# Patient Record
Sex: MALE | URBAN - METROPOLITAN AREA
[De-identification: names, ages, dates, MRNs, and addresses within clinical notes are randomized per-mention and may not be internally consistent; named-entity substitution may affect disease eponyms.]

---

## 2017-02-11 ENCOUNTER — EMERGENCY (EMERGENCY)
Facility: HOSPITAL | Age: 82
LOS: 1 days | Discharge: HOME | End: 2017-02-11
Attending: EMERGENCY MEDICINE | Admitting: EMERGENCY MEDICINE
Payer: MEDICARE

## 2017-02-11 VITALS
OXYGEN SATURATION: 98 % | SYSTOLIC BLOOD PRESSURE: 168 MMHG | HEIGHT: 68 IN | RESPIRATION RATE: 18 BRPM | WEIGHT: 134.92 LBS | DIASTOLIC BLOOD PRESSURE: 67 MMHG | TEMPERATURE: 98 F | HEART RATE: 78 BPM

## 2017-02-11 PROCEDURE — 99283 EMERGENCY DEPT VISIT LOW MDM: CPT

## 2017-02-11 PROCEDURE — 73130 X-RAY EXAM OF HAND: CPT | Mod: 26,LT

## 2017-02-11 NOTE — ED ADULT TRIAGE NOTE - CHIEF COMPLAINT QUOTE
Pt A&Ox3. Pt states he tripped on a raised slab of sidewalk and fell forward using his L hand to break the fall. Pt c/o L knee pain and L hand pain. Pts 4th digit wrapped in gauze by EMS to control bleeding from lac on finger. Pt denies CP/SOB; denies hitting head.

## 2017-02-12 RX ORDER — OXYCODONE AND ACETAMINOPHEN 5; 325 MG/1; MG/1
1 TABLET ORAL
Qty: 12 | Refills: 0
Start: 2017-02-12 | End: 2017-02-15

## 2017-02-12 NOTE — ED PROVIDER NOTE - OBJECTIVE STATEMENT
82 yo M s/p mech. slip and fall, fell forward onto knees and L hand. No head injury, no LOC, no neck c/o. Was helped up and was able to ambulate, notes abrasion to L fingertip and to R knee. Denies anticoagulant use or meds, denies PMH.

## 2017-02-13 ENCOUNTER — EMERGENCY (EMERGENCY)
Facility: HOSPITAL | Age: 82
LOS: 1 days | Discharge: HOME | End: 2017-02-13
Attending: EMERGENCY MEDICINE | Admitting: EMERGENCY MEDICINE
Payer: MEDICARE

## 2017-02-13 VITALS
HEART RATE: 81 BPM | RESPIRATION RATE: 18 BRPM | OXYGEN SATURATION: 100 % | DIASTOLIC BLOOD PRESSURE: 77 MMHG | SYSTOLIC BLOOD PRESSURE: 159 MMHG | TEMPERATURE: 98 F

## 2017-02-13 PROCEDURE — 73130 X-RAY EXAM OF HAND: CPT | Mod: 26,LT

## 2017-02-13 PROCEDURE — 99283 EMERGENCY DEPT VISIT LOW MDM: CPT | Mod: GC

## 2017-02-13 NOTE — ED PROVIDER NOTE - OBJECTIVE STATEMENT
83yom with left 4th metacarpal fracture diagnosed on 2/11 at [facility] after mechanical fall presents for follow up visit. Pt was told to follow up with ortho within 24hr but has not been able to get an appointment w/ hand until tomorrow night, so he was concerned and came to get it check. Pt arrives in ulnar splint, feels like his hand has swollen but denies any worsening pain, numbness, tingling. Has noted some increased bruising in his exposed 2nd/3rd digits. Non-displaced, non-angulated fracture on prior imaging. 83yom with left 4th metacarpal fracture diagnosed on 2/11 at [facility] after mechanical fall presents for follow up visit. Pt was told to follow up with ortho within 24hr but has not been able to get an appointment w/ hand until tomorrow night, so he was concerned and came to get it check. Pt arrives in ulnar splint, feels like his hand has swollen but denies any worsening pain, numbness, tingling. Has noted some increased bruising in his exposed 2nd/3rd digits. Non-displaced, non-angulated fracture on prior imaging. No repeat trauma.

## 2017-02-13 NOTE — ED PROVIDER NOTE - ATTENDING CONTRIBUTION TO CARE
82 yo male sp fall 2 days ago with 4th metatarsal fracture splinted presents because his FU with ortho is tomorrow but he was told to fu in 48 hours.  Pt denies paresthesias, feels his ecchymosis and swelling has incresed . afvss nad sitting in chair normal sensation normal pulses + ecchymosis + tenderness at 4th metatarsal/repeat xray with improved alignment since yesterday fu with ortho tomorrow at 720 pm

## 2017-02-13 NOTE — ED ADULT TRIAGE NOTE - CHIEF COMPLAINT QUOTE
Pt states he injured his left hand on saturday, was told he had a fracture and told to see ortho within 24hrs, pt unable to get an appt and is worried. Pt noted with splint and ace bandage to extremity, +swelling to hand

## 2018-02-10 ENCOUNTER — EMERGENCY (EMERGENCY)
Facility: HOSPITAL | Age: 83
LOS: 1 days | Discharge: HOME | End: 2018-02-10
Attending: EMERGENCY MEDICINE | Admitting: EMERGENCY MEDICINE
Payer: MEDICARE

## 2018-02-10 VITALS
OXYGEN SATURATION: 100 % | RESPIRATION RATE: 20 BRPM | HEART RATE: 92 BPM | SYSTOLIC BLOOD PRESSURE: 133 MMHG | DIASTOLIC BLOOD PRESSURE: 65 MMHG | TEMPERATURE: 98 F

## 2018-02-10 LAB
ALBUMIN SERPL ELPH-MCNC: 4.1 G/DL — SIGNIFICANT CHANGE UP (ref 3.3–5)
ALP SERPL-CCNC: 58 U/L — SIGNIFICANT CHANGE UP (ref 40–120)
ALT FLD-CCNC: 12 U/L — SIGNIFICANT CHANGE UP (ref 4–41)
APTT BLD: 28.4 SEC — SIGNIFICANT CHANGE UP (ref 27.5–37.4)
AST SERPL-CCNC: 16 U/L — SIGNIFICANT CHANGE UP (ref 4–40)
BASOPHILS # BLD AUTO: 0.03 K/UL — SIGNIFICANT CHANGE UP (ref 0–0.2)
BASOPHILS NFR BLD AUTO: 0.5 % — SIGNIFICANT CHANGE UP (ref 0–2)
BILIRUB SERPL-MCNC: 0.5 MG/DL — SIGNIFICANT CHANGE UP (ref 0.2–1.2)
BUN SERPL-MCNC: 14 MG/DL — SIGNIFICANT CHANGE UP (ref 7–23)
CALCIUM SERPL-MCNC: 8.9 MG/DL — SIGNIFICANT CHANGE UP (ref 8.4–10.5)
CHLORIDE SERPL-SCNC: 103 MMOL/L — SIGNIFICANT CHANGE UP (ref 98–107)
CO2 SERPL-SCNC: 27 MMOL/L — SIGNIFICANT CHANGE UP (ref 22–31)
CREAT SERPL-MCNC: 1.04 MG/DL — SIGNIFICANT CHANGE UP (ref 0.5–1.3)
EOSINOPHIL # BLD AUTO: 0.04 K/UL — SIGNIFICANT CHANGE UP (ref 0–0.5)
EOSINOPHIL NFR BLD AUTO: 0.6 % — SIGNIFICANT CHANGE UP (ref 0–6)
GLUCOSE SERPL-MCNC: 113 MG/DL — HIGH (ref 70–99)
HBA1C BLD-MCNC: 5.6 % — SIGNIFICANT CHANGE UP (ref 4–5.6)
HCT VFR BLD CALC: 44.4 % — SIGNIFICANT CHANGE UP (ref 39–50)
HGB BLD-MCNC: 14.7 G/DL — SIGNIFICANT CHANGE UP (ref 13–17)
IMM GRANULOCYTES # BLD AUTO: 0.03 # — SIGNIFICANT CHANGE UP
IMM GRANULOCYTES NFR BLD AUTO: 0.5 % — SIGNIFICANT CHANGE UP (ref 0–1.5)
INR BLD: 0.95 — SIGNIFICANT CHANGE UP (ref 0.88–1.17)
LYMPHOCYTES # BLD AUTO: 1.21 K/UL — SIGNIFICANT CHANGE UP (ref 1–3.3)
LYMPHOCYTES # BLD AUTO: 19.5 % — SIGNIFICANT CHANGE UP (ref 13–44)
MCHC RBC-ENTMCNC: 30.8 PG — SIGNIFICANT CHANGE UP (ref 27–34)
MCHC RBC-ENTMCNC: 33.1 % — SIGNIFICANT CHANGE UP (ref 32–36)
MCV RBC AUTO: 92.9 FL — SIGNIFICANT CHANGE UP (ref 80–100)
MONOCYTES # BLD AUTO: 0.59 K/UL — SIGNIFICANT CHANGE UP (ref 0–0.9)
MONOCYTES NFR BLD AUTO: 9.5 % — SIGNIFICANT CHANGE UP (ref 2–14)
NEUTROPHILS # BLD AUTO: 4.29 K/UL — SIGNIFICANT CHANGE UP (ref 1.8–7.4)
NEUTROPHILS NFR BLD AUTO: 69.4 % — SIGNIFICANT CHANGE UP (ref 43–77)
NRBC # FLD: 0 — SIGNIFICANT CHANGE UP
PLATELET # BLD AUTO: 260 K/UL — SIGNIFICANT CHANGE UP (ref 150–400)
PMV BLD: 9.1 FL — SIGNIFICANT CHANGE UP (ref 7–13)
POTASSIUM SERPL-MCNC: 4.4 MMOL/L — SIGNIFICANT CHANGE UP (ref 3.5–5.3)
POTASSIUM SERPL-SCNC: 4.4 MMOL/L — SIGNIFICANT CHANGE UP (ref 3.5–5.3)
PROT SERPL-MCNC: 6.8 G/DL — SIGNIFICANT CHANGE UP (ref 6–8.3)
PROTHROM AB SERPL-ACNC: 10.5 SEC — SIGNIFICANT CHANGE UP (ref 9.8–13.1)
RBC # BLD: 4.78 M/UL — SIGNIFICANT CHANGE UP (ref 4.2–5.8)
RBC # FLD: 13.7 % — SIGNIFICANT CHANGE UP (ref 10.3–14.5)
SODIUM SERPL-SCNC: 140 MMOL/L — SIGNIFICANT CHANGE UP (ref 135–145)
WBC # BLD: 6.19 K/UL — SIGNIFICANT CHANGE UP (ref 3.8–10.5)
WBC # FLD AUTO: 6.19 K/UL — SIGNIFICANT CHANGE UP (ref 3.8–10.5)

## 2018-02-10 PROCEDURE — 70496 CT ANGIOGRAPHY HEAD: CPT | Mod: 26

## 2018-02-10 PROCEDURE — 70450 CT HEAD/BRAIN W/O DYE: CPT | Mod: 26,59

## 2018-02-10 PROCEDURE — 70498 CT ANGIOGRAPHY NECK: CPT | Mod: 26

## 2018-02-10 PROCEDURE — 99218: CPT | Mod: GC

## 2018-02-10 RX ORDER — SODIUM CHLORIDE 9 MG/ML
1000 INJECTION INTRAMUSCULAR; INTRAVENOUS; SUBCUTANEOUS ONCE
Qty: 0 | Refills: 0 | Status: COMPLETED | OUTPATIENT
Start: 2018-02-10 | End: 2018-02-10

## 2018-02-10 RX ORDER — METOCLOPRAMIDE HCL 10 MG
10 TABLET ORAL ONCE
Qty: 0 | Refills: 0 | Status: COMPLETED | OUTPATIENT
Start: 2018-02-10 | End: 2018-02-10

## 2018-02-10 RX ORDER — KETOROLAC TROMETHAMINE 30 MG/ML
15 SYRINGE (ML) INJECTION ONCE
Qty: 0 | Refills: 0 | Status: DISCONTINUED | OUTPATIENT
Start: 2018-02-10 | End: 2018-02-10

## 2018-02-10 RX ORDER — ACETAMINOPHEN 500 MG
650 TABLET ORAL ONCE
Qty: 0 | Refills: 0 | Status: COMPLETED | OUTPATIENT
Start: 2018-02-10 | End: 2018-02-10

## 2018-02-10 RX ADMIN — Medication 650 MILLIGRAM(S): at 19:37

## 2018-02-10 RX ADMIN — SODIUM CHLORIDE 1000 MILLILITER(S): 9 INJECTION INTRAMUSCULAR; INTRAVENOUS; SUBCUTANEOUS at 17:37

## 2018-02-10 RX ADMIN — Medication 650 MILLIGRAM(S): at 20:46

## 2018-02-10 RX ADMIN — Medication 10 MILLIGRAM(S): at 17:37

## 2018-02-10 RX ADMIN — Medication 15 MILLIGRAM(S): at 21:00

## 2018-02-10 RX ADMIN — Medication 15 MILLIGRAM(S): at 20:46

## 2018-02-10 NOTE — ED PROVIDER NOTE - OBJECTIVE STATEMENT
84M h/o peripheral neuropathy and BPH 84M h/o peripheral neuropathy and BPH p/w HA and gait disturbance. HA is b/l, gradual onset at 9am this morning, pressure-like, associated with gait disturbance. No facial droop, slurred speech, vertigo, visual changes, or unilateral numbness/weakness. No fever, cp, sob, or N/V. No falls or head injury. 84M h/o peripheral neuropathy and BPH p/w HA and gait disturbance. HA is b/l, gradual onset at 9am this morning, pressure-like, associated with gait disturbance. No facial droop, slurred speech, vertigo, visual changes, or unilateral numbness/weakness. No fever, cp, sob, or vomiting. +nausea. No falls or head injury.

## 2018-02-10 NOTE — ED CDU PROVIDER INITIAL DAY NOTE - OBJECTIVE STATEMENT
84M h/o peripheral neuropathy and BPH p/w HA and gait disturbance. HA is b/l, gradual onset at 9am this morning, pressure-like, associated with gait disturbance. No facial droop, slurred speech, vertigo, visual changes, or unilateral numbness/weakness. No fever, cp, sob, or vomiting. +nausea. No falls or head injury. 84M h/o peripheral neuropathy, HLD and BPH presents c/o HA and gait disturbance since this am when waking up. CT head/ angio done showing no acute findings, Neuro seen pt. Recommended pt to come to CDU for tele monitoring, MRI c spine to r/0 subacute cord compression, and meds to control HA.

## 2018-02-10 NOTE — ED CDU PROVIDER INITIAL DAY NOTE - ATTENDING CONTRIBUTION TO CARE
I performed a history and physical exam of the patient and discussed their management with the Advanced Care Practitioner. I reviewed the ACP's note and agree with the documented findings and plan of care.     Patient is an 83 yo M with history of hyperlipidemia, BPH, peripheral neuropathy here in CDU for MRI head and neck. Patient came to the emergency department for severe headache and dizziness, difficulty walking upon waking up yesterday. CTA Head/ Neck neg for acute findings. Patient was evaluated by neurology who recommended further imaging.   VS noted  Gen. no acute distress, Non toxic   HEENT: EOMI, mmm, pharynx w/o erythema or exudate  Lungs: CTAB/L no C/ W /R   CVS: RRR   Abd; Soft non tender, non distended   Ext: no edema  Skin: no rash  Neuro AAOx3, CN 2-12 intact, strength 5/5 in UE/ LE non focal clear speech, gait normal  a/p: headache/ dizziness - pending MRI head and neck (to r/o subacute cord compression)  - Ann VIRAMONTES I performed a history and physical exam of the patient and discussed their management with the Advanced Care Practitioner. I reviewed the ACP's note and agree with the documented findings and plan of care.     Patient is an 83 yo M with history of hyperlipidemia, BPH, peripheral neuropathy here in CDU for MRI head and neck. Patient came to the emergency department for severe headache and dizziness, difficulty walking upon waking up yesterday. CTA Head/ Neck neg for acute findings. Patient was evaluated by neurology who recommended further imaging.   VS noted  Gen. no acute distress, Non toxic   HEENT: EOMI, mmm, pharynx w/o erythema or exudate  Lungs: CTAB/L no C/ W /R   CVS: RRR   Abd; Soft non tender, non distended   Ext: no edema  Skin: no rash  Neuro AAOx3, CN 2-12 intact, strength 5/5 in UE/ LE non focal clear speech, gait normal  a/p: headache/ dizziness - pending MRI head and neck (to r/o subacute cord compression)  - Ann VIRAMONTES.

## 2018-02-10 NOTE — ED CDU PROVIDER INITIAL DAY NOTE - MEDICAL DECISION MAKING DETAILS
84M h/o peripheral neuropathy, HLD and BPH presents c/o HA and gait disturbance since this am when waking up. CT head/ angio done showing no acute findings, Neuro seen pt. Recommended pt to come to CDU for tele monitoring, MRI c spine to r/0 subacute cord compression, and meds to control HA.

## 2018-02-10 NOTE — CONSULT NOTE ADULT - SUBJECTIVE AND OBJECTIVE BOX
HPI: The patient is a 83 yo m, with PMHx of neuropathy in BL feet with unknown etiology, presented with headache from this morning when he woke up. Headache did not wake him up. He describes it as bifrontal, constant, worse when he is standing up, no photophobia, no phonophobia. He also noticed some imbalance and problem ambulating accompanied with headache. No falls. No vertigo, no dizziness, no weakness.    MEDICATIONS  (STANDING):    MEDICATIONS  (PRN):    PAST MEDICAL & SURGICAL HISTORY:  Peripheral neuropathy  BPH (benign prostatic hyperplasia)  No significant past surgical history    FAMILY HISTORY:  No pertinent family history in first degree relatives    Allergies    penicillin (Unknown)    Intolerances        SHx - No smoking, No ETOH, No drug abuse    Review of Systems:  See HPI      Vital Signs Last 24 Hrs  T(C): 36.7 (10 Feb 2018 19:38), Max: 36.8 (10 Feb 2018 15:04)  T(F): 98 (10 Feb 2018 19:38), Max: 98.3 (10 Feb 2018 15:04)  HR: 69 (10 Feb 2018 19:38) (66 - 92)  BP: 145/65 (10 Feb 2018 19:38) (133/65 - 182/66)  BP(mean): --  RR: 18 (10 Feb 2018 19:38) (18 - 20)  SpO2: 100% (10 Feb 2018 19:38) (99% - 100%)    General Exam:   General appearance: No acute distress                   Neurological Exam:  Mental Status: Orientated to self, date and place.    No dysarthria, aphasia or neglect.      Cranial Nerves: CN I - not tested.  PERRL, EOMI, VFF, no nystagmus or diplopia.  No APD.    Fundi not visualized bilaterally.    No facial asymmetry.  Hearing intact to finger rub bilaterally.     Motor:   Tone: normal.                  Strength:     [] Upper extremity                      Delt       Bicep    Tricep                                                  R         5/5        5/5        5/5       5/5                                               L          5/5        5/5        5/5       5/5  [] Lower extremity                       HF          KE          KF        DF         PF                                               R        5/5        5/5        5/5       5/5       5/5                                               L         5/5        5/5       5/5       5/5        5/5  Pronator drift: none                 Dysmetria: BL NL FTN  No truncal ataxia.    Tremor: No resting, postural or action tremor.  No myoclonus.    Sensation: intact to light touch, pinprick, vibration and proprioception    Deep Tendon Reflexes:   Right 3+ : BC, TC, BRD   Left 3+ : BC, TC, BRD  Right 4+ Knee, 2+ ankle no clonus  Left 4+ Knee, 2+ ankle no clonus    Toes flexor bilaterally  Gait: unstable, appears mildy spastic    Other:    02-10    140  |  103  |  14  ----------------------------<  113<H>  4.4   |  27  |  1.04    Ca    8.9      10 Feb 2018 16:10    TPro  6.8  /  Alb  4.1  /  TBili  0.5  /  DBili  x   /  AST  16  /  ALT  12  /  AlkPhos  58  02-10                          14.7   6.19  )-----------( 260      ( 10 Feb 2018 16:10 )             44.4       Radiology  < from: CT Angio Head w/ IV Cont (02.10.18 @ 18:28) >  Contrast enhanced CT head: No acute intracranial mass effect or abnormal   enhancement.    CT angiography neck: No evidence of hemodynamically significant stenosis   using NASCET criteria. Mild atherosclerosis of the arch and of the left   vertebral artery resulting in mild stenosis, remainder of the cervical   vertebral arteries are widely patent.    CT angiography brain: No major vessel occlusion or proximal stenosis. 2   mm posterior medially oriented conical outpouching off of the left   supraclinoid internal carotid artery may represent an infundibular   origin, however tiny aneurysm cannot be excluded.    < end of copied text >

## 2018-02-10 NOTE — ED ADULT NURSE NOTE - OBJECTIVE STATEMENT
Pt received in #1, aaox3 with c/o frontal headache with associated dizziness and unsteady gait since ~0900 this AM. Pt states that he was in his normal state of health prior to sleeping last night and woke up this AM with his symptoms. States his headaches normally improve with "aspirin" but his current symptoms have not improved, are more severe than normal and his unsteady gait is not normally associated with his previous headaches. Pt denies any falls, cp, sob, nausea, vomiting or diaphoresis. No facial droop, arm drift or slurred speech noted.   Pt transported to CT for urgent CTH.

## 2018-02-10 NOTE — ED ADULT TRIAGE NOTE - CHIEF COMPLAINT QUOTE
Pt with co waking up with a bad headache. pt states his gait is usually unsteady but today it was worse. Pt denies sob chest. Evaluated by Fit no stroke code, per   in field

## 2018-02-10 NOTE — ED PROVIDER NOTE - MEDICAL DECISION MAKING DETAILS
Pt with HA and gait instability. Concern for posterior CVA causing ataxia. Plan: labs, CT, pain control, neuro c/s, admit for MRI if needed.

## 2018-02-10 NOTE — ED PROVIDER NOTE - ATTENDING CONTRIBUTION TO CARE
ED Attending Dr. Brown: 85 yo male with BPH and non-DM peripheral neuropathy in ED with headache and imbalance.  Pt states that he woke up with headache (was not awoken by headache) and then during the day noted difficulty keeping his balance.  No falls.  No head injury.  No CP, SOB, N/V/D or abdominal pain.  Headache has not changed in quality since beginning--is a frontal "burning" pain.  On exam pt overall well appearing, in NAD, heart RRR, lungs CTAB, abd NTND, extremities without swelling, strength 5/5 in all extremities and skin without rash.  Pt stood up to walk and had tenuous gait, states that his normal gait is more fluid. ED Attending Dr. Brown: 83 yo male with BPH and non-DM peripheral neuropathy in ED with headache and imbalance.  Pt states that he woke up with headache (was not awoken by headache) and then during the day noted difficulty keeping his balance.  No falls.  No head injury.  No CP, SOB, N/V/D or abdominal pain.  Headache has not changed in quality since beginning--is a frontal "burning" pain.  On exam pt overall well appearing, in NAD, heart RRR, lungs CTAB, abd NTND, extremities without swelling, strength 5/5 in all extremities and skin without rash.  Pt stood up to walk and had tenuous gait, states that his normal gait is more fluid

## 2018-02-10 NOTE — ED ADULT NURSE NOTE - ED STAT RN HANDOFF DETAILS
Report given to RODOLFO Greco in the CDU, pt. appears stable, transported to the unit via wheelchair.

## 2018-02-10 NOTE — CONSULT NOTE ADULT - ASSESSMENT
83 yo m, with PMHx of neuropathy in BL feet with unknown etiology, presented with headache from this morning when he woke up. Headache did not wake him up. He describes it as bifrontal, constant, worse when he is standing up, no photophobia, no phonophobia. He also noticed some imbalance and problem ambulating accompanied with headache. Exam shows a supple neck, increased reflexes, BL UE 3+, BL KJ LE 4+, ankles 3+ with no clonus. CTH with contrast unremarkable, CTA H&N shows a 2 mm supraclinoid left ICA aneurysm which is an accidental finding and can't cause these symptoms.  Impression:  [] MRI C Spine no contrast  [] Headache control with Ketorolac, magnesium IV 85 yo m, with PMHx of neuropathy in BL feet with unknown etiology, presented with headache from this morning when he woke up. Headache did not wake him up. He describes it as bifrontal, constant, worse when he is standing up, no photophobia, no phonophobia. He also noticed some imbalance and problem ambulating accompanied with headache. Exam shows a supple neck, increased reflexes, BL UE 3+, BL KJ LE 4+, ankles 3+ with no clonus. Proprioception and vibration intact. CTH with contrast unremarkable, CTA H&N shows a 2 mm supraclinoid left ICA aneurysm which is an accidental finding and can't cause these symptoms.  Impression: no SDH or SAH were seen in CTH, no post aneurysm were identified, exam is non focal except hyperreflexia  Plan: Control headache, r/o c spine subacute cord compression  [] MRI C Spine no contrast  [] Headache control with Ketorolac, magnesium IV

## 2018-02-10 NOTE — ED PROVIDER NOTE - PROGRESS NOTE DETAILS
Dr. Marrero: Was called for a stroke eval. Pt states he woke up with a headache and worsening difficulty walking with feeling he will fall. Pt states having neuropathy to LE but this is worse than normal. Pt denies any falls. Admits to nausea. Denies any vomiting. Denies any visual changes, fever, chills, SOB, chest pain, or abd pain. No focal deficits on exam. Good finger to nose. 5/5 b/l UE and LE. Last normal was last night. Gollogly: CT/CTA neg. Pt seen by neuro. Plan for overnight stay in CDU for MRI. Pt agrees to stay.

## 2018-02-10 NOTE — ED CDU PROVIDER INITIAL DAY NOTE - PMH
BPH (benign prostatic hyperplasia)    Peripheral neuropathy BPH (benign prostatic hyperplasia)    HLD (hyperlipidemia)    Peripheral neuropathy

## 2018-02-11 VITALS
OXYGEN SATURATION: 100 % | TEMPERATURE: 98 F | HEART RATE: 67 BPM | SYSTOLIC BLOOD PRESSURE: 136 MMHG | RESPIRATION RATE: 18 BRPM | DIASTOLIC BLOOD PRESSURE: 75 MMHG

## 2018-02-11 PROCEDURE — 99217: CPT | Mod: GC

## 2018-02-11 PROCEDURE — 72141 MRI NECK SPINE W/O DYE: CPT | Mod: 26

## 2018-02-11 PROCEDURE — 70551 MRI BRAIN STEM W/O DYE: CPT | Mod: 26

## 2018-02-11 RX ORDER — SIMVASTATIN 20 MG/1
5 TABLET, FILM COATED ORAL ONCE
Qty: 0 | Refills: 0 | Status: COMPLETED | OUTPATIENT
Start: 2018-02-11 | End: 2018-02-11

## 2018-02-11 RX ORDER — ACETAMINOPHEN 500 MG
650 TABLET ORAL ONCE
Qty: 0 | Refills: 0 | Status: COMPLETED | OUTPATIENT
Start: 2018-02-11 | End: 2018-02-11

## 2018-02-11 RX ADMIN — Medication 650 MILLIGRAM(S): at 17:27

## 2018-02-11 RX ADMIN — SIMVASTATIN 5 MILLIGRAM(S): 20 TABLET, FILM COATED ORAL at 02:46

## 2018-02-11 RX ADMIN — Medication 0.5 MILLIGRAM(S): at 09:48

## 2018-02-11 RX ADMIN — Medication 650 MILLIGRAM(S): at 17:58

## 2018-02-11 NOTE — ED CDU PROVIDER SUBSEQUENT DAY NOTE - MEDICAL DECISION MAKING DETAILS
Ann VIRAMONTES: Patient is an 85 yo M with history of hyperlipidemia, BPH, peripheral neuropathy here in CDU for MRI head and neck. Patient came to the emergency department for severe headache and dizziness, difficulty walking upon waking up yesterday. CTA Head/ Neck neg for acute findings. Patient was evaluated by neurology who recommended further imaging. Patient reports improvement in headache and describes a slight pressure in the front of his head. No nausea/ vomiting/ dizziness.

## 2018-02-11 NOTE — PROVIDER CONTACT NOTE (OTHER) - ASSESSMENT
Medical team informed pt needs ride home to go home. Writer with the pt and pt informed he has head ache. Writer discussed this case with the medical team in CDU and was informed pt has been medically cleared for discharge. Writer explained the pt on the discharge planning process and pt informed " If I need a ride its my problem and will find my way to go home and no need assistance at this time". Writer even offered calling a cab to get home. medical team was informed about this intervention.

## 2018-02-11 NOTE — ED CDU PROVIDER SUBSEQUENT DAY NOTE - ATTENDING CONTRIBUTION TO CARE
I performed a history and physical exam of the patient and discussed their management with the Advanced Care Practitioner. I reviewed the ACP's note and agree with the documented findings and plan of care.     Patient is an 83 yo M with history of hyperlipidemia, BPH, peripheral neuropathy here in CDU for MRI head and neck. Patient came to the emergency department for severe headache and dizziness, difficulty walking upon waking up yesterday. CTA Head/ Neck neg for acute findings. Patient was evaluated by neurology who recommended further imaging.   VS noted  Gen. no acute distress, Non toxic   HEENT: EOMI, mmm, pharynx w/o erythema or exudate  Lungs: CTAB/L no C/ W /R   CVS: RRR   Abd; Soft non tender, non distended   Ext: no edema  Skin: no rash  Neuro AAOx3, CN 2-12 intact, strength 5/5 in UE/ LE non focal clear speech, gait normal  a/p: headache/ dizziness. Patient reports improvement in headache and describes a slight pressure in the front of his head. No nausea/ vomiting/ dizziness.  - pending MRI head and neck (to r/o subacute cord compression).  - Ann VIRAMONTES

## 2018-02-11 NOTE — ED CDU PROVIDER DISPOSITION NOTE - CLINICAL COURSE
PT presented with headache and wide gait. He was seen by neuro who recommended an mri to r/o stroke. Head ache improved and stroke work up was negative. Pt was given name og neurologist to follow up with. Dr. Bolanos tel

## 2018-02-11 NOTE — ED CDU PROVIDER DISPOSITION NOTE - ATTENDING CONTRIBUTION TO CARE
I performed a history and physical exam of the patient and discussed their management with the Advanced Care Practitioner. I reviewed the ACP's note and agree with the documented findings and plan of care.       Patient is an 83 yo M with history of hyperlipidemia, BPH, peripheral neuropathy here in CDU for MRI head and neck. Patient came to the emergency department for severe headache and dizziness, difficulty walking upon waking up yesterday. CTA Head/ Neck neg for acute findings. Patient was evaluated by neurology who recommended further imaging.   VS noted  Gen. no acute distress, Non toxic   HEENT: EOMI, mmm, pharynx w/o erythema or exudate  Lungs: CTAB/L no C/ W /R   CVS: RRR   Abd; Soft non tender, non distended   Ext: no edema  Skin: no rash  Neuro AAOx3, CN 2-12 intact, strength 5/5 in UE/ LE non focal clear speech, gait normal  a/p: headache/ dizziness. Patient reports improvement in headache and describes a slight pressure in the front of his head. No nausea/ vomiting/ dizziness.  MRI Head/ Neck negative for acute findings. Neurology discussed results with patient and recommended outpatient follow up. Patient reassessed and he is walking independently. Of note: patient is upset that no clear etiology of headache has been found. I discussed results with patient in detail and also observed neurology to discuss results with him. Given no acute findings, patient told to take tylenol for pain and follow up. Patient discharged.   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  No acute or subacute infarction.    MR Neck:   IMPRESSION:  Multilevel degenerative spondylosis as described above. No evidence for   cord signal abnormality. No evidence for soft tissue injury.  - Ann VIRAMONTES.

## 2018-02-11 NOTE — PROGRESS NOTE ADULT - SUBJECTIVE AND OBJECTIVE BOX
Neurology Progress    [facility]84[facility]    HPI:      Past Medical History  HLD (hyperlipidemia)  Peripheral neuropathy  BPH (benign prostatic hyperplasia)  No pertinent past medical history      Past Surgical History  No significant past surgical history      MEDICATIONS           Family history: No history of dementia, strokes, or seizures   FAMILY HISTORY:  No pertinent family history in first degree relatives    SOCIAL HISTORY -- No history of tobacco or alcohol use     Allergies    penicillin (Unknown)    Intolerances            Vital Signs Last 24 Hrs  T(C): 36.8 (11 Feb 2018 06:20), Max: 37.1 (10 Feb 2018 23:50)  T(F): 98.2 (11 Feb 2018 06:20), Max: 98.7 (10 Feb 2018 23:50)  HR: 64 (11 Feb 2018 06:20) (63 - 92)  BP: 132/- (11 Feb 2018 06:20) (132/- - 182/66)  BP(mean): --  RR: 14 (11 Feb 2018 06:20) (14 - 20)  SpO2: 98% (11 Feb 2018 06:20) (98% - 100%)        On Neurological Examination:    Mental Status - Patient is alert, awake, oriented X3. .   Follows commands well and able to answer questions appropriately. Mood and affect  normal  Follow simple commands able to repeat  able to name.  Speech - Fluent no Dysarthria  no  Aphasia                              Cranial Nerves - Extraocular muscle intact  TRACEY Facial symmetry Tongue midline, CnV1to V3 intact gross hearing intact      Motor Exam -   Right upper  5/5 throughout  Left upper 5/5 throughtout  Right lower- 5/5 throughout  Left lower 5/5 throughout  Coordination -finger to nose intact  Muscle tone - is normal all over. No asymmetry is seen.      Sensory    Bilateral intact to light touch    Gait -  normal  no ataxia     GENERAL Exam:     Nontoxic , No Acute Distress   	  HEENT:  normocephalic, atraumatic  		  LUNGS:	Clear bilaterally  No Wheeze      VASCULAR: no carotid brui  	  HEART:	 Normal S1S2   No murmur RRR        	  MUSCULOSKELETAL: Normal Range of Motion  	   SKIN:      Normal   No Ecchymosis               LABS:  CBC Full  -  ( 10 Feb 2018 16:10 )  WBC Count : 6.19 K/uL  Hemoglobin : 14.7 g/dL  Hematocrit : 44.4 %  Platelet Count - Automated : 260 K/uL  Mean Cell Volume : 92.9 fL  Mean Cell Hemoglobin : 30.8 pg  Mean Cell Hemoglobin Concentration : 33.1 %  Auto Neutrophil # : 4.29 K/uL  Auto Lymphocyte # : 1.21 K/uL  Auto Monocyte # : 0.59 K/uL  Auto Eosinophil # : 0.04 K/uL  Auto Basophil # : 0.03 K/uL  Auto Neutrophil % : 69.4 %  Auto Lymphocyte % : 19.5 %  Auto Monocyte % : 9.5 %  Auto Eosinophil % : 0.6 %  Auto Basophil % : 0.5 %      02-10    140  |  103  |  14  ----------------------------<  113<H>  4.4   |  27  |  1.04    Ca    8.9      10 Feb 2018 16:10    TPro  6.8  /  Alb  4.1  /  TBili  0.5  /  DBili  x   /  AST  16  /  ALT  12  /  AlkPhos  58  02-10    Hemoglobin A1C: Hemoglobin A1C, Whole Blood: 5.6 % (02-10 @ 16:10)      LIVER FUNCTIONS - ( 10 Feb 2018 16:10 )  Alb: 4.1 g/dL / Pro: 6.8 g/dL / ALK PHOS: 58 u/L / ALT: 12 u/L / AST: 16 u/L / GGT: x           Vitamin B12   PT/INR - ( 10 Feb 2018 16:10 )   PT: 10.5 SEC;   INR: 0.95          PTT - ( 10 Feb 2018 16:10 )  PTT:28.4 SEC      RADIOLOGY    < from: CT Angio Head w/ IV Cont (02.10.18 @ 18:28) >  EXAM:  CT ANGIO NECK (W)AW IC      EXAM:  CT ANGIO BRAIN (W)AW IC        PROCEDURE DATE:  Feb 10 2018         INTERPRETATION:  CLINICAL INFORMATION: Headache. Ataxia. Balancing   difficulty. Vertebrobasilar insufficiency.     TECHNIQUE: Axial contrast enhanced images of the brain were performed.   Contrast enhanced axial CT images were acquired from the aortic arch to   the vertex of the calvarium, during the angiographic phase.   Three-dimensional maximum intensity projection reformats were generated.    90 cc of Omnipaque-350 mg/ml were administered intravenously, without   immediate complication.    COMPARISON: CT head 2/10/2018.    FINDINGS:     CONTRAST-ENHANCED CT HEAD:    No CT evidence of intracranial hemorrhage, acute territorial infarct,   mass effect, edema, herniation, or hydrocephalus.    The ventricles and sulci appear within normal limits for age.    The mastoid air cells and paranasal sinuses appear clear.    No enhancing mass lesion is identified.    CT ANGIOGRAPHY NECK:    The visualized portions of the aortic arch and proximal great vessels are   unremarkable. The bilateral common, internal, and external carotid   arteries are widely patent without evidence of flow-limiting stenosis,   occlusion, or dissection. There is calcification at the origin of the   left vertebral artery off of the left subclavian artery resulting in mild   stenosis. The remainder of the bilateral cervical vertebral arteries are   widely patent throughout their course. Incidental note is made of a mild   retropharyngeal course of the left common carotid artery.    Visualized lung apices are clear. Regional soft tissues of the neck are   grossly unremarkable.    CT ANGIOGRAPHY BRAIN:    2 mm posterior medially oriented conical outpouching off of the left   supraclinoid internal carotid artery may represent a infundibulum origin   versus tiny aneurysm. Bilateral petrous, cavernous, and supine segments   of the internal carotid arteries are otherwise widely patent. Bilateral   anterior middle cerebral arteries are unremarkable with grossly symmetric   distal branching.    Posterior circulation including the bilateral intracranial vertebral,   basilar, and bilateral posterior cerebral arteries are unremarkable and   widely patent. A right posterior commuting artery is identified. The   origins and proximal portions of the bilateral superior cerebellar and   posterior inferior cerebellar arteries identified.    IMPRESSION:     Contrast enhanced CT head: No acute intracranial mass effect or abnormal   enhancement.    CT angiography neck: No evidence of hemodynamically significant stenosis   using NASCET criteria. Mild atherosclerosis of the arch and of the left   vertebral artery resulting in mild stenosis, remainder of the cervical   vertebral arteries are widely patent.    CT angiography brain: No major vessel occlusion or proximal stenosis. 2   mm posterior medially oriented conical outpouching off of the left   supraclinoid internal carotid artery may represent an infundibular   origin, however tiny aneurysm cannot be excluded.    If clinical symptoms persist or worsen, more sensitive evaluation with   brain MRI may be obtained, if no contraindications exist.              ALBERTO SNOWDEN M.D., RADIOLOGY RESIDENT  This document has been electronically signed.  NATY ARGUELLES M.D., ATTENDING RADIOLOGIST  This document has been electronically signed. Feb 10 2018  9:18PM        < end of copied text >  EKG                 schoenberg

## 2018-11-16 NOTE — ED CDU PROVIDER SUBSEQUENT DAY NOTE - PMH
BPH (benign prostatic hyperplasia)    HLD (hyperlipidemia)    Peripheral neuropathy
# 0.02 0.00 - 0.20 E9/L    Anisocytosis 2+     Poikilocytes 2+     Target Cells 2+    RETICULOCYTES   Result Value Ref Range    Retic Ct Pct 1.3 0.4 - 1.9 %    Retic Ct Abs 0.060 E12/L    Immature Retic Fract 20.7 (H) 3.0 - 15.9 %    Retic HGB Equivalent 21.9 (L) 28.2 - 36.6 pg   Comprehensive Metabolic Panel   Result Value Ref Range    Sodium 138 132 - 146 mmol/L    Potassium 3.8 3.5 - 5.0 mmol/L    Chloride 103 98 - 107 mmol/L    CO2 24 22 - 29 mmol/L    Anion Gap 11 7 - 16 mmol/L    Glucose 123 (H) 74 - 99 mg/dL    BUN 7 6 - 20 mg/dL    CREATININE 0.8 0.5 - 1.0 mg/dL    GFR Non-African American >60 >=60 mL/min/1.73    GFR African American >60     Calcium 8.5 (L) 8.6 - 10.2 mg/dL    Total Protein 7.0 6.4 - 8.3 g/dL    Alb 3.8 3.5 - 5.2 g/dL    Total Bilirubin 0.8 0.0 - 1.2 mg/dL    Alkaline Phosphatase 99 35 - 104 U/L    ALT 6 0 - 32 U/L    AST 11 0 - 31 U/L   EKG 12 Lead   Result Value Ref Range    Ventricular Rate 59 BPM    Atrial Rate 59 BPM    P-R Interval 138 ms    QRS Duration 88 ms    Q-T Interval 418 ms    QTc Calculation (Bazett) 413 ms    P Axis 20 degrees    R Axis 43 degrees    T Axis 24 degrees       Radiology:  XR CHEST PORTABLE   Final Result   1. Indeterminate right basilar airspace disease could be reflective of   atelectasis or pneumonia, nonspecific finding. 2. Stable, prominent cardiomediastinal silhouette with suspected mild   underlying central pulmonary vascular congestion.          ------------------------- NURSING NOTES AND VITALS REVIEWED ---------------------------  Date / Time Roomed:  11/16/2018  1:35 PM  ED Bed Assignment:  01/01    The nursing notes within the ED encounter and vital signs as below have been reviewed.    BP (!) 150/81   Pulse 81   Temp 98.2 °F (36.8 °C) (Oral)   Resp 16   Ht 5' 3\" (1.6 m)   Wt 290 lb (131.5 kg)   LMP 11/12/2018   SpO2 100%   BMI 51.37 kg/m²   Oxygen Saturation Interpretation: Normal      ------------------------------------------ PROGRESS

## 2020-06-24 NOTE — ED PROVIDER NOTE - MEDICAL DECISION MAKING DETAILS
[Patient] : patient
83yom second visit for non-displaced non-angulated 4th metacarpal fracture. Pt has hand follow up tomorrow. No evidence of compartment syndrome. Repeat imaging unchanged, f/u w/ hand.

## 2021-07-26 ENCOUNTER — EMERGENCY (EMERGENCY)
Facility: HOSPITAL | Age: 86
LOS: 1 days | Discharge: HOME | End: 2021-07-26
Attending: EMERGENCY MEDICINE | Admitting: EMERGENCY MEDICINE
Payer: MEDICARE

## 2021-07-26 VITALS
RESPIRATION RATE: 20 BRPM | TEMPERATURE: 98 F | OXYGEN SATURATION: 97 % | DIASTOLIC BLOOD PRESSURE: 67 MMHG | HEIGHT: 68 IN | HEART RATE: 101 BPM | SYSTOLIC BLOOD PRESSURE: 141 MMHG

## 2021-07-26 VITALS
DIASTOLIC BLOOD PRESSURE: 76 MMHG | RESPIRATION RATE: 16 BRPM | OXYGEN SATURATION: 98 % | HEART RATE: 98 BPM | SYSTOLIC BLOOD PRESSURE: 144 MMHG

## 2021-07-26 PROBLEM — N40.0 BENIGN PROSTATIC HYPERPLASIA WITHOUT LOWER URINARY TRACT SYMPTOMS: Chronic | Status: ACTIVE | Noted: 2018-02-10

## 2021-07-26 PROBLEM — G62.9 POLYNEUROPATHY, UNSPECIFIED: Chronic | Status: ACTIVE | Noted: 2018-02-10

## 2021-07-26 LAB
ALBUMIN SERPL ELPH-MCNC: 4.2 G/DL — SIGNIFICANT CHANGE UP (ref 3.3–5)
ALP SERPL-CCNC: 75 U/L — SIGNIFICANT CHANGE UP (ref 40–120)
ALT FLD-CCNC: 9 U/L — SIGNIFICANT CHANGE UP (ref 4–41)
ANION GAP SERPL CALC-SCNC: 12 MMOL/L — SIGNIFICANT CHANGE UP (ref 7–14)
APTT BLD: 29.1 SEC — SIGNIFICANT CHANGE UP (ref 27–36.3)
AST SERPL-CCNC: 18 U/L — SIGNIFICANT CHANGE UP (ref 4–40)
BASOPHILS # BLD AUTO: 0.04 K/UL — SIGNIFICANT CHANGE UP (ref 0–0.2)
BASOPHILS NFR BLD AUTO: 0.4 % — SIGNIFICANT CHANGE UP (ref 0–2)
BILIRUB SERPL-MCNC: 0.4 MG/DL — SIGNIFICANT CHANGE UP (ref 0.2–1.2)
BUN SERPL-MCNC: 18 MG/DL — SIGNIFICANT CHANGE UP (ref 7–23)
CALCIUM SERPL-MCNC: 9.4 MG/DL — SIGNIFICANT CHANGE UP (ref 8.4–10.5)
CHLORIDE SERPL-SCNC: 104 MMOL/L — SIGNIFICANT CHANGE UP (ref 98–107)
CO2 SERPL-SCNC: 24 MMOL/L — SIGNIFICANT CHANGE UP (ref 22–31)
CREAT SERPL-MCNC: 0.97 MG/DL — SIGNIFICANT CHANGE UP (ref 0.5–1.3)
EOSINOPHIL # BLD AUTO: 0.02 K/UL — SIGNIFICANT CHANGE UP (ref 0–0.5)
EOSINOPHIL NFR BLD AUTO: 0.2 % — SIGNIFICANT CHANGE UP (ref 0–6)
GLUCOSE SERPL-MCNC: 114 MG/DL — HIGH (ref 70–99)
HCT VFR BLD CALC: 42.3 % — SIGNIFICANT CHANGE UP (ref 39–50)
HGB BLD-MCNC: 13.6 G/DL — SIGNIFICANT CHANGE UP (ref 13–17)
IANC: 8.26 K/UL — SIGNIFICANT CHANGE UP (ref 1.5–8.5)
IMM GRANULOCYTES NFR BLD AUTO: 0.5 % — SIGNIFICANT CHANGE UP (ref 0–1.5)
INR BLD: 1.08 RATIO — SIGNIFICANT CHANGE UP (ref 0.88–1.16)
LYMPHOCYTES # BLD AUTO: 1.08 K/UL — SIGNIFICANT CHANGE UP (ref 1–3.3)
LYMPHOCYTES # BLD AUTO: 10.6 % — LOW (ref 13–44)
MCHC RBC-ENTMCNC: 29.3 PG — SIGNIFICANT CHANGE UP (ref 27–34)
MCHC RBC-ENTMCNC: 32.2 GM/DL — SIGNIFICANT CHANGE UP (ref 32–36)
MCV RBC AUTO: 91.2 FL — SIGNIFICANT CHANGE UP (ref 80–100)
MONOCYTES # BLD AUTO: 0.7 K/UL — SIGNIFICANT CHANGE UP (ref 0–0.9)
MONOCYTES NFR BLD AUTO: 6.9 % — SIGNIFICANT CHANGE UP (ref 2–14)
NEUTROPHILS # BLD AUTO: 8.26 K/UL — HIGH (ref 1.8–7.4)
NEUTROPHILS NFR BLD AUTO: 81.4 % — HIGH (ref 43–77)
NRBC # BLD: 0 /100 WBCS — SIGNIFICANT CHANGE UP
NRBC # FLD: 0 K/UL — SIGNIFICANT CHANGE UP
PLATELET # BLD AUTO: 279 K/UL — SIGNIFICANT CHANGE UP (ref 150–400)
POTASSIUM SERPL-MCNC: 4.6 MMOL/L — SIGNIFICANT CHANGE UP (ref 3.5–5.3)
POTASSIUM SERPL-SCNC: 4.6 MMOL/L — SIGNIFICANT CHANGE UP (ref 3.5–5.3)
PROT SERPL-MCNC: 7.5 G/DL — SIGNIFICANT CHANGE UP (ref 6–8.3)
PROTHROM AB SERPL-ACNC: 12.3 SEC — SIGNIFICANT CHANGE UP (ref 10.6–13.6)
RBC # BLD: 4.64 M/UL — SIGNIFICANT CHANGE UP (ref 4.2–5.8)
RBC # FLD: 13 % — SIGNIFICANT CHANGE UP (ref 10.3–14.5)
SODIUM SERPL-SCNC: 140 MMOL/L — SIGNIFICANT CHANGE UP (ref 135–145)
WBC # BLD: 10.15 K/UL — SIGNIFICANT CHANGE UP (ref 3.8–10.5)
WBC # FLD AUTO: 10.15 K/UL — SIGNIFICANT CHANGE UP (ref 3.8–10.5)

## 2021-07-26 PROCEDURE — 70498 CT ANGIOGRAPHY NECK: CPT | Mod: 26

## 2021-07-26 PROCEDURE — 70496 CT ANGIOGRAPHY HEAD: CPT | Mod: 26

## 2021-07-26 PROCEDURE — 99285 EMERGENCY DEPT VISIT HI MDM: CPT

## 2021-07-26 NOTE — CONSULT NOTE ADULT - ASSESSMENT
87 y.o. RH male with a PMH HTN, HLD, peripheral neuropathy, BPH, presenting with c/o right hand weakness and numbness upon waking up this AM. Last known normal 3:00 AM 7/26/21. Vitals notable for tachycardia of 101, but otherwise stable on admission. Routine labs without significant findings. Physical exam notable for reduced sensation to pinprick of right 5th and ulnar half of 4th digit in comparison to the left, as well as subjective complaint of weakness of right 5th digit in comparison to the left, however not elicited on exam.     Impression:     Recommendations:    87 y.o. RH male with a PMH HTN, HLD, peripheral neuropathy, BPH, presenting with c/o right hand weakness and numbness upon waking up this AM. Last known normal 3:00 AM 7/26/21. Vitals notable for tachycardia of 101, but otherwise stable on admission. Routine labs without significant findings. Physical exam notable for reduced sensation to pinprick of right 5th and ulnar half of 4th digit in comparison to the left, as well as subjective complaint of weakness of right 5th digit in comparison to the left, however not elicited on exam. CTH, and CTA head and neck without significant findings.      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     Recommendations:   [] Conservative management: elbow brace/pad, avoid prolonged resting on elbow, NSAIDs prn  [] Patient can follow up with general neurology at [address] [place] [address] for EMG to evaluate for weakness. Please instruct the patient to call [phone] to schedule this appointment.     Plan to be discussed with Neurology attending Dr. Ca.  87 y.o. RH male with a PMH HTN, HLD, peripheral neuropathy, BPH, presenting with c/o right hand weakness and numbness upon waking up this AM. Last known normal 3:00 AM 7/26/21. Vitals notable for tachycardia of 101, but otherwise stable on admission. Routine labs without significant findings. Physical exam notable for reduced sensation to pinprick of right 5th and ulnar half of 4th digit in comparison to the left, as well as subjective complaint of weakness of right 5th digit in comparison to the left, however not elicited on exam. CTH, and CTA head and neck without significant findings.      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     Recommendations:   [] Conservative management: elbow brace/pad, avoid prolonged resting on elbow or any activity that may cause compression  [] Patient can follow up with Neurology at [address] [place] [address] for a nerve conduction study.  Please instruct the patient to call [phone] to schedule this appointment.     Plan discussed with Neurology attending Dr. Tran.

## 2021-07-26 NOTE — ED ADULT NURSE NOTE - PAIN: PRESENCE, MLM
denies pain/discomfort Odomzo Counseling- I discussed with the patient the risks of Odomzo including but not limited to nausea, vomiting, diarrhea, constipation, weight loss, changes in the sense of taste, decreased appetite, muscle spasms, and hair loss.  The patient verbalized understanding of the proper use and possible adverse effects of Odomzo.  All of the patient's questions and concerns were addressed.

## 2021-07-26 NOTE — ED PROVIDER NOTE - PROGRESS NOTE DETAILS
Head CT angio wnl. Patient cleated to discharge as per neurology recs. Agrees to follow up with neurology.

## 2021-07-26 NOTE — ED PROVIDER NOTE - NSFOLLOWUPINSTRUCTIONS_ED_ALL_ED_FT
You were seen in the ED for r hand weakness.   The following labs/imaging were obtained: see attached (if applicable)  Continue home medications (if any).   Ibuprofen (Motrin 600mg each 6-8hrs) for pain for no more than 1 week. Take Ibuprofen with meals.  Return to the ED if you develop leg / arm weakness, slurred speech, worsening or new concerning symptoms.  Follow up with your primary care in 2-3 days.   Follow up with neurology at [address] [place] [address] for EMG to evaluate for weakness. call [phone] to schedule this appointment.   Discussed with pt results of work up, strict return precautions, and need for follow up.  Pt expressed understanding and agrees with plan.

## 2021-07-26 NOTE — ED PROVIDER NOTE - ATTENDING CONTRIBUTION TO CARE
pt w/ right hand weakness since wake up, w/ normal neuro exam, will CT, neuro consult.   GEN - NAD; well appearing; A+O x3   HEAD - NC/AT     EYES - EOMI, no conjunctival pallor, no scleral icterus  ENT -   mucous membranes  moist , no discharge      NECK - Neck supple  PULM - CTA b/l,  symmetric breath sounds  COR -  RRR, S1 S2, no murmurs  ABD - , ND, NT, soft, no guarding, no rebound, no masses    BACK - no CVA tenderness, nontender spine     EXTREMS - no edema, no deformity, warm and well perfused    SKIN - no rash or bruising      NEUROLOGIC - A&Ox3, PERRLA, EOMI, no nystagmus, CN2-12 grossly intact, no pronator drift, normal finger to nose and rapid alternating finger movements, normal sensation and 5/5 strength in all extremities

## 2021-07-26 NOTE — ED ADULT NURSE NOTE - CAS EDP DISCH TYPE
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 No clonality on last bx 12/2018; in 2016 he did have atypical cells undetermined significance on blood but saw Ramón and he felt ok.  Thanks, fredrick 
Home

## 2021-07-26 NOTE — ED PROVIDER NOTE - PATIENT PORTAL LINK FT
You can access the FollowMyHealth Patient Portal offered by [facility] by registering at the following website: [email or web address]. By joining [facility]’s FollowMyHealth portal, you will also be able to view your health information using other applications (apps) compatible with our system.

## 2021-07-26 NOTE — ED PROVIDER NOTE - OBJECTIVE STATEMENT
86yo M w/ pmhx of HLD, BPH, peripheral neuropathy presents to the ED c/o R hand weakness x today. Pt woke up with the R hand weakness, last known normal last night before he went to bed. Went to an C prior to arrival and was referred to the ER for further evaluation. Denies any CN weakness, cp, sob, dizziness, syncope, fevers, chills, neck stiffness.

## 2021-07-26 NOTE — ED ADULT NURSE NOTE - NSIMPLEMENTINTERV_GEN_ALL_ED
Implemented All Universal Safety Interventions:  [place] to call system. Call bell, personal items and telephone within reach. Instruct patient to call for assistance. Room bathroom lighting operational. Non-slip footwear when patient is off stretcher. Physically safe environment: no spills, clutter or unnecessary equipment. Stretcher in lowest position, wheels locked, appropriate side rails in place.

## 2021-07-26 NOTE — ED ADULT TRIAGE NOTE - CHIEF COMPLAINT QUOTE
pt coming by EMS from Urgent care for right hand weakness started at 10;30 AM - 11:00 today king. upper and lower ext. equal strength.  denies CP, SOB, no dizziness.

## 2021-07-26 NOTE — CONSULT NOTE ADULT - SUBJECTIVE AND OBJECTIVE BOX
Neurology Consult    HPI:  87 y.o. RH male with a PMH HTN, HLD, peripheral neuropathy, BPH, presenting with c/o right hand weakness and numbness. Last known normal 3:00 AM 7/26/21, when he went to bed. Patient reports noticing symptoms on awaking this AM. Reports having coins fall from his hand due to inability to hold onto them. Though sensation of the coins in his hands was intact, he reports this was reduced as well. Patient also reports feeling his handwriting is slightly off due to same. On further questioning he specifies weakness localized specifically to his right fifth digit. He denies any sensation of weakness or numbness to his right shoulder/arm/forearm or LUE. He reports chronic peripheral neuropathy of his bilateral lower extremity but denies new weakness or change in sensation as well. He reports sleeping with his arms crossed beneath his head, but otherwise denies placing his RUE extremity in odd positions or performing activity resulting in excessive use of his right wrist. Patient also denies any associated headache, dizziness or light headedness, changes in vision or hearing, chest pain, palpitations, changes in gait, recent fall or injury. He denies having similar symptoms in the past, as well as any history of stroke.    REVIEW OF SYSTEMS:  A 10-system ROS was performed and is negative except as noted above and/or in the HPI.      PAST MEDICAL & SURGICAL HISTORY:  BPH (benign prostatic hyperplasia)    Peripheral neuropathy    HLD (hyperlipidemia)    No significant past surgical history      FAMILY HISTORY:  No pertinent family history in first degree relatives      SOCIAL HISTORY:       MEDICATIONS    Home Medications:    MEDICATIONS  (STANDING):    MEDICATIONS  (PRN):      ALLERGIES    penicillin (Unknown)      VITALS & EXAMINATION    Height (cm): 172.7 (07-26 @ 13:50)    Vital Signs Last 24 Hrs  T(C): 36.8 (26 Jul 2021 17:51), Max: 36.8 (26 Jul 2021 17:51)  T(F): 98.2 (26 Jul 2021 17:51), Max: 98.2 (26 Jul 2021 17:51)  HR: 98 (26 Jul 2021 17:51) (98 - 101)  BP: 160/75 (26 Jul 2021 17:51) (141/67 - 160/75)  RR: 16 (26 Jul 2021 17:51) (16 - 20)  SpO2: 98% (26 Jul 2021 17:51) (97% - 98%)    General Exam:  Constitutional: NAD, pleasant, cooperative  Head: NT/AT   Chest: No signs of resp distress, on room air  Abd: Soft, NTTP  Extremities: No edema  	    Neuro Exam:  Mental Status: Patient is alert, awake, oriented to person, place, situation and time. Speech is clear. Naming and repetition intact, fluent with good comprehension. Follows 3-step commands. Recalls 2/3 objects at 5 minutes.                             Cranial Nerves: Pupils are equal, round, and reactive to light. Visual fields are intact to confrontation. Ocular movements are intact. Face is symmetric at rest and with activation with intact sensation throughout. Hearing intact to finger rub bilaterally. Muscles of tongue and palate activate symmetrically. No dysarthria. Strength is full in sternocleidomastoid and trapezius bilaterally.     Motor:  Muscle bulk and tone are normal. Intact fine motor movements bilaterally. There is no pronator drift.              Deltoid	Biceps	Triceps	Wrist ext	Finger ABd	   R	5	5	5	5	               5		5 	  L	5	5	5	5	               5		5    	H-Flex	H-Ext	K-Flex	K-Ext	D-Flex	P-Flex  R	5	5	5	5	5	5 	   L	5	5	5	5	5	5      Sensation is intact to light touch and proprioception throughout. Reduced sensation to pinprick on R 5th and ulnar half of 4th digit in comparison to left. Romberg is negative.    Reflexes:  2+ and symmetric at the biceps, triceps, brachioradialis, patella, and Achilles bilaterally. Plantar response is flexor bilaterally.        Coordination: No dysmetria on finger-nose-finger or heel-knee-shin.     Gait: Deferred.      LABS:    CBC                       13.6   10.15 )-----------( 279      ( 26 Jul 2021 17:20 )             42.3     Chem 07-26    140  |  104  |  18  ----------------------------<  114<H>  4.6   |  24  |  0.97    Ca    9.4      26 Jul 2021 17:20    LFTs LIVER FUNCTIONS - ( 26 Jul 2021 17:20 )  TPro  7.5  /  Alb  4.2  /  TBili  0.4  /  DBili  x   /  AST  18  /  ALT  9   /  AlkPhos  75  07-26    Coags PT/INR - ( 26 Jul 2021 18:19 )   PT: 12.3 sec;   INR: 1.08 ratio    PTT - ( 26 Jul 2021 18:19 )  PTT:29.1 sec      Lipid Panel   A1c   Cardiac enzymes     U/A     STUDIES & IMAGING   Neurology Consult    HPI:  87 y.o. RH male with a PMH HTN, HLD, peripheral neuropathy, BPH, presenting with c/o right hand weakness and numbness. Last known normal 3:00 AM 7/26/21, when he went to bed. Patient reports noticing symptoms on awaking this AM. Reports having coins fall from his hand due to inability to hold onto them. Though sensation of the coins in his hands was intact, he reports this was reduced as well. Patient also reports feeling his handwriting is slightly off due to same. On further questioning he specifies weakness localized specifically to his right fifth digit. He denies any sensation of weakness or numbness to his right shoulder/arm/forearm or LUE. He reports chronic peripheral neuropathy of his bilateral lower extremity but denies new weakness or change in sensation as well. He reports sleeping with his arms crossed beneath his head, but otherwise denies placing his RUE extremity in odd positions or performing activity resulting in excessive use of his right wrist. Patient also denies any associated headache, dizziness or light headedness, changes in vision or hearing, chest pain, palpitations, changes in gait, recent fall or injury. He denies having similar symptoms in the past, as well as any history of stroke.    REVIEW OF SYSTEMS:  A 10-system ROS was performed and is negative except as noted above and/or in the HPI.      PAST MEDICAL & SURGICAL HISTORY:  BPH (benign prostatic hyperplasia)    Peripheral neuropathy    HLD (hyperlipidemia)    No significant past surgical history      FAMILY HISTORY:  No pertinent family history in first degree relatives      SOCIAL HISTORY:       MEDICATIONS    Home Medications:    MEDICATIONS  (STANDING):    MEDICATIONS  (PRN):      ALLERGIES    penicillin (Unknown)      VITALS & EXAMINATION    Height (cm): 172.7 (07-26 @ 13:50)    Vital Signs Last 24 Hrs  T(C): 36.8 (26 Jul 2021 17:51), Max: 36.8 (26 Jul 2021 17:51)  T(F): 98.2 (26 Jul 2021 17:51), Max: 98.2 (26 Jul 2021 17:51)  HR: 98 (26 Jul 2021 17:51) (98 - 101)  BP: 160/75 (26 Jul 2021 17:51) (141/67 - 160/75)  RR: 16 (26 Jul 2021 17:51) (16 - 20)  SpO2: 98% (26 Jul 2021 17:51) (97% - 98%)    General Exam:  Constitutional: NAD, pleasant, cooperative  Head: NT/AT   Chest: No signs of resp distress, on room air  Abd: Soft, NTTP  Extremities: No edema  	    Neuro Exam:  Mental Status: Patient is alert, awake, oriented to person, place, situation and time. Speech is clear. Naming and repetition intact, fluent with good comprehension. Follows 3-step commands. Recalls 2/3 objects at 5 minutes.                             Cranial Nerves: Pupils are equal, round, and reactive to light. Visual fields are intact to confrontation. Ocular movements are intact. Face is symmetric at rest and with activation with intact sensation throughout. Hearing intact to finger rub bilaterally. Muscles of tongue and palate activate symmetrically. No dysarthria. Strength is full in sternocleidomastoid and trapezius bilaterally.     Motor:  Muscle bulk and tone are normal. Intact fine motor movements bilaterally. There is no pronator drift.              Deltoid	Biceps	Triceps	Wrist ext	Finger ABd	   R	5	5	5	5	               5		5 	  L	5	5	5	5	               5		5    	H-Flex	H-Ext	K-Flex	K-Ext	D-Flex	P-Flex  R	5	5	5	5	5	5 	   L	5	5	5	5	5	5      Sensation is intact to light touch and proprioception throughout. Reduced sensation to pinprick on R 5th and ulnar half of 4th digit in comparison to left. Romberg is negative.    Reflexes:  2+ and symmetric at the biceps, triceps, brachioradialis, patella, and Achilles bilaterally. Plantar response is flexor bilaterally.        Coordination: No dysmetria on finger-nose-finger or heel-knee-shin.     Gait: Deferred.      LABS:    CBC                       13.6   10.15 )-----------( 279      ( 26 Jul 2021 17:20 )             42.3     Chem 07-26    140  |  104  |  18  ----------------------------<  114<H>  4.6   |  24  |  0.97    Ca    9.4      26 Jul 2021 17:20    LFTs LIVER FUNCTIONS - ( 26 Jul 2021 17:20 )  TPro  7.5  /  Alb  4.2  /  TBili  0.4  /  DBili  x   /  AST  18  /  ALT  9   /  AlkPhos  75  07-26    Coags PT/INR - ( 26 Jul 2021 18:19 )   PT: 12.3 sec;   INR: 1.08 ratio    PTT - ( 26 Jul 2021 18:19 )  PTT:29.1 sec      Lipid Panel   A1c   Cardiac enzymes     U/A     STUDIES & IMAGING    CT BRAIN (7/26/21): No acute intracranial hemorrhage, mass effect, midline shift. No abnormal areas of enhancement are identified.    CTA NECK AND BRAIN (7/26/21): No obstruction, hemodynamically significant stenosis, dissection, or aneurysm.   Neurology Consult    HPI:  87 y.o. RH male with a PMH HTN, HLD, peripheral neuropathy, BPH, presenting with c/o right hand weakness and numbness. Last known normal 3:00 AM 7/26/21, when he went to bed. Patient reports noticing symptoms on awaking this AM. Reports having coins fall from his hand due to inability to hold onto them. Though sensation of the coins in his hands was intact, he reports this was reduced as well. Patient also reports feeling his handwriting is slightly off due to same. On further questioning he specifies weakness localized specifically to his right fifth digit. He denies any sensation of weakness or numbness to his right shoulder/arm/forearm or LUE. He reports chronic peripheral neuropathy of his bilateral lower extremity but denies new weakness or change in sensation as well. He reports sleeping with his arms crossed beneath his head, but otherwise denies placing his RUE extremity in odd positions or performing activity resulting in excessive use of his right wrist. Patient also denies any associated headache, dizziness or light headedness, changes in vision or hearing, chest pain, palpitations, changes in gait, recent fall or injury. He denies having similar symptoms in the past, as well as any history of stroke.    REVIEW OF SYSTEMS:  A 10-system ROS was performed and is negative except as noted above and/or in the HPI.      PAST MEDICAL & SURGICAL HISTORY:  BPH (benign prostatic hyperplasia)    Peripheral neuropathy    HLD (hyperlipidemia)    No significant past surgical history      FAMILY HISTORY:  No pertinent family history in first degree relatives      ALLERGIES    penicillin (Unknown)      VITALS & EXAMINATION    Height (cm): 172.7 (07-26 @ 13:50)    Vital Signs Last 24 Hrs  T(C): 36.8 (26 Jul 2021 17:51), Max: 36.8 (26 Jul 2021 17:51)  T(F): 98.2 (26 Jul 2021 17:51), Max: 98.2 (26 Jul 2021 17:51)  HR: 98 (26 Jul 2021 17:51) (98 - 101)  BP: 160/75 (26 Jul 2021 17:51) (141/67 - 160/75)  RR: 16 (26 Jul 2021 17:51) (16 - 20)  SpO2: 98% (26 Jul 2021 17:51) (97% - 98%)    General Exam:  Constitutional: NAD, pleasant, cooperative  Head: NT/AT   Chest: No signs of resp distress, on room air  Abd: Soft, NTTP  Extremities: No edema  	    Neuro Exam:  Mental Status: Patient is alert, awake, oriented to person, place, situation and time. Speech is clear. Naming and repetition intact, fluent with good comprehension. Follows 3-step commands. Recalls 2/3 objects at 5 minutes.                             Cranial Nerves: Pupils are equal, round, and reactive to light. Visual fields are intact to confrontation. Ocular movements are intact. Face is symmetric at rest and with activation with intact sensation throughout. Hearing intact to finger rub bilaterally. Muscles of tongue and palate activate symmetrically. No dysarthria. Strength is full in sternocleidomastoid and trapezius bilaterally.     Motor:  Muscle bulk and tone are normal. Intact fine motor movements bilaterally. There is no pronator drift.              Deltoid	Biceps	Triceps	Wrist ext	Finger ABd	   R	5	5	5	5	               5		5 	  L	5	5	5	5	               5		5    	H-Flex	H-Ext	K-Flex	K-Ext	D-Flex	P-Flex  R	5	5	5	5	5	5 	   L	5	5	5	5	5	5      Sensation is intact to light touch and proprioception throughout. Reduced sensation to pinprick on R 5th and ulnar half of 4th digit in comparison to left. Romberg is negative.    Reflexes:  2+ and symmetric at the biceps, triceps, brachioradialis, patella, and Achilles bilaterally. Plantar response is flexor bilaterally.        Coordination: No dysmetria on finger-nose-finger or heel-knee-shin.     Gait: Deferred.      LABS:    CBC                       13.6   10.15 )-----------( 279      ( 26 Jul 2021 17:20 )             42.3     Chem 07-26    140  |  104  |  18  ----------------------------<  114<H>  4.6   |  24  |  0.97    Ca    9.4      26 Jul 2021 17:20    LFTs LIVER FUNCTIONS - ( 26 Jul 2021 17:20 )  TPro  7.5  /  Alb  4.2  /  TBili  0.4  /  DBili  x   /  AST  18  /  ALT  9   /  AlkPhos  75  07-26    Coags PT/INR - ( 26 Jul 2021 18:19 )   PT: 12.3 sec;   INR: 1.08 ratio    PTT - ( 26 Jul 2021 18:19 )  PTT:29.1 sec      Lipid Panel   A1c   Cardiac enzymes     U/A     STUDIES & IMAGING    CT BRAIN (7/26/21): No acute intracranial hemorrhage, mass effect, midline shift. No abnormal areas of enhancement are identified.    CTA NECK AND BRAIN (7/26/21): No obstruction, hemodynamically significant stenosis, dissection, or aneurysm.   Neurology Consult    HPI:  87 y.o. RH male with a PMH HTN, HLD, peripheral neuropathy, BPH, presenting with c/o right hand weakness and numbness. Last known normal 3:00 AM 7/26/21, when he went to bed. Patient reports noticing symptoms on awaking this AM. Reports having coins fall from his hand due to inability to hold onto them. Though sensation of the coins in his hands was intact, he reports this was reduced as well. Patient also reports feeling his handwriting is slightly off due to same. On further questioning he specifies weakness localized specifically to his right fifth digit. He denies any sensation of weakness or numbness to his right shoulder/arm/forearm or LUE. He reports chronic peripheral neuropathy of his bilateral lower extremity but denies new weakness or change in sensation as well. He reports sleeping with his arms crossed beneath his head, but otherwise denies placing his RUE extremity in odd positions or performing activity resulting in excessive use of his right wrist. Patient also denies any associated headache, dizziness or light headedness, changes in vision or hearing, chest pain, palpitations, changes in gait, recent fall or injury. He denies having similar symptoms in the past, as well as any history of stroke.    REVIEW OF SYSTEMS:  A 10-system ROS was performed and is negative except as noted above and/or in the HPI.      PAST MEDICAL & SURGICAL HISTORY:  BPH (benign prostatic hyperplasia)    Peripheral neuropathy    HLD (hyperlipidemia)    No significant past surgical history      FAMILY HISTORY:  No pertinent family history in first degree relatives      ALLERGIES    penicillin (Unknown)      VITALS & EXAMINATION    Height (cm): 172.7 (07-26 @ 13:50)    Vital Signs Last 24 Hrs  T(C): 36.8 (26 Jul 2021 17:51), Max: 36.8 (26 Jul 2021 17:51)  T(F): 98.2 (26 Jul 2021 17:51), Max: 98.2 (26 Jul 2021 17:51)  HR: 98 (26 Jul 2021 17:51) (98 - 101)  BP: 160/75 (26 Jul 2021 17:51) (141/67 - 160/75)  RR: 16 (26 Jul 2021 17:51) (16 - 20)  SpO2: 98% (26 Jul 2021 17:51) (97% - 98%)    General Exam:  Constitutional: NAD, pleasant, cooperative  Head: NT/AT   Chest: No signs of resp distress, on room air  Abd: Soft, NTTP  Extremities: No edema  	    Neuro Exam:  Mental Status: Patient is alert, awake, oriented to person, place, situation and time. Speech is clear. Naming and repetition intact, fluent with good comprehension. Follows 3-step commands. Recalls 2/3 objects at 5 minutes.                             Cranial Nerves: Pupils are equal, round, and reactive to light. Visual fields are intact to confrontation. Ocular movements are intact. Face is symmetric at rest and with activation with intact sensation throughout. Hearing intact to finger rub bilaterally. Muscles of tongue and palate activate symmetrically. No dysarthria. Strength is full in sternocleidomastoid and trapezius bilaterally.     Motor:  Muscle bulk and tone are normal. Intact fine motor movements bilaterally. There is no pronator drift.              Deltoid	Biceps	Triceps	Wrist ext	Finger ABd	   R	5	5	5	5	               5		5 	  L	5	5	5	5	               5		5    	H-Flex	H-Ext	K-Flex	K-Ext	D-Flex	P-Flex  R	5	5	5	5	5	5 	   L	5	5	5	5	5	5      Sensation is intact to light touch and proprioception throughout. Reduced sensation to pinprick on R 5th and ulnar half of 4th digit in comparison to left. Negative phalen and tinel signs.    Reflexes:  2+ and symmetric at the biceps, triceps, brachioradialis, patella, and Achilles bilaterally. Plantar response is flexor bilaterally.        Coordination: No dysmetria on finger-nose-finger or heel-knee-shin.     Gait: Deferred.      LABS:    CBC                       13.6   10.15 )-----------( 279      ( 26 Jul 2021 17:20 )             42.3     Chem 07-26    140  |  104  |  18  ----------------------------<  114<H>  4.6   |  24  |  0.97    Ca    9.4      26 Jul 2021 17:20    LFTs LIVER FUNCTIONS - ( 26 Jul 2021 17:20 )  TPro  7.5  /  Alb  4.2  /  TBili  0.4  /  DBili  x   /  AST  18  /  ALT  9   /  AlkPhos  75  07-26    Coags PT/INR - ( 26 Jul 2021 18:19 )   PT: 12.3 sec;   INR: 1.08 ratio    PTT - ( 26 Jul 2021 18:19 )  PTT:29.1 sec      Lipid Panel   A1c   Cardiac enzymes     U/A     STUDIES & IMAGING    CT BRAIN (7/26/21): No acute intracranial hemorrhage, mass effect, midline shift. No abnormal areas of enhancement are identified.    CTA NECK AND BRAIN (7/26/21): No obstruction, hemodynamically significant stenosis, dissection, or aneurysm.

## 2021-07-26 NOTE — ED ADULT NURSE NOTE - OBJECTIVE STATEMENT
Pt A/Ox4 states he noticed at about 1030am thar his RT hand was weak, denies trauma injury. Pt states he was trying to  some coins and wasn't able to which isnt normal for him. Denies h/a or visual changes, arm strength equal and strong, no drift noted, no facial droop observed, Ambulates with steady gait. Speech clear, no slurring noted. Pt appears well, breathing even and unlabored, skin warm and dry. Awaiting MD painter.

## 2021-07-26 NOTE — ED PROVIDER NOTE - PMH
Goal Outcome Evaluation:         Pt already uic today. She reports 8/10 pain left shoulder and received pain meds just before PT. She also reports completing OT eval with rom/ex with OT today. Pt encouraged to perform BLE ex ad corby in chair and to amb with rwx and nsg asst to bathroom when able. Pt min/cga for STS to rwx and amb 12' in room with cga shuffle step gait pattern. Pt is very labored with activity today. O2 sat on % following amb but pt reports feeling weak.    BPH (benign prostatic hyperplasia)    HLD (hyperlipidemia)    Peripheral neuropathy

## 2021-07-26 NOTE — PROVIDER CONTACT NOTE (OTHER) - ASSESSMENT
Arranged [place] [facility], verified address with pt. He said he has gomez to pay $19. 00, put him in the taxi.

## 2022-04-06 NOTE — ED PROVIDER NOTE - IV ALTEPLASE DOOR HIDDEN
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 Kaiser    show

## 2022-06-17 NOTE — ED PROVIDER NOTE - CROS ED ROS STATEMENT
Refill Routing Note   Medication(s) are not appropriate for processing by [facility] for the following reason(s):      - Non-participating provider    [facility] action(s):  Route          Medication reconciliation completed: No     Appointments  past 12m or future 3m with PCP    Date Provider   Last Visit   Visit date not found Chasity Marcelino MD   Next Visit   Visit date not found Chasity Marcelino MD   ED visits in past 90 days: 0        Note composed:12:08 PM 06/17/2022            all other ROS negative except as per HPI

## 2022-07-07 NOTE — ED ADULT NURSE NOTE - NS ED NURSE RECORD ANOTHER HT AND WT
no lesions, no deformities, no traumatic injuries, no significant scars are present, chest wall non-tender, no masses present, tactile fremitus is normal, breathing is unlabored without accessory muscle use., normal breath sounds. No

## 2022-08-02 NOTE — ED PROVIDER NOTE - NS ED MD DISPO DISCHARGE CCDA
[FreeTextEntry1] : Hyperacusis, reverberation\par denies vertigo or Tullio phenomenon\par causing significant stress\par  and vemp ordered - she said she could not wait today and rescheduled to 22\par advised to get help now - to ask internist for referral, due to the amt of stress it is causing her. 
Patient/Caregiver provided printed discharge information.

## 2023-04-11 ENCOUNTER — INPATIENT (INPATIENT)
Facility: HOSPITAL | Age: 88
LOS: 3 days | Discharge: HOME HEALTH | End: 2023-04-15
Attending: HOSPITALIST | Admitting: HOSPITALIST
Payer: MEDICARE

## 2023-04-11 VITALS
SYSTOLIC BLOOD PRESSURE: 134 MMHG | OXYGEN SATURATION: 100 % | TEMPERATURE: 98 F | HEART RATE: 95 BPM | DIASTOLIC BLOOD PRESSURE: 56 MMHG | RESPIRATION RATE: 16 BRPM

## 2023-04-11 DIAGNOSIS — N40.0 BENIGN PROSTATIC HYPERPLASIA WITHOUT LOWER URINARY TRACT SYMPTOMS: ICD-10-CM

## 2023-04-11 DIAGNOSIS — Z29.9 ENCOUNTER FOR PROPHYLACTIC MEASURES, UNSPECIFIED: ICD-10-CM

## 2023-04-11 DIAGNOSIS — M54.9 DORSALGIA, UNSPECIFIED: ICD-10-CM

## 2023-04-11 DIAGNOSIS — R27.0 ATAXIA, UNSPECIFIED: ICD-10-CM

## 2023-04-11 DIAGNOSIS — D64.9 ANEMIA, UNSPECIFIED: ICD-10-CM

## 2023-04-11 LAB
ALBUMIN SERPL ELPH-MCNC: 3.9 G/DL — SIGNIFICANT CHANGE UP (ref 3.3–5)
ALP SERPL-CCNC: 58 U/L — SIGNIFICANT CHANGE UP (ref 40–120)
ALT FLD-CCNC: 13 U/L — SIGNIFICANT CHANGE UP (ref 4–41)
ANION GAP SERPL CALC-SCNC: 16 MMOL/L — HIGH (ref 7–14)
ANISOCYTOSIS BLD QL: SIGNIFICANT CHANGE UP
APPEARANCE UR: CLEAR — SIGNIFICANT CHANGE UP
APTT BLD: 26.5 SEC — LOW (ref 27–36.3)
AST SERPL-CCNC: 27 U/L — SIGNIFICANT CHANGE UP (ref 4–40)
BASOPHILS # BLD AUTO: 0.15 K/UL — SIGNIFICANT CHANGE UP (ref 0–0.2)
BASOPHILS NFR BLD AUTO: 1.7 % — SIGNIFICANT CHANGE UP (ref 0–2)
BILIRUB SERPL-MCNC: 0.6 MG/DL — SIGNIFICANT CHANGE UP (ref 0.2–1.2)
BILIRUB UR-MCNC: NEGATIVE — SIGNIFICANT CHANGE UP
BLD GP AB SCN SERPL QL: NEGATIVE — SIGNIFICANT CHANGE UP
BUN SERPL-MCNC: 31 MG/DL — HIGH (ref 7–23)
CALCIUM SERPL-MCNC: 8.7 MG/DL — SIGNIFICANT CHANGE UP (ref 8.4–10.5)
CHLORIDE SERPL-SCNC: 101 MMOL/L — SIGNIFICANT CHANGE UP (ref 98–107)
CO2 SERPL-SCNC: 20 MMOL/L — LOW (ref 22–31)
COLOR SPEC: SIGNIFICANT CHANGE UP
CREAT SERPL-MCNC: 1.14 MG/DL — SIGNIFICANT CHANGE UP (ref 0.5–1.3)
DACRYOCYTES BLD QL SMEAR: SLIGHT — SIGNIFICANT CHANGE UP
DIFF PNL FLD: NEGATIVE — SIGNIFICANT CHANGE UP
EGFR: 61 ML/MIN/1.73M2 — SIGNIFICANT CHANGE UP
ELLIPTOCYTES BLD QL SMEAR: SLIGHT — SIGNIFICANT CHANGE UP
EOSINOPHIL # BLD AUTO: 0 K/UL — SIGNIFICANT CHANGE UP (ref 0–0.5)
EOSINOPHIL NFR BLD AUTO: 0 % — SIGNIFICANT CHANGE UP (ref 0–6)
FERRITIN SERPL-MCNC: 14 NG/ML — LOW (ref 30–400)
FLUAV AG NPH QL: SIGNIFICANT CHANGE UP
FLUBV AG NPH QL: SIGNIFICANT CHANGE UP
FOLATE SERPL-MCNC: >20 NG/ML — HIGH (ref 3.1–17.5)
GIANT PLATELETS BLD QL SMEAR: PRESENT — SIGNIFICANT CHANGE UP
GLUCOSE SERPL-MCNC: 79 MG/DL — SIGNIFICANT CHANGE UP (ref 70–99)
GLUCOSE UR QL: NEGATIVE — SIGNIFICANT CHANGE UP
HCT VFR BLD CALC: 25.3 % — LOW (ref 39–50)
HGB BLD-MCNC: 7.1 G/DL — LOW (ref 13–17)
HYPOCHROMIA BLD QL: SIGNIFICANT CHANGE UP
IANC: 7.46 K/UL — HIGH (ref 1.8–7.4)
INR BLD: 1.17 RATIO — HIGH (ref 0.88–1.16)
IRON SATN MFR SERPL: 15 UG/DL — LOW (ref 45–165)
IRON SATN MFR SERPL: 4 % — LOW (ref 14–50)
KETONES UR-MCNC: ABNORMAL
LEUKOCYTE ESTERASE UR-ACNC: NEGATIVE — SIGNIFICANT CHANGE UP
LYMPHOCYTES # BLD AUTO: 0.31 K/UL — LOW (ref 1–3.3)
LYMPHOCYTES # BLD AUTO: 3.5 % — LOW (ref 13–44)
MAGNESIUM SERPL-MCNC: 2 MG/DL — SIGNIFICANT CHANGE UP (ref 1.6–2.6)
MCHC RBC-ENTMCNC: 19.8 PG — LOW (ref 27–34)
MCHC RBC-ENTMCNC: 28.1 GM/DL — LOW (ref 32–36)
MCV RBC AUTO: 70.5 FL — LOW (ref 80–100)
MICROCYTES BLD QL: SIGNIFICANT CHANGE UP
MONOCYTES # BLD AUTO: 0.15 K/UL — SIGNIFICANT CHANGE UP (ref 0–0.9)
MONOCYTES NFR BLD AUTO: 1.7 % — LOW (ref 2–14)
NEUTROPHILS # BLD AUTO: 8.18 K/UL — HIGH (ref 1.8–7.4)
NEUTROPHILS NFR BLD AUTO: 93.1 % — HIGH (ref 43–77)
NITRITE UR-MCNC: NEGATIVE — SIGNIFICANT CHANGE UP
PH UR: 6 — SIGNIFICANT CHANGE UP (ref 5–8)
PLAT MORPH BLD: ABNORMAL
PLATELET # BLD AUTO: 370 K/UL — SIGNIFICANT CHANGE UP (ref 150–400)
PLATELET COUNT - ESTIMATE: NORMAL — SIGNIFICANT CHANGE UP
POIKILOCYTOSIS BLD QL AUTO: SIGNIFICANT CHANGE UP
POLYCHROMASIA BLD QL SMEAR: SLIGHT — SIGNIFICANT CHANGE UP
POTASSIUM SERPL-MCNC: 4 MMOL/L — SIGNIFICANT CHANGE UP (ref 3.5–5.3)
POTASSIUM SERPL-SCNC: 4 MMOL/L — SIGNIFICANT CHANGE UP (ref 3.5–5.3)
PROT SERPL-MCNC: 6.7 G/DL — SIGNIFICANT CHANGE UP (ref 6–8.3)
PROT UR-MCNC: ABNORMAL
PROTHROM AB SERPL-ACNC: 13.6 SEC — HIGH (ref 10.5–13.4)
RBC # BLD: 3.59 M/UL — LOW (ref 4.2–5.8)
RBC # FLD: 17.7 % — HIGH (ref 10.3–14.5)
RBC BLD AUTO: ABNORMAL
RH IG SCN BLD-IMP: NEGATIVE — SIGNIFICANT CHANGE UP
RH IG SCN BLD-IMP: NEGATIVE — SIGNIFICANT CHANGE UP
RSV RNA NPH QL NAA+NON-PROBE: SIGNIFICANT CHANGE UP
SARS-COV-2 RNA SPEC QL NAA+PROBE: SIGNIFICANT CHANGE UP
SCHISTOCYTES BLD QL AUTO: SLIGHT — SIGNIFICANT CHANGE UP
SODIUM SERPL-SCNC: 137 MMOL/L — SIGNIFICANT CHANGE UP (ref 135–145)
SP GR SPEC: 1.05 — SIGNIFICANT CHANGE UP (ref 1.01–1.05)
TIBC SERPL-MCNC: 363 UG/DL — SIGNIFICANT CHANGE UP (ref 220–430)
TROPONIN T, HIGH SENSITIVITY RESULT: 18 NG/L — SIGNIFICANT CHANGE UP
TROPONIN T, HIGH SENSITIVITY RESULT: 19 NG/L — SIGNIFICANT CHANGE UP
UIBC SERPL-MCNC: 348 UG/DL — SIGNIFICANT CHANGE UP (ref 110–370)
UROBILINOGEN FLD QL: SIGNIFICANT CHANGE UP
VIT B12 SERPL-MCNC: 597 PG/ML — SIGNIFICANT CHANGE UP (ref 200–900)
VIT B12 SERPL-MCNC: 597 PG/ML — SIGNIFICANT CHANGE UP (ref 200–900)
WBC # BLD: 8.79 K/UL — SIGNIFICANT CHANGE UP (ref 3.8–10.5)
WBC # FLD AUTO: 8.79 K/UL — SIGNIFICANT CHANGE UP (ref 3.8–10.5)

## 2023-04-11 PROCEDURE — 70498 CT ANGIOGRAPHY NECK: CPT | Mod: 26,MA

## 2023-04-11 PROCEDURE — 70496 CT ANGIOGRAPHY HEAD: CPT | Mod: 26,MA

## 2023-04-11 PROCEDURE — 99285 EMERGENCY DEPT VISIT HI MDM: CPT

## 2023-04-11 PROCEDURE — 73502 X-RAY EXAM HIP UNI 2-3 VIEWS: CPT | Mod: 26,LT

## 2023-04-11 PROCEDURE — 99223 1ST HOSP IP/OBS HIGH 75: CPT

## 2023-04-11 PROCEDURE — 71045 X-RAY EXAM CHEST 1 VIEW: CPT | Mod: 26

## 2023-04-11 RX ORDER — TRAMADOL HYDROCHLORIDE 50 MG/1
25 TABLET ORAL ONCE
Refills: 0 | Status: DISCONTINUED | OUTPATIENT
Start: 2023-04-11 | End: 2023-04-11

## 2023-04-11 RX ORDER — ACETAMINOPHEN 500 MG
650 TABLET ORAL ONCE
Refills: 0 | Status: COMPLETED | OUTPATIENT
Start: 2023-04-11 | End: 2023-04-11

## 2023-04-11 RX ORDER — PANTOPRAZOLE SODIUM 20 MG/1
80 TABLET, DELAYED RELEASE ORAL ONCE
Refills: 0 | Status: COMPLETED | OUTPATIENT
Start: 2023-04-11 | End: 2023-04-11

## 2023-04-11 RX ORDER — TAMSULOSIN HYDROCHLORIDE 0.4 MG/1
0.4 CAPSULE ORAL AT BEDTIME
Refills: 0 | Status: DISCONTINUED | OUTPATIENT
Start: 2023-04-11 | End: 2023-04-15

## 2023-04-11 RX ORDER — OXYCODONE HYDROCHLORIDE 5 MG/1
5 TABLET ORAL ONCE
Refills: 0 | Status: DISCONTINUED | OUTPATIENT
Start: 2023-04-11 | End: 2023-04-11

## 2023-04-11 RX ORDER — MORPHINE SULFATE 50 MG/1
1 CAPSULE, EXTENDED RELEASE ORAL ONCE
Refills: 0 | Status: DISCONTINUED | OUTPATIENT
Start: 2023-04-11 | End: 2023-04-11

## 2023-04-11 RX ADMIN — PANTOPRAZOLE SODIUM 80 MILLIGRAM(S): 20 TABLET, DELAYED RELEASE ORAL at 18:17

## 2023-04-11 RX ADMIN — TAMSULOSIN HYDROCHLORIDE 0.4 MILLIGRAM(S): 0.4 CAPSULE ORAL at 21:56

## 2023-04-11 NOTE — ED PROVIDER NOTE - NSICDXPASTMEDICALHX_GEN_ALL_CORE_FT
PAST MEDICAL HISTORY:  BPH (benign prostatic hyperplasia)     HLD (hyperlipidemia)     Peripheral neuropathy

## 2023-04-11 NOTE — ED ADULT NURSE REASSESSMENT NOTE - NS ED NURSE REASSESS COMMENT FT1
Break RN: Resting on stretcher, no signs of distress, pending admission, fall precautions maintained

## 2023-04-11 NOTE — H&P ADULT - NSHPLABSRESULTS_GEN_ALL_CORE
7.1    8.79  )-----------( 370      ( 2023 10:20 )             25.3     Hgb Trend: 7.1<--  04-11    137  |  101  |  31<H>  ----------------------------<  79  4.0   |  20<L>  |  1.14    Ca    8.7      2023 10:20  Mg     2.00     11    TPro  6.7  /  Alb  3.9  /  TBili  0.6  /  DBili  x   /  AST  27  /  ALT  13  /  AlkPhos  58  04-11    Creatinine Trend: 1.14<--  PT/INR - ( 2023 10:20 )   PT: 13.6 sec;   INR: 1.17 ratio         PTT - ( 2023 10:20 )  PTT:26.5 sec      Urinalysis Basic - ( 2023 14:01 )    Color: Light Yellow / Appearance: Clear / S.047 / pH: x  Gluc: x / Ketone: Moderate  / Bili: Negative / Urobili: <2 mg/dL   Blood: x / Protein: Trace / Nitrite: Negative   Leuk Esterase: Negative / RBC: x / WBC x   Sq Epi: x / Non Sq Epi: x / Bacteria: x        Xray Hip as reviewed by the radiologist: No acute fracture or dislocation. No advanced arthritic changes at the   hips. Mild lower lumbar spondylosis.      CT head as reviewed by the radiologist:       Head CT:  1. No acute intracranial hemorrhage, mass effect, or shift of the midline   structures.  2. Similar-appearing mild chronic white matter microvascular type changes.    CTA neck:  1. Similar-appearing mixed plaque affects the left carotid bifurcation   with associated moderate (50-60%) stenosis of the left internal carotid   artery origin and proximal segment by criteria.  2. Otherwise, no large vessel occlusion or major stenosis.    CTA head:  1. No large vessel occlusion or major stenosis.

## 2023-04-11 NOTE — H&P ADULT - PROBLEM SELECTOR PLAN 2
Acute on chronic back pain radiating down his L leg   -Xray hip showed Mild lower lumbar spondylosis  -Most likely sciatica vs, spinal stenosis   -Tylenol PRN   -Will start gabapentin 300 mg at bedtime   -PT jval

## 2023-04-11 NOTE — H&P ADULT - PROBLEM SELECTOR PLAN 1
Patient with acute anemia with Hgb of 7.1   -No reports to melena or hematochezia as per patient.  -Given advanced age and microcytic anemia, concern for malignancy. Other DDx include PUD, esophagitis, gastritis, AVM, diverticular bleeding, or inflammation   -GI emailed by the provider   -Will give PPI 80 mg IV   -Will give pRBCs x2 units   -F/u with post transfusion CBC   -Trend CBC q12 hrs   -Transfuse if Hgb <7   -F/u with iron studies

## 2023-04-11 NOTE — ED ADULT NURSE NOTE - HIV OFFER
Telephone Encounter by Deysi Clifton RN at 05/17/18 09:35 AM     Author:  Deysi Clifton RN Service:  (none) Author Type:  Registered Nurse     Filed:  05/17/18 09:35 AM Encounter Date:  5/17/2018 Status:  Signed     :  Deysi Clifton RN (Registered Nurse)            Message left on machine to call back.[LP1.1T]     ABT and syringes sent to pharmacy for pt[LP1.1M]       Revision History        User Key Date/Time User Provider Type Action    > LP1.1 05/17/18 09:35 AM Deysi Clifton, RN Registered Nurse Sign    M - Manual, T - Template             Opt out

## 2023-04-11 NOTE — ED PROVIDER NOTE - CLINICAL SUMMARY MEDICAL DECISION MAKING FREE TEXT BOX
MD CHO:  89 y/o male h/o hld bph peripheral neuropathy with back pain s/p falls d/t ataxia x1 wk.  Denies numbness tingling weakness vision changes.  Obtain cbc cmp viral swab ua ucx ct head cta h/n consult neuro. MD CHO:  89 y/o male h/o hld bph peripheral neuropathy with back pain s/p falls d/t ataxia x1 wk.  Denies numbness tingling weakness vision changes.  Obtain cbc cmp viral swab ua ucx ct head cta h/n consult neuro.    Paula Auguste, PGY1, MD:  89-year-old male history of hyperlipidemia, BPH, neuropathy, presenting for evaluation of worsening gait imbalance and dizziness with ambulation causing frequent falls onset 2 weeks ago associated with intermittent headaches every 1 to 2 days, and back pain x1 week.  No AC.  Patient is afebrile, hemodynamically stable with difficulty with finger-to-nose on right upper extremity, right sciatic notch tenderness, right straight leg raise positive, gait ataxia with falling to the left side on ambulation, positive Romberg's, dizziness with ambulation but not with seated positional changes.  No nystagmus.  No cranial nerve deficits, no strength deficits.     Differential diagnosis includes but is not limited to CVA versus ICH versus hydrocephalus/ICP vs cerebellar mass vs metabolic derangement versus exacerbation of neuropathy versus bony abnormality, low suspicion for cord compression or epidural abscess given no bladder or bowel dysfunction, no sensory changes, no midline or paraspinal tenderness, no fevers no reported weight loss.  Patient's back pain is most likely sciatica given right sciatic notch tenderness and positive right straight leg raise, in addition patient has reported he has had this exact pain a year ago that was managed with pain medication.  Unlikely to be peripheral vertigo given lack of dizziness with head positional changes, lack of tinnitus, and no evidence of nystagmus.  Patient is out of the window for code stroke, however will get stroke work-up with CTs and CTAs of head and neck, CBC CMP troponin EKG chest x-ray coags. anticipate neuro consult. Will get x-rays of the pelvis and the hip to evaluate for bony abnormality sustained either from falls or contributing to the fall.  Patient will need to be admitted given his severe gait instability and risk of falling at home.

## 2023-04-11 NOTE — H&P ADULT - NSHPPHYSICALEXAM_GEN_ALL_CORE
Vital Signs Last 24 Hrs  T(C): 36.6 (11 Apr 2023 16:35), Max: 36.8 (11 Apr 2023 08:58)  T(F): 97.8 (11 Apr 2023 16:35), Max: 98.2 (11 Apr 2023 08:58)  HR: 82 (11 Apr 2023 16:35) (82 - 95)  BP: 149/63 (11 Apr 2023 16:35) (134/56 - 149/63)  BP(mean): --  RR: 16 (11 Apr 2023 16:35) (16 - 19)  SpO2: 100% (11 Apr 2023 16:35) (99% - 100%)    Parameters below as of 11 Apr 2023 14:00  Patient On (Oxygen Delivery Method): room air    GENERAL: NAD, well-developed  HEENT:  Atraumatic, Normocephalic, Conjunctiva and sclera clear, oral mucosa moist, clear w/o any exudate   NECK: Supple, No JVD  CHEST/LUNG: Clear to auscultation bilaterally; No wheeze  HEART: Tachycardic; No murmurs, rubs, or gallops  ABDOMEN: Soft, Nontender, Nondistended; Bowel sounds present  EXTREMITIES:  2+ Peripheral Pulses, No clubbing, cyanosis, or edema  PSYCH: AAOx3, normal affect  NEUROLOGY: non-focal, moving all extremities   SKIN: No rashes or lesions

## 2023-04-11 NOTE — ED PROVIDER NOTE - PROGRESS NOTE DETAILS
Paula Auguste, PGY1, MD: Hemoglobin noted to be 7.1 on CBC, last hemoglobin in EMR records was 13.6.  Patient without symptoms of anemia at this time or evidence of bleeding.  Type and screen ordered, however will hold off on transfusion at this time unless hemoglobin drops further, patient becomes symptomatic, or if a bleeding source is identified. Dyllan Sinha, PGY-3- work up reviewed. Awaiting urinalysis results.  Patient with negative CT findings.  Will be admitted for further work-up of dizziness and anemia.  We will also get orthostatic vitals on patient.  Concern for possible symptomatic anemia.  PCP - Dr. Mclaughlin

## 2023-04-11 NOTE — ED ADULT NURSE NOTE - NSIMPLEMENTINTERV_GEN_ALL_ED
Implemented All Fall with Harm Risk Interventions:  [place] to call system. Call bell, personal items and telephone within reach. Instruct patient to call for assistance. Room bathroom lighting operational. Non-slip footwear when patient is off stretcher. Physically safe environment: no spills, clutter or unnecessary equipment. Stretcher in lowest position, wheels locked, appropriate side rails in place. Provide visual cue, wrist band, yellow gown, etc. Monitor gait and stability. Monitor for mental status changes and reorient to person, place, and time. Review medications for side effects contributing to fall risk. Reinforce activity limits and safety measures with patient and family. Provide visual clues: red socks.

## 2023-04-11 NOTE — ED PROVIDER NOTE - CARE PLAN
1 Principal Discharge DX:	Poor balance   Principal Discharge DX:	Ataxia   Principal Discharge DX:	Ataxia  Secondary Diagnosis:	Anemia

## 2023-04-11 NOTE — ED ADULT NURSE NOTE - OBJECTIVE STATEMENT
Patient is awake and alert, c/o frequent falls, " lost his balance".  Patient states he has ' chronic back pain ".  Denies hitting his head when he fell. Patient is awake and alert, c/o frequent falls, " lost his balance".  Patient states he has ' chronic back pain ".  Denies hitting his head when he fell.  IV placed, labs sent , awaiting x ray and ct scan. Presently the patient is resting comfortably.

## 2023-04-11 NOTE — H&P ADULT - NSHPREVIEWOFSYSTEMS_GEN_ALL_CORE
REVIEW OF SYSTEMS:    CONSTITUTIONAL: No weakness, fevers or chills  EYES/ENT: No visual changes;  No vertigo or throat pain   NECK: No pain or stiffness  RESPIRATORY: No cough, wheezing, hemoptysis; No shortness of breath  CARDIOVASCULAR: No chest pain or palpitations  GASTROINTESTINAL: No abdominal or epigastric pain. No nausea, vomiting, or hematemesis; No diarrhea or constipation. No melena or hematochezia.  GENITOURINARY: No dysuria, frequency or hematuria  NEUROLOGICAL: +weakness, headache  MUSCULOSKELETAL: No joint pain, no muscle ache   SKIN: No itching, burning, rashes, or lesions   All other review of systems is negative unless indicated above.

## 2023-04-11 NOTE — ED PROVIDER NOTE - ATTENDING CONTRIBUTION TO CARE
DR. JULIEN, ATTENDING MD-  I performed a face to face bedside interview with the patient regarding history of present illness, review of symptoms and past medical history. I completed an independent physical exam.  I have discussed the patient's plan of care with the resident.   Documentation as above in the note.    MDM written by myself.

## 2023-04-11 NOTE — H&P ADULT - HISTORY OF PRESENT ILLNESS
88 yo M with PMhx of BPH, chronic back pain, and BPH presents to the ED with lightheadedness, headache, and acute on chronic back pain. Patient reports that for the past one week he has been feeling weak. He had a fall last week due to weakness. He did not hit his head or lose consciousness. He reports to have diffuse headache that is constant and nonradiating. It is not associated with fever, photophobia, blurry vision or other focal deficit. He also reports to have acute on chronic back pain. The pain is localized on the lower lumbar region. It is sharp and constant. It radiates down his L leg. He denies any associated urinary or bowel incontinence or LE weakness/loss of sensation. He had similar pain in the past and it usually gets better with exercise and tylenol. Of note, he has not had a blood work done in the last 1-2 years. His last blood work was in 2021 and his Hgb was WNL.   In the ED, his vitals were notable for tachycardia and HTN. Labs were notable for anemia with Hgb of 7.1, metabolic acidosis with slightly high AG of 16. CT head showed no acute stroke or bleeding. Hip Xray showed no acute fracture, mild spondylodesis

## 2023-04-11 NOTE — ED PROVIDER NOTE - PHYSICAL EXAMINATION
GENERAL: no acute distress, non-toxic appearing  HEAD: normocephalic, atraumatic  HEENT: normal conjunctiva, oral mucosa dry, uvula midline,   CARDIAC: regular rate and rhythm, no appreciable murmurs, 2+ pulses in UE/LE b/l  PULM: normal breath sounds, clear to ascultation bilaterally, no rales, rhonchi, wheezing  GI: abdomen nondistended, soft, nontender, no guarding, rebound tenderness  NEURO: no appreciable sensory deficits, CN2-12 intact, normal speech, PERRLA, EOMI, positive Romberg, unsteady gait- falls to left side and cannot ambulate independently, reports dizziness with ambulation but not with position change from supine to sitting upright, AAOx3, finger to nose intact on left, unsteady on right   MSK: no peripheral edema, no calf tenderness b/l, no midline or paraspinal ttp, +right straight leg raise, right sciatic notch tenderness, strength intact in the upper and lower extremities, no deformity   SKIN: well-perfused, extremities warm, no visible rashes  PSYCH: appropriate mood and affect GENERAL: no acute distress, non-toxic appearing  HEAD: normocephalic, atraumatic  HEENT: normal conjunctiva, oral mucosa dry, uvula midline,   CARDIAC: regular rate and rhythm, no appreciable murmurs, 2+ pulses in UE/LE b/l  PULM: normal breath sounds, clear to ascultation bilaterally, no rales, rhonchi, wheezing  GI: abdomen nondistended, soft, nontender, no guarding, rebound tenderness  NEURO: no appreciable sensory deficits, CN2-12 intact, normal speech, PERRLA, EOMI, no evidence of nystagmus, positive Romberg, gait ataxia- falls to left side and cannot ambulate independently, reports dizziness with ambulation but not with position change from supine to sitting upright, AAOx3, finger to nose intact on left, unsteady on right   MSK: no peripheral edema, no calf tenderness b/l, no midline or paraspinal ttp, +right straight leg raise, right sciatic notch tenderness, strength intact in the upper and lower extremities, no deformity, ROM intact in all extremities,   SKIN: well-perfused, extremities warm, no visible rashes  PSYCH: appropriate mood and affect

## 2023-04-11 NOTE — ED PROVIDER NOTE - OBJECTIVE STATEMENT
89-year-old male history of hyperlipidemia, BPH, neuropathy, presenting for evaluation of worsening gait imbalance and dizziness with ambulation causing frequent falls onset 2 weeks ago associated with intermittent headaches every 1 to 2 days.  Patient also reports right lower back pain onset 1 week ago that he has had in the past.  Patient was seen by his PCP on Thursday who prescribed him tramadol for low back pain and gabapentin without improvement in symptoms.  Patient states that 2 days ago he fell twice 2/2 poor balance, falling onto his left side, did not hit his head, did not lose consciousness, did not injure himself.  Patient is not on blood thinners.  Patient denies fevers, chills, chest pain, shortness of breath, nausea, vomiting, abdominal pain, vision changes, tinnitus, dizziness with positional changes or head movement, urinary or bowel dysfunction, pain with urination, blood in the urine.  Patient denies having a headache today.  Denies alcohol use.

## 2023-04-11 NOTE — ED ADULT TRIAGE NOTE - CHIEF COMPLAINT QUOTE
Pt. c/o lower back pain with a few falls this past week. Seen by his MD Thursday and given Tramadol. Has had no pain relief. Denies head trauma or LOC.

## 2023-04-11 NOTE — ED ADULT NURSE NOTE - NSICDXPASTSURGICALHX_GEN_ALL_CORE_FT
----- Message from Geoff Yeager MD sent at 11/13/2020 10:16 AM EST -----  Regarding: follow up  Alexandria Yanes, would you let her know I am sending in thyroid med but want to see her back (in person) and do labs soon? Her TSH was very low a few months ago. Thanks!  Geoff Yeager MD 11/13/2020 10:17 AM PAST SURGICAL HISTORY:  No significant past surgical history

## 2023-04-11 NOTE — H&P ADULT - ASSESSMENT
89 yo M with PMhx of BPH, chronic back pain, and BPH presents to the ED with lightheadedness, headache, and acute on chronic back pain, found to acute anemia, concerning for GI bleed

## 2023-04-12 LAB
ANION GAP SERPL CALC-SCNC: 12 MMOL/L — SIGNIFICANT CHANGE UP (ref 7–14)
BUN SERPL-MCNC: 20 MG/DL — SIGNIFICANT CHANGE UP (ref 7–23)
CALCIUM SERPL-MCNC: 8.9 MG/DL — SIGNIFICANT CHANGE UP (ref 8.4–10.5)
CHLORIDE SERPL-SCNC: 104 MMOL/L — SIGNIFICANT CHANGE UP (ref 98–107)
CO2 SERPL-SCNC: 23 MMOL/L — SIGNIFICANT CHANGE UP (ref 22–31)
CREAT SERPL-MCNC: 1.03 MG/DL — SIGNIFICANT CHANGE UP (ref 0.5–1.3)
CULTURE RESULTS: SIGNIFICANT CHANGE UP
EGFR: 69 ML/MIN/1.73M2 — SIGNIFICANT CHANGE UP
GLUCOSE SERPL-MCNC: 93 MG/DL — SIGNIFICANT CHANGE UP (ref 70–99)
HCT VFR BLD CALC: 32.6 % — LOW (ref 39–50)
HCT VFR BLD CALC: 37.6 % — LOW (ref 39–50)
HGB BLD-MCNC: 11.3 G/DL — LOW (ref 13–17)
HGB BLD-MCNC: 9.9 G/DL — LOW (ref 13–17)
MAGNESIUM SERPL-MCNC: 1.9 MG/DL — SIGNIFICANT CHANGE UP (ref 1.6–2.6)
MCHC RBC-ENTMCNC: 22.1 PG — LOW (ref 27–34)
MCHC RBC-ENTMCNC: 22.3 PG — LOW (ref 27–34)
MCHC RBC-ENTMCNC: 30.1 GM/DL — LOW (ref 32–36)
MCHC RBC-ENTMCNC: 30.4 GM/DL — LOW (ref 32–36)
MCV RBC AUTO: 73.4 FL — LOW (ref 80–100)
MCV RBC AUTO: 73.4 FL — LOW (ref 80–100)
NRBC # BLD: 0 /100 WBCS — SIGNIFICANT CHANGE UP (ref 0–0)
NRBC # BLD: 0 /100 WBCS — SIGNIFICANT CHANGE UP (ref 0–0)
NRBC # FLD: 0 K/UL — SIGNIFICANT CHANGE UP (ref 0–0)
NRBC # FLD: 0 K/UL — SIGNIFICANT CHANGE UP (ref 0–0)
PHOSPHATE SERPL-MCNC: 2.2 MG/DL — LOW (ref 2.5–4.5)
PLATELET # BLD AUTO: 351 K/UL — SIGNIFICANT CHANGE UP (ref 150–400)
PLATELET # BLD AUTO: 435 K/UL — HIGH (ref 150–400)
POTASSIUM SERPL-MCNC: 3.8 MMOL/L — SIGNIFICANT CHANGE UP (ref 3.5–5.3)
POTASSIUM SERPL-SCNC: 3.8 MMOL/L — SIGNIFICANT CHANGE UP (ref 3.5–5.3)
RBC # BLD: 4.44 M/UL — SIGNIFICANT CHANGE UP (ref 4.2–5.8)
RBC # BLD: 5.12 M/UL — SIGNIFICANT CHANGE UP (ref 4.2–5.8)
RBC # FLD: 19.5 % — HIGH (ref 10.3–14.5)
RBC # FLD: 19.9 % — HIGH (ref 10.3–14.5)
SODIUM SERPL-SCNC: 139 MMOL/L — SIGNIFICANT CHANGE UP (ref 135–145)
SPECIMEN SOURCE: SIGNIFICANT CHANGE UP
WBC # BLD: 10.61 K/UL — HIGH (ref 3.8–10.5)
WBC # BLD: 9.73 K/UL — SIGNIFICANT CHANGE UP (ref 3.8–10.5)
WBC # FLD AUTO: 10.61 K/UL — HIGH (ref 3.8–10.5)
WBC # FLD AUTO: 9.73 K/UL — SIGNIFICANT CHANGE UP (ref 3.8–10.5)

## 2023-04-12 PROCEDURE — 99222 1ST HOSP IP/OBS MODERATE 55: CPT | Mod: GC

## 2023-04-12 PROCEDURE — 99233 SBSQ HOSP IP/OBS HIGH 50: CPT

## 2023-04-12 RX ORDER — SODIUM,POTASSIUM PHOSPHATES 278-250MG
1 POWDER IN PACKET (EA) ORAL ONCE
Refills: 0 | Status: COMPLETED | OUTPATIENT
Start: 2023-04-12 | End: 2023-04-12

## 2023-04-12 RX ORDER — LIDOCAINE 4 G/100G
1 CREAM TOPICAL EVERY 24 HOURS
Refills: 0 | Status: DISCONTINUED | OUTPATIENT
Start: 2023-04-12 | End: 2023-04-15

## 2023-04-12 RX ORDER — LANOLIN ALCOHOL/MO/W.PET/CERES
3 CREAM (GRAM) TOPICAL AT BEDTIME
Refills: 0 | Status: DISCONTINUED | OUTPATIENT
Start: 2023-04-12 | End: 2023-04-12

## 2023-04-12 RX ORDER — OXYCODONE HYDROCHLORIDE 5 MG/1
5 TABLET ORAL ONCE
Refills: 0 | Status: DISCONTINUED | OUTPATIENT
Start: 2023-04-12 | End: 2023-04-12

## 2023-04-12 RX ORDER — FERROUS SULFATE 325(65) MG
325 TABLET ORAL DAILY
Refills: 0 | Status: DISCONTINUED | OUTPATIENT
Start: 2023-04-12 | End: 2023-04-15

## 2023-04-12 RX ORDER — ONDANSETRON 8 MG/1
4 TABLET, FILM COATED ORAL EVERY 8 HOURS
Refills: 0 | Status: DISCONTINUED | OUTPATIENT
Start: 2023-04-12 | End: 2023-04-12

## 2023-04-12 RX ORDER — PANTOPRAZOLE SODIUM 20 MG/1
40 TABLET, DELAYED RELEASE ORAL
Refills: 0 | Status: DISCONTINUED | OUTPATIENT
Start: 2023-04-12 | End: 2023-04-15

## 2023-04-12 RX ORDER — ONDANSETRON 8 MG/1
4 TABLET, FILM COATED ORAL EVERY 8 HOURS
Refills: 0 | Status: DISCONTINUED | OUTPATIENT
Start: 2023-04-12 | End: 2023-04-15

## 2023-04-12 RX ORDER — ACETAMINOPHEN 500 MG
650 TABLET ORAL EVERY 6 HOURS
Refills: 0 | Status: DISCONTINUED | OUTPATIENT
Start: 2023-04-12 | End: 2023-04-15

## 2023-04-12 RX ORDER — GABAPENTIN 400 MG/1
400 CAPSULE ORAL AT BEDTIME
Refills: 0 | Status: DISCONTINUED | OUTPATIENT
Start: 2023-04-12 | End: 2023-04-15

## 2023-04-12 RX ORDER — ACETAMINOPHEN 500 MG
650 TABLET ORAL EVERY 6 HOURS
Refills: 0 | Status: DISCONTINUED | OUTPATIENT
Start: 2023-04-12 | End: 2023-04-12

## 2023-04-12 RX ORDER — LANOLIN ALCOHOL/MO/W.PET/CERES
3 CREAM (GRAM) TOPICAL AT BEDTIME
Refills: 0 | Status: DISCONTINUED | OUTPATIENT
Start: 2023-04-12 | End: 2023-04-15

## 2023-04-12 RX ADMIN — PANTOPRAZOLE SODIUM 40 MILLIGRAM(S): 20 TABLET, DELAYED RELEASE ORAL at 17:12

## 2023-04-12 RX ADMIN — Medication 650 MILLIGRAM(S): at 23:25

## 2023-04-12 RX ADMIN — TAMSULOSIN HYDROCHLORIDE 0.4 MILLIGRAM(S): 0.4 CAPSULE ORAL at 21:18

## 2023-04-12 RX ADMIN — GABAPENTIN 400 MILLIGRAM(S): 400 CAPSULE ORAL at 21:18

## 2023-04-12 RX ADMIN — OXYCODONE HYDROCHLORIDE 5 MILLIGRAM(S): 5 TABLET ORAL at 18:10

## 2023-04-12 RX ADMIN — Medication 1 PACKET(S): at 10:07

## 2023-04-12 RX ADMIN — MORPHINE SULFATE 1 MILLIGRAM(S): 50 CAPSULE, EXTENDED RELEASE ORAL at 00:27

## 2023-04-12 RX ADMIN — Medication 325 MILLIGRAM(S): at 18:10

## 2023-04-12 RX ADMIN — Medication 650 MILLIGRAM(S): at 22:55

## 2023-04-12 NOTE — CONSULT NOTE ADULT - ATTENDING COMMENTS
# Iron deficiency anemia without overt bleeding  # Lightheadedness, headache    --Empiric PPI  --Start iron supplementation  --Avoid NSAIDs  --Colonoscopy +/- EGD recommended for evaluation of DANG. Patient currently declining inpatient evaluation, but would be amenable to outpatient evaluation. Discussed potential of worsening anemia/bleeding and or underlying malignancy. He expressed understanding, but states he is unable to remain in the hospital because of other obligations at home. He is amenable to outpatient follow up.   --Will assist with expedited outpatient follow up with [facility] as noted above. Please ensure he is given the office's contact information upon discharge in event he misses their call or does not hear from them.     Additional recommendations as above.

## 2023-04-12 NOTE — CONSULT NOTE ADULT - SUBJECTIVE AND OBJECTIVE BOX
HPI:  Jorge Alberto Ngo is a 89 year old with a PMHx of HLD, BPH and chronic back pain, and BPH presents to the ED with lightheadedness, headache, and acute on chronic back pain found to have DANG. GI consulted for further workup and management of the above.     Mr. Ngo notes that over the past one week he has been feeling weak with general weakness. He has felt so weak that he had an fall last week although did not hit his head or lose consciousness. After the fall  he reports to have diffuse headache that is constant and non-radiating. In addition to the above he also noted acute on chronic back pain with pain is localized on the lower lumbar region that is sharp and constant and radiates down his L leg. He denies any associated urinary or bowel incontinence or LE weakness/loss of sensation. He had similar pain in the past and it usually gets better with exercise and tylenol. Given the above, he came into the ED for evaluation Workup in the ED notable for CT head showed no acute stroke or bleeding. Hip Xray showed no acute fracture, mild spondylodesis. Labs were notable for Hgb of 7.1 with iron studies indicated of DANG comapared to prior blood work was in  and his Hgb was WNL.     Regarding medications, patient denies any antiplatelet or anticoagulation. Denies herbal supplements.   No melena or hematochezia. No hematemesis.   Patient denies any family history of GI malignancy.   GI review of systems negative for dysphagia, odynophagia, early satiety, weight loss. Denies recent travel or sick contacts.   No prior EGD or colonoscopy.       Allergies:  aspirin (Unknown)  penicillin (Unknown)    Home Medications: Reviewed in [facility] Medications:  potassium phosphate / sodium phosphate Powder (PHOS-NaK) 1 Packet(s) Oral once  tamsulosin 0.4 milliGRAM(s) Oral at bedtime      PMHX/PSHX:   BPH (benign prostatic hyperplasia)  Peripheral neuropathy  HLD (hyperlipidemia)    Family history:     Denies family history of colon cancer/polyps, stomach cancer/polyps, pancreatic cancer/masses, liver cancer/disease, ovarian cancer and endometrial cancer.    Social History:   Tob: Denies  EtOH: Denies  Illicit Drugs: Denies    ROS: Complete and normal except as mentioned above      PHYSICAL EXAM:     GENERAL:  No acute distress  HEENT:  NCAT, no scleral icterus   CHEST:  no respiratory distress  HEART:  Regular rate and rhythm  ABDOMEN:  Soft, non-tender, non-distended, normoactive bowel sounds,  no masses  EXTREMITIES: No edema  SKIN:  No rash/erythema/ecchymoses/petechiae/wounds/abscess/warm/dry  NEURO:  Alert and oriented x 3, no asterixis    Vital Signs:  Vital Signs Last 24 Hrs  T(C): 36.6 (2023 06:17), Max: 36.9 (2023 21:14)  T(F): 97.9 (2023 06:17), Max: 98.4 (2023 21:14)  HR: 82 (2023 06:17) (78 - 95)  BP: 154/67 (:17) (118/50 - 158/78)  BP(mean): --  RR: 18 (2023 06:17) (16 - 20)  SpO2: 99% (:17) (98% - 100%)    Parameters below as of 2023 06:17  Patient On (Oxygen Delivery Method): room air      Daily     Daily     LABS:                        9.9    9.73  )-----------( 351      ( 2023 03:24 )             32.6     Mean Cell Volume: 73.4 fL (23 @ 03:24)    -    139  |  104  |  20  ----------------------------<  93  3.8   |  23  |  1.03    Ca    8.9      2023 03:24  Phos  2.2     -  Mg     1.90     -12    TPro  6.7  /  Alb  3.9  /  TBili  0.6  /  DBili  x   /  AST  27  /  ALT  13  /  AlkPhos  58  04-11    LIVER FUNCTIONS - ( 2023 10:20 )  Alb: 3.9 g/dL / Pro: 6.7 g/dL / ALK PHOS: 58 U/L / ALT: 13 U/L / AST: 27 U/L / GGT: x           PT/INR - ( 2023 10:20 )   PT: 13.6 sec;   INR: 1.17 ratio         PTT - ( 2023 10:20 )  PTT:26.5 sec  Urinalysis Basic - ( 2023 14:01 )    Color: Light Yellow / Appearance: Clear / S.047 / pH: x  Gluc: x / Ketone: Moderate  / Bili: Negative / Urobili: <2 mg/dL   Blood: x / Protein: Trace / Nitrite: Negative   Leuk Esterase: Negative / RBC: x / WBC x   Sq Epi: x / Non Sq Epi: x / Bacteria: x                              9.9    9.73  )-----------( 351      ( 2023 03:24 )             32.6                         7.1    8.79  )-----------( 370      ( 2023 10:20 )             25.3       Imaging:  < from: CT Angio Neck w/ IV Cont (23 @ 13:00) >  IMPRESSION:    Head CT:  1. No acute intracranial hemorrhage, mass effect, or shift of the midline   structures.  2. Similar-appearing mild chronic white matter microvascular type changes.    CTA neck:  1. Similar-appearing mixed plaque affects the left carotid bifurcation   with associated moderate (50-60%) stenosis of the left internal carotid   artery origin and proximal segment by criteria.  2. Otherwise, no large vessel occlusion or major stenosis.    CTA head:  1. No large vessel occlusion or major stenosis.    < end of copied text >  < from: Xray Chest 1 View- PORTABLE-Urgent (23 @ 10:52) >  IMPRESSION: No acute traumatic findings    < end of copied text >  < from: Xray Pelvis AP only (23 @ 10:51) >  IMPRESSION:    No acute fracture or dislocation. No advanced arthritic changes at the   hips. Mild lower lumbar spondylosis.    < end of copied text >             HPI:  Jorge Alberto Ngo is a 89 year old with a PMHx of HLD, BPH and chronic back pain, and BPH presents to the ED with lightheadedness, headache, and acute on chronic back pain found to have DANG. GI consulted for further workup and management of the above.     Mr. Ngo notes that over the past one week he has been feeling weak with general weakness. He has felt so weak that he had an fall last week although did not hit his head or lose consciousness. After the fall  he reports to have diffuse headache that is constant and non-radiating. In addition to the above he also noted acute on chronic back pain with pain is localized on the lower lumbar region that is sharp and constant and radiates down his L leg. He denies any associated urinary or bowel incontinence or LE weakness/loss of sensation. He had similar pain in the past and it usually gets better with exercise and tylenol. Given the above, he came into the ED for evaluation Workup in the ED notable for CT head showed no acute stroke or bleeding. Hip Xray showed no acute fracture, mild spondylodesis. Labs were notable for Hgb of 7.1 with iron studies indicated of DANG compared to prior blood work was in  and his Hgb was WNL. He was given 2 units of blood and admitted. Regarding medications, patient denies any antiplatelet or anticoagulation use. GI review of systems negative for dysphagia, odynophagia, early satiety, No melena or hematochezia. No hematemesis. Patient denies any family history of GI malignancy. No prior EGD or colonoscopy.  Pt otherwise feeling well at this time outside of pain in his leg and denies any ongoing fevers, chills, n/v/abdominal pain or any overt bleeding.     Allergies:  aspirin (Unknown)  penicillin (Unknown)    Home Medications: Reviewed in [facility] Medications:  potassium phosphate / sodium phosphate Powder (PHOS-NaK) 1 Packet(s) Oral once  tamsulosin 0.4 milliGRAM(s) Oral at bedtime      PMHX/PSHX:   BPH (benign prostatic hyperplasia)  Peripheral neuropathy  HLD (hyperlipidemia)    Family history: No family hx of GI cancers.     Social History:   Tob: Denies  EtOH: Denies  Illicit Drugs: Denies    ROS: Complete and normal except as mentioned above      PHYSICAL EXAM:   GENERAL:  No acute distress  HEENT:  NCAT, no scleral icterus   CHEST:  no respiratory distress  HEART:  Regular rate and rhythm  ABDOMEN:  Soft, non-tender, non-distended, normoactive bowel sounds  EXTREMITIES: No edema  NEURO:  Alert and oriented x 3, no asterixis    Vital Signs:  Vital Signs Last 24 Hrs  T(C): 36.6 (2023 06:17), Max: 36.9 (2023 21:14)  T(F): 97.9 (2023 06:17), Max: 98.4 (2023 21:14)  HR: 82 (2023 06:17) (78 - 95)  BP: 154/67 (2023 06:17) (118/50 - 158/78)  BP(mean): --  RR: 18 (2023 06:17) (16 - 20)  SpO2: 99% (2023 06:17) (98% - 100%)    Parameters below as of 2023 06:17  Patient On (Oxygen Delivery Method): room air      LABS:                        9.9    9.73  )-----------( 351      ( 2023 03:24 )             32.6     Mean Cell Volume: 73.4 fL (04-12-23 @ 03:24)    04-12    139  |  104  |  20  ----------------------------<  93  3.8   |  23  |  1.03    Ca    8.9      2023 03:24  Phos  2.2     04-12  Mg     1.90     04-12    TPro  6.7  /  Alb  3.9  /  TBili  0.6  /  DBili  x   /  AST  27  /  ALT  13  /  AlkPhos  58  04-11    LIVER FUNCTIONS - ( 2023 10:20 )  Alb: 3.9 g/dL / Pro: 6.7 g/dL / ALK PHOS: 58 U/L / ALT: 13 U/L / AST: 27 U/L / GGT: x           PT/INR - ( 2023 10:20 )   PT: 13.6 sec;   INR: 1.17 ratio         PTT - ( 2023 10:20 )  PTT:26.5 sec  Urinalysis Basic - ( 2023 14:01 )    Color: Light Yellow / Appearance: Clear / S.047 / pH: x  Gluc: x / Ketone: Moderate  / Bili: Negative / Urobili: <2 mg/dL   Blood: x / Protein: Trace / Nitrite: Negative   Leuk Esterase: Negative / RBC: x / WBC x   Sq Epi: x / Non Sq Epi: x / Bacteria: x                              9.9    9.73  )-----------( 351      ( 2023 03:24 )             32.6                         7.1    8.79  )-----------( 370      ( 2023 10:20 )             25.3       Imaging:  < from: CT Angio Neck w/ IV Cont (23 @ 13:00) >  IMPRESSION:    Head CT:  1. No acute intracranial hemorrhage, mass effect, or shift of the midline   structures.  2. Similar-appearing mild chronic white matter microvascular type changes.    CTA neck:  1. Similar-appearing mixed plaque affects the left carotid bifurcation   with associated moderate (50-60%) stenosis of the left internal carotid   artery origin and proximal segment by criteria.  2. Otherwise, no large vessel occlusion or major stenosis.    CTA head:  1. No large vessel occlusion or major stenosis.    < end of copied text >  < from: Xray Chest 1 View- PORTABLE-Urgent (23 @ 10:52) >  IMPRESSION: No acute traumatic findings    < end of copied text >  < from: Xray Pelvis AP only (23 @ 10:51) >  IMPRESSION:    No acute fracture or dislocation. No advanced arthritic changes at the   hips. Mild lower lumbar spondylosis.    < end of copied text >

## 2023-04-12 NOTE — PATIENT PROFILE ADULT - FUNCTIONAL ASSESSMENT - BASIC MOBILITY 6.
3-calculated by average/Not able to assess (calculate score using [facility] averaging method)

## 2023-04-12 NOTE — CONSULT NOTE ADULT - ASSESSMENT
Jorge Alberto Ngo is a 89 year old with a PMHx of HLD, BPH and chronic back pain, and BPH presents to the ED with lightheadedness, headache, and acute on chronic back pain found to have DANG. GI consulted for further workup and management of the above.     #Microcytic Iron Deficiency Anemia   Presenting with general malaise found to have occult microcytic Iron Deficiency Anemia with hgb of 7 from normal prior b/l of 13.6 during 2021.    DDx is broad although given age will plan to preform pan-endoscopic evaluation as outlined below to workup underlying anemia.     Recommendations:  -OK for CLD today, will plan for EGD+ Colonoscopy on 4/13/2023 for further workup of the above   -Trend CBC q24 hours, transfuse for goal>7  -PO PPI BID   -Maintain two large bore IV, active T&S    Instructions for colonoscopy  - clear liquid diet today, NPO at midnight  - please ensure golytely is completed (to be ordered by GI fellow)  - please ensure patient drinks entire prep  - please ensure morning labs are drawn at 2am, electrolytes repleted as necessary, and Hg>7    All recommendations are tentative until note is attested by attending.     Ajith Singh, PGY-4  Gastroenterology/Hepatology Fellow  Available on Microsoft Teams   [phone] (Long Range Pager)  [phone] (Short Range Pager [facility])    After 5pm, please contact the on-call GI fellow. [phone]         Jorge Alberto Ngo is a 89 year old with a PMHx of HLD, BPH and chronic back pain, and BPH presents to the ED with lightheadedness, headache, and acute on chronic back pain found to have DANG. GI consulted for further workup and management of the above.     #Microcytic Iron Deficiency Anemia   Presenting with general malaise found to have occult microcytic iron deficiency anemia with hgb of 7 from normal prior b/l of 13.6 during 2021. No prior EGD/c-scope with no ongoing GI constitutional symptoms or any overt bleeding. DDx is broad although given age will plan to I recommended a pan-endoscopic evaluation to workup his underlying anemia and possible malignancy. Despite my recommendation, patient does NOT want to proceed with endoscopic evaluation during this admission. If pt changes his mind we can preform while admitted or have this done expediently as an outpatient       Recommendations:  -Pt declining endoscopic evaluation with either EGD or c-scope at this time. Please reach back out if pt changes his mind we can preform while admitted or have this done expediently as an outpatient     -Would start PO Iron and empiric PO PPI QD     GI will plan to sign off at this time. Please feel free to reach out to our team with any follow up questions. Please provide patient with Gastroenterology Clinic number to confirm/arrange appointment; [phone] ([facility] at [address] [place] [address]) or [phone] ([facility] at [address] [place] [address]) or [phone] ([facility] at [address] [facility]).    All recommendations are tentative until note is attested by attending.     Ajith Singh, PGY-4  Gastroenterology/Hepatology Fellow  Available on Microsoft Teams   [phone] (Long Range Pager)  [phone] (Short Range Pager [facility])    After 5pm, please contact the on-call GI fellow. [phone]      All recommendations are tentative until note is attested by attending.     Ajith Singh, PGY-4  Gastroenterology/Hepatology Fellow  Available on Microsoft Teams   [phone] (Long Range Pager)  [phone] (Short Range Pager [facility])    After 5pm, please contact the on-call GI fellow. [phone]         Jorge Alberto Ngo is a 89 year old with a PMHx of HLD, BPH and chronic back pain, and BPH presents to the ED with lightheadedness, headache, and acute on chronic back pain found to have DANG. GI consulted for further workup and management of the above.     #Microcytic Iron Deficiency Anemia   Presenting with general malaise found to have occult microcytic iron deficiency anemia with hgb of 7 from normal prior b/l of 13.6 during 2021. No prior EGD/c-scope with no ongoing GI constitutional symptoms or any overt bleeding. DDx is broad although given age will plan to I recommended a pan-endoscopic evaluation to workup his underlying anemia and possible malignancy. Despite my recommendation, patient does NOT want to proceed with endoscopic evaluation during this admission. If pt changes his mind we can preform while admitted or have this done expediently as an outpatient       Recommendations:  -Pt declining endoscopic evaluation with either EGD or c-scope at this time. Please reach back out if pt changes his mind we can preform while admitted or have this done expediently as an outpatient     -Would start PO Iron and empiric PO PPI QD     GI will plan to sign off at this time. Please feel free to reach out to our team with any follow up questions. I have emailed the [place] office at [address] [place] [address]. [address], [place], [address] ([phone])to assist with appointment - they will call patient/family to schedule and confirm details, but please ensure patient has office number upon discharge in event he has not heard back.  All recommendations are tentative until note is attested by attending.     Ajith Singh, PGY-4  Gastroenterology/Hepatology Fellow  Available on Microsoft Teams   [phone] (Long Range Pager)  [phone] (Short Range Pager [facility])    After 5pm, please contact the on-call GI fellow. [phone]      All recommendations are tentative until note is attested by attending.     Ajith Singh, PGY-4  Gastroenterology/Hepatology Fellow  Available on Microsoft Teams   [phone] (Long Range Pager)  [phone] (Short Range Pager [facility])    After 5pm, please contact the on-call GI fellow. [phone]         Jorge Alberto Ngo is a 89 year old with a PMHx of HLD, BPH and chronic back pain, and BPH presents to the ED with lightheadedness, headache, and acute on chronic back pain found to have DANG. GI consulted for further workup and management of the above.     #Microcytic Iron Deficiency Anemia   Presenting with general malaise found to have occult microcytic iron deficiency anemia with hgb of 7 from normal prior b/l of 13.6 during 2021. No prior EGD/c-scope with no ongoing GI constitutional symptoms or any overt bleeding. DDx is broad although given age will plan to I recommended a pan-endoscopic evaluation to workup his underlying anemia and possible malignancy. Despite my recommendation, patient does NOT want to proceed with endoscopic evaluation during this admission. If pt changes his mind we can preform while admitted or have this done expediently as an outpatient       Recommendations:  -Pt declining endoscopic evaluation with either EGD or c-scope at this time. Please reach back out if pt changes his mind we can preform while admitted or have this done expediently as an outpatient     -Would start PO Iron  -Empiric PPI BID x 4 weeks followed by daily   -I have emailed the [place] office at [address] [place] [address]. [address], [place], [place], [address] ([phone])to assist with appointment - they will call patient/family to schedule and confirm details, but please ensure patient has office number upon discharge in event he has not heard back.    GI will plan to sign off at this time. Please feel free to reach out to our team with any follow up questions.All recommendations are tentative until note is attested by attending.     Ajith Singh, PGY-4  Gastroenterology/Hepatology Fellow  Available on Microsoft Teams   [phone] (Long Range Pager)  [phone] (Short Range Pager [facility])    After 5pm, please contact the on-call GI fellow. [phone]      All recommendations are tentative until note is attested by attending.     Ajith Singh, PGY-4  Gastroenterology/Hepatology Fellow  Available on Microsoft Teams   [phone] (Long Range Pager)  [phone] (Short Range Pager [facility])    After 5pm, please contact the on-call GI fellow. [phone]

## 2023-04-12 NOTE — PROGRESS NOTE ADULT - SUBJECTIVE AND OBJECTIVE BOX
[facility]  Pager [phone]    Patient is a 89y old  Male who presents with a chief complaint of Back pain, dizziness (12 Apr 2023 08:25)      INTERVAL HPI/OVERNIGHT EVENTS:    MEDICATIONS  (STANDING):  lidocaine   4% Patch 1 Patch Transdermal every 24 hours  pantoprazole    Tablet 40 milliGRAM(s) Oral two times a day  tamsulosin 0.4 milliGRAM(s) Oral at bedtime    MEDICATIONS  (PRN):      Allergies    aspirin (Unknown)  penicillin (Unknown)    Intolerances        REVIEW OF SYSTEMS:  Please see interval HPI:    Vital Signs Last 24 Hrs  T(C): 36.4 (12 Apr 2023 12:42), Max: 36.9 (11 Apr 2023 21:14)  T(F): 97.5 (12 Apr 2023 12:42), Max: 98.4 (11 Apr 2023 21:14)  HR: 77 (12 Apr 2023 12:42) (77 - 94)  BP: 109/47 (12 Apr 2023 12:42) (109/47 - 158/78)  BP(mean): --  RR: 18 (12 Apr 2023 12:42) (16 - 20)  SpO2: 100% (12 Apr 2023 12:42) (98% - 100%)    Parameters below as of 12 Apr 2023 12:42  Patient On (Oxygen Delivery Method): room air      I&O's Detail        PHYSICAL EXAM:  GENERAL:   HEAD:    EYES:   ENMT:   NECK:   NERVOUS SYSTEM:    CHEST/LUNG:   HEART:   ABDOMEN:   EXTREMITIES:    LYMPH:   SKIN:     LABS:                        9.9    9.73  )-----------( 351      ( 12 Apr 2023 03:24 )             32.6     12 Apr 2023 03:24    139    |  104    |  20     ----------------------------<  93     3.8     |  23     |  1.03     Ca    8.9        12 Apr 2023 03:24  Phos  2.2       12 Apr 2023 03:24  Mg     1.90      12 Apr 2023 03:24      PT/INR - ( 11 Apr 2023 10:20 )   PT: 13.6 sec;   INR: 1.17 ratio         PTT - ( 11 Apr 2023 10:20 )  PTT:26.5 sec  CAPILLARY BLOOD GLUCOSE        BLOOD CULTURE    RADIOLOGY & ADDITIONAL TESTS:    Imaging Personally Reviewed:  [ ] YES     Consultant(s) Notes Reviewed:      Care Discussed with Consultants/Other Providers: [facility]  Pager [phone]    Patient is a 89y old  Male who presents with a chief complaint of Back pain, dizziness (12 Apr 2023 08:25)      INTERVAL HPI/OVERNIGHT EVENTS:  Patient reports feeling weak and lightheaded, as well as experiencing low back pain, neuropathic pain in legs. Discussed anemia and ongoing GI evaluation. Patient reports having endoscopy in the past but appears to be reluctant to do colonoscopy. To discuss further with GI. Reports dark brown stools, denies obvious melena/hematochezia, denies NSAID usage. Discussed potential use of lidocaine patch for back pain, gabapentin for neuropathic pain, discussed side effects of gabapentin such as sedation. Patient verbalized understanding.     MEDICATIONS  (STANDING):  lidocaine   4% Patch 1 Patch Transdermal every 24 hours  pantoprazole    Tablet 40 milliGRAM(s) Oral two times a day  tamsulosin 0.4 milliGRAM(s) Oral at bedtime    MEDICATIONS  (PRN):    Allergies  aspirin (Unknown)  penicillin (Unknown)    Intolerances    REVIEW OF SYSTEMS:  Please see interval HPI:    Vital Signs Last 24 Hrs  T(C): 36.4 (12 Apr 2023 12:42), Max: 36.9 (11 Apr 2023 21:14)  T(F): 97.5 (12 Apr 2023 12:42), Max: 98.4 (11 Apr 2023 21:14)  HR: 77 (12 Apr 2023 12:42) (77 - 94)  BP: 109/47 (12 Apr 2023 12:42) (109/47 - 158/78)  BP(mean): --  RR: 18 (12 Apr 2023 12:42) (16 - 20)  SpO2: 100% (12 Apr 2023 12:42) (98% - 100%)    Parameters below as of 12 Apr 2023 12:42  Patient On (Oxygen Delivery Method): room air    I&O's Detail    PHYSICAL EXAM:  GENERAL: NAD, lying in bed  HEAD:  NC/AT  EYES: EOMI, clear sclera/conjunctiva  ENMT: MMM, hearing intact to voice  NECK: supple, no JVD  NERVOUS SYSTEM: moving extremities, follows commands   CHEST/LUNG: CTAB, comfortable on RA, speaking in full sentences  HEART: S1S2 RRR  ABDOMEN: soft, non-tender +BS  EXTREMITIES:  no c/c/e      LABS:                        9.9    9.73  )-----------( 351      ( 12 Apr 2023 03:24 )             32.6     MCV 73.4  hgb trend 7.1 -> 9.9    12 Apr 2023 03:24    139    |  104    |  20     ----------------------------<  93     3.8     |  23     |  1.03     Ca    8.9        12 Apr 2023 03:24  Phos  2.2       12 Apr 2023 03:24  Mg     1.90      12 Apr 2023 03:24      PT/INR - ( 11 Apr 2023 10:20 )   PT: 13.6 sec;   INR: 1.17 ratio         PTT - ( 11 Apr 2023 10:20 )  PTT:26.5 sec  CAPILLARY BLOOD GLUCOSE        BLOOD CULTURE    RADIOLOGY & ADDITIONAL TESTS:    Imaging Personally Reviewed:  [ ] YES   < from: CT Head No Cont (04.11.23 @ 13:00) >  IMPRESSION:    Head CT:  1. No acute intracranial hemorrhage, mass effect, or shift of the midline   structures.  2. Similar-appearing mild chronic white matter microvascular type changes.    CTA neck:  1. Similar-appearing mixed plaque affects the left carotid bifurcation   with associated moderate (50-60%) stenosis of the left internal carotid   artery origin and proximal segment by criteria.  2. Otherwise, no large vessel occlusion or major stenosis.    CTA head:  1. No large vessel occlusion or major stenosis.    < end of copied text >  < from: Xray Chest 1 View- PORTABLE-Urgent (04.11.23 @ 10:52) >    IMPRESSION: No acute traumatic findings.    < end of copied text >  < from: Xray Hip 2-3 Views, Left (04.11.23 @ 10:51) >    No acute fracture or dislocation. No advanced arthritic changes at the   hips. Mild lower lumbar spondylosis.    < end of copied text >      Consultant(s) Notes Reviewed:  GI    Care Discussed with Consultants/Other Providers: LOU Canseco re: pain management, PT eval, GI f/u

## 2023-04-12 NOTE — PHYSICAL THERAPY INITIAL EVALUATION ADULT - ADDITIONAL COMMENTS
Pt reports that he lives alone in an apartment with 3 steps to enter; (+)handrail; bedroom is on the first floor. Prior to hospital admission pt was completely independent and used no assistive device with ambulation. Pt does own a single axis cane, rolling walker, and wheelchair if he needs. Pt's last fall was a few weeks ago secondary to losing his balance.    Pt left comfortable in bed, NAD, all lines intact, all precautions maintained, with call bell in reach, and RN aware of PT evaluation and pt's pain.

## 2023-04-12 NOTE — PHYSICAL THERAPY INITIAL EVALUATION ADULT - PATIENT PROFILE REVIEW, REHAB EVAL
No Formal Activity Order in the Computer; spoke with RODOLFO Johnson prior to PT evaluation--> Pt OK for PT consult/OOB activity./yes

## 2023-04-12 NOTE — PROGRESS NOTE ADULT - ASSESSMENT
88 yo M w/ BPH, chronic pain pain p/w lightheadedness/weakness, admitted with microcytic anemia potentially secondary to GI bleed, recommended to have endoscopic evaluation for further workup.     # Microcytic, iron deficiency anemia  - hgb 13.6 in 2021, 7.1 on admission  - was symptomatic from anemia with lightheadedness/weakness  - concern for potential bleed/malignancy  - s/p 2u pRBC transfusions with increase in hgb level  - continue to monitor hgb and transfuse prn  - recommended by GI to undergo endoscopic evaluation, while patient seemed more amenable this AM when I spoke to him, appeared to have later  declined inpatient evaluation  - GI assistance appreciated with arranging expedited outpatient followup  - c/w ppi   - c/w iron supplementation  - counseled to avoid NSAIDS    # Chronic back pain/neuropathic pain  - c/w gabapentin qhs   - tylenol prn, avoid nsaids  - trial of lidoderm patch to low back  - PT recs home w/ home PT    # BPH  - c/w flomax    # hypophosphatemia  - replete phos    Need for prophylactic measure  - VTE ppx: SCDs  - clear liquid diet, will see if patient will change mind about endoscopic evaluation    Dispo: home w/ PT pending stability, potentially with expedited otpt GI followup if continues to refuse inpatient scope

## 2023-04-12 NOTE — PATIENT PROFILE ADULT - FALL HARM RISK - RISK INTERVENTIONS
Assistance OOB with selected safe patient handling equipment/Assistance with ambulation/Communicate Fall Risk and Risk Factors to all staff, patient, and family/Discuss with provider need for PT consult/Monitor gait and stability/Reinforce activity limits and safety measures with patient and family/Use of alarms - bed, chair and/or voice tab/Visual Cue: Yellow wristband/Bed in lowest position, wheels locked, appropriate side rails in place/Call bell, personal items and telephone in reach/Instruct patient to call for assistance before getting out of bed or chair/Non-slip footwear when patient is out of bed/[place] to call system/Physically safe environment - no spills, clutter or unnecessary equipment/Purposeful Proactive Rounding/Room/bathroom lighting operational, light cord in reach

## 2023-04-12 NOTE — PHYSICAL THERAPY INITIAL EVALUATION ADULT - PERTINENT HX OF CURRENT PROBLEM, REHAB EVAL
88 yo M with PMhx of BPH, chronic back pain, and BPH presents to the ED with lightheadedness, headache, and acute on chronic back pain, found to acute anemia, concerning for GI bleed. CT Head IMPRESSION: No acute intracranial hemorrhage, mass effect, or shift of the midline structures. CTA neck: Similar-appearing mixed plaque affects the left carotid bifurcation with associated moderate (50-60%) stenosis of the left internal carotid artery origin and proximal segment by criteria. X-Ray of Pelvis and Left Hip: No acute fracture or dislocation. No advanced arthritic changes at the hips. Mild lower lumbar spondylosis.

## 2023-04-13 ENCOUNTER — TRANSCRIPTION ENCOUNTER (OUTPATIENT)
Age: 88
End: 2023-04-13

## 2023-04-13 LAB
ANION GAP SERPL CALC-SCNC: 13 MMOL/L — SIGNIFICANT CHANGE UP (ref 7–14)
BUN SERPL-MCNC: 15 MG/DL — SIGNIFICANT CHANGE UP (ref 7–23)
CALCIUM SERPL-MCNC: 8.9 MG/DL — SIGNIFICANT CHANGE UP (ref 8.4–10.5)
CHLORIDE SERPL-SCNC: 102 MMOL/L — SIGNIFICANT CHANGE UP (ref 98–107)
CO2 SERPL-SCNC: 23 MMOL/L — SIGNIFICANT CHANGE UP (ref 22–31)
CREAT SERPL-MCNC: 1.09 MG/DL — SIGNIFICANT CHANGE UP (ref 0.5–1.3)
EGFR: 65 ML/MIN/1.73M2 — SIGNIFICANT CHANGE UP
GLUCOSE SERPL-MCNC: 87 MG/DL — SIGNIFICANT CHANGE UP (ref 70–99)
HCT VFR BLD CALC: 32.1 % — LOW (ref 39–50)
HCT VFR BLD CALC: 32.7 % — LOW (ref 39–50)
HGB BLD-MCNC: 10.1 G/DL — LOW (ref 13–17)
HGB BLD-MCNC: 9.6 G/DL — LOW (ref 13–17)
MAGNESIUM SERPL-MCNC: 1.8 MG/DL — SIGNIFICANT CHANGE UP (ref 1.6–2.6)
MCHC RBC-ENTMCNC: 22.2 PG — LOW (ref 27–34)
MCHC RBC-ENTMCNC: 22.5 PG — LOW (ref 27–34)
MCHC RBC-ENTMCNC: 29.9 GM/DL — LOW (ref 32–36)
MCHC RBC-ENTMCNC: 30.9 GM/DL — LOW (ref 32–36)
MCV RBC AUTO: 73 FL — LOW (ref 80–100)
MCV RBC AUTO: 74.1 FL — LOW (ref 80–100)
NRBC # BLD: 0 /100 WBCS — SIGNIFICANT CHANGE UP (ref 0–0)
NRBC # BLD: 0 /100 WBCS — SIGNIFICANT CHANGE UP (ref 0–0)
NRBC # FLD: 0 K/UL — SIGNIFICANT CHANGE UP (ref 0–0)
NRBC # FLD: 0 K/UL — SIGNIFICANT CHANGE UP (ref 0–0)
PHOSPHATE SERPL-MCNC: 2.8 MG/DL — SIGNIFICANT CHANGE UP (ref 2.5–4.5)
PLATELET # BLD AUTO: 340 K/UL — SIGNIFICANT CHANGE UP (ref 150–400)
PLATELET # BLD AUTO: 355 K/UL — SIGNIFICANT CHANGE UP (ref 150–400)
POTASSIUM SERPL-MCNC: 3.6 MMOL/L — SIGNIFICANT CHANGE UP (ref 3.5–5.3)
POTASSIUM SERPL-SCNC: 3.6 MMOL/L — SIGNIFICANT CHANGE UP (ref 3.5–5.3)
RBC # BLD: 4.33 M/UL — SIGNIFICANT CHANGE UP (ref 4.2–5.8)
RBC # BLD: 4.48 M/UL — SIGNIFICANT CHANGE UP (ref 4.2–5.8)
RBC # FLD: 19.8 % — HIGH (ref 10.3–14.5)
RBC # FLD: 20.2 % — HIGH (ref 10.3–14.5)
SODIUM SERPL-SCNC: 138 MMOL/L — SIGNIFICANT CHANGE UP (ref 135–145)
WBC # BLD: 7.8 K/UL — SIGNIFICANT CHANGE UP (ref 3.8–10.5)
WBC # BLD: 8.26 K/UL — SIGNIFICANT CHANGE UP (ref 3.8–10.5)
WBC # FLD AUTO: 7.8 K/UL — SIGNIFICANT CHANGE UP (ref 3.8–10.5)
WBC # FLD AUTO: 8.26 K/UL — SIGNIFICANT CHANGE UP (ref 3.8–10.5)

## 2023-04-13 PROCEDURE — 99232 SBSQ HOSP IP/OBS MODERATE 35: CPT

## 2023-04-13 RX ORDER — GABAPENTIN 400 MG/1
1 CAPSULE ORAL
Qty: 30 | Refills: 0
Start: 2023-04-13 | End: 2023-05-12

## 2023-04-13 RX ORDER — PANTOPRAZOLE SODIUM 20 MG/1
1 TABLET, DELAYED RELEASE ORAL
Qty: 60 | Refills: 0 | DISCHARGE
Start: 2023-04-13 | End: 2023-05-12

## 2023-04-13 RX ORDER — SENNA PLUS 8.6 MG/1
2 TABLET ORAL
Qty: 60 | Refills: 0
Start: 2023-04-13 | End: 2023-05-12

## 2023-04-13 RX ORDER — CHLORHEXIDINE GLUCONATE 213 G/1000ML
1 SOLUTION TOPICAL DAILY
Refills: 0 | Status: DISCONTINUED | OUTPATIENT
Start: 2023-04-13 | End: 2023-04-15

## 2023-04-13 RX ORDER — ACETAMINOPHEN 500 MG
2 TABLET ORAL
Qty: 0 | Refills: 0 | DISCHARGE
Start: 2023-04-13

## 2023-04-13 RX ORDER — TAMSULOSIN HYDROCHLORIDE 0.4 MG/1
1 CAPSULE ORAL
Qty: 30 | Refills: 0
Start: 2023-04-13 | End: 2023-05-12

## 2023-04-13 RX ORDER — TRAMADOL HYDROCHLORIDE 50 MG/1
50 TABLET ORAL
Refills: 0 | Status: DISCONTINUED | OUTPATIENT
Start: 2023-04-13 | End: 2023-04-15

## 2023-04-13 RX ORDER — SODIUM CHLORIDE 9 MG/ML
250 INJECTION INTRAMUSCULAR; INTRAVENOUS; SUBCUTANEOUS ONCE
Refills: 0 | Status: COMPLETED | OUTPATIENT
Start: 2023-04-13 | End: 2023-04-13

## 2023-04-13 RX ORDER — TRAMADOL HYDROCHLORIDE 50 MG/1
1 TABLET ORAL
Qty: 6 | Refills: 0
Start: 2023-04-13 | End: 2023-04-15

## 2023-04-13 RX ORDER — FERROUS SULFATE 325(65) MG
1 TABLET ORAL
Qty: 30 | Refills: 0
Start: 2023-04-13 | End: 2023-05-12

## 2023-04-13 RX ORDER — SODIUM CHLORIDE 9 MG/ML
500 INJECTION INTRAMUSCULAR; INTRAVENOUS; SUBCUTANEOUS ONCE
Refills: 0 | Status: COMPLETED | OUTPATIENT
Start: 2023-04-13 | End: 2023-04-13

## 2023-04-13 RX ORDER — LIDOCAINE 4 G/100G
1 CREAM TOPICAL
Qty: 6 | Refills: 0
Start: 2023-04-13 | End: 2023-05-12

## 2023-04-13 RX ORDER — PANTOPRAZOLE SODIUM 20 MG/1
1 TABLET, DELAYED RELEASE ORAL
Qty: 60 | Refills: 0
Start: 2023-04-13 | End: 2023-05-12

## 2023-04-13 RX ADMIN — PANTOPRAZOLE SODIUM 40 MILLIGRAM(S): 20 TABLET, DELAYED RELEASE ORAL at 18:06

## 2023-04-13 RX ADMIN — LIDOCAINE 1 PATCH: 4 CREAM TOPICAL at 22:59

## 2023-04-13 RX ADMIN — GABAPENTIN 400 MILLIGRAM(S): 400 CAPSULE ORAL at 21:18

## 2023-04-13 RX ADMIN — PANTOPRAZOLE SODIUM 40 MILLIGRAM(S): 20 TABLET, DELAYED RELEASE ORAL at 06:11

## 2023-04-13 RX ADMIN — CHLORHEXIDINE GLUCONATE 1 APPLICATION(S): 213 SOLUTION TOPICAL at 16:28

## 2023-04-13 RX ADMIN — Medication 650 MILLIGRAM(S): at 12:54

## 2023-04-13 RX ADMIN — SODIUM CHLORIDE 500 MILLILITER(S): 9 INJECTION INTRAMUSCULAR; INTRAVENOUS; SUBCUTANEOUS at 16:29

## 2023-04-13 RX ADMIN — SODIUM CHLORIDE 500 MILLILITER(S): 9 INJECTION INTRAMUSCULAR; INTRAVENOUS; SUBCUTANEOUS at 22:51

## 2023-04-13 RX ADMIN — Medication 325 MILLIGRAM(S): at 11:54

## 2023-04-13 RX ADMIN — Medication 650 MILLIGRAM(S): at 11:54

## 2023-04-13 RX ADMIN — LIDOCAINE 1 PATCH: 4 CREAM TOPICAL at 11:54

## 2023-04-13 RX ADMIN — TAMSULOSIN HYDROCHLORIDE 0.4 MILLIGRAM(S): 0.4 CAPSULE ORAL at 21:18

## 2023-04-13 NOTE — PROVIDER CONTACT NOTE (OTHER) - ACTION/TREATMENT ORDERED:
GINNY Marie notified and at bedside assessing patient. CBC ordered. followed by fluids. continuing to monitor patient.

## 2023-04-13 NOTE — PROVIDER CONTACT NOTE (OTHER) - ASSESSMENT
patient is alert & oriented x4. orthos positive in flowsheet. patient is reporting some visual changes. hx of low hemoglobin.

## 2023-04-13 NOTE — DISCHARGE NOTE PROVIDER - CARE PROVIDER_API CALL
Usha Mack)  Gastroenterology; Internal Medicine  [address] [place] [address]  Phone: [phone]  Fax: [phone]  Follow Up Time:    Usha Mack)  Gastroenterology; Internal Medicine  [address] [place] [address]  Phone: [phone]  Fax: [phone]  Follow Up Time:     Christiana Camp (NP; RN)  NP in [facility]  [address] [place] [address] [address]  [place] [address]  Phone: [phone]  Fax: [phone]  Follow Up Time:

## 2023-04-13 NOTE — DISCHARGE NOTE PROVIDER - NSDCMRMEDTOKEN_GEN_ALL_CORE_FT
acetaminophen 325 mg oral tablet: 2 tab(s) orally every 6 hours As needed Temp greater or equal to 38C (100.4F), Mild Pain (1 - 3)  ferrous sulfate 325 mg (65 mg elemental iron) oral tablet: 1 tab(s) orally once a day  gabapentin 400 mg oral capsule: 1 cap(s) orally once a day (at bedtime)  lidocaine 4% topical film: Apply topically to affected area once a day  pantoprazole 40 mg oral delayed release tablet: 1 tab(s) orally 2 times a day  Senna 8.6 mg oral tablet: 2 tab(s) orally once a day (at bedtime) as needed for  constipation please take while you are on tramadol  tamsulosin 0.4 mg oral capsule: 1 cap(s) orally once a day (at bedtime)  traMADol 50 mg oral tablet: 1 tab(s) orally 2 times a day as needed for  moderate pain MDD: 2 tabs   acetaminophen 325 mg oral tablet: 2 tab(s) orally every 6 hours As needed Temp greater or equal to 38C (100.4F), Mild Pain (1 - 3)  atorvastatin 80 mg oral tablet: 1 tab(s) orally once a day (at bedtime)  ferrous sulfate 325 mg (65 mg elemental iron) oral tablet: 1 tab(s) orally once a day  gabapentin 400 mg oral capsule: 1 cap(s) orally once a day (at bedtime)  lidocaine 4% topical film: Apply topically to affected area once a day  meclizine 12.5 mg oral tablet: 1 tab(s) orally 2 times a day as needed for  dizziness  pantoprazole 40 mg oral delayed release tablet: 1 tab(s) orally 2 times a day  Senna 8.6 mg oral tablet: 2 tab(s) orally once a day (at bedtime) as needed for  constipation please take while you are on tramadol  tamsulosin 0.4 mg oral capsule: 1 cap(s) orally once a day (at bedtime)  traMADol 50 mg oral tablet: 1 tab(s) orally 2 times a day as needed for  moderate pain MDD: 2 tabs

## 2023-04-13 NOTE — PROGRESS NOTE ADULT - ASSESSMENT
88 yo M w/ BPH, chronic pain pain p/w lightheadedness/weakness, admitted with microcytic anemia potentially secondary to GI bleed, recommended to have endoscopic evaluation for further workup though patient declining inpatient procedure.     # Microcytic, iron deficiency anemia  - hgb 13.6 in 2021, 7.1 on admission  - was symptomatic from anemia with lightheadedness/weakness  - concern for potential bleed/malignancy  - s/p 2u pRBC transfusions with increase in hgb level  - continue to monitor hgb and transfuse prn, hgb appears stable this AM in the 9s  - recommended by GI to undergo endoscopic evaluation, patient adamantly declining inpatient procedure at this time  - GI assistance appreciated with arranging expedited outpatient followup, patient counseled on importance of followup  - c/w ppi   - c/w iron supplementation  - counseled to avoid NSAIDS    # Chronic back pain/neuropathic pain  - c/w gabapentin qhs   - tylenol prn, avoid nsaids  - trial of lidoderm patch to low back  - was prescribed tramadol as otpt, can consider providing small supply upon discharge  - advised otpt PMD followup for further management  - PT recs home w/ home PT    # BPH  - c/w flomax      Need for prophylactic measure  - VTE ppx: SCDs  - advanced to regular as no currently plan for inpatient scope as patient declining    Dispo: home w/ PT with expedited otpt GI followup if continues to refuse inpatient scope, hgb stable this AM, patient wants to go home, anticipatory guidance provided, appreciate CM/SW assistance.      88 yo M w/ BPH, chronic pain pain p/w lightheadedness/weakness, admitted with microcytic anemia potentially secondary to GI bleed, recommended to have endoscopic evaluation for further workup though patient declining inpatient procedure.     # Microcytic, iron deficiency anemia  - hgb 13.6 in 2021, 7.1 on admission  - was symptomatic from anemia with lightheadedness/weakness  - concern for potential bleed/malignancy  - s/p 2u pRBC transfusions with increase in hgb level  - continue to monitor hgb and transfuse prn, hgb appears stable this AM in the 9s  - recommended by GI to undergo endoscopic evaluation, patient adamantly declining inpatient procedure at this time  - GI assistance appreciated with arranging expedited outpatient followup, patient counseled on importance of followup  - c/w ppi   - c/w iron supplementation  - counseled to avoid NSAIDS    # Chronic back pain/neuropathic pain  - c/w gabapentin qhs   - tylenol prn, avoid nsaids  - trial of lidoderm patch to low back  - was prescribed tramadol as otpt, can consider providing small supply upon discharge  - advised otpt PMD followup for further management  - PT recs home w/ home PT    # BPH  - c/w flomax      Need for prophylactic measure  - VTE ppx: SCDs  - advanced to regular as no currently plan for inpatient scope as patient declining    Dispo: home w/ PT with expedited otpt GI followup if continues to refuse inpatient scope, hgb stable this AM, patient wants to go home, anticipatory guidance provided, appreciate CM/SW assistance.     Addendum: patient noted to be orthostatic this afternoon, repeat hgb 10.1, stable, giving fluids, to reassess, holding off on discharge for now

## 2023-04-13 NOTE — DISCHARGE NOTE NURSING/CASE MANAGEMENT/SOCIAL WORK - NSDCFUADDAPPT_GEN_ALL_CORE_FT
The gastroenterologist want you to followup with them at their [place] office ([address] [place] [address] [facility]. [address], [place], [address] ([phone])) as soon as possible to investigate why your blood levels were so low. Please be on alert for a phone call from their office to schedule an appointment. If you do not hear from them, please contact their office at the number above to make an appointment.     Followup with your primary care provider for workup of chronic back pain

## 2023-04-13 NOTE — CHART NOTE - NSCHARTNOTEFT_GEN_A_CORE
Notified by RN pt was dressing and up to go downstair of taxi and reported lightheadedness, pt was wheeled back to bedside on a chair     pt seen at bedside reported feeling lightheadness/dizziness. Denied any ringing in ears. when looking at white wall thought for a second that he saw a fence and quickly went away. Denied any CP, SOB, palpitation or diaphoresis. No further melena or blood in stool     Vital Signs Last 24 Hrs  T(C): 36.8 (13 Apr 2023 12:25), Max: 36.8 (13 Apr 2023 12:08)  T(F): 98.2 (13 Apr 2023 12:25), Max: 98.2 (13 Apr 2023 12:08)  HR: 89 (13 Apr 2023 12:25) (77 - 99)  BP: 132/81 (13 Apr 2023 12:25) (119/65 - 152/72)  BP(mean): --  RR: 18 (13 Apr 2023 12:25) (17 - 18)  SpO2: 99% (13 Apr 2023 12:25) (99% - 100%)    Parameters below as of 13 Apr 2023 05:10  Patient On (Oxygen Delivery Method): room air    A&OX3. CN II-XII intact   S1S2 no murmur no gallops  Lung CTA  strength intact bilaterally. No pronator drift  no facial droop. Sensation intact    orthostatic + at bedside     - CBC reported HGb stable   - 500cc bolus IVF and repeat orthostatic  - above discussed with attending

## 2023-04-13 NOTE — DISCHARGE NOTE PROVIDER - NSDCCPCAREPLAN_GEN_ALL_CORE_FT
PRINCIPAL DISCHARGE DIAGNOSIS  Diagnosis: Iron deficiency anemia  Assessment and Plan of Treatment: You were found to have a low blood level and required blood transfusions. You were seen by the gastroenterologist and recommended to have an endoscopic procedure done to figure out why your blood level was low (possibilities include bleeding from the gastrointestinal tract and cancer). You ultimately declined to have the procedure done. It is very important that you followup with the GI doctors for this evaluation. Please see your primary care doctor after discharge. Please avoid medications like advil, ibuprofen, naproxen (NSAIDs). Please seek medical attention if having chest pain, shortness of breath, lightheadedness, or are experiencing bleeding from the gastrointestinal tract.      SECONDARY DISCHARGE DIAGNOSES  Diagnosis: Chronic back pain  Assessment and Plan of Treatment: Please followup with your primary medical doctor for further management of your pain. Please note that gabapentin can cause sedation so you should not drive or use heavy machinery until you know how the medication affects you. Please seek medical attention if you have new weakness/numbness/inability to control your bowels or bladder.    Diagnosis: BPH without urinary obstruction  Assessment and Plan of Treatment: Please take your medication as prescribed.     PRINCIPAL DISCHARGE DIAGNOSIS  Diagnosis: Iron deficiency anemia  Assessment and Plan of Treatment: You were found to have a low blood level and required blood transfusions. You were seen by the gastroenterologist and recommended to have an endoscopic procedure done to figure out why your blood level was low (possibilities include bleeding from the gastrointestinal tract and cancer). You ultimately declined to have the procedure done. It is very important that you followup with the GI doctors for this evaluation. Please see your primary care doctor after discharge. Please take your iron supplement and acid reducer (protonix)as prescribed. Please avoid medications like advil, ibuprofen, naproxen (NSAIDs) as they could increase your risk for bleeding. Please seek medical attention if having chest pain, shortness of breath, lightheadedness, or are experiencing bleeding from the gastrointestinal tract.      SECONDARY DISCHARGE DIAGNOSES  Diagnosis: Chronic back pain  Assessment and Plan of Treatment: Please followup with your primary medical doctor for further management of your pain. Please note that gabapentin can cause sedation so you should not drive or use heavy machinery until you know how the medication affects you. Please seek medical attention if you have new weakness/numbness/inability to control your bowels or bladder.    Diagnosis: BPH without urinary obstruction  Assessment and Plan of Treatment: Please take your medication as prescribed.     PRINCIPAL DISCHARGE DIAGNOSIS  Diagnosis: Iron deficiency anemia  Assessment and Plan of Treatment: You were found to have a low blood level and required blood transfusions. You were seen by the gastroenterologist and recommended to have an endoscopic procedure done to figure out why your blood level was low (possibilities include bleeding from the gastrointestinal tract and cancer). You ultimately declined to have the procedure done. It is very important that you followup with the GI doctors for this evaluation. Please see your primary care doctor after discharge. Please take your iron supplement and acid reducer (protonix)as prescribed. Please avoid medications like advil, ibuprofen, naproxen (NSAIDs) as they could increase your risk for bleeding. Please seek medical attention if having chest pain, shortness of breath, lightheadedness, or are experiencing bleeding from the gastrointestinal tract.      SECONDARY DISCHARGE DIAGNOSES  Diagnosis: Chronic back pain  Assessment and Plan of Treatment: Please followup with your primary medical doctor for further management of your pain. Please note that gabapentin can cause sedation so you should not drive or use heavy machinery until you know how the medication affects you. Please seek medical attention if you have new weakness/numbness/inability to control your bowels or bladder.    Diagnosis: BPH without urinary obstruction  Assessment and Plan of Treatment: Please take your medication as prescribed.    Diagnosis: Slurred speech  Assessment and Plan of Treatment: Followup with neurology clinic ([facility]) on discharge in 1-2 weeks. You were seen by neurology during admission, recommended for CT head and MRI head outpatient.

## 2023-04-13 NOTE — PROGRESS NOTE ADULT - SUBJECTIVE AND OBJECTIVE BOX
[facility] Medicine  Pager [phone]    Patient is a 89y old  Male who presents with a chief complaint of Back pain, dizziness (13 Apr 2023 09:57)      INTERVAL HPI/OVERNIGHT EVENTS:    MEDICATIONS  (STANDING):  ferrous    sulfate 325 milliGRAM(s) Oral daily  gabapentin 400 milliGRAM(s) Oral at bedtime  lidocaine   4% Patch 1 Patch Transdermal every 24 hours  pantoprazole    Tablet 40 milliGRAM(s) Oral two times a day  tamsulosin 0.4 milliGRAM(s) Oral at bedtime    MEDICATIONS  (PRN):  acetaminophen     Tablet .. 650 milliGRAM(s) Oral every 6 hours PRN Temp greater or equal to 38C (100.4F), Mild Pain (1 - 3)  aluminum hydroxide/magnesium hydroxide/simethicone Suspension 30 milliLiter(s) Oral every 4 hours PRN Dyspepsia  melatonin 3 milliGRAM(s) Oral at bedtime PRN Insomnia  ondansetron Injectable 4 milliGRAM(s) IV Push every 8 hours PRN Nausea and/or Vomiting      Allergies    aspirin (Unknown)  penicillin (Unknown)    Intolerances        REVIEW OF SYSTEMS:  Please see interval HPI:    Vital Signs Last 24 Hrs  T(C): 36.3 (13 Apr 2023 05:10), Max: 36.7 (12 Apr 2023 18:53)  T(F): 97.3 (13 Apr 2023 05:10), Max: 98.1 (12 Apr 2023 18:53)  HR: 85 (13 Apr 2023 05:10) (77 - 99)  BP: 130/64 (13 Apr 2023 05:10) (109/47 - 152/72)  BP(mean): --  RR: 17 (13 Apr 2023 05:10) (17 - 18)  SpO2: 100% (13 Apr 2023 05:10) (100% - 100%)    Parameters below as of 13 Apr 2023 05:10  Patient On (Oxygen Delivery Method): room air      I&O's Detail        PHYSICAL EXAM:  GENERAL:   HEAD:    EYES:   ENMT:   NECK:   NERVOUS SYSTEM:    CHEST/LUNG:   HEART:   ABDOMEN:   EXTREMITIES:    LYMPH:   SKIN:     LABS:                        9.6    7.80  )-----------( 340      ( 13 Apr 2023 06:15 )             32.1     13 Apr 2023 06:15    138    |  102    |  15     ----------------------------<  87     3.6     |  23     |  1.09     Ca    8.9        13 Apr 2023 06:15  Phos  2.8       13 Apr 2023 06:15  Mg     1.80      13 Apr 2023 06:15        CAPILLARY BLOOD GLUCOSE        BLOOD CULTURE  04-11 @ 14:01   <10,000 CFU/mL Normal Urogenital Nita  --  --    RADIOLOGY & ADDITIONAL TESTS:    Imaging Personally Reviewed:  [ ] YES     Consultant(s) Notes Reviewed:      Care Discussed with Consultants/Other Providers: [facility] Medicine  Pager [phone]    Patient is a 89y old  Male who presents with a chief complaint of Back pain, dizziness (13 Apr 2023 09:57)      INTERVAL HPI/OVERNIGHT EVENTS:  Wants to go home today, though still feeling weak states is better, denies any bleeding. Does not want to have inpatient endoscopic evaluation. Discussed importance of close GI followup for evaluation of underlying cause of his anemia (differential including bleed, malignancy etc). Anticipatory guidance provided re: signs/symptoms of anemia or GIB that would prompt seeking urgent medical attention. Discussed pain management (and caution for sedation w/ gabapentin), and recommendation to followup otpt w/ PMD for further management. Patient verbalized understanding. Requests assistance with transportation home, CM/SW assistance appreciated.     MEDICATIONS  (STANDING):  ferrous    sulfate 325 milliGRAM(s) Oral daily  gabapentin 400 milliGRAM(s) Oral at bedtime  lidocaine   4% Patch 1 Patch Transdermal every 24 hours  pantoprazole    Tablet 40 milliGRAM(s) Oral two times a day  tamsulosin 0.4 milliGRAM(s) Oral at bedtime    MEDICATIONS  (PRN):  acetaminophen     Tablet .. 650 milliGRAM(s) Oral every 6 hours PRN Temp greater or equal to 38C (100.4F), Mild Pain (1 - 3)  aluminum hydroxide/magnesium hydroxide/simethicone Suspension 30 milliLiter(s) Oral every 4 hours PRN Dyspepsia  melatonin 3 milliGRAM(s) Oral at bedtime PRN Insomnia  ondansetron Injectable 4 milliGRAM(s) IV Push every 8 hours PRN Nausea and/or Vomiting    Allergies    aspirin (Unknown)  penicillin (Unknown)    Intolerances    REVIEW OF SYSTEMS:  Please see interval HPI:    Vital Signs Last 24 Hrs  T(C): 36.3 (13 Apr 2023 05:10), Max: 36.7 (12 Apr 2023 18:53)  T(F): 97.3 (13 Apr 2023 05:10), Max: 98.1 (12 Apr 2023 18:53)  HR: 85 (13 Apr 2023 05:10) (77 - 99)  BP: 130/64 (13 Apr 2023 05:10) (109/47 - 152/72)  RR: 17 (13 Apr 2023 05:10) (17 - 18)  SpO2: 100% (13 Apr 2023 05:10) (100% - 100%)    Parameters below as of 13 Apr 2023 05:10  Patient On (Oxygen Delivery Method): room air    I&O's Detail    PHYSICAL EXAM:  GENERAL: NAD, lying in bed  HEAD:  NC/AT  EYES: EOMI, clear sclera/conjunctiva  ENMT: MMM, hearing intact to voice  NECK: supple, no JVD  NERVOUS SYSTEM: moving extremities, follows commands   CHEST/LUNG: CTAB, comfortable on RA, speaking in full sentences  HEART: S1S2 RRR  ABDOMEN: soft, non-tender +BS  EXTREMITIES:  no c/c/e    LABS:                        9.6    7.80  )-----------( 340      ( 13 Apr 2023 06:15 )             32.1     13 Apr 2023 06:15    138    |  102    |  15     ----------------------------<  87     3.6     |  23     |  1.09     Ca    8.9        13 Apr 2023 06:15  Phos  2.8       13 Apr 2023 06:15  Mg     1.80      13 Apr 2023 06:15        CAPILLARY BLOOD GLUCOSE    URINE CULTURE  04-11 @ 14:01   <10,000 CFU/mL Normal Urogenital Nita  --  --    RADIOLOGY & ADDITIONAL TESTS:    Imaging Personally Reviewed:  [ ] YES     Consultant(s) Notes Reviewed:      Care Discussed with Consultants/Other Providers: LOU Hammond re: istop, pain management, counseled on medications

## 2023-04-13 NOTE — DISCHARGE NOTE PROVIDER - DETAILS OF MALNUTRITION DIAGNOSIS/DIAGNOSES
This patient has been assessed with a concern for Malnutrition and was treated during this hospitalization for the following Nutrition diagnosis/diagnoses:     -  04/14/2023: Moderate protein-calorie malnutrition   -  04/14/2023: Underweight (BMI < 19)

## 2023-04-13 NOTE — DISCHARGE NOTE NURSING/CASE MANAGEMENT/SOCIAL WORK - PATIENT PORTAL LINK FT
You can access the FollowMyHealth Patient Portal offered by [facility] by registering at the following website: [email or web address]. By joining [facility]’s FollowMyHealth portal, you will also be able to view your health information using other applications (apps) compatible with our system.

## 2023-04-13 NOTE — PHARMACOTHERAPY INTERVENTION NOTE - COMMENTS
Discharge medications were reviewed with the patient. Current medication schedule was discussed in detail including: medication name, indication, dose, administration times, side effects, and special instructions. Patient questions and concerns were answered and addressed. Patient demonstrated understanding and was provided with educational handout.    Rosalba Robbins, PharmD  Clinical Pharmacy Specialist  [facility] [id number]

## 2023-04-13 NOTE — DISCHARGE NOTE PROVIDER - NSDCCPTREATMENT_GEN_ALL_CORE_FT
PRINCIPAL PROCEDURE  Procedure: 2D echo  Findings and Treatment: EF 65  1. Mitral annular calcification, otherwise normal mitral  valve. Mild mitral regurgitation.  2. Calcified trileaflet aortic valve with normal opening.  Mild aortic regurgitation.  3. Normal left ventricular internal dimensions and wall  thicknesses.  4. Endocardium not well visualized; grossly normal left  ventricular systolic function.  Endocardial visualization  enhanced with intravenous injection of echo contrast  (Definity).  5. Mild diastolic dysfunction (Stage I).  6. Normal right ventricular size and function.  7. Estimated right ventricular systolic pressure equals 38  mm Hg, assuming right atrial pressure equals 10 mm Hg,  consistent with borderline pulmonary hypertension.  8. Small pericardial effusion posterior to the left  ventricle.        SECONDARY PROCEDURE  Procedure: CT angiogram head w contrast  Findings and Treatment: 4/11   Head CT:  1. No acute intracranial hemorrhage, mass effect, or shift of the midline   structures.  2. Similar-appearing mild chronic white matter microvascular type changes.  CTA neck:  1. Similar-appearing mixed plaque affects the left carotid bifurcation   with associated moderate (50-60%) stenosis of the left internal carotid   artery origin and proximal segment by criteria.  2. Otherwise, no large vessel occlusion or major stenosis.  CTA head:  1. No large vessel occlusion or major stenosis.

## 2023-04-13 NOTE — DISCHARGE NOTE NURSING/CASE MANAGEMENT/SOCIAL WORK - NSDCPEFALRISK_GEN_ALL_CORE
For information on Fall & Injury Prevention, visit: [email or web address] OR  [email or web address] OR  https://www.cdc.gov/steadi/patient.html

## 2023-04-13 NOTE — CHART NOTE - NSCHARTNOTEFT_GEN_A_CORE
This report was requested by: Manish Marie | Reference #: [id number]    You have not added a GIBSON number. Keeping your GIBSON number(s) up to date on the My GIBSON # page will enable the separation of your prescriptions from others in the search results.    Others' Prescriptions  Patient Name: Ari NgoBirth Date: 11/15/1933  Address: [address] [place] [address] [place], [place] [address]Pwk: Male  Rx Written	Rx Dispensed	Drug	Quantity	Days Supply	Prescriber Name  04/06/2023	04/06/2023	tramadol hcl 50 mg tablet	10	5	Arnie Lehman MD

## 2023-04-13 NOTE — DISCHARGE NOTE PROVIDER - HOSPITAL COURSE
88 yo M w/ BPH, chronic pain p/w lightheadedness/weakness, admitted with microcytic iron deficiency anemia potentially secondary to GI bleed vs malignancy, recommended to have endoscopic evaluation for further workup. Started on ppi, patient ultimately declined inpatient EGD + colonoscopy, thus GI attempted to arrange expedited outpatient followup. His hgb improved s/p 2U pRBC transfusion and remained stable afterwards. Patient received medications for his chronic back pain and was evaluated by PT who recommended home w/ PT services. Imaging was negative for stroke/fractures/dislocations.     Anticipatory guidance provided, patient in agreement w/ discharge plan.    89 yo M w/ BPH, chronic pain p/w lightheadedness/weakness, admitted with microcytic iron deficiency anemia potentially secondary to GI bleed vs malignancy, recommended to have endoscopic evaluation for further workup. Started on ppi, patient ultimately declined inpatient EGD + colonoscopy, thus GI attempted to arrange expedited outpatient followup. His hgb improved s/p 2U pRBC transfusion and remained stable afterwards. Patient received medications for his chronic back pain and was evaluated by PT who recommended home w/ PT services. Imaging was negative for stroke/fractures/dislocations. On 4/14 pt noted with episode of slurred speech which quickly resolved. CTH ordered and neurology consulted. TTE EF 65 mild MR/AR, Mild diastolic dysfunction (Stage I). borderline pulmonary hypertension. Small pericardial effusion posterior to the left ventricle. Per neurology low suspicion for CVA recommended outpatient workup for CTH and MRI. DAPT deferred by primary team given recent anemia with GI bleed    Anticipatory guidance provided, patient in agreement w/ discharge plan.

## 2023-04-13 NOTE — DISCHARGE NOTE PROVIDER - PROVIDER TOKENS
PROVIDER:[TOKEN:[[id number]]] PROVIDER:[TOKEN:[[id number]]],PROVIDER:[TOKEN:[[id number]]]

## 2023-04-13 NOTE — DISCHARGE NOTE PROVIDER - NSDCFUADDAPPT_GEN_ALL_CORE_FT
The gastroenterologist want you to followup with them at their [place] office ([address] [place] [address] [facility] [address], [place], [address] ([phone])) as soon as possible to investigate why your blood levels were so low. Please be on alert for a phone call from their office to schedule an appointment. If you do not hear from them, please contact their office at the number above to make an appointment.  The gastroenterologist want you to followup with them at their [place] office ([address] [place] [address] [facility]. [address], [place], [address] ([phone])) as soon as possible to investigate why your blood levels were so low. Please be on alert for a phone call from their office to schedule an appointment. If you do not hear from them, please contact their office at the number above to make an appointment.     Followup with your primary care provider for workup of chronic back pain The gastroenterologist want you to followup with them at their [place] office ([facility]. [address], [place], [address] ([phone])) as soon as possible to investigate why your blood levels were so low. Please be on alert for a phone call from their office to schedule an appointment. If you do not hear from them, please contact their office at the number above to make an appointment.     your hgb is 9.6 on discharge     Followup with your primary care provider for workup of chronic back pain The gastroenterologist want you to followup with them at their [place] office ([facility]. [address], [place], [address] ([phone])) as soon as possible to investigate why your blood levels were so low. Please be on alert for a phone call from their office to schedule an appointment. If you do not hear from them, please contact their office at the number above to make an appointment.     your hgb is 9.6 on discharge     Followup with your primary care provider for workup of chronic back pain and orthostatic hypotension. please change position slowly and if dizzy to sit down on chair/floor  The gastroenterologist want you to followup with them at their [place] office ([facility]. [address], [place], [address] ([phone])) as soon as possible to investigate why your blood levels were so low. Please be on alert for a phone call from their office to schedule an appointment. If you do not hear from them, please contact their office at the number above to make an appointment.     your hgb is 10.1 on discharge     Followup with neurology in 1-2 weeks after discharge. You would need CT head and MRI brain outpatient.     Followup with your primary care provider for workup of chronic back pain and orthostatic hypotension. please change position slowly and if dizzy to sit down on chair/floor

## 2023-04-13 NOTE — DISCHARGE NOTE PROVIDER - CARE PROVIDERS DIRECT ADDRESSES
[email or web address]

## 2023-04-14 LAB
ANION GAP SERPL CALC-SCNC: 10 MMOL/L — SIGNIFICANT CHANGE UP (ref 7–14)
BLD GP AB SCN SERPL QL: NEGATIVE — SIGNIFICANT CHANGE UP
BUN SERPL-MCNC: 16 MG/DL — SIGNIFICANT CHANGE UP (ref 7–23)
CALCIUM SERPL-MCNC: 8.7 MG/DL — SIGNIFICANT CHANGE UP (ref 8.4–10.5)
CHLORIDE SERPL-SCNC: 104 MMOL/L — SIGNIFICANT CHANGE UP (ref 98–107)
CO2 SERPL-SCNC: 25 MMOL/L — SIGNIFICANT CHANGE UP (ref 22–31)
CREAT SERPL-MCNC: 1.12 MG/DL — SIGNIFICANT CHANGE UP (ref 0.5–1.3)
EGFR: 63 ML/MIN/1.73M2 — SIGNIFICANT CHANGE UP
GLUCOSE SERPL-MCNC: 86 MG/DL — SIGNIFICANT CHANGE UP (ref 70–99)
HCT VFR BLD CALC: 31.8 % — LOW (ref 39–50)
HGB BLD-MCNC: 9.4 G/DL — LOW (ref 13–17)
MAGNESIUM SERPL-MCNC: 1.7 MG/DL — SIGNIFICANT CHANGE UP (ref 1.6–2.6)
MCHC RBC-ENTMCNC: 22.2 PG — LOW (ref 27–34)
MCHC RBC-ENTMCNC: 29.6 GM/DL — LOW (ref 32–36)
MCV RBC AUTO: 75 FL — LOW (ref 80–100)
MRSA PCR RESULT.: SIGNIFICANT CHANGE UP
NRBC # BLD: 0 /100 WBCS — SIGNIFICANT CHANGE UP (ref 0–0)
NRBC # FLD: 0 K/UL — SIGNIFICANT CHANGE UP (ref 0–0)
PHOSPHATE SERPL-MCNC: 3.1 MG/DL — SIGNIFICANT CHANGE UP (ref 2.5–4.5)
PLATELET # BLD AUTO: 337 K/UL — SIGNIFICANT CHANGE UP (ref 150–400)
POTASSIUM SERPL-MCNC: 3.8 MMOL/L — SIGNIFICANT CHANGE UP (ref 3.5–5.3)
POTASSIUM SERPL-SCNC: 3.8 MMOL/L — SIGNIFICANT CHANGE UP (ref 3.5–5.3)
RBC # BLD: 4.24 M/UL — SIGNIFICANT CHANGE UP (ref 4.2–5.8)
RBC # FLD: 21.2 % — HIGH (ref 10.3–14.5)
RH IG SCN BLD-IMP: NEGATIVE — SIGNIFICANT CHANGE UP
S AUREUS DNA NOSE QL NAA+PROBE: SIGNIFICANT CHANGE UP
SODIUM SERPL-SCNC: 139 MMOL/L — SIGNIFICANT CHANGE UP (ref 135–145)
WBC # BLD: 8.06 K/UL — SIGNIFICANT CHANGE UP (ref 3.8–10.5)
WBC # FLD AUTO: 8.06 K/UL — SIGNIFICANT CHANGE UP (ref 3.8–10.5)

## 2023-04-14 PROCEDURE — 99233 SBSQ HOSP IP/OBS HIGH 50: CPT

## 2023-04-14 RX ORDER — SODIUM CHLORIDE 9 MG/ML
500 INJECTION INTRAMUSCULAR; INTRAVENOUS; SUBCUTANEOUS ONCE
Refills: 0 | Status: COMPLETED | OUTPATIENT
Start: 2023-04-14 | End: 2023-04-14

## 2023-04-14 RX ORDER — MECLIZINE HCL 12.5 MG
12.5 TABLET ORAL ONCE
Refills: 0 | Status: COMPLETED | OUTPATIENT
Start: 2023-04-14 | End: 2023-04-14

## 2023-04-14 RX ORDER — ATORVASTATIN CALCIUM 80 MG/1
80 TABLET, FILM COATED ORAL AT BEDTIME
Refills: 0 | Status: DISCONTINUED | OUTPATIENT
Start: 2023-04-14 | End: 2023-04-15

## 2023-04-14 RX ADMIN — Medication 325 MILLIGRAM(S): at 11:18

## 2023-04-14 RX ADMIN — GABAPENTIN 400 MILLIGRAM(S): 400 CAPSULE ORAL at 22:07

## 2023-04-14 RX ADMIN — LIDOCAINE 1 PATCH: 4 CREAM TOPICAL at 11:19

## 2023-04-14 RX ADMIN — LIDOCAINE 1 PATCH: 4 CREAM TOPICAL at 23:46

## 2023-04-14 RX ADMIN — LIDOCAINE 1 PATCH: 4 CREAM TOPICAL at 19:35

## 2023-04-14 RX ADMIN — SODIUM CHLORIDE 1000 MILLILITER(S): 9 INJECTION INTRAMUSCULAR; INTRAVENOUS; SUBCUTANEOUS at 17:30

## 2023-04-14 RX ADMIN — PANTOPRAZOLE SODIUM 40 MILLIGRAM(S): 20 TABLET, DELAYED RELEASE ORAL at 17:14

## 2023-04-14 RX ADMIN — SODIUM CHLORIDE 1000 MILLILITER(S): 9 INJECTION INTRAMUSCULAR; INTRAVENOUS; SUBCUTANEOUS at 14:18

## 2023-04-14 RX ADMIN — TAMSULOSIN HYDROCHLORIDE 0.4 MILLIGRAM(S): 0.4 CAPSULE ORAL at 22:07

## 2023-04-14 RX ADMIN — ATORVASTATIN CALCIUM 80 MILLIGRAM(S): 80 TABLET, FILM COATED ORAL at 22:08

## 2023-04-14 RX ADMIN — Medication 12.5 MILLIGRAM(S): at 14:15

## 2023-04-14 RX ADMIN — PANTOPRAZOLE SODIUM 40 MILLIGRAM(S): 20 TABLET, DELAYED RELEASE ORAL at 05:03

## 2023-04-14 RX ADMIN — CHLORHEXIDINE GLUCONATE 1 APPLICATION(S): 213 SOLUTION TOPICAL at 11:19

## 2023-04-14 NOTE — CONSULT NOTE ADULT - SUBJECTIVE AND OBJECTIVE BOX
Neurology - Consult Note    Spectra: [phone] ([facility]), [phone] ([facility])    HPI:  88 yo M with PMhx of BPH, chronic back pain, and BPH presents to the ED with lightheadedness, headache, and acute on chronic back pain. Patient reports that for the past one week he has been feeling weak. He had a fall last week due to weakness. He did not hit his head or lose consciousness. He reports to have diffuse headache that is constant and nonradiating. It is not associated with fever, photophobia, blurry vision or other focal deficit. He also reports to have acute on chronic back pain. The pain is localized on the lower lumbar region. It is sharp and constant. It radiates down his L leg. He denies any associated urinary or bowel incontinence or LE weakness/loss of sensation. He had similar pain in the past and it usually gets better with exercise and tylenol. Of note, he has not had a blood work done in the last 1-2 years. His last blood work was in 2021 and his Hgb was WNL.   In the ED, his vitals were notable for tachycardia and HTN. Labs were notable for anemia with Hgb of 7.1, metabolic acidosis with slightly high AG of 16. CT head showed no acute stroke or bleeding. Hip Xray showed no acute fracture, mild spondylodesis  (11 Apr 2023 16:31)    Review of Systems:  INCOMPLETE   CONSTITUTIONAL: No fevers or chills  EYES AND ENT: No visual changes or no throat pain   NECK: No pain or stiffness  RESPIRATORY: No shortness of breath  CARDIOVASCULAR: No chest pain or palpitations  GASTROINTESTINAL: No nausea or vomiting   GENITOURINARY: No dysuria  NEUROLOGICAL: +As stated in HPI above  SKIN: No itching, burning, rashes, or lesions   All other review of systems is negative unless indicated above.    Allergies:  aspirin (Unknown)  penicillin (Unknown)      PMHx/PSHx/Family Hx: As above, otherwise see below   No pertinent past medical history    BPH (benign prostatic hyperplasia)    Peripheral neuropathy    HLD (hyperlipidemia)        Social Hx:  Never smoker; no current use of tobacco, alcohol, or illicit drugs  Lives with ***; occupation ***, baseline functional status is ***    Medications:  MEDICATIONS  (STANDING):  chlorhexidine 2% Cloths 1 Application(s) Topical daily  ferrous    sulfate 325 milliGRAM(s) Oral daily  gabapentin 400 milliGRAM(s) Oral at bedtime  lidocaine   4% Patch 1 Patch Transdermal every 24 hours  pantoprazole    Tablet 40 milliGRAM(s) Oral two times a day  tamsulosin 0.4 milliGRAM(s) Oral at bedtime    MEDICATIONS  (PRN):  acetaminophen     Tablet .. 650 milliGRAM(s) Oral every 6 hours PRN Temp greater or equal to 38C (100.4F), Mild Pain (1 - 3)  aluminum hydroxide/magnesium hydroxide/simethicone Suspension 30 milliLiter(s) Oral every 4 hours PRN Dyspepsia  melatonin 3 milliGRAM(s) Oral at bedtime PRN Insomnia  ondansetron Injectable 4 milliGRAM(s) IV Push every 8 hours PRN Nausea and/or Vomiting  traMADol 50 milliGRAM(s) Oral two times a day PRN Severe Pain (7 - 10)      Vitals:  T(C): 36.7 (04-14-23 @ 05:10), Max: 36.8 (04-13-23 @ 20:06)  HR: 77 (04-14-23 @ 05:10) (77 - 84)  BP: 105/50 (04-14-23 @ 05:10) (105/50 - 113/53)  RR: 18 (04-14-23 @ 05:10) (18 - 18)  SpO2: 95% (04-14-23 @ 05:10) (95% - 99%)    Physical Examination: INCOMPLETE  General - non-toxic appearing male/female in no acute distress  Cardiovascular - peripheral pulses palpable, no edema  Respiratory - breathing comfortably with no increased work of breathing    Neurologic Exam:  Mental status - Awake, Alert, Oriented to person, place, and time. Speech fluent, repetition and naming intact. Follows simple and complex commands. Attention/concentration, recent and remote memory (including registration 3/3 and recall 3/3), and fund of knowledge intact    Cranial nerves - PERRLA (4mm -> 3mm b/l), VFF, EOMI, face sensation (V1-V3) intact b/l, facial strength intact without asymmetry b/l, hearing intact b/l, palate with symmetric elevation, trapezius OR sternocleidomastiod 5/5 strength b/l, tongue midline on protrusion with full lateral movement    Motor - Normal bulk and tone throughout. No pronator drift.    Strength testing            Deltoid      Biceps      Triceps     Wrist Extension    Wrist Flexion     Interossei         R            5                 5               5                     5                              5                        5                 5  L             5                 5               5                     5                              5                        5                 5              Hip Flexion    Hip Extension    Knee Flexion    Knee Extension    Dorsiflexion    Plantar Flexion  R              5                         5                       5                           5                            5                          5  L              5                         5                        5                           5                            5                          5    Sensation - Light touch/temperature OR pain/vibration intact throughout    DTR's -             Biceps      Triceps     Brachioradialis      Patellar    Ankle    Toes/plantar response  R             2+             2+                  2+                       2+            2+                 Down  L              2+             2+                 2+                        2+           2+                 Down    Coordination - Finger to Nose intact b/l. No tremors appreciated    Gait and station - Normal casual gait. Romberg (-)    Labs:                        9.4    8.06  )-----------( 337      ( 14 Apr 2023 05:20 )             31.8     04-14    139  |  104  |  16  ----------------------------<  86  3.8   |  25  |  1.12    Ca    8.7      14 Apr 2023 05:20  Phos  3.1     04-14  Mg     1.70     04-14      CAPILLARY BLOOD GLUCOSE      Radiology:  CT Head No Cont:  (11 Apr 2023 13:00)     Neurology - Consult Note    Spectra: [phone] ([facility]), [phone] ([facility])    HPI:  89M PMHx BPH and chronic back pain who initially presented with lightheadedness, headache, and acute on chronic back pain. Pt found to have Hgb of 7.1 and admitted for further work-up to investigate anemia. Pt refused GI work-up for possible GI bleed. Neuro consulted for an episode of dysarthria and Right hand weakness. Per the primary team, nurse noticed that pt while in the middle of a conversation started having "slurred speech." Also, noticed that pt had difficulty "picking up a pencil." Episode lasted approx 5 minutes and completely resolved. Per pt, he had mild weakness of hand which prevented him from grabbing the pencil and noticed some "slurred speech." No prior h/o stroke. LKN 1345. NIHSS 0. mRS 0.     Review of Systems:  CONSTITUTIONAL: No fevers or chills  EYES AND ENT: No visual changes or no throat pain   NECK: No pain or stiffness  RESPIRATORY: No shortness of breath  CARDIOVASCULAR: No chest pain or palpitations  GASTROINTESTINAL: No nausea or vomiting   GENITOURINARY: No dysuria  NEUROLOGICAL: +As stated in HPI above  SKIN: No itching, burning, rashes, or lesions   All other review of systems is negative unless indicated above.    Allergies:  aspirin (Unknown)  penicillin (Unknown)      PMHx/PSHx/Family Hx: As above, otherwise see below   No pertinent past medical history    BPH (benign prostatic hyperplasia)    Peripheral neuropathy    HLD (hyperlipidemia)      Social Hx:  No current use of tobacco, alcohol, or illicit drugs  Lives alone     Medications:  MEDICATIONS  (STANDING):  chlorhexidine 2% Cloths 1 Application(s) Topical daily  ferrous    sulfate 325 milliGRAM(s) Oral daily  gabapentin 400 milliGRAM(s) Oral at bedtime  lidocaine   4% Patch 1 Patch Transdermal every 24 hours  pantoprazole    Tablet 40 milliGRAM(s) Oral two times a day  tamsulosin 0.4 milliGRAM(s) Oral at bedtime    MEDICATIONS  (PRN):  acetaminophen     Tablet .. 650 milliGRAM(s) Oral every 6 hours PRN Temp greater or equal to 38C (100.4F), Mild Pain (1 - 3)  aluminum hydroxide/magnesium hydroxide/simethicone Suspension 30 milliLiter(s) Oral every 4 hours PRN Dyspepsia  melatonin 3 milliGRAM(s) Oral at bedtime PRN Insomnia  ondansetron Injectable 4 milliGRAM(s) IV Push every 8 hours PRN Nausea and/or Vomiting  traMADol 50 milliGRAM(s) Oral two times a day PRN Severe Pain (7 - 10)      Vitals:  T(C): 36.7 (04-14-23 @ 05:10), Max: 36.8 (04-13-23 @ 20:06)  HR: 77 (04-14-23 @ 05:10) (77 - 84)  BP: 105/50 (04-14-23 @ 05:10) (105/50 - 113/53)  RR: 18 (04-14-23 @ 05:10) (18 - 18)  SpO2: 95% (04-14-23 @ 05:10) (95% - 99%)    Physical Examination:  General - non-toxic appearing male in no acute distress  Cardiovascular - peripheral pulses palpable, no edema  Respiratory - breathing comfortably with no increased work of breathing    Neurologic Exam:  Mental status - Awake, Alert, Oriented to person, place, and time. Speech fluent, (no dysarthria) repetition and naming intact. Follows simple and complex commands.    Cranial nerves - PERRLA (4mm -> 3mm b/l), VFF, EOMI, face sensation (V1-V3) intact b/l, facial strength intact without asymmetry b/l, hearing intact b/l, palate with symmetric elevation, trapezius 5/5 strength b/l, tongue midline on protrusion with full lateral movement    Motor - Normal bulk and tone throughout. No pronator drift.    Strength testing            Deltoid      Biceps      Triceps     Wrist Extension    Wrist Flexion     Interossei         R            5             5               5                  5                     5                        5                 5  L             5             5               5                  5                     5                        5                 5              Hip Flexion    Hip Extension    Knee Flexion    Knee Extension    Dorsiflexion    Plantar Flexion  R              5                    5                       5                   5                        5                          5  L              5                    5                        5                   5                        5                          5    Sensation - Light touch intact throughout    DTR's -             Biceps      Triceps     Brachioradialis      Patellar    Ankle    Toes/plantar response  R             2+             2+                  2+              2+           1+                 Down  L              2+             2+                 2+               2+           1+                 Down    Coordination - Finger to Nose intact b/l. No tremors appreciated    Gait and station - Normal casual gait. Romberg (-)      Labs:                        9.4    8.06  )-----------( 337      ( 14 Apr 2023 05:20 )             31.8     04-14    139  |  104  |  16  ----------------------------<  86  3.8   |  25  |  1.12    Ca    8.7      14 Apr 2023 05:20  Phos  3.1     04-14  Mg     1.70     04-14      CAPILLARY BLOOD GLUCOSE      Radiology:  CT Head No Cont:  (11 Apr 2023 13:00)  < from: CT Head No Cont (04.11.23 @ 13:00) >  FINDINGS:    Head CT: There is no acute intracranial hemorrhage, mass effect, shift of   the midline structures, herniation, extra-axial fluid collection, or   hydrocephalus.    There is diffuse cerebral volume loss with prominence of the sulci,   fissures, and cisternal spaces which is normal for the patient's age.   There is mild deep and periventricular white matter hypoattenuation   statistically compatible with microvascular changes given calcific   atherosclerotic disease of the intracranial arteries.    The paranasal sinuses and mastoid air cells are clear. The calvarium is   intact. The imaged orbits are unremarkable.    CTA NECK: There is a standard anatomic configuration to the aortic arch.   Minimal calcified plaque affects the arch. The origins of the great   vessels appear grossly unremarkable.    The bilateral common carotid arteries and right carotid bifurcation   appear unremarkable. There is mixed plaque affecting the left carotid   bifurcation with moderate (50-60%) stenosis of the left internal carotid   artery origin and proximal segment by NASCET criteria. The bilateral   cervical internal carotid arteries are otherwise patent extending to the   skull base.    The right vertebral artery origin appears unremarkable. Calcified plaque   affects the origin of the left vertebral artery with associated mild   focal stenosis. The bilateral vertebral arteries are patent throughout   their course in the neck.    Biapical pleural thickening and/or scarring is seen along with pleural   calcification.    CTA HEAD: Minor calcified plaques affect the bilateral carotid siphons   without associated stenosis. Otherwise, the bilateral intracranial   internal carotid, anterior, and middle cerebral arteries appear   unremarkable.    The anterior and bilateral posterior communicating arteries are seen.    The posterior circulation appears unremarkable.    The intracranial venous structures are patent.    IMPRESSION:    Head CT:  1. No acute intracranial hemorrhage, mass effect, or shift of the midline   structures.  2. Similar-appearing mild chronic white matter microvascular type changes.    CTA neck:  1. Similar-appearing mixed plaque affects the left carotid bifurcation   with associated moderate (50-60%) stenosis of the left internal carotid   artery origin and proximal segment by criteria.  2. Otherwise, no large vessel occlusion or major stenosis.    CTA head:  1. No large vessel occlusion or major stenosis.    < end of copied text >

## 2023-04-14 NOTE — CONSULT NOTE ADULT - ASSESSMENT
IMPRESSION:    RECOMMENDATIONS: INCOMPLETE  [] Start ASA 81mg and Plavix 75mg x3 weeks followed by ASA 81mg QD alone per CHANCE trial if no other medical contraindications  [] Start Atorvastatin 80mg QHS (goal LDL<70)  [] DVT prophylaxis  [] MRI brain w/o contrast to look at the extent and distribution of the potential stroke   [] TTE and telemetry to look for a cardiac source of embolism.  [] Check HbA1C and lipid panel  [] Telemonitoring; Neurochecks and vital signs Q4H  [] Permissive HTN up to 220/120 mmHg for first 24 hours after the symptom onset followed by gradual normotension.   [] BGM goals 140-180  [] PT/OT evaluation  [] NPO until clears dysphagia screen, otherwise swallow evaluation  [] Stroke education provided   89M PMHx BPH and chronic back pain who initially presented with lightheadedness, headache, and acute on chronic back pain. Pt admitted for further work-up and found to have Hgb of 7.1 concerning for GI bleed. Pt refusing further work-up for possible bleed. Neuro consulted transient episode of slurred speech and right hand weakness. Vitals stable. CBC notable for Hgb 9.4. CMP WNL. Vitamin B12 597, Folate >20.0. UA negative. COVID negative. When neuro team was consulted, pt was immediately evaluated due to concerns for stroke. On exam, no neurological deficits were noted. Determined that pt is back to baseline. NIHSS 0. Pt had already obtained CTH and CTA H/N prior and due to ongoing suspicion of GI bleed, agreed to not pursue code stroke. CTH showing no acute findings. CTA neck showing moderate stenosis of Left ICA. ABCD2 score 3 points (low risk).    IMPRESSION: Transient episode of dysarthria and Right hand weakness likely 2/2 TIA vs. less likely acute ischemic infarct.     RECOMMENDATIONS:  [] Start ASA 81mg and Plavix 75mg x3 weeks followed by ASA 81mg QD alone if no other medical contraindications (consider risk and benefits for pt due to ongoing suspicion for GI bleed)   [] Start Atorvastatin 80mg QHS (goal LDL<70)  [] OK with repeat CTH   [] MRI brain w/o contrast to look at the extent and distribution of the potential stroke   [] TTE and telemetry to look for a cardiac source of embolism.  [] Check HbA1C and lipid panel  [] Telemonitoring; Neurochecks and vital signs Q4H  [] Permissive HTN up to 220/120 mmHg for first 24 hours after the symptom onset followed by gradual normotension.   [] BGM goals 140-180  [] PT/OT evaluation  [] NPO until clears dysphagia screen, otherwise swallow evaluation  [] Stroke education provided      Case to be seen and d/w Neurovascular attending.

## 2023-04-14 NOTE — PROGRESS NOTE ADULT - ASSESSMENT
88 yo M w/ BPH, chronic pain pain p/w lightheadedness/weakness, admitted with microcytic anemia potentially secondary to GI bleed, recommended to have endoscopic evaluation for further workup though patient declining inpatient procedure. Noted to be orthostatic.     # Orthostatic hypotension  - s/p IV fluids, giving additional small bolus, monitor orthostatics  - encouraged PO intake  - counseled patient on orthostatic precautions, trial of compression stockings  - discussed fall risk, and need to workup cause of his anemia, patient still wishing to go home    # Microcytic, iron deficiency anemia  - hgb 13.6 in 2021, 7.1 on admission  - was symptomatic from anemia with lightheadedness/weakness  - concern for potential bleed/malignancy  - s/p 2u pRBC transfusions with increase in hgb level  - continue to monitor hgb and transfuse prn, hgb appears stable this AM (9.4)  - recommended by GI to undergo endoscopic evaluation, patient adamantly declining inpatient procedure at this time  - GI assistance appreciated with arranging expedited outpatient followup, patient counseled on importance of followup  - c/w ppi   - c/w iron supplementation  - counseled to avoid NSAIDS    # Chronic back pain/neuropathic pain  - c/w gabapentin qhs   - tylenol prn, avoid nsaids  - trial of lidoderm patch to low back  - was prescribed tramadol as otpt, can consider providing small supply upon discharge  - advised otpt PMD followup for further management  - PT recs home w/ home PT    # BPH  - c/w flomax    # Moderate protein calorie malnutrition  - nutrition recs appreciated, BMI 18.8, c/w supplements    Need for prophylactic measure  - VTE ppx: SCDs  - regular diet w/ ensure HP supplements    Dispo: home w/ PT, needs close otpt followup, anticipatory guidance provided    Addendum: notified by ACP this afternoon that patient had episode of slurred speech and weakness of hand that resolved after short period of time. Concern for TIA?  CT head earlier in admission was negative for acute infarct, will plan for neuro eval and head imaging.

## 2023-04-14 NOTE — DIETITIAN INITIAL EVALUATION ADULT - ORAL INTAKE PTA/DIET HISTORY
Pt lives at home alone. He is able to cook for himself at home. No difficulty obtaining groceries. No specific diet followed PTA. No supplements/Vitamins taken PTA.

## 2023-04-14 NOTE — DIETITIAN INITIAL EVALUATION ADULT - PERTINENT MEDS FT
MEDICATIONS  (STANDING):  chlorhexidine 2% Cloths 1 Application(s) Topical daily  ferrous    sulfate 325 milliGRAM(s) Oral daily  gabapentin 400 milliGRAM(s) Oral at bedtime  lidocaine   4% Patch 1 Patch Transdermal every 24 hours  pantoprazole    Tablet 40 milliGRAM(s) Oral two times a day  tamsulosin 0.4 milliGRAM(s) Oral at bedtime    MEDICATIONS  (PRN):  acetaminophen     Tablet .. 650 milliGRAM(s) Oral every 6 hours PRN Temp greater or equal to 38C (100.4F), Mild Pain (1 - 3)  aluminum hydroxide/magnesium hydroxide/simethicone Suspension 30 milliLiter(s) Oral every 4 hours PRN Dyspepsia  melatonin 3 milliGRAM(s) Oral at bedtime PRN Insomnia  ondansetron Injectable 4 milliGRAM(s) IV Push every 8 hours PRN Nausea and/or Vomiting  traMADol 50 milliGRAM(s) Oral two times a day PRN Severe Pain (7 - 10)

## 2023-04-14 NOTE — DIETITIAN INITIAL EVALUATION ADULT - OTHER INFO
Per chart,  88 yo male w/ BPH, chronic pain pain p/w lightheadedness/weakness, admitted with microcytic anemia potentially secondary to GI bleed, recommended to have endoscopic evaluation for further workup though patient declining inpatient procedure.      Nutrition interview: No recent episodes of nausea, vomiting, diarrhea or constipation, Pt reports last BM was x2d ago. Denies any chewing/swallowing difficulties. No food allergies. Stated UBW: ~135#, Pt states he believes he was last 135# x1 week ago, and does not think he has lost weight. New weight per EMR (4/13): 127.7#, possibly bedscale error?  Food preferences explored and noted. Intake is % per pt. Feeding skills: set up help.     RD provided pt with verbal and written education regarding Iron deficiency anemia nutrition therapy, (foods recommended and iron content of foods was provided). Pt confirmed understanding and compliance to this information.

## 2023-04-14 NOTE — PROGRESS NOTE ADULT - SUBJECTIVE AND OBJECTIVE BOX
[facility] Medicine  Pager [phone]    Patient is a 89y old  Male who presents with a chief complaint of Back pain, dizziness (13 Apr 2023 11:18)      INTERVAL HPI/OVERNIGHT EVENTS:    MEDICATIONS  (STANDING):  chlorhexidine 2% Cloths 1 Application(s) Topical daily  ferrous    sulfate 325 milliGRAM(s) Oral daily  gabapentin 400 milliGRAM(s) Oral at bedtime  lidocaine   4% Patch 1 Patch Transdermal every 24 hours  meclizine 12.5 milliGRAM(s) Oral once  pantoprazole    Tablet 40 milliGRAM(s) Oral two times a day  sodium chloride 0.9% Bolus 500 milliLiter(s) IV Bolus once  tamsulosin 0.4 milliGRAM(s) Oral at bedtime    MEDICATIONS  (PRN):  acetaminophen     Tablet .. 650 milliGRAM(s) Oral every 6 hours PRN Temp greater or equal to 38C (100.4F), Mild Pain (1 - 3)  aluminum hydroxide/magnesium hydroxide/simethicone Suspension 30 milliLiter(s) Oral every 4 hours PRN Dyspepsia  melatonin 3 milliGRAM(s) Oral at bedtime PRN Insomnia  ondansetron Injectable 4 milliGRAM(s) IV Push every 8 hours PRN Nausea and/or Vomiting  traMADol 50 milliGRAM(s) Oral two times a day PRN Severe Pain (7 - 10)      Allergies    aspirin (Unknown)  penicillin (Unknown)    Intolerances        REVIEW OF SYSTEMS:  Please see interval HPI:    Vital Signs Last 24 Hrs  T(C): 36.7 (14 Apr 2023 05:10), Max: 36.8 (13 Apr 2023 20:06)  T(F): 98 (14 Apr 2023 05:10), Max: 98.2 (13 Apr 2023 20:06)  HR: 77 (14 Apr 2023 05:10) (77 - 84)  BP: 105/50 (14 Apr 2023 05:10) (105/50 - 113/53)  BP(mean): --  RR: 18 (14 Apr 2023 05:10) (18 - 18)  SpO2: 95% (14 Apr 2023 05:10) (95% - 99%)    Parameters below as of 14 Apr 2023 05:10  Patient On (Oxygen Delivery Method): room air      I&O's Detail        PHYSICAL EXAM:  GENERAL:   HEAD:    EYES:   ENMT:   NECK:   NERVOUS SYSTEM:    CHEST/LUNG:   HEART:   ABDOMEN:   EXTREMITIES:    LYMPH:   SKIN:     LABS:                        9.4    8.06  )-----------( 337      ( 14 Apr 2023 05:20 )             31.8     14 Apr 2023 05:20    139    |  104    |  16     ----------------------------<  86     3.8     |  25     |  1.12     Ca    8.7        14 Apr 2023 05:20  Phos  3.1       14 Apr 2023 05:20  Mg     1.70      14 Apr 2023 05:20        CAPILLARY BLOOD GLUCOSE        BLOOD CULTURE  04-11 @ 14:01   <10,000 CFU/mL Normal Urogenital Nita  --  --    RADIOLOGY & ADDITIONAL TESTS:    Imaging Personally Reviewed:  [ ] YES     Consultant(s) Notes Reviewed:      Care Discussed with Consultants/Other Providers: [facility] Medicine  Pager [phone]    Patient is a 89y old  Male who presents with a chief complaint of Back pain, dizziness (13 Apr 2023 11:18)      INTERVAL HPI/OVERNIGHT EVENTS:  Patient noted to be orthostatic again this /68 lying down to 104/53 standing. Denies chest pain/shortness of breath, still really wants to go home. Per staff, patient was able to walk down the hallways without issue. Counseled patient extensively on orthostatic precautions, fall risk and to stop if having symptoms. Discussed potential use of compression stockings. Again strongly advised patient to followup with GI after discharge for further evaluation of his anemia. Patient eager to go home.     MEDICATIONS  (STANDING):  chlorhexidine 2% Cloths 1 Application(s) Topical daily  ferrous    sulfate 325 milliGRAM(s) Oral daily  gabapentin 400 milliGRAM(s) Oral at bedtime  lidocaine   4% Patch 1 Patch Transdermal every 24 hours  meclizine 12.5 milliGRAM(s) Oral once  pantoprazole    Tablet 40 milliGRAM(s) Oral two times a day  sodium chloride 0.9% Bolus 500 milliLiter(s) IV Bolus once  tamsulosin 0.4 milliGRAM(s) Oral at bedtime    MEDICATIONS  (PRN):  acetaminophen     Tablet .. 650 milliGRAM(s) Oral every 6 hours PRN Temp greater or equal to 38C (100.4F), Mild Pain (1 - 3)  aluminum hydroxide/magnesium hydroxide/simethicone Suspension 30 milliLiter(s) Oral every 4 hours PRN Dyspepsia  melatonin 3 milliGRAM(s) Oral at bedtime PRN Insomnia  ondansetron Injectable 4 milliGRAM(s) IV Push every 8 hours PRN Nausea and/or Vomiting  traMADol 50 milliGRAM(s) Oral two times a day PRN Severe Pain (7 - 10)    Allergies  aspirin (Unknown)  penicillin (Unknown)    Intolerances    REVIEW OF SYSTEMS:  Please see interval HPI:    Vital Signs Last 24 Hrs  T(C): 36.7 (14 Apr 2023 05:10), Max: 36.8 (13 Apr 2023 20:06)  T(F): 98 (14 Apr 2023 05:10), Max: 98.2 (13 Apr 2023 20:06)  HR: 77 (14 Apr 2023 05:10) (77 - 84)  BP: 105/50 (14 Apr 2023 05:10) (105/50 - 113/53)  RR: 18 (14 Apr 2023 05:10) (18 - 18)  SpO2: 95% (14 Apr 2023 05:10) (95% - 99%)    Parameters below as of 14 Apr 2023 05:10  Patient On (Oxygen Delivery Method): room air    I&O's Detail    PHYSICAL EXAM:  GENERAL: NAD, lying in bed  HEAD:  NC/AT  EYES: EOMI, clear sclera/conjunctiva  ENMT: MMM, hearing intact to voice  NECK: supple, no JVD  NERVOUS SYSTEM: moving extremities, follows commands, alert conversant  CHEST/LUNG: CTAB, comfortable on RA, speaking in full sentences  HEART: S1S2 RRR  ABDOMEN: soft, non-tender +BS  EXTREMITIES:  no c/c/e    LABS:                        9.4    8.06  )-----------( 337      ( 14 Apr 2023 05:20 )             31.8     14 Apr 2023 05:20    139    |  104    |  16     ----------------------------<  86     3.8     |  25     |  1.12     Ca    8.7        14 Apr 2023 05:20  Phos  3.1       14 Apr 2023 05:20  Mg     1.70      14 Apr 2023 05:20    CAPILLARY BLOOD GLUCOSE    URINE CULTURE  04-11 @ 14:01   <10,000 CFU/mL Normal Urogenital Nita  --  --    RADIOLOGY & ADDITIONAL TESTS:    Imaging Personally Reviewed:  [ ] YES     Consultant(s) Notes Reviewed:      Care Discussed with Consultants/Other Providers: LOU Hammond re: +orthostatic, giving fluid, counseled on precautions, advised to try compression stockings, patient still adamant on leaving, per staff able to ambulate

## 2023-04-14 NOTE — CHART NOTE - NSCHARTNOTEFT_GEN_A_CORE
Late entry notified by RN that earlier when she was given medication to patient around 1:45pm bird he had episode of slurred speech and was unable to grasp his cup. Per RN the episode lasted 5 mins and resolved by itself.     Pt seen at bedside, recalls the event but reported feeling well now   Vital Signs Last 24 Hrs  T(C): 36.7 (14 Apr 2023 05:10), Max: 36.8 (13 Apr 2023 20:06)  T(F): 98 (14 Apr 2023 05:10), Max: 98.2 (13 Apr 2023 20:06)  HR: 77 (14 Apr 2023 05:10) (77 - 84)  BP: 105/50 (14 Apr 2023 05:10) (105/50 - 113/53)  BP(mean): --  RR: 18 (14 Apr 2023 05:10) (18 - 18)  SpO2: 95% (14 Apr 2023 05:10) (95% - 99%)    Parameters below as of 14 Apr 2023 05:10  Patient On (Oxygen Delivery Method): room air    A&OX3 CN II- XII intact   strength intact UE and LE  no pronator drift  unsteady gait on admission unchanged     CT head ordered   Neuro consulted possible TIA recommended study below  MRI brain non contrast   TTE   lipitor 80mg   permissive HTN for 24hr   ASA and plavix if no contraindication     Above discussed with attending Late entry notified by RN that earlier when she was given medication to patient around 1:45pm bird he had episode of slurred speech and was unable to grasp his cup. Per RN the episode lasted 5 mins and resolved by itself.     Pt seen at bedside, recalls the event but reported feeling well now   Vital Signs Last 24 Hrs  T(C): 36.7 (14 Apr 2023 05:10), Max: 36.8 (13 Apr 2023 20:06)  T(F): 98 (14 Apr 2023 05:10), Max: 98.2 (13 Apr 2023 20:06)  HR: 77 (14 Apr 2023 05:10) (77 - 84)  BP: 105/50 (14 Apr 2023 05:10) (105/50 - 113/53)  BP(mean): --  RR: 18 (14 Apr 2023 05:10) (18 - 18)  SpO2: 95% (14 Apr 2023 05:10) (95% - 99%)    Parameters below as of 14 Apr 2023 05:10  Patient On (Oxygen Delivery Method): room air    A&OX3 CN II- XII intact   strength intact UE and LE  no pronator drift  unsteady gait on admission unchanged     CT head ordered   Neuro consulted possible TIA recommended study below  MRI brain non contrast   TTE   lipitor 80mg   permissive HTN for 24hr   ASA and plavix if no contraindication. discussed with Dr Garibay given recent GI bleed will not start antiplt, 500cc bolus    Above discussed with attending

## 2023-04-14 NOTE — DIETITIAN INITIAL EVALUATION ADULT - LITERATURE/VIDEOS GIVEN
RD provided pt with verbal and written education regarding Iron deficiency anemia nutrition therapy, (foods recommended and iron content of foods was provided). Pt confirmed understanding and compliance to this information.

## 2023-04-15 VITALS
RESPIRATION RATE: 18 BRPM | DIASTOLIC BLOOD PRESSURE: 53 MMHG | OXYGEN SATURATION: 100 % | TEMPERATURE: 98 F | HEART RATE: 84 BPM | SYSTOLIC BLOOD PRESSURE: 115 MMHG

## 2023-04-15 LAB
A1C WITH ESTIMATED AVERAGE GLUCOSE RESULT: 5 % — SIGNIFICANT CHANGE UP (ref 4–5.6)
ANION GAP SERPL CALC-SCNC: 13 MMOL/L — SIGNIFICANT CHANGE UP (ref 7–14)
BUN SERPL-MCNC: 21 MG/DL — SIGNIFICANT CHANGE UP (ref 7–23)
CALCIUM SERPL-MCNC: 9 MG/DL — SIGNIFICANT CHANGE UP (ref 8.4–10.5)
CHLORIDE SERPL-SCNC: 104 MMOL/L — SIGNIFICANT CHANGE UP (ref 98–107)
CO2 SERPL-SCNC: 20 MMOL/L — LOW (ref 22–31)
CREAT SERPL-MCNC: 1.04 MG/DL — SIGNIFICANT CHANGE UP (ref 0.5–1.3)
EGFR: 69 ML/MIN/1.73M2 — SIGNIFICANT CHANGE UP
ESTIMATED AVERAGE GLUCOSE: 97 — SIGNIFICANT CHANGE UP
GLUCOSE SERPL-MCNC: 91 MG/DL — SIGNIFICANT CHANGE UP (ref 70–99)
HCT VFR BLD CALC: 34.2 % — LOW (ref 39–50)
HGB BLD-MCNC: 10.1 G/DL — LOW (ref 13–17)
MAGNESIUM SERPL-MCNC: 1.7 MG/DL — SIGNIFICANT CHANGE UP (ref 1.6–2.6)
MCHC RBC-ENTMCNC: 22.3 PG — LOW (ref 27–34)
MCHC RBC-ENTMCNC: 29.5 GM/DL — LOW (ref 32–36)
MCV RBC AUTO: 75.5 FL — LOW (ref 80–100)
NRBC # BLD: 0 /100 WBCS — SIGNIFICANT CHANGE UP (ref 0–0)
NRBC # FLD: 0 K/UL — SIGNIFICANT CHANGE UP (ref 0–0)
PHOSPHATE SERPL-MCNC: 2.8 MG/DL — SIGNIFICANT CHANGE UP (ref 2.5–4.5)
PLATELET # BLD AUTO: 347 K/UL — SIGNIFICANT CHANGE UP (ref 150–400)
POTASSIUM SERPL-MCNC: 4.2 MMOL/L — SIGNIFICANT CHANGE UP (ref 3.5–5.3)
POTASSIUM SERPL-SCNC: 4.2 MMOL/L — SIGNIFICANT CHANGE UP (ref 3.5–5.3)
RBC # BLD: 4.53 M/UL — SIGNIFICANT CHANGE UP (ref 4.2–5.8)
RBC # FLD: 22.2 % — HIGH (ref 10.3–14.5)
SODIUM SERPL-SCNC: 137 MMOL/L — SIGNIFICANT CHANGE UP (ref 135–145)
WBC # BLD: 10.15 K/UL — SIGNIFICANT CHANGE UP (ref 3.8–10.5)
WBC # FLD AUTO: 10.15 K/UL — SIGNIFICANT CHANGE UP (ref 3.8–10.5)

## 2023-04-15 PROCEDURE — 99239 HOSP IP/OBS DSCHRG MGMT >30: CPT

## 2023-04-15 PROCEDURE — 93306 TTE W/DOPPLER COMPLETE: CPT | Mod: 26

## 2023-04-15 RX ORDER — ATORVASTATIN CALCIUM 80 MG/1
1 TABLET, FILM COATED ORAL
Qty: 30 | Refills: 0
Start: 2023-04-15 | End: 2023-05-14

## 2023-04-15 RX ORDER — MECLIZINE HCL 12.5 MG
1 TABLET ORAL
Qty: 28 | Refills: 0
Start: 2023-04-15 | End: 2023-04-28

## 2023-04-15 RX ADMIN — LIDOCAINE 1 PATCH: 4 CREAM TOPICAL at 11:30

## 2023-04-15 RX ADMIN — Medication 325 MILLIGRAM(S): at 11:30

## 2023-04-15 RX ADMIN — CHLORHEXIDINE GLUCONATE 1 APPLICATION(S): 213 SOLUTION TOPICAL at 11:30

## 2023-04-15 RX ADMIN — PANTOPRAZOLE SODIUM 40 MILLIGRAM(S): 20 TABLET, DELAYED RELEASE ORAL at 06:58

## 2023-04-15 NOTE — PROGRESS NOTE ADULT - SUBJECTIVE AND OBJECTIVE BOX
[facility] Medicine  Pager [phone]    Patient is a 89y old  Male who presents with a chief complaint of Back pain, dizziness (14 Apr 2023 16:05)      INTERVAL HPI/OVERNIGHT EVENTS:    MEDICATIONS  (STANDING):  atorvastatin 80 milliGRAM(s) Oral at bedtime  chlorhexidine 2% Cloths 1 Application(s) Topical daily  ferrous    sulfate 325 milliGRAM(s) Oral daily  gabapentin 400 milliGRAM(s) Oral at bedtime  lidocaine   4% Patch 1 Patch Transdermal every 24 hours  pantoprazole    Tablet 40 milliGRAM(s) Oral two times a day  tamsulosin 0.4 milliGRAM(s) Oral at bedtime    MEDICATIONS  (PRN):  acetaminophen     Tablet .. 650 milliGRAM(s) Oral every 6 hours PRN Temp greater or equal to 38C (100.4F), Mild Pain (1 - 3)  aluminum hydroxide/magnesium hydroxide/simethicone Suspension 30 milliLiter(s) Oral every 4 hours PRN Dyspepsia  melatonin 3 milliGRAM(s) Oral at bedtime PRN Insomnia  ondansetron Injectable 4 milliGRAM(s) IV Push every 8 hours PRN Nausea and/or Vomiting  traMADol 50 milliGRAM(s) Oral two times a day PRN Severe Pain (7 - 10)      Allergies    aspirin (Unknown)  penicillin (Unknown)    Intolerances        REVIEW OF SYSTEMS:  Please see interval HPI:    Vital Signs Last 24 Hrs  T(C): 36.8 (15 Apr 2023 11:49), Max: 36.8 (15 Apr 2023 11:49)  T(F): 98.2 (15 Apr 2023 11:49), Max: 98.2 (15 Apr 2023 11:49)  HR: 76 (15 Apr 2023 11:49) (69 - 78)  BP: 111/72 (15 Apr 2023 11:49) (111/72 - 134/58)  BP(mean): --  RR: 18 (15 Apr 2023 11:49) (15 - 18)  SpO2: 99% (15 Apr 2023 11:49) (99% - 100%)    Parameters below as of 15 Apr 2023 06:38  Patient On (Oxygen Delivery Method): room air      I&O's Detail        PHYSICAL EXAM:  GENERAL:   HEAD:    EYES:   ENMT:   NECK:   NERVOUS SYSTEM:    CHEST/LUNG:   HEART:   ABDOMEN:   EXTREMITIES:    LYMPH:   SKIN:     LABS:                        10.1   10.15 )-----------( 347      ( 15 Apr 2023 05:55 )             34.2     15 Apr 2023 05:55    137    |  104    |  21     ----------------------------<  91     4.2     |  20     |  1.04     Ca    9.0        15 Apr 2023 05:55  Phos  2.8       15 Apr 2023 05:55  Mg     1.70      15 Apr 2023 05:55        CAPILLARY BLOOD GLUCOSE        BLOOD CULTURE  04-11 @ 14:01   <10,000 CFU/mL Normal Urogenital Nita  --  --    RADIOLOGY & ADDITIONAL TESTS:    Imaging Personally Reviewed:  [ ] YES     Consultant(s) Notes Reviewed:      Care Discussed with Consultants/Other Providers: [facility] Medicine  Pager [phone]    Patient is a 89y old  Male who presents with a chief complaint of Back pain, dizziness (14 Apr 2023 16:05)      INTERVAL HPI/OVERNIGHT EVENTS:  Denies any further issues with speech or focal weakness. Able to eat lunch without issue. Still wishes to go home. Discussed sign/symptoms of stroke that would prompt seeking urgent medical attention. Continued to reinforce orthostatic precautions as well as need to followup with otpt providers for further evaluation/management (such as GI for w/u of anemia, neuro for ?episode). Patient verbalized understanding, in agreement w/ discharge plan.     MEDICATIONS  (STANDING):  atorvastatin 80 milliGRAM(s) Oral at bedtime  chlorhexidine 2% Cloths 1 Application(s) Topical daily  ferrous    sulfate 325 milliGRAM(s) Oral daily  gabapentin 400 milliGRAM(s) Oral at bedtime  lidocaine   4% Patch 1 Patch Transdermal every 24 hours  pantoprazole    Tablet 40 milliGRAM(s) Oral two times a day  tamsulosin 0.4 milliGRAM(s) Oral at bedtime    MEDICATIONS  (PRN):  acetaminophen     Tablet .. 650 milliGRAM(s) Oral every 6 hours PRN Temp greater or equal to 38C (100.4F), Mild Pain (1 - 3)  aluminum hydroxide/magnesium hydroxide/simethicone Suspension 30 milliLiter(s) Oral every 4 hours PRN Dyspepsia  melatonin 3 milliGRAM(s) Oral at bedtime PRN Insomnia  ondansetron Injectable 4 milliGRAM(s) IV Push every 8 hours PRN Nausea and/or Vomiting  traMADol 50 milliGRAM(s) Oral two times a day PRN Severe Pain (7 - 10)      Allergies    aspirin (Unknown)  penicillin (Unknown)    Intolerances        REVIEW OF SYSTEMS:  Please see interval HPI:    Vital Signs Last 24 Hrs  T(C): 36.8 (15 Apr 2023 11:49), Max: 36.8 (15 Apr 2023 11:49)  T(F): 98.2 (15 Apr 2023 11:49), Max: 98.2 (15 Apr 2023 11:49)  HR: 76 (15 Apr 2023 11:49) (69 - 78)  BP: 111/72 (15 Apr 2023 11:49) (111/72 - 134/58)  BP(mean): --  RR: 18 (15 Apr 2023 11:49) (15 - 18)  SpO2: 99% (15 Apr 2023 11:49) (99% - 100%)    Parameters below as of 15 Apr 2023 06:38  Patient On (Oxygen Delivery Method): room air      I&O's Detail    PHYSICAL EXAM:  GENERAL: NAD, sitting in bed eating lunch, no difficulty manipulating utensils to feed self  HEAD:  NC/AT  EYES: EOMI, clear sclera/conjunctiva  ENMT: MMM, hearing intact to voice  NECK: supple, no JVD  NERVOUS SYSTEM: moving all extremities, follows commands, speech clear/fluent  CHEST/LUNG: CTAB, comfortable on RA, speaking in full sentences  HEART: S1S2 RRR  ABDOMEN: soft, non-tender +BS  EXTREMITIES:  no c/c/e    LABS:                        10.1   10.15 )-----------( 347      ( 15 Apr 2023 05:55 )             34.2     15 Apr 2023 05:55    137    |  104    |  21     ----------------------------<  91     4.2     |  20     |  1.04     Ca    9.0        15 Apr 2023 05:55  Phos  2.8       15 Apr 2023 05:55  Mg     1.70      15 Apr 2023 05:55      CAPILLARY BLOOD GLUCOSE    RADIOLOGY & ADDITIONAL TESTS:    Imaging Personally Reviewed:  [ ] YES     < from: TTE with Doppler (w/Cont) (04.15.23 @ 16:13) >  CONCLUSIONS:  1. Mitral annular calcification, otherwise normal mitral  valve. Mild mitral regurgitation.  2. Calcified trileaflet aortic valve with normal opening.  Mild aortic regurgitation.  3. Normal left ventricular internal dimensions and wall  thicknesses.  4. Endocardium not well visualized; grossly normal left  ventricular systolic function.  Endocardial visualization  enhanced with intravenous injection of echo contrast  (Definity).  5. Mild diastolic dysfunction (Stage I).  6. Normal right ventricular size and function.  7. Estimated right ventricular systolic pressure equals 38  mm Hg, assuming right atrial pressure equals 10 mm Hg,  consistent with borderline pulmonary hypertension.  8. Small pericardial effusion posterior to the left  ventricle.    < end of copied text >      Consultant(s) Notes Reviewed:  Neuro    Care Discussed with Consultants/Other Providers: LOU Hammond re: her discussion w/ neuro -> they have lower suspicion for CVA, can followup otpt for imaging, cleared for discharge from neuro perspective. d/c planning w/ otpt followup, anticipatory guidance provided, CM/SW to assist w/ transportation

## 2023-04-15 NOTE — PROGRESS NOTE ADULT - NUTRITIONAL ASSESSMENT
This patient has been assessed with a concern for Malnutrition and has been determined to have a diagnosis/diagnoses of Moderate protein-calorie malnutrition and Underweight (BMI < 19).    This patient is being managed with:   Diet Regular-  Supplement Feeding Modality:  Oral  Ensure Plus High Protein Cans or Servings Per Day:  1       Frequency:  Two Times a day  Entered: Apr 14 2023  3:38PM

## 2023-04-15 NOTE — PROGRESS NOTE ADULT - ASSESSMENT
89 yo M w/ BPH, chronic pain pain p/w lightheadedness/weakness, admitted with microcytic anemia potentially secondary to GI bleed, recommended to have endoscopic evaluation for further workup though patient declining inpatient procedure. Noted to be orthostatic. Course c/b episode of dysarthria and weakness, since resolved, low suspicion for CVA per neuro, cleared for discharge from their perspective.     # Orthostatic hypotension  - s/p IV fluids, giving additional small bolus, monitor orthostatics  - encouraged PO intake  - counseled patient on orthostatic precautions, trial of compression stockings  - discussed fall risk, and need to workup cause of his anemia, patient still wishing to go home    # Dysarthria (resolved)  - denies further symptoms  - neuro input appreciated, can pursue CT, MRI as otpt, they have lower suspicion for CVA  - otpt neuro f/u  - c/w statin, deferring anti-platelet agents at this time    # Microcytic, iron deficiency anemia  - hgb 13.6 in 2021, 7.1 on admission  - was symptomatic from anemia with lightheadedness/weakness  - concern for potential bleed/malignancy  - s/p 2u pRBC transfusions with increase in hgb level  - continue to monitor hgb and transfuse prn, hgb appears stable this AM (10.1)  - recommended by GI to undergo endoscopic evaluation, patient adamantly declining inpatient procedure at this time  - GI assistance appreciated with arranging expedited outpatient followup, patient counseled on importance of followup  - c/w ppi   - c/w iron supplementation  - counseled to avoid NSAIDS    # Chronic back pain/neuropathic pain  - c/w gabapentin qhs   - tylenol prn, avoid nsaids  - trial of lidoderm patch to low back  - was prescribed tramadol as otpt, can consider providing small supply upon discharge  - advised otpt PMD followup for further management  - PT recs home w/ home PT    # BPH  - c/w flomax    # Moderate protein calorie malnutrition  - nutrition recs appreciated, BMI 18.8, c/w supplements    Need for prophylactic measure  - VTE ppx: SCDs  - regular diet w/ ensure HP supplements    Dispo: home w/ PT, needs close otpt followup, anticipatory guidance provided (re: signs/symptoms of stroke, anemia, orthostatic precautions, need for otpt f/u), appreciate CM/SW assistance     Discharge time 35 mintues

## 2023-04-15 NOTE — CHART NOTE - NSCHARTNOTEFT_GEN_A_CORE
Pt seen by neurology today. Per Neurology GINNY Harrison attending have low suspicion for CVA and pt can have rest of workup outpatient. (CTH and MRI). Pt medically cleared from neurology standpoint     Above discussed with attending

## 2023-04-24 ENCOUNTER — EMERGENCY (EMERGENCY)
Facility: HOSPITAL | Age: 88
LOS: 1 days | Discharge: HOME | End: 2023-04-24
Attending: STUDENT IN AN ORGANIZED HEALTH CARE EDUCATION/TRAINING PROGRAM | Admitting: STUDENT IN AN ORGANIZED HEALTH CARE EDUCATION/TRAINING PROGRAM
Payer: MEDICARE

## 2023-04-24 VITALS
SYSTOLIC BLOOD PRESSURE: 146 MMHG | DIASTOLIC BLOOD PRESSURE: 71 MMHG | HEART RATE: 80 BPM | TEMPERATURE: 98 F | RESPIRATION RATE: 16 BRPM | OXYGEN SATURATION: 100 %

## 2023-04-24 VITALS
HEART RATE: 98 BPM | SYSTOLIC BLOOD PRESSURE: 148 MMHG | TEMPERATURE: 98 F | OXYGEN SATURATION: 100 % | RESPIRATION RATE: 16 BRPM | DIASTOLIC BLOOD PRESSURE: 86 MMHG | HEIGHT: 69 IN

## 2023-04-24 LAB
ALBUMIN SERPL ELPH-MCNC: 4 G/DL — SIGNIFICANT CHANGE UP (ref 3.3–5)
ALP SERPL-CCNC: 82 U/L — SIGNIFICANT CHANGE UP (ref 40–120)
ALT FLD-CCNC: 9 U/L — SIGNIFICANT CHANGE UP (ref 4–41)
ANION GAP SERPL CALC-SCNC: 12 MMOL/L — SIGNIFICANT CHANGE UP (ref 7–14)
AST SERPL-CCNC: 15 U/L — SIGNIFICANT CHANGE UP (ref 4–40)
BASOPHILS # BLD AUTO: 0.06 K/UL — SIGNIFICANT CHANGE UP (ref 0–0.2)
BASOPHILS NFR BLD AUTO: 0.8 % — SIGNIFICANT CHANGE UP (ref 0–2)
BILIRUB SERPL-MCNC: 0.5 MG/DL — SIGNIFICANT CHANGE UP (ref 0.2–1.2)
BUN SERPL-MCNC: 17 MG/DL — SIGNIFICANT CHANGE UP (ref 7–23)
CALCIUM SERPL-MCNC: 9.4 MG/DL — SIGNIFICANT CHANGE UP (ref 8.4–10.5)
CHLORIDE SERPL-SCNC: 103 MMOL/L — SIGNIFICANT CHANGE UP (ref 98–107)
CO2 SERPL-SCNC: 24 MMOL/L — SIGNIFICANT CHANGE UP (ref 22–31)
CREAT SERPL-MCNC: 1.02 MG/DL — SIGNIFICANT CHANGE UP (ref 0.5–1.3)
EGFR: 70 ML/MIN/1.73M2 — SIGNIFICANT CHANGE UP
EOSINOPHIL # BLD AUTO: 0.11 K/UL — SIGNIFICANT CHANGE UP (ref 0–0.5)
EOSINOPHIL NFR BLD AUTO: 1.4 % — SIGNIFICANT CHANGE UP (ref 0–6)
GLUCOSE SERPL-MCNC: 98 MG/DL — SIGNIFICANT CHANGE UP (ref 70–99)
HCT VFR BLD CALC: 34.5 % — LOW (ref 39–50)
HGB BLD-MCNC: 10.1 G/DL — LOW (ref 13–17)
IANC: 6.1 K/UL — SIGNIFICANT CHANGE UP (ref 1.8–7.4)
IMM GRANULOCYTES NFR BLD AUTO: 0.5 % — SIGNIFICANT CHANGE UP (ref 0–0.9)
LYMPHOCYTES # BLD AUTO: 0.67 K/UL — LOW (ref 1–3.3)
LYMPHOCYTES # BLD AUTO: 8.5 % — LOW (ref 13–44)
MCHC RBC-ENTMCNC: 22.9 PG — LOW (ref 27–34)
MCHC RBC-ENTMCNC: 29.3 GM/DL — LOW (ref 32–36)
MCV RBC AUTO: 78.1 FL — LOW (ref 80–100)
MONOCYTES # BLD AUTO: 0.9 K/UL — SIGNIFICANT CHANGE UP (ref 0–0.9)
MONOCYTES NFR BLD AUTO: 11.4 % — SIGNIFICANT CHANGE UP (ref 2–14)
NEUTROPHILS # BLD AUTO: 6.1 K/UL — SIGNIFICANT CHANGE UP (ref 1.8–7.4)
NEUTROPHILS NFR BLD AUTO: 77.4 % — HIGH (ref 43–77)
NRBC # BLD: 0 /100 WBCS — SIGNIFICANT CHANGE UP (ref 0–0)
NRBC # FLD: 0 K/UL — SIGNIFICANT CHANGE UP (ref 0–0)
PLATELET # BLD AUTO: 558 K/UL — HIGH (ref 150–400)
POTASSIUM SERPL-MCNC: 3.9 MMOL/L — SIGNIFICANT CHANGE UP (ref 3.5–5.3)
POTASSIUM SERPL-SCNC: 3.9 MMOL/L — SIGNIFICANT CHANGE UP (ref 3.5–5.3)
PROT SERPL-MCNC: 6.9 G/DL — SIGNIFICANT CHANGE UP (ref 6–8.3)
RBC # BLD: 4.42 M/UL — SIGNIFICANT CHANGE UP (ref 4.2–5.8)
RBC # FLD: 23.9 % — HIGH (ref 10.3–14.5)
SODIUM SERPL-SCNC: 139 MMOL/L — SIGNIFICANT CHANGE UP (ref 135–145)
WBC # BLD: 7.88 K/UL — SIGNIFICANT CHANGE UP (ref 3.8–10.5)
WBC # FLD AUTO: 7.88 K/UL — SIGNIFICANT CHANGE UP (ref 3.8–10.5)

## 2023-04-24 PROCEDURE — 93010 ELECTROCARDIOGRAM REPORT: CPT

## 2023-04-24 PROCEDURE — 99284 EMERGENCY DEPT VISIT MOD MDM: CPT

## 2023-04-24 NOTE — ED PROVIDER NOTE - NSFOLLOWUPINSTRUCTIONS_ED_ALL_ED_FT
You are to follow-up in the next 1-2 weeks with [facility] Division of Gastroenterology at [facility]. Please call [phone] for an appointment.    Please follow up with your primary care doctor. If you do not have a primary care doctor, you may refer to the clinic below:     [facility] - Primary Care   [address] [place] [address]  Phone: [phone]    Please return to the Emergency Department if you experience any of the following symptoms:   - Shortness of breath or trouble breathing  - Pressure, pain or tightness in the chest  - Face drooping, arm weakness or speech difficulty  - Persistence of severe vomiting  - Head injury or loss of consciousness  - Nonstop bleeding or an open wound    (1) Follow up with your primary care physician within the next 24-48 hours as discussed. In addition, we did not find evidence of a life threatening illness on your testing here today, but listed below are the specialists that will be necessary to see as an outpatient to continue the workup.  Please call the numbers listed below or [phone] to set up the necessary appointments.  (2) Take Tylenol (up to 1000mg or 1 g) up to every 6 hours as needed for pain.   (3) If you had an IV (intravenous) line placed, it was removed. Sometimes, after IV removal, that area can be tender for a few days; if it develops redness and swelling, those could be signs of infection; in which case, return to the Emergency Department for assessment.  (4) Please continue taking all of your home medications as directed.

## 2023-04-24 NOTE — ED PROVIDER NOTE - OBJECTIVE STATEMENT
Patient is a 89-year-old man with a past medical history of chronic pain, microcytic iron deficiency anemia of unknown origin presents to the ER today for medical evaluation.  Patient states that he was discharged from [facility] 4/15/2023 after a 5-day stay for anemia.  At which time he received 2 units of PRBC.  At that visit it was recommended that patient get an EGD and colonoscopy however he refused because he did not want to stay in the hospital.  Patient was discharged with GI follow-up.  Patient states that he was not able to get an appointment with GI which prompted his visit to the ER today for further evaluation.  Patient denies any dizziness lightheadedness nausea vomiting diarrhea.

## 2023-04-24 NOTE — ED PROVIDER NOTE - CLINICAL SUMMARY MEDICAL DECISION MAKING FREE TEXT BOX
Patient is a 89-year-old man with a past medical history of chronic pain, microcytic iron deficiency anemia of unknown origin presents to the ER today for medical evaluation.  Patient is well-appearing in no acute distress.  No conjunctival paler.  No lightheadedness.  Will obtain basic blood test and ensure not anemic.  If hemoglobin is at baseline from last visit plan to discharge patient with GI follow-up.

## 2023-04-24 NOTE — ED ADULT NURSE NOTE - OBJECTIVE STATEMENT
Received pt in 3A, A &O x4, PMH of HLD, BPH, Peripheral neuropathy. Was D/C here on 4/15/23 and had 2 Packed red blood cells. Came to the ED today due to he want to check his blood, to make sure he isn't anemic and need another blood transfusion. States he also came because he was told to follow-up with GI and was unable to obtain an appointment. Pt denies any pain, discomfort, chest pain, SOB, headahce, dizziness's, lightheaded, weakness, or fatigue. Heart sounds are normal, lung sounds are clear, Abdomen soft, non-distended, + bowel sounds in all four quadrants. No edema to upper or lower extremities, + pulses to upper and lower extremities. Safety maintained and will continue to monitor.

## 2023-04-24 NOTE — ED PROVIDER NOTE - ATTENDING CONTRIBUTION TO CARE
35 y/o  [facility] 2021 @ 39.6 week I have personally performed a face to face medical and diagnostic evaluation of the patient. I have discussed with and reviewed the Resident's note and agree with the History, ROS, Physical Exam and MDM unless otherwise indicated. A brief summary of my personal evaluation and impression can be found below.    Jon VIRAMONTES: 89-year-old male, history of anemia, thought secondary possibly to iron deficiency, chronic pain, no AC, hyperlipidemia, presents with a chief complaint of requesting blood work for evaluation of hemoglobin level, patient reports she was admitted to the hospital recently for anemia requiring transfusions of unclear etiology, was recommended while inpatient to have an endoscopy, at that point however he adamantly refused and was discharged, he is coming today because he is concerned his blood levels will be dropping.  He reports he has tried to see a GI outpatient but they are unable to see him for some time.  He offers no physical complaints, no new chest pain shortness of breath lightheadedness dizziness nausea or vomiting, he has no fever, he has no changes in stools melanotic or otherwise, no bleeding, no urinary symptoms, no easy bruising.    All other ROS negative, except as above and as per HPI and ROS section.    VITALS: Initial triage and subsequent vitals have been reviewed by me.  GEN APPEARANCE: Alert, non-toxic, well-appearing, NAD.  HEAD: Atraumatic.  EYES: PERRLa, EOMI, vision grossly intact.   NECK: Supple  CV: RRR, S1S2, no c/r/m/g. Cap refill <2 seconds. No bruits.   LUNGS: CTAB. No abnormal breath sounds.  ABDOMEN: Soft, NTND. No guarding or rebound.   MSK/EXT: No spinal or extremity point tenderness. No CVA ttp. Pelvis stable. No peripheral edema.  NEURO: Alert, follows commands. Weight bearing normal. Speech normal. Sensation and motor normal x4 extremities.   SKIN: Warm, dry and intact. No rash.  PSYCH: Appropriate    Plan/MDM: Exam vital stable nontoxic-appearing physical exam as above, DDx concern for possible anemia, less concern for likely at this time as he is hemodynamically stable well-appearing with no physical symptoms or complaints, will recheck basic labs here, if nonactionable patient will be able to follow-up with his primary care doctor for further evaluation and GI outpatient.  If acutely anemic will transfuse as indicated and consider admission.

## 2023-04-24 NOTE — ED PROVIDER NOTE - PROGRESS NOTE DETAILS
Jon VIRAMONTES:   Pt assessed at beside. Pt resting comfortably, pain controlled, pt questions answered. Vital signs stable. Patient informed of lab results in ED, hemoglobin is at baseline from discharge, informed of lab results, strict return precautions were discussed, patient to follow-up with PMD this week, copy of paperwork and laboratory given to patient, strict return precautions with emphasis on bleeding for instructed patient patient reported understanding, patient reports he is comfortable ambulating himself to the bus.

## 2023-04-24 NOTE — ED PROVIDER NOTE - PATIENT PORTAL LINK FT
You can access the FollowMyHealth Patient Portal offered by [facility] by registering at the following website: [email or web address]. By joining [employer]’s FollowMyHealth portal, you will also be able to view your health information using other applications (apps) compatible with our system.

## 2023-04-24 NOTE — ED PROVIDER NOTE - PHYSICAL EXAMINATION
General: Patient awake alert NAD. Tearful during exam.   HEENT: normocephalic, atraumatic, EOMI, MMM   Cardiac: RRR, S1, S2, no murmur.   LUNGS: CTAB, NWOB, no wheeze, rhonchi, speaking full sentences.     Abdomen: soft NT, ND, no rebound no guarding.   EXT: Moving all extremities, no edema.   Neuro: A&Ox3, no focal neurological deficits   Skin: warm, dry, no rash.  Psych: normal affect

## 2023-04-24 NOTE — ED ADULT NURSE NOTE - CHIEF COMPLAINT QUOTE
pt was discharged from [facility] 4/15/23  after 5 day stay for anemia, received blood transfusions. Pt states was instructed to follow up with GI MD but unable to get an appointment. Pt returning today because he is concerned about his blood count. Denies any fatigue, dizziness or SOB. PMHX BPH, chronic back pain.

## 2023-05-19 ENCOUNTER — APPOINTMENT (OUTPATIENT)
Dept: GASTROENTEROLOGY | Facility: CLINIC | Age: 88
End: 2023-05-19

## 2023-11-30 ENCOUNTER — INPATIENT (INPATIENT)
Facility: HOSPITAL | Age: 88
LOS: 19 days | Discharge: HOME | End: 2023-12-20
Attending: STUDENT IN AN ORGANIZED HEALTH CARE EDUCATION/TRAINING PROGRAM | Admitting: STUDENT IN AN ORGANIZED HEALTH CARE EDUCATION/TRAINING PROGRAM
Payer: MEDICARE

## 2023-11-30 VITALS
OXYGEN SATURATION: 98 % | DIASTOLIC BLOOD PRESSURE: 77 MMHG | TEMPERATURE: 97 F | SYSTOLIC BLOOD PRESSURE: 144 MMHG | HEART RATE: 94 BPM | RESPIRATION RATE: 16 BRPM

## 2023-11-30 DIAGNOSIS — Z29.9 ENCOUNTER FOR PROPHYLACTIC MEASURES, UNSPECIFIED: ICD-10-CM

## 2023-11-30 DIAGNOSIS — N40.0 BENIGN PROSTATIC HYPERPLASIA WITHOUT LOWER URINARY TRACT SYMPTOMS: ICD-10-CM

## 2023-11-30 DIAGNOSIS — K63.89 OTHER SPECIFIED DISEASES OF INTESTINE: ICD-10-CM

## 2023-11-30 DIAGNOSIS — E78.5 HYPERLIPIDEMIA, UNSPECIFIED: ICD-10-CM

## 2023-11-30 DIAGNOSIS — R19.00 INTRA-ABDOMINAL AND PELVIC SWELLING, MASS AND LUMP, UNSPECIFIED SITE: ICD-10-CM

## 2023-11-30 DIAGNOSIS — G62.9 POLYNEUROPATHY, UNSPECIFIED: ICD-10-CM

## 2023-11-30 LAB
ALBUMIN SERPL ELPH-MCNC: 3.7 G/DL — SIGNIFICANT CHANGE UP (ref 3.3–5)
ALBUMIN SERPL ELPH-MCNC: 3.7 G/DL — SIGNIFICANT CHANGE UP (ref 3.3–5)
ALP SERPL-CCNC: 72 U/L — SIGNIFICANT CHANGE UP (ref 40–120)
ALP SERPL-CCNC: 72 U/L — SIGNIFICANT CHANGE UP (ref 40–120)
ALT FLD-CCNC: 7 U/L — SIGNIFICANT CHANGE UP (ref 4–41)
ALT FLD-CCNC: 7 U/L — SIGNIFICANT CHANGE UP (ref 4–41)
ANION GAP SERPL CALC-SCNC: 20 MMOL/L — HIGH (ref 7–14)
ANION GAP SERPL CALC-SCNC: 20 MMOL/L — HIGH (ref 7–14)
APPEARANCE UR: CLEAR — SIGNIFICANT CHANGE UP
APPEARANCE UR: CLEAR — SIGNIFICANT CHANGE UP
AST SERPL-CCNC: 12 U/L — SIGNIFICANT CHANGE UP (ref 4–40)
AST SERPL-CCNC: 12 U/L — SIGNIFICANT CHANGE UP (ref 4–40)
BASE EXCESS BLDV CALC-SCNC: -4 MMOL/L — LOW (ref -2–3)
BASE EXCESS BLDV CALC-SCNC: -4 MMOL/L — LOW (ref -2–3)
BASOPHILS # BLD AUTO: 0.02 K/UL — SIGNIFICANT CHANGE UP (ref 0–0.2)
BASOPHILS # BLD AUTO: 0.02 K/UL — SIGNIFICANT CHANGE UP (ref 0–0.2)
BASOPHILS NFR BLD AUTO: 0.2 % — SIGNIFICANT CHANGE UP (ref 0–2)
BASOPHILS NFR BLD AUTO: 0.2 % — SIGNIFICANT CHANGE UP (ref 0–2)
BILIRUB SERPL-MCNC: 0.5 MG/DL — SIGNIFICANT CHANGE UP (ref 0.2–1.2)
BILIRUB SERPL-MCNC: 0.5 MG/DL — SIGNIFICANT CHANGE UP (ref 0.2–1.2)
BILIRUB UR-MCNC: NEGATIVE — SIGNIFICANT CHANGE UP
BILIRUB UR-MCNC: NEGATIVE — SIGNIFICANT CHANGE UP
BLOOD GAS VENOUS COMPREHENSIVE RESULT: SIGNIFICANT CHANGE UP
BLOOD GAS VENOUS COMPREHENSIVE RESULT: SIGNIFICANT CHANGE UP
BUN SERPL-MCNC: 17 MG/DL — SIGNIFICANT CHANGE UP (ref 7–23)
BUN SERPL-MCNC: 17 MG/DL — SIGNIFICANT CHANGE UP (ref 7–23)
CALCIUM SERPL-MCNC: 9 MG/DL — SIGNIFICANT CHANGE UP (ref 8.4–10.5)
CALCIUM SERPL-MCNC: 9 MG/DL — SIGNIFICANT CHANGE UP (ref 8.4–10.5)
CEA SERPL-MCNC: 81.9 NG/ML — HIGH (ref 1–3.8)
CEA SERPL-MCNC: 81.9 NG/ML — HIGH (ref 1–3.8)
CHLORIDE BLDV-SCNC: 101 MMOL/L — SIGNIFICANT CHANGE UP (ref 96–108)
CHLORIDE BLDV-SCNC: 101 MMOL/L — SIGNIFICANT CHANGE UP (ref 96–108)
CHLORIDE SERPL-SCNC: 97 MMOL/L — LOW (ref 98–107)
CHLORIDE SERPL-SCNC: 97 MMOL/L — LOW (ref 98–107)
CO2 BLDV-SCNC: 23.6 MMOL/L — SIGNIFICANT CHANGE UP (ref 22–26)
CO2 BLDV-SCNC: 23.6 MMOL/L — SIGNIFICANT CHANGE UP (ref 22–26)
CO2 SERPL-SCNC: 21 MMOL/L — LOW (ref 22–31)
CO2 SERPL-SCNC: 21 MMOL/L — LOW (ref 22–31)
COLOR SPEC: YELLOW — SIGNIFICANT CHANGE UP
COLOR SPEC: YELLOW — SIGNIFICANT CHANGE UP
CREAT SERPL-MCNC: 1 MG/DL — SIGNIFICANT CHANGE UP (ref 0.5–1.3)
CREAT SERPL-MCNC: 1 MG/DL — SIGNIFICANT CHANGE UP (ref 0.5–1.3)
DIFF PNL FLD: NEGATIVE — SIGNIFICANT CHANGE UP
DIFF PNL FLD: NEGATIVE — SIGNIFICANT CHANGE UP
EGFR: 72 ML/MIN/1.73M2 — SIGNIFICANT CHANGE UP
EGFR: 72 ML/MIN/1.73M2 — SIGNIFICANT CHANGE UP
EOSINOPHIL # BLD AUTO: 0 K/UL — SIGNIFICANT CHANGE UP (ref 0–0.5)
EOSINOPHIL # BLD AUTO: 0 K/UL — SIGNIFICANT CHANGE UP (ref 0–0.5)
EOSINOPHIL NFR BLD AUTO: 0 % — SIGNIFICANT CHANGE UP (ref 0–6)
EOSINOPHIL NFR BLD AUTO: 0 % — SIGNIFICANT CHANGE UP (ref 0–6)
GAS PNL BLDV: 133 MMOL/L — LOW (ref 136–145)
GAS PNL BLDV: 133 MMOL/L — LOW (ref 136–145)
GLUCOSE BLDV-MCNC: 135 MG/DL — HIGH (ref 70–99)
GLUCOSE BLDV-MCNC: 135 MG/DL — HIGH (ref 70–99)
GLUCOSE SERPL-MCNC: 138 MG/DL — HIGH (ref 70–99)
GLUCOSE SERPL-MCNC: 138 MG/DL — HIGH (ref 70–99)
GLUCOSE UR QL: NEGATIVE MG/DL — SIGNIFICANT CHANGE UP
GLUCOSE UR QL: NEGATIVE MG/DL — SIGNIFICANT CHANGE UP
HCO3 BLDV-SCNC: 22 MMOL/L — SIGNIFICANT CHANGE UP (ref 22–29)
HCO3 BLDV-SCNC: 22 MMOL/L — SIGNIFICANT CHANGE UP (ref 22–29)
HCT VFR BLD CALC: 37.8 % — LOW (ref 39–50)
HCT VFR BLD CALC: 37.8 % — LOW (ref 39–50)
HCT VFR BLDA CALC: 37 % — LOW (ref 39–51)
HCT VFR BLDA CALC: 37 % — LOW (ref 39–51)
HGB BLD CALC-MCNC: 12.2 G/DL — LOW (ref 12.6–17.4)
HGB BLD-MCNC: 11.9 G/DL — LOW (ref 13–17)
HGB BLD-MCNC: 11.9 G/DL — LOW (ref 13–17)
IANC: 10.91 K/UL — HIGH (ref 1.8–7.4)
IANC: 10.91 K/UL — HIGH (ref 1.8–7.4)
IMM GRANULOCYTES NFR BLD AUTO: 0.6 % — SIGNIFICANT CHANGE UP (ref 0–0.9)
IMM GRANULOCYTES NFR BLD AUTO: 0.6 % — SIGNIFICANT CHANGE UP (ref 0–0.9)
KETONES UR-MCNC: 40 MG/DL
KETONES UR-MCNC: 40 MG/DL
LACTATE BLDV-MCNC: 4.5 MMOL/L — CRITICAL HIGH (ref 0.5–2)
LACTATE BLDV-MCNC: 4.5 MMOL/L — CRITICAL HIGH (ref 0.5–2)
LEUKOCYTE ESTERASE UR-ACNC: NEGATIVE — SIGNIFICANT CHANGE UP
LEUKOCYTE ESTERASE UR-ACNC: NEGATIVE — SIGNIFICANT CHANGE UP
LIDOCAIN IGE QN: 7 U/L — SIGNIFICANT CHANGE UP (ref 7–60)
LIDOCAIN IGE QN: 7 U/L — SIGNIFICANT CHANGE UP (ref 7–60)
LYMPHOCYTES # BLD AUTO: 0.6 K/UL — LOW (ref 1–3.3)
LYMPHOCYTES # BLD AUTO: 0.6 K/UL — LOW (ref 1–3.3)
LYMPHOCYTES # BLD AUTO: 4.9 % — LOW (ref 13–44)
LYMPHOCYTES # BLD AUTO: 4.9 % — LOW (ref 13–44)
MCHC RBC-ENTMCNC: 27.4 PG — SIGNIFICANT CHANGE UP (ref 27–34)
MCHC RBC-ENTMCNC: 27.4 PG — SIGNIFICANT CHANGE UP (ref 27–34)
MCHC RBC-ENTMCNC: 31.5 GM/DL — LOW (ref 32–36)
MCHC RBC-ENTMCNC: 31.5 GM/DL — LOW (ref 32–36)
MCV RBC AUTO: 87.1 FL — SIGNIFICANT CHANGE UP (ref 80–100)
MCV RBC AUTO: 87.1 FL — SIGNIFICANT CHANGE UP (ref 80–100)
MONOCYTES # BLD AUTO: 0.76 K/UL — SIGNIFICANT CHANGE UP (ref 0–0.9)
MONOCYTES # BLD AUTO: 0.76 K/UL — SIGNIFICANT CHANGE UP (ref 0–0.9)
MONOCYTES NFR BLD AUTO: 6.1 % — SIGNIFICANT CHANGE UP (ref 2–14)
MONOCYTES NFR BLD AUTO: 6.1 % — SIGNIFICANT CHANGE UP (ref 2–14)
NEUTROPHILS # BLD AUTO: 10.91 K/UL — HIGH (ref 1.8–7.4)
NEUTROPHILS # BLD AUTO: 10.91 K/UL — HIGH (ref 1.8–7.4)
NEUTROPHILS NFR BLD AUTO: 88.2 % — HIGH (ref 43–77)
NEUTROPHILS NFR BLD AUTO: 88.2 % — HIGH (ref 43–77)
NITRITE UR-MCNC: NEGATIVE — SIGNIFICANT CHANGE UP
NITRITE UR-MCNC: NEGATIVE — SIGNIFICANT CHANGE UP
NRBC # BLD: 0 /100 WBCS — SIGNIFICANT CHANGE UP (ref 0–0)
NRBC # BLD: 0 /100 WBCS — SIGNIFICANT CHANGE UP (ref 0–0)
NRBC # FLD: 0 K/UL — SIGNIFICANT CHANGE UP (ref 0–0)
NRBC # FLD: 0 K/UL — SIGNIFICANT CHANGE UP (ref 0–0)
PCO2 BLDV: 44 MMHG — SIGNIFICANT CHANGE UP (ref 42–55)
PCO2 BLDV: 44 MMHG — SIGNIFICANT CHANGE UP (ref 42–55)
PH BLDV: 7.31 — LOW (ref 7.32–7.43)
PH BLDV: 7.31 — LOW (ref 7.32–7.43)
PH UR: 6 — SIGNIFICANT CHANGE UP (ref 5–8)
PH UR: 6 — SIGNIFICANT CHANGE UP (ref 5–8)
PLATELET # BLD AUTO: 382 K/UL — SIGNIFICANT CHANGE UP (ref 150–400)
PLATELET # BLD AUTO: 382 K/UL — SIGNIFICANT CHANGE UP (ref 150–400)
PO2 BLDV: 45 MMHG — SIGNIFICANT CHANGE UP (ref 25–45)
PO2 BLDV: 45 MMHG — SIGNIFICANT CHANGE UP (ref 25–45)
POTASSIUM BLDV-SCNC: 3.4 MMOL/L — LOW (ref 3.5–5.1)
POTASSIUM BLDV-SCNC: 3.4 MMOL/L — LOW (ref 3.5–5.1)
POTASSIUM SERPL-MCNC: 3.6 MMOL/L — SIGNIFICANT CHANGE UP (ref 3.5–5.3)
POTASSIUM SERPL-MCNC: 3.6 MMOL/L — SIGNIFICANT CHANGE UP (ref 3.5–5.3)
POTASSIUM SERPL-SCNC: 3.6 MMOL/L — SIGNIFICANT CHANGE UP (ref 3.5–5.3)
POTASSIUM SERPL-SCNC: 3.6 MMOL/L — SIGNIFICANT CHANGE UP (ref 3.5–5.3)
PROT SERPL-MCNC: 6.8 G/DL — SIGNIFICANT CHANGE UP (ref 6–8.3)
PROT SERPL-MCNC: 6.8 G/DL — SIGNIFICANT CHANGE UP (ref 6–8.3)
PROT UR-MCNC: NEGATIVE MG/DL — SIGNIFICANT CHANGE UP
PROT UR-MCNC: NEGATIVE MG/DL — SIGNIFICANT CHANGE UP
RBC # BLD: 4.34 M/UL — SIGNIFICANT CHANGE UP (ref 4.2–5.8)
RBC # BLD: 4.34 M/UL — SIGNIFICANT CHANGE UP (ref 4.2–5.8)
RBC # FLD: 13.6 % — SIGNIFICANT CHANGE UP (ref 10.3–14.5)
RBC # FLD: 13.6 % — SIGNIFICANT CHANGE UP (ref 10.3–14.5)
SAO2 % BLDV: 67.2 % — SIGNIFICANT CHANGE UP (ref 67–88)
SAO2 % BLDV: 67.2 % — SIGNIFICANT CHANGE UP (ref 67–88)
SODIUM SERPL-SCNC: 138 MMOL/L — SIGNIFICANT CHANGE UP (ref 135–145)
SODIUM SERPL-SCNC: 138 MMOL/L — SIGNIFICANT CHANGE UP (ref 135–145)
SP GR SPEC: 1.05 — HIGH (ref 1–1.03)
SP GR SPEC: 1.05 — HIGH (ref 1–1.03)
TROPONIN T, HIGH SENSITIVITY RESULT: 20 NG/L — SIGNIFICANT CHANGE UP
TROPONIN T, HIGH SENSITIVITY RESULT: 20 NG/L — SIGNIFICANT CHANGE UP
UROBILINOGEN FLD QL: 1 MG/DL — SIGNIFICANT CHANGE UP (ref 0.2–1)
UROBILINOGEN FLD QL: 1 MG/DL — SIGNIFICANT CHANGE UP (ref 0.2–1)
WBC # BLD: 12.36 K/UL — HIGH (ref 3.8–10.5)
WBC # BLD: 12.36 K/UL — HIGH (ref 3.8–10.5)
WBC # FLD AUTO: 12.36 K/UL — HIGH (ref 3.8–10.5)
WBC # FLD AUTO: 12.36 K/UL — HIGH (ref 3.8–10.5)

## 2023-11-30 PROCEDURE — 99222 1ST HOSP IP/OBS MODERATE 55: CPT

## 2023-11-30 PROCEDURE — 71250 CT THORAX DX C-: CPT | Mod: 26

## 2023-11-30 PROCEDURE — 74177 CT ABD & PELVIS W/CONTRAST: CPT | Mod: 26,MA

## 2023-11-30 PROCEDURE — 99223 1ST HOSP IP/OBS HIGH 75: CPT | Mod: GC

## 2023-11-30 PROCEDURE — 99285 EMERGENCY DEPT VISIT HI MDM: CPT

## 2023-11-30 RX ORDER — ONDANSETRON 8 MG/1
4 TABLET, FILM COATED ORAL EVERY 8 HOURS
Refills: 0 | Status: DISCONTINUED | OUTPATIENT
Start: 2023-11-30 | End: 2023-12-02

## 2023-11-30 RX ORDER — ACETAMINOPHEN 500 MG
650 TABLET ORAL EVERY 6 HOURS
Refills: 0 | Status: DISCONTINUED | OUTPATIENT
Start: 2023-11-30 | End: 2023-12-05

## 2023-11-30 RX ORDER — SOD SULF/SODIUM/NAHCO3/KCL/PEG
4000 SOLUTION, RECONSTITUTED, ORAL ORAL ONCE
Refills: 0 | Status: COMPLETED | OUTPATIENT
Start: 2023-11-30 | End: 2023-11-30

## 2023-11-30 RX ORDER — LANOLIN ALCOHOL/MO/W.PET/CERES
3 CREAM (GRAM) TOPICAL AT BEDTIME
Refills: 0 | Status: DISCONTINUED | OUTPATIENT
Start: 2023-11-30 | End: 2023-12-07

## 2023-11-30 RX ORDER — ONDANSETRON 8 MG/1
4 TABLET, FILM COATED ORAL ONCE
Refills: 0 | Status: COMPLETED | OUTPATIENT
Start: 2023-11-30 | End: 2023-11-30

## 2023-11-30 RX ORDER — SODIUM CHLORIDE 9 MG/ML
1000 INJECTION INTRAMUSCULAR; INTRAVENOUS; SUBCUTANEOUS ONCE
Refills: 0 | Status: COMPLETED | OUTPATIENT
Start: 2023-11-30 | End: 2023-11-30

## 2023-11-30 RX ORDER — INFLUENZA VIRUS VACCINE 15; 15; 15; 15 UG/.5ML; UG/.5ML; UG/.5ML; UG/.5ML
0.7 SUSPENSION INTRAMUSCULAR ONCE
Refills: 0 | Status: DISCONTINUED | OUTPATIENT
Start: 2023-11-30 | End: 2023-12-20

## 2023-11-30 RX ORDER — PANTOPRAZOLE SODIUM 20 MG/1
40 TABLET, DELAYED RELEASE ORAL DAILY
Refills: 0 | Status: DISCONTINUED | OUTPATIENT
Start: 2023-11-30 | End: 2023-12-06

## 2023-11-30 RX ORDER — MORPHINE SULFATE 50 MG/1
4 CAPSULE, EXTENDED RELEASE ORAL ONCE
Refills: 0 | Status: DISCONTINUED | OUTPATIENT
Start: 2023-11-30 | End: 2023-11-30

## 2023-11-30 RX ORDER — SODIUM CHLORIDE 9 MG/ML
500 INJECTION INTRAMUSCULAR; INTRAVENOUS; SUBCUTANEOUS ONCE
Refills: 0 | Status: COMPLETED | OUTPATIENT
Start: 2023-11-30 | End: 2023-11-30

## 2023-11-30 RX ADMIN — PANTOPRAZOLE SODIUM 40 MILLIGRAM(S): 20 TABLET, DELAYED RELEASE ORAL at 22:25

## 2023-11-30 RX ADMIN — ONDANSETRON 4 MILLIGRAM(S): 8 TABLET, FILM COATED ORAL at 07:54

## 2023-11-30 RX ADMIN — ONDANSETRON 4 MILLIGRAM(S): 8 TABLET, FILM COATED ORAL at 12:45

## 2023-11-30 RX ADMIN — SODIUM CHLORIDE 500 MILLILITER(S): 9 INJECTION INTRAMUSCULAR; INTRAVENOUS; SUBCUTANEOUS at 07:54

## 2023-11-30 RX ADMIN — MORPHINE SULFATE 4 MILLIGRAM(S): 50 CAPSULE, EXTENDED RELEASE ORAL at 13:15

## 2023-11-30 RX ADMIN — Medication 4000 MILLILITER(S): at 20:58

## 2023-11-30 RX ADMIN — SODIUM CHLORIDE 1000 MILLILITER(S): 9 INJECTION INTRAMUSCULAR; INTRAVENOUS; SUBCUTANEOUS at 09:24

## 2023-11-30 RX ADMIN — MORPHINE SULFATE 4 MILLIGRAM(S): 50 CAPSULE, EXTENDED RELEASE ORAL at 12:45

## 2023-11-30 NOTE — CONSULT NOTE ADULT - ATTENDING COMMENTS
Synchronous lesions of the distal ascending and mid transverse colon  Upstream dilation of the cecum  Recommend lap partial colectomy to prevent obstruction and resolve likely source of his anemia  Please document medical/cardiac risk stratification  Awaiting colonoscopy

## 2023-11-30 NOTE — CONSULT NOTE ADULT - ASSESSMENT
89 y/o M p/w likely ascending colon mass.    -No acute surgical intervention  -Appreciate GI recs, for c-scope tomorrow and possible biopsy of lesion  -F/u CT Chest to assess for distant mets  -Tumor markets CEA, CA 19-9  -GOC discussion regarding possibility of surgery +/- chemotherapy    D/w Dr. Frank Chapa MD  PGY-3  A Team Surgery 89 y/o M p/w likely ascending colon mass.    -No acute surgical intervention  -Appreciate GI recs, for c-scope tomorrow and possible biopsy of lesion  -F/u CT Chest to assess for distant mets  -Tumor markers CEA, CA 19-9  -GOC discussion regarding possibility of surgery +/- chemotherapy depending on final staging    D/w Dr. Frank Chapa MD  PGY-3  A Team Surgery

## 2023-11-30 NOTE — ED PROVIDER NOTE - ATTENDING APP SHARED VISIT CONTRIBUTION OF CARE
I have personally performed a face to face medical and diagnostic evaluation of the patient. I have discussed with and reviewed the TELLY's note and agree with the History, ROS, Physical Exam and MDM unless otherwise indicated. A brief summary of my personal evaluation and impression can be found below.    Jon VIRAMONTES: 89M hx of BPH HLD hernia repair presents with a cc of diffuse abdominal burning discomfort a/w nausea, vomiting, decreased bowel movements, no bleeding, no fever, no chest pain, SOB or MORALES.  Pt reports has been having discomfort over last few months since having a colonoscopy. No urinary complaints. No testicular pain. No rash.     All other ROS negative, except as above and as per HPI and ROS section.    VITALS: Initial triage and subsequent vitals have been reviewed by me.  GEN APPEARANCE: Alert, non-toxic, well-appearing, NAD.  HEAD: Atraumatic.  EYES: PERRLa, EOMI, vision grossly intact.   NECK: Supple  CV: RRR, S1S2, no c/r/m/g. Cap refill <2 seconds. No bruits.   LUNGS: CTAB. No abnormal breath sounds.  ABDOMEN: Soft, NTND. No guarding or rebound. L inguinal reducible mass.  exam reassuring, no testicular tenderness, exam with Darío GROSS.   MSK/EXT: No spinal or extremity point tenderness. No CVA ttp. Pelvis stable. No peripheral edema.  NEURO: Alert, follows commands. Weight bearing normal. Speech normal. Sensation and motor normal x4 extremities.   SKIN: Warm, dry and intact. No rash.  PSYCH: Appropriate    Plan/MDM:  exam vss non toxic PE as above ddx c/f intraabdominal surgical or obstructive of infectious pathology, will check labs urine ctap ekg cardiacs, will give meds as needed, and reassesses.

## 2023-11-30 NOTE — CONSULT NOTE ADULT - SUBJECTIVE AND OBJECTIVE BOX
Chief Complaint:  Patient is a 89y old  Male who presents with a chief complaint of abd pain and constipation    HPI:   89 Y/O M PMH BPH HLD PSH B/L inguinal hernia repair with known L recurrence, colonoscopy and endoscopy in sept for anemia on oral iron presents with generalized abdominal pain and constipation. Pt has been passing flatus but has been nauseous. Pt states he vomited two times prior to ED arrival. last BM was 3 days ago which he states was solid and brown.    GI consulted for further evaluation of colonic mass. pt seen and examined.        currently avss    Allergies:  penicillin (Unknown)  aspirin (Unknown)      PMHX/PSHX:  No pertinent past medical history    BPH (benign prostatic hyperplasia)    Peripheral neuropathy    HLD (hyperlipidemia)    No significant past surgical history        Family history:  No pertinent family history in first degree relatives    No pertinent family history in first degree relatives        Social History: as above    Home Medications:    Hospital Medications:        ROS:   General:  AS PER HPI  Eyes:  Adequate vision, no reported pain  ENT:  No sore throat, runny nose, dysphagia  CV:  No chest pain, palpitations  Pulm:  No dyspnea, cough, tachypnea, wheezing  GI:  AS PER HPI  :  No pain, bleeding, burning  Muscle:  No pain, weakness  Neuro:  No tingling, numbness, memory problems  Psych:  No insomnia, mood problems, depression  Endocrine:  No polyuria, cold/heat intolerance  Heme:  No petechiae, ecchymosis, easy bruisability  Skin:  No rash, edema      Vital Signs:  Vital Signs Last 24 Hrs  T(C): 36 (2023 12:22), Max: 36.3 (2023 06:25)  T(F): 96.8 (2023 12:22), Max: 97.3 (2023 06:25)  HR: 80 (2023 12:22) (80 - 94)  BP: 155/77 (2023 12:22) (139/60 - 155/77)  BP(mean): 102 (2023 12:22) (102 - 102)  RR: 18 (2023 12:22) (16 - 18)  SpO2: 99% (2023 12:22) (98% - 99%)    Parameters below as of 2023 12:51  Patient On (Oxygen Delivery Method): room air      Daily     Daily     LABS:                        11.9   12.36 )-----------( 382      ( 2023 06:52 )             37.8     Mean Cell Volume: 87.1 fL (- @ 06:52)        138  |  97<L>  |  17  ----------------------------<  138<H>  3.6   |  21<L>  |  1.00    Ca    9.0      2023 06:52    TPro  6.8  /  Alb  3.7  /  TBili  0.5  /  DBili  x   /  AST  12  /  ALT  7   /  AlkPhos  72      LIVER FUNCTIONS - ( 2023 06:52 )  Alb: 3.7 g/dL / Pro: 6.8 g/dL / ALK PHOS: 72 U/L / ALT: 7 U/L / AST: 12 U/L / GGT: x             Urinalysis Basic - ( 2023 09:49 )    Color: Yellow / Appearance: Clear / S.049 / pH: x  Gluc: x / Ketone: 40 mg/dL  / Bili: Negative / Urobili: 1.0 mg/dL   Blood: x / Protein: Negative mg/dL / Nitrite: Negative   Leuk Esterase: Negative / RBC: x / WBC x   Sq Epi: x / Non Sq Epi: x / Bacteria: x      Amylase Serum--      Lipase serum7       Ammonia--                          11.9   12.36 )-----------( 382      ( 2023 06:52 )             37.8       PHYSICAL EXAM:   GENERAL:  Lying in bed in NAD  HEENT:  Normocephalic/atraumatic, no scleral icterus  NECK: Trachea midline  CHEST:  Resp even, non labored   ABDOMEN:  Soft, non-tender, non-distended  EXTREMITIES: WWP, no edema  SKIN:  No rash or jaundice  NEURO:  Alert and oriented x 3, no asterixis    Imaging:           Chief Complaint:  Patient is a 89y old  Male who presents with a chief complaint of abd pain and constipation    HPI:   89 Y/O M PMH BPH HLD PSH B/L inguinal hernia repair with known L recurrence, colonoscopy and endoscopy in sept for anemia on oral iron presents with suprapubic abdominal pain and constipation x 3 days. Imaging showing areas of irregular mass-like wall thickening of 6.0 x 4.2 x 6.2 cm involving the mid transverse colon and distal ascending colon measuring 3.8 x 3.3 x 4.4 cm with upstream cecal and small bowel distention, consistent with primary neoplasm.    seen by gi prior 2023 for brittany, had declined endoscopic evaluation at that time and planned to f/u as op    GI consulted for further evaluation of colonic mass. pt seen and examined. c/o intermittent suprapubic pain and constipation x past 3 days (constipation not totally unusual for him), though passing flatus. over past 2 days with nausea, dry heaves, and decreased po intake. denies any overt vomiting. denies f/c, diarrhea, melena, hematochezia, or significant wt loss. had recent egd/colon this past aug/sept with dr osorio-  states colon inconclusive, only 50% seen due to stool, was recommended for ct colonography but due to personal issues was unable to go for test. abd sx- hx of hernia repair x2. fhx nc. meds- no ac/ap. denies toxic habits.     currently avss  wbc 12k  normocytic anemia hgb 11.9   lactate 4.5  imaging as above    Allergies:  penicillin (Unknown)  aspirin (Unknown)      PMHX/PSHX:      BPH (benign prostatic hyperplasia)    Peripheral neuropathy    HLD (hyperlipidemia)      Family history:  No pertinent family history in first degree relatives      Social History: as above    Home Medications: pertinent meds reviewed      ROS:   General:  AS PER HPI  Eyes:  Adequate vision, no reported pain  ENT:  No sore throat, runny nose, dysphagia  CV:  No chest pain, palpitations  Pulm:  No dyspnea, cough, tachypnea, wheezing  GI:  AS PER HPI  :  as per hpi  Muscle:  No pain, weakness  Neuro:  No tingling, numbness, memory problems  Psych:  No insomnia, mood problems, depression  Endocrine:  No polyuria, cold/heat intolerance  Heme:  No petechiae, ecchymosis, easy bruisability  Skin:  No rash, edema      Vital Signs:  Vital Signs Last 24 Hrs  T(C): 36 (2023 12:22), Max: 36.3 (2023 06:25)  T(F): 96.8 (2023 12:22), Max: 97.3 (2023 06:25)  HR: 80 (2023 12:22) (80 - 94)  BP: 155/77 (2023 12:22) (139/60 - 155/77)  BP(mean): 102 (2023 12:22) (102 - 102)  RR: 18 (2023 12:22) (16 - 18)  SpO2: 99% (2023 12:22) (98% - 99%)    Parameters below as of 2023 12:51  Patient On (Oxygen Delivery Method): room air      Daily     Daily     LABS:                        11.9   12.36 )-----------( 382      ( 2023 06:52 )             37.8     Mean Cell Volume: 87.1 fL (- @ 06:52)        138  |  97<L>  |  17  ----------------------------<  138<H>  3.6   |  21<L>  |  1.00    Ca    9.0      2023 06:52    TPro  6.8  /  Alb  3.7  /  TBili  0.5  /  DBili  x   /  AST  12  /  ALT  7   /  AlkPhos  72  30    LIVER FUNCTIONS - ( 2023 06:52 )  Alb: 3.7 g/dL / Pro: 6.8 g/dL / ALK PHOS: 72 U/L / ALT: 7 U/L / AST: 12 U/L / GGT: x             Urinalysis Basic - ( 2023 09:49 )    Color: Yellow / Appearance: Clear / S.049 / pH: x  Gluc: x / Ketone: 40 mg/dL  / Bili: Negative / Urobili: 1.0 mg/dL   Blood: x / Protein: Negative mg/dL / Nitrite: Negative   Leuk Esterase: Negative / RBC: x / WBC x   Sq Epi: x / Non Sq Epi: x / Bacteria: x      Amylase Serum--      Lipase serum7       Ammonia--                          11.9   12.36 )-----------( 382      ( 2023 06:52 )             37.8       PHYSICAL EXAM:   GENERAL: lying in bed, frail, in no apparent distress  HEENT: NCAT  EYES: anicteric sclerae, moist conjunctivae  NECK: trachea midline, FROM  CHEST:  respirations even, non labored  ABDOMEN:  soft, non-tender, non-distended, no guarding/rt  EXTREMITIES: WWP, no edema  SKIN:  warm/dry, no visible rash appreciated  PSYCH: appropriate affect, A&O x 3  NEURO: no tremor noted     Imaging:    ACC: [id number] EXAM:  CT ABDOMEN AND PELVIS IC   ORDERED BY: KAL SPIVEY     PROCEDURE DATE:  2023          INTERPRETATION:  CLINICAL INFORMATION: Abdominal pain, vomiting, and   constipation. History of bilateral inguinal hernia repair.    COMPARISON: None.    CONTRAST/COMPLICATIONS:  IV Contrast: Omnipaque 350  70 cc administered   30 cc discarded  Oral Contrast: NONE  Complications: None reported at time of study completion    PROCEDURE:  CT of the Abdomen and Pelvis was performed.  Sagittal and coronal reformats were performed.    FINDINGS:  LOWER CHEST: Trace pericardial effusion. Coronary calcifications.    LIVER: Multiple scattered cysts.  BILE DUCTS: Normal caliber.  GALLBLADDER: Within normal limits.  SPLEEN: Within normal limits.  PANCREAS: Within normal limits.  ADRENALS: Within normal limits.  KIDNEYS/URETERS: No hydronephrosis. Bilateral renal cysts and additional   subcentimeter hypodense foci, which are too small to characterize.    BLADDER: Within normal limits.  REPRODUCTIVE ORGANS: Prostate is enlarged.    BOWEL: Areas of irregular mass-like wall thickening of 6.0 x 4.2 x 6.2 cm   involving the mid transverse colon (602, 31) and distal ascending colon   measuring 3.8 x 3.3 x 4.4 cm. The latter results in distention of the   cecum and distal small bowel loops.  PERITONEUM: Trace free fluid in the abdomen and pelvis.  VESSELS: Atherosclerotic changes.  RETROPERITONEUM/LYMPH NODES: No lymphadenopathy.  ABDOMINAL WALL: A round cystic structure measuring 2.2 cm (301-109),   between the femoral vessels and small right inguinal hernia, may   represent fluid. Small fat-containing bilateral inguinal hernias.  BONES: Degenerative changes.    IMPRESSION:  Mass lesions involving the ascending and transverse colon with upstream   cecal and small bowel distention, consistent with primary neoplasm.    --- End of Report ---

## 2023-11-30 NOTE — H&P ADULT - NSHPSOCIALHISTORY_GEN_ALL_CORE
lives at home alone.   one surviving family member is his daughter who has intellectual disability and lives at group home  Notes wife had history of being on a vent 4 years ago and that this was an awful experience.

## 2023-11-30 NOTE — H&P ADULT - TIME BILLING
Time-based billing (NON-critical care).     80 minutes spent on total encounter; more than 50% of the visit was spent counseling and / or coordinating care by the attending physician.  The necessity of the time spent during the encounter on this date of service was due to:     review of laboratory data, radiology results, consultants' recommendations, documentation in [place], discussion with patient/ACP and interdisciplinary staff (such as , social workers, etc). Interventions were performed as documented above.

## 2023-11-30 NOTE — PATIENT PROFILE ADULT - FALL HARM RISK - HARM RISK INTERVENTIONS

## 2023-11-30 NOTE — ED ADULT NURSE NOTE - NSICDXFAMILYHX_GEN_ALL_CORE_FT
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  CTA ch/abd/pel 12/1/21 showed bilateral PE.  Also L-femoral DVT.  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 Given her age not candidate for Vyxeos  - Will transfuse to keep hemoglobin in the range of 7, and platelet above 10,000, all blood RBC's and PLT products should be irradiated  - FISH negative for BCR/ABL1 rearrangement  - Next-generation sequencing and FLT3 mutation pending  - Consider LP for cytology  with intrathecal bebeto-C This will  do it once she is off anticoagulation with good platelet count     # ID issues   Will need prophylaxis with the acyclovir, levofloxacin, and posaconazole. Prophylaxis for PJP  F/u  with ID svc   -no evidence of tumor lysis     # Acute bilateral PE, left femoral DVT  -Diagnosed 12/1/21  -Likely provoked by immobility or recent COVID booster  -No recurrent malignancy on CT ch/abd/pel 12/1/21  -On Eliquis. Will hold once platelets drop below 50,000  - s/p IVC filter 12/20/21     #  Anemia  -Iron, B12, and folate ok. 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 Should have labs sent to our office. Daily        Subjective     Doing ok. White count continues to downtrend      Vital signs in last 24 hours:  Temp:  [97.5 °F (36.4 °C)-97.9 °F (36.6 °C)] 97.5 °F (36.4 °C)  Heart Rate:  [76-91] 76  Resp:  [14-18] 14  BP: (120-131)/(66-76) 122/75    Intake/Output last 3 shifts:  I/O last 3 completed shifts:  In: 1152 [I.V.:1152]  Out: 600 [Urine:600]    Intake/Output this shift:  No intake/output data recorded.     Objective:  Vital signs: (most recent): Blood pressure 122/75, pulse 76, temperature 97.5 °F (36.4 °C), temperature source Oral, resp. rate 14, height 5' 4\" (1.626 m), weight 98.1 kg (216 lb 4.3 oz), SpO2 94 %.        Medications:  • insulin lispro   Subcutaneous TID WC   • predniSONE  30 mg Oral Daily with breakfast    Followed by   • [START ON 12/27/2021] predniSONE  20 mg Oral Daily with breakfast    Followed by   • [START ON 1/1/2022] predniSONE  15 mg Oral Daily with breakfast    Followed by   • [START ON 1/6/2022] predniSONE  10 mg Oral Daily with breakfast    Followed by   • [START ON 1/11/2022] predniSONE  5 mg Oral Daily with breakfast   • acyclovir  400 mg Oral Q12H   • levoFLOXacin  500 mg Oral QAM AC   • posaconazole  300 mg Oral Daily with breakfast   • insulin lispro  4 Units Subcutaneous TID AC   • insulin glargine  14 Units Subcutaneous Nightly   • apixaBAN  5 mg Oral 2 times per day   • melatonin  3 mg Oral Nightly   • pantoprazole  40 mg Oral Daily   • docusate sodium-sennosides  1 tablet Oral BID   • allopurinol  100 mg Oral Daily   • polyethylene glycol  17 g Oral BID   • metoPROLOL succinate  100 mg Oral Daily   • lidocaine  1 patch Transdermal Daily   • amLODIPine  10 mg Oral Daily   • aspirin   81 mg Oral Daily   • levothyroxine  125 mcg Oral QAM AC   • cholecalciferol  2,000 Units Oral Daily   • fluticasone-vilanterol  1 puff Inhalation Daily Resp     • sodium chloride 0.9% infusion     • lactated ringers infusion 45 mL/hr at 12/22/21 1220     HYDROmorphone, HYDROcodone-acetaminophen, sodium chloride, sodium chloride, heparin, sodium chloride, sodium chloride, EPINEPHrine, diphenhydrAMINE, famotidine, methylPREDNISolone, albuterol, albuterol, prochlorperazine, diclofenac, calcium carbonate, hydrALAZINE, acetaminophen, dextrose, dextrose, glucagon, dextrose, dextrose     Laboratory:  Lab Results   Component Value Date    WBC 46.1 (H) 12/22/2021    WBC 8.3 09/16/2019    HGB 8.1 (L) 12/22/2021    HGB 10.3 (L) 09/16/2019    HCT 25.7 (L) 12/22/2021    HCT 34.7 (L) 09/16/2019     12/22/2021     09/16/2019     Lab Results   Component Value Date    CO2 28 12/22/2021    CO2 32 09/16/2019    BUN 33 (H) 12/22/2021    BUN 10 09/16/2019    CREATININE 1.83 (H) 12/22/2021    CREATININE 1.17 (H) 09/16/2019    GLUCOSE 142 (H) 12/22/2021    GLUCOSE 97 09/16/2019     Lab Results   Component Value Date    CALCIUM 9.4 12/22/2021    CALCIUM 9.0 09/16/2019    MG 2.3 12/19/2021    MG 1.7 09/14/2019    PHOS 4.1 12/22/2021    PHOS 3.1 09/14/2019     Lab Results   Component Value Date    AST 21 12/22/2021    AST 15 08/03/2019    ALBUMIN 2.0 (L) 12/22/2021    ALBUMIN 3.1 (L) 08/03/2019     Lab Results   Component Value Date    PTT 40 (H) 12/09/2021    PTT 26 08/05/2019    PTT PTT  Therapeutic Range:  45-70 seconds. 08/05/2019    PTT NOT APPLICABLE 08/05/2019    INR 1.2 12/09/2021    INR 1.0 08/05/2019    INR  08/05/2019     INR Therapeutic Range: 2.0 to 3.0 (2.5 to 3.5 recommended for recurrent thrombotic episodes and mechanical prosthetic heart valves.)     No results found for: COLORU, CLARITYU, KETONESU, UROBILINOGEN  Lab Results   Component Value Date    TSH 2.560 12/04/2021    TSH 3.020 12/02/2021    TSH 2.527  03/04/2021    TSH 3.012 03/03/2021    TSH 0.321 (L) 08/03/2019    TSH 5.485 (H) 07/31/2017           Genoveva Gray MD     FAMILY HISTORY:  No pertinent family history in first degree relatives

## 2023-11-30 NOTE — ED ADULT TRIAGE NOTE - CHIEF COMPLAINT QUOTE
Pt from home, c/o intermittent lower abdominal pain since September 2023. Pt also c/o no bowel movement x3 days, nausea, and lack of appetite. States he took senna without relief. Arrived with 20G IV to left AC, received about 200mL normal saline from EMS. PMHx BPH, HLD

## 2023-11-30 NOTE — ED PROVIDER NOTE - NS ED MD DISPO DIVISION
Pt started to projectile vomit in room. x2RN assist pt cleaned and bed changed. Pt resting quietly & V/S stable.  Will continue to monitor AALIYAH

## 2023-11-30 NOTE — CONSULT NOTE ADULT - ASSESSMENT
89 yo M PMH BPH, HLD, PSH B/L inguinal hernia repair with known L recurrence, presents with suprapubic abdominal pain and constipation x 3 days. Imaging showing areas of irregular mass-like wall thickening of 6.0 x 4.2 x 6.2 cm involving the mid transverse colon and distal ascending colon measuring 3.8 x 3.3 x 4.4 cm with upstream cecal and small bowel distention, consistent with primary neoplasm. Labs significant for wbc 12k, normocytic anemia w/ initial hgb 11.9, lactate 4.5, imaging as above. Seen by GI prior 4/2023 for DANG, had declined endoscopic evaluation at that time and planned to f/u as OP. As OP had recent egd/colon this past aug/sept with Dr Mondragon-  states colon inconclusive, only 50% seen due to stool, was recommended for ct colonography but due to personal issues was unable to complete test. GI consulted for colonoscopy    # abnormal imaging w/ areas of irregular mass-like wall thickening involving the mid transverse colon and distal ascending colon measuring with upstream cecal and small bowel distention, consistent with primary neoplasm  # mild normocytic anemia  - c/o intermittent suprapubic pain and constipation x past 3 days (though passing flatus and endorses degree of constipation at baseline), as well as nausea, dry heaves, and decreased po intake x past 2 days, no vomiting, no acute abd pain, no overt gi bleeding, imaging as above c/f malignancy    Recommendations-  - IVF resuscitation as per primary team, trend lactate  - check CT chest and obtain surgery evaluation  - can plan for tentative colonoscopy tomorrow if otherwise medically optimized  - GI team to order prep, pt advised to drink slowly and can continue past MN as needed for completion, AM enemas ordered as well  - okay for clears as tolerated, keep NPO p MN   - ensure morning labs are drawn early at 2am (cbc, bmp, coags, T&S), electrolytes are repleted as necessary (please only give IV formulations) and Hgb >/=7   - rest of care per primary team       dw gi attg    JOHN Gonzales PA-C, RD, CDN  available on TEAMS for questions  after 5pm/weekends, please contact the on-call GI fellow at [phone]  89 yo M PMH BPH, HLD, PSH B/L inguinal hernia repair with known L recurrence, presents with suprapubic abdominal pain and constipation x 3 days. Imaging showing areas of irregular mass-like wall thickening of 6.0 x 4.2 x 6.2 cm involving the mid transverse colon and distal ascending colon measuring 3.8 x 3.3 x 4.4 cm with upstream cecal and small bowel distention, consistent with primary neoplasm. Labs significant for wbc 12k, normocytic anemia w/ initial hgb 11.9, lactate 4.5, imaging as above. Seen by GI prior 4/2023 for DANG, had declined endoscopic evaluation at that time and planned to f/u as OP. As OP had recent egd/colon this past aug/sept with Dr Mondragon-  states colon inconclusive, only 50% seen due to stool, was recommended for ct colonography but due to personal issues was unable to complete test. GI consulted for colonoscopy    # abnormal imaging w/ areas of irregular mass-like wall thickening involving the mid transverse colon and distal ascending colon measuring with upstream cecal and small bowel distention, consistent with primary neoplasm  # mild normocytic anemia  # lactic acidosis - possibly iso dehydration, abd exam benign   - c/o intermittent suprapubic pain and constipation x past 3 days (though passing flatus and endorses degree of constipation at baseline), as well as nausea, dry heaves, and decreased po intake x past 2 days, no vomiting, no acute abd pain, no overt gi bleeding, imaging as above c/f malignancy    Recommendations-  - IVF resuscitation as per primary team, trend lactate  - check CT chest and obtain surgery evaluation  - can plan for tentative colonoscopy tomorrow if otherwise medically optimized  - GI team to order prep, pt advised to drink slowly and can continue past MN as needed for completion, AM enemas ordered as well  - okay for clears as tolerated, keep NPO p MN   - ensure morning labs are drawn early at 2am (cbc, bmp, coags, T&S), electrolytes are repleted as necessary (please only give IV formulations) and Hgb >/=7   - rest of care per primary team       dw gi attg    JOHN Gonzales PA-C, RD, CDN  available on TEAMS for questions  after 5pm/weekends, please contact the on-call GI fellow at [phone]

## 2023-11-30 NOTE — ED ADULT NURSE NOTE - OBJECTIVE STATEMENT
Pt arrives to the ED aaox4, ambulatory, breathing even and unlabored in bed. Pt states he has been experiencing diffuse abd pain for the past few days with decreased PO intake and N/V. Pt states at this time upon arrival to the ED he has no abd pain. Pt denies chest pain, SOB, dizziness, headache, blurry vision, chills. #20G IV placed by EMS in left AC, labs drawn and sent. Bed in lowest position, call bell within reach.

## 2023-11-30 NOTE — H&P ADULT - PROBLEM SELECTOR PLAN 4
DVT ppx: SCDs -> enox after colonoscopy  Diet: clear liquid -> NPO at midnight  Ambulation: ad nikia  GOC conversation pending completion; he is considering DNI, not sure about DNR  Patient also has no social contact that he would like to be a decision maker for him  Will continue to engage on this topic

## 2023-11-30 NOTE — ED ADULT NURSE NOTE - NSFALLUNIVINTERV_ED_ALL_ED
Bed/Stretcher in lowest position, wheels locked, appropriate side rails in place/Call bell, personal items and telephone in reach/Instruct patient to call for assistance before getting out of bed/chair/stretcher/Non-slip footwear applied when patient is off stretcher/[place] to call system/Physically safe environment - no spills, clutter or unnecessary equipment/Purposeful proactive rounding/Room/bathroom lighting operational, light cord in reach

## 2023-11-30 NOTE — ED PROVIDER NOTE - NS ED ATTENDING STATEMENT MOD
This was a shared visit with the TELLY. I reviewed and verified the documentation and independently performed the documented:

## 2023-11-30 NOTE — CONSULT NOTE ADULT - SUBJECTIVE AND OBJECTIVE BOX
HPI: 89 M PMH GERD, BPH, HLD, open R IHR, LIH (non repaired), presents to ED with suprapubic pain .    In the ED, HDS. Labs significant for WBC 12, Lactate 4.5. CT A/P w/ IV reviewed, thickening of wall of ascending and transverse colon c/f colon cancer, no other signs of mets such as omental caking or liver lesions. No evidence of large bowl obstruction. Patient endorses his last colonoscopy was 5 months ago, but was inconclusive. Patient endorses he is passing gas but has not had a BM x 3d. Denies blood thinner. Maintains he lives alone without family in region. Endorses he signs for himself.    PMH: As above    PSH: As above    Allergies: ASA, PCN    Physical exam:    /59 HR 63 SPO2 100 T 96.8 RR 17    General- Older man, NAD. Non toxic appearing  Lungs- Comfortable on room air  Abdomen- Scar R side from ASIS to pubis, L inguinal hernia reducible. Abdomen distended, non tender    Imaging:    ACC: [id number] EXAM: CT ABDOMEN AND PELVIS IC ORDERED BY: KAL SPIVEY    PROCEDURE DATE: 11/30/2023        INTERPRETATION: CLINICAL INFORMATION: Abdominal pain, vomiting, and constipation. History of bilateral inguinal hernia repair.    COMPARISON: None.    CONTRAST/COMPLICATIONS:  IV Contrast: Omnipaque 350 70 cc administered 30 cc discarded  Oral Contrast: NONE  Complications: None reported at time of study completion    PROCEDURE:  CT of the Abdomen and Pelvis was performed.  Sagittal and coronal reformats were performed.    FINDINGS:  LOWER CHEST: Trace pericardial effusion. Coronary calcifications.    LIVER: Multiple scattered cysts.  BILE DUCTS: Normal caliber.  GALLBLADDER: Within normal limits.  SPLEEN: Within normal limits.  PANCREAS: Within normal limits.  ADRENALS: Within normal limits.  KIDNEYS/URETERS: No hydronephrosis. Bilateral renal cysts and additional subcentimeter hypodense foci, which are too small to characterize.    BLADDER: Within normal limits.  REPRODUCTIVE ORGANS: Prostate is enlarged.    BOWEL: Areas of irregular mass-like wall thickening of 6.0 x 4.2 x 6.2 cm involving the mid transverse colon (602, 31) and distal ascending colon measuring 3.8 x 3.3 x 4.4 cm. The latter results in distention of the cecum and distal small bowel loops.  PERITONEUM: Trace free fluid in the abdomen and pelvis.  VESSELS: Atherosclerotic changes.  RETROPERITONEUM/LYMPH NODES: No lymphadenopathy.  ABDOMINAL WALL: A round cystic structure measuring 2.2 cm (301-109), between the femoral vessels and small right inguinal hernia, may represent fluid. Small fat-containing bilateral inguinal hernias.  BONES: Degenerative changes.    IMPRESSION:  Mass lesions involving the ascending and transverse colon with upstream cecal and small bowel distention, consistent with primary neoplasm.    --- End of Report ---          EUGENE BRITTON MD; Resident Radiologist  This document has been electronically signed.  ALEXA EDWARDS MD; Attending Radiologist  This document has been electronically signed. Nov 30 2023 10:36AM 91.6

## 2023-11-30 NOTE — ED ADULT NURSE REASSESSMENT NOTE - NS ED NURSE REASSESS COMMENT FT1
GINNY Nicole at  providing update regarding test results and POC. TBA, verbalizes understanding. Medicated as per EMAR for c/o RLQ and nausea

## 2023-11-30 NOTE — ED PROVIDER NOTE - CLINICAL SUMMARY MEDICAL DECISION MAKING FREE TEXT BOX
89 Y/O M PMH BPH HLD PSH B/L inguinal hernia repair with known L recurrence, colonoscopy and endoscopy in sept for anemia on oral iron presents with generalized abdominal pain and constipation. Plan is CT to eval for obstruction, abdominal mass or other acute pathology. Pt is stable, afebrile. Sx appear GI in etiology but will also obtain a troponin to eval for an atypical presentation of ACS.

## 2023-11-30 NOTE — H&P ADULT - NSHPLABSRESULTS_GEN_ALL_CORE
11.9   12.36 )-----------( 382      ( 2023 06:52 )             37.8           138  |  97<L>  |  17  ----------------------------<  138<H>  3.6   |  21<L>  |  1.00    Ca    9.0      2023 06:52    TPro  6.8  /  Alb  3.7  /  TBili  0.5  /  DBili  x   /  AST  12  /  ALT  7   /  AlkPhos  72                Urinalysis Basic - ( 2023 09:49 )    Color: Yellow / Appearance: Clear / S.049 / pH: x  Gluc: x / Ketone: 40 mg/dL  / Bili: Negative / Urobili: 1.0 mg/dL   Blood: x / Protein: Negative mg/dL / Nitrite: Negative   Leuk Esterase: Negative / RBC: x / WBC x   Sq Epi: x / Non Sq Epi: x / Bacteria: x            Lactate Trend   @ 10:30 Lactate:1.5             CAPILLARY BLOOD GLUCOSE          CT abdomen/pelvis:  BOWEL: Areas of irregular mass-like wall thickening of 6.0 x 4.2 x 6.2 cm   involving the mid transverse colon (602, 31) and distal ascending colon   measuring 3.8 x 3.3 x 4.4 cm. The latter results in distention of the   cecum and distal small bowel loops.

## 2023-11-30 NOTE — H&P ADULT - ASSESSMENT
89yoM with hx of BPH, HLD, inguinal hernia repairs, anemia, presenting with 3 days of NV and constipation with CT findings c/f primary colon malignancy undergoing staging and colonoscopy for biopsy.

## 2023-11-30 NOTE — H&P ADULT - PROBLEM SELECTOR PLAN 1
no BM in 3 days but passing gas. +NV. CT showing no BM in 3 days but passing gas. +NV. CT showing masses c/w primary colon CA  GI evaluated, planning for colonoscopy in the AM and Bx  Surgery c/s for possible resection pending path results and screening CT chest  > NPO at midnight (except for golytly)  > okay for enemas  > cbc, cmp, coags, T&S at 3AM

## 2023-11-30 NOTE — ED PROVIDER NOTE - OBJECTIVE STATEMENT
89 Y/O M PMH BPH HLD PSH B/L inguinal hernia repair with known L recurrence, colonoscopy and endoscopy in sept for anemia on oral iron presents with generalized abdominal pain and constipation. Pt has been passing flatus but has been nauseous. Pt states he vomited two times prior to ED arrival. Pt denies fever chills or nightsweats. Pt states his last BM was 3 days ago which he states was solid and brown. Pt denies any other sx or acute complaints.

## 2023-11-30 NOTE — H&P ADULT - HISTORY OF PRESENT ILLNESS
89 yoM with hx of BPH, HLD, B/L inguinal hernia repair with known L recurrence, colonoscopy and endoscopy in sept for anemia on oral iron presents with abdominal pain and constipation over the past three days. Patient states that he hasn't had a bowel movement in the past three days but that he has been passing lots of gas. In the past day started having nausea and vomiting and wasn't able to eat anything then eventually started dry heaving. Denies any blood in vomit. Last BM was brown and regular appearing.     In addition he denies any chest pain, SOB, HA, back pain, LEG swelling, neck pain, recent vision changes, lightheadedness, and has otherwise been feeling healthy.     In the ED patient had labs showing elevated lactate 4.5 that improved to 1.5 s/p IVF hydration. CT abdomen/pelvis c/f primary neoplasm of the ascending and transverse colon. GI consulted and recommended colonoscopy tomorrow for biopsy.

## 2023-11-30 NOTE — H&P ADULT - NSHPPHYSICALEXAM_GEN_ALL_CORE
Constitutional: Well nourished, well developed, appears stated age.   Eyes: PERRL, EOMI. No scleral icterus  HENT: Moist mucous membranes. No posterior oropharyngeal erythema.   Neck: Supple. No goiter. No C-spine TTP. No meningismus  CV: RRR, no m/r/g. Well perfused extremities.   RESP: Lungs CTAB, normal respiratory effort  GI: Soft, non-tender, no masses appreciated  MSK: Moving all four extremities. No obvious deformity  Skin: Warm, dry, no rashes  Neuro: Alert and oriented. CNII-XII grossly intact. Normal strength and sensation of UEs and LEs  Psych: Appropriate mood and affect Constitutional: Well nourished, well developed, appears stated age.   Eyes: PERRL, EOMI. No scleral icterus  HENT: Moist mucous membranes. No posterior oropharyngeal erythema.   Neck: Supple. No goiter.  CV: RRR, no m/r/g. Well perfused extremities.   RESP: Lungs CTAB, normal respiratory effort  GI: Soft, non-tender, no masses appreciated  MSK: Moving all four extremities. No obvious deformity  Skin: Warm, dry, no rashes  Neuro: Alert and oriented. Normal strength and sensation of UEs and LEs  Psych: Appropriate mood and affect

## 2023-11-30 NOTE — H&P ADULT - ATTENDING COMMENTS
89 yo M with PMH of HLD, BPH, DANG, presenting with 3 days of N/V and inability to tolerate PO.     Of note, patient had a hospitalization in 04/2023 for lightheadedness/weakness and was admitted for microcytic iron deficiency anemia potentially secondary to GI bleed vs malignancy requiring 2u pRBCs. Patient was recommended to have endoscopic evaluation for further workup. However, patient ultimately declined inpatient EGD + colonoscopy, He subsequently followed with GI as OP and completed a colonoscopy a few months ago 89 yo M with PMH of HLD, BPH, DANG, presenting with 3 days of N/V and inability to tolerate PO.     Of note, patient had a hospitalization in 04/2023 for lightheadedness/weakness and was admitted for microcytic iron deficiency anemia potentially secondary to GI bleed vs malignancy requiring 2u pRBCs. Patient was recommended to have endoscopic evaluation for further workup. However, patient ultimately declined inpatient EGD + colonoscopy, He subsequently followed with GI as OP and completed a colonoscopy a few months ago, however, due to insufficient prep, the results were inconclusive. Gi recommended a CT scan to further assess.     Patient now presents with pain below the umbilicus, dull, intermittent, not responsive to Tylenol, associated with N/V (NBNB) for past few days. CTAP in the ED showed mass lesions involving the ascending and transverse colon with upstream cecal and small bowel distention, consistent with primary neoplasm. GI was consulted and colonoscopy is planned for 12/1. Will start protonix IV as supportive measures.   Of note: patient does not have a designated POA. His daughter has special needs and he is not close to anyone else. Counseled on picking a POA. Will continue GOC discussions     Discussed with HS 4

## 2023-12-01 LAB
ALBUMIN SERPL ELPH-MCNC: 3.5 G/DL — SIGNIFICANT CHANGE UP (ref 3.3–5)
ALBUMIN SERPL ELPH-MCNC: 3.5 G/DL — SIGNIFICANT CHANGE UP (ref 3.3–5)
ALP SERPL-CCNC: 64 U/L — SIGNIFICANT CHANGE UP (ref 40–120)
ALP SERPL-CCNC: 64 U/L — SIGNIFICANT CHANGE UP (ref 40–120)
ALT FLD-CCNC: 9 U/L — SIGNIFICANT CHANGE UP (ref 4–41)
ALT FLD-CCNC: 9 U/L — SIGNIFICANT CHANGE UP (ref 4–41)
ANION GAP SERPL CALC-SCNC: 11 MMOL/L — SIGNIFICANT CHANGE UP (ref 7–14)
ANION GAP SERPL CALC-SCNC: 11 MMOL/L — SIGNIFICANT CHANGE UP (ref 7–14)
APTT BLD: 28 SEC — SIGNIFICANT CHANGE UP (ref 24.5–35.6)
APTT BLD: 28 SEC — SIGNIFICANT CHANGE UP (ref 24.5–35.6)
AST SERPL-CCNC: 14 U/L — SIGNIFICANT CHANGE UP (ref 4–40)
AST SERPL-CCNC: 14 U/L — SIGNIFICANT CHANGE UP (ref 4–40)
BASOPHILS # BLD AUTO: 0.03 K/UL — SIGNIFICANT CHANGE UP (ref 0–0.2)
BASOPHILS # BLD AUTO: 0.03 K/UL — SIGNIFICANT CHANGE UP (ref 0–0.2)
BASOPHILS NFR BLD AUTO: 0.3 % — SIGNIFICANT CHANGE UP (ref 0–2)
BASOPHILS NFR BLD AUTO: 0.3 % — SIGNIFICANT CHANGE UP (ref 0–2)
BILIRUB SERPL-MCNC: 0.4 MG/DL — SIGNIFICANT CHANGE UP (ref 0.2–1.2)
BILIRUB SERPL-MCNC: 0.4 MG/DL — SIGNIFICANT CHANGE UP (ref 0.2–1.2)
BLD GP AB SCN SERPL QL: NEGATIVE — SIGNIFICANT CHANGE UP
BLD GP AB SCN SERPL QL: NEGATIVE — SIGNIFICANT CHANGE UP
BUN SERPL-MCNC: 14 MG/DL — SIGNIFICANT CHANGE UP (ref 7–23)
BUN SERPL-MCNC: 14 MG/DL — SIGNIFICANT CHANGE UP (ref 7–23)
CALCIUM SERPL-MCNC: 8.7 MG/DL — SIGNIFICANT CHANGE UP (ref 8.4–10.5)
CALCIUM SERPL-MCNC: 8.7 MG/DL — SIGNIFICANT CHANGE UP (ref 8.4–10.5)
CANCER AG19-9 SERPL-ACNC: 225 U/ML — HIGH
CANCER AG19-9 SERPL-ACNC: 225 U/ML — HIGH
CHLORIDE SERPL-SCNC: 102 MMOL/L — SIGNIFICANT CHANGE UP (ref 98–107)
CHLORIDE SERPL-SCNC: 102 MMOL/L — SIGNIFICANT CHANGE UP (ref 98–107)
CO2 SERPL-SCNC: 25 MMOL/L — SIGNIFICANT CHANGE UP (ref 22–31)
CO2 SERPL-SCNC: 25 MMOL/L — SIGNIFICANT CHANGE UP (ref 22–31)
CREAT SERPL-MCNC: 0.84 MG/DL — SIGNIFICANT CHANGE UP (ref 0.5–1.3)
CREAT SERPL-MCNC: 0.84 MG/DL — SIGNIFICANT CHANGE UP (ref 0.5–1.3)
EGFR: 83 ML/MIN/1.73M2 — SIGNIFICANT CHANGE UP
EGFR: 83 ML/MIN/1.73M2 — SIGNIFICANT CHANGE UP
EOSINOPHIL # BLD AUTO: 0.02 K/UL — SIGNIFICANT CHANGE UP (ref 0–0.5)
EOSINOPHIL # BLD AUTO: 0.02 K/UL — SIGNIFICANT CHANGE UP (ref 0–0.5)
EOSINOPHIL NFR BLD AUTO: 0.2 % — SIGNIFICANT CHANGE UP (ref 0–6)
EOSINOPHIL NFR BLD AUTO: 0.2 % — SIGNIFICANT CHANGE UP (ref 0–6)
GLUCOSE SERPL-MCNC: 108 MG/DL — HIGH (ref 70–99)
GLUCOSE SERPL-MCNC: 108 MG/DL — HIGH (ref 70–99)
HCT VFR BLD CALC: 36.5 % — LOW (ref 39–50)
HCT VFR BLD CALC: 36.5 % — LOW (ref 39–50)
HGB BLD-MCNC: 11.8 G/DL — LOW (ref 13–17)
HGB BLD-MCNC: 11.8 G/DL — LOW (ref 13–17)
IANC: 9.31 K/UL — HIGH (ref 1.8–7.4)
IANC: 9.31 K/UL — HIGH (ref 1.8–7.4)
IMM GRANULOCYTES NFR BLD AUTO: 0.5 % — SIGNIFICANT CHANGE UP (ref 0–0.9)
IMM GRANULOCYTES NFR BLD AUTO: 0.5 % — SIGNIFICANT CHANGE UP (ref 0–0.9)
INR BLD: 0.98 RATIO — SIGNIFICANT CHANGE UP (ref 0.85–1.18)
INR BLD: 0.98 RATIO — SIGNIFICANT CHANGE UP (ref 0.85–1.18)
LYMPHOCYTES # BLD AUTO: 0.57 K/UL — LOW (ref 1–3.3)
LYMPHOCYTES # BLD AUTO: 0.57 K/UL — LOW (ref 1–3.3)
LYMPHOCYTES # BLD AUTO: 5.2 % — LOW (ref 13–44)
LYMPHOCYTES # BLD AUTO: 5.2 % — LOW (ref 13–44)
MAGNESIUM SERPL-MCNC: 1.8 MG/DL — SIGNIFICANT CHANGE UP (ref 1.6–2.6)
MAGNESIUM SERPL-MCNC: 1.8 MG/DL — SIGNIFICANT CHANGE UP (ref 1.6–2.6)
MCHC RBC-ENTMCNC: 27.3 PG — SIGNIFICANT CHANGE UP (ref 27–34)
MCHC RBC-ENTMCNC: 27.3 PG — SIGNIFICANT CHANGE UP (ref 27–34)
MCHC RBC-ENTMCNC: 32.3 GM/DL — SIGNIFICANT CHANGE UP (ref 32–36)
MCHC RBC-ENTMCNC: 32.3 GM/DL — SIGNIFICANT CHANGE UP (ref 32–36)
MCV RBC AUTO: 84.5 FL — SIGNIFICANT CHANGE UP (ref 80–100)
MCV RBC AUTO: 84.5 FL — SIGNIFICANT CHANGE UP (ref 80–100)
MONOCYTES # BLD AUTO: 0.96 K/UL — HIGH (ref 0–0.9)
MONOCYTES # BLD AUTO: 0.96 K/UL — HIGH (ref 0–0.9)
MONOCYTES NFR BLD AUTO: 8.8 % — SIGNIFICANT CHANGE UP (ref 2–14)
MONOCYTES NFR BLD AUTO: 8.8 % — SIGNIFICANT CHANGE UP (ref 2–14)
NEUTROPHILS # BLD AUTO: 9.31 K/UL — HIGH (ref 1.8–7.4)
NEUTROPHILS # BLD AUTO: 9.31 K/UL — HIGH (ref 1.8–7.4)
NEUTROPHILS NFR BLD AUTO: 85 % — HIGH (ref 43–77)
NEUTROPHILS NFR BLD AUTO: 85 % — HIGH (ref 43–77)
NRBC # BLD: 0 /100 WBCS — SIGNIFICANT CHANGE UP (ref 0–0)
NRBC # BLD: 0 /100 WBCS — SIGNIFICANT CHANGE UP (ref 0–0)
NRBC # FLD: 0 K/UL — SIGNIFICANT CHANGE UP (ref 0–0)
NRBC # FLD: 0 K/UL — SIGNIFICANT CHANGE UP (ref 0–0)
PHOSPHATE SERPL-MCNC: 1.9 MG/DL — LOW (ref 2.5–4.5)
PHOSPHATE SERPL-MCNC: 1.9 MG/DL — LOW (ref 2.5–4.5)
PLATELET # BLD AUTO: 336 K/UL — SIGNIFICANT CHANGE UP (ref 150–400)
PLATELET # BLD AUTO: 336 K/UL — SIGNIFICANT CHANGE UP (ref 150–400)
POTASSIUM SERPL-MCNC: 3.9 MMOL/L — SIGNIFICANT CHANGE UP (ref 3.5–5.3)
POTASSIUM SERPL-MCNC: 3.9 MMOL/L — SIGNIFICANT CHANGE UP (ref 3.5–5.3)
POTASSIUM SERPL-SCNC: 3.9 MMOL/L — SIGNIFICANT CHANGE UP (ref 3.5–5.3)
POTASSIUM SERPL-SCNC: 3.9 MMOL/L — SIGNIFICANT CHANGE UP (ref 3.5–5.3)
PROT SERPL-MCNC: 6.3 G/DL — SIGNIFICANT CHANGE UP (ref 6–8.3)
PROT SERPL-MCNC: 6.3 G/DL — SIGNIFICANT CHANGE UP (ref 6–8.3)
PROTHROM AB SERPL-ACNC: 11.1 SEC — SIGNIFICANT CHANGE UP (ref 9.5–13)
PROTHROM AB SERPL-ACNC: 11.1 SEC — SIGNIFICANT CHANGE UP (ref 9.5–13)
RBC # BLD: 4.32 M/UL — SIGNIFICANT CHANGE UP (ref 4.2–5.8)
RBC # BLD: 4.32 M/UL — SIGNIFICANT CHANGE UP (ref 4.2–5.8)
RBC # FLD: 13.6 % — SIGNIFICANT CHANGE UP (ref 10.3–14.5)
RBC # FLD: 13.6 % — SIGNIFICANT CHANGE UP (ref 10.3–14.5)
RH IG SCN BLD-IMP: NEGATIVE — SIGNIFICANT CHANGE UP
RH IG SCN BLD-IMP: NEGATIVE — SIGNIFICANT CHANGE UP
SODIUM SERPL-SCNC: 138 MMOL/L — SIGNIFICANT CHANGE UP (ref 135–145)
SODIUM SERPL-SCNC: 138 MMOL/L — SIGNIFICANT CHANGE UP (ref 135–145)
WBC # BLD: 10.94 K/UL — HIGH (ref 3.8–10.5)
WBC # BLD: 10.94 K/UL — HIGH (ref 3.8–10.5)
WBC # FLD AUTO: 10.94 K/UL — HIGH (ref 3.8–10.5)
WBC # FLD AUTO: 10.94 K/UL — HIGH (ref 3.8–10.5)

## 2023-12-01 PROCEDURE — 45378 DIAGNOSTIC COLONOSCOPY: CPT | Mod: 53

## 2023-12-01 PROCEDURE — 99232 SBSQ HOSP IP/OBS MODERATE 35: CPT | Mod: GC

## 2023-12-01 RX ORDER — MULTIVIT WITH MIN/MFOLATE/K2 340-15/3 G
296 POWDER (GRAM) ORAL ONCE
Refills: 0 | Status: DISCONTINUED | OUTPATIENT
Start: 2023-12-01 | End: 2023-12-01

## 2023-12-01 RX ORDER — SOD SULF/SODIUM/NAHCO3/KCL/PEG
4000 SOLUTION, RECONSTITUTED, ORAL ORAL ONCE
Refills: 0 | Status: DISCONTINUED | OUTPATIENT
Start: 2023-12-03 | End: 2023-12-03

## 2023-12-01 RX ORDER — SOD SULF/SODIUM/NAHCO3/KCL/PEG
4000 SOLUTION, RECONSTITUTED, ORAL ORAL ONCE
Refills: 0 | Status: COMPLETED | OUTPATIENT
Start: 2023-12-02 | End: 2023-12-02

## 2023-12-01 RX ORDER — MULTIVIT WITH MIN/MFOLATE/K2 340-15/3 G
296 POWDER (GRAM) ORAL ONCE
Refills: 0 | Status: COMPLETED | OUTPATIENT
Start: 2023-12-01 | End: 2023-12-01

## 2023-12-01 RX ORDER — POTASSIUM PHOSPHATE, MONOBASIC POTASSIUM PHOSPHATE, DIBASIC 236; 224 MG/ML; MG/ML
30 INJECTION, SOLUTION INTRAVENOUS ONCE
Refills: 0 | Status: COMPLETED | OUTPATIENT
Start: 2023-12-01 | End: 2023-12-01

## 2023-12-01 RX ADMIN — POTASSIUM PHOSPHATE, MONOBASIC POTASSIUM PHOSPHATE, DIBASIC 83.33 MILLIMOLE(S): 236; 224 INJECTION, SOLUTION INTRAVENOUS at 05:41

## 2023-12-01 RX ADMIN — PANTOPRAZOLE SODIUM 40 MILLIGRAM(S): 20 TABLET, DELAYED RELEASE ORAL at 11:48

## 2023-12-01 RX ADMIN — ONDANSETRON 4 MILLIGRAM(S): 8 TABLET, FILM COATED ORAL at 00:53

## 2023-12-01 NOTE — PROGRESS NOTE ADULT - SUBJECTIVE AND OBJECTIVE BOX
Progress Note    Veronique Rey MD     [facility], MERARI 89y (15-Nov-1933) Male [id number]  11-30-23 (1d)      Chief Complaint: c/f colon cancer    Subjective:  No acute events overnight. Patient seen and examined at bedside.    Review of Systems:  CONSTITUTIONAL: No fever, weight loss, or fatigue  EYES: No eye pain, visual disturbances, or discharge  ENMT:  No difficulty hearing, tinnitus, vertigo; No sinus or throat pain  NECK: No pain or stiffness  RESPIRATORY: No cough, wheezing, chills or hemoptysis; No shortness of breath  CARDIOVASCULAR: No chest pain, palpitations, dizziness, or leg swelling  GASTROINTESTINAL: No abdominal or epigastric pain. No nausea, vomiting, or hematemesis; No diarrhea or constipation. No melena or hematochezia.  GENITOURINARY: No dysuria, frequency, hematuria, or incontinence  NEUROLOGICAL: No headaches, memory loss, loss of strength, numbness, or tremors  SKIN: No itching, burning, rashes, or lesions   MUSCULOSKELETAL: No joint pain or swelling; No muscle, back, or extremity pain      PAST MEDICAL & SURGICAL HISTORY:  No pertinent past medical history [[id number]]    BPH (benign prostatic hyperplasia) [N40.0]    Peripheral neuropathy [G62.9]    HLD (hyperlipidemia) [E78.5]    No significant past surgical history [[id number]]      acetaminophen     Tablet .. 650 milliGRAM(s) Oral every 6 hours PRN  aluminum hydroxide/magnesium hydroxide/simethicone Suspension 30 milliLiter(s) Oral every 4 hours PRN  influenza  Vaccine (HIGH DOSE) 0.7 milliLiter(s) IntraMuscular once  melatonin 3 milliGRAM(s) Oral at bedtime PRN  ondansetron Injectable 4 milliGRAM(s) IV Push every 8 hours PRN  pantoprazole  Injectable 40 milliGRAM(s) IV Push daily    Objective:  T(C): 36.5 (12-01-23 @ 05:13), Max: 36.6 (11-30-23 @ 20:12)  HR: 74 (12-01-23 @ 05:13) (63 - 86)  BP: 147/67 (12-01-23 @ 05:13) (132/59 - 155/77)  RR: 18 (12-01-23 @ 05:13) (16 - 18)  SpO2: 97% (12-01-23 @ 05:13) (97% - 99%)    Physical exam:  GENERAL: NAD, well-developed  HEAD:  Atraumatic, Normocephalic  EYES: EOMI, PERRLA, conjunctiva and sclera clear  NECK: Supple, No JVD  CHEST/LUNG: Clear to auscultation bilaterally; No wheeze  HEART: Regular rate and rhythm; No murmurs, rubs, or gallops  ABDOMEN: Soft, Nontender, Nondistended; Bowel sounds present  EXTREMITIES:  2+ Peripheral Pulses, No clubbing, cyanosis, or edema  PSYCH: AAOx3  NEUROLOGY: non-focal  SKIN: No rashes or lesions        CAPILLARY BLOOD GLUCOSE      (12-01 @ 01:25)                      11.8  10.94 )-----------( 336                 36.5    Neutrophils = 9.31 (85.0%)  Lymphocytes = 0.57 (5.2%)  Eosinophils = 0.02 (0.2%)  Basophils = 0.03 (0.3%)  Monocytes = 0.96 (8.8%)  Bands = --%    12-01    138  |  102  |  14  ----------------------------<  108<H>  3.9   |  25  |  0.84    Ca    8.7      01 Dec 2023 01:25  Phos  1.9     12-01  Mg     1.80     12-01    TPro  6.3  /  Alb  3.5  /  TBili  0.4  /  DBili  x   /  AST  14  /  ALT  9   /  AlkPhos  64  12-01    ( 01 Dec 2023 01:25 )   PT: 11.1 sec;   INR: 0.98 ratio;       PTT:28.0 sec      RVP:          Tox:         Urinalysis Basic - ( 01 Dec 2023 01:25 )    Color: x / Appearance: x / SG: x / pH: x  Gluc: 108 mg/dL / Ketone: x  / Bili: x / Urobili: x   Blood: x / Protein: x / Nitrite: x   Leuk Esterase: x / RBC: x / WBC x   Sq Epi: x / Non Sq Epi: x / Bacteria: x        WBC Trend: 10.94<--, 12.36<--    Hb Trend: 11.8<--, 11.9<--        New imaging in last 24 hours:  Consult notes reviewed:

## 2023-12-01 NOTE — PROGRESS NOTE ADULT - ASSESSMENT
89 M w/ PMHx GERD, BPH, HLD, open R IHR, LIH (non repaired), presents to ED with suprapubic pain. CT w/ distal ascending and proximal transverse thickening, concern for malignancy. Colorectal surgery consulted.    Plan:  -CT chest negative for mets  -CEA 81.9, CA 19-9 pending  -Colonoscopy with GI today with possible biopsy  -Patient will likely need extended R hemicolectomy - plan for possibly next week pending additional conversations with patient and OR availability  -Continue medical optimization    [facility] Surgery  [id number]

## 2023-12-01 NOTE — CHART NOTE - NSCHARTNOTEFT_GEN_A_CORE
Attempted colonoscopy, prep very inadequate. Liquid black stool throughout examined colon, rendering safe traversal of sigmoid colon impossible.    Will need repeat examination early next week. Will need to prep over the weekend.    Recs:  - low residue diet today, one bottle of mag citrate tonight  - low residue diet tomorrow morning, switch to full liquids at noon  - one bottle of mag citrate tomorrow night  - starting 8 am Sunday, start 4L Golytely. Should take small sips every 5-10 minutes until finished. Should receive zofran as needed to facilitate prep.    Please reach out via Teams with questions/concerns Attempted colonoscopy, prep very inadequate. Liquid black stool throughout examined colon, rendering safe traversal of sigmoid colon impossible.    Will need repeat examination early next week. Will need to prep over the weekend.    Recs:  - low residue diet today, one bottle of mag citrate tonight  - low residue diet tomorrow morning, switch to full liquids at noon  - one bottle of mag citrate tomorrow night  - starting 8 am Sunday, start 4L Golytely. Should take small sips every 5-10 minutes until finished. Should receive zofran as needed to facilitate prep.    **if mag citrate is unavailable, half a bottle of miralax (119g, or seven 17g packets) mixed with 32oz of gatorade/pedialyte/etc may be substituted    Please reach out via Teams with questions/concerns

## 2023-12-01 NOTE — PROGRESS NOTE ADULT - SUBJECTIVE AND OBJECTIVE BOX
Surgery Progress Note     Subjective:  Patient seen and examined on AM rounds. Patient reports nonbloody diarrhea and hiccuping overnight due to the colonoscopy prep. Endorses low suprapubic pain.      Vital Signs:  Vital Signs Last 24 Hrs  T(C): 36.5 (01 Dec 2023 05:13), Max: 36.6 (30 Nov 2023 20:12)  T(F): 97.7 (01 Dec 2023 05:13), Max: 97.9 (30 Nov 2023 20:12)  HR: 74 (01 Dec 2023 05:13) (63 - 80)  BP: 147/67 (01 Dec 2023 05:13) (132/59 - 155/77)  BP(mean): 80 (30 Nov 2023 16:03) (80 - 102)  RR: 18 (01 Dec 2023 05:13) (16 - 18)  SpO2: 97% (01 Dec 2023 05:13) (97% - 99%)    Parameters below as of 01 Dec 2023 05:13  Patient On (Oxygen Delivery Method): room air        CAPILLARY BLOOD GLUCOSE          I&O's Detail        Physical Exam:  General: NAD, resting comfortably in bed  HEENT: Normocephalic atraumatic  Respiratory: Nonlabored respirations  Cardio: regular rate  Abdomen: soft, nondistended, nontender  Vascular: extremities are warm and well perfused      Labs:    12-01    138  |  102  |  14  ----------------------------<  108<H>  3.9   |  25  |  0.84    Ca    8.7      01 Dec 2023 01:25  Phos  1.9     12-01  Mg     1.80     12-01    TPro  6.3  /  Alb  3.5  /  TBili  0.4  /  DBili  x   /  AST  14  /  ALT  9   /  AlkPhos  64  12-01    LIVER FUNCTIONS - ( 01 Dec 2023 01:25 )  Alb: 3.5 g/dL / Pro: 6.3 g/dL / ALK PHOS: 64 U/L / ALT: 9 U/L / AST: 14 U/L / GGT: x                                 11.8   10.94 )-----------( 336      ( 01 Dec 2023 01:25 )             36.5     PT/INR - ( 01 Dec 2023 01:25 )   PT: 11.1 sec;   INR: 0.98 ratio         PTT - ( 01 Dec 2023 01:25 )  PTT:28.0 sec

## 2023-12-01 NOTE — PROGRESS NOTE ADULT - PROBLEM SELECTOR PLAN 1
no BM in 3 days but passing gas. +NV. CT showing masses c/w primary colon CA  GI evaluated, planning for colonoscopy in the AM and Bx  Surgery c/s for possible resection pending path results and screening CT chest  > NPO at midnight (except for golytly)  > okay for enemas  > cbc, cmp, coags, T&S at 3AM

## 2023-12-02 LAB
ALBUMIN SERPL ELPH-MCNC: 2.9 G/DL — LOW (ref 3.3–5)
ALBUMIN SERPL ELPH-MCNC: 2.9 G/DL — LOW (ref 3.3–5)
ALP SERPL-CCNC: 55 U/L — SIGNIFICANT CHANGE UP (ref 40–120)
ALP SERPL-CCNC: 55 U/L — SIGNIFICANT CHANGE UP (ref 40–120)
ALT FLD-CCNC: 8 U/L — SIGNIFICANT CHANGE UP (ref 4–41)
ALT FLD-CCNC: 8 U/L — SIGNIFICANT CHANGE UP (ref 4–41)
ANION GAP SERPL CALC-SCNC: 11 MMOL/L — SIGNIFICANT CHANGE UP (ref 7–14)
ANION GAP SERPL CALC-SCNC: 11 MMOL/L — SIGNIFICANT CHANGE UP (ref 7–14)
AST SERPL-CCNC: 11 U/L — SIGNIFICANT CHANGE UP (ref 4–40)
AST SERPL-CCNC: 11 U/L — SIGNIFICANT CHANGE UP (ref 4–40)
BASOPHILS # BLD AUTO: 0.04 K/UL — SIGNIFICANT CHANGE UP (ref 0–0.2)
BASOPHILS # BLD AUTO: 0.04 K/UL — SIGNIFICANT CHANGE UP (ref 0–0.2)
BASOPHILS NFR BLD AUTO: 0.6 % — SIGNIFICANT CHANGE UP (ref 0–2)
BASOPHILS NFR BLD AUTO: 0.6 % — SIGNIFICANT CHANGE UP (ref 0–2)
BILIRUB SERPL-MCNC: 0.3 MG/DL — SIGNIFICANT CHANGE UP (ref 0.2–1.2)
BILIRUB SERPL-MCNC: 0.3 MG/DL — SIGNIFICANT CHANGE UP (ref 0.2–1.2)
BUN SERPL-MCNC: 12 MG/DL — SIGNIFICANT CHANGE UP (ref 7–23)
BUN SERPL-MCNC: 12 MG/DL — SIGNIFICANT CHANGE UP (ref 7–23)
CALCIUM SERPL-MCNC: 8.3 MG/DL — LOW (ref 8.4–10.5)
CALCIUM SERPL-MCNC: 8.3 MG/DL — LOW (ref 8.4–10.5)
CHLORIDE SERPL-SCNC: 104 MMOL/L — SIGNIFICANT CHANGE UP (ref 98–107)
CHLORIDE SERPL-SCNC: 104 MMOL/L — SIGNIFICANT CHANGE UP (ref 98–107)
CO2 SERPL-SCNC: 25 MMOL/L — SIGNIFICANT CHANGE UP (ref 22–31)
CO2 SERPL-SCNC: 25 MMOL/L — SIGNIFICANT CHANGE UP (ref 22–31)
CREAT SERPL-MCNC: 0.84 MG/DL — SIGNIFICANT CHANGE UP (ref 0.5–1.3)
CREAT SERPL-MCNC: 0.84 MG/DL — SIGNIFICANT CHANGE UP (ref 0.5–1.3)
EGFR: 83 ML/MIN/1.73M2 — SIGNIFICANT CHANGE UP
EGFR: 83 ML/MIN/1.73M2 — SIGNIFICANT CHANGE UP
EOSINOPHIL # BLD AUTO: 0.15 K/UL — SIGNIFICANT CHANGE UP (ref 0–0.5)
EOSINOPHIL # BLD AUTO: 0.15 K/UL — SIGNIFICANT CHANGE UP (ref 0–0.5)
EOSINOPHIL NFR BLD AUTO: 2.2 % — SIGNIFICANT CHANGE UP (ref 0–6)
EOSINOPHIL NFR BLD AUTO: 2.2 % — SIGNIFICANT CHANGE UP (ref 0–6)
GLUCOSE SERPL-MCNC: 72 MG/DL — SIGNIFICANT CHANGE UP (ref 70–99)
GLUCOSE SERPL-MCNC: 72 MG/DL — SIGNIFICANT CHANGE UP (ref 70–99)
HCT VFR BLD CALC: 33.7 % — LOW (ref 39–50)
HCT VFR BLD CALC: 33.7 % — LOW (ref 39–50)
HGB BLD-MCNC: 10.7 G/DL — LOW (ref 13–17)
HGB BLD-MCNC: 10.7 G/DL — LOW (ref 13–17)
IANC: 4.47 K/UL — SIGNIFICANT CHANGE UP (ref 1.8–7.4)
IANC: 4.47 K/UL — SIGNIFICANT CHANGE UP (ref 1.8–7.4)
IMM GRANULOCYTES NFR BLD AUTO: 0.3 % — SIGNIFICANT CHANGE UP (ref 0–0.9)
IMM GRANULOCYTES NFR BLD AUTO: 0.3 % — SIGNIFICANT CHANGE UP (ref 0–0.9)
LYMPHOCYTES # BLD AUTO: 1.24 K/UL — SIGNIFICANT CHANGE UP (ref 1–3.3)
LYMPHOCYTES # BLD AUTO: 1.24 K/UL — SIGNIFICANT CHANGE UP (ref 1–3.3)
LYMPHOCYTES # BLD AUTO: 18.3 % — SIGNIFICANT CHANGE UP (ref 13–44)
LYMPHOCYTES # BLD AUTO: 18.3 % — SIGNIFICANT CHANGE UP (ref 13–44)
MAGNESIUM SERPL-MCNC: 1.8 MG/DL — SIGNIFICANT CHANGE UP (ref 1.6–2.6)
MAGNESIUM SERPL-MCNC: 1.8 MG/DL — SIGNIFICANT CHANGE UP (ref 1.6–2.6)
MCHC RBC-ENTMCNC: 27.2 PG — SIGNIFICANT CHANGE UP (ref 27–34)
MCHC RBC-ENTMCNC: 27.2 PG — SIGNIFICANT CHANGE UP (ref 27–34)
MCHC RBC-ENTMCNC: 31.8 GM/DL — LOW (ref 32–36)
MCHC RBC-ENTMCNC: 31.8 GM/DL — LOW (ref 32–36)
MCV RBC AUTO: 85.5 FL — SIGNIFICANT CHANGE UP (ref 80–100)
MCV RBC AUTO: 85.5 FL — SIGNIFICANT CHANGE UP (ref 80–100)
MONOCYTES # BLD AUTO: 0.86 K/UL — SIGNIFICANT CHANGE UP (ref 0–0.9)
MONOCYTES # BLD AUTO: 0.86 K/UL — SIGNIFICANT CHANGE UP (ref 0–0.9)
MONOCYTES NFR BLD AUTO: 12.7 % — SIGNIFICANT CHANGE UP (ref 2–14)
MONOCYTES NFR BLD AUTO: 12.7 % — SIGNIFICANT CHANGE UP (ref 2–14)
NEUTROPHILS # BLD AUTO: 4.47 K/UL — SIGNIFICANT CHANGE UP (ref 1.8–7.4)
NEUTROPHILS # BLD AUTO: 4.47 K/UL — SIGNIFICANT CHANGE UP (ref 1.8–7.4)
NEUTROPHILS NFR BLD AUTO: 65.9 % — SIGNIFICANT CHANGE UP (ref 43–77)
NEUTROPHILS NFR BLD AUTO: 65.9 % — SIGNIFICANT CHANGE UP (ref 43–77)
NRBC # BLD: 0 /100 WBCS — SIGNIFICANT CHANGE UP (ref 0–0)
NRBC # BLD: 0 /100 WBCS — SIGNIFICANT CHANGE UP (ref 0–0)
NRBC # FLD: 0 K/UL — SIGNIFICANT CHANGE UP (ref 0–0)
NRBC # FLD: 0 K/UL — SIGNIFICANT CHANGE UP (ref 0–0)
PHOSPHATE SERPL-MCNC: 2.8 MG/DL — SIGNIFICANT CHANGE UP (ref 2.5–4.5)
PHOSPHATE SERPL-MCNC: 2.8 MG/DL — SIGNIFICANT CHANGE UP (ref 2.5–4.5)
PLATELET # BLD AUTO: 294 K/UL — SIGNIFICANT CHANGE UP (ref 150–400)
PLATELET # BLD AUTO: 294 K/UL — SIGNIFICANT CHANGE UP (ref 150–400)
POTASSIUM SERPL-MCNC: 3.4 MMOL/L — LOW (ref 3.5–5.3)
POTASSIUM SERPL-MCNC: 3.4 MMOL/L — LOW (ref 3.5–5.3)
POTASSIUM SERPL-SCNC: 3.4 MMOL/L — LOW (ref 3.5–5.3)
POTASSIUM SERPL-SCNC: 3.4 MMOL/L — LOW (ref 3.5–5.3)
PROT SERPL-MCNC: 5.4 G/DL — LOW (ref 6–8.3)
PROT SERPL-MCNC: 5.4 G/DL — LOW (ref 6–8.3)
RBC # BLD: 3.94 M/UL — LOW (ref 4.2–5.8)
RBC # BLD: 3.94 M/UL — LOW (ref 4.2–5.8)
RBC # FLD: 13.7 % — SIGNIFICANT CHANGE UP (ref 10.3–14.5)
RBC # FLD: 13.7 % — SIGNIFICANT CHANGE UP (ref 10.3–14.5)
SODIUM SERPL-SCNC: 140 MMOL/L — SIGNIFICANT CHANGE UP (ref 135–145)
SODIUM SERPL-SCNC: 140 MMOL/L — SIGNIFICANT CHANGE UP (ref 135–145)
WBC # BLD: 6.78 K/UL — SIGNIFICANT CHANGE UP (ref 3.8–10.5)
WBC # BLD: 6.78 K/UL — SIGNIFICANT CHANGE UP (ref 3.8–10.5)
WBC # FLD AUTO: 6.78 K/UL — SIGNIFICANT CHANGE UP (ref 3.8–10.5)
WBC # FLD AUTO: 6.78 K/UL — SIGNIFICANT CHANGE UP (ref 3.8–10.5)

## 2023-12-02 PROCEDURE — 99233 SBSQ HOSP IP/OBS HIGH 50: CPT

## 2023-12-02 RX ORDER — POTASSIUM CHLORIDE 20 MEQ
40 PACKET (EA) ORAL EVERY 4 HOURS
Refills: 0 | Status: COMPLETED | OUTPATIENT
Start: 2023-12-02 | End: 2023-12-02

## 2023-12-02 RX ORDER — POTASSIUM CHLORIDE 20 MEQ
10 PACKET (EA) ORAL ONCE
Refills: 0 | Status: COMPLETED | OUTPATIENT
Start: 2023-12-02 | End: 2023-12-02

## 2023-12-02 RX ORDER — ONDANSETRON 8 MG/1
4 TABLET, FILM COATED ORAL EVERY 6 HOURS
Refills: 0 | Status: DISCONTINUED | OUTPATIENT
Start: 2023-12-02 | End: 2023-12-06

## 2023-12-02 RX ORDER — SOD SULF/SODIUM/NAHCO3/KCL/PEG
4000 SOLUTION, RECONSTITUTED, ORAL ORAL ONCE
Refills: 0 | Status: DISCONTINUED | OUTPATIENT
Start: 2023-12-02 | End: 2023-12-02

## 2023-12-02 RX ORDER — POLYETHYLENE GLYCOL 3350 17 G/17G
17 POWDER, FOR SOLUTION ORAL ONCE
Refills: 0 | Status: COMPLETED | OUTPATIENT
Start: 2023-12-02 | End: 2023-12-02

## 2023-12-02 RX ADMIN — POLYETHYLENE GLYCOL 3350 17 GRAM(S): 17 POWDER, FOR SOLUTION ORAL at 09:43

## 2023-12-02 RX ADMIN — PANTOPRAZOLE SODIUM 40 MILLIGRAM(S): 20 TABLET, DELAYED RELEASE ORAL at 11:12

## 2023-12-02 RX ADMIN — Medication 4000 MILLILITER(S): at 09:43

## 2023-12-02 RX ADMIN — Medication 40 MILLIEQUIVALENT(S): at 17:28

## 2023-12-02 RX ADMIN — ONDANSETRON 4 MILLIGRAM(S): 8 TABLET, FILM COATED ORAL at 17:27

## 2023-12-02 RX ADMIN — Medication 4000 MILLILITER(S): at 02:33

## 2023-12-02 RX ADMIN — ONDANSETRON 4 MILLIGRAM(S): 8 TABLET, FILM COATED ORAL at 11:23

## 2023-12-02 RX ADMIN — Medication 40 MILLIEQUIVALENT(S): at 11:12

## 2023-12-02 NOTE — DIETITIAN INITIAL EVALUATION ADULT - ADD RECOMMEND
1) When medically feasible recommend regular diet post colonoscopy   2) When diet advanced recommend Ensure Plus High Protein 1 PO Twice Daily (700 kcal, 40 gm protein)   3) Replete electrolytes prn   4) Monitor weights, labs, BM's, skin integrity, p.o. intake.   5) Encourage PO intake and honor food preferences as able.

## 2023-12-02 NOTE — DIETITIAN INITIAL EVALUATION ADULT - OTHER INFO
Medical course: per chart 89 year old Male with hx of BPH, HLD, inguinal hernia repairs, anemia, presenting with 3 days of NV and constipation with CT findings c/f primary colon malignancy undergoing staging and colonoscopy for biopsy.     Nutrition interview: Pt A&Ox4, declined interview at this time. Failed attempted at colonscopy 12/1 2/2 inadequate prep. Received low fiber diet then downgraded to full liquid diet to reattempt colonoscopy. Inadequate PO intake 2/2 discomfort from bowel prep. Per H&P concern for colon malignancy. Denies any chewing/swallowing difficulties. No known food allergies. Per HIE + weight loss (4/24: 127lb). Pt with visible muscle and fat wasting. Labs notable hypokalemia and hyposphatemia likely result of poor PO intake/hydration and now bowel prep.

## 2023-12-02 NOTE — DIETITIAN INITIAL EVALUATION ADULT - MUSCLE MASS (LOSS OF MUSCLE)
Eyes with no visual disturbances.  Ears clean and dry and no hearing difficulties. Nose with pink mucosa and no drainage.  Mouth mucous membranes moist and pink.  No tenderness or swelling to throat or neck.
Temples.../Clavicles.../Shoulders...

## 2023-12-02 NOTE — DIETITIAN INITIAL EVALUATION ADULT - PERTINENT LABORATORY DATA
12-02    140  |  104  |  12  ----------------------------<  72  3.4<L>   |  25  |  0.84    Ca    8.3<L>      02 Dec 2023 07:41  Phos  2.8     12-02  Mg     1.80     12-02    TPro  5.4<L>  /  Alb  2.9<L>  /  TBili  0.3  /  DBili  x   /  AST  11  /  ALT  8   /  AlkPhos  55  12-02  A1C with Estimated Average Glucose Result: 5.0 % (04-15-23 @ 05:55)

## 2023-12-02 NOTE — PROGRESS NOTE ADULT - PROBLEM SELECTOR PLAN 1
no BM in 3 days but passing gas. +NV. CT showing masses c/w primary colon CA  GI evaluated, planning for colonoscopy in the AM and Bx  Surgery c/s for possible resection pending path results and screening CT chest  > NPO at midnight (except for golytly)  > okay for enemas  > cbc, cmp, coags, T&S at 3AM no BM in 3 days but passing gas. +NV. CT showing masses c/w primary colon CA  GI evaluated, planning for colonoscopy in the AM and Bx  Surgery c/s for possible resection pending path results   CT chest wo additional lesions  > cont bowel prep thru Monday 12/4 for repeat colonoscopy  > zofran scheduled for nausea with bowel prep; will f/u ECG for qtc  > okay for enemas  > cbc, cmp, coags, T&S at 3AM 12/4

## 2023-12-02 NOTE — DIETITIAN INITIAL EVALUATION ADULT - PERTINENT MEDS FT
MEDICATIONS  (STANDING):  influenza  Vaccine (HIGH DOSE) 0.7 milliLiter(s) IntraMuscular once  ondansetron Injectable 4 milliGRAM(s) IV Push every 6 hours  pantoprazole  Injectable 40 milliGRAM(s) IV Push daily  potassium chloride    Tablet ER 40 milliEquivalent(s) Oral every 4 hours    MEDICATIONS  (PRN):  acetaminophen     Tablet .. 650 milliGRAM(s) Oral every 6 hours PRN Temp greater or equal to 38C (100.4F), Mild Pain (1 - 3)  aluminum hydroxide/magnesium hydroxide/simethicone Suspension 30 milliLiter(s) Oral every 4 hours PRN Dyspepsia  melatonin 3 milliGRAM(s) Oral at bedtime PRN Insomnia

## 2023-12-02 NOTE — PROGRESS NOTE ADULT - SUBJECTIVE AND OBJECTIVE BOX
Progress Note    Veronique Rey MD     [facility], MERARI 89y (15-Nov-1933) Male [id number]  11-30-23 (2d)      Chief Complaint: c/f colon cancer    Subjective:  No acute events overnight. Patient seen and examined at bedside.    Review of Systems:  CONSTITUTIONAL: No fever, weight loss, or fatigue  EYES: No eye pain, visual disturbances, or discharge  ENMT:  No difficulty hearing, tinnitus, vertigo; No sinus or throat pain  NECK: No pain or stiffness  RESPIRATORY: No cough, wheezing, chills or hemoptysis; No shortness of breath  CARDIOVASCULAR: No chest pain, palpitations, dizziness, or leg swelling  GASTROINTESTINAL: No abdominal or epigastric pain. No nausea, vomiting, or hematemesis; No diarrhea or constipation. No melena or hematochezia.  GENITOURINARY: No dysuria, frequency, hematuria, or incontinence  NEUROLOGICAL: No headaches, memory loss, loss of strength, numbness, or tremors  SKIN: No itching, burning, rashes, or lesions   MUSCULOSKELETAL: No joint pain or swelling; No muscle, back, or extremity pain      PAST MEDICAL & SURGICAL HISTORY:  No pertinent past medical history [[id number]]    BPH (benign prostatic hyperplasia) [N40.0]    Peripheral neuropathy [G62.9]    HLD (hyperlipidemia) [E78.5]    No significant past surgical history [[id number]]      acetaminophen     Tablet .. 650 milliGRAM(s) Oral every 6 hours PRN  aluminum hydroxide/magnesium hydroxide/simethicone Suspension 30 milliLiter(s) Oral every 4 hours PRN  influenza  Vaccine (HIGH DOSE) 0.7 milliLiter(s) IntraMuscular once  melatonin 3 milliGRAM(s) Oral at bedtime PRN  ondansetron Injectable 4 milliGRAM(s) IV Push every 8 hours PRN  pantoprazole  Injectable 40 milliGRAM(s) IV Push daily  polyethylene glycol/electrolyte Solution. 4000 milliLiter(s) Oral once    Objective:  T(C): 36.8 (12-02-23 @ 05:28), Max: 36.8 (12-02-23 @ 05:28)  HR: 65 (12-02-23 @ 05:28) (60 - 83)  BP: 141/52 (12-02-23 @ 05:28) (112/46 - 156/71)  RR: 16 (12-02-23 @ 05:28) (13 - 17)  SpO2: 98% (12-02-23 @ 05:28) (97% - 100%)    Physical exam:  GENERAL: NAD, well-developed  HEAD:  Atraumatic, Normocephalic  EYES: EOMI, PERRLA, conjunctiva and sclera clear  NECK: Supple, No JVD  CHEST/LUNG: Clear to auscultation bilaterally; No wheeze  HEART: Regular rate and rhythm; No murmurs, rubs, or gallops  ABDOMEN: Soft, Nontender, Nondistended; Bowel sounds present  EXTREMITIES:  2+ Peripheral Pulses, No clubbing, cyanosis, or edema  PSYCH: AAOx3  NEUROLOGY: non-focal  SKIN: No rashes or lesions        CAPILLARY BLOOD GLUCOSE      (12-01 @ 01:25)                      11.8  10.94 )-----------( 336                 36.5    Neutrophils = 9.31 (85.0%)  Lymphocytes = 0.57 (5.2%)  Eosinophils = 0.02 (0.2%)  Basophils = 0.03 (0.3%)  Monocytes = 0.96 (8.8%)  Bands = --%    12-01    138  |  102  |  14  ----------------------------<  108<H>  3.9   |  25  |  0.84    Ca    8.7      01 Dec 2023 01:25  Phos  1.9     12-01  Mg     1.80     12-01    TPro  6.3  /  Alb  3.5  /  TBili  0.4  /  DBili  x   /  AST  14  /  ALT  9   /  AlkPhos  64  12-01    ( 01 Dec 2023 01:25 )   PT: 11.1 sec;   INR: 0.98 ratio;       PTT:28.0 sec      RVP:          Tox:         Urinalysis Basic - ( 01 Dec 2023 01:25 )    Color: x / Appearance: x / SG: x / pH: x  Gluc: 108 mg/dL / Ketone: x  / Bili: x / Urobili: x   Blood: x / Protein: x / Nitrite: x   Leuk Esterase: x / RBC: x / WBC x   Sq Epi: x / Non Sq Epi: x / Bacteria: x        WBC Trend: 10.94<--, 12.36<--    Hb Trend: 11.8<--, 11.9<--        New imaging in last 24 hours:  Consult notes reviewed: Progress Note    Veronique Rey MD     [facility], MERARI 89y (15-Nov-1933) Male [id number]  11-30-23 (2d)      Chief Complaint: c/f colon cancer    Subjective:  No acute events overnight. Patient seen and examined at bedside. Had a lot of difficulty with bowel prep and wasn't able to get colonoscopy. Unsure if he wants a surgery or not. Having nausea with the bowel prep. Has not thought of a person who he would like to make decisions for him.     Review of Systems:  CONSTITUTIONAL: No fever, weight loss, or fatigue  EYES: No eye pain, visual disturbances, or discharge  ENMT:  No difficulty hearing, tinnitus, vertigo; No sinus or throat pain  NECK: No pain or stiffness  RESPIRATORY: No cough, wheezing, chills or hemoptysis; No shortness of breath  CARDIOVASCULAR: No chest pain, palpitations, dizziness, or leg swelling  GASTROINTESTINAL: No abdominal or epigastric pain. No nausea, vomiting, or hematemesis; No diarrhea or constipation. No melena or hematochezia.  GENITOURINARY: No dysuria, frequency, hematuria, or incontinence  NEUROLOGICAL: No headaches, memory loss, loss of strength, numbness, or tremors  SKIN: No itching, burning, rashes, or lesions   MUSCULOSKELETAL: No joint pain or swelling; No muscle, back, or extremity pain      PAST MEDICAL & SURGICAL HISTORY:  No pertinent past medical history [[id number]]    BPH (benign prostatic hyperplasia) [N40.0]    Peripheral neuropathy [G62.9]    HLD (hyperlipidemia) [E78.5]    No significant past surgical history [[id number]]      acetaminophen     Tablet .. 650 milliGRAM(s) Oral every 6 hours PRN  aluminum hydroxide/magnesium hydroxide/simethicone Suspension 30 milliLiter(s) Oral every 4 hours PRN  influenza  Vaccine (HIGH DOSE) 0.7 milliLiter(s) IntraMuscular once  melatonin 3 milliGRAM(s) Oral at bedtime PRN  ondansetron Injectable 4 milliGRAM(s) IV Push every 8 hours PRN  pantoprazole  Injectable 40 milliGRAM(s) IV Push daily  polyethylene glycol/electrolyte Solution. 4000 milliLiter(s) Oral once    Objective:  T(C): 36.8 (12-02-23 @ 05:28), Max: 36.8 (12-02-23 @ 05:28)  HR: 65 (12-02-23 @ 05:28) (60 - 83)  BP: 141/52 (12-02-23 @ 05:28) (112/46 - 156/71)  RR: 16 (12-02-23 @ 05:28) (13 - 17)  SpO2: 98% (12-02-23 @ 05:28) (97% - 100%)    Physical exam:  GENERAL: NAD, well-developed  HEAD:  Atraumatic, Normocephalic  EYES: EOMI, PERRLA, conjunctiva and sclera clear  NECK: Supple, No JVD  CHEST/LUNG: Clear to auscultation bilaterally; No wheeze  HEART: Regular rate and rhythm; No murmurs, rubs, or gallops  ABDOMEN: Soft, Nontender, Nondistended; Bowel sounds present  EXTREMITIES:  2+ Peripheral Pulses, No clubbing, cyanosis, or edema  PSYCH: AAOx3  NEUROLOGY: non-focal  SKIN: No rashes or lesions        CAPILLARY BLOOD GLUCOSE      (12-01 @ 01:25)                      11.8  10.94 )-----------( 336                 36.5    Neutrophils = 9.31 (85.0%)  Lymphocytes = 0.57 (5.2%)  Eosinophils = 0.02 (0.2%)  Basophils = 0.03 (0.3%)  Monocytes = 0.96 (8.8%)  Bands = --%    12-01    138  |  102  |  14  ----------------------------<  108<H>  3.9   |  25  |  0.84    Ca    8.7      01 Dec 2023 01:25  Phos  1.9     12-01  Mg     1.80     12-01    TPro  6.3  /  Alb  3.5  /  TBili  0.4  /  DBili  x   /  AST  14  /  ALT  9   /  AlkPhos  64  12-01    ( 01 Dec 2023 01:25 )   PT: 11.1 sec;   INR: 0.98 ratio;       PTT:28.0 sec      RVP:          Tox:         Urinalysis Basic - ( 01 Dec 2023 01:25 )    Color: x / Appearance: x / SG: x / pH: x  Gluc: 108 mg/dL / Ketone: x  / Bili: x / Urobili: x   Blood: x / Protein: x / Nitrite: x   Leuk Esterase: x / RBC: x / WBC x   Sq Epi: x / Non Sq Epi: x / Bacteria: x        WBC Trend: 10.94<--, 12.36<--    Hb Trend: 11.8<--, 11.9<--        New imaging in last 24 hours:  Consult notes reviewed:

## 2023-12-02 NOTE — DIETITIAN INITIAL EVALUATION ADULT - ORAL INTAKE PTA/DIET HISTORY
Pt very uncomfortable 2/2 bowel prep for colonoscopy. Declined interview at this time. Admitted with nausea, vomiting, and constipation.

## 2023-12-03 LAB
-  AMPICILLIN: SIGNIFICANT CHANGE UP
-  AMPICILLIN: SIGNIFICANT CHANGE UP
-  CIPROFLOXACIN: SIGNIFICANT CHANGE UP
-  CIPROFLOXACIN: SIGNIFICANT CHANGE UP
-  LEVOFLOXACIN: SIGNIFICANT CHANGE UP
-  LEVOFLOXACIN: SIGNIFICANT CHANGE UP
-  NITROFURANTOIN: SIGNIFICANT CHANGE UP
-  NITROFURANTOIN: SIGNIFICANT CHANGE UP
-  TETRACYCLINE: SIGNIFICANT CHANGE UP
-  TETRACYCLINE: SIGNIFICANT CHANGE UP
-  VANCOMYCIN: SIGNIFICANT CHANGE UP
-  VANCOMYCIN: SIGNIFICANT CHANGE UP
ALBUMIN SERPL ELPH-MCNC: 3.1 G/DL — LOW (ref 3.3–5)
ALBUMIN SERPL ELPH-MCNC: 3.1 G/DL — LOW (ref 3.3–5)
ALP SERPL-CCNC: 57 U/L — SIGNIFICANT CHANGE UP (ref 40–120)
ALP SERPL-CCNC: 57 U/L — SIGNIFICANT CHANGE UP (ref 40–120)
ALT FLD-CCNC: 8 U/L — SIGNIFICANT CHANGE UP (ref 4–41)
ALT FLD-CCNC: 8 U/L — SIGNIFICANT CHANGE UP (ref 4–41)
ANION GAP SERPL CALC-SCNC: 14 MMOL/L — SIGNIFICANT CHANGE UP (ref 7–14)
ANION GAP SERPL CALC-SCNC: 14 MMOL/L — SIGNIFICANT CHANGE UP (ref 7–14)
AST SERPL-CCNC: 12 U/L — SIGNIFICANT CHANGE UP (ref 4–40)
AST SERPL-CCNC: 12 U/L — SIGNIFICANT CHANGE UP (ref 4–40)
BASOPHILS # BLD AUTO: 0.03 K/UL — SIGNIFICANT CHANGE UP (ref 0–0.2)
BASOPHILS # BLD AUTO: 0.03 K/UL — SIGNIFICANT CHANGE UP (ref 0–0.2)
BASOPHILS NFR BLD AUTO: 0.4 % — SIGNIFICANT CHANGE UP (ref 0–2)
BASOPHILS NFR BLD AUTO: 0.4 % — SIGNIFICANT CHANGE UP (ref 0–2)
BILIRUB SERPL-MCNC: 0.4 MG/DL — SIGNIFICANT CHANGE UP (ref 0.2–1.2)
BILIRUB SERPL-MCNC: 0.4 MG/DL — SIGNIFICANT CHANGE UP (ref 0.2–1.2)
BUN SERPL-MCNC: 10 MG/DL — SIGNIFICANT CHANGE UP (ref 7–23)
BUN SERPL-MCNC: 10 MG/DL — SIGNIFICANT CHANGE UP (ref 7–23)
CALCIUM SERPL-MCNC: 8.7 MG/DL — SIGNIFICANT CHANGE UP (ref 8.4–10.5)
CALCIUM SERPL-MCNC: 8.7 MG/DL — SIGNIFICANT CHANGE UP (ref 8.4–10.5)
CHLORIDE SERPL-SCNC: 98 MMOL/L — SIGNIFICANT CHANGE UP (ref 98–107)
CHLORIDE SERPL-SCNC: 98 MMOL/L — SIGNIFICANT CHANGE UP (ref 98–107)
CO2 SERPL-SCNC: 24 MMOL/L — SIGNIFICANT CHANGE UP (ref 22–31)
CO2 SERPL-SCNC: 24 MMOL/L — SIGNIFICANT CHANGE UP (ref 22–31)
CREAT SERPL-MCNC: 0.89 MG/DL — SIGNIFICANT CHANGE UP (ref 0.5–1.3)
CREAT SERPL-MCNC: 0.89 MG/DL — SIGNIFICANT CHANGE UP (ref 0.5–1.3)
CULTURE RESULTS: ABNORMAL
CULTURE RESULTS: ABNORMAL
EGFR: 81 ML/MIN/1.73M2 — SIGNIFICANT CHANGE UP
EGFR: 81 ML/MIN/1.73M2 — SIGNIFICANT CHANGE UP
EOSINOPHIL # BLD AUTO: 0.04 K/UL — SIGNIFICANT CHANGE UP (ref 0–0.5)
EOSINOPHIL # BLD AUTO: 0.04 K/UL — SIGNIFICANT CHANGE UP (ref 0–0.5)
EOSINOPHIL NFR BLD AUTO: 0.5 % — SIGNIFICANT CHANGE UP (ref 0–6)
EOSINOPHIL NFR BLD AUTO: 0.5 % — SIGNIFICANT CHANGE UP (ref 0–6)
GLUCOSE SERPL-MCNC: 75 MG/DL — SIGNIFICANT CHANGE UP (ref 70–99)
GLUCOSE SERPL-MCNC: 75 MG/DL — SIGNIFICANT CHANGE UP (ref 70–99)
HCT VFR BLD CALC: 35.1 % — LOW (ref 39–50)
HCT VFR BLD CALC: 35.1 % — LOW (ref 39–50)
HGB BLD-MCNC: 11.4 G/DL — LOW (ref 13–17)
HGB BLD-MCNC: 11.4 G/DL — LOW (ref 13–17)
IANC: 6.1 K/UL — SIGNIFICANT CHANGE UP (ref 1.8–7.4)
IANC: 6.1 K/UL — SIGNIFICANT CHANGE UP (ref 1.8–7.4)
IMM GRANULOCYTES NFR BLD AUTO: 0.5 % — SIGNIFICANT CHANGE UP (ref 0–0.9)
IMM GRANULOCYTES NFR BLD AUTO: 0.5 % — SIGNIFICANT CHANGE UP (ref 0–0.9)
LYMPHOCYTES # BLD AUTO: 1.1 K/UL — SIGNIFICANT CHANGE UP (ref 1–3.3)
LYMPHOCYTES # BLD AUTO: 1.1 K/UL — SIGNIFICANT CHANGE UP (ref 1–3.3)
LYMPHOCYTES # BLD AUTO: 13.2 % — SIGNIFICANT CHANGE UP (ref 13–44)
LYMPHOCYTES # BLD AUTO: 13.2 % — SIGNIFICANT CHANGE UP (ref 13–44)
MAGNESIUM SERPL-MCNC: 1.8 MG/DL — SIGNIFICANT CHANGE UP (ref 1.6–2.6)
MAGNESIUM SERPL-MCNC: 1.8 MG/DL — SIGNIFICANT CHANGE UP (ref 1.6–2.6)
MCHC RBC-ENTMCNC: 27.3 PG — SIGNIFICANT CHANGE UP (ref 27–34)
MCHC RBC-ENTMCNC: 27.3 PG — SIGNIFICANT CHANGE UP (ref 27–34)
MCHC RBC-ENTMCNC: 32.5 GM/DL — SIGNIFICANT CHANGE UP (ref 32–36)
MCHC RBC-ENTMCNC: 32.5 GM/DL — SIGNIFICANT CHANGE UP (ref 32–36)
MCV RBC AUTO: 84.2 FL — SIGNIFICANT CHANGE UP (ref 80–100)
MCV RBC AUTO: 84.2 FL — SIGNIFICANT CHANGE UP (ref 80–100)
METHOD TYPE: SIGNIFICANT CHANGE UP
METHOD TYPE: SIGNIFICANT CHANGE UP
MONOCYTES # BLD AUTO: 1.02 K/UL — HIGH (ref 0–0.9)
MONOCYTES # BLD AUTO: 1.02 K/UL — HIGH (ref 0–0.9)
MONOCYTES NFR BLD AUTO: 12.2 % — SIGNIFICANT CHANGE UP (ref 2–14)
MONOCYTES NFR BLD AUTO: 12.2 % — SIGNIFICANT CHANGE UP (ref 2–14)
NEUTROPHILS # BLD AUTO: 6.1 K/UL — SIGNIFICANT CHANGE UP (ref 1.8–7.4)
NEUTROPHILS # BLD AUTO: 6.1 K/UL — SIGNIFICANT CHANGE UP (ref 1.8–7.4)
NEUTROPHILS NFR BLD AUTO: 73.2 % — SIGNIFICANT CHANGE UP (ref 43–77)
NEUTROPHILS NFR BLD AUTO: 73.2 % — SIGNIFICANT CHANGE UP (ref 43–77)
NRBC # BLD: 0 /100 WBCS — SIGNIFICANT CHANGE UP (ref 0–0)
NRBC # BLD: 0 /100 WBCS — SIGNIFICANT CHANGE UP (ref 0–0)
NRBC # FLD: 0 K/UL — SIGNIFICANT CHANGE UP (ref 0–0)
NRBC # FLD: 0 K/UL — SIGNIFICANT CHANGE UP (ref 0–0)
ORGANISM # SPEC MICROSCOPIC CNT: ABNORMAL
PHOSPHATE SERPL-MCNC: 2.4 MG/DL — LOW (ref 2.5–4.5)
PHOSPHATE SERPL-MCNC: 2.4 MG/DL — LOW (ref 2.5–4.5)
PLATELET # BLD AUTO: 322 K/UL — SIGNIFICANT CHANGE UP (ref 150–400)
PLATELET # BLD AUTO: 322 K/UL — SIGNIFICANT CHANGE UP (ref 150–400)
POTASSIUM SERPL-MCNC: 4.5 MMOL/L — SIGNIFICANT CHANGE UP (ref 3.5–5.3)
POTASSIUM SERPL-MCNC: 4.5 MMOL/L — SIGNIFICANT CHANGE UP (ref 3.5–5.3)
POTASSIUM SERPL-SCNC: 4.5 MMOL/L — SIGNIFICANT CHANGE UP (ref 3.5–5.3)
POTASSIUM SERPL-SCNC: 4.5 MMOL/L — SIGNIFICANT CHANGE UP (ref 3.5–5.3)
PROT SERPL-MCNC: 5.9 G/DL — LOW (ref 6–8.3)
PROT SERPL-MCNC: 5.9 G/DL — LOW (ref 6–8.3)
RBC # BLD: 4.17 M/UL — LOW (ref 4.2–5.8)
RBC # BLD: 4.17 M/UL — LOW (ref 4.2–5.8)
RBC # FLD: 13.6 % — SIGNIFICANT CHANGE UP (ref 10.3–14.5)
RBC # FLD: 13.6 % — SIGNIFICANT CHANGE UP (ref 10.3–14.5)
SODIUM SERPL-SCNC: 136 MMOL/L — SIGNIFICANT CHANGE UP (ref 135–145)
SODIUM SERPL-SCNC: 136 MMOL/L — SIGNIFICANT CHANGE UP (ref 135–145)
SPECIMEN SOURCE: SIGNIFICANT CHANGE UP
SPECIMEN SOURCE: SIGNIFICANT CHANGE UP
WBC # BLD: 8.33 K/UL — SIGNIFICANT CHANGE UP (ref 3.8–10.5)
WBC # BLD: 8.33 K/UL — SIGNIFICANT CHANGE UP (ref 3.8–10.5)
WBC # FLD AUTO: 8.33 K/UL — SIGNIFICANT CHANGE UP (ref 3.8–10.5)
WBC # FLD AUTO: 8.33 K/UL — SIGNIFICANT CHANGE UP (ref 3.8–10.5)

## 2023-12-03 PROCEDURE — 99233 SBSQ HOSP IP/OBS HIGH 50: CPT

## 2023-12-03 PROCEDURE — 99232 SBSQ HOSP IP/OBS MODERATE 35: CPT

## 2023-12-03 RX ORDER — SODIUM,POTASSIUM PHOSPHATES 278-250MG
1 POWDER IN PACKET (EA) ORAL ONCE
Refills: 0 | Status: COMPLETED | OUTPATIENT
Start: 2023-12-03 | End: 2023-12-03

## 2023-12-03 RX ORDER — MAGNESIUM SULFATE 500 MG/ML
2 VIAL (ML) INJECTION ONCE
Refills: 0 | Status: COMPLETED | OUTPATIENT
Start: 2023-12-03 | End: 2023-12-03

## 2023-12-03 RX ORDER — SOD SULF/SODIUM/NAHCO3/KCL/PEG
240 SOLUTION, RECONSTITUTED, ORAL ORAL
Refills: 0 | Status: DISCONTINUED | OUTPATIENT
Start: 2023-12-03 | End: 2023-12-04

## 2023-12-03 RX ADMIN — Medication 25 GRAM(S): at 18:38

## 2023-12-03 RX ADMIN — ONDANSETRON 4 MILLIGRAM(S): 8 TABLET, FILM COATED ORAL at 23:24

## 2023-12-03 RX ADMIN — Medication 1 PACKET(S): at 18:40

## 2023-12-03 RX ADMIN — PANTOPRAZOLE SODIUM 40 MILLIGRAM(S): 20 TABLET, DELAYED RELEASE ORAL at 12:58

## 2023-12-03 RX ADMIN — Medication 650 MILLIGRAM(S): at 05:30

## 2023-12-03 RX ADMIN — ONDANSETRON 4 MILLIGRAM(S): 8 TABLET, FILM COATED ORAL at 05:31

## 2023-12-03 RX ADMIN — ONDANSETRON 4 MILLIGRAM(S): 8 TABLET, FILM COATED ORAL at 18:39

## 2023-12-03 RX ADMIN — ONDANSETRON 4 MILLIGRAM(S): 8 TABLET, FILM COATED ORAL at 00:02

## 2023-12-03 RX ADMIN — Medication 650 MILLIGRAM(S): at 18:40

## 2023-12-03 RX ADMIN — ONDANSETRON 4 MILLIGRAM(S): 8 TABLET, FILM COATED ORAL at 12:58

## 2023-12-03 RX ADMIN — Medication 650 MILLIGRAM(S): at 19:40

## 2023-12-03 RX ADMIN — Medication 30 MILLILITER(S): at 05:30

## 2023-12-03 RX ADMIN — Medication 240 MILLILITER(S): at 18:39

## 2023-12-03 RX ADMIN — Medication 650 MILLIGRAM(S): at 06:30

## 2023-12-03 NOTE — CHART NOTE - NSCHARTNOTEFT_GEN_A_CORE
Movi-prep is acceptable alternative for bowel prep, and if available is preferred given low volume of solution to drink and presence of partial LBO. Please start ASAP.

## 2023-12-03 NOTE — PROGRESS NOTE ADULT - ASSESSMENT
89M PMHx b/l inguinal hernia repair previously recommended to have EGD/colonoscopy in 4/2023 for eval of DANG, s/p unremarkable EGD and incomplete colonoscopy 9/2023, presents with constipation, suprapubic abdominal pain and nausea/vomiting over last 3 days and found to have colon mass c/f malignancy on CT. Attempted colonoscopy on 12/1 was aborted due to poor prep (dark semi-solid stool throughout recto-sigmoid and sigmoid).    #Abnormal imaging  #Mass-like thickening in ascending and transverse colon  #Partial LBO  - doing prolonged prep for re-attempt at colonoscopy tomorrow; unclear how much of Golytely at bedside he has had (two 4000 cc doses of Golytely documented as given yesterday per MAR) and he reports stools are brown and watery  - prep is limited by bloating and abdominal discomfort, though he states he does not mind the taste of the Golytely  - patient is wondering if there is a way to "flush him out like a radiator," discussed that while NGT can be placed and Golytely administered through it, this will likely cause more bloating and abdominal discomfort than taking small sips  - unlikely he will be able to finish Golytely, would switch to miralax prep (238g, or one bottle, of miralax mixed with 64 oz of gatorade or pedialyte) if possible as this is smaller volume - please d/w pharmacy. If not possible must finish at least jug of Golytely at bedside.  - please also give dulcolax 5 mg suppository now and at 4 pm  - please clarify with surgery if colonoscopy is absolutely necessary; happy to attempt colonoscopy again tomorrow but if prep is still inadequate strongly doubt he will ever be able to obtain adequate prep  - CLD today, NPO at MN except prep 89M PMHx b/l inguinal hernia repair previously recommended to have EGD/colonoscopy in 4/2023 for eval of DANG, s/p unremarkable EGD and incomplete colonoscopy 9/2023, presents with constipation, suprapubic abdominal pain and nausea/vomiting over last 3 days and found to have colon mass c/f malignancy on CT. Attempted colonoscopy on 12/1 was aborted due to poor prep (dark semi-solid stool throughout recto-sigmoid and sigmoid).    #Abnormal imaging  #Mass-like thickening in ascending and transverse colon  #Partial LBO  - doing prolonged prep for re-attempt at colonoscopy tomorrow; unclear how much of Golytely at bedside he has had (two 4000 cc doses of Golytely documented as given yesterday per MAR) and he reports stools are brown and watery  - prep is limited by bloating and abdominal discomfort, though he states he does not mind the taste of the Golytely  - patient is wondering if there is a way to "flush him out like a radiator," discussed that while NGT can be placed and Golytely administered through it, this will likely cause more bloating and abdominal discomfort than taking small sips  - unlikely he will be able to finish Golytely, would switch to miralax prep (238g, or one bottle, of miralax mixed with 64 oz of gatorade or pedialyte) if possible as this is smaller volume - please d/w pharmacy. If not possible must finish at least jug of Golytely at bedside.  - please clarify with surgery if colonoscopy is absolutely necessary; happy to attempt colonoscopy again tomorrow but if prep is still inadequate strongly doubt he will ever be able to obtain adequate prep  - CLD today, NPO at MN except prep

## 2023-12-03 NOTE — PROGRESS NOTE ADULT - SUBJECTIVE AND OBJECTIVE BOX
SURGERY DAILY PROGRESS NOTE:     SUBJECTIVE/24hr Events:     Patient seen and examined on am rounds. Pt denies abdominal pain this AM. Endorses flatus and liquid BM.      OBJECTIVE:    MEDICATIONS  (STANDING):  influenza  Vaccine (HIGH DOSE) 0.7 milliLiter(s) IntraMuscular once  ondansetron Injectable 4 milliGRAM(s) IV Push every 6 hours  pantoprazole  Injectable 40 milliGRAM(s) IV Push daily  polyethylene glycol/electrolyte Solution 240 milliLiter(s) Oral two times a day    MEDICATIONS  (PRN):  acetaminophen     Tablet .. 650 milliGRAM(s) Oral every 6 hours PRN Temp greater or equal to 38C (100.4F), Mild Pain (1 - 3)  aluminum hydroxide/magnesium hydroxide/simethicone Suspension 30 milliLiter(s) Oral every 4 hours PRN Dyspepsia  melatonin 3 milliGRAM(s) Oral at bedtime PRN Insomnia      Vital Signs Last 24 Hrs  T(C): 37 (03 Dec 2023 18:30), Max: 37 (03 Dec 2023 05:30)  T(F): 98.6 (03 Dec 2023 18:30), Max: 98.6 (03 Dec 2023 05:30)  HR: 98 (03 Dec 2023 18:30) (65 - 98)  BP: 132/80 (03 Dec 2023 18:30) (126/60 - 154/80)  BP(mean): --  RR: 18 (03 Dec 2023 18:30) (17 - 18)  SpO2: 99% (03 Dec 2023 18:30) (99% - 100%)    Parameters below as of 03 Dec 2023 18:30  Patient On (Oxygen Delivery Method): room air          I&O's Detail        Daily     Daily           LABS:                        11.4   8.33  )-----------( 322      ( 03 Dec 2023 07:08 )             35.1     12-03    136  |  98  |  10  ----------------------------<  75  4.5   |  24  |  0.89    Ca    8.7      03 Dec 2023 07:08  Phos  2.4     12-03  Mg     1.80     12-03    TPro  5.9<L>  /  Alb  3.1<L>  /  TBili  0.4  /  DBili  x   /  AST  12  /  ALT  8   /  AlkPhos  57  12-03      Urinalysis Basic - ( 03 Dec 2023 07:08 )    Color: x / Appearance: x / SG: x / pH: x  Gluc: 75 mg/dL / Ketone: x  / Bili: x / Urobili: x   Blood: x / Protein: x / Nitrite: x   Leuk Esterase: x / RBC: x / WBC x   Sq Epi: x / Non Sq Epi: x / Bacteria: x        PHYSICAL EXAM:  Constitutional:  NAD  ENMT: normal facies, symmetric  Respiratory: Normal respiratory effort   Abdomen: Mildly distended. Non tender. No rebound or guarding.   Psychiatric: oriented x 3; appropriate

## 2023-12-03 NOTE — CHART NOTE - NSCHARTNOTEFT_GEN_A_CORE
Cardiovascular Risk Stratification - RCRI =  ( 1 ).  1. History of ischemic heart disease   2. History of congestive heart failure   3. History of cerebrovascular disease (stroke or transient ischemic attack)   4. History of diabetes requiring preoperative insulin use   5. Chronic kidney disease (creatinine > 2 mg/dL)   6. Undergoing suprainguinal vascular, intraperitoneal, or intrathoracic surgery   0 predictors = 3.9%, 1 predictor = 6.0 %, 2 predictors = 10.1 %, 3 predictors = >15%    - Overall this patient is as 6% risk (for cardiac death, nonfatal myocardial infarction, and nonfatal cardiac arrest perioperatively for this intermediate risk procedure.    Metabolic equivalents >4    Patient is medically optimized from medicine team standpoint.

## 2023-12-03 NOTE — PROGRESS NOTE ADULT - PROBLEM SELECTOR PLAN 4
DVT ppx: SCDs -> enox after colonoscopy  Diet: clear liquid -> NPO at midnight  Ambulation: ad nikia  GOC conversation pending completion; he is considering DNI, not sure about DNR  Patient also has no social contact that he would like to be a decision maker for him  Will continue to engage on this topic DVT ppx: SCDs -> enox after colonoscopy  Diet: clear liquid -> NPO at midnight  Ambulation: ad nikia  GOC conversation pending completion; he is considering DNI, not sure about DNR  Patient also has no social contact that he would like to be a decision maker for him  Continue to engage on this topic; appears patient may just have to make decisions for himself as he has no one to make decisions for him DVT ppx: SCDs -> enox after colonoscopy  Diet: clear liquid -> NPO at midnight except bowel prep  Ambulation: ad nikia  GOC conversation pending completion as noted in chart; he is considering DNI, not sure about DNR  Patient also has no social contact that he would like to be a decision maker for him  Continue to engage on this topic; appears patient may just have to make decisions for himself as he has no one to make decisions for him

## 2023-12-03 NOTE — PROGRESS NOTE ADULT - PROBLEM SELECTOR PLAN 1
no BM in 3 days but passing gas. +NV. CT showing masses c/w primary colon CA  GI evaluated, planning for colonoscopy in the AM and Bx  Surgery c/s for possible resection pending path results   CT chest wo additional lesions  > cont bowel prep thru Monday 12/4 for repeat colonoscopy  > zofran scheduled for nausea with bowel prep; will f/u ECG for qtc  > okay for enemas  > cbc, cmp, coags, T&S at 3AM 12/4 no BM in 3 days but passing gas. +NV. CT showing masses c/w primary colon CA  GI evaluated, planning for colonoscopy in the AM and Bx  Surgery c/s for possible resection pending path results   CT chest wo additional lesions  > cont bowel prep thru Monday 12/4 for repeat colonoscopy  > zofran scheduled for nausea with bowel prep; f/u ECG for qtc  > okay for enemas  > cbc, cmp, coags, T&S at 3AM 12/4 no BM in 3 days but passing gas. +NV. CT showing masses c/w primary colon CA  GI evaluated, planning for colonoscopy in the AM and Bx  Surgery c/s for possible resection pending path results   CT chest wo additional lesions  > cont bowel prep thru Monday 12/4 for repeat colonoscopy  > zofran scheduled for nausea with bowel prep; f/u ECG for qtc  -repleted phosphorus  -appreciate GI recs  > okay for enemas with miralax  > cbc, cmp, coags, T&S at 3AM 12/4  -[facility] surg team planning for possible OR on Tuesday  -Medical clearance for OR as noted in chart note

## 2023-12-03 NOTE — PROGRESS NOTE ADULT - SUBJECTIVE AND OBJECTIVE BOX
Patient is a 89y old  Male who presents with a chief complaint of c/f colon cancer (03 Dec 2023 13:07)     INTERVAL HPI/OVERNIGHT EVENTS:  - No acute events overnight    SUBJECTIVE  - Patient seen and evaluated at bedside  - Patient reports presence of dull, burning abd pain, dry heaving, and nausea with attempts to drink bowel prep; has been having watery stools while on prep and liquid diet   - Patient reports absence of fevers, chills, emesis, chest pain, dyspnea, palpitations, abd pain, bloody or black-colored stools, urinary symptoms, or LE edema     MEDICATIONS  (STANDING):  influenza  Vaccine (HIGH DOSE) 0.7 milliLiter(s) IntraMuscular once  magnesium sulfate  IVPB 2 Gram(s) IV Intermittent once  ondansetron Injectable 4 milliGRAM(s) IV Push every 6 hours  pantoprazole  Injectable 40 milliGRAM(s) IV Push daily  polyethylene glycol/electrolyte Solution. 4000 milliLiter(s) Oral once  potassium phosphate / sodium phosphate Powder (PHOS-NaK) 1 Packet(s) Oral once    MEDICATIONS  (PRN):  acetaminophen     Tablet .. 650 milliGRAM(s) Oral every 6 hours PRN Temp greater or equal to 38C (100.4F), Mild Pain (1 - 3)  aluminum hydroxide/magnesium hydroxide/simethicone Suspension 30 milliLiter(s) Oral every 4 hours PRN Dyspepsia  melatonin 3 milliGRAM(s) Oral at bedtime PRN Insomnia        REVIEW OF SYSTEMS: As indicated above; otherwise, negative    VITAL SIGNS:  T(F): 98.1 (12-03-23 @ 12:35), Max: 98.6 (12-03-23 @ 05:30)  HR: 65 (12-03-23 @ 12:35) (65 - 91)  BP: 126/60 (12-03-23 @ 12:35) (126/60 - 166/83)  RR: 17 (12-03-23 @ 12:35) (17 - 18)  SpO2: 100% (12-03-23 @ 12:35) (100% - 100%)    PHYSICAL EXAM:  General: NAD  Neck: No palpable pre-auricular, post-auricular, occipital, mandibular, submental, supra-clavicular, or infra-clavicular lymph nodes   Chest: Clear to auscultation bilaterally; no rales, rhonchi, or wheezing  Heart: Regular rate and rhythm; intact S1 and S2; no murmurs, rubs, or gallops  Abd: Soft, nontender to palpation, nondistended  Nervous System: Alert and oriented to situation  Psych: Appropriate affect  Ext: no peripheral LE edema bilaterally    LABS:                        11.4   8.33  )-----------( 322      ( 03 Dec 2023 07:08 )             35.1     03 Dec 2023 07:08    136    |  98     |  10     ----------------------------<  75     4.5     |  24     |  0.89     Ca    8.7        03 Dec 2023 07:08  Phos  2.4       03 Dec 2023 07:08  Mg     1.80      03 Dec 2023 07:08    TPro  5.9    /  Alb  3.1    /  TBili  0.4    /  DBili  x      /  AST  12     /  ALT  8      /  AlkPhos  57     03 Dec 2023 07:08    CAPILLARY BLOOD GLUCOSE        BLOOD CULTURE  11-30 @ 11:51   10,000 - 49,000 CFU/mL Enterococcus faecalis  --  --    RADIOLOGY & ADDITIONAL TESTS:    Imaging Personally Reviewed:  [X ] YES     Consultant(s) Notes Reviewed:  Yes    Care Discussed with Consultants/Other Providers: Yes

## 2023-12-03 NOTE — PROGRESS NOTE ADULT - SUBJECTIVE AND OBJECTIVE BOX
Interval Events:   Colonoscopy with poor prep Friday 12/1, aborted  Has been having significant difficulty with extended bowel prep, having cramping abdominal pain and some nausea    Allergies:  penicillin (Unknown)  aspirin (Unknown)        Hospital Medications:  acetaminophen     Tablet .. 650 milliGRAM(s) Oral every 6 hours PRN  aluminum hydroxide/magnesium hydroxide/simethicone Suspension 30 milliLiter(s) Oral every 4 hours PRN  influenza  Vaccine (HIGH DOSE) 0.7 milliLiter(s) IntraMuscular once  melatonin 3 milliGRAM(s) Oral at bedtime PRN  ondansetron Injectable 4 milliGRAM(s) IV Push every 6 hours  pantoprazole  Injectable 40 milliGRAM(s) IV Push daily  polyethylene glycol/electrolyte Solution. 4000 milliLiter(s) Oral once      PMHX/PSHX:  No pertinent past medical history    BPH (benign prostatic hyperplasia)    Peripheral neuropathy    HLD (hyperlipidemia)    No significant past surgical history        Family history:  No pertinent family history in first degree relatives    No pertinent family history in first degree relatives        ROS: As per HPI, otherwise 14-point ROS reviewed and negative.      PHYSICAL EXAM:   Vital Signs:  Vital Signs Last 24 Hrs  T(C): 37 (03 Dec 2023 05:30), Max: 37 (03 Dec 2023 05:30)  T(F): 98.6 (03 Dec 2023 05:30), Max: 98.6 (03 Dec 2023 05:30)  HR: 73 (03 Dec 2023 05:30) (70 - 91)  BP: 154/80 (03 Dec 2023 05:30) (138/51 - 175/82)  BP(mean): --  RR: 18 (03 Dec 2023 05:30) (17 - 18)  SpO2: 100% (03 Dec 2023 05:30) (100% - 100%)    Parameters below as of 03 Dec 2023 05:30  Patient On (Oxygen Delivery Method): room air      GENERAL:  No acute distress  HEENT:  Normocephalic/atraumtic,  no scleral icterus  CHEST:  No accessory muscle use  HEART:  Regular rate and rhythm  ABDOMEN:  Soft, nontender, nondistended  EXTREMITIES:  No cyanosis, clubbing, or edema  SKIN:  No rash  NEURO:  Alert and oriented x 3, no asterixis, no tremor    LABS:                        11.4   8.33  )-----------( 322      ( 03 Dec 2023 07:08 )             35.1     Mean Cell Volume: 84.2 fL (12-03-23 @ 07:08)    12-03    136  |  98  |  10  ----------------------------<  75  4.5   |  24  |  0.89    Ca    8.7      03 Dec 2023 07:08  Phos  2.4     12-03  Mg     1.80     12-03    TPro  5.9<L>  /  Alb  3.1<L>  /  TBili  0.4  /  DBili  x   /  AST  12  /  ALT  8   /  AlkPhos  57  12-03    LIVER FUNCTIONS - ( 03 Dec 2023 07:08 )  Alb: 3.1 g/dL / Pro: 5.9 g/dL / ALK PHOS: 57 U/L / ALT: 8 U/L / AST: 12 U/L / GGT: x             Urinalysis Basic - ( 03 Dec 2023 07:08 )    Color: x / Appearance: x / SG: x / pH: x  Gluc: 75 mg/dL / Ketone: x  / Bili: x / Urobili: x   Blood: x / Protein: x / Nitrite: x   Leuk Esterase: x / RBC: x / WBC x   Sq Epi: x / Non Sq Epi: x / Bacteria: x                              11.4   8.33  )-----------( 322      ( 03 Dec 2023 07:08 )             35.1                         10.7   6.78  )-----------( 294      ( 02 Dec 2023 07:41 )             33.7                         11.8   10.94 )-----------( 336      ( 01 Dec 2023 01:25 )             36.5       Imaging:  CTAP  IMPRESSION:  Mass lesions involving the ascending and transverse colon with upstream   cecal and small bowel distention, consistent with primary neoplasm.    CT Chest  IMPRESSION:  No mass lesions within the lungs.    Small pericardial effusion.    Mass present within transverse colon. Mass within the ascending colon   incompletely visualized on the chest CT exam.  Findings demonstrated to   best advantage on the CT abdomen and pelvis performed same day.

## 2023-12-03 NOTE — PROGRESS NOTE ADULT - ASSESSMENT
89yoM with hx of BPH, HLD, inguinal hernia repairs, anemia, presenting with 3 days of NV and constipation with CT findings c/f primary colon malignancy undergoing staging and colonoscopy for biopsy.  89 M PMH BPH, HLD, inguinal hernia repairs, anemia, presenting with 3 days of NV and constipation with CT findings c/f primary colon malignancy undergoing staging and colonoscopy for biopsy.

## 2023-12-03 NOTE — PROGRESS NOTE ADULT - ASSESSMENT
89 M w/ PMHx GERD, BPH, HLD, open R IHR, LIH (non repaired), presents to ED with suprapubic pain. CT w/ distal ascending and proximal transverse thickening, concern for malignancy. Colorectal surgery consulted.    Plan:  -CT chest negative for mets  -CEA 81.9, CA 19-9 pending  -Plan for colonoscopy with GI tomorrow 12/4  -Patient will likely need extended R hemicolectomy - plan for possibly next week pending additional conversations with patient and OR availability  -Medical optimization for OR. Please document clearance.     [facility] Surgery  [id number] 89 M w/ PMHx GERD, BPH, HLD, open R IHR, LIH (non repaired), presents to ED with suprapubic pain. CT w/ distal ascending and proximal transverse thickening, concern for malignancy. Colorectal surgery consulted.    Plan:  -CT chest negative for mets  -CEA 81.9, CA 19-9 pending  -Plan for colonoscopy with GI tomorrow 12/4  -Patient will likely need extended R hemicolectomy - plan for possibly next week pending additional conversations with patient and OR availability  -Medical optimization for OR. Please document clearance.     [facility]  [id number]

## 2023-12-04 ENCOUNTER — TRANSCRIPTION ENCOUNTER (OUTPATIENT)
Age: 88
End: 2023-12-04

## 2023-12-04 LAB
ALBUMIN SERPL ELPH-MCNC: 3.6 G/DL — SIGNIFICANT CHANGE UP (ref 3.3–5)
ALBUMIN SERPL ELPH-MCNC: 3.6 G/DL — SIGNIFICANT CHANGE UP (ref 3.3–5)
ALP SERPL-CCNC: 64 U/L — SIGNIFICANT CHANGE UP (ref 40–120)
ALP SERPL-CCNC: 64 U/L — SIGNIFICANT CHANGE UP (ref 40–120)
ALT FLD-CCNC: 7 U/L — SIGNIFICANT CHANGE UP (ref 4–41)
ALT FLD-CCNC: 7 U/L — SIGNIFICANT CHANGE UP (ref 4–41)
ANION GAP SERPL CALC-SCNC: 20 MMOL/L — HIGH (ref 7–14)
ANION GAP SERPL CALC-SCNC: 20 MMOL/L — HIGH (ref 7–14)
APTT BLD: 27.1 SEC — SIGNIFICANT CHANGE UP (ref 24.5–35.6)
APTT BLD: 27.1 SEC — SIGNIFICANT CHANGE UP (ref 24.5–35.6)
AST SERPL-CCNC: 14 U/L — SIGNIFICANT CHANGE UP (ref 4–40)
AST SERPL-CCNC: 14 U/L — SIGNIFICANT CHANGE UP (ref 4–40)
BASOPHILS # BLD AUTO: 0.03 K/UL — SIGNIFICANT CHANGE UP (ref 0–0.2)
BASOPHILS # BLD AUTO: 0.03 K/UL — SIGNIFICANT CHANGE UP (ref 0–0.2)
BASOPHILS NFR BLD AUTO: 0.2 % — SIGNIFICANT CHANGE UP (ref 0–2)
BASOPHILS NFR BLD AUTO: 0.2 % — SIGNIFICANT CHANGE UP (ref 0–2)
BILIRUB SERPL-MCNC: 0.4 MG/DL — SIGNIFICANT CHANGE UP (ref 0.2–1.2)
BILIRUB SERPL-MCNC: 0.4 MG/DL — SIGNIFICANT CHANGE UP (ref 0.2–1.2)
BLD GP AB SCN SERPL QL: NEGATIVE — SIGNIFICANT CHANGE UP
BLD GP AB SCN SERPL QL: NEGATIVE — SIGNIFICANT CHANGE UP
BUN SERPL-MCNC: 10 MG/DL — SIGNIFICANT CHANGE UP (ref 7–23)
BUN SERPL-MCNC: 10 MG/DL — SIGNIFICANT CHANGE UP (ref 7–23)
CALCIUM SERPL-MCNC: 8.9 MG/DL — SIGNIFICANT CHANGE UP (ref 8.4–10.5)
CALCIUM SERPL-MCNC: 8.9 MG/DL — SIGNIFICANT CHANGE UP (ref 8.4–10.5)
CHLORIDE SERPL-SCNC: 96 MMOL/L — LOW (ref 98–107)
CHLORIDE SERPL-SCNC: 96 MMOL/L — LOW (ref 98–107)
CO2 SERPL-SCNC: 23 MMOL/L — SIGNIFICANT CHANGE UP (ref 22–31)
CO2 SERPL-SCNC: 23 MMOL/L — SIGNIFICANT CHANGE UP (ref 22–31)
CREAT SERPL-MCNC: 0.8 MG/DL — SIGNIFICANT CHANGE UP (ref 0.5–1.3)
CREAT SERPL-MCNC: 0.8 MG/DL — SIGNIFICANT CHANGE UP (ref 0.5–1.3)
EGFR: 84 ML/MIN/1.73M2 — SIGNIFICANT CHANGE UP
EGFR: 84 ML/MIN/1.73M2 — SIGNIFICANT CHANGE UP
EOSINOPHIL # BLD AUTO: 0 K/UL — SIGNIFICANT CHANGE UP (ref 0–0.5)
EOSINOPHIL # BLD AUTO: 0 K/UL — SIGNIFICANT CHANGE UP (ref 0–0.5)
EOSINOPHIL NFR BLD AUTO: 0 % — SIGNIFICANT CHANGE UP (ref 0–6)
EOSINOPHIL NFR BLD AUTO: 0 % — SIGNIFICANT CHANGE UP (ref 0–6)
GLUCOSE SERPL-MCNC: 108 MG/DL — HIGH (ref 70–99)
GLUCOSE SERPL-MCNC: 108 MG/DL — HIGH (ref 70–99)
HCT VFR BLD CALC: 39.8 % — SIGNIFICANT CHANGE UP (ref 39–50)
HCT VFR BLD CALC: 39.8 % — SIGNIFICANT CHANGE UP (ref 39–50)
HGB BLD-MCNC: 13 G/DL — SIGNIFICANT CHANGE UP (ref 13–17)
HGB BLD-MCNC: 13 G/DL — SIGNIFICANT CHANGE UP (ref 13–17)
IANC: 12.98 K/UL — HIGH (ref 1.8–7.4)
IANC: 12.98 K/UL — HIGH (ref 1.8–7.4)
IMM GRANULOCYTES NFR BLD AUTO: 0.6 % — SIGNIFICANT CHANGE UP (ref 0–0.9)
IMM GRANULOCYTES NFR BLD AUTO: 0.6 % — SIGNIFICANT CHANGE UP (ref 0–0.9)
INR BLD: 0.98 RATIO — SIGNIFICANT CHANGE UP (ref 0.85–1.18)
INR BLD: 0.98 RATIO — SIGNIFICANT CHANGE UP (ref 0.85–1.18)
LYMPHOCYTES # BLD AUTO: 0.71 K/UL — LOW (ref 1–3.3)
LYMPHOCYTES # BLD AUTO: 0.71 K/UL — LOW (ref 1–3.3)
LYMPHOCYTES # BLD AUTO: 4.7 % — LOW (ref 13–44)
LYMPHOCYTES # BLD AUTO: 4.7 % — LOW (ref 13–44)
MAGNESIUM SERPL-MCNC: 2 MG/DL — SIGNIFICANT CHANGE UP (ref 1.6–2.6)
MAGNESIUM SERPL-MCNC: 2 MG/DL — SIGNIFICANT CHANGE UP (ref 1.6–2.6)
MCHC RBC-ENTMCNC: 27.3 PG — SIGNIFICANT CHANGE UP (ref 27–34)
MCHC RBC-ENTMCNC: 27.3 PG — SIGNIFICANT CHANGE UP (ref 27–34)
MCHC RBC-ENTMCNC: 32.7 GM/DL — SIGNIFICANT CHANGE UP (ref 32–36)
MCHC RBC-ENTMCNC: 32.7 GM/DL — SIGNIFICANT CHANGE UP (ref 32–36)
MCV RBC AUTO: 83.4 FL — SIGNIFICANT CHANGE UP (ref 80–100)
MCV RBC AUTO: 83.4 FL — SIGNIFICANT CHANGE UP (ref 80–100)
MONOCYTES # BLD AUTO: 1.15 K/UL — HIGH (ref 0–0.9)
MONOCYTES # BLD AUTO: 1.15 K/UL — HIGH (ref 0–0.9)
MONOCYTES NFR BLD AUTO: 7.7 % — SIGNIFICANT CHANGE UP (ref 2–14)
MONOCYTES NFR BLD AUTO: 7.7 % — SIGNIFICANT CHANGE UP (ref 2–14)
NEUTROPHILS # BLD AUTO: 12.98 K/UL — HIGH (ref 1.8–7.4)
NEUTROPHILS # BLD AUTO: 12.98 K/UL — HIGH (ref 1.8–7.4)
NEUTROPHILS NFR BLD AUTO: 86.8 % — HIGH (ref 43–77)
NEUTROPHILS NFR BLD AUTO: 86.8 % — HIGH (ref 43–77)
NRBC # BLD: 0 /100 WBCS — SIGNIFICANT CHANGE UP (ref 0–0)
NRBC # BLD: 0 /100 WBCS — SIGNIFICANT CHANGE UP (ref 0–0)
NRBC # FLD: 0 K/UL — SIGNIFICANT CHANGE UP (ref 0–0)
NRBC # FLD: 0 K/UL — SIGNIFICANT CHANGE UP (ref 0–0)
PHOSPHATE SERPL-MCNC: 3 MG/DL — SIGNIFICANT CHANGE UP (ref 2.5–4.5)
PHOSPHATE SERPL-MCNC: 3 MG/DL — SIGNIFICANT CHANGE UP (ref 2.5–4.5)
PLATELET # BLD AUTO: 424 K/UL — HIGH (ref 150–400)
PLATELET # BLD AUTO: 424 K/UL — HIGH (ref 150–400)
POTASSIUM SERPL-MCNC: 3.8 MMOL/L — SIGNIFICANT CHANGE UP (ref 3.5–5.3)
POTASSIUM SERPL-MCNC: 3.8 MMOL/L — SIGNIFICANT CHANGE UP (ref 3.5–5.3)
POTASSIUM SERPL-SCNC: 3.8 MMOL/L — SIGNIFICANT CHANGE UP (ref 3.5–5.3)
POTASSIUM SERPL-SCNC: 3.8 MMOL/L — SIGNIFICANT CHANGE UP (ref 3.5–5.3)
PROT SERPL-MCNC: 6.5 G/DL — SIGNIFICANT CHANGE UP (ref 6–8.3)
PROT SERPL-MCNC: 6.5 G/DL — SIGNIFICANT CHANGE UP (ref 6–8.3)
PROTHROM AB SERPL-ACNC: 11 SEC — SIGNIFICANT CHANGE UP (ref 9.5–13)
PROTHROM AB SERPL-ACNC: 11 SEC — SIGNIFICANT CHANGE UP (ref 9.5–13)
RBC # BLD: 4.77 M/UL — SIGNIFICANT CHANGE UP (ref 4.2–5.8)
RBC # BLD: 4.77 M/UL — SIGNIFICANT CHANGE UP (ref 4.2–5.8)
RBC # FLD: 13.7 % — SIGNIFICANT CHANGE UP (ref 10.3–14.5)
RBC # FLD: 13.7 % — SIGNIFICANT CHANGE UP (ref 10.3–14.5)
RH IG SCN BLD-IMP: NEGATIVE — SIGNIFICANT CHANGE UP
RH IG SCN BLD-IMP: NEGATIVE — SIGNIFICANT CHANGE UP
SODIUM SERPL-SCNC: 139 MMOL/L — SIGNIFICANT CHANGE UP (ref 135–145)
SODIUM SERPL-SCNC: 139 MMOL/L — SIGNIFICANT CHANGE UP (ref 135–145)
WBC # BLD: 14.96 K/UL — HIGH (ref 3.8–10.5)
WBC # BLD: 14.96 K/UL — HIGH (ref 3.8–10.5)
WBC # FLD AUTO: 14.96 K/UL — HIGH (ref 3.8–10.5)
WBC # FLD AUTO: 14.96 K/UL — HIGH (ref 3.8–10.5)

## 2023-12-04 PROCEDURE — 74019 RADEX ABDOMEN 2 VIEWS: CPT | Mod: 26

## 2023-12-04 PROCEDURE — 45380 COLONOSCOPY AND BIOPSY: CPT | Mod: 52

## 2023-12-04 PROCEDURE — 88305 TISSUE EXAM BY PATHOLOGIST: CPT | Mod: 26

## 2023-12-04 PROCEDURE — 99233 SBSQ HOSP IP/OBS HIGH 50: CPT | Mod: GC

## 2023-12-04 RX ORDER — MORPHINE SULFATE 50 MG/1
0.5 CAPSULE, EXTENDED RELEASE ORAL ONCE
Refills: 0 | Status: DISCONTINUED | OUTPATIENT
Start: 2023-12-04 | End: 2023-12-04

## 2023-12-04 RX ORDER — NEOMYCIN SULFATE 500 MG/1
500 TABLET ORAL ONCE
Refills: 0 | Status: COMPLETED | OUTPATIENT
Start: 2023-12-05 | End: 2023-12-05

## 2023-12-04 RX ORDER — METRONIDAZOLE 500 MG
250 TABLET ORAL ONCE
Refills: 0 | Status: COMPLETED | OUTPATIENT
Start: 2023-12-04 | End: 2023-12-04

## 2023-12-04 RX ORDER — NEOMYCIN SULFATE 500 MG/1
500 TABLET ORAL ONCE
Refills: 0 | Status: COMPLETED | OUTPATIENT
Start: 2023-12-04 | End: 2023-12-04

## 2023-12-04 RX ORDER — METRONIDAZOLE 500 MG
250 TABLET ORAL ONCE
Refills: 0 | Status: COMPLETED | OUTPATIENT
Start: 2023-12-05 | End: 2023-12-05

## 2023-12-04 RX ORDER — NEOMYCIN SULFATE 500 MG/1
500 TABLET ORAL ONCE
Refills: 0 | Status: DISCONTINUED | OUTPATIENT
Start: 2023-12-04 | End: 2023-12-04

## 2023-12-04 RX ADMIN — ONDANSETRON 4 MILLIGRAM(S): 8 TABLET, FILM COATED ORAL at 17:41

## 2023-12-04 RX ADMIN — ONDANSETRON 4 MILLIGRAM(S): 8 TABLET, FILM COATED ORAL at 05:27

## 2023-12-04 RX ADMIN — Medication 250 MILLIGRAM(S): at 17:41

## 2023-12-04 RX ADMIN — ONDANSETRON 4 MILLIGRAM(S): 8 TABLET, FILM COATED ORAL at 11:30

## 2023-12-04 RX ADMIN — Medication 250 MILLIGRAM(S): at 20:45

## 2023-12-04 RX ADMIN — PANTOPRAZOLE SODIUM 40 MILLIGRAM(S): 20 TABLET, DELAYED RELEASE ORAL at 11:29

## 2023-12-04 RX ADMIN — MORPHINE SULFATE 0.5 MILLIGRAM(S): 50 CAPSULE, EXTENDED RELEASE ORAL at 10:24

## 2023-12-04 RX ADMIN — MORPHINE SULFATE 0.5 MILLIGRAM(S): 50 CAPSULE, EXTENDED RELEASE ORAL at 09:56

## 2023-12-04 RX ADMIN — NEOMYCIN SULFATE 500 MILLIGRAM(S): 500 TABLET ORAL at 22:30

## 2023-12-04 RX ADMIN — NEOMYCIN SULFATE 500 MILLIGRAM(S): 500 TABLET ORAL at 17:41

## 2023-12-04 NOTE — PROGRESS NOTE ADULT - SUBJECTIVE AND OBJECTIVE BOX
Surgery Progress Note     Subjective:  Patient seen and examined on AM rounds. Patient was nauseous and dry heaving throughout the night, did not drink any additional prep. Endorses ongoing suprapubic intermittent burning/pain.      Vital Signs:  Vital Signs Last 24 Hrs  T(C): 36.7 (04 Dec 2023 05:41), Max: 37 (03 Dec 2023 18:30)  T(F): 98 (04 Dec 2023 05:41), Max: 98.6 (03 Dec 2023 18:30)  HR: 88 (04 Dec 2023 05:41) (65 - 98)  BP: 175/77 (04 Dec 2023 05:41) (126/60 - 175/77)  BP(mean): --  RR: 18 (04 Dec 2023 05:41) (17 - 18)  SpO2: 96% (04 Dec 2023 05:41) (96% - 100%)    Parameters below as of 04 Dec 2023 05:41  Patient On (Oxygen Delivery Method): room air        CAPILLARY BLOOD GLUCOSE          I&O's Detail        Physical Exam:  General: NAD, resting comfortably in bed  HEENT: Normocephalic atraumatic  Respiratory: Nonlabored respirations  Cardio: regular rate  Abdomen: soft, mildly distended, nontender, no rebound or guarding  Vascular: extremities are warm and well perfused      Labs:    12-03    136  |  98  |  10  ----------------------------<  75  4.5   |  24  |  0.89    Ca    8.7      03 Dec 2023 07:08  Phos  2.4     12-03  Mg     1.80     12-03    TPro  5.9<L>  /  Alb  3.1<L>  /  TBili  0.4  /  DBili  x   /  AST  12  /  ALT  8   /  AlkPhos  57  12-03    LIVER FUNCTIONS - ( 03 Dec 2023 07:08 )  Alb: 3.1 g/dL / Pro: 5.9 g/dL / ALK PHOS: 57 U/L / ALT: 8 U/L / AST: 12 U/L / GGT: x                                 11.4   8.33  )-----------( 322      ( 03 Dec 2023 07:08 )             35.1

## 2023-12-04 NOTE — PROGRESS NOTE ADULT - PROBLEM SELECTOR PLAN 1
no BM in 3 days but passing gas. +NV. CT showing masses c/w primary colon CA  GI evaluated, planning for colonoscopy in the AM and Bx  Surgery c/s for possible resection pending path results   CT chest wo additional lesions  > cont bowel prep thru Monday 12/4 for repeat colonoscopy  > zofran scheduled for nausea with bowel prep; f/u ECG for qtc  -repleted phosphorus  -appreciate GI recs  > okay for enemas with miralax  > cbc, cmp, coags, T&S at 3AM 12/4  -[facility] surg team planning for possible OR on Tuesday  -Medical clearance for OR as noted in chart note no BM in 3 days but passing gas. +NV. CT showing masses c/w primary colon CA  GI evaluated, planning for colonoscopy in the AM and Bx  Surgery c/s for possible resection pending path results   CT chest wo additional lesions  > cont bowel prep thru Monday 12/4 for repeat colonoscopy  > zofran scheduled for nausea with bowel prep; f/u ECG for qtc  -repleted phosphorus  -appreciate GI recs  > okay for enemas with miralax  > cbc, cmp, coags, T&S at 3AM 12/5  - pending abdominal XR   -[facility] surg team planning for OR on Tuesday  -Medical clearance for OR as noted in chart note

## 2023-12-04 NOTE — PROGRESS NOTE ADULT - SUBJECTIVE AND OBJECTIVE BOX
************************  Ese Mercer MD  Internal Medicine PGY-1  ************************    Patient is a 89y old  Male who presents with a chief complaint of c/f colon cancer (03 Dec 2023 13:07)    Overnight no events, this morning patient with no acute complaints.Denies CP, SOB, fevers/chills, N/V, or abdominal pain.  Patient urinating well with BM(s).      Patient reminded of ongoing careplan.    MEDICATIONS  (STANDING):  influenza  Vaccine (HIGH DOSE) 0.7 milliLiter(s) IntraMuscular once  ondansetron Injectable 4 milliGRAM(s) IV Push every 6 hours  pantoprazole  Injectable 40 milliGRAM(s) IV Push daily  polyethylene glycol/electrolyte Solution 240 milliLiter(s) Oral two times a day    MEDICATIONS  (PRN):  acetaminophen     Tablet .. 650 milliGRAM(s) Oral every 6 hours PRN Temp greater or equal to 38C (100.4F), Mild Pain (1 - 3)  aluminum hydroxide/magnesium hydroxide/simethicone Suspension 30 milliLiter(s) Oral every 4 hours PRN Dyspepsia  melatonin 3 milliGRAM(s) Oral at bedtime PRN Insomnia      CAPILLARY BLOOD GLUCOSE        I&O's Summary      PHYSICAL EXAM:  Vital Signs Last 24 Hrs  T(C): 36.7 (04 Dec 2023 05:41), Max: 37 (03 Dec 2023 18:30)  T(F): 98 (04 Dec 2023 05:41), Max: 98.6 (03 Dec 2023 18:30)  HR: 88 (04 Dec 2023 05:41) (65 - 98)  BP: 175/77 (04 Dec 2023 05:41) (126/60 - 175/77)  BP(mean): --  RR: 18 (04 Dec 2023 05:41) (17 - 18)  SpO2: 96% (04 Dec 2023 05:41) (96% - 100%)    Parameters below as of 04 Dec 2023 05:41  Patient On (Oxygen Delivery Method): room air        PHYSICAL EXAM:  GENERAL: NAD, lying in bed comfortably  HEENT: NC/AT  NECK: Supple, No JVD  CHEST/LUNG: CTAB, no increased WOB  HEART: RRR, no m/r/g  ABDOMEN: soft, NT, ND, BS+  EXTREMITIES:  2+ peripheral pulses, no edema  NERVOUS SYSTEM:  A&Ox3  MSK: FROM all 4 extremities, full and equal strength  SKIN: No rashes or lesions    LABS:                        11.4   8.33  )-----------( 322      ( 03 Dec 2023 07:08 )             35.1     12-03    136  |  98  |  10  ----------------------------<  75  4.5   |  24  |  0.89    Ca    8.7      03 Dec 2023 07:08  Phos  2.4     12-03  Mg     1.80     12-03    TPro  5.9<L>  /  Alb  3.1<L>  /  TBili  0.4  /  DBili  x   /  AST  12  /  ALT  8   /  AlkPhos  57  12-03          Urinalysis Basic - ( 03 Dec 2023 07:08 )    Color: x / Appearance: x / SG: x / pH: x  Gluc: 75 mg/dL / Ketone: x  / Bili: x / Urobili: x   Blood: x / Protein: x / Nitrite: x   Leuk Esterase: x / RBC: x / WBC x   Sq Epi: x / Non Sq Epi: x / Bacteria: x           ************************  Ese Mercer MD  Internal Medicine PGY-1  ************************    Patient is a 89y old  Male who presents with a chief complaint of c/f colon cancer (03 Dec 2023 13:07)    Overnight complained of abdominal pain and  this morning patient with no acute complaints.Denies CP, SOB, fevers/chills, N/V, or abdominal pain.  Patient urinating well with BM(s).      Patient reminded of ongoing careplan.    MEDICATIONS  (STANDING):  influenza  Vaccine (HIGH DOSE) 0.7 milliLiter(s) IntraMuscular once  ondansetron Injectable 4 milliGRAM(s) IV Push every 6 hours  pantoprazole  Injectable 40 milliGRAM(s) IV Push daily  polyethylene glycol/electrolyte Solution 240 milliLiter(s) Oral two times a day    MEDICATIONS  (PRN):  acetaminophen     Tablet .. 650 milliGRAM(s) Oral every 6 hours PRN Temp greater or equal to 38C (100.4F), Mild Pain (1 - 3)  aluminum hydroxide/magnesium hydroxide/simethicone Suspension 30 milliLiter(s) Oral every 4 hours PRN Dyspepsia  melatonin 3 milliGRAM(s) Oral at bedtime PRN Insomnia      CAPILLARY BLOOD GLUCOSE        I&O's Summary      PHYSICAL EXAM:  Vital Signs Last 24 Hrs  T(C): 36.7 (04 Dec 2023 05:41), Max: 37 (03 Dec 2023 18:30)  T(F): 98 (04 Dec 2023 05:41), Max: 98.6 (03 Dec 2023 18:30)  HR: 88 (04 Dec 2023 05:41) (65 - 98)  BP: 175/77 (04 Dec 2023 05:41) (126/60 - 175/77)  BP(mean): --  RR: 18 (04 Dec 2023 05:41) (17 - 18)  SpO2: 96% (04 Dec 2023 05:41) (96% - 100%)    Parameters below as of 04 Dec 2023 05:41  Patient On (Oxygen Delivery Method): room air        PHYSICAL EXAM:  GENERAL: NAD, lying in bed comfortably  HEENT: NC/AT  NECK: Supple, No JVD  CHEST/LUNG: CTAB, no increased WOB  HEART: RRR, no m/r/g  ABDOMEN: soft, NT, ND, BS+  EXTREMITIES:  2+ peripheral pulses, no edema  NERVOUS SYSTEM:  A&Ox3  MSK: FROM all 4 extremities, full and equal strength  SKIN: No rashes or lesions    LABS:                        11.4   8.33  )-----------( 322      ( 03 Dec 2023 07:08 )             35.1     12-03    136  |  98  |  10  ----------------------------<  75  4.5   |  24  |  0.89    Ca    8.7      03 Dec 2023 07:08  Phos  2.4     12-03  Mg     1.80     12-03    TPro  5.9<L>  /  Alb  3.1<L>  /  TBili  0.4  /  DBili  x   /  AST  12  /  ALT  8   /  AlkPhos  57  12-03          Urinalysis Basic - ( 03 Dec 2023 07:08 )    Color: x / Appearance: x / SG: x / pH: x  Gluc: 75 mg/dL / Ketone: x  / Bili: x / Urobili: x   Blood: x / Protein: x / Nitrite: x   Leuk Esterase: x / RBC: x / WBC x   Sq Epi: x / Non Sq Epi: x / Bacteria: x           ************************  Ese Mercer MD  Internal Medicine PGY-1  ************************    Patient is a 89y old  Male who presents with a chief complaint of c/f colon cancer (03 Dec 2023 13:07)    Overnight complained of abdominal pain and nausea. This morning pt complains of abdominal pain and nausea that is perssisting. Denies CP, sob,  this morning patient with no acute complaints.Denies CP, SOB, fevers/chills, N/V, or abdominal pain.  Patient urinating well with BM(s).      Patient reminded of ongoing careplan.    MEDICATIONS  (STANDING):  influenza  Vaccine (HIGH DOSE) 0.7 milliLiter(s) IntraMuscular once  ondansetron Injectable 4 milliGRAM(s) IV Push every 6 hours  pantoprazole  Injectable 40 milliGRAM(s) IV Push daily  polyethylene glycol/electrolyte Solution 240 milliLiter(s) Oral two times a day    MEDICATIONS  (PRN):  acetaminophen     Tablet .. 650 milliGRAM(s) Oral every 6 hours PRN Temp greater or equal to 38C (100.4F), Mild Pain (1 - 3)  aluminum hydroxide/magnesium hydroxide/simethicone Suspension 30 milliLiter(s) Oral every 4 hours PRN Dyspepsia  melatonin 3 milliGRAM(s) Oral at bedtime PRN Insomnia      CAPILLARY BLOOD GLUCOSE        I&O's Summary      PHYSICAL EXAM:  Vital Signs Last 24 Hrs  T(C): 36.7 (04 Dec 2023 05:41), Max: 37 (03 Dec 2023 18:30)  T(F): 98 (04 Dec 2023 05:41), Max: 98.6 (03 Dec 2023 18:30)  HR: 88 (04 Dec 2023 05:41) (65 - 98)  BP: 175/77 (04 Dec 2023 05:41) (126/60 - 175/77)  BP(mean): --  RR: 18 (04 Dec 2023 05:41) (17 - 18)  SpO2: 96% (04 Dec 2023 05:41) (96% - 100%)    Parameters below as of 04 Dec 2023 05:41  Patient On (Oxygen Delivery Method): room air        PHYSICAL EXAM:  GENERAL: NAD, lying in bed comfortably  HEENT: NC/AT  NECK: Supple, No JVD  CHEST/LUNG: CTAB, no increased WOB  HEART: RRR, no m/r/g  ABDOMEN: soft, NT, ND, BS+  EXTREMITIES:  2+ peripheral pulses, no edema  NERVOUS SYSTEM:  A&Ox3  MSK: FROM all 4 extremities, full and equal strength  SKIN: No rashes or lesions    LABS:                        11.4   8.33  )-----------( 322      ( 03 Dec 2023 07:08 )             35.1     12-03    136  |  98  |  10  ----------------------------<  75  4.5   |  24  |  0.89    Ca    8.7      03 Dec 2023 07:08  Phos  2.4     12-03  Mg     1.80     12-03    TPro  5.9<L>  /  Alb  3.1<L>  /  TBili  0.4  /  DBili  x   /  AST  12  /  ALT  8   /  AlkPhos  57  12-03          Urinalysis Basic - ( 03 Dec 2023 07:08 )    Color: x / Appearance: x / SG: x / pH: x  Gluc: 75 mg/dL / Ketone: x  / Bili: x / Urobili: x   Blood: x / Protein: x / Nitrite: x   Leuk Esterase: x / RBC: x / WBC x   Sq Epi: x / Non Sq Epi: x / Bacteria: x           ************************  Ese Mercer MD  Internal Medicine PGY-1  ************************    Patient is a 89y old  Male who presents with a chief complaint of c/f colon cancer (03 Dec 2023 13:07)    Overnight complained of abdominal pain and nausea. Unable to tolerate colonoscopy prep. This morning pt complains of abdominal pain and nausea that is persisting. Denies CP, sob,  Patient reminded of ongoing careplan.    MEDICATIONS  (STANDING):  influenza  Vaccine (HIGH DOSE) 0.7 milliLiter(s) IntraMuscular once  ondansetron Injectable 4 milliGRAM(s) IV Push every 6 hours  pantoprazole  Injectable 40 milliGRAM(s) IV Push daily  polyethylene glycol/electrolyte Solution 240 milliLiter(s) Oral two times a day    MEDICATIONS  (PRN):  acetaminophen     Tablet .. 650 milliGRAM(s) Oral every 6 hours PRN Temp greater or equal to 38C (100.4F), Mild Pain (1 - 3)  aluminum hydroxide/magnesium hydroxide/simethicone Suspension 30 milliLiter(s) Oral every 4 hours PRN Dyspepsia  melatonin 3 milliGRAM(s) Oral at bedtime PRN Insomnia      CAPILLARY BLOOD GLUCOSE        I&O's Summary      PHYSICAL EXAM:  Vital Signs Last 24 Hrs  T(C): 36.7 (04 Dec 2023 05:41), Max: 37 (03 Dec 2023 18:30)  T(F): 98 (04 Dec 2023 05:41), Max: 98.6 (03 Dec 2023 18:30)  HR: 88 (04 Dec 2023 05:41) (65 - 98)  BP: 175/77 (04 Dec 2023 05:41) (126/60 - 175/77)  BP(mean): --  RR: 18 (04 Dec 2023 05:41) (17 - 18)  SpO2: 96% (04 Dec 2023 05:41) (96% - 100%)    Parameters below as of 04 Dec 2023 05:41  Patient On (Oxygen Delivery Method): room air        PHYSICAL EXAM:  GENERAL: NAD, lying in bed comfortably  HEENT: NC/AT  NECK: Supple, No JVD  CHEST/LUNG: CTAB, no increased WOB  HEART: RRR, no m/r/g  ABDOMEN: soft, NT, ND, BS+  EXTREMITIES:  2+ peripheral pulses, no edema  NERVOUS SYSTEM:  A&Ox3  MSK: FROM all 4 extremities, full and equal strength  SKIN: No rashes or lesions    LABS:                        11.4   8.33  )-----------( 322      ( 03 Dec 2023 07:08 )             35.1     12-03    136  |  98  |  10  ----------------------------<  75  4.5   |  24  |  0.89    Ca    8.7      03 Dec 2023 07:08  Phos  2.4     12-03  Mg     1.80     12-03    TPro  5.9<L>  /  Alb  3.1<L>  /  TBili  0.4  /  DBili  x   /  AST  12  /  ALT  8   /  AlkPhos  57  12-03          Urinalysis Basic - ( 03 Dec 2023 07:08 )    Color: x / Appearance: x / SG: x / pH: x  Gluc: 75 mg/dL / Ketone: x  / Bili: x / Urobili: x   Blood: x / Protein: x / Nitrite: x   Leuk Esterase: x / RBC: x / WBC x   Sq Epi: x / Non Sq Epi: x / Bacteria: x           ************************  Ese Mercer MD  Internal Medicine PGY-1  ************************    Patient is a 89y old  Male who presents with a chief complaint of c/f colon cancer (03 Dec 2023 13:07)    Overnight complained of abdominal pain and nausea. Unable to tolerate colonoscopy prep. This morning pt complains of abdominal pain and nausea that is persisting. Denies CP, sob,  Patient reminded of ongoing care plan.    MEDICATIONS  (STANDING):  influenza  Vaccine (HIGH DOSE) 0.7 milliLiter(s) IntraMuscular once  ondansetron Injectable 4 milliGRAM(s) IV Push every 6 hours  pantoprazole  Injectable 40 milliGRAM(s) IV Push daily  polyethylene glycol/electrolyte Solution 240 milliLiter(s) Oral two times a day    MEDICATIONS  (PRN):  acetaminophen     Tablet .. 650 milliGRAM(s) Oral every 6 hours PRN Temp greater or equal to 38C (100.4F), Mild Pain (1 - 3)  aluminum hydroxide/magnesium hydroxide/simethicone Suspension 30 milliLiter(s) Oral every 4 hours PRN Dyspepsia  melatonin 3 milliGRAM(s) Oral at bedtime PRN Insomnia      CAPILLARY BLOOD GLUCOSE        I&O's Summary      PHYSICAL EXAM:  Vital Signs Last 24 Hrs  T(C): 36.7 (04 Dec 2023 05:41), Max: 37 (03 Dec 2023 18:30)  T(F): 98 (04 Dec 2023 05:41), Max: 98.6 (03 Dec 2023 18:30)  HR: 88 (04 Dec 2023 05:41) (65 - 98)  BP: 175/77 (04 Dec 2023 05:41) (126/60 - 175/77)  BP(mean): --  RR: 18 (04 Dec 2023 05:41) (17 - 18)  SpO2: 96% (04 Dec 2023 05:41) (96% - 100%)    Parameters below as of 04 Dec 2023 05:41  Patient On (Oxygen Delivery Method): room air        PHYSICAL EXAM:  GENERAL: NAD, lying in bed comfortably  HEENT: NC/AT  NECK: Supple, No JVD  CHEST/LUNG: CTAB, no increased WOB  HEART: RRR, no m/r/g  ABDOMEN: soft, NT, ND, BS+  EXTREMITIES:  2+ peripheral pulses, no edema  NERVOUS SYSTEM:  A&Ox3  MSK: FROM all 4 extremities, full and equal strength  SKIN: No rashes or lesions    LABS:                        11.4   8.33  )-----------( 322      ( 03 Dec 2023 07:08 )             35.1     12-03    136  |  98  |  10  ----------------------------<  75  4.5   |  24  |  0.89    Ca    8.7      03 Dec 2023 07:08  Phos  2.4     12-03  Mg     1.80     12-03    TPro  5.9<L>  /  Alb  3.1<L>  /  TBili  0.4  /  DBili  x   /  AST  12  /  ALT  8   /  AlkPhos  57  12-03          Urinalysis Basic - ( 03 Dec 2023 07:08 )    Color: x / Appearance: x / SG: x / pH: x  Gluc: 75 mg/dL / Ketone: x  / Bili: x / Urobili: x   Blood: x / Protein: x / Nitrite: x   Leuk Esterase: x / RBC: x / WBC x   Sq Epi: x / Non Sq Epi: x / Bacteria: x

## 2023-12-04 NOTE — PROGRESS NOTE ADULT - ASSESSMENT
89 M PMH BPH, HLD, inguinal hernia repairs, anemia, presenting with 3 days of NV and constipation with CT findings c/f primary colon malignancy undergoing staging and colonoscopy for biopsy.

## 2023-12-04 NOTE — PROGRESS NOTE ADULT - ASSESSMENT
89 M w/ PMHx GERD, BPH, HLD, open R IHR, LIH (non repaired), presents to ED with suprapubic pain. CT w/ distal ascending and proximal transverse thickening, concern for malignancy. Colorectal surgery consulted.    Plan:  -CT chest without evidence of metastasis  -CEA 81.9, CA 19-9 225  -Plan for colonoscopy with GI today, unsure if possible with current prep  -Patient planned for surgery tomorrow 12/5  -Please preop patient - 4AM labs (CBC, BMP, coags, active type and screen), NPO at midnight  -Medical optimization documented 12/3    A Team Surgery  [id number] 89 M w/ PMHx GERD, BPH, HLD, open R IHR, LIH (non repaired), presents to ED with suprapubic pain. CT w/ distal ascending and proximal transverse thickening, concern for malignancy. Colorectal surgery consulted.    Plan:  -CT chest without evidence of metastasis  -CEA 81.9, CA 19-9 225  -Plan for colonoscopy with GI today, unsure if possible with current prep  -Patient planned for surgery tomorrow 12/5  -Please preop patient - 4AM labs (CBC, BMP, coags, active type and screen), NPO at midnight  -Medical optimization documented 12/3    A Team Surgery  [id number] 89 M w/ PMHx GERD, BPH, HLD, open R IHR, LIH (non repaired), presents to ED with suprapubic pain. CT w/ distal ascending and proximal transverse thickening, concern for malignancy. Colorectal surgery consulted.    Plan:  -CT chest without evidence of metastasis  -CEA 81.9, CA 19-9 225  -Plan for colonoscopy with GI today, unsure if possible with current prep  -Patient planned for surgery tomorrow 12/5 at 12pm  -Please preop patient - 4AM labs (CBC, BMP, coags, active type and screen), NPO at midnight  -Medical optimization documented 12/3    A Team Surgery  [id number] 89 M w/ PMHx GERD, BPH, HLD, open R IHR, LIH (non repaired), presents to ED with suprapubic pain. CT w/ distal ascending and proximal transverse thickening, concern for malignancy. Colorectal surgery consulted.    Plan:  -CT chest without evidence of metastasis  -CEA 81.9, CA 19-9 225  -Plan for colonoscopy with GI today, unsure if possible with current prep  -Patient planned for surgery tomorrow 12/5 at 12pm  -Please preop patient - 4AM labs (CBC, BMP, coags, active type and screen), NPO at midnight  -Medical optimization documented 12/3    A Team Surgery  [id number]

## 2023-12-05 ENCOUNTER — TRANSCRIPTION ENCOUNTER (OUTPATIENT)
Age: 88
End: 2023-12-05

## 2023-12-05 LAB
ANION GAP SERPL CALC-SCNC: 13 MMOL/L — SIGNIFICANT CHANGE UP (ref 7–14)
ANION GAP SERPL CALC-SCNC: 13 MMOL/L — SIGNIFICANT CHANGE UP (ref 7–14)
APTT BLD: 27.4 SEC — SIGNIFICANT CHANGE UP (ref 24.5–35.6)
APTT BLD: 27.4 SEC — SIGNIFICANT CHANGE UP (ref 24.5–35.6)
BASOPHILS # BLD AUTO: 0.01 K/UL — SIGNIFICANT CHANGE UP (ref 0–0.2)
BASOPHILS # BLD AUTO: 0.01 K/UL — SIGNIFICANT CHANGE UP (ref 0–0.2)
BASOPHILS NFR BLD AUTO: 0.1 % — SIGNIFICANT CHANGE UP (ref 0–2)
BASOPHILS NFR BLD AUTO: 0.1 % — SIGNIFICANT CHANGE UP (ref 0–2)
BLD GP AB SCN SERPL QL: NEGATIVE — SIGNIFICANT CHANGE UP
BLD GP AB SCN SERPL QL: NEGATIVE — SIGNIFICANT CHANGE UP
BUN SERPL-MCNC: 15 MG/DL — SIGNIFICANT CHANGE UP (ref 7–23)
BUN SERPL-MCNC: 15 MG/DL — SIGNIFICANT CHANGE UP (ref 7–23)
CALCIUM SERPL-MCNC: 8.5 MG/DL — SIGNIFICANT CHANGE UP (ref 8.4–10.5)
CALCIUM SERPL-MCNC: 8.5 MG/DL — SIGNIFICANT CHANGE UP (ref 8.4–10.5)
CHLORIDE SERPL-SCNC: 98 MMOL/L — SIGNIFICANT CHANGE UP (ref 98–107)
CHLORIDE SERPL-SCNC: 98 MMOL/L — SIGNIFICANT CHANGE UP (ref 98–107)
CO2 SERPL-SCNC: 26 MMOL/L — SIGNIFICANT CHANGE UP (ref 22–31)
CO2 SERPL-SCNC: 26 MMOL/L — SIGNIFICANT CHANGE UP (ref 22–31)
CREAT SERPL-MCNC: 0.85 MG/DL — SIGNIFICANT CHANGE UP (ref 0.5–1.3)
CREAT SERPL-MCNC: 0.85 MG/DL — SIGNIFICANT CHANGE UP (ref 0.5–1.3)
EGFR: 83 ML/MIN/1.73M2 — SIGNIFICANT CHANGE UP
EGFR: 83 ML/MIN/1.73M2 — SIGNIFICANT CHANGE UP
EOSINOPHIL # BLD AUTO: 0 K/UL — SIGNIFICANT CHANGE UP (ref 0–0.5)
EOSINOPHIL # BLD AUTO: 0 K/UL — SIGNIFICANT CHANGE UP (ref 0–0.5)
EOSINOPHIL NFR BLD AUTO: 0 % — SIGNIFICANT CHANGE UP (ref 0–6)
EOSINOPHIL NFR BLD AUTO: 0 % — SIGNIFICANT CHANGE UP (ref 0–6)
GLUCOSE SERPL-MCNC: 94 MG/DL — SIGNIFICANT CHANGE UP (ref 70–99)
GLUCOSE SERPL-MCNC: 94 MG/DL — SIGNIFICANT CHANGE UP (ref 70–99)
HCT VFR BLD CALC: 36.1 % — LOW (ref 39–50)
HCT VFR BLD CALC: 36.1 % — LOW (ref 39–50)
HGB BLD-MCNC: 11.6 G/DL — LOW (ref 13–17)
HGB BLD-MCNC: 11.6 G/DL — LOW (ref 13–17)
IANC: 9.54 K/UL — HIGH (ref 1.8–7.4)
IANC: 9.54 K/UL — HIGH (ref 1.8–7.4)
IMM GRANULOCYTES NFR BLD AUTO: 0.5 % — SIGNIFICANT CHANGE UP (ref 0–0.9)
IMM GRANULOCYTES NFR BLD AUTO: 0.5 % — SIGNIFICANT CHANGE UP (ref 0–0.9)
INR BLD: 0.98 RATIO — SIGNIFICANT CHANGE UP (ref 0.85–1.18)
INR BLD: 0.98 RATIO — SIGNIFICANT CHANGE UP (ref 0.85–1.18)
LYMPHOCYTES # BLD AUTO: 0.81 K/UL — LOW (ref 1–3.3)
LYMPHOCYTES # BLD AUTO: 0.81 K/UL — LOW (ref 1–3.3)
LYMPHOCYTES # BLD AUTO: 7.1 % — LOW (ref 13–44)
LYMPHOCYTES # BLD AUTO: 7.1 % — LOW (ref 13–44)
MAGNESIUM SERPL-MCNC: 1.9 MG/DL — SIGNIFICANT CHANGE UP (ref 1.6–2.6)
MAGNESIUM SERPL-MCNC: 1.9 MG/DL — SIGNIFICANT CHANGE UP (ref 1.6–2.6)
MCHC RBC-ENTMCNC: 27.2 PG — SIGNIFICANT CHANGE UP (ref 27–34)
MCHC RBC-ENTMCNC: 27.2 PG — SIGNIFICANT CHANGE UP (ref 27–34)
MCHC RBC-ENTMCNC: 32.1 GM/DL — SIGNIFICANT CHANGE UP (ref 32–36)
MCHC RBC-ENTMCNC: 32.1 GM/DL — SIGNIFICANT CHANGE UP (ref 32–36)
MCV RBC AUTO: 84.5 FL — SIGNIFICANT CHANGE UP (ref 80–100)
MCV RBC AUTO: 84.5 FL — SIGNIFICANT CHANGE UP (ref 80–100)
MONOCYTES # BLD AUTO: 1 K/UL — HIGH (ref 0–0.9)
MONOCYTES # BLD AUTO: 1 K/UL — HIGH (ref 0–0.9)
MONOCYTES NFR BLD AUTO: 8.8 % — SIGNIFICANT CHANGE UP (ref 2–14)
MONOCYTES NFR BLD AUTO: 8.8 % — SIGNIFICANT CHANGE UP (ref 2–14)
NEUTROPHILS # BLD AUTO: 9.54 K/UL — HIGH (ref 1.8–7.4)
NEUTROPHILS # BLD AUTO: 9.54 K/UL — HIGH (ref 1.8–7.4)
NEUTROPHILS NFR BLD AUTO: 83.5 % — HIGH (ref 43–77)
NEUTROPHILS NFR BLD AUTO: 83.5 % — HIGH (ref 43–77)
NRBC # BLD: 0 /100 WBCS — SIGNIFICANT CHANGE UP (ref 0–0)
NRBC # BLD: 0 /100 WBCS — SIGNIFICANT CHANGE UP (ref 0–0)
NRBC # FLD: 0 K/UL — SIGNIFICANT CHANGE UP (ref 0–0)
NRBC # FLD: 0 K/UL — SIGNIFICANT CHANGE UP (ref 0–0)
PHOSPHATE SERPL-MCNC: 2.7 MG/DL — SIGNIFICANT CHANGE UP (ref 2.5–4.5)
PHOSPHATE SERPL-MCNC: 2.7 MG/DL — SIGNIFICANT CHANGE UP (ref 2.5–4.5)
PLATELET # BLD AUTO: 378 K/UL — SIGNIFICANT CHANGE UP (ref 150–400)
PLATELET # BLD AUTO: 378 K/UL — SIGNIFICANT CHANGE UP (ref 150–400)
POTASSIUM SERPL-MCNC: 4.3 MMOL/L — SIGNIFICANT CHANGE UP (ref 3.5–5.3)
POTASSIUM SERPL-MCNC: 4.3 MMOL/L — SIGNIFICANT CHANGE UP (ref 3.5–5.3)
POTASSIUM SERPL-SCNC: 4.3 MMOL/L — SIGNIFICANT CHANGE UP (ref 3.5–5.3)
POTASSIUM SERPL-SCNC: 4.3 MMOL/L — SIGNIFICANT CHANGE UP (ref 3.5–5.3)
PROTHROM AB SERPL-ACNC: 11.1 SEC — SIGNIFICANT CHANGE UP (ref 9.5–13)
PROTHROM AB SERPL-ACNC: 11.1 SEC — SIGNIFICANT CHANGE UP (ref 9.5–13)
RBC # BLD: 4.27 M/UL — SIGNIFICANT CHANGE UP (ref 4.2–5.8)
RBC # BLD: 4.27 M/UL — SIGNIFICANT CHANGE UP (ref 4.2–5.8)
RBC # FLD: 13.9 % — SIGNIFICANT CHANGE UP (ref 10.3–14.5)
RBC # FLD: 13.9 % — SIGNIFICANT CHANGE UP (ref 10.3–14.5)
RH IG SCN BLD-IMP: NEGATIVE — SIGNIFICANT CHANGE UP
RH IG SCN BLD-IMP: NEGATIVE — SIGNIFICANT CHANGE UP
SODIUM SERPL-SCNC: 137 MMOL/L — SIGNIFICANT CHANGE UP (ref 135–145)
SODIUM SERPL-SCNC: 137 MMOL/L — SIGNIFICANT CHANGE UP (ref 135–145)
WBC # BLD: 11.42 K/UL — HIGH (ref 3.8–10.5)
WBC # BLD: 11.42 K/UL — HIGH (ref 3.8–10.5)
WBC # FLD AUTO: 11.42 K/UL — HIGH (ref 3.8–10.5)
WBC # FLD AUTO: 11.42 K/UL — HIGH (ref 3.8–10.5)

## 2023-12-05 PROCEDURE — 99231 SBSQ HOSP IP/OBS SF/LOW 25: CPT

## 2023-12-05 PROCEDURE — 88112 CYTOPATH CELL ENHANCE TECH: CPT | Mod: 26

## 2023-12-05 PROCEDURE — 88341 IMHCHEM/IMCYTCHM EA ADD ANTB: CPT | Mod: 26,XP

## 2023-12-05 PROCEDURE — 88305 TISSUE EXAM BY PATHOLOGIST: CPT | Mod: 26

## 2023-12-05 PROCEDURE — 88342 IMHCHEM/IMCYTCHM 1ST ANTB: CPT | Mod: 26

## 2023-12-05 PROCEDURE — 88341 IMHCHEM/IMCYTCHM EA ADD ANTB: CPT | Mod: 26

## 2023-12-05 PROCEDURE — 88309 TISSUE EXAM BY PATHOLOGIST: CPT | Mod: 26

## 2023-12-05 PROCEDURE — 88342 IMHCHEM/IMCYTCHM 1ST ANTB: CPT | Mod: 26,XP

## 2023-12-05 PROCEDURE — 99233 SBSQ HOSP IP/OBS HIGH 50: CPT | Mod: GC

## 2023-12-05 DEVICE — STAPLER COVIDIEN ENDO GIA 80-3.8MM BLUE RELOAD
Type: IMPLANTABLE DEVICE | Status: NON-FUNCTIONAL
Removed: 2023-12-05

## 2023-12-05 DEVICE — STAPLER COVIDIEN TA 90 BLUE
Type: IMPLANTABLE DEVICE | Status: NON-FUNCTIONAL
Removed: 2023-12-05

## 2023-12-05 DEVICE — STAPLER COVIDIEN ENDO GIA 80-3.8MM BLUE
Type: IMPLANTABLE DEVICE | Status: NON-FUNCTIONAL
Removed: 2023-12-05

## 2023-12-05 RX ORDER — FENTANYL CITRATE 50 UG/ML
50 INJECTION INTRAVENOUS
Refills: 0 | Status: DISCONTINUED | OUTPATIENT
Start: 2023-12-05 | End: 2023-12-05

## 2023-12-05 RX ORDER — HYDROMORPHONE HYDROCHLORIDE 2 MG/ML
0.5 INJECTION INTRAMUSCULAR; INTRAVENOUS; SUBCUTANEOUS
Refills: 0 | Status: DISCONTINUED | OUTPATIENT
Start: 2023-12-05 | End: 2023-12-05

## 2023-12-05 RX ORDER — HEPARIN SODIUM 5000 [USP'U]/ML
5000 INJECTION INTRAVENOUS; SUBCUTANEOUS EVERY 8 HOURS
Refills: 0 | Status: DISCONTINUED | OUTPATIENT
Start: 2023-12-06 | End: 2023-12-20

## 2023-12-05 RX ORDER — ACETAMINOPHEN 500 MG
1000 TABLET ORAL EVERY 6 HOURS
Refills: 0 | Status: COMPLETED | OUTPATIENT
Start: 2023-12-05 | End: 2023-12-06

## 2023-12-05 RX ORDER — SODIUM CHLORIDE 9 MG/ML
1000 INJECTION, SOLUTION INTRAVENOUS
Refills: 0 | Status: DISCONTINUED | OUTPATIENT
Start: 2023-12-05 | End: 2023-12-06

## 2023-12-05 RX ORDER — HYDROMORPHONE HYDROCHLORIDE 2 MG/ML
0.25 INJECTION INTRAMUSCULAR; INTRAVENOUS; SUBCUTANEOUS EVERY 4 HOURS
Refills: 0 | Status: DISCONTINUED | OUTPATIENT
Start: 2023-12-05 | End: 2023-12-08

## 2023-12-05 RX ORDER — HYDROMORPHONE HYDROCHLORIDE 2 MG/ML
0.5 INJECTION INTRAMUSCULAR; INTRAVENOUS; SUBCUTANEOUS EVERY 4 HOURS
Refills: 0 | Status: DISCONTINUED | OUTPATIENT
Start: 2023-12-05 | End: 2023-12-08

## 2023-12-05 RX ORDER — CHLORHEXIDINE GLUCONATE 213 G/1000ML
1 SOLUTION TOPICAL DAILY
Refills: 0 | Status: DISCONTINUED | OUTPATIENT
Start: 2023-12-05 | End: 2023-12-08

## 2023-12-05 RX ADMIN — Medication 250 MILLIGRAM(S): at 05:06

## 2023-12-05 RX ADMIN — HYDROMORPHONE HYDROCHLORIDE 0.5 MILLIGRAM(S): 2 INJECTION INTRAMUSCULAR; INTRAVENOUS; SUBCUTANEOUS at 20:15

## 2023-12-05 RX ADMIN — ONDANSETRON 4 MILLIGRAM(S): 8 TABLET, FILM COATED ORAL at 11:45

## 2023-12-05 RX ADMIN — ONDANSETRON 4 MILLIGRAM(S): 8 TABLET, FILM COATED ORAL at 00:59

## 2023-12-05 RX ADMIN — HYDROMORPHONE HYDROCHLORIDE 0.5 MILLIGRAM(S): 2 INJECTION INTRAMUSCULAR; INTRAVENOUS; SUBCUTANEOUS at 20:00

## 2023-12-05 RX ADMIN — NEOMYCIN SULFATE 500 MILLIGRAM(S): 500 TABLET ORAL at 05:06

## 2023-12-05 RX ADMIN — CHLORHEXIDINE GLUCONATE 1 APPLICATION(S): 213 SOLUTION TOPICAL at 13:40

## 2023-12-05 RX ADMIN — SODIUM CHLORIDE 100 MILLILITER(S): 9 INJECTION, SOLUTION INTRAVENOUS at 20:04

## 2023-12-05 RX ADMIN — ONDANSETRON 4 MILLIGRAM(S): 8 TABLET, FILM COATED ORAL at 05:32

## 2023-12-05 RX ADMIN — PANTOPRAZOLE SODIUM 40 MILLIGRAM(S): 20 TABLET, DELAYED RELEASE ORAL at 11:45

## 2023-12-05 NOTE — PROGRESS NOTE ADULT - ASSESSMENT
89 M w/ PMHx GERD, BPH, HLD, open R IHR, LIH (non repaired), presents to ED with suprapubic pain. CT w/ distal ascending and proximal transverse thickening, concern for malignancy. Colorectal surgery consulted. CT chest without evidence of metastasis. CEA 81.9, CA 19-9 225. Colonoscopy done 12/4.    Plan:  -Surgery today for lap extended right hemicolectomy, possible open - completed abx prep  -Consent in chart  -Preop labs reviewed  -NPO/IVF  -Medical optimization documented 12/3    A Team Surgery  [id number] 89 M w/ PMHx GERD, BPH, HLD, open R IHR, LIH (non repaired), presents to ED with suprapubic pain. CT w/ distal ascending and proximal transverse thickening, concern for malignancy. Colorectal surgery consulted. CT chest without evidence of metastasis. CEA 81.9, CA 19-9 225. Colonoscopy done 12/4.    Plan:  -Surgery today for lap extended right hemicolectomy, possible open - completed abx prep  -Consent in chart  -Preop labs reviewed  -NPO/IVF  -Medical optimization documented 12/3    A Team Surgery  [id number]

## 2023-12-05 NOTE — PROGRESS NOTE ADULT - SUBJECTIVE AND OBJECTIVE BOX
************************  Ese Mercer MD  Internal Medicine PGY-1  ************************    Patient is a 89y old  Male who presents with a chief complaint of c/f colon cancer (04 Dec 2023 07:55)    Overnight no events, this morning patient with no acute complaints.Denies CP, SOB, fevers/chills, N/V, or abdominal pain.  Patient urinating well with BM(s).      Patient reminded of ongoing careplan.    MEDICATIONS  (STANDING):  influenza  Vaccine (HIGH DOSE) 0.7 milliLiter(s) IntraMuscular once  ondansetron Injectable 4 milliGRAM(s) IV Push every 6 hours  pantoprazole  Injectable 40 milliGRAM(s) IV Push daily    MEDICATIONS  (PRN):  acetaminophen     Tablet .. 650 milliGRAM(s) Oral every 6 hours PRN Temp greater or equal to 38C (100.4F), Mild Pain (1 - 3)  aluminum hydroxide/magnesium hydroxide/simethicone Suspension 30 milliLiter(s) Oral every 4 hours PRN Dyspepsia  melatonin 3 milliGRAM(s) Oral at bedtime PRN Insomnia      CAPILLARY BLOOD GLUCOSE        I&O's Summary      PHYSICAL EXAM:  Vital Signs Last 24 Hrs  T(C): 36.5 (05 Dec 2023 05:32), Max: 36.9 (04 Dec 2023 12:47)  T(F): 97.7 (05 Dec 2023 05:32), Max: 98.5 (04 Dec 2023 12:47)  HR: 74 (05 Dec 2023 05:32) (72 - 98)  BP: 152/52 (05 Dec 2023 05:32) (102/42 - 171/85)  BP(mean): --  RR: 16 (05 Dec 2023 05:32) (15 - 22)  SpO2: 97% (05 Dec 2023 05:32) (96% - 100%)    Parameters below as of 05 Dec 2023 05:32  Patient On (Oxygen Delivery Method): room air        PHYSICAL EXAM:  GENERAL: NAD, lying in bed comfortably  HEENT: NC/AT  NECK: Supple, No JVD  CHEST/LUNG: CTAB, no increased WOB  HEART: RRR, no m/r/g  ABDOMEN: soft, NT, ND, BS+  EXTREMITIES:  2+ peripheral pulses, no edema  NERVOUS SYSTEM:  A&Ox3  MSK: FROM all 4 extremities, full and equal strength  SKIN: No rashes or lesions    LABS:                        11.6   11.42 )-----------( 378      ( 05 Dec 2023 04:46 )             36.1     12-05    137  |  98  |  15  ----------------------------<  94  4.3   |  26  |  0.85    Ca    8.5      05 Dec 2023 04:46  Phos  2.7     12-05  Mg     1.90     12-05    TPro  6.5  /  Alb  3.6  /  TBili  0.4  /  DBili  x   /  AST  14  /  ALT  7   /  AlkPhos  64  12-04    PT/INR - ( 05 Dec 2023 04:46 )   PT: 11.1 sec;   INR: 0.98 ratio         PTT - ( 05 Dec 2023 04:46 )  PTT:27.4 sec      Urinalysis Basic - ( 05 Dec 2023 04:46 )    Color: x / Appearance: x / SG: x / pH: x  Gluc: 94 mg/dL / Ketone: x  / Bili: x / Urobili: x   Blood: x / Protein: x / Nitrite: x   Leuk Esterase: x / RBC: x / WBC x   Sq Epi: x / Non Sq Epi: x / Bacteria: x           ************************  Ese Mercer MD  Internal Medicine PGY-1  ************************    Patient is a 89y old  Male who presents with a chief complaint of c/f colon cancer (04 Dec 2023 07:55)    Overnight no events, this morning patient complains of abdominal pain and nausea which is consistent as the day prior. Pt also reports having diarrhea which has worsened since starting the pre-op antibiotics.Denies CP, SOB, fevers/chills, N/V, or abdominal pain.  Patient urinating well with BM(s).      Patient reminded of ongoing careplan.    MEDICATIONS  (STANDING):  influenza  Vaccine (HIGH DOSE) 0.7 milliLiter(s) IntraMuscular once  ondansetron Injectable 4 milliGRAM(s) IV Push every 6 hours  pantoprazole  Injectable 40 milliGRAM(s) IV Push daily    MEDICATIONS  (PRN):  acetaminophen     Tablet .. 650 milliGRAM(s) Oral every 6 hours PRN Temp greater or equal to 38C (100.4F), Mild Pain (1 - 3)  aluminum hydroxide/magnesium hydroxide/simethicone Suspension 30 milliLiter(s) Oral every 4 hours PRN Dyspepsia  melatonin 3 milliGRAM(s) Oral at bedtime PRN Insomnia      CAPILLARY BLOOD GLUCOSE        I&O's Summary      PHYSICAL EXAM:  Vital Signs Last 24 Hrs  T(C): 36.5 (05 Dec 2023 05:32), Max: 36.9 (04 Dec 2023 12:47)  T(F): 97.7 (05 Dec 2023 05:32), Max: 98.5 (04 Dec 2023 12:47)  HR: 74 (05 Dec 2023 05:32) (72 - 98)  BP: 152/52 (05 Dec 2023 05:32) (102/42 - 171/85)  BP(mean): --  RR: 16 (05 Dec 2023 05:32) (15 - 22)  SpO2: 97% (05 Dec 2023 05:32) (96% - 100%)    Parameters below as of 05 Dec 2023 05:32  Patient On (Oxygen Delivery Method): room air        PHYSICAL EXAM:  GENERAL: NAD, lying in bed comfortably  HEENT: NC/AT  NECK: Supple, No JVD  CHEST/LUNG: CTAB, no increased WOB  HEART: RRR, no m/r/g  ABDOMEN: soft, NT, ND, BS+  EXTREMITIES:  2+ peripheral pulses, no edema  NERVOUS SYSTEM:  A&Ox3  MSK: FROM all 4 extremities, full and equal strength  SKIN: No rashes or lesions    LABS:                        11.6   11.42 )-----------( 378      ( 05 Dec 2023 04:46 )             36.1     12-05    137  |  98  |  15  ----------------------------<  94  4.3   |  26  |  0.85    Ca    8.5      05 Dec 2023 04:46  Phos  2.7     12-05  Mg     1.90     12-05    TPro  6.5  /  Alb  3.6  /  TBili  0.4  /  DBili  x   /  AST  14  /  ALT  7   /  AlkPhos  64  12-04    PT/INR - ( 05 Dec 2023 04:46 )   PT: 11.1 sec;   INR: 0.98 ratio         PTT - ( 05 Dec 2023 04:46 )  PTT:27.4 sec      Urinalysis Basic - ( 05 Dec 2023 04:46 )    Color: x / Appearance: x / SG: x / pH: x  Gluc: 94 mg/dL / Ketone: x  / Bili: x / Urobili: x   Blood: x / Protein: x / Nitrite: x   Leuk Esterase: x / RBC: x / WBC x   Sq Epi: x / Non Sq Epi: x / Bacteria: x

## 2023-12-05 NOTE — PROGRESS NOTE ADULT - SUBJECTIVE AND OBJECTIVE BOX
Surgery Progress Note     Subjective:  Patient seen and examined on AM rounds. Reports diarrhea but no vomiting overnight.      Vital Signs:  Vital Signs Last 24 Hrs  T(C): 36.5 (05 Dec 2023 05:32), Max: 36.9 (04 Dec 2023 12:47)  T(F): 97.7 (05 Dec 2023 05:32), Max: 98.5 (04 Dec 2023 12:47)  HR: 74 (05 Dec 2023 05:32) (72 - 95)  BP: 152/52 (05 Dec 2023 05:32) (102/42 - 171/85)  BP(mean): --  RR: 16 (05 Dec 2023 05:32) (15 - 22)  SpO2: 97% (05 Dec 2023 05:32) (96% - 100%)    Parameters below as of 05 Dec 2023 05:32  Patient On (Oxygen Delivery Method): room air        CAPILLARY BLOOD GLUCOSE          I&O's Detail        Physical Exam:  General: NAD, resting comfortably in bed  HEENT: Normocephalic atraumatic  Respiratory: Nonlabored respirations  Cardio: regular rate  Abdomen: soft, mildly distended, nontender  Vascular: extremities are warm and well perfused      Labs:    12-05    137  |  98  |  15  ----------------------------<  94  4.3   |  26  |  0.85    Ca    8.5      05 Dec 2023 04:46  Phos  2.7     12-05  Mg     1.90     12-05    TPro  6.5  /  Alb  3.6  /  TBili  0.4  /  DBili  x   /  AST  14  /  ALT  7   /  AlkPhos  64  12-04    LIVER FUNCTIONS - ( 04 Dec 2023 06:21 )  Alb: 3.6 g/dL / Pro: 6.5 g/dL / ALK PHOS: 64 U/L / ALT: 7 U/L / AST: 14 U/L / GGT: x                                 11.6   11.42 )-----------( 378      ( 05 Dec 2023 04:46 )             36.1     PT/INR - ( 05 Dec 2023 04:46 )   PT: 11.1 sec;   INR: 0.98 ratio         PTT - ( 05 Dec 2023 04:46 )  PTT:27.4 sec

## 2023-12-05 NOTE — CHART NOTE - NSCHARTNOTEFT_GEN_A_CORE
Post Operative Note  Patient: MERARI GALEAS 89y (15-Nov-1933) Male   MRN: [id number]  Location: [facility] [address]  Date: 12-05-23 @ 22:43    PROCEDURE: Laparoscopic extended right hemicolectomy 05-Dec-2023 19:18:17    Subjective: Patient seen and examined post operatively. Reports soreness in the midline, with mild to moderate abdominal pain. Initially was feeling nauseous after surgery but that has improved. Patient with pause lapses while providing information. Denies fever, chills, chest pain, SOB. Drank minimal water since the surgery and has not ambulated.    Objective:  Vitals: T(F): 97.8 (12-05-23 @ 21:00), Max: 98.4 (12-05-23 @ 13:45)  HR: 75 (12-05-23 @ 21:30)  BP: 130/61 (12-05-23 @ 21:30) (108/48 - 173/57)  RR: 16 (12-05-23 @ 21:30)  SpO2: 100% (12-05-23 @ 21:30)    In:   12-05-23 @ 07:01  -  12-05-23 @ 22:43  --------------------------------------------------------  IN: 400 mL      IV Fluids: lactated ringers. 1000 milliLiter(s) (100 mL/Hr) IV Continuous <Continuous>      Out:   12-05-23 @ 07:01  -  12-05-23 @ 22:43  --------------------------------------------------------  OUT: 225 mL    Voided Urine:   12-05-23 @ 07:01  -  12-05-23 @ 22:43  --------------------------------------------------------  OUT: 225 mL      Bruner Catheter: YES    Physical Examination:  General: NAD, resting comfortably in bed  HEENT: Normocephalic atraumatic  Respiratory: Nonlabored respirations, normal CW expansion.  Cardio: S1S2, regular rate and rhythm.  Abdomen: softly distended, appropriately tender, midline surgical incision c/d/i. Incision ports c/d/i.  Genitourinary: Bruner in place with 300cc output.  Vascular: extremities are warm and well perfused, adequate strength.     Assessment:  89 M w/ PMHx GERD, BPH, HLD, open R IHR, LIH (non repaired), presents to ED with suprapubic pain. CT w/ distal ascending and proximal transverse thickening, concern for malignancy. CT chest without evidence of metastasis. CEA 81.9, CA 19-9 225. Colonoscopy done 12/4. Patient currently s/p Laparoscopic extended right hemicolectomy.    Plan:  - SQH  - IVF/CLD  - IV Pain meds  - Monitor bruner  - PT consult in the AM  - AM labs    A Team Surgery  [id number] Post Operative Note  Patient: MERARI GALEAS 89y (15-Nov-1933) Male   MRN: [id number]  Location: [facility] [address]  Date: 12-05-23 @ 22:43    PROCEDURE: Laparoscopic extended right hemicolectomy 05-Dec-2023 19:18:17    Subjective: Patient seen and examined post operatively. Reports soreness in the midline, with mild to moderate abdominal pain. Initially was feeling nauseous after surgery but that has improved. Patient with pause lapses while providing information. Denies fever, chills, chest pain, SOB. Drank minimal water since the surgery and has not ambulated.    Objective:  Vitals: T(F): 97.8 (12-05-23 @ 21:00), Max: 98.4 (12-05-23 @ 13:45)  HR: 75 (12-05-23 @ 21:30)  BP: 130/61 (12-05-23 @ 21:30) (108/48 - 173/57)  RR: 16 (12-05-23 @ 21:30)  SpO2: 100% (12-05-23 @ 21:30)    In:   12-05-23 @ 07:01  -  12-05-23 @ 22:43  --------------------------------------------------------  IN: 400 mL      IV Fluids: lactated ringers. 1000 milliLiter(s) (100 mL/Hr) IV Continuous <Continuous>      Out:   12-05-23 @ 07:01  -  12-05-23 @ 22:43  --------------------------------------------------------  OUT: 225 mL    Voided Urine:   12-05-23 @ 07:01  -  12-05-23 @ 22:43  --------------------------------------------------------  OUT: 225 mL      Bruner Catheter: YES    Physical Examination:  General: NAD, resting comfortably in bed  HEENT: Normocephalic atraumatic  Respiratory: Nonlabored respirations, normal CW expansion.  Cardio: S1S2, regular rate and rhythm.  Abdomen: softly distended, appropriately tender, midline surgical incision c/d/i. Incision ports c/d/i.  Genitourinary: Bruner in place with 300cc output.  Vascular: extremities are warm and well perfused, adequate strength.     Assessment:  89 M w/ PMHx GERD, BPH, HLD, open R IHR, LIH (non repaired), presents to ED with suprapubic pain. CT w/ distal ascending and proximal transverse thickening, concern for malignancy. CT chest without evidence of metastasis. CEA 81.9, CA 19-9 225. Colonoscopy done 12/4. Patient currently s/p Laparoscopic extended right hemicolectomy.    Plan:  - SQH  - IVF/CLD  - IV Pain meds  - Monitor bruner  - PT consult in the AM  - AM labs    A Team Surgery  [id number]

## 2023-12-05 NOTE — PROGRESS NOTE ADULT - ASSESSMENT
89 M PMH BPH, HLD, inguinal hernia repairs, anemia, presenting with 3 days of NV and constipation with CT findings c/f primary colon malignancy undergoing staging and colonoscopy for biopsy.  89 M PMH BPH, HLD, inguinal hernia repairs, anemia, presenting with 3 days of NV and constipation with CT findings c/f primary colon malignancy undergoing staging and colonoscopy for biopsy. .

## 2023-12-05 NOTE — PROGRESS NOTE ADULT - SUBJECTIVE AND OBJECTIVE BOX
Interval Events: sp colon yesterday. pt seen and examined. c/o intermittent suprapubic discomfort, nausea, hiccups, and loose stools. no vomiting. planned for OR with CRS today      Allergies:  penicillin (Unknown)  aspirin (Unknown)      Hospital Medications:  acetaminophen     Tablet .. 650 milliGRAM(s) Oral every 6 hours PRN  aluminum hydroxide/magnesium hydroxide/simethicone Suspension 30 milliLiter(s) Oral every 4 hours PRN  influenza  Vaccine (HIGH DOSE) 0.7 milliLiter(s) IntraMuscular once  melatonin 3 milliGRAM(s) Oral at bedtime PRN  ondansetron Injectable 4 milliGRAM(s) IV Push every 6 hours  pantoprazole  Injectable 40 milliGRAM(s) IV Push daily      ROS: As per HPI, otherwise 10-point ROS reviewed and negative.    Vital Signs:  Vital Signs Last 24 Hrs  T(C): 36.5 (05 Dec 2023 05:32), Max: 36.9 (04 Dec 2023 12:47)  T(F): 97.7 (05 Dec 2023 05:32), Max: 98.5 (04 Dec 2023 12:47)  HR: 74 (05 Dec 2023 05:32) (72 - 98)  BP: 152/52 (05 Dec 2023 05:32) (102/42 - 171/85)  BP(mean): --  RR: 16 (05 Dec 2023 05:32) (15 - 22)  SpO2: 97% (05 Dec 2023 05:32) (96% - 100%)    Parameters below as of 05 Dec 2023 05:32  Patient On (Oxygen Delivery Method): room air      Daily Height in cm: 172.7 (05 Dec 2023 07:38)    Daily         LABS:                        11.6   11.42 )-----------( 378      ( 05 Dec 2023 04:46 )             36.1     Mean Cell Volume: 84.5 fL (12-05-23 @ 04:46)    12-05    137  |  98  |  15  ----------------------------<  94  4.3   |  26  |  0.85    Ca    8.5      05 Dec 2023 04:46  Phos  2.7     12-05  Mg     1.90     12-05    TPro  6.5  /  Alb  3.6  /  TBili  0.4  /  DBili  x   /  AST  14  /  ALT  7   /  AlkPhos  64  12-04    LIVER FUNCTIONS - ( 04 Dec 2023 06:21 )  Alb: 3.6 g/dL / Pro: 6.5 g/dL / ALK PHOS: 64 U/L / ALT: 7 U/L / AST: 14 U/L / GGT: x           PT/INR - ( 05 Dec 2023 04:46 )   PT: 11.1 sec;   INR: 0.98 ratio         PTT - ( 05 Dec 2023 04:46 )  PTT:27.4 sec  Urinalysis Basic - ( 05 Dec 2023 04:46 )    Color: x / Appearance: x / SG: x / pH: x  Gluc: 94 mg/dL / Ketone: x  / Bili: x / Urobili: x   Blood: x / Protein: x / Nitrite: x   Leuk Esterase: x / RBC: x / WBC x   Sq Epi: x / Non Sq Epi: x / Bacteria: x                              11.6   11.42 )-----------( 378      ( 05 Dec 2023 04:46 )             36.1                         13.0   14.96 )-----------( 424      ( 04 Dec 2023 06:21 )             39.8                         11.4   8.33  )-----------( 322      ( 03 Dec 2023 07:08 )             35.1       PHYSICAL EXAM  GENERAL: lying in bed, frail, in no apparent distress  HEENT: NCAT  EYES: anicteric sclerae, moist conjunctivae  NECK: trachea midline, FROM  CHEST:  respirations even, non labored  ABDOMEN:  soft, non-tender, mild dt  EXTREMITIES: WWP, no edema  SKIN:  warm/dry, no visible rash appreciated  PSYCH: appropriate affect, A&O x 3  NEURO: no tremor noted         [facility]  _______________________________________________________________________________  Patient Name: Ari Ngo          Procedure Date: 12/4/2023 1:08 PM  MRN: [id number]                     Account Number: [id number]  YOB: 1933             Admit Type: Inpatient  Room: [place] [address]                         Gender: Male  Attending MD: JURGEN FLORES MD    _______________________________________________________________________________     Procedure:           Colonoscopy  Indications:         Abnormal CT of the GI tract  Providers:           JURGEN FLORES MD  Medicines:           Monitored Anesthesia Care  Complications:       No immediate complications.  Procedure:           Pre-Anesthesia Assessment:                       - Prior to the procedure, a History and Physical was                        performed, and patient medications and allergies were                        reviewed. The patient's tolerance of previous anesthesia                    was also reviewed. The risks and benefits of the                        procedure and the sedation options and risks were                        discussed with the patient. All questions were answered,                        and informed consent was obtained. Prior Anticoagulants:                        The patient has taken no previous anticoagulant or                        antiplatelet agents. ASA Grade Assessment: III - A                        patient with severe systemic disease. After reviewing                        the risks and benefits, the patient was deemed in                        satisfactory condition to undergo the procedure.                       After I obtained informed consent, the scope was passed                    under direct vision. Throughout the procedure, the                        patient's blood pressure, pulse, and oxygen saturations                        were monitored continuously. The Colonoscope was                        introduced through the anus with the intention of                        advancing to the cecum. The scope was advanced to the                        hepatic flexure before the procedure was aborted.                        Medications were given. The colonoscopy was performed                        without difficulty. The quality of the bowel preparation                        was poor. The colonoscopy time was intentionally limited                        given incomplete SBO and intent to limit amount of                       insufllation given.                                                                                   Findings:       A large amount of stool was found in the entire colon, precluding        visualization. Lavage of the area was performed using copious amounts of        normal saline, resulting in incomplete clearance with continued poor        visualization.       A few polyps were found in the entire colon. Polypectomy was not        attempted.       An infiltrative partially obstructing, ulcerated large mass was found at        the hepatic flexure. The mass was circumferential and was not able to be        traversed. Oozing was present. This was biopsied with a cold forceps for        histology. The area was tattooedwith an injection of 0.5 mL of Ruth        ink 5 cm distal to the lesion.       Many small and large-mouthed diverticula were found in the recto-sigmoid        colon.       No other large lesions were seen within the limitations of the poor prep.                                                                                 Impression:          - Preparation of the colon was poor.                       - Stool in the entire examined colon limiting evaluation.                       - A few polyps in the entire colon. Resection not                        attempted.                       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 Biopsied. Tattooed.                       - Diverticulosis in the recto-sigmoid colon.  Recommendation:      - [facility].                       - Await pathology results.                       - Would repeat the colonoscopy in 3-6 months after the                        planned surgery to complete the evaluation given                        suboptimal prep.                                                                                   Attending Participation:       I personally performed the entire procedure.                                                                       _____________________  JURGEN FLORES MD  12/4/2023 2:12:02 PM  Number of Addenda: 0    Note Initiated On: 12/4/2023 1:08 PM       Interval Events: sp colon yesterday. pt seen and examined. c/o intermittent suprapubic discomfort, nausea, hiccups, and loose stools. no vomiting. planned for OR with CRS today      Allergies:  penicillin (Unknown)  aspirin (Unknown)      Hospital Medications:  acetaminophen     Tablet .. 650 milliGRAM(s) Oral every 6 hours PRN  aluminum hydroxide/magnesium hydroxide/simethicone Suspension 30 milliLiter(s) Oral every 4 hours PRN  influenza  Vaccine (HIGH DOSE) 0.7 milliLiter(s) IntraMuscular once  melatonin 3 milliGRAM(s) Oral at bedtime PRN  ondansetron Injectable 4 milliGRAM(s) IV Push every 6 hours  pantoprazole  Injectable 40 milliGRAM(s) IV Push daily      ROS: As per HPI, otherwise 10-point ROS reviewed and negative.    Vital Signs:  Vital Signs Last 24 Hrs  T(C): 36.5 (05 Dec 2023 05:32), Max: 36.9 (04 Dec 2023 12:47)  T(F): 97.7 (05 Dec 2023 05:32), Max: 98.5 (04 Dec 2023 12:47)  HR: 74 (05 Dec 2023 05:32) (72 - 98)  BP: 152/52 (05 Dec 2023 05:32) (102/42 - 171/85)  BP(mean): --  RR: 16 (05 Dec 2023 05:32) (15 - 22)  SpO2: 97% (05 Dec 2023 05:32) (96% - 100%)    Parameters below as of 05 Dec 2023 05:32  Patient On (Oxygen Delivery Method): room air      Daily Height in cm: 172.7 (05 Dec 2023 07:38)    Daily         LABS:                        11.6   11.42 )-----------( 378      ( 05 Dec 2023 04:46 )             36.1     Mean Cell Volume: 84.5 fL (12-05-23 @ 04:46)    12-05    137  |  98  |  15  ----------------------------<  94  4.3   |  26  |  0.85    Ca    8.5      05 Dec 2023 04:46  Phos  2.7     12-05  Mg     1.90     12-05    TPro  6.5  /  Alb  3.6  /  TBili  0.4  /  DBili  x   /  AST  14  /  ALT  7   /  AlkPhos  64  12-04    LIVER FUNCTIONS - ( 04 Dec 2023 06:21 )  Alb: 3.6 g/dL / Pro: 6.5 g/dL / ALK PHOS: 64 U/L / ALT: 7 U/L / AST: 14 U/L / GGT: x           PT/INR - ( 05 Dec 2023 04:46 )   PT: 11.1 sec;   INR: 0.98 ratio         PTT - ( 05 Dec 2023 04:46 )  PTT:27.4 sec  Urinalysis Basic - ( 05 Dec 2023 04:46 )    Color: x / Appearance: x / SG: x / pH: x  Gluc: 94 mg/dL / Ketone: x  / Bili: x / Urobili: x   Blood: x / Protein: x / Nitrite: x   Leuk Esterase: x / RBC: x / WBC x   Sq Epi: x / Non Sq Epi: x / Bacteria: x                              11.6   11.42 )-----------( 378      ( 05 Dec 2023 04:46 )             36.1                         13.0   14.96 )-----------( 424      ( 04 Dec 2023 06:21 )             39.8                         11.4   8.33  )-----------( 322      ( 03 Dec 2023 07:08 )             35.1       PHYSICAL EXAM  GENERAL: lying in bed, frail, in no apparent distress  HEENT: NCAT  EYES: anicteric sclerae, moist conjunctivae  NECK: trachea midline, FROM  CHEST:  respirations even, non labored  ABDOMEN:  soft, non-tender, mild dt  EXTREMITIES: WWP, no edema  SKIN:  warm/dry, no visible rash appreciated  PSYCH: appropriate affect, A&O x 3  NEURO: no tremor noted         [facility]  _______________________________________________________________________________  Patient Name: Ari Ngo          Procedure Date: 12/4/2023 1:08 PM  MRN: [id number]                     Account Number: [id number]  YOB: 1933             Admit Type: Inpatient  Room: [place] [address]                         Gender: Male  Attending MD: JURGEN FLORES MD    _______________________________________________________________________________     Procedure:           Colonoscopy  Indications:         Abnormal CT of the GI tract  Providers:           JURGEN FLORES MD  Medicines:           Monitored Anesthesia Care  Complications:       No immediate complications.  Procedure:           Pre-Anesthesia Assessment:                       - Prior to the procedure, a History and Physical was                        performed, and patient medications and allergies were                        reviewed. The patient's tolerance of previous anesthesia                    was also reviewed. The risks and benefits of the                        procedure and the sedation options and risks were                        discussed with the patient. All questions were answered,                        and informed consent was obtained. Prior Anticoagulants:                        The patient has taken no previous anticoagulant or                        antiplatelet agents. ASA Grade Assessment: III - A                        patient with severe systemic disease. After reviewing                        the risks and benefits, the patient was deemed in                        satisfactory condition to undergo the procedure.                       After I obtained informed consent, the scope was passed                    under direct vision. Throughout the procedure, the                        patient's blood pressure, pulse, and oxygen saturations                        were monitored continuously. The Colonoscope was                        introduced through the anus with the intention of                        advancing to the cecum. The scope was advanced to the                        hepatic flexure before the procedure was aborted.                        Medications were given. The colonoscopy was performed                        without difficulty. The quality of the bowel preparation                        was poor. The colonoscopy time was intentionally limited                        given incomplete SBO and intent to limit amount of                       insufllation given.                                                                                   Findings:       A large amount of stool was found in the entire colon, precluding        visualization. Lavage of the area was performed using copious amounts of        normal saline, resulting in incomplete clearance with continued poor        visualization.       A few polyps were found in the entire colon. Polypectomy was not        attempted.       An infiltrative partially obstructing, ulcerated large mass was found at        the hepatic flexure. The mass was circumferential and was not able to be        traversed. Oozing was present. This was biopsied with a cold forceps for        histology. The area was tattooedwith an injection of 0.5 mL of Ruth        ink 5 cm distal to the lesion.       Many small and large-mouthed diverticula were found in the recto-sigmoid        colon.       No other large lesions were seen within the limitations of the poor prep.                                                                                 Impression:          - Preparation of the colon was poor.                       - Stool in the entire examined colon limiting evaluation.                       - A few polyps in the entire colon. Resection not                        attempted.                       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 Biopsied. Tattooed.                       - Diverticulosis in the recto-sigmoid colon.  Recommendation:      - Return patient to [facility] for [facility] care.                       - Await pathology results.                       - Would repeat the colonoscopy in 3-6 months after the                        planned surgery to complete the evaluation given                        suboptimal prep.                                                                                   Attending Participation:       I personally performed the entire procedure.                                                                       _____________________  JURGEN FLORES MD  12/4/2023 2:12:02 PM  Number of Addenda: 0    Note Initiated On: 12/4/2023 1:08 PM

## 2023-12-05 NOTE — PROGRESS NOTE ADULT - ASSESSMENT
89M PMHx b/l inguinal hernia repair previously recommended to have EGD/colonoscopy in 4/2023 for eval of DANG, s/p unremarkable EGD and incomplete colonoscopy 9/2023, presents with constipation, suprapubic abdominal pain and nausea/vomiting over last 3 days and found to have colon mass c/f malignancy on CT. Attempted colonoscopy on 12/1 was aborted due to poor prep (dark semi-solid stool throughout recto-sigmoid and sigmoid).    #Abnormal imaging  #Mass-like thickening in ascending and transverse colon  #Partial LBO  - 12/4 sp repeat colonoscopy- preparation of the colon was poor. Stool in the entire examined colon limiting evaluation. A few polyps in the entire colon. Resection not attempted. 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 Biopsied. Tattooed. Diverticulosis in the recto-sigmoid colon.      Recommendations-  - await pathology  - planned for OR with CRS today  - would repeat colonoscopy in 3-6 months after the planned surgery to complete evaluation given suboptimal prep  - rest of care as per primary team    *all recommendations are tentative until note is attested by attending*  please reach out to GI team with any further quesitons/concerns    JOHN Gonzales PA-C, RD, CDN  available on TEAMS for questions  after 5pm/weekends, please contact the on-call GI fellow at [phone]    89M PMHx b/l inguinal hernia repair previously recommended to have EGD/colonoscopy in 4/2023 for eval of DANG, s/p unremarkable EGD and incomplete colonoscopy 9/2023, presents with constipation, suprapubic abdominal pain and nausea/vomiting over last 3 days and found to have colon mass c/f malignancy on CT. Attempted colonoscopy on 12/1 was aborted due to poor prep (dark semi-solid stool throughout recto-sigmoid and sigmoid).    #Abnormal imaging  #Mass-like thickening in ascending and transverse colon  #Partial LBO  - 12/4 sp repeat colonoscopy- preparation of the colon was poor. Stool in the entire examined colon limiting evaluation. A few polyps in the entire colon. Resection not attempted. 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 Biopsied. Tattooed. Diverticulosis in the recto-sigmoid colon.      Recommendations-  - await pathology  - planned for OR with CRS today  - would repeat colonoscopy in 3-6 months after the planned surgery to complete evaluation given suboptimal prep  - rest of care as per primary team    *all recommendations are tentative until note is attested by attending*  please reach out to GI team with any further quesitons/concerns    JOHN Gonzales PA-C, RD, CDN  available on TEAMS for questions  after 5pm/weekends, please contact the on-call GI fellow at [phone]

## 2023-12-05 NOTE — PROGRESS NOTE ADULT - PROBLEM SELECTOR PLAN 4
DVT ppx: SCDs -> enox after colonoscopy  Diet: clear liquid -> NPO at midnight except bowel prep  Ambulation: ad nikia  GOC conversation pending completion as noted in chart; he is considering DNI, not sure about DNR  Patient also has no social contact that he would like to be a decision maker for him  Continue to engage on this topic; appears patient may just have to make decisions for himself as he has no one to make decisions for him

## 2023-12-05 NOTE — BRIEF OPERATIVE NOTE - OPERATION/FINDINGS
Medial to lateral mobilization of terminal ileum and right colon. Ileocolic pedicle dissected and divided using ligasure. Hepatic flexure and portion of transverse colon mobilized by entering lesser sac. Right and middle colic arteries divided with ligasure. Small midline incision made and mobilized colon extracorporealized. Endo EDWARDO blue used to dived distal ileum and to divide transverse colon distal to previously tattooed site. Primary side to side anastomosis created using Domingo technique. ICG used to evaluate perfusion to anastomosis.

## 2023-12-05 NOTE — BRIEF OPERATIVE NOTE - NSICDXBRIEFPROCEDURE_GEN_ALL_CORE_FT
PROCEDURES:  Laparoscopic extended right hemicolectomy 05-Dec-2023 19:18:17  Magda Ponce   PROCEDURES:  Laparoscopic extended right hemicolectomy 05-Dec-2023 19:18:17  Magda Pnoce

## 2023-12-05 NOTE — PROGRESS NOTE ADULT - PROBLEM SELECTOR PLAN 1
no BM in 3 days but passing gas. +NV. CT showing masses c/w primary colon CA  GI evaluated, planning for colonoscopy in the AM and Bx  Surgery c/s for possible resection pending path results   CT chest wo additional lesions  > cont bowel prep thru Monday 12/4 for repeat colonoscopy  > zofran scheduled for nausea with bowel prep; f/u ECG for qtc  -repleted phosphorus  -appreciate GI recs  > okay for enemas with miralax  > cbc, cmp, coags, T&S at 3AM 12/5  -CRC surg team planning for OR on Tuesday  -Medical clearance for OR as noted in chart note

## 2023-12-05 NOTE — PROGRESS NOTE ADULT - NS ATTEND AMEND GEN_ALL_CORE FT
# Abnormal CT and partial LBO s/p colonoscopy 12/4 with malignant-appearing partially obstructing colonic mass at hepatic flexure (biopsied) as well as few polyps throughout colon (not resected).     --Planned for OR today with colorectal surgery team  --Given inadequate prep on yesterday's colonoscopy, would consider a repeat colonoscopy for surveillance and removal of additional polyps 3-6 months post-op as noted above    Additional recommendations as above.

## 2023-12-06 LAB
ALBUMIN SERPL ELPH-MCNC: 2.8 G/DL — LOW (ref 3.3–5)
ALBUMIN SERPL ELPH-MCNC: 2.8 G/DL — LOW (ref 3.3–5)
ALP SERPL-CCNC: 46 U/L — SIGNIFICANT CHANGE UP (ref 40–120)
ALP SERPL-CCNC: 46 U/L — SIGNIFICANT CHANGE UP (ref 40–120)
ALT FLD-CCNC: 7 U/L — SIGNIFICANT CHANGE UP (ref 4–41)
ALT FLD-CCNC: 7 U/L — SIGNIFICANT CHANGE UP (ref 4–41)
ANION GAP SERPL CALC-SCNC: 9 MMOL/L — SIGNIFICANT CHANGE UP (ref 7–14)
ANION GAP SERPL CALC-SCNC: 9 MMOL/L — SIGNIFICANT CHANGE UP (ref 7–14)
AST SERPL-CCNC: 10 U/L — SIGNIFICANT CHANGE UP (ref 4–40)
AST SERPL-CCNC: 10 U/L — SIGNIFICANT CHANGE UP (ref 4–40)
BILIRUB SERPL-MCNC: 0.4 MG/DL — SIGNIFICANT CHANGE UP (ref 0.2–1.2)
BILIRUB SERPL-MCNC: 0.4 MG/DL — SIGNIFICANT CHANGE UP (ref 0.2–1.2)
BUN SERPL-MCNC: 17 MG/DL — SIGNIFICANT CHANGE UP (ref 7–23)
BUN SERPL-MCNC: 17 MG/DL — SIGNIFICANT CHANGE UP (ref 7–23)
CALCIUM SERPL-MCNC: 8.3 MG/DL — LOW (ref 8.4–10.5)
CALCIUM SERPL-MCNC: 8.3 MG/DL — LOW (ref 8.4–10.5)
CHLORIDE SERPL-SCNC: 101 MMOL/L — SIGNIFICANT CHANGE UP (ref 98–107)
CHLORIDE SERPL-SCNC: 101 MMOL/L — SIGNIFICANT CHANGE UP (ref 98–107)
CO2 SERPL-SCNC: 29 MMOL/L — SIGNIFICANT CHANGE UP (ref 22–31)
CO2 SERPL-SCNC: 29 MMOL/L — SIGNIFICANT CHANGE UP (ref 22–31)
CREAT SERPL-MCNC: 0.97 MG/DL — SIGNIFICANT CHANGE UP (ref 0.5–1.3)
CREAT SERPL-MCNC: 0.97 MG/DL — SIGNIFICANT CHANGE UP (ref 0.5–1.3)
EGFR: 74 ML/MIN/1.73M2 — SIGNIFICANT CHANGE UP
EGFR: 74 ML/MIN/1.73M2 — SIGNIFICANT CHANGE UP
GLUCOSE SERPL-MCNC: 86 MG/DL — SIGNIFICANT CHANGE UP (ref 70–99)
GLUCOSE SERPL-MCNC: 86 MG/DL — SIGNIFICANT CHANGE UP (ref 70–99)
HCT VFR BLD CALC: 35.8 % — LOW (ref 39–50)
HCT VFR BLD CALC: 35.8 % — LOW (ref 39–50)
HGB BLD-MCNC: 11.2 G/DL — LOW (ref 13–17)
HGB BLD-MCNC: 11.2 G/DL — LOW (ref 13–17)
MAGNESIUM SERPL-MCNC: 1.8 MG/DL — SIGNIFICANT CHANGE UP (ref 1.6–2.6)
MAGNESIUM SERPL-MCNC: 1.8 MG/DL — SIGNIFICANT CHANGE UP (ref 1.6–2.6)
MCHC RBC-ENTMCNC: 27.2 PG — SIGNIFICANT CHANGE UP (ref 27–34)
MCHC RBC-ENTMCNC: 27.2 PG — SIGNIFICANT CHANGE UP (ref 27–34)
MCHC RBC-ENTMCNC: 31.3 GM/DL — LOW (ref 32–36)
MCHC RBC-ENTMCNC: 31.3 GM/DL — LOW (ref 32–36)
MCV RBC AUTO: 86.9 FL — SIGNIFICANT CHANGE UP (ref 80–100)
MCV RBC AUTO: 86.9 FL — SIGNIFICANT CHANGE UP (ref 80–100)
NRBC # BLD: 0 /100 WBCS — SIGNIFICANT CHANGE UP (ref 0–0)
NRBC # BLD: 0 /100 WBCS — SIGNIFICANT CHANGE UP (ref 0–0)
NRBC # FLD: 0 K/UL — SIGNIFICANT CHANGE UP (ref 0–0)
NRBC # FLD: 0 K/UL — SIGNIFICANT CHANGE UP (ref 0–0)
PHOSPHATE SERPL-MCNC: 3.6 MG/DL — SIGNIFICANT CHANGE UP (ref 2.5–4.5)
PHOSPHATE SERPL-MCNC: 3.6 MG/DL — SIGNIFICANT CHANGE UP (ref 2.5–4.5)
PLATELET # BLD AUTO: 347 K/UL — SIGNIFICANT CHANGE UP (ref 150–400)
PLATELET # BLD AUTO: 347 K/UL — SIGNIFICANT CHANGE UP (ref 150–400)
POTASSIUM SERPL-MCNC: 4.2 MMOL/L — SIGNIFICANT CHANGE UP (ref 3.5–5.3)
POTASSIUM SERPL-MCNC: 4.2 MMOL/L — SIGNIFICANT CHANGE UP (ref 3.5–5.3)
POTASSIUM SERPL-SCNC: 4.2 MMOL/L — SIGNIFICANT CHANGE UP (ref 3.5–5.3)
POTASSIUM SERPL-SCNC: 4.2 MMOL/L — SIGNIFICANT CHANGE UP (ref 3.5–5.3)
PROT SERPL-MCNC: 5.1 G/DL — LOW (ref 6–8.3)
PROT SERPL-MCNC: 5.1 G/DL — LOW (ref 6–8.3)
RBC # BLD: 4.12 M/UL — LOW (ref 4.2–5.8)
RBC # BLD: 4.12 M/UL — LOW (ref 4.2–5.8)
RBC # FLD: 14.1 % — SIGNIFICANT CHANGE UP (ref 10.3–14.5)
RBC # FLD: 14.1 % — SIGNIFICANT CHANGE UP (ref 10.3–14.5)
SODIUM SERPL-SCNC: 139 MMOL/L — SIGNIFICANT CHANGE UP (ref 135–145)
SODIUM SERPL-SCNC: 139 MMOL/L — SIGNIFICANT CHANGE UP (ref 135–145)
WBC # BLD: 11.29 K/UL — HIGH (ref 3.8–10.5)
WBC # BLD: 11.29 K/UL — HIGH (ref 3.8–10.5)
WBC # FLD AUTO: 11.29 K/UL — HIGH (ref 3.8–10.5)
WBC # FLD AUTO: 11.29 K/UL — HIGH (ref 3.8–10.5)

## 2023-12-06 RX ORDER — SODIUM CHLORIDE 9 MG/ML
500 INJECTION INTRAMUSCULAR; INTRAVENOUS; SUBCUTANEOUS ONCE
Refills: 0 | Status: COMPLETED | OUTPATIENT
Start: 2023-12-06 | End: 2023-12-06

## 2023-12-06 RX ORDER — GABAPENTIN 400 MG/1
400 CAPSULE ORAL AT BEDTIME
Refills: 0 | Status: DISCONTINUED | OUTPATIENT
Start: 2023-12-06 | End: 2023-12-07

## 2023-12-06 RX ORDER — MECLIZINE HCL 12.5 MG
12.5 TABLET ORAL
Refills: 0 | Status: DISCONTINUED | OUTPATIENT
Start: 2023-12-06 | End: 2023-12-07

## 2023-12-06 RX ORDER — SODIUM CHLORIDE 9 MG/ML
1000 INJECTION, SOLUTION INTRAVENOUS
Refills: 0 | Status: DISCONTINUED | OUTPATIENT
Start: 2023-12-06 | End: 2023-12-08

## 2023-12-06 RX ORDER — ATORVASTATIN CALCIUM 80 MG/1
80 TABLET, FILM COATED ORAL AT BEDTIME
Refills: 0 | Status: DISCONTINUED | OUTPATIENT
Start: 2023-12-06 | End: 2023-12-07

## 2023-12-06 RX ORDER — SODIUM CHLORIDE 9 MG/ML
1000 INJECTION, SOLUTION INTRAVENOUS
Refills: 0 | Status: DISCONTINUED | OUTPATIENT
Start: 2023-12-06 | End: 2023-12-06

## 2023-12-06 RX ORDER — TAMSULOSIN HYDROCHLORIDE 0.4 MG/1
0.4 CAPSULE ORAL DAILY
Refills: 0 | Status: DISCONTINUED | OUTPATIENT
Start: 2023-12-06 | End: 2023-12-07

## 2023-12-06 RX ORDER — PANTOPRAZOLE SODIUM 20 MG/1
40 TABLET, DELAYED RELEASE ORAL
Refills: 0 | Status: DISCONTINUED | OUTPATIENT
Start: 2023-12-06 | End: 2023-12-06

## 2023-12-06 RX ORDER — PANTOPRAZOLE SODIUM 20 MG/1
40 TABLET, DELAYED RELEASE ORAL
Refills: 0 | Status: DISCONTINUED | OUTPATIENT
Start: 2023-12-06 | End: 2023-12-13

## 2023-12-06 RX ORDER — PANTOPRAZOLE SODIUM 20 MG/1
40 TABLET, DELAYED RELEASE ORAL
Refills: 0 | Status: DISCONTINUED | OUTPATIENT
Start: 2023-12-06 | End: 2023-12-07

## 2023-12-06 RX ORDER — ONDANSETRON 8 MG/1
4 TABLET, FILM COATED ORAL ONCE
Refills: 0 | Status: COMPLETED | OUTPATIENT
Start: 2023-12-06 | End: 2023-12-06

## 2023-12-06 RX ADMIN — ONDANSETRON 4 MILLIGRAM(S): 8 TABLET, FILM COATED ORAL at 06:50

## 2023-12-06 RX ADMIN — SODIUM CHLORIDE 100 MILLILITER(S): 9 INJECTION, SOLUTION INTRAVENOUS at 09:44

## 2023-12-06 RX ADMIN — Medication 400 MILLIGRAM(S): at 13:14

## 2023-12-06 RX ADMIN — HEPARIN SODIUM 5000 UNIT(S): 5000 INJECTION INTRAVENOUS; SUBCUTANEOUS at 14:50

## 2023-12-06 RX ADMIN — ONDANSETRON 4 MILLIGRAM(S): 8 TABLET, FILM COATED ORAL at 19:15

## 2023-12-06 RX ADMIN — Medication 400 MILLIGRAM(S): at 19:30

## 2023-12-06 RX ADMIN — Medication 400 MILLIGRAM(S): at 06:49

## 2023-12-06 RX ADMIN — SODIUM CHLORIDE 100 MILLILITER(S): 9 INJECTION, SOLUTION INTRAVENOUS at 21:02

## 2023-12-06 RX ADMIN — PANTOPRAZOLE SODIUM 40 MILLIGRAM(S): 20 TABLET, DELAYED RELEASE ORAL at 20:36

## 2023-12-06 RX ADMIN — SODIUM CHLORIDE 250 MILLILITER(S): 9 INJECTION INTRAMUSCULAR; INTRAVENOUS; SUBCUTANEOUS at 15:09

## 2023-12-06 RX ADMIN — CHLORHEXIDINE GLUCONATE 1 APPLICATION(S): 213 SOLUTION TOPICAL at 14:34

## 2023-12-06 RX ADMIN — TAMSULOSIN HYDROCHLORIDE 0.4 MILLIGRAM(S): 0.4 CAPSULE ORAL at 09:44

## 2023-12-06 RX ADMIN — SODIUM CHLORIDE 500 MILLILITER(S): 9 INJECTION INTRAMUSCULAR; INTRAVENOUS; SUBCUTANEOUS at 19:15

## 2023-12-06 RX ADMIN — Medication 400 MILLIGRAM(S): at 01:00

## 2023-12-06 RX ADMIN — HYDROMORPHONE HYDROCHLORIDE 0.5 MILLIGRAM(S): 2 INJECTION INTRAMUSCULAR; INTRAVENOUS; SUBCUTANEOUS at 21:28

## 2023-12-06 RX ADMIN — HEPARIN SODIUM 5000 UNIT(S): 5000 INJECTION INTRAVENOUS; SUBCUTANEOUS at 22:29

## 2023-12-06 RX ADMIN — HYDROMORPHONE HYDROCHLORIDE 0.5 MILLIGRAM(S): 2 INJECTION INTRAMUSCULAR; INTRAVENOUS; SUBCUTANEOUS at 20:58

## 2023-12-06 RX ADMIN — Medication 1000 MILLIGRAM(S): at 13:30

## 2023-12-06 RX ADMIN — Medication 1000 MILLIGRAM(S): at 19:45

## 2023-12-06 RX ADMIN — HEPARIN SODIUM 5000 UNIT(S): 5000 INJECTION INTRAVENOUS; SUBCUTANEOUS at 06:50

## 2023-12-06 NOTE — PROGRESS NOTE ADULT - PROBLEM SELECTOR PLAN 1
no BM in 3 days but passing gas. +NV. CT showing masses c/w primary colon CA  - CT chest without additional lesions   - 12/5 surgery for laporoscopic r hemicholectomy

## 2023-12-06 NOTE — PROGRESS NOTE ADULT - PROBLEM SELECTOR PLAN 2
- cont atorvastatin

## 2023-12-06 NOTE — PROGRESS NOTE ADULT - PROBLEM SELECTOR PLAN 3
- cont tamsulosin

## 2023-12-06 NOTE — DISCHARGE NOTE PROVIDER - NSDCFUADDAPPT_GEN_ALL_CORE_FT
Please follow up with your primary care provider 1-2 weeks after discharge regarding recent hospitalization.  Please follow up with your primary care provider 1-2 weeks after discharge regarding recent hospitalization/medical problems/medications.

## 2023-12-06 NOTE — DISCHARGE NOTE PROVIDER - DETAILS OF MALNUTRITION DIAGNOSIS/DIAGNOSES
This patient has been assessed with a concern for Malnutrition and was treated during this hospitalization for the following Nutrition diagnosis/diagnoses:     -  12/02/2023: Severe protein-calorie malnutrition   -  12/02/2023: Underweight (BMI < 19)

## 2023-12-06 NOTE — PROGRESS NOTE ADULT - PROBLEM SELECTOR PLAN 4
DVT ppx: SCDs -> enox after colonoscopy  Diet: clear liquid -> NPO at midnight except bowel prep  Ambulation: ad nikia  GOC conversation pending completion as noted in chart; he is considering DNI, not sure about DNR  Patient also has no social contact that he would like to be a decision maker for him  Continue to engage on this topic; appears patient may just have to make decisions for himself as he has no one to make decisions for him  Pending PT consult s/p surgery

## 2023-12-06 NOTE — DISCHARGE NOTE PROVIDER - INSTRUCTIONS
If you had part of your intestine removed, please consume a low fiber diet for the first 2-3 weeks. You should avoid raw fruits/vegetables, nuts, seeds, corn, and leafy greens (spinach, kale, luna greens, lettuce) during this period of time.

## 2023-12-06 NOTE — PROGRESS NOTE ADULT - SUBJECTIVE AND OBJECTIVE BOX
A Team General Surgery Progress Note    S: Pt seen and examined with team on morning rounds. Patient feels well. Admits to some lower abdominal pain. Denies nausea/emesis. No Flatus/BM. BP rechecked by nurse 150/55 (from 105/47 while asleep). Denies CP/palpitations/SOB/HA/dizziness.      Vital Signs Last 24 Hrs  T(C): 36.3 (06 Dec 2023 06:45), Max: 36.9 (05 Dec 2023 13:45)  T(F): 97.3 (06 Dec 2023 06:45), Max: 98.4 (05 Dec 2023 13:45)  HR: 72 (06 Dec 2023 06:45) (65 - 77)  BP: 150/52 (06 Dec 2023 06:45) (105/47 - 173/57)  BP(mean): 81 (05 Dec 2023 21:30) (67 - 109)  RR: 16 (06 Dec 2023 06:45) (12 - 19)  SpO2: 99% (06 Dec 2023 06:45) (94% - 100%)    Parameters below as of 06 Dec 2023 06:45  Patient On (Oxygen Delivery Method): room air        I&O's Summary    05 Dec 2023 07:01  -  06 Dec 2023 07:00  --------------------------------------------------------  IN: 1260 mL / OUT: 700 mL / NET: 560 mL      I&O's Detail    05 Dec 2023 07:01  -  06 Dec 2023 07:00  --------------------------------------------------------  IN:    Lactated Ringers: 1100 mL    Oral Fluid: 160 mL  Total IN: 1260 mL    OUT:    Indwelling Catheter - Urethral (mL): 700 mL  Total OUT: 700 mL    Total NET: 560 mL          General Appearance: Appears well, NAD  Chest: Breathing comfortably on RA.    CV: S1, S2 @ 72  Abdomen: Softly distended, appropriate incisional tenderness steris clean/dry/intact.  Extremities: Moves all extremities.    MEDICATIONS  (STANDING):  acetaminophen   IVPB .. 1000 milliGRAM(s) IV Intermittent every 6 hours  chlorhexidine 2% Cloths 1 Application(s) Topical daily  heparin   Injectable 5000 Unit(s) SubCutaneous every 8 hours  influenza  Vaccine (HIGH DOSE) 0.7 milliLiter(s) IntraMuscular once  lactated ringers. 1000 milliLiter(s) (100 mL/Hr) IV Continuous <Continuous>  ondansetron Injectable 4 milliGRAM(s) IV Push every 6 hours  pantoprazole  Injectable 40 milliGRAM(s) IV Push daily    MEDICATIONS  (PRN):  aluminum hydroxide/magnesium hydroxide/simethicone Suspension 30 milliLiter(s) Oral every 4 hours PRN Dyspepsia  HYDROmorphone  Injectable 0.25 milliGRAM(s) IV Push every 4 hours PRN Moderate Pain (4 - 6)  HYDROmorphone  Injectable 0.5 milliGRAM(s) IV Push every 4 hours PRN Severe Pain (7 - 10)  melatonin 3 milliGRAM(s) Oral at bedtime PRN Insomnia      LABS:                        11.2   11.29 )-----------( 347      ( 06 Dec 2023 06:57 )             35.8     12-06    139  |  101  |  17  ----------------------------<  86  4.2   |  29  |  0.97    Ca    8.3<L>      06 Dec 2023 06:57  Phos  3.6     12-06  Mg     1.80     12-06    TPro  5.1<L>  /  Alb  2.8<L>  /  TBili  0.4  /  DBili  x   /  AST  10  /  ALT  7   /  AlkPhos  46  12-06    PT/INR - ( 05 Dec 2023 04:46 )   PT: 11.1 sec;   INR: 0.98 ratio         PTT - ( 05 Dec 2023 04:46 )  PTT:27.4 sec  Urinalysis Basic - ( 06 Dec 2023 06:57 )    Color: x / Appearance: x / SG: x / pH: x  Gluc: 86 mg/dL / Ketone: x  / Bili: x / Urobili: x   Blood: x / Protein: x / Nitrite: x   Leuk Esterase: x / RBC: x / WBC x   Sq Epi: x / Non Sq Epi: x / Bacteria: x

## 2023-12-06 NOTE — DISCHARGE NOTE PROVIDER - NSDCCPTREATMENT_GEN_ALL_CORE_FT
PRINCIPAL PROCEDURE  Procedure: Laparoscopic extended right hemicolectomy  Findings and Treatment:

## 2023-12-06 NOTE — DISCHARGE NOTE PROVIDER - HOSPITAL COURSE
89 yoM with hx of BPH, HLD, B/L inguinal hernia repair with known L recurrence, colonoscopy and endoscopy in sept for anemia on oral iron presented with abdominal pain and constipation over the past three days. In the ED patient had labs showing elevated lactate 4.5 that improved to 1.5 s/p IVF hydration. CT abdomen/pelvis c/f primary neoplasm of the ascending and transverse colon. GI consulted and recommended colonoscopy for biopsy. Admitted to medicine for further management where CT chest obtained and negative for metastatic disease. Patient then had colorectal surgery consult who recommended colectomy for likely primary colon CA. Patient initially planned to undergo colonoscopy but unable to complete adequate bowel prep so underwent additional bowel prep over the weekend prior to repeat colonoscopy where biopsies were obtained showing ***. 89 M w/ PMHx GERD, BPH, HLD, open R IHR, LIH (non repaired), presents to ED with suprapubic pain. CT w/ distal ascending and proximal transverse thickening, concern for malignancy. CT chest without evidence of metastasis. CEA 81.9, CA 19-9 225. Colonoscopy done 12/4 demonstrating partially obstructing hepatic flexure mass. Patient currently s/p Laparoscopic extended right hemicolectomy 12/5/23. Patient tolerated operation well and there were no post-operative complications identified. Patient remained hemodynamically stable in the PACU and transferred to the surgical floor.     12/7 NGT placed for abdominal distension  12/8 Patient passed/failed NGT clamp trial **    Diet was restarted and advanced as tolerated. Pain control was transitioned from IV to PO pain meds. At this time, patient is currently ambulating, voiding, tolerating a regular diet. Pain well controlled on PO pain meds. Patient has been deemed stable for discharge home with follow up as an outpatient. 89 M w/ PMHx GERD, BPH, HLD, open R IHR, LIH (non repaired), presents to ED with suprapubic pain. CT w/ distal ascending and proximal transverse thickening, concern for malignancy. CT chest without evidence of metastasis. CEA 81.9, CA 19-9 225. Colonoscopy done 12/4 demonstrating partially obstructing hepatic flexure mass. Patient currently s/p Laparoscopic extended right hemicolectomy 12/5/23. Patient tolerated operation well and there were no post-operative complications identified. Patient remained hemodynamically stable in the PACU and transferred to the surgical floor.     12/7 NGT placed for abdominal distension  12/8 Patient passed NGT clamp trial. He was started on sips. PT recommended restorative rehab. Inpatient rehab recommended inpatient rehab as well.   12/9 Gill was removed, passed TOV.  12/10 Advanced to clear liquid diet.  12/11 Patient with multiple episodes of diarrhea. C. diff sent.  12/12 c. diff negative. advanced to regular diet.   12/13 PICC line placed and TPN started. Downgraded to CLD.  12/16 Patient was tolerating regular diet and TPN was stopped.  12/18 PICC line removed.    Diet was restarted and advanced as tolerated. Pain control was transitioned from IV to PO pain meds. At this time, patient is currently ambulating, voiding, tolerating a regular diet. Pain well controlled on PO pain meds. Patient has been deemed stable for discharge to rehab with follow up as an outpatient. 89 M w/ PMHx GERD, BPH, HLD, open R IHR, LIH (non repaired), presents to ED with suprapubic pain. CT w/ distal ascending and proximal transverse thickening, concern for malignancy. CT chest without evidence of metastasis. CEA 81.9, CA 19-9 225. Colonoscopy done 12/4 demonstrating partially obstructing hepatic flexure mass. Patient currently s/p Laparoscopic extended right hemicolectomy 12/5/23. Patient tolerated operation well and there were no post-operative complications identified. Patient remained hemodynamically stable in the PACU and transferred to the surgical floor.     12/7 NGT placed for abdominal distension  12/8 Patient passed NGT clamp trial. He was started on sips. PT recommended restorative rehab. Inpatient rehab recommended inpatient rehab as well.   12/9 Gill was removed, passed TOV.  12/10 Advanced to clear liquid diet.  12/11 Patient with multiple episodes of diarrhea. C. diff sent.  12/12 c. diff negative. advanced to regular diet.   12/13 PICC line placed and TPN started. Downgraded to CLD.  12/16 Patient was tolerating regular diet and TPN was stopped.  12/18 PICC line removed.    Diet was restarted and advanced as tolerated. Pain control was transitioned from IV to PO pain meds. At this time, patient is currently ambulating, voiding, tolerating a regular diet. Pain well controlled on PO pain meds. Patient has been deemed stable for discharge to rehab with follow up as an outpatient.     c

## 2023-12-06 NOTE — DISCHARGE NOTE PROVIDER - NSDCCPCAREPLAN_GEN_ALL_CORE_FT
PRINCIPAL DISCHARGE DIAGNOSIS  Diagnosis: Hepatic flexure mass  Assessment and Plan of Treatment:

## 2023-12-06 NOTE — DISCHARGE NOTE PROVIDER - NSDCMRMEDTOKEN_GEN_ALL_CORE_FT
atorvastatin 80 mg oral tablet: 1 tab(s) orally once a day (at bedtime)  gabapentin 400 mg oral capsule: 1 cap(s) orally once a day (at bedtime)  meclizine 12.5 mg oral tablet: 1 tab(s) orally 2 times a day as needed for  dizziness  pantoprazole 40 mg oral delayed release tablet: 1 tab(s) orally 2 times a day  tamsulosin 0.4 mg oral capsule: 1 cap(s) orally once a day (at bedtime)  traMADol 50 mg oral tablet: 1 tab(s) orally 2 times a day as needed for  moderate pain MDD: 2 tabs   atorvastatin 80 mg oral tablet: 1 tab(s) orally once a day (at bedtime)  gabapentin 400 mg oral capsule: 1 cap(s) orally once a day (at bedtime)  meclizine 12.5 mg oral tablet: 1 tab(s) orally 2 times a day as needed for  dizziness  pantoprazole 40 mg oral delayed release tablet: 1 tab(s) orally 2 times a day  tamsulosin 0.4 mg oral capsule: 1 cap(s) orally once a day (at bedtime)   acetaminophen 325 mg oral tablet: 2 tab(s) orally every 6 hours as needed for Mild Pain (1 - 3)  atorvastatin 80 mg oral tablet: 1 tab(s) orally once a day (at bedtime)  gabapentin 400 mg oral capsule: 1 cap(s) orally once a day (at bedtime)  meclizine 12.5 mg oral tablet: 1 tab(s) orally 2 times a day as needed for  dizziness  pantoprazole 40 mg oral delayed release tablet: 1 tab(s) orally once a day  tamsulosin 0.4 mg oral capsule: 1 cap(s) orally once a day (at bedtime)

## 2023-12-06 NOTE — PROGRESS NOTE ADULT - ASSESSMENT
89 M PMH BPH, HLD, inguinal hernia repairs, anemia, presenting with 3 days of NV and constipation with CT findings c/f primary colon malignancy undergoing staging and colonoscopy for biopsy. .

## 2023-12-06 NOTE — PROGRESS NOTE ADULT - SUBJECTIVE AND OBJECTIVE BOX
************************  Ese Mercer MD  Internal Medicine PGY-1  ************************    Patient is a 89y old  Male who presents with a chief complaint of c/f colon cancer (06 Dec 2023 01:41)    Overnight no events, this morning patient with no acute complaints.Denies CP, SOB, fevers/chills, N/V, or abdominal pain.  Patient urinating well with BM(s).      Patient reminded of ongoing careplan.    MEDICATIONS  (STANDING):  acetaminophen   IVPB .. 1000 milliGRAM(s) IV Intermittent every 6 hours  chlorhexidine 2% Cloths 1 Application(s) Topical daily  heparin   Injectable 5000 Unit(s) SubCutaneous every 8 hours  influenza  Vaccine (HIGH DOSE) 0.7 milliLiter(s) IntraMuscular once  lactated ringers. 1000 milliLiter(s) (100 mL/Hr) IV Continuous <Continuous>  ondansetron Injectable 4 milliGRAM(s) IV Push every 6 hours  pantoprazole  Injectable 40 milliGRAM(s) IV Push daily    MEDICATIONS  (PRN):  aluminum hydroxide/magnesium hydroxide/simethicone Suspension 30 milliLiter(s) Oral every 4 hours PRN Dyspepsia  HYDROmorphone  Injectable 0.25 milliGRAM(s) IV Push every 4 hours PRN Moderate Pain (4 - 6)  HYDROmorphone  Injectable 0.5 milliGRAM(s) IV Push every 4 hours PRN Severe Pain (7 - 10)  melatonin 3 milliGRAM(s) Oral at bedtime PRN Insomnia      CAPILLARY BLOOD GLUCOSE      POCT Blood Glucose.: 75 mg/dL (05 Dec 2023 13:20)    I&O's Summary    05 Dec 2023 07:01  -  06 Dec 2023 07:00  --------------------------------------------------------  IN: 800 mL / OUT: 600 mL / NET: 200 mL        PHYSICAL EXAM:  Vital Signs Last 24 Hrs  T(C): 36.7 (06 Dec 2023 02:06), Max: 36.9 (05 Dec 2023 13:45)  T(F): 98 (06 Dec 2023 02:06), Max: 98.4 (05 Dec 2023 13:45)  HR: 65 (06 Dec 2023 02:06) (65 - 77)  BP: 105/47 (06 Dec 2023 02:06) (105/47 - 173/57)  BP(mean): 81 (05 Dec 2023 21:30) (67 - 109)  RR: 16 (06 Dec 2023 02:06) (12 - 19)  SpO2: 96% (06 Dec 2023 02:06) (94% - 100%)    Parameters below as of 06 Dec 2023 02:06  Patient On (Oxygen Delivery Method): room air        PHYSICAL EXAM:  GENERAL: NAD, lying in bed comfortably  HEENT: NC/AT  NECK: Supple, No JVD  CHEST/LUNG: CTAB, no increased WOB  HEART: RRR, no m/r/g  ABDOMEN: soft, NT, ND, BS+  EXTREMITIES:  2+ peripheral pulses, no edema  NERVOUS SYSTEM:  A&Ox3  MSK: FROM all 4 extremities, full and equal strength  SKIN: No rashes or lesions    LABS:                        11.6   11.42 )-----------( 378      ( 05 Dec 2023 04:46 )             36.1     12-05    137  |  98  |  15  ----------------------------<  94  4.3   |  26  |  0.85    Ca    8.5      05 Dec 2023 04:46  Phos  2.7     12-05  Mg     1.90     12-05      PT/INR - ( 05 Dec 2023 04:46 )   PT: 11.1 sec;   INR: 0.98 ratio         PTT - ( 05 Dec 2023 04:46 )  PTT:27.4 sec      Urinalysis Basic - ( 05 Dec 2023 04:46 )    Color: x / Appearance: x / SG: x / pH: x  Gluc: 94 mg/dL / Ketone: x  / Bili: x / Urobili: x   Blood: x / Protein: x / Nitrite: x   Leuk Esterase: x / RBC: x / WBC x   Sq Epi: x / Non Sq Epi: x / Bacteria: x

## 2023-12-06 NOTE — DISCHARGE NOTE PROVIDER - CARE PROVIDER_API CALL
Arnie Lehman  Internal Medicine  [address] [place] [address]  Phone: [phone]  Fax: [phone]  Established Patient  Follow Up Time: 1 week   rAnie Lehman  Internal Medicine  [address] [place] [address]  Phone: [phone]  Fax: [phone]  Established Patient  Follow Up Time: 1 week   Arnie Lehman  Internal Medicine  [address] [place] [address]  Phone: [phone]  Fax: [phone]  Established Patient  Follow Up Time: 1 week    Asia Marie  Surgery  [address] [place] [address]  Phone: [phone]  Fax: [phone]  Follow Up Time: 1 week   Arnie Lehman  Internal Medicine  [address] [place] [address]  Phone: [phone]  Fax: [phone]  Established Patient  Follow Up Time: 1 week    Asia Marie  Surgery  [address] [place] [address]  Phone: [phone]  Fax: [phone]  Follow Up Time: 1 week

## 2023-12-06 NOTE — DISCHARGE NOTE PROVIDER - NSDCFUADDINST_GEN_ALL_CORE_FT
WOUND CARE:  Please keep incisions clean and dry. Please do not Scrub or rub incisions. Do not use lotion or powder on incisions.   BATHING: You may shower and/or sponge bathe. You may use warm soapy water in the shower and rinse, pat dry.  ACTIVITY: No heavy lifting or straining. Otherwise, you may return to your usual level of physical activity. If you are taking narcotic pain medication DO NOT drive a car, operate machinery or make important decisions.  DIET: Return to your usual diet.  NOTIFY YOUR SURGEON IF YOU HAVE: any bleeding that does not stop, any pus draining from your wound(s), any fever (over 100.4 F) persistent nausea/vomiting, or if your pain is not controlled on your discharge pain medications, unable to urinate.  Please follow up with your primary care physician in one week regarding your hospitalization, bring copies of your discharge paperwork.  Please follow up with your surgeon, Dr. Marie WOUND CARE:  Please keep incisions clean and dry. Please do not Scrub or rub incisions. Do not use lotion or powder on incisions.   BATHING: You may shower and/or sponge bathe. You may use warm soapy water in the shower and rinse, pat dry.  ACTIVITY: No heavy lifting or straining. Otherwise, you may return to your usual level of physical activity. If you are taking narcotic pain medication DO NOT drive a car, operate machinery or make important decisions.  DIET: Return to your usual diet.  NOTIFY YOUR SURGEON IF YOU HAVE: any bleeding that does not stop, any pus draining from your wound(s), any fever (over 100.4 F) persistent nausea/vomiting, or if your pain is not controlled on your discharge pain medications, unable to urinate.  Please follow up with your primary care physician in one week regarding your hospitalization, bring copies of your discharge paperwork.  Please follow up with your surgeon, Dr. Marie. Call today for appointment in 1-2 weeks.

## 2023-12-06 NOTE — PROGRESS NOTE ADULT - ASSESSMENT
89 M w/ PMHx GERD, BPH, HLD, open R IHR, LIH (non repaired), presents to ED with suprapubic pain. CT w/ distal ascending and proximal transverse thickening, concern for malignancy. CT chest without evidence of metastasis. CEA 81.9, CA 19-9 225. Colonoscopy done 12/4. Patient currently s/p Laparoscopic extended right hemicolectomy.    Plan:  - SQH  - IVF/CLD  - Pain meds  - Monitor bruner - strict I's/O's  - PT consult for OOB Ambulate  - AM labs    A Team Surgery  [id number].     89 M w/ PMHx GERD, BPH, HLD, open R IHR, LIH (non repaired), presents to ED with suprapubic pain. CT w/ distal ascending and proximal transverse thickening, concern for malignancy. CT chest without evidence of metastasis. CEA 81.9, CA 19-9 225. Colonoscopy done 12/4. Patient currently s/p Laparoscopic extended right hemicolectomy.    Plan:  - SQH  - IVF/CLD  - Pain meds  - Monitor bruner - strict I's/O's  - PT consult for OOB Ambulate  - AM labs    A Team Surgery  [id number].

## 2023-12-06 NOTE — DISCHARGE NOTE PROVIDER - PROVIDER TOKENS
PROVIDER:[[id number]],FOLLOWUP:[1 week],ESTABLISHEDPATIENT:[T]] PROVIDER:[[id number]],FOLLOWUP:[1 week],ESTABLISHEDPATIENT:[T]],PROVIDER:[[id number]],FOLLOWUP:[1 week]] PROVIDER:[[id number]],FOLLOWUP:[1 week],ESTABLISHEDPATIENT:[T]],PROVIDER:[TOKEN:[[id number]],FOLLOWUP:[1 week]]

## 2023-12-07 LAB
ANION GAP SERPL CALC-SCNC: 8 MMOL/L — SIGNIFICANT CHANGE UP (ref 7–14)
ANION GAP SERPL CALC-SCNC: 8 MMOL/L — SIGNIFICANT CHANGE UP (ref 7–14)
BUN SERPL-MCNC: 13 MG/DL — SIGNIFICANT CHANGE UP (ref 7–23)
BUN SERPL-MCNC: 13 MG/DL — SIGNIFICANT CHANGE UP (ref 7–23)
CALCIUM SERPL-MCNC: 8.1 MG/DL — LOW (ref 8.4–10.5)
CALCIUM SERPL-MCNC: 8.1 MG/DL — LOW (ref 8.4–10.5)
CHLORIDE SERPL-SCNC: 101 MMOL/L — SIGNIFICANT CHANGE UP (ref 98–107)
CHLORIDE SERPL-SCNC: 101 MMOL/L — SIGNIFICANT CHANGE UP (ref 98–107)
CO2 SERPL-SCNC: 29 MMOL/L — SIGNIFICANT CHANGE UP (ref 22–31)
CO2 SERPL-SCNC: 29 MMOL/L — SIGNIFICANT CHANGE UP (ref 22–31)
CREAT SERPL-MCNC: 0.85 MG/DL — SIGNIFICANT CHANGE UP (ref 0.5–1.3)
CREAT SERPL-MCNC: 0.85 MG/DL — SIGNIFICANT CHANGE UP (ref 0.5–1.3)
EGFR: 83 ML/MIN/1.73M2 — SIGNIFICANT CHANGE UP
EGFR: 83 ML/MIN/1.73M2 — SIGNIFICANT CHANGE UP
GLUCOSE SERPL-MCNC: 112 MG/DL — HIGH (ref 70–99)
GLUCOSE SERPL-MCNC: 112 MG/DL — HIGH (ref 70–99)
HCT VFR BLD CALC: 36.6 % — LOW (ref 39–50)
HCT VFR BLD CALC: 36.6 % — LOW (ref 39–50)
HGB BLD-MCNC: 11.8 G/DL — LOW (ref 13–17)
HGB BLD-MCNC: 11.8 G/DL — LOW (ref 13–17)
LACTATE SERPL-SCNC: 1.3 MMOL/L — SIGNIFICANT CHANGE UP (ref 0.5–2)
LACTATE SERPL-SCNC: 1.3 MMOL/L — SIGNIFICANT CHANGE UP (ref 0.5–2)
MAGNESIUM SERPL-MCNC: 1.7 MG/DL — SIGNIFICANT CHANGE UP (ref 1.6–2.6)
MAGNESIUM SERPL-MCNC: 1.7 MG/DL — SIGNIFICANT CHANGE UP (ref 1.6–2.6)
MCHC RBC-ENTMCNC: 27.3 PG — SIGNIFICANT CHANGE UP (ref 27–34)
MCHC RBC-ENTMCNC: 27.3 PG — SIGNIFICANT CHANGE UP (ref 27–34)
MCHC RBC-ENTMCNC: 32.2 GM/DL — SIGNIFICANT CHANGE UP (ref 32–36)
MCHC RBC-ENTMCNC: 32.2 GM/DL — SIGNIFICANT CHANGE UP (ref 32–36)
MCV RBC AUTO: 84.7 FL — SIGNIFICANT CHANGE UP (ref 80–100)
MCV RBC AUTO: 84.7 FL — SIGNIFICANT CHANGE UP (ref 80–100)
NRBC # BLD: 0 /100 WBCS — SIGNIFICANT CHANGE UP (ref 0–0)
NRBC # BLD: 0 /100 WBCS — SIGNIFICANT CHANGE UP (ref 0–0)
NRBC # FLD: 0 K/UL — SIGNIFICANT CHANGE UP (ref 0–0)
NRBC # FLD: 0 K/UL — SIGNIFICANT CHANGE UP (ref 0–0)
PHOSPHATE SERPL-MCNC: 2.2 MG/DL — LOW (ref 2.5–4.5)
PHOSPHATE SERPL-MCNC: 2.2 MG/DL — LOW (ref 2.5–4.5)
PLATELET # BLD AUTO: 420 K/UL — HIGH (ref 150–400)
PLATELET # BLD AUTO: 420 K/UL — HIGH (ref 150–400)
POTASSIUM SERPL-MCNC: 3.5 MMOL/L — SIGNIFICANT CHANGE UP (ref 3.5–5.3)
POTASSIUM SERPL-MCNC: 3.5 MMOL/L — SIGNIFICANT CHANGE UP (ref 3.5–5.3)
POTASSIUM SERPL-SCNC: 3.5 MMOL/L — SIGNIFICANT CHANGE UP (ref 3.5–5.3)
POTASSIUM SERPL-SCNC: 3.5 MMOL/L — SIGNIFICANT CHANGE UP (ref 3.5–5.3)
RBC # BLD: 4.32 M/UL — SIGNIFICANT CHANGE UP (ref 4.2–5.8)
RBC # BLD: 4.32 M/UL — SIGNIFICANT CHANGE UP (ref 4.2–5.8)
RBC # FLD: 14.2 % — SIGNIFICANT CHANGE UP (ref 10.3–14.5)
RBC # FLD: 14.2 % — SIGNIFICANT CHANGE UP (ref 10.3–14.5)
SODIUM SERPL-SCNC: 138 MMOL/L — SIGNIFICANT CHANGE UP (ref 135–145)
SODIUM SERPL-SCNC: 138 MMOL/L — SIGNIFICANT CHANGE UP (ref 135–145)
WBC # BLD: 13.37 K/UL — HIGH (ref 3.8–10.5)
WBC # BLD: 13.37 K/UL — HIGH (ref 3.8–10.5)
WBC # FLD AUTO: 13.37 K/UL — HIGH (ref 3.8–10.5)
WBC # FLD AUTO: 13.37 K/UL — HIGH (ref 3.8–10.5)

## 2023-12-07 PROCEDURE — 71045 X-RAY EXAM CHEST 1 VIEW: CPT | Mod: 26

## 2023-12-07 PROCEDURE — 74018 RADEX ABDOMEN 1 VIEW: CPT | Mod: 26

## 2023-12-07 RX ORDER — SODIUM CHLORIDE 9 MG/ML
500 INJECTION, SOLUTION INTRAVENOUS ONCE
Refills: 0 | Status: DISCONTINUED | OUTPATIENT
Start: 2023-12-07 | End: 2023-12-07

## 2023-12-07 RX ORDER — ONDANSETRON 8 MG/1
4 TABLET, FILM COATED ORAL ONCE
Refills: 0 | Status: COMPLETED | OUTPATIENT
Start: 2023-12-07 | End: 2023-12-07

## 2023-12-07 RX ORDER — POTASSIUM PHOSPHATE, MONOBASIC POTASSIUM PHOSPHATE, DIBASIC 236; 224 MG/ML; MG/ML
15 INJECTION, SOLUTION INTRAVENOUS ONCE
Refills: 0 | Status: COMPLETED | OUTPATIENT
Start: 2023-12-07 | End: 2023-12-07

## 2023-12-07 RX ORDER — TETRACAINE/BENZOCAINE/BUTAMBEN 2%-14%-2%
1 OINTMENT (GRAM) TOPICAL THREE TIMES A DAY
Refills: 0 | Status: DISCONTINUED | OUTPATIENT
Start: 2023-12-07 | End: 2023-12-08

## 2023-12-07 RX ORDER — POTASSIUM CHLORIDE 20 MEQ
10 PACKET (EA) ORAL
Refills: 0 | Status: COMPLETED | OUTPATIENT
Start: 2023-12-07 | End: 2023-12-07

## 2023-12-07 RX ORDER — MAGNESIUM SULFATE 500 MG/ML
2 VIAL (ML) INJECTION ONCE
Refills: 0 | Status: COMPLETED | OUTPATIENT
Start: 2023-12-07 | End: 2023-12-07

## 2023-12-07 RX ORDER — ACETAMINOPHEN 500 MG
1000 TABLET ORAL EVERY 6 HOURS
Refills: 0 | Status: COMPLETED | OUTPATIENT
Start: 2023-12-07 | End: 2023-12-08

## 2023-12-07 RX ORDER — SODIUM CHLORIDE 9 MG/ML
500 INJECTION, SOLUTION INTRAVENOUS ONCE
Refills: 0 | Status: COMPLETED | OUTPATIENT
Start: 2023-12-07 | End: 2023-12-07

## 2023-12-07 RX ORDER — ACETAMINOPHEN 500 MG
1000 TABLET ORAL EVERY 6 HOURS
Refills: 0 | Status: COMPLETED | OUTPATIENT
Start: 2023-12-08 | End: 2023-12-09

## 2023-12-07 RX ADMIN — Medication 25 GRAM(S): at 08:54

## 2023-12-07 RX ADMIN — PANTOPRAZOLE SODIUM 40 MILLIGRAM(S): 20 TABLET, DELAYED RELEASE ORAL at 18:06

## 2023-12-07 RX ADMIN — SODIUM CHLORIDE 500 MILLILITER(S): 9 INJECTION, SOLUTION INTRAVENOUS at 09:28

## 2023-12-07 RX ADMIN — Medication 400 MILLIGRAM(S): at 18:07

## 2023-12-07 RX ADMIN — CHLORHEXIDINE GLUCONATE 1 APPLICATION(S): 213 SOLUTION TOPICAL at 14:27

## 2023-12-07 RX ADMIN — Medication 400 MILLIGRAM(S): at 13:24

## 2023-12-07 RX ADMIN — Medication 100 MILLIEQUIVALENT(S): at 10:59

## 2023-12-07 RX ADMIN — POTASSIUM PHOSPHATE, MONOBASIC POTASSIUM PHOSPHATE, DIBASIC 62.5 MILLIMOLE(S): 236; 224 INJECTION, SOLUTION INTRAVENOUS at 11:00

## 2023-12-07 RX ADMIN — Medication 400 MILLIGRAM(S): at 06:13

## 2023-12-07 RX ADMIN — PANTOPRAZOLE SODIUM 40 MILLIGRAM(S): 20 TABLET, DELAYED RELEASE ORAL at 05:27

## 2023-12-07 RX ADMIN — Medication 100 MILLIEQUIVALENT(S): at 13:24

## 2023-12-07 RX ADMIN — HEPARIN SODIUM 5000 UNIT(S): 5000 INJECTION INTRAVENOUS; SUBCUTANEOUS at 21:40

## 2023-12-07 RX ADMIN — Medication 1000 MILLIGRAM(S): at 13:54

## 2023-12-07 RX ADMIN — Medication 100 MILLIEQUIVALENT(S): at 08:54

## 2023-12-07 RX ADMIN — ONDANSETRON 4 MILLIGRAM(S): 8 TABLET, FILM COATED ORAL at 01:05

## 2023-12-07 RX ADMIN — HEPARIN SODIUM 5000 UNIT(S): 5000 INJECTION INTRAVENOUS; SUBCUTANEOUS at 13:24

## 2023-12-07 RX ADMIN — HEPARIN SODIUM 5000 UNIT(S): 5000 INJECTION INTRAVENOUS; SUBCUTANEOUS at 05:27

## 2023-12-07 NOTE — PROGRESS NOTE ADULT - ASSESSMENT
89 M w/ PMHx GERD, BPH, HLD, open R IHR, LIH (non repaired), presents to ED with suprapubic pain. CT w/ distal ascending and proximal transverse thickening, concern for malignancy. CT chest without evidence of metastasis. CEA 81.9, CA 19-9 225. Colonoscopy done 12/4. Patient currently s/p Laparoscopic extended right hemicolectomy.    Plan:  - NGT placement for ileus.  - NPO/IVF and fluid resuscitation prn  - 500cc bolus this AM  - Replete electrolytes as needed  - Pain meds IV  - Monitor bruner - strict I's/O's  - PT consult for OOB Ambulate ordered yesterday - will see today.    A Team Surgery  [id number]. 89 M w/ PMHx GERD, BPH, HLD, open R IHR, LIH (non repaired), presents to ED with suprapubic pain. CT w/ distal ascending and proximal transverse thickening, concern for malignancy. CT chest without evidence of metastasis. CEA 81.9, CA 19-9 225. Colonoscopy done 12/4. Patient currently s/p Laparoscopic extended right hemicolectomy.    Plan:  - NGT placement for ileus.  - NPO/IVF and fluid resuscitation prn  - 500cc bolus this AM  - Replete electrolytes as needed  - Pain meds IV  - Monitor bruner - strict I's/O's  - PT consult for OOB Ambulate ordered yesterday - will see today.    A Team Surgery  [id number].

## 2023-12-07 NOTE — PHYSICAL THERAPY INITIAL EVALUATION ADULT - ADDITIONAL COMMENTS
Pt lives alone in an apartment with 3 steps to enter. Pt uses a cane and was independent with ADLs. Pt reports no falls in the past 6 months.   Pt left sitting in bedside chair in NAD, all lines intact, call nuñez in reach and RODOLFO Briscoe made aware.

## 2023-12-07 NOTE — PROGRESS NOTE ADULT - SUBJECTIVE AND OBJECTIVE BOX
A Team General Surgery Progress Note    S: Pt seen and examined with team on morning rounds. Patient with Nausea overnight. received Protonix and Zofran. Had small episode of emesis and abdominal xray performed which showed mildly dilated small bowel loops - ileus. Patient still with no flatus, no BM. Feels slightly bloated and belching. Gill kept in place due to oliguria and patient received 2 - 500cc boluses for oliguria yesterday afternoon to evening. Patient waiting for PT evaluation for OOB.      Vital Signs Last 24 Hrs  T(C): 36.8 (07 Dec 2023 09:51), Max: 36.8 (07 Dec 2023 09:51)  T(F): 98.2 (07 Dec 2023 09:51), Max: 98.2 (07 Dec 2023 09:51)  HR: 80 (07 Dec 2023 09:51) (75 - 86)  BP: 151/78 (07 Dec 2023 09:51) (151/78 - 183/65)  BP(mean): --  RR: 18 (07 Dec 2023 09:51) (17 - 18)  SpO2: 98% (07 Dec 2023 09:51) (95% - 100%)    Parameters below as of 07 Dec 2023 06:00  Patient On (Oxygen Delivery Method): room air        I&O's Summary    06 Dec 2023 07:01  -  07 Dec 2023 07:00  --------------------------------------------------------  IN: 4280 mL / OUT: 485 mL / NET: 3795 mL    07 Dec 2023 07:01  -  07 Dec 2023 12:13  --------------------------------------------------------  IN: 0 mL / OUT: 100 mL / NET: -100 mL      I&O's Detail    06 Dec 2023 07:01  -  07 Dec 2023 07:00  --------------------------------------------------------  IN:    IV PiggyBack: 200 mL    Lactated Ringers: 2200 mL    Oral Fluid: 880 mL    Sodium Chloride 0.9% Bolus: 1000 mL  Total IN: 4280 mL    OUT:    Indwelling Catheter - Urethral (mL): 485 mL  Total OUT: 485 mL    Total NET: 3795 mL      07 Dec 2023 07:01  -  07 Dec 2023 12:13  --------------------------------------------------------  IN:  Total IN: 0 mL    OUT:    Indwelling Catheter - Urethral (mL): 100 mL  Total OUT: 100 mL    Total NET: -100 mL          General Appearance: Appears well, NAD  Chest: Breathing comfortably on RA.    CV: S1, S2 @ 80  Abdomen: Softly distended, appropriate incisional tenderness, steris with minimal serosanguinous staining.  Extremities: Moves all extremities.    MEDICATIONS  (STANDING):  acetaminophen   IVPB .. 1000 milliGRAM(s) IV Intermittent every 6 hours  atorvastatin 80 milliGRAM(s) Oral at bedtime  chlorhexidine 2% Cloths 1 Application(s) Topical daily  gabapentin 400 milliGRAM(s) Oral at bedtime  heparin   Injectable 5000 Unit(s) SubCutaneous every 8 hours  influenza  Vaccine (HIGH DOSE) 0.7 milliLiter(s) IntraMuscular once  lactated ringers. 1000 milliLiter(s) (100 mL/Hr) IV Continuous <Continuous>  pantoprazole  Injectable 40 milliGRAM(s) IV Push two times a day  potassium chloride  10 mEq/100 mL IVPB 10 milliEquivalent(s) IV Intermittent every 1 hour  tamsulosin 0.4 milliGRAM(s) Oral daily    MEDICATIONS  (PRN):  HYDROmorphone  Injectable 0.25 milliGRAM(s) IV Push every 4 hours PRN Moderate Pain (4 - 6)  HYDROmorphone  Injectable 0.5 milliGRAM(s) IV Push every 4 hours PRN Severe Pain (7 - 10)  meclizine 12.5 milliGRAM(s) Oral two times a day PRN for dizziness  melatonin 3 milliGRAM(s) Oral at bedtime PRN Insomnia      LABS:                        11.8   13.37 )-----------( 420      ( 07 Dec 2023 05:41 )             36.6     12-07    138  |  101  |  13  ----------------------------<  112<H>  3.5   |  29  |  0.85    Ca    8.1<L>      07 Dec 2023 05:41  Phos  2.2     12-07  Mg     1.70     12-07    TPro  5.1<L>  /  Alb  2.8<L>  /  TBili  0.4  /  DBili  x   /  AST  10  /  ALT  7   /  AlkPhos  46  12-06      Urinalysis Basic - ( 07 Dec 2023 05:41 )    Color: x / Appearance: x / SG: x / pH: x  Gluc: 112 mg/dL / Ketone: x  / Bili: x / Urobili: x   Blood: x / Protein: x / Nitrite: x   Leuk Esterase: x / RBC: x / WBC x   Sq Epi: x / Non Sq Epi: x / Bacteria: x

## 2023-12-07 NOTE — PHYSICAL THERAPY INITIAL EVALUATION ADULT - PERTINENT HX OF CURRENT PROBLEM, REHAB EVAL
Pt is a 89 year old male with a past medical history of GERD, BPH, HLD, open R IHR, LIH (non repaired), presents to ED with suprapubic pain. CT w/ distal ascending and proximal transverse thickening, concern for malignancy. CT chest without evidence of metastasis. CEA 81.9, CA 19-9 225. Colonoscopy done 12/4. Patient currently s/p Laparoscopic extended right hemicolectomy.

## 2023-12-08 LAB
ANION GAP SERPL CALC-SCNC: 9 MMOL/L — SIGNIFICANT CHANGE UP (ref 7–14)
ANION GAP SERPL CALC-SCNC: 9 MMOL/L — SIGNIFICANT CHANGE UP (ref 7–14)
BUN SERPL-MCNC: 10 MG/DL — SIGNIFICANT CHANGE UP (ref 7–23)
BUN SERPL-MCNC: 10 MG/DL — SIGNIFICANT CHANGE UP (ref 7–23)
CALCIUM SERPL-MCNC: 7.9 MG/DL — LOW (ref 8.4–10.5)
CALCIUM SERPL-MCNC: 7.9 MG/DL — LOW (ref 8.4–10.5)
CHLORIDE SERPL-SCNC: 102 MMOL/L — SIGNIFICANT CHANGE UP (ref 98–107)
CHLORIDE SERPL-SCNC: 102 MMOL/L — SIGNIFICANT CHANGE UP (ref 98–107)
CO2 SERPL-SCNC: 26 MMOL/L — SIGNIFICANT CHANGE UP (ref 22–31)
CO2 SERPL-SCNC: 26 MMOL/L — SIGNIFICANT CHANGE UP (ref 22–31)
CREAT SERPL-MCNC: 0.72 MG/DL — SIGNIFICANT CHANGE UP (ref 0.5–1.3)
CREAT SERPL-MCNC: 0.72 MG/DL — SIGNIFICANT CHANGE UP (ref 0.5–1.3)
EGFR: 87 ML/MIN/1.73M2 — SIGNIFICANT CHANGE UP
EGFR: 87 ML/MIN/1.73M2 — SIGNIFICANT CHANGE UP
GLUCOSE BLDC GLUCOMTR-MCNC: 76 MG/DL — SIGNIFICANT CHANGE UP (ref 70–99)
GLUCOSE BLDC GLUCOMTR-MCNC: 76 MG/DL — SIGNIFICANT CHANGE UP (ref 70–99)
GLUCOSE BLDC GLUCOMTR-MCNC: 88 MG/DL — SIGNIFICANT CHANGE UP (ref 70–99)
GLUCOSE BLDC GLUCOMTR-MCNC: 88 MG/DL — SIGNIFICANT CHANGE UP (ref 70–99)
GLUCOSE SERPL-MCNC: 68 MG/DL — LOW (ref 70–99)
GLUCOSE SERPL-MCNC: 68 MG/DL — LOW (ref 70–99)
HCT VFR BLD CALC: 32.9 % — LOW (ref 39–50)
HCT VFR BLD CALC: 32.9 % — LOW (ref 39–50)
HGB BLD-MCNC: 10.4 G/DL — LOW (ref 13–17)
HGB BLD-MCNC: 10.4 G/DL — LOW (ref 13–17)
MAGNESIUM SERPL-MCNC: 1.9 MG/DL — SIGNIFICANT CHANGE UP (ref 1.6–2.6)
MAGNESIUM SERPL-MCNC: 1.9 MG/DL — SIGNIFICANT CHANGE UP (ref 1.6–2.6)
MCHC RBC-ENTMCNC: 27.3 PG — SIGNIFICANT CHANGE UP (ref 27–34)
MCHC RBC-ENTMCNC: 27.3 PG — SIGNIFICANT CHANGE UP (ref 27–34)
MCHC RBC-ENTMCNC: 31.6 GM/DL — LOW (ref 32–36)
MCHC RBC-ENTMCNC: 31.6 GM/DL — LOW (ref 32–36)
MCV RBC AUTO: 86.4 FL — SIGNIFICANT CHANGE UP (ref 80–100)
MCV RBC AUTO: 86.4 FL — SIGNIFICANT CHANGE UP (ref 80–100)
NRBC # BLD: 0 /100 WBCS — SIGNIFICANT CHANGE UP (ref 0–0)
NRBC # BLD: 0 /100 WBCS — SIGNIFICANT CHANGE UP (ref 0–0)
NRBC # FLD: 0 K/UL — SIGNIFICANT CHANGE UP (ref 0–0)
NRBC # FLD: 0 K/UL — SIGNIFICANT CHANGE UP (ref 0–0)
PHOSPHATE SERPL-MCNC: 2.4 MG/DL — LOW (ref 2.5–4.5)
PHOSPHATE SERPL-MCNC: 2.4 MG/DL — LOW (ref 2.5–4.5)
PLATELET # BLD AUTO: 290 K/UL — SIGNIFICANT CHANGE UP (ref 150–400)
PLATELET # BLD AUTO: 290 K/UL — SIGNIFICANT CHANGE UP (ref 150–400)
POTASSIUM SERPL-MCNC: 3.9 MMOL/L — SIGNIFICANT CHANGE UP (ref 3.5–5.3)
POTASSIUM SERPL-MCNC: 3.9 MMOL/L — SIGNIFICANT CHANGE UP (ref 3.5–5.3)
POTASSIUM SERPL-SCNC: 3.9 MMOL/L — SIGNIFICANT CHANGE UP (ref 3.5–5.3)
POTASSIUM SERPL-SCNC: 3.9 MMOL/L — SIGNIFICANT CHANGE UP (ref 3.5–5.3)
RBC # BLD: 3.81 M/UL — LOW (ref 4.2–5.8)
RBC # BLD: 3.81 M/UL — LOW (ref 4.2–5.8)
RBC # FLD: 14.3 % — SIGNIFICANT CHANGE UP (ref 10.3–14.5)
RBC # FLD: 14.3 % — SIGNIFICANT CHANGE UP (ref 10.3–14.5)
SODIUM SERPL-SCNC: 137 MMOL/L — SIGNIFICANT CHANGE UP (ref 135–145)
SODIUM SERPL-SCNC: 137 MMOL/L — SIGNIFICANT CHANGE UP (ref 135–145)
WBC # BLD: 10.12 K/UL — SIGNIFICANT CHANGE UP (ref 3.8–10.5)
WBC # BLD: 10.12 K/UL — SIGNIFICANT CHANGE UP (ref 3.8–10.5)
WBC # FLD AUTO: 10.12 K/UL — SIGNIFICANT CHANGE UP (ref 3.8–10.5)
WBC # FLD AUTO: 10.12 K/UL — SIGNIFICANT CHANGE UP (ref 3.8–10.5)

## 2023-12-08 PROCEDURE — 99222 1ST HOSP IP/OBS MODERATE 55: CPT

## 2023-12-08 RX ORDER — TAMSULOSIN HYDROCHLORIDE 0.4 MG/1
0.4 CAPSULE ORAL AT BEDTIME
Refills: 0 | Status: DISCONTINUED | OUTPATIENT
Start: 2023-12-08 | End: 2023-12-08

## 2023-12-08 RX ORDER — DEXTROSE MONOHYDRATE, SODIUM CHLORIDE, AND POTASSIUM CHLORIDE 50; .745; 4.5 G/1000ML; G/1000ML; G/1000ML
1000 INJECTION, SOLUTION INTRAVENOUS
Refills: 0 | Status: DISCONTINUED | OUTPATIENT
Start: 2023-12-08 | End: 2023-12-11

## 2023-12-08 RX ORDER — SIMVASTATIN 20 MG/1
40 TABLET, FILM COATED ORAL AT BEDTIME
Refills: 0 | Status: DISCONTINUED | OUTPATIENT
Start: 2023-12-08 | End: 2023-12-20

## 2023-12-08 RX ORDER — OXYCODONE HYDROCHLORIDE 5 MG/1
2.5 TABLET ORAL ONCE
Refills: 0 | Status: DISCONTINUED | OUTPATIENT
Start: 2023-12-08 | End: 2023-12-08

## 2023-12-08 RX ORDER — ATORVASTATIN CALCIUM 80 MG/1
80 TABLET, FILM COATED ORAL AT BEDTIME
Refills: 0 | Status: DISCONTINUED | OUTPATIENT
Start: 2023-12-08 | End: 2023-12-08

## 2023-12-08 RX ORDER — METOCLOPRAMIDE HCL 10 MG
5 TABLET ORAL ONCE
Refills: 0 | Status: COMPLETED | OUTPATIENT
Start: 2023-12-08 | End: 2023-12-08

## 2023-12-08 RX ORDER — POTASSIUM PHOSPHATE, MONOBASIC POTASSIUM PHOSPHATE, DIBASIC 236; 224 MG/ML; MG/ML
15 INJECTION, SOLUTION INTRAVENOUS ONCE
Refills: 0 | Status: COMPLETED | OUTPATIENT
Start: 2023-12-08 | End: 2023-12-08

## 2023-12-08 RX ORDER — TAMSULOSIN HYDROCHLORIDE 0.4 MG/1
0.4 CAPSULE ORAL AT BEDTIME
Refills: 0 | Status: DISCONTINUED | OUTPATIENT
Start: 2023-12-08 | End: 2023-12-20

## 2023-12-08 RX ORDER — DEXTROSE 50 % IN WATER 50 %
25 SYRINGE (ML) INTRAVENOUS ONCE
Refills: 0 | Status: DISCONTINUED | OUTPATIENT
Start: 2023-12-08 | End: 2023-12-08

## 2023-12-08 RX ADMIN — Medication 400 MILLIGRAM(S): at 18:51

## 2023-12-08 RX ADMIN — Medication 400 MILLIGRAM(S): at 12:13

## 2023-12-08 RX ADMIN — HEPARIN SODIUM 5000 UNIT(S): 5000 INJECTION INTRAVENOUS; SUBCUTANEOUS at 14:38

## 2023-12-08 RX ADMIN — Medication 1000 MILLIGRAM(S): at 12:43

## 2023-12-08 RX ADMIN — Medication 1000 MILLIGRAM(S): at 19:21

## 2023-12-08 RX ADMIN — POTASSIUM PHOSPHATE, MONOBASIC POTASSIUM PHOSPHATE, DIBASIC 62.5 MILLIMOLE(S): 236; 224 INJECTION, SOLUTION INTRAVENOUS at 11:12

## 2023-12-08 RX ADMIN — PANTOPRAZOLE SODIUM 40 MILLIGRAM(S): 20 TABLET, DELAYED RELEASE ORAL at 18:51

## 2023-12-08 RX ADMIN — Medication 5 MILLIGRAM(S): at 18:51

## 2023-12-08 RX ADMIN — OXYCODONE HYDROCHLORIDE 2.5 MILLIGRAM(S): 5 TABLET ORAL at 16:00

## 2023-12-08 RX ADMIN — DEXTROSE MONOHYDRATE, SODIUM CHLORIDE, AND POTASSIUM CHLORIDE 100 MILLILITER(S): 50; .745; 4.5 INJECTION, SOLUTION INTRAVENOUS at 22:39

## 2023-12-08 RX ADMIN — SIMVASTATIN 40 MILLIGRAM(S): 20 TABLET, FILM COATED ORAL at 22:42

## 2023-12-08 RX ADMIN — Medication 400 MILLIGRAM(S): at 06:04

## 2023-12-08 RX ADMIN — Medication 400 MILLIGRAM(S): at 00:19

## 2023-12-08 RX ADMIN — HEPARIN SODIUM 5000 UNIT(S): 5000 INJECTION INTRAVENOUS; SUBCUTANEOUS at 06:04

## 2023-12-08 RX ADMIN — HEPARIN SODIUM 5000 UNIT(S): 5000 INJECTION INTRAVENOUS; SUBCUTANEOUS at 22:42

## 2023-12-08 RX ADMIN — PANTOPRAZOLE SODIUM 40 MILLIGRAM(S): 20 TABLET, DELAYED RELEASE ORAL at 06:03

## 2023-12-08 RX ADMIN — OXYCODONE HYDROCHLORIDE 2.5 MILLIGRAM(S): 5 TABLET ORAL at 16:30

## 2023-12-08 RX ADMIN — DEXTROSE MONOHYDRATE, SODIUM CHLORIDE, AND POTASSIUM CHLORIDE 100 MILLILITER(S): 50; .745; 4.5 INJECTION, SOLUTION INTRAVENOUS at 09:20

## 2023-12-08 RX ADMIN — CHLORHEXIDINE GLUCONATE 1 APPLICATION(S): 213 SOLUTION TOPICAL at 11:12

## 2023-12-08 RX ADMIN — TAMSULOSIN HYDROCHLORIDE 0.4 MILLIGRAM(S): 0.4 CAPSULE ORAL at 22:42

## 2023-12-08 NOTE — PROGRESS NOTE ADULT - SUBJECTIVE AND OBJECTIVE BOX
A TEAM Surgery Progress Note  Patient is a 89y old  Male who presents with a chief complaint of c/f colon cancer (07 Dec 2023 12:13)    SUBJECTIVE: Patient seen and examined at bedside with surgical team, pt without complaints. Denies fever, chills, CP, SOB nausea, vomiting, abdominal pain. Pt denies flatus overnight, RN endorses 1 BM yesterday.    OBJECTIVE:    Vital Signs Last 24 Hrs  T(C): 36.8 (08 Dec 2023 06:00), Max: 36.8 (07 Dec 2023 09:51)  T(F): 98.2 (08 Dec 2023 06:00), Max: 98.3 (07 Dec 2023 14:00)  HR: 87 (08 Dec 2023 06:00) (80 - 88)  BP: 146/82 (08 Dec 2023 06:00) (136/70 - 155/68)  BP(mean): --  RR: 18 (08 Dec 2023 06:00) (16 - 18)  SpO2: 100% (08 Dec 2023 06:00) (98% - 100%)    Parameters below as of 08 Dec 2023 06:00  Patient On (Oxygen Delivery Method): room air    I&O's Detail    07 Dec 2023 07:01  -  08 Dec 2023 07:00  --------------------------------------------------------  IN:    IV PiggyBack: 300 mL    Lactated Ringers: 2400 mL    Sodium Chloride 0.9% Bolus: 500 mL  Total IN: 3200 mL    OUT:    Blood Loss (mL): 0 mL    Indwelling Catheter - Urethral (mL): 785 mL    Nasogastric/Oral tube (mL): 350 mL    Oral Fluid: 0 mL  Total OUT: 1135 mL    Total NET: 2065 mL      MEDICATIONS  (STANDING):  acetaminophen   IVPB .. 1000 milliGRAM(s) IV Intermittent every 6 hours  chlorhexidine 2% Cloths 1 Application(s) Topical daily  dextrose 5% + sodium chloride 0.45% with potassium chloride 20 mEq/L 1000 milliLiter(s) (100 mL/Hr) IV Continuous <Continuous>  heparin   Injectable 5000 Unit(s) SubCutaneous every 8 hours  influenza  Vaccine (HIGH DOSE) 0.7 milliLiter(s) IntraMuscular once  pantoprazole  Injectable 40 milliGRAM(s) IV Push two times a day  potassium phosphate IVPB 15 milliMole(s) IV Intermittent once    MEDICATIONS  (PRN):  HYDROmorphone  Injectable 0.25 milliGRAM(s) IV Push every 4 hours PRN Moderate Pain (4 - 6)  HYDROmorphone  Injectable 0.5 milliGRAM(s) IV Push every 4 hours PRN Severe Pain (7 - 10)  tetracaine/benzocaine/butamben Spray 1 Spray(s) Topical three times a day PRN NGT irritation      PHYSICAL EXAM:  Constitutional: A&Ox3, NAD  Respiratory: Unlabored breathing on RA  Abdomen: Soft, mildly distended, tympanic to percussion. Mild incisional tenderness. Incision C/D/I with steris in place.  Extremities: WWP, BARBOSA spontaneously    LABS:                        10.4   10.12 )-----------( 290      ( 08 Dec 2023 05:17 )             32.9     12-08    137  |  102  |  10  ----------------------------<  68<L>  3.9   |  26  |  0.72    Ca    7.9<L>      08 Dec 2023 05:17  Phos  2.4     12-08  Mg     1.90     12-08          Urinalysis Basic - ( 08 Dec 2023 05:17 )    Color: x / Appearance: x / SG: x / pH: x  Gluc: 68 mg/dL / Ketone: x  / Bili: x / Urobili: x   Blood: x / Protein: x / Nitrite: x   Leuk Esterase: x / RBC: x / WBC x   Sq Epi: x / Non Sq Epi: x / Bacteria: x

## 2023-12-08 NOTE — CONSULT NOTE ADULT - SUBJECTIVE AND OBJECTIVE BOX
Patient is a 89y old  Male who presents with a chief complaint of c/f colon cancer (08 Dec 2023 09:24)      HPI:  89 yoM with hx of BPH, HLD, B/L inguinal hernia repair with known L recurrence, colonoscopy and endoscopy in sept for anemia on oral iron presents with abdominal pain and constipation over the past three days. Patient states that he hasn't had a bowel movement in the past three days but that he has been passing lots of gas. In the past day started having nausea and vomiting and wasn't able to eat anything then eventually started dry heaving. Denies any blood in vomit. Last BM was brown and regular appearing.     In addition he denies any chest pain, SOB, HA, back pain, LEG swelling, neck pain, recent vision changes, lightheadedness, and has otherwise been feeling healthy.     In the ED patient had labs showing elevated lactate 4.5 that improved to 1.5 s/p IVF hydration. CT abdomen/pelvis c/f primary neoplasm of the ascending and transverse colon. GI consulted and recommended colonoscopy tomorrow for biopsy.  (30 Nov 2023 18:18)     Patient now s/p laparascopic extended right hemicolectomy.   NGT for ileus  npo    REVIEW OF SYSTEMS  Constitutional - No fever, No weight loss, No fatigue  HEENT - No eye pain, No visual disturbances, No difficulty hearing, No tinnitus, No vertigo, No neck pain  Respiratory - No cough, No wheezing, No shortness of breath  Cardiovascular - No chest pain, No palpitations  Gastrointestinal - No abdominal pain, No nausea, No vomiting, No diarrhea, No constipation  Genitourinary - No dysuria, No frequency, No hematuria, No incontinence  Neurological - No headaches, No memory loss, No loss of strength, No numbness, No tremors  Skin - No itching, No rashes, No lesions   Endocrine - No temperature intolerance  Musculoskeletal - No joint pain, No joint swelling, No muscle pain  Psychiatric - No depression, No anxiety    PAST MEDICAL & SURGICAL HISTORY  No pertinent past medical history    BPH (benign prostatic hyperplasia)    Peripheral neuropathy    HLD (hyperlipidemia)    No significant past surgical history        SOCIAL HISTORY  Smoking - Denied  EtOH - Denied   Drugs - Denied    FUNCTIONAL HISTORY  Lives alone in apartment with 3 steps  Independent with device    CURRENT FUNCTIONAL STATUS  12/7   Bed Mobility: Supine to Sit:     · Level of [place]	minimum assist (75% patients effort)  · Physical Assist/Nonphysical Assist	1 person assist; verbal cues    Transfer: Sit to Stand:     · Level of [place]	minimum assist (75% patients effort); x2 reps  	  · Physical Assist/Nonphysical Assist	1 person assist; verbal cues  · Weight-Bearing Restrictions	full weight-bearing  · Assistive Device	rolling walker    Transfer: Stand to Sit:     · Level of [place]	minimum assist (75% patients effort)  · Physical Assist/Nonphysical Assist	1 person assist; verbal cues  · Weight-Bearing Restrictions	full weight-bearing  · Assistive Device	rolling walker    Gait Skills:     · Level of [place]	contact guard; minimum assist (75% patients effort)  · Physical Assist/Nonphysical Assist	1 person assist; verbal cues  · Weight-Bearing Restrictions	full weight-bearing  · Assistive Device	rolling walker  · Gait Distance	30ft    Gait Analysis:     · Gait Pattern Used	3-point gait  · Gait Deviations Noted	decreased av; decreased step length; decreased weight-shifting ability  · Impairments Contributing to Gait Deviations	impaired balance; impaired coordination; impaired motor control; decreased strength        FAMILY HISTORY   No pertinent family history in first degree relatives        RECENT LABS/IMAGING  CBC Full  -  ( 08 Dec 2023 05:17 )  WBC Count : 10.12 K/uL  RBC Count : 3.81 M/uL  Hemoglobin : 10.4 g/dL  Hematocrit : 32.9 %  Platelet Count - Automated : 290 K/uL  Mean Cell Volume : 86.4 fL  Mean Cell Hemoglobin : 27.3 pg  Mean Cell Hemoglobin Concentration : 31.6 gm/dL  Auto Neutrophil # : x  Auto Lymphocyte # : x  Auto Monocyte # : x  Auto Eosinophil # : x  Auto Basophil # : x  Auto Neutrophil % : x  Auto Lymphocyte % : x  Auto Monocyte % : x  Auto Eosinophil % : x  Auto Basophil % : x    12-08    137  |  102  |  10  ----------------------------<  68<L>  3.9   |  26  |  0.72    Ca    7.9<L>      08 Dec 2023 05:17  Phos  2.4     12-08  Mg     1.90     12-08      Urinalysis Basic - ( 08 Dec 2023 05:17 )    Color: x / Appearance: x / SG: x / pH: x  Gluc: 68 mg/dL / Ketone: x  / Bili: x / Urobili: x   Blood: x / Protein: x / Nitrite: x   Leuk Esterase: x / RBC: x / WBC x   Sq Epi: x / Non Sq Epi: x / Bacteria: x        VITALS  T(C): 36.7 (12-08-23 @ 09:45), Max: 36.8 (12-07-23 @ 14:00)  HR: 84 (12-08-23 @ 09:45) (80 - 88)  BP: 155/82 (12-08-23 @ 09:45) (136/70 - 155/82)  RR: 18 (12-08-23 @ 09:45) (16 - 18)  SpO2: 96% (12-08-23 @ 09:45) (96% - 100%)  Wt(kg): --    ALLERGIES  penicillin (Unknown)  aspirin (Unknown)      MEDICATIONS   acetaminophen   IVPB .. 1000 milliGRAM(s) IV Intermittent every 6 hours  chlorhexidine 2% Cloths 1 Application(s) Topical daily  dextrose 5% + sodium chloride 0.45% with potassium chloride 20 mEq/L 1000 milliLiter(s) IV Continuous <Continuous>  heparin   Injectable 5000 Unit(s) SubCutaneous every 8 hours  HYDROmorphone  Injectable 0.25 milliGRAM(s) IV Push every 4 hours PRN  HYDROmorphone  Injectable 0.5 milliGRAM(s) IV Push every 4 hours PRN  influenza  Vaccine (HIGH DOSE) 0.7 milliLiter(s) IntraMuscular once  pantoprazole  Injectable 40 milliGRAM(s) IV Push two times a day  tetracaine/benzocaine/butamben Spray 1 Spray(s) Topical three times a day PRN      ----------------------------------------------------------------------------------------  PHYSICAL EXAM  Constitutional - NAD, Comfortable  HEENT - NCAT, EOMI  Neck - Supple, No limited ROM  Chest - no respiratory distress  Cardiovascular - RRR, S1S2   Abdomen - BS+, Soft, NTND  Extremities - No C/C/E, No calf tenderness   Neurologic Exam -                    Cognitive - Awake, Alert, AAO to self, place, date, year, situation     Communication - Fluent, No dysarthria     Cranial Nerves - CN 2-12 intact     Motor - No focal deficits                    LEFT    UE - ShAB 5/5, EF 5/5, EE 5/5, WE 5/5,  5/5                    RIGHT UE - ShAB 5/5, EF 5/5, EE 5/5, WE 5/5,  5/5                    LEFT    LE - HF 5/5, KE 5/5, DF 5/5, PF 5/5                    RIGHT LE - HF 5/5, KE 5/5, DF 5/5, PF 5/5        Sensory - Intact to LT     Reflexes - DTR Intact, No primitive reflexive     Coordination - FTN intact     OculoVestibular - No saccades, No nystagmus, VOR         Balance - WNL Static  Psychiatric - Mood stable, Affect WNL  ----------------------------------------------------------------------------------------  ASSESSMENT/PLAN  89 yo m s/p laparascopic extended right hemicolectomy for GI malignancy    Pain -  DVT PPX -   Rehab -      incomplete note, consult in progress.  Patient is a 89y old  Male who presents with a chief complaint of c/f colon cancer (08 Dec 2023 09:24)      HPI:  89 yoM with hx of BPH, HLD, B/L inguinal hernia repair with known L recurrence, colonoscopy and endoscopy in sept for anemia on oral iron presents with abdominal pain and constipation over the past three days. Patient states that he hasn't had a bowel movement in the past three days but that he has been passing lots of gas. In the past day started having nausea and vomiting and wasn't able to eat anything then eventually started dry heaving. Denies any blood in vomit. Last BM was brown and regular appearing.     In addition he denies any chest pain, SOB, HA, back pain, LEG swelling, neck pain, recent vision changes, lightheadedness, and has otherwise been feeling healthy.     In the ED patient had labs showing elevated lactate 4.5 that improved to 1.5 s/p IVF hydration. CT abdomen/pelvis c/f primary neoplasm of the ascending and transverse colon. GI consulted and recommended colonoscopy tomorrow for biopsy.  (30 Nov 2023 18:18)     Patient now s/p laparascopic extended right hemicolectomy.   NGT for ileus  npo    REVIEW OF SYSTEMS  Constitutional - No fever, No weight loss, No fatigue  HEENT - No eye pain, No visual disturbances, No difficulty hearing, No tinnitus, No vertigo, No neck pain  Respiratory - No cough, No wheezing, No shortness of breath  Cardiovascular - No chest pain, No palpitations  Gastrointestinal - No abdominal pain, No nausea, No vomiting, No diarrhea, No constipation  Genitourinary - No dysuria, No frequency, No hematuria, No incontinence  Neurological - No headaches, No memory loss, No loss of strength, No numbness, No tremors  Skin - No itching, No rashes, No lesions   Endocrine - No temperature intolerance  Musculoskeletal - No joint pain, No joint swelling, No muscle pain  Psychiatric - No depression, No anxiety    PAST MEDICAL & SURGICAL HISTORY  No pertinent past medical history    BPH (benign prostatic hyperplasia)    Peripheral neuropathy    HLD (hyperlipidemia)    No significant past surgical history        SOCIAL HISTORY  Smoking - Denied  EtOH - Denied   Drugs - Denied    FUNCTIONAL HISTORY  Lives alone in apartment with 3 steps  Independent with device    CURRENT FUNCTIONAL STATUS  12/7   Bed Mobility: Supine to Sit:     · Level of [place]	minimum assist (75% patients effort)  · Physical Assist/Nonphysical Assist	1 person assist; verbal cues    Transfer: Sit to Stand:     · Level of [place]	minimum assist (75% patients effort); x2 reps  	  · Physical Assist/Nonphysical Assist	1 person assist; verbal cues  · Weight-Bearing Restrictions	full weight-bearing  · Assistive Device	rolling walker    Transfer: Stand to Sit:     · Level of [place]	minimum assist (75% patients effort)  · Physical Assist/Nonphysical Assist	1 person assist; verbal cues  · Weight-Bearing Restrictions	full weight-bearing  · Assistive Device	rolling walker    Gait Skills:     · Level of [place]	contact guard; minimum assist (75% patients effort)  · Physical Assist/Nonphysical Assist	1 person assist; verbal cues  · Weight-Bearing Restrictions	full weight-bearing  · Assistive Device	rolling walker  · Gait Distance	30ft    Gait Analysis:     · Gait Pattern Used	3-point gait  · Gait Deviations Noted	decreased av; decreased step length; decreased weight-shifting ability  · Impairments Contributing to Gait Deviations	impaired balance; impaired coordination; impaired motor control; decreased strength        FAMILY HISTORY   No pertinent family history in first degree relatives        RECENT LABS/IMAGING  CBC Full  -  ( 08 Dec 2023 05:17 )  WBC Count : 10.12 K/uL  RBC Count : 3.81 M/uL  Hemoglobin : 10.4 g/dL  Hematocrit : 32.9 %  Platelet Count - Automated : 290 K/uL  Mean Cell Volume : 86.4 fL  Mean Cell Hemoglobin : 27.3 pg  Mean Cell Hemoglobin Concentration : 31.6 gm/dL  Auto Neutrophil # : x  Auto Lymphocyte # : x  Auto Monocyte # : x  Auto Eosinophil # : x  Auto Basophil # : x  Auto Neutrophil % : x  Auto Lymphocyte % : x  Auto Monocyte % : x  Auto Eosinophil % : x  Auto Basophil % : x    12-08    137  |  102  |  10  ----------------------------<  68<L>  3.9   |  26  |  0.72    Ca    7.9<L>      08 Dec 2023 05:17  Phos  2.4     12-08  Mg     1.90     12-08      Urinalysis Basic - ( 08 Dec 2023 05:17 )    Color: x / Appearance: x / SG: x / pH: x  Gluc: 68 mg/dL / Ketone: x  / Bili: x / Urobili: x   Blood: x / Protein: x / Nitrite: x   Leuk Esterase: x / RBC: x / WBC x   Sq Epi: x / Non Sq Epi: x / Bacteria: x        VITALS  T(C): 36.7 (12-08-23 @ 09:45), Max: 36.8 (12-07-23 @ 14:00)  HR: 84 (12-08-23 @ 09:45) (80 - 88)  BP: 155/82 (12-08-23 @ 09:45) (136/70 - 155/82)  RR: 18 (12-08-23 @ 09:45) (16 - 18)  SpO2: 96% (12-08-23 @ 09:45) (96% - 100%)  Wt(kg): --    ALLERGIES  penicillin (Unknown)  aspirin (Unknown)      MEDICATIONS   acetaminophen   IVPB .. 1000 milliGRAM(s) IV Intermittent every 6 hours  chlorhexidine 2% Cloths 1 Application(s) Topical daily  dextrose 5% + sodium chloride 0.45% with potassium chloride 20 mEq/L 1000 milliLiter(s) IV Continuous <Continuous>  heparin   Injectable 5000 Unit(s) SubCutaneous every 8 hours  HYDROmorphone  Injectable 0.25 milliGRAM(s) IV Push every 4 hours PRN  HYDROmorphone  Injectable 0.5 milliGRAM(s) IV Push every 4 hours PRN  influenza  Vaccine (HIGH DOSE) 0.7 milliLiter(s) IntraMuscular once  pantoprazole  Injectable 40 milliGRAM(s) IV Push two times a day  tetracaine/benzocaine/butamben Spray 1 Spray(s) Topical three times a day PRN      ----------------------------------------------------------------------------------------  PHYSICAL EXAM  Constitutional - NAD, Comfortable  HEENT - NCAT, EOMI  Neck - Supple, No limited ROM  Chest - no respiratory distress  Cardiovascular - RRR, S1S2   Abdomen - BS+, Soft, NTND  Extremities - No C/C/E, No calf tenderness   Neurologic Exam -                    Cognitive - Awake, Alert, AAO to self, place, date, year, situation     Communication - Fluent, No dysarthria     Cranial Nerves - CN 2-12 intact     Motor - No focal deficits                    LEFT    UE - ShAB 5/5, EF 5/5, EE 5/5, WE 5/5,  5/5                    RIGHT UE - ShAB 5/5, EF 5/5, EE 5/5, WE 5/5,  5/5                    LEFT    LE - HF 5/5, KE 5/5, DF 5/5, PF 5/5                    RIGHT LE - HF 5/5, KE 5/5, DF 5/5, PF 5/5        Sensory - Intact to LT     Reflexes - DTR Intact, No primitive reflexive     Coordination - FTN intact     OculoVestibular - No saccades, No nystagmus, VOR         Balance - WNL Static  Psychiatric - Mood stable, Affect WNL  ----------------------------------------------------------------------------------------  ASSESSMENT/PLAN  89 yo m s/p laparascopic extended right hemicolectomy for GI malignancy    Pain -  DVT PPX -   Rehab -      incomplete note, consult in progress.  Patient is a 89y old  Male who presents with a chief complaint of c/f colon cancer (08 Dec 2023 09:24)      HPI:  89 yoM with hx of BPH, HLD, B/L inguinal hernia repair with known L recurrence, colonoscopy and endoscopy in sept for anemia on oral iron presents with abdominal pain and constipation over the past three days. Patient states that he hasn't had a bowel movement in the past three days but that he has been passing lots of gas. In the past day started having nausea and vomiting and wasn't able to eat anything then eventually started dry heaving. Denies any blood in vomit. Last BM was brown and regular appearing.     In addition he denies any chest pain, SOB, HA, back pain, LEG swelling, neck pain, recent vision changes, lightheadedness, and has otherwise been feeling healthy.     In the ED patient had labs showing elevated lactate 4.5 that improved to 1.5 s/p IVF hydration. CT abdomen/pelvis c/f primary neoplasm of the ascending and transverse colon. GI consulted and recommended colonoscopy tomorrow for biopsy.  (30 Nov 2023 18:18)     Patient now s/p laparascopic extended right hemicolectomy.   NGT for ileus  npo    REVIEW OF SYSTEMS  + pain  "feels foggy in his head"    PAST MEDICAL & SURGICAL HISTORY  No pertinent past medical history    BPH (benign prostatic hyperplasia)    Peripheral neuropathy    HLD (hyperlipidemia)    No significant past surgical history        SOCIAL HISTORY  Smoking - Denied  EtOH - Denied   Drugs - Denied    FUNCTIONAL HISTORY  Lives alone in apartment with 3 steps  Independent with device    CURRENT FUNCTIONAL STATUS  12/7   Bed Mobility: Supine to Sit:     · Level of [place]	minimum assist (75% patients effort)  · Physical Assist/Nonphysical Assist	1 person assist; verbal cues    Transfer: Sit to Stand:     · Level of [place]	minimum assist (75% patients effort); x2 reps  	  · Physical Assist/Nonphysical Assist	1 person assist; verbal cues  · Weight-Bearing Restrictions	full weight-bearing  · Assistive Device	rolling walker    Transfer: Stand to Sit:     · Level of [place]	minimum assist (75% patients effort)  · Physical Assist/Nonphysical Assist	1 person assist; verbal cues  · Weight-Bearing Restrictions	full weight-bearing  · Assistive Device	rolling walker    Gait Skills:     · Level of [place]	contact guard; minimum assist (75% patients effort)  · Physical Assist/Nonphysical Assist	1 person assist; verbal cues  · Weight-Bearing Restrictions	full weight-bearing  · Assistive Device	rolling walker  · Gait Distance	30ft    Gait Analysis:     · Gait Pattern Used	3-point gait  · Gait Deviations Noted	decreased av; decreased step length; decreased weight-shifting ability  · Impairments Contributing to Gait Deviations	impaired balance; impaired coordination; impaired motor control; decreased strength        FAMILY HISTORY   No pertinent family history in first degree relatives        RECENT LABS/IMAGING  CBC Full  -  ( 08 Dec 2023 05:17 )  WBC Count : 10.12 K/uL  RBC Count : 3.81 M/uL  Hemoglobin : 10.4 g/dL  Hematocrit : 32.9 %  Platelet Count - Automated : 290 K/uL  Mean Cell Volume : 86.4 fL  Mean Cell Hemoglobin : 27.3 pg  Mean Cell Hemoglobin Concentration : 31.6 gm/dL  Auto Neutrophil # : x  Auto Lymphocyte # : x  Auto Monocyte # : x  Auto Eosinophil # : x  Auto Basophil # : x  Auto Neutrophil % : x  Auto Lymphocyte % : x  Auto Monocyte % : x  Auto Eosinophil % : x  Auto Basophil % : x    12-08    137  |  102  |  10  ----------------------------<  68<L>  3.9   |  26  |  0.72    Ca    7.9<L>      08 Dec 2023 05:17  Phos  2.4     12-08  Mg     1.90     12-08      Urinalysis Basic - ( 08 Dec 2023 05:17 )    Color: x / Appearance: x / SG: x / pH: x  Gluc: 68 mg/dL / Ketone: x  / Bili: x / Urobili: x   Blood: x / Protein: x / Nitrite: x   Leuk Esterase: x / RBC: x / WBC x   Sq Epi: x / Non Sq Epi: x / Bacteria: x        VITALS  T(C): 36.7 (12-08-23 @ 09:45), Max: 36.8 (12-07-23 @ 14:00)  HR: 84 (12-08-23 @ 09:45) (80 - 88)  BP: 155/82 (12-08-23 @ 09:45) (136/70 - 155/82)  RR: 18 (12-08-23 @ 09:45) (16 - 18)  SpO2: 96% (12-08-23 @ 09:45) (96% - 100%)  Wt(kg): --    ALLERGIES  penicillin (Unknown)  aspirin (Unknown)      MEDICATIONS   acetaminophen   IVPB .. 1000 milliGRAM(s) IV Intermittent every 6 hours  chlorhexidine 2% Cloths 1 Application(s) Topical daily  dextrose 5% + sodium chloride 0.45% with potassium chloride 20 mEq/L 1000 milliLiter(s) IV Continuous <Continuous>  heparin   Injectable 5000 Unit(s) SubCutaneous every 8 hours  HYDROmorphone  Injectable 0.25 milliGRAM(s) IV Push every 4 hours PRN  HYDROmorphone  Injectable 0.5 milliGRAM(s) IV Push every 4 hours PRN  influenza  Vaccine (HIGH DOSE) 0.7 milliLiter(s) IntraMuscular once  pantoprazole  Injectable 40 milliGRAM(s) IV Push two times a day  tetracaine/benzocaine/butamben Spray 1 Spray(s) Topical three times a day PRN      ----------------------------------------------------------------------------------------  PHYSICAL EXAM  Constitutional - NAD, Comfortable sitting in chair  HEENT - NCAT, EOMI, + NG tube   Chest - no respiratory distress  Cardiovascular - RRR, S1S2   Abdomen -  + steri strips over incision site  Extremities - No C/C/E, No calf tenderness   Neurologic Exam -                    Cognitive - Awake, Alert, AAO to self, place, date, year, situation     Communication - Fluent, No dysarthria     Cranial Nerves - CN 2-12 intact     Motor - No focal deficits                    LEFT    UE - ShAB 5/5, EF 5/5, EE 5/5, WE 5/5,  5/5                    RIGHT UE - ShAB 5/5, EF 5/5, EE 5/5, WE 5/5,  5/5                    LEFT    LE - HF 5/5, KE 5/5, DF 5/5, PF 5/5                    RIGHT LE - HF 5/5, KE 5/5, DF 5/5, PF 5/5        Sensory - Intact to LT      Balance - WNL Static  Psychiatric - Mood stable, Affect WNL  ----------------------------------------------------------------------------------------  ASSESSMENT/PLAN  89 yo m s/p laparascopic extended right hemicolectomy for GI malignancy  NG tube for ileus  ? further treatment/plan for GI mass  Pain -acetaminophen iv, dilaudid iv prn  bruner  DVT PPX -  heparin  Rehab -  patient previously independent, now min assist with PT.   Recommend inpatient rehab, likely acute rehab if still requires assistance for mobility at time of discharge. Patient limited by pain, currently still on iv pain medications.  anticipate function to improve as pain improves.  Patient states his goal is for home discharge but will consider short stay in rehab if needed.  Will monitor for improvement. Patient is a 89y old  Male who presents with a chief complaint of c/f colon cancer (08 Dec 2023 09:24)      HPI:  89 yoM with hx of BPH, HLD, B/L inguinal hernia repair with known L recurrence, colonoscopy and endoscopy in sept for anemia on oral iron presents with abdominal pain and constipation over the past three days. Patient states that he hasn't had a bowel movement in the past three days but that he has been passing lots of gas. In the past day started having nausea and vomiting and wasn't able to eat anything then eventually started dry heaving. Denies any blood in vomit. Last BM was brown and regular appearing.     In addition he denies any chest pain, SOB, HA, back pain, LEG swelling, neck pain, recent vision changes, lightheadedness, and has otherwise been feeling healthy.     In the ED patient had labs showing elevated lactate 4.5 that improved to 1.5 s/p IVF hydration. CT abdomen/pelvis c/f primary neoplasm of the ascending and transverse colon. GI consulted and recommended colonoscopy tomorrow for biopsy.  (30 Nov 2023 18:18)     Patient now s/p laparascopic extended right hemicolectomy.   NGT for ileus  npo    REVIEW OF SYSTEMS  + pain  "feels foggy in his head"    PAST MEDICAL & SURGICAL HISTORY  No pertinent past medical history    BPH (benign prostatic hyperplasia)    Peripheral neuropathy    HLD (hyperlipidemia)    No significant past surgical history        SOCIAL HISTORY  Smoking - Denied  EtOH - Denied   Drugs - Denied    FUNCTIONAL HISTORY  Lives alone in apartment with 3 steps  Independent with device    CURRENT FUNCTIONAL STATUS  12/7   Bed Mobility: Supine to Sit:     · Level of [place]	minimum assist (75% patients effort)  · Physical Assist/Nonphysical Assist	1 person assist; verbal cues    Transfer: Sit to Stand:     · Level of [place]	minimum assist (75% patients effort); x2 reps  	  · Physical Assist/Nonphysical Assist	1 person assist; verbal cues  · Weight-Bearing Restrictions	full weight-bearing  · Assistive Device	rolling walker    Transfer: Stand to Sit:     · Level of [place]	minimum assist (75% patients effort)  · Physical Assist/Nonphysical Assist	1 person assist; verbal cues  · Weight-Bearing Restrictions	full weight-bearing  · Assistive Device	rolling walker    Gait Skills:     · Level of [place]	contact guard; minimum assist (75% patients effort)  · Physical Assist/Nonphysical Assist	1 person assist; verbal cues  · Weight-Bearing Restrictions	full weight-bearing  · Assistive Device	rolling walker  · Gait Distance	30ft    Gait Analysis:     · Gait Pattern Used	3-point gait  · Gait Deviations Noted	decreased av; decreased step length; decreased weight-shifting ability  · Impairments Contributing to Gait Deviations	impaired balance; impaired coordination; impaired motor control; decreased strength        FAMILY HISTORY   No pertinent family history in first degree relatives        RECENT LABS/IMAGING  CBC Full  -  ( 08 Dec 2023 05:17 )  WBC Count : 10.12 K/uL  RBC Count : 3.81 M/uL  Hemoglobin : 10.4 g/dL  Hematocrit : 32.9 %  Platelet Count - Automated : 290 K/uL  Mean Cell Volume : 86.4 fL  Mean Cell Hemoglobin : 27.3 pg  Mean Cell Hemoglobin Concentration : 31.6 gm/dL  Auto Neutrophil # : x  Auto Lymphocyte # : x  Auto Monocyte # : x  Auto Eosinophil # : x  Auto Basophil # : x  Auto Neutrophil % : x  Auto Lymphocyte % : x  Auto Monocyte % : x  Auto Eosinophil % : x  Auto Basophil % : x    12-08    137  |  102  |  10  ----------------------------<  68<L>  3.9   |  26  |  0.72    Ca    7.9<L>      08 Dec 2023 05:17  Phos  2.4     12-08  Mg     1.90     12-08      Urinalysis Basic - ( 08 Dec 2023 05:17 )    Color: x / Appearance: x / SG: x / pH: x  Gluc: 68 mg/dL / Ketone: x  / Bili: x / Urobili: x   Blood: x / Protein: x / Nitrite: x   Leuk Esterase: x / RBC: x / WBC x   Sq Epi: x / Non Sq Epi: x / Bacteria: x        VITALS  T(C): 36.7 (12-08-23 @ 09:45), Max: 36.8 (12-07-23 @ 14:00)  HR: 84 (12-08-23 @ 09:45) (80 - 88)  BP: 155/82 (12-08-23 @ 09:45) (136/70 - 155/82)  RR: 18 (12-08-23 @ 09:45) (16 - 18)  SpO2: 96% (12-08-23 @ 09:45) (96% - 100%)  Wt(kg): --    ALLERGIES  penicillin (Unknown)  aspirin (Unknown)      MEDICATIONS   acetaminophen   IVPB .. 1000 milliGRAM(s) IV Intermittent every 6 hours  chlorhexidine 2% Cloths 1 Application(s) Topical daily  dextrose 5% + sodium chloride 0.45% with potassium chloride 20 mEq/L 1000 milliLiter(s) IV Continuous <Continuous>  heparin   Injectable 5000 Unit(s) SubCutaneous every 8 hours  HYDROmorphone  Injectable 0.25 milliGRAM(s) IV Push every 4 hours PRN  HYDROmorphone  Injectable 0.5 milliGRAM(s) IV Push every 4 hours PRN  influenza  Vaccine (HIGH DOSE) 0.7 milliLiter(s) IntraMuscular once  pantoprazole  Injectable 40 milliGRAM(s) IV Push two times a day  tetracaine/benzocaine/butamben Spray 1 Spray(s) Topical three times a day PRN      ----------------------------------------------------------------------------------------  PHYSICAL EXAM  Constitutional - NAD, Comfortable sitting in chair  HEENT - NCAT, EOMI, + NG tube   Chest - no respiratory distress  Cardiovascular - RRR, S1S2   Abdomen -  + steri strips over incision site  Extremities - No C/C/E, No calf tenderness   Neurologic Exam -                    Cognitive - Awake, Alert, AAO to self, place, date, year, situation     Communication - Fluent, No dysarthria     Cranial Nerves - CN 2-12 intact     Motor - No focal deficits                    LEFT    UE - ShAB 5/5, EF 5/5, EE 5/5, WE 5/5,  5/5                    RIGHT UE - ShAB 5/5, EF 5/5, EE 5/5, WE 5/5,  5/5                    LEFT    LE - HF 5/5, KE 5/5, DF 5/5, PF 5/5                    RIGHT LE - HF 5/5, KE 5/5, DF 5/5, PF 5/5        Sensory - Intact to LT      Balance - WNL Static  Psychiatric - Mood stable, Affect WNL  ----------------------------------------------------------------------------------------  ASSESSMENT/PLAN  89 yo m s/p laparascopic extended right hemicolectomy for GI malignancy  NG tube for ileus  ? further treatment/plan for GI mass  Pain -acetaminophen iv, dilaudid iv prn  bruner  DVT PPX -  heparin  Rehab -  patient previously independent, now min assist with PT.   Recommend inpatient rehab, likely acute rehab if still requires assistance for mobility at time of discharge. Patient limited by pain, currently still on iv pain medications.  anticipate function to improve as pain improves.  Patient states his goal is for home discharge but will consider short stay in rehab if needed.  Will monitor for improvement.

## 2023-12-08 NOTE — PROGRESS NOTE ADULT - ASSESSMENT
89 M w/ PMHx GERD, BPH, HLD, open R IHR, LIH (non repaired), presents to ED with suprapubic pain. CT w/ distal ascending and proximal transverse thickening, concern for malignancy. CT chest without evidence of metastasis. CEA 81.9, CA 19-9 225. Colonoscopy done 12/4. Patient currently s/p Laparoscopic extended right hemicolectomy.    Plan:  - NGT placed yesterday for ileus.  - Clamp trial today - f/u  - NPO/IVF and fluid resuscitation prn  - Replete electrolytes as needed  - Pain meds IV  - Monitor bruner - strict I's/O's  - PT consult yesterday - rec restorative rehab  - PM+R consult - f/u recs for restorative rehab    A Team Surgery  [id number]. 89 M w/ PMHx GERD, BPH, HLD, open R IHR, LIH (non repaired), presents to ED with suprapubic pain. CT w/ distal ascending and proximal transverse thickening, concern for malignancy. CT chest without evidence of metastasis. CEA 81.9, CA 19-9 225. Colonoscopy done 12/4. Patient currently s/p Laparoscopic extended right hemicolectomy.    Plan:  - NGT placed yesterday for ileus.  - Clamp trial today - f/u  - NPO/IVF and fluid resuscitation prn  - Replete electrolytes as needed  - Pain meds IV  - Monitor bruner - strict I's/O's  - PT consult yesterday - rec restorative rehab  - PM+R consult - f/u recs for restorative rehab    A Team Surgery  [id number].

## 2023-12-09 LAB
ANION GAP SERPL CALC-SCNC: 5 MMOL/L — LOW (ref 7–14)
ANION GAP SERPL CALC-SCNC: 5 MMOL/L — LOW (ref 7–14)
BUN SERPL-MCNC: 6 MG/DL — LOW (ref 7–23)
BUN SERPL-MCNC: 6 MG/DL — LOW (ref 7–23)
CALCIUM SERPL-MCNC: 8 MG/DL — LOW (ref 8.4–10.5)
CALCIUM SERPL-MCNC: 8 MG/DL — LOW (ref 8.4–10.5)
CHLORIDE SERPL-SCNC: 102 MMOL/L — SIGNIFICANT CHANGE UP (ref 98–107)
CHLORIDE SERPL-SCNC: 102 MMOL/L — SIGNIFICANT CHANGE UP (ref 98–107)
CO2 SERPL-SCNC: 28 MMOL/L — SIGNIFICANT CHANGE UP (ref 22–31)
CO2 SERPL-SCNC: 28 MMOL/L — SIGNIFICANT CHANGE UP (ref 22–31)
CREAT SERPL-MCNC: 0.69 MG/DL — SIGNIFICANT CHANGE UP (ref 0.5–1.3)
CREAT SERPL-MCNC: 0.69 MG/DL — SIGNIFICANT CHANGE UP (ref 0.5–1.3)
EGFR: 88 ML/MIN/1.73M2 — SIGNIFICANT CHANGE UP
EGFR: 88 ML/MIN/1.73M2 — SIGNIFICANT CHANGE UP
GLUCOSE SERPL-MCNC: 118 MG/DL — HIGH (ref 70–99)
GLUCOSE SERPL-MCNC: 118 MG/DL — HIGH (ref 70–99)
HCT VFR BLD CALC: 33.1 % — LOW (ref 39–50)
HCT VFR BLD CALC: 33.1 % — LOW (ref 39–50)
HGB BLD-MCNC: 10.3 G/DL — LOW (ref 13–17)
HGB BLD-MCNC: 10.3 G/DL — LOW (ref 13–17)
MAGNESIUM SERPL-MCNC: 1.7 MG/DL — SIGNIFICANT CHANGE UP (ref 1.6–2.6)
MAGNESIUM SERPL-MCNC: 1.7 MG/DL — SIGNIFICANT CHANGE UP (ref 1.6–2.6)
MCHC RBC-ENTMCNC: 27.1 PG — SIGNIFICANT CHANGE UP (ref 27–34)
MCHC RBC-ENTMCNC: 27.1 PG — SIGNIFICANT CHANGE UP (ref 27–34)
MCHC RBC-ENTMCNC: 31.1 GM/DL — LOW (ref 32–36)
MCHC RBC-ENTMCNC: 31.1 GM/DL — LOW (ref 32–36)
MCV RBC AUTO: 87.1 FL — SIGNIFICANT CHANGE UP (ref 80–100)
MCV RBC AUTO: 87.1 FL — SIGNIFICANT CHANGE UP (ref 80–100)
NRBC # BLD: 0 /100 WBCS — SIGNIFICANT CHANGE UP (ref 0–0)
NRBC # BLD: 0 /100 WBCS — SIGNIFICANT CHANGE UP (ref 0–0)
NRBC # FLD: 0 K/UL — SIGNIFICANT CHANGE UP (ref 0–0)
NRBC # FLD: 0 K/UL — SIGNIFICANT CHANGE UP (ref 0–0)
PHOSPHATE SERPL-MCNC: 2.2 MG/DL — LOW (ref 2.5–4.5)
PHOSPHATE SERPL-MCNC: 2.2 MG/DL — LOW (ref 2.5–4.5)
PLATELET # BLD AUTO: 412 K/UL — HIGH (ref 150–400)
PLATELET # BLD AUTO: 412 K/UL — HIGH (ref 150–400)
POTASSIUM SERPL-MCNC: 3.5 MMOL/L — SIGNIFICANT CHANGE UP (ref 3.5–5.3)
POTASSIUM SERPL-MCNC: 3.5 MMOL/L — SIGNIFICANT CHANGE UP (ref 3.5–5.3)
POTASSIUM SERPL-SCNC: 3.5 MMOL/L — SIGNIFICANT CHANGE UP (ref 3.5–5.3)
POTASSIUM SERPL-SCNC: 3.5 MMOL/L — SIGNIFICANT CHANGE UP (ref 3.5–5.3)
RBC # BLD: 3.8 M/UL — LOW (ref 4.2–5.8)
RBC # BLD: 3.8 M/UL — LOW (ref 4.2–5.8)
RBC # FLD: 14.3 % — SIGNIFICANT CHANGE UP (ref 10.3–14.5)
RBC # FLD: 14.3 % — SIGNIFICANT CHANGE UP (ref 10.3–14.5)
SODIUM SERPL-SCNC: 135 MMOL/L — SIGNIFICANT CHANGE UP (ref 135–145)
SODIUM SERPL-SCNC: 135 MMOL/L — SIGNIFICANT CHANGE UP (ref 135–145)
WBC # BLD: 7.8 K/UL — SIGNIFICANT CHANGE UP (ref 3.8–10.5)
WBC # BLD: 7.8 K/UL — SIGNIFICANT CHANGE UP (ref 3.8–10.5)
WBC # FLD AUTO: 7.8 K/UL — SIGNIFICANT CHANGE UP (ref 3.8–10.5)
WBC # FLD AUTO: 7.8 K/UL — SIGNIFICANT CHANGE UP (ref 3.8–10.5)

## 2023-12-09 RX ORDER — METOCLOPRAMIDE HCL 10 MG
5 TABLET ORAL ONCE
Refills: 0 | Status: COMPLETED | OUTPATIENT
Start: 2023-12-09 | End: 2023-12-09

## 2023-12-09 RX ORDER — ACETAMINOPHEN 500 MG
975 TABLET ORAL EVERY 6 HOURS
Refills: 0 | Status: DISCONTINUED | OUTPATIENT
Start: 2023-12-09 | End: 2023-12-20

## 2023-12-09 RX ORDER — MAGNESIUM SULFATE 500 MG/ML
1 VIAL (ML) INJECTION ONCE
Refills: 0 | Status: COMPLETED | OUTPATIENT
Start: 2023-12-09 | End: 2023-12-09

## 2023-12-09 RX ORDER — POTASSIUM CHLORIDE 20 MEQ
10 PACKET (EA) ORAL
Refills: 0 | Status: COMPLETED | OUTPATIENT
Start: 2023-12-09 | End: 2023-12-09

## 2023-12-09 RX ADMIN — HEPARIN SODIUM 5000 UNIT(S): 5000 INJECTION INTRAVENOUS; SUBCUTANEOUS at 05:30

## 2023-12-09 RX ADMIN — Medication 975 MILLIGRAM(S): at 12:28

## 2023-12-09 RX ADMIN — PANTOPRAZOLE SODIUM 40 MILLIGRAM(S): 20 TABLET, DELAYED RELEASE ORAL at 17:51

## 2023-12-09 RX ADMIN — Medication 975 MILLIGRAM(S): at 12:58

## 2023-12-09 RX ADMIN — Medication 100 MILLIEQUIVALENT(S): at 16:54

## 2023-12-09 RX ADMIN — SIMVASTATIN 40 MILLIGRAM(S): 20 TABLET, FILM COATED ORAL at 22:11

## 2023-12-09 RX ADMIN — Medication 100 GRAM(S): at 10:57

## 2023-12-09 RX ADMIN — TAMSULOSIN HYDROCHLORIDE 0.4 MILLIGRAM(S): 0.4 CAPSULE ORAL at 22:11

## 2023-12-09 RX ADMIN — HEPARIN SODIUM 5000 UNIT(S): 5000 INJECTION INTRAVENOUS; SUBCUTANEOUS at 15:42

## 2023-12-09 RX ADMIN — PANTOPRAZOLE SODIUM 40 MILLIGRAM(S): 20 TABLET, DELAYED RELEASE ORAL at 05:30

## 2023-12-09 RX ADMIN — DEXTROSE MONOHYDRATE, SODIUM CHLORIDE, AND POTASSIUM CHLORIDE 100 MILLILITER(S): 50; .745; 4.5 INJECTION, SOLUTION INTRAVENOUS at 05:29

## 2023-12-09 RX ADMIN — Medication 400 MILLIGRAM(S): at 00:44

## 2023-12-09 RX ADMIN — HEPARIN SODIUM 5000 UNIT(S): 5000 INJECTION INTRAVENOUS; SUBCUTANEOUS at 22:11

## 2023-12-09 RX ADMIN — Medication 85 MILLIMOLE(S): at 10:57

## 2023-12-09 RX ADMIN — Medication 5 MILLIGRAM(S): at 12:28

## 2023-12-09 RX ADMIN — Medication 100 MILLIEQUIVALENT(S): at 15:42

## 2023-12-09 RX ADMIN — Medication 100 MILLIEQUIVALENT(S): at 18:30

## 2023-12-09 NOTE — PROGRESS NOTE ADULT - ASSESSMENT
89 M w/ PMHx GERD, BPH, HLD, open R IHR, LIH (non repaired), presents to ED with suprapubic pain. CT w/ distal ascending and proximal transverse thickening, concern for malignancy. CT chest without evidence of metastasis. CEA 81.9, CA 19-9 225. Colonoscopy done 12/4. Patient currently s/p Laparoscopic extended right hemicolectomy.    Plan:  - NGT dc'd yesterday  - Keep NPO w sips  - Replete electrolytes as needed  - Pain meds IV  - Monitor bruner - strict I's/O's  - PT consulted - rec restorative rehab  - PM+R consult - rec inpatient vs acute rehab  - Plan to DC bruner this AM. F/u TOV  - OOB, will f/u with PT    A Team Surgery  [id number].   89 M w/ PMHx GERD, BPH, HLD, open R IHR, LIH (non repaired), presents to ED with suprapubic pain. CT w/ distal ascending and proximal transverse thickening, concern for malignancy. CT chest without evidence of metastasis. CEA 81.9, CA 19-9 225. Colonoscopy done 12/4. Patient currently s/p Laparoscopic extended right hemicolectomy.    Plan:  - NGT dc'd yesterday  - Keep NPO w sips  - Replete electrolytes as needed  - Pain meds IV  - Monitor bruner - strict I's/O's  - PT consulted - rec restorative rehab  - PM+R consult - rec inpatient vs acute rehab  - Plan to DC bruner this AM. F/u TOV  - OOB, will f/u with PT    A Team Surgery  [id number].

## 2023-12-09 NOTE — PROGRESS NOTE ADULT - SUBJECTIVE AND OBJECTIVE BOX
TEAM [ *A* ] Surgery Daily Progress Note  =====================================================    SUBJECTIVE: Patient seen and examined at bedside on AM rounds. Patient reports that they're feeling well. Headache better today, weakness improved from yesterday. Patient NPO with sips, tolerating, still -/- for gas and bm.  Denies fever, chills, N/V, chest pain, SOB    ALLERGIES:  penicillin (Unknown)  aspirin (Unknown)      --------------------------------------------------------------------------------------    MEDICATIONS:    Neurologic Medications    Respiratory Medications    Cardiovascular Medications    Gastrointestinal Medications  dextrose 5% + sodium chloride 0.45% with potassium chloride 20 mEq/L 1000 milliLiter(s) IV Continuous <Continuous>  magnesium sulfate  IVPB 1 Gram(s) IV Intermittent once  pantoprazole  Injectable 40 milliGRAM(s) IV Push two times a day  potassium chloride  10 mEq/100 mL IVPB 10 milliEquivalent(s) IV Intermittent every 1 hour  sodium phosphate 30 milliMole(s)/500 mL IVPB 30 milliMole(s) IV Intermittent once    Genitourinary Medications  tamsulosin 0.4 milliGRAM(s) Oral at bedtime    Hematologic/Oncologic Medications  heparin   Injectable 5000 Unit(s) SubCutaneous every 8 hours  influenza  Vaccine (HIGH DOSE) 0.7 milliLiter(s) IntraMuscular once    Antimicrobial/Immunologic Medications    Endocrine/Metabolic Medications  simvastatin 40 milliGRAM(s) Oral at bedtime    Topical/Other Medications    --------------------------------------------------------------------------------------    VITAL SIGNS:  T(C): 36.6 (12-09-23 @ 06:00), Max: 36.7 (12-08-23 @ 23:06)  HR: 86 (12-09-23 @ 06:00) (70 - 86)  BP: 164/60 (12-09-23 @ 06:00) (140/60 - 168/71)  RR: 17 (12-09-23 @ 06:00) (16 - 18)  SpO2: 95% (12-09-23 @ 06:00) (95% - 100%)  --------------------------------------------------------------------------------------    INS AND OUTS:    12-08-23 @ 07:01  -  12-09-23 @ 07:00  --------------------------------------------------------  IN: 1460 mL / OUT: 1610 mL / NET: -150 mL      --------------------------------------------------------------------------------------      EXAM    Constitutional:   NAD  CV: Regular rate  Respiratory: Unlabored breathing on RA  Abdomen: Soft, mildly distended, tympanic to percussion. non tender to palpation. Incision C/D/I.       --------------------------------------------------------------------------------------    LABS                          10.3   7.80  )-----------( 412      ( 09 Dec 2023 05:38 )             33.1     12-09    135  |  102  |  6<L>  ----------------------------<  118<H>  3.5   |  28  |  0.69    Ca    8.0<L>      09 Dec 2023 05:38  Phos  2.2     12-09  Mg     1.70     12-09        Urinalysis Basic - ( 09 Dec 2023 05:38 )    Color: x / Appearance: x / SG: x / pH: x  Gluc: 118 mg/dL / Ketone: x  / Bili: x / Urobili: x   Blood: x / Protein: x / Nitrite: x   Leuk Esterase: x / RBC: x / WBC x   Sq Epi: x / Non Sq Epi: x / Bacteria: x      penicillin (Unknown)  aspirin (Unknown)    T(C): 36.6 (12-09-23 @ 06:00), Max: 36.7 (12-08-23 @ 23:06)  HR: 86 (12-09-23 @ 06:00) (70 - 86)  BP: 164/60 (12-09-23 @ 06:00) (140/60 - 168/71)  RR: 17 (12-09-23 @ 06:00) (16 - 18)  SpO2: 95% (12-09-23 @ 06:00) (95% - 100%)    12-08-23 @ 07:01  -  12-09-23 @ 07:00  --------------------------------------------------------  IN: 1460 mL / OUT: 1610 mL / NET: -150 mL

## 2023-12-10 LAB
ANION GAP SERPL CALC-SCNC: 7 MMOL/L — SIGNIFICANT CHANGE UP (ref 7–14)
ANION GAP SERPL CALC-SCNC: 7 MMOL/L — SIGNIFICANT CHANGE UP (ref 7–14)
ANION GAP SERPL CALC-SCNC: 8 MMOL/L — SIGNIFICANT CHANGE UP (ref 7–14)
ANION GAP SERPL CALC-SCNC: 8 MMOL/L — SIGNIFICANT CHANGE UP (ref 7–14)
BUN SERPL-MCNC: 3 MG/DL — LOW (ref 7–23)
BUN SERPL-MCNC: 3 MG/DL — LOW (ref 7–23)
BUN SERPL-MCNC: 4 MG/DL — LOW (ref 7–23)
BUN SERPL-MCNC: 4 MG/DL — LOW (ref 7–23)
CALCIUM SERPL-MCNC: 8.1 MG/DL — LOW (ref 8.4–10.5)
CALCIUM SERPL-MCNC: 8.1 MG/DL — LOW (ref 8.4–10.5)
CALCIUM SERPL-MCNC: 8.2 MG/DL — LOW (ref 8.4–10.5)
CALCIUM SERPL-MCNC: 8.2 MG/DL — LOW (ref 8.4–10.5)
CHLORIDE SERPL-SCNC: 103 MMOL/L — SIGNIFICANT CHANGE UP (ref 98–107)
CO2 SERPL-SCNC: 27 MMOL/L — SIGNIFICANT CHANGE UP (ref 22–31)
CREAT SERPL-MCNC: 0.66 MG/DL — SIGNIFICANT CHANGE UP (ref 0.5–1.3)
CREAT SERPL-MCNC: 0.66 MG/DL — SIGNIFICANT CHANGE UP (ref 0.5–1.3)
CREAT SERPL-MCNC: 0.69 MG/DL — SIGNIFICANT CHANGE UP (ref 0.5–1.3)
CREAT SERPL-MCNC: 0.69 MG/DL — SIGNIFICANT CHANGE UP (ref 0.5–1.3)
EGFR: 88 ML/MIN/1.73M2 — SIGNIFICANT CHANGE UP
EGFR: 88 ML/MIN/1.73M2 — SIGNIFICANT CHANGE UP
EGFR: 89 ML/MIN/1.73M2 — SIGNIFICANT CHANGE UP
EGFR: 89 ML/MIN/1.73M2 — SIGNIFICANT CHANGE UP
GLUCOSE SERPL-MCNC: 116 MG/DL — HIGH (ref 70–99)
GLUCOSE SERPL-MCNC: 116 MG/DL — HIGH (ref 70–99)
GLUCOSE SERPL-MCNC: 122 MG/DL — HIGH (ref 70–99)
GLUCOSE SERPL-MCNC: 122 MG/DL — HIGH (ref 70–99)
HCT VFR BLD CALC: 32.7 % — LOW (ref 39–50)
HCT VFR BLD CALC: 32.7 % — LOW (ref 39–50)
HGB BLD-MCNC: 10.4 G/DL — LOW (ref 13–17)
HGB BLD-MCNC: 10.4 G/DL — LOW (ref 13–17)
MAGNESIUM SERPL-MCNC: 1.8 MG/DL — SIGNIFICANT CHANGE UP (ref 1.6–2.6)
MAGNESIUM SERPL-MCNC: 1.8 MG/DL — SIGNIFICANT CHANGE UP (ref 1.6–2.6)
MAGNESIUM SERPL-MCNC: 2.2 MG/DL — SIGNIFICANT CHANGE UP (ref 1.6–2.6)
MAGNESIUM SERPL-MCNC: 2.2 MG/DL — SIGNIFICANT CHANGE UP (ref 1.6–2.6)
MCHC RBC-ENTMCNC: 26.9 PG — LOW (ref 27–34)
MCHC RBC-ENTMCNC: 26.9 PG — LOW (ref 27–34)
MCHC RBC-ENTMCNC: 31.8 GM/DL — LOW (ref 32–36)
MCHC RBC-ENTMCNC: 31.8 GM/DL — LOW (ref 32–36)
MCV RBC AUTO: 84.5 FL — SIGNIFICANT CHANGE UP (ref 80–100)
MCV RBC AUTO: 84.5 FL — SIGNIFICANT CHANGE UP (ref 80–100)
NRBC # BLD: 0 /100 WBCS — SIGNIFICANT CHANGE UP (ref 0–0)
NRBC # BLD: 0 /100 WBCS — SIGNIFICANT CHANGE UP (ref 0–0)
NRBC # FLD: 0 K/UL — SIGNIFICANT CHANGE UP (ref 0–0)
NRBC # FLD: 0 K/UL — SIGNIFICANT CHANGE UP (ref 0–0)
PHOSPHATE SERPL-MCNC: 2.4 MG/DL — LOW (ref 2.5–4.5)
PHOSPHATE SERPL-MCNC: 2.4 MG/DL — LOW (ref 2.5–4.5)
PHOSPHATE SERPL-MCNC: 2.6 MG/DL — SIGNIFICANT CHANGE UP (ref 2.5–4.5)
PHOSPHATE SERPL-MCNC: 2.6 MG/DL — SIGNIFICANT CHANGE UP (ref 2.5–4.5)
PLATELET # BLD AUTO: 389 K/UL — SIGNIFICANT CHANGE UP (ref 150–400)
PLATELET # BLD AUTO: 389 K/UL — SIGNIFICANT CHANGE UP (ref 150–400)
POTASSIUM SERPL-MCNC: 4.2 MMOL/L — SIGNIFICANT CHANGE UP (ref 3.5–5.3)
POTASSIUM SERPL-MCNC: 4.2 MMOL/L — SIGNIFICANT CHANGE UP (ref 3.5–5.3)
POTASSIUM SERPL-MCNC: 4.3 MMOL/L — SIGNIFICANT CHANGE UP (ref 3.5–5.3)
POTASSIUM SERPL-MCNC: 4.3 MMOL/L — SIGNIFICANT CHANGE UP (ref 3.5–5.3)
POTASSIUM SERPL-SCNC: 4.2 MMOL/L — SIGNIFICANT CHANGE UP (ref 3.5–5.3)
POTASSIUM SERPL-SCNC: 4.2 MMOL/L — SIGNIFICANT CHANGE UP (ref 3.5–5.3)
POTASSIUM SERPL-SCNC: 4.3 MMOL/L — SIGNIFICANT CHANGE UP (ref 3.5–5.3)
POTASSIUM SERPL-SCNC: 4.3 MMOL/L — SIGNIFICANT CHANGE UP (ref 3.5–5.3)
RBC # BLD: 3.87 M/UL — LOW (ref 4.2–5.8)
RBC # BLD: 3.87 M/UL — LOW (ref 4.2–5.8)
RBC # FLD: 14.3 % — SIGNIFICANT CHANGE UP (ref 10.3–14.5)
RBC # FLD: 14.3 % — SIGNIFICANT CHANGE UP (ref 10.3–14.5)
SODIUM SERPL-SCNC: 137 MMOL/L — SIGNIFICANT CHANGE UP (ref 135–145)
SODIUM SERPL-SCNC: 137 MMOL/L — SIGNIFICANT CHANGE UP (ref 135–145)
SODIUM SERPL-SCNC: 138 MMOL/L — SIGNIFICANT CHANGE UP (ref 135–145)
SODIUM SERPL-SCNC: 138 MMOL/L — SIGNIFICANT CHANGE UP (ref 135–145)
WBC # BLD: 10.13 K/UL — SIGNIFICANT CHANGE UP (ref 3.8–10.5)
WBC # BLD: 10.13 K/UL — SIGNIFICANT CHANGE UP (ref 3.8–10.5)
WBC # FLD AUTO: 10.13 K/UL — SIGNIFICANT CHANGE UP (ref 3.8–10.5)
WBC # FLD AUTO: 10.13 K/UL — SIGNIFICANT CHANGE UP (ref 3.8–10.5)

## 2023-12-10 RX ORDER — ONDANSETRON 8 MG/1
4 TABLET, FILM COATED ORAL ONCE
Refills: 0 | Status: COMPLETED | OUTPATIENT
Start: 2023-12-10 | End: 2023-12-10

## 2023-12-10 RX ORDER — SODIUM,POTASSIUM PHOSPHATES 278-250MG
1 POWDER IN PACKET (EA) ORAL ONCE
Refills: 0 | Status: COMPLETED | OUTPATIENT
Start: 2023-12-10 | End: 2023-12-10

## 2023-12-10 RX ORDER — MAGNESIUM SULFATE 500 MG/ML
2 VIAL (ML) INJECTION ONCE
Refills: 0 | Status: COMPLETED | OUTPATIENT
Start: 2023-12-10 | End: 2023-12-10

## 2023-12-10 RX ADMIN — Medication 1 TABLET(S): at 09:44

## 2023-12-10 RX ADMIN — Medication 85 MILLIMOLE(S): at 15:23

## 2023-12-10 RX ADMIN — TAMSULOSIN HYDROCHLORIDE 0.4 MILLIGRAM(S): 0.4 CAPSULE ORAL at 21:43

## 2023-12-10 RX ADMIN — PANTOPRAZOLE SODIUM 40 MILLIGRAM(S): 20 TABLET, DELAYED RELEASE ORAL at 05:46

## 2023-12-10 RX ADMIN — Medication 25 GRAM(S): at 09:45

## 2023-12-10 RX ADMIN — ONDANSETRON 4 MILLIGRAM(S): 8 TABLET, FILM COATED ORAL at 17:05

## 2023-12-10 RX ADMIN — HEPARIN SODIUM 5000 UNIT(S): 5000 INJECTION INTRAVENOUS; SUBCUTANEOUS at 05:46

## 2023-12-10 RX ADMIN — PANTOPRAZOLE SODIUM 40 MILLIGRAM(S): 20 TABLET, DELAYED RELEASE ORAL at 18:00

## 2023-12-10 RX ADMIN — DEXTROSE MONOHYDRATE, SODIUM CHLORIDE, AND POTASSIUM CHLORIDE 75 MILLILITER(S): 50; .745; 4.5 INJECTION, SOLUTION INTRAVENOUS at 09:44

## 2023-12-10 RX ADMIN — SIMVASTATIN 40 MILLIGRAM(S): 20 TABLET, FILM COATED ORAL at 21:42

## 2023-12-10 RX ADMIN — DEXTROSE MONOHYDRATE, SODIUM CHLORIDE, AND POTASSIUM CHLORIDE 100 MILLILITER(S): 50; .745; 4.5 INJECTION, SOLUTION INTRAVENOUS at 05:54

## 2023-12-10 RX ADMIN — HEPARIN SODIUM 5000 UNIT(S): 5000 INJECTION INTRAVENOUS; SUBCUTANEOUS at 15:01

## 2023-12-10 RX ADMIN — HEPARIN SODIUM 5000 UNIT(S): 5000 INJECTION INTRAVENOUS; SUBCUTANEOUS at 21:42

## 2023-12-10 NOTE — PROGRESS NOTE ADULT - ASSESSMENT
89 M w/ PMHx GERD, BPH, HLD, open R IHR, LIH (non repaired), presents to ED with suprapubic pain. CT w/ distal ascending and proximal transverse thickening, concern for malignancy. CT chest without evidence of metastasis. CEA 81.9, CA 19-9 225. Colonoscopy done 12/4. Patient currently s/p Laparoscopic extended right hemicolectomy.    Plan:  - Advance to [facility] today  - Replete electrolytes as needed  - Pain meds IV  - Monitor bruner - strict I's/O's  - PT consulted - rec restorative rehab  - PM+R consult - rec inpatient vs acute rehab  - Plan to DC bruner this AM. F/u TOV  - OOB, will f/u with PT    A Team Surgery  [id number].   89 M w/ PMHx GERD, BPH, HLD, open R IHR, LIH (non repaired), presents to ED with suprapubic pain. CT w/ distal ascending and proximal transverse thickening, concern for malignancy. CT chest without evidence of metastasis. CEA 81.9, CA 19-9 225. Colonoscopy done 12/4. Patient currently s/p Laparoscopic extended right hemicolectomy.    Plan:  - Advance to [facility] today  - Replete electrolytes as needed  - Pain meds IV  - Monitor bruner - strict I's/O's  - PT consulted - rec restorative rehab  - PM+R consult - rec inpatient vs acute rehab  - Plan to DC bruner this AM. F/u TOV  - OOB, will f/u with PT    A Team Surgery  [id number].   Alex Gonzalez), Orthopaedic Surgery  [address]  [address] [place] [address]  Phone: [phone]  Fax: [phone]

## 2023-12-10 NOTE — PROGRESS NOTE ADULT - SUBJECTIVE AND OBJECTIVE BOX
A Team Surgery Daily Resident Progress Note    OVERNIGHT:  No acute events.     SUBJECTIVE: Patient seen and examined at bedside on AM rounds. Patient reports that they're feeling well. Patient NPO with sips, tolerating, -/+ with 5 loose bowel movements overnight.  Denies fever, chills, N/V, chest pain, SOB.      OBJECTIVE:  Vital Signs Last 24 Hrs  T(C): 36.6 (10 Dec 2023 06:00), Max: 36.9 (10 Dec 2023 02:00)  T(F): 97.8 (10 Dec 2023 06:00), Max: 98.4 (10 Dec 2023 02:00)  HR: 96 (10 Dec 2023 06:00) (72 - 99)  BP: 151/66 (10 Dec 2023 06:00) (119/59 - 151/66)  BP(mean): 93 (09 Dec 2023 10:03) (93 - 93)  RR: 17 (10 Dec 2023 06:00) (17 - 18)  SpO2: 96% (10 Dec 2023 06:00) (95% - 100%)    Parameters below as of 10 Dec 2023 06:00  Patient On (Oxygen Delivery Method): room air      Physical Exam:  Constitutional:  NAD  CV: Regular rate  Respiratory: Unlabored breathing on RA  Abdomen: Soft, mildly distended, tympanic to percussion. non tender to palpation. Incision C/D/I.    dextrose 5% + sodium chloride 0.45% with potassium chloride 20 mEq/L milliLiter(s) (75 mL/Hr)  magnesium sulfate  IVPB Gram(s)  potassium phosphate / sodium phosphate Tablet (K-PHOS No. 2) Tablet(s)      Medications:  MEDICATIONS  (STANDING):  dextrose 5% + sodium chloride 0.45% with potassium chloride 20 mEq/L 1000 milliLiter(s) (75 mL/Hr) IV Continuous <Continuous>  heparin   Injectable 5000 Unit(s) SubCutaneous every 8 hours  influenza  Vaccine (HIGH DOSE) 0.7 milliLiter(s) IntraMuscular once  magnesium sulfate  IVPB 2 Gram(s) IV Intermittent once  pantoprazole  Injectable 40 milliGRAM(s) IV Push two times a day  potassium phosphate / sodium phosphate Tablet (K-PHOS No. 2) 1 Tablet(s) Oral once  simvastatin 40 milliGRAM(s) Oral at bedtime  tamsulosin 0.4 milliGRAM(s) Oral at bedtime    MEDICATIONS  (PRN):  acetaminophen     Tablet .. 975 milliGRAM(s) Oral every 6 hours PRN Mild Pain (1 - 3)    Allergies:  penicillin (Unknown)  aspirin (Unknown)      Labs:  12-10    138  |  103  |  4<L>  ----------------------------<  116<H>  4.2   |  27  |  0.66    Ca    8.2<L>      10 Dec 2023 06:30  Phos  2.6     12-10  Mg     1.80     12-10                            10.4   10.13 )-----------( 389      ( 10 Dec 2023 06:30 )             32.7

## 2023-12-11 LAB
ANION GAP SERPL CALC-SCNC: 6 MMOL/L — LOW (ref 7–14)
ANION GAP SERPL CALC-SCNC: 6 MMOL/L — LOW (ref 7–14)
BUN SERPL-MCNC: 2 MG/DL — LOW (ref 7–23)
BUN SERPL-MCNC: 2 MG/DL — LOW (ref 7–23)
C DIFF BY PCR RESULT: SIGNIFICANT CHANGE UP
C DIFF BY PCR RESULT: SIGNIFICANT CHANGE UP
CALCIUM SERPL-MCNC: 8.2 MG/DL — LOW (ref 8.4–10.5)
CALCIUM SERPL-MCNC: 8.2 MG/DL — LOW (ref 8.4–10.5)
CHLORIDE SERPL-SCNC: 105 MMOL/L — SIGNIFICANT CHANGE UP (ref 98–107)
CHLORIDE SERPL-SCNC: 105 MMOL/L — SIGNIFICANT CHANGE UP (ref 98–107)
CO2 SERPL-SCNC: 28 MMOL/L — SIGNIFICANT CHANGE UP (ref 22–31)
CO2 SERPL-SCNC: 28 MMOL/L — SIGNIFICANT CHANGE UP (ref 22–31)
CREAT SERPL-MCNC: 0.74 MG/DL — SIGNIFICANT CHANGE UP (ref 0.5–1.3)
CREAT SERPL-MCNC: 0.74 MG/DL — SIGNIFICANT CHANGE UP (ref 0.5–1.3)
EGFR: 86 ML/MIN/1.73M2 — SIGNIFICANT CHANGE UP
EGFR: 86 ML/MIN/1.73M2 — SIGNIFICANT CHANGE UP
GLUCOSE BLDC GLUCOMTR-MCNC: 103 MG/DL — HIGH (ref 70–99)
GLUCOSE BLDC GLUCOMTR-MCNC: 103 MG/DL — HIGH (ref 70–99)
GLUCOSE BLDC GLUCOMTR-MCNC: 108 MG/DL — HIGH (ref 70–99)
GLUCOSE BLDC GLUCOMTR-MCNC: 108 MG/DL — HIGH (ref 70–99)
GLUCOSE SERPL-MCNC: 114 MG/DL — HIGH (ref 70–99)
GLUCOSE SERPL-MCNC: 114 MG/DL — HIGH (ref 70–99)
HCT VFR BLD CALC: 32.2 % — LOW (ref 39–50)
HCT VFR BLD CALC: 32.2 % — LOW (ref 39–50)
HGB BLD-MCNC: 10.5 G/DL — LOW (ref 13–17)
HGB BLD-MCNC: 10.5 G/DL — LOW (ref 13–17)
MAGNESIUM SERPL-MCNC: 1.9 MG/DL — SIGNIFICANT CHANGE UP (ref 1.6–2.6)
MAGNESIUM SERPL-MCNC: 1.9 MG/DL — SIGNIFICANT CHANGE UP (ref 1.6–2.6)
MCHC RBC-ENTMCNC: 27.6 PG — SIGNIFICANT CHANGE UP (ref 27–34)
MCHC RBC-ENTMCNC: 27.6 PG — SIGNIFICANT CHANGE UP (ref 27–34)
MCHC RBC-ENTMCNC: 32.6 GM/DL — SIGNIFICANT CHANGE UP (ref 32–36)
MCHC RBC-ENTMCNC: 32.6 GM/DL — SIGNIFICANT CHANGE UP (ref 32–36)
MCV RBC AUTO: 84.5 FL — SIGNIFICANT CHANGE UP (ref 80–100)
MCV RBC AUTO: 84.5 FL — SIGNIFICANT CHANGE UP (ref 80–100)
NRBC # BLD: 0 /100 WBCS — SIGNIFICANT CHANGE UP (ref 0–0)
NRBC # BLD: 0 /100 WBCS — SIGNIFICANT CHANGE UP (ref 0–0)
NRBC # FLD: 0 K/UL — SIGNIFICANT CHANGE UP (ref 0–0)
NRBC # FLD: 0 K/UL — SIGNIFICANT CHANGE UP (ref 0–0)
PHOSPHATE SERPL-MCNC: 3.2 MG/DL — SIGNIFICANT CHANGE UP (ref 2.5–4.5)
PHOSPHATE SERPL-MCNC: 3.2 MG/DL — SIGNIFICANT CHANGE UP (ref 2.5–4.5)
PLATELET # BLD AUTO: 448 K/UL — HIGH (ref 150–400)
PLATELET # BLD AUTO: 448 K/UL — HIGH (ref 150–400)
POTASSIUM SERPL-MCNC: 4.4 MMOL/L — SIGNIFICANT CHANGE UP (ref 3.5–5.3)
POTASSIUM SERPL-MCNC: 4.4 MMOL/L — SIGNIFICANT CHANGE UP (ref 3.5–5.3)
POTASSIUM SERPL-SCNC: 4.4 MMOL/L — SIGNIFICANT CHANGE UP (ref 3.5–5.3)
POTASSIUM SERPL-SCNC: 4.4 MMOL/L — SIGNIFICANT CHANGE UP (ref 3.5–5.3)
RBC # BLD: 3.81 M/UL — LOW (ref 4.2–5.8)
RBC # BLD: 3.81 M/UL — LOW (ref 4.2–5.8)
RBC # FLD: 14.5 % — SIGNIFICANT CHANGE UP (ref 10.3–14.5)
RBC # FLD: 14.5 % — SIGNIFICANT CHANGE UP (ref 10.3–14.5)
SODIUM SERPL-SCNC: 139 MMOL/L — SIGNIFICANT CHANGE UP (ref 135–145)
SODIUM SERPL-SCNC: 139 MMOL/L — SIGNIFICANT CHANGE UP (ref 135–145)
WBC # BLD: 8.94 K/UL — SIGNIFICANT CHANGE UP (ref 3.8–10.5)
WBC # BLD: 8.94 K/UL — SIGNIFICANT CHANGE UP (ref 3.8–10.5)
WBC # FLD AUTO: 8.94 K/UL — SIGNIFICANT CHANGE UP (ref 3.8–10.5)
WBC # FLD AUTO: 8.94 K/UL — SIGNIFICANT CHANGE UP (ref 3.8–10.5)

## 2023-12-11 RX ORDER — POTASSIUM PHOSPHATE, MONOBASIC POTASSIUM PHOSPHATE, DIBASIC 236; 224 MG/ML; MG/ML
15 INJECTION, SOLUTION INTRAVENOUS ONCE
Refills: 0 | Status: COMPLETED | OUTPATIENT
Start: 2023-12-11 | End: 2023-12-11

## 2023-12-11 RX ORDER — DEXTROSE MONOHYDRATE, SODIUM CHLORIDE, AND POTASSIUM CHLORIDE 50; .745; 4.5 G/1000ML; G/1000ML; G/1000ML
1000 INJECTION, SOLUTION INTRAVENOUS
Refills: 0 | Status: DISCONTINUED | OUTPATIENT
Start: 2023-12-11 | End: 2023-12-12

## 2023-12-11 RX ORDER — METOCLOPRAMIDE HCL 10 MG
5 TABLET ORAL EVERY 6 HOURS
Refills: 0 | Status: DISCONTINUED | OUTPATIENT
Start: 2023-12-11 | End: 2023-12-18

## 2023-12-11 RX ADMIN — DEXTROSE MONOHYDRATE, SODIUM CHLORIDE, AND POTASSIUM CHLORIDE 75 MILLILITER(S): 50; .745; 4.5 INJECTION, SOLUTION INTRAVENOUS at 00:53

## 2023-12-11 RX ADMIN — Medication 975 MILLIGRAM(S): at 02:26

## 2023-12-11 RX ADMIN — TAMSULOSIN HYDROCHLORIDE 0.4 MILLIGRAM(S): 0.4 CAPSULE ORAL at 22:23

## 2023-12-11 RX ADMIN — PANTOPRAZOLE SODIUM 40 MILLIGRAM(S): 20 TABLET, DELAYED RELEASE ORAL at 05:40

## 2023-12-11 RX ADMIN — DEXTROSE MONOHYDRATE, SODIUM CHLORIDE, AND POTASSIUM CHLORIDE 100 MILLILITER(S): 50; .745; 4.5 INJECTION, SOLUTION INTRAVENOUS at 10:43

## 2023-12-11 RX ADMIN — PANTOPRAZOLE SODIUM 40 MILLIGRAM(S): 20 TABLET, DELAYED RELEASE ORAL at 18:42

## 2023-12-11 RX ADMIN — SIMVASTATIN 40 MILLIGRAM(S): 20 TABLET, FILM COATED ORAL at 22:23

## 2023-12-11 RX ADMIN — HEPARIN SODIUM 5000 UNIT(S): 5000 INJECTION INTRAVENOUS; SUBCUTANEOUS at 13:04

## 2023-12-11 RX ADMIN — HEPARIN SODIUM 5000 UNIT(S): 5000 INJECTION INTRAVENOUS; SUBCUTANEOUS at 05:39

## 2023-12-11 RX ADMIN — HEPARIN SODIUM 5000 UNIT(S): 5000 INJECTION INTRAVENOUS; SUBCUTANEOUS at 22:23

## 2023-12-11 RX ADMIN — Medication 5 MILLIGRAM(S): at 18:46

## 2023-12-11 RX ADMIN — POTASSIUM PHOSPHATE, MONOBASIC POTASSIUM PHOSPHATE, DIBASIC 62.5 MILLIMOLE(S): 236; 224 INJECTION, SOLUTION INTRAVENOUS at 10:43

## 2023-12-11 RX ADMIN — Medication 975 MILLIGRAM(S): at 01:56

## 2023-12-11 NOTE — PROGRESS NOTE ADULT - SUBJECTIVE AND OBJECTIVE BOX
A TEAM Surgery Progress Note  Patient is a 89y old  Male who presents with a chief complaint of c/f colon cancer (10 Dec 2023 09:32)    INTERVAL EVENTS: Small amount of emesis following jello and ginger ale.    SUBJECTIVE: Patient seen and examined at bedside with surgical team, patient without complaints. Denies fever, chills, CP, SOB nausea, vomiting, abdominal pain.  Flatus /  BM    OBJECTIVE:    Vital Signs Last 24 Hrs  T(C): 36.4 (11 Dec 2023 02:11), Max: 37 (10 Dec 2023 22:00)  T(F): 97.5 (11 Dec 2023 02:11), Max: 98.6 (10 Dec 2023 22:00)  HR: 88 (11 Dec 2023 02:11) (88 - 97)  BP: 142/73 (11 Dec 2023 02:11) (140/65 - 162/77)  BP(mean): --  RR: 16 (11 Dec 2023 02:11) (16 - 18)  SpO2: 95% (11 Dec 2023 02:11) (95% - 98%)    Parameters below as of 11 Dec 2023 02:11  Patient On (Oxygen Delivery Method): room air    I&O's Detail    09 Dec 2023 07:01  -  10 Dec 2023 07:00  --------------------------------------------------------  IN:    dextrose 5% + sodium chloride 0.45% w/ Additives: 1200 mL    Oral Fluid: 60 mL  Total IN: 1260 mL    OUT:    IV PiggyBack: 0 mL    Voided (mL): 1270 mL  Total OUT: 1270 mL    Total NET: -10 mL      10 Dec 2023 07:01  -  11 Dec 2023 06:02  --------------------------------------------------------  IN:    dextrose 5% + sodium chloride 0.45% w/ Additives: 600 mL    Oral Fluid: 240 mL  Total IN: 840 mL    OUT:    Blood Loss (mL): 0 mL    Incontinent per Condom Catheter (mL): 800 mL    IV PiggyBack: 0 mL    Voided (mL): 900 mL  Total OUT: 1700 mL    Total NET: -860 mL      MEDICATIONS  (STANDING):  dextrose 5% + sodium chloride 0.45% with potassium chloride 20 mEq/L 1000 milliLiter(s) (75 mL/Hr) IV Continuous <Continuous>  heparin   Injectable 5000 Unit(s) SubCutaneous every 8 hours  influenza  Vaccine (HIGH DOSE) 0.7 milliLiter(s) IntraMuscular once  pantoprazole  Injectable 40 milliGRAM(s) IV Push two times a day  simvastatin 40 milliGRAM(s) Oral at bedtime  tamsulosin 0.4 milliGRAM(s) Oral at bedtime    MEDICATIONS  (PRN):  acetaminophen     Tablet .. 975 milliGRAM(s) Oral every 6 hours PRN Mild Pain (1 - 3)      PHYSICAL EXAM:  Constitutional: A&Ox3, NAD  Respiratory: Unlabored breathing on RA  Abdomen: Soft, mildly distended, tympanic to percussion. non tender to palpation. Incision C/D/I.  Extremities: WWP, BARBOSA spontaneously    LABS:                        10.4   10.13 )-----------( 389      ( 10 Dec 2023 06:30 )             32.7     12-10    137  |  103  |  3<L>  ----------------------------<  122<H>  4.3   |  27  |  0.69    Ca    8.1<L>      10 Dec 2023 11:19  Phos  2.4     12-10  Mg     2.20     12-10          Urinalysis Basic - ( 10 Dec 2023 11:19 )    Color: x / Appearance: x / SG: x / pH: x  Gluc: 122 mg/dL / Ketone: x  / Bili: x / Urobili: x   Blood: x / Protein: x / Nitrite: x   Leuk Esterase: x / RBC: x / WBC x   Sq Epi: x / Non Sq Epi: x / Bacteria: x           A TEAM Surgery Progress Note  Patient is a 89y old  Male who presents with a chief complaint of c/f colon cancer (10 Dec 2023 09:32)    INTERVAL EVENTS: Small amount of emesis following jello and ginger ale.    SUBJECTIVE: Patient seen and examined at bedside with surgical team. Patient complains of nausea overnight with one episode of emesis. Denies fever, chills, CP, SOB, abdominal pain.  - Flatus / + BM overnight, last flatus yesterday.     OBJECTIVE:    Vital Signs Last 24 Hrs  T(C): 36.4 (11 Dec 2023 02:11), Max: 37 (10 Dec 2023 22:00)  T(F): 97.5 (11 Dec 2023 02:11), Max: 98.6 (10 Dec 2023 22:00)  HR: 88 (11 Dec 2023 02:11) (88 - 97)  BP: 142/73 (11 Dec 2023 02:11) (140/65 - 162/77)  BP(mean): --  RR: 16 (11 Dec 2023 02:11) (16 - 18)  SpO2: 95% (11 Dec 2023 02:11) (95% - 98%)    Parameters below as of 11 Dec 2023 02:11  Patient On (Oxygen Delivery Method): room air    I&O's Detail    09 Dec 2023 07:01  -  10 Dec 2023 07:00  --------------------------------------------------------  IN:    dextrose 5% + sodium chloride 0.45% w/ Additives: 1200 mL    Oral Fluid: 60 mL  Total IN: 1260 mL    OUT:    IV PiggyBack: 0 mL    Voided (mL): 1270 mL  Total OUT: 1270 mL    Total NET: -10 mL      10 Dec 2023 07:01  -  11 Dec 2023 06:02  --------------------------------------------------------  IN:    dextrose 5% + sodium chloride 0.45% w/ Additives: 600 mL    Oral Fluid: 240 mL  Total IN: 840 mL    OUT:    Blood Loss (mL): 0 mL    Incontinent per Condom Catheter (mL): 800 mL    IV PiggyBack: 0 mL    Voided (mL): 900 mL  Total OUT: 1700 mL    Total NET: -860 mL      MEDICATIONS  (STANDING):  dextrose 5% + sodium chloride 0.45% with potassium chloride 20 mEq/L 1000 milliLiter(s) (75 mL/Hr) IV Continuous <Continuous>  heparin   Injectable 5000 Unit(s) SubCutaneous every 8 hours  influenza  Vaccine (HIGH DOSE) 0.7 milliLiter(s) IntraMuscular once  pantoprazole  Injectable 40 milliGRAM(s) IV Push two times a day  simvastatin 40 milliGRAM(s) Oral at bedtime  tamsulosin 0.4 milliGRAM(s) Oral at bedtime    MEDICATIONS  (PRN):  acetaminophen     Tablet .. 975 milliGRAM(s) Oral every 6 hours PRN Mild Pain (1 - 3)      PHYSICAL EXAM:  Constitutional: A&Ox3, NAD  Respiratory: Unlabored breathing on RA  Abdomen: Soft, moderately distended, tympanic to percussion. non tender to palpation. Incision C/D/I.  Extremities: WWP, BARBOSA spontaneously    LABS:                        10.4   10.13 )-----------( 389      ( 10 Dec 2023 06:30 )             32.7     12-10    137  |  103  |  3<L>  ----------------------------<  122<H>  4.3   |  27  |  0.69    Ca    8.1<L>      10 Dec 2023 11:19  Phos  2.4     12-10  Mg     2.20     12-10          Urinalysis Basic - ( 10 Dec 2023 11:19 )    Color: x / Appearance: x / SG: x / pH: x  Gluc: 122 mg/dL / Ketone: x  / Bili: x / Urobili: x   Blood: x / Protein: x / Nitrite: x   Leuk Esterase: x / RBC: x / WBC x   Sq Epi: x / Non Sq Epi: x / Bacteria: x           A TEAM Surgery Progress Note  Patient is a 89y old  Male who presents with a chief complaint of c/f colon cancer (10 Dec 2023 09:32)    INTERVAL EVENTS: Small amount of emesis following jello and ginger ale.    SUBJECTIVE: Patient seen and examined at bedside with surgical team. Patient complains of nausea overnight with one episode of emesis. Denies fever, chills, CP, SOB, abdominal pain.  - Flatus / + BM overnight, last flatus yesterday.     OBJECTIVE:     Vital Signs Last 24 Hrs  T(C): 36.4 (11 Dec 2023 02:11), Max: 37 (10 Dec 2023 22:00)  T(F): 97.5 (11 Dec 2023 02:11), Max: 98.6 (10 Dec 2023 22:00)  HR: 88 (11 Dec 2023 02:11) (88 - 97)  BP: 142/73 (11 Dec 2023 02:11) (140/65 - 162/77)  BP(mean): --  RR: 16 (11 Dec 2023 02:11) (16 - 18)  SpO2: 95% (11 Dec 2023 02:11) (95% - 98%)    Parameters below as of 11 Dec 2023 02:11  Patient On (Oxygen Delivery Method): room air    I&O's Detail    09 Dec 2023 07:01  -  10 Dec 2023 07:00  --------------------------------------------------------  IN:    dextrose 5% + sodium chloride 0.45% w/ Additives: 1200 mL    Oral Fluid: 60 mL  Total IN: 1260 mL    OUT:    IV PiggyBack: 0 mL    Voided (mL): 1270 mL  Total OUT: 1270 mL    Total NET: -10 mL      10 Dec 2023 07:01  -  11 Dec 2023 06:02  --------------------------------------------------------  IN:    dextrose 5% + sodium chloride 0.45% w/ Additives: 600 mL    Oral Fluid: 240 mL  Total IN: 840 mL    OUT:    Blood Loss (mL): 0 mL    Incontinent per Condom Catheter (mL): 800 mL    IV PiggyBack: 0 mL    Voided (mL): 900 mL  Total OUT: 1700 mL    Total NET: -860 mL      MEDICATIONS  (STANDING):  dextrose 5% + sodium chloride 0.45% with potassium chloride 20 mEq/L 1000 milliLiter(s) (75 mL/Hr) IV Continuous <Continuous>  heparin   Injectable 5000 Unit(s) SubCutaneous every 8 hours  influenza  Vaccine (HIGH DOSE) 0.7 milliLiter(s) IntraMuscular once  pantoprazole  Injectable 40 milliGRAM(s) IV Push two times a day  simvastatin 40 milliGRAM(s) Oral at bedtime  tamsulosin 0.4 milliGRAM(s) Oral at bedtime    MEDICATIONS  (PRN):  acetaminophen     Tablet .. 975 milliGRAM(s) Oral every 6 hours PRN Mild Pain (1 - 3)      PHYSICAL EXAM:  Constitutional: A&Ox3, NAD  Respiratory: Unlabored breathing on RA  Abdomen: Soft, moderately distended, tympanic to percussion. non tender to palpation. Incision C/D/I.  Extremities: WWP, BARBOSA spontaneously    LABS:                        10.4   10.13 )-----------( 389      ( 10 Dec 2023 06:30 )             32.7     12-10    137  |  103  |  3<L>  ----------------------------<  122<H>  4.3   |  27  |  0.69    Ca    8.1<L>      10 Dec 2023 11:19  Phos  2.4     12-10  Mg     2.20     12-10          Urinalysis Basic - ( 10 Dec 2023 11:19 )    Color: x / Appearance: x / SG: x / pH: x  Gluc: 122 mg/dL / Ketone: x  / Bili: x / Urobili: x   Blood: x / Protein: x / Nitrite: x   Leuk Esterase: x / RBC: x / WBC x   Sq Epi: x / Non Sq Epi: x / Bacteria: x

## 2023-12-11 NOTE — PROGRESS NOTE ADULT - ASSESSMENT
89 M w/ PMHx GERD, BPH, HLD, open R IHR, LIH (non repaired), presents to ED with suprapubic pain. CT w/ distal ascending and proximal transverse thickening, concern for malignancy. CT chest without evidence of metastasis. CEA 81.9, CA 19-9 225. Colonoscopy done 12/4. Patient currently s/p Laparoscopic extended right hemicolectomy.     89 M w/ PMHx GERD, BPH, HLD, open R IHR, LIH (non repaired), presents to ED with suprapubic pain. CT w/ distal ascending and proximal transverse thickening, concern for malignancy. CT chest without evidence of metastasis. CEA 81.9, CA 19-9 225. Colonoscopy done 12/4. Patient currently s/p Laparoscopic extended right hemicolectomy.    Plan:  - NPO, IVF  - Possibly start TPN due to poor PO intake  - F/U C-diff PCR results, contact precautions  - Replete electrolytes as needed  - Pain meds IV  - Gill dc'd yesterday, pt voiding appropiately  - PT consulted - rec restorative rehab  - PM+R consult - rec inpatient vs acute rehab  - OOB, will f/u with PT    A Team Surgery  [id number]. 89 M w/ PMHx GERD, BPH, HLD, open R IHR, LIH (non repaired), presents to ED with suprapubic pain. CT w/ distal ascending and proximal transverse thickening, concern for malignancy. CT chest without evidence of metastasis. CEA 81.9, CA 19-9 225. Colonoscopy done 12/4. Patient currently s/p Laparoscopic extended right hemicolectomy.    Plan:  - NPO, IVF  - Possibly start TPN due to poor PO intake  - F/U C-diff PCR results, contact precautions  - Replete electrolytes as needed  - Pain meds IV  - Gill dc'd yesterday, pt voiding appropiately  - PT consulted - rec restorative rehab  - PM+R consult - rec inpatient vs acute rehab  - OOB, will f/u with PT    A Team Surgery  [id number]. 89 M w/ PMHx GERD, BPH, HLD, open R IHR, LIH (non repaired), presents to ED with suprapubic pain. CT w/ distal ascending and proximal transverse thickening, concern for malignancy. CT chest without evidence of metastasis. CEA 81.9, CA 19-9 225. Colonoscopy done 12/4. Patient currently s/p Laparoscopic extended right hemicolectomy.    Plan:  - NPO, IVF  - F/U C-diff PCR results, contact precautions  - Replete electrolytes as needed  - Pain meds IV  - Gill dc'd yesterday, pt voiding appropiately  - PT consulted - rec restorative rehab  - PM+R consult - rec inpatient vs acute rehab  - OOB, will f/u with PT    A Team Surgery  [id number]. 89 M w/ PMHx GERD, BPH, HLD, open R IHR, LIH (non repaired), presents to ED with suprapubic pain. CT w/ distal ascending and proximal transverse thickening, concern for malignancy. CT chest without evidence of metastasis. CEA 81.9, CA 19-9 225. Colonoscopy done 12/4. Patient currently s/p Laparoscopic extended right hemicolectomy.    Plan:  - NPO, IVF  - F/U C-diff PCR results, contact precautions  - Replete electrolytes as needed  - Pain meds IV  - Gill dc'd yesterday, pt voiding appropiately  - PT consulted - rec restorative rehab  - PM+R consult - rec inpatient vs acute rehab  - OOB, will f/u with PT    A Team Surgery  [id number].

## 2023-12-12 LAB
ANION GAP SERPL CALC-SCNC: 10 MMOL/L — SIGNIFICANT CHANGE UP (ref 7–14)
ANION GAP SERPL CALC-SCNC: 10 MMOL/L — SIGNIFICANT CHANGE UP (ref 7–14)
BUN SERPL-MCNC: 3 MG/DL — LOW (ref 7–23)
BUN SERPL-MCNC: 3 MG/DL — LOW (ref 7–23)
CALCIUM SERPL-MCNC: 8.2 MG/DL — LOW (ref 8.4–10.5)
CALCIUM SERPL-MCNC: 8.2 MG/DL — LOW (ref 8.4–10.5)
CHLORIDE SERPL-SCNC: 100 MMOL/L — SIGNIFICANT CHANGE UP (ref 98–107)
CHLORIDE SERPL-SCNC: 100 MMOL/L — SIGNIFICANT CHANGE UP (ref 98–107)
CO2 SERPL-SCNC: 24 MMOL/L — SIGNIFICANT CHANGE UP (ref 22–31)
CO2 SERPL-SCNC: 24 MMOL/L — SIGNIFICANT CHANGE UP (ref 22–31)
CREAT SERPL-MCNC: 0.78 MG/DL — SIGNIFICANT CHANGE UP (ref 0.5–1.3)
CREAT SERPL-MCNC: 0.78 MG/DL — SIGNIFICANT CHANGE UP (ref 0.5–1.3)
EGFR: 85 ML/MIN/1.73M2 — SIGNIFICANT CHANGE UP
EGFR: 85 ML/MIN/1.73M2 — SIGNIFICANT CHANGE UP
GLUCOSE SERPL-MCNC: 100 MG/DL — HIGH (ref 70–99)
GLUCOSE SERPL-MCNC: 100 MG/DL — HIGH (ref 70–99)
HCT VFR BLD CALC: 33 % — LOW (ref 39–50)
HCT VFR BLD CALC: 33 % — LOW (ref 39–50)
HGB BLD-MCNC: 10.5 G/DL — LOW (ref 13–17)
HGB BLD-MCNC: 10.5 G/DL — LOW (ref 13–17)
MAGNESIUM SERPL-MCNC: 1.7 MG/DL — SIGNIFICANT CHANGE UP (ref 1.6–2.6)
MAGNESIUM SERPL-MCNC: 1.7 MG/DL — SIGNIFICANT CHANGE UP (ref 1.6–2.6)
MCHC RBC-ENTMCNC: 26.6 PG — LOW (ref 27–34)
MCHC RBC-ENTMCNC: 26.6 PG — LOW (ref 27–34)
MCHC RBC-ENTMCNC: 31.8 GM/DL — LOW (ref 32–36)
MCHC RBC-ENTMCNC: 31.8 GM/DL — LOW (ref 32–36)
MCV RBC AUTO: 83.8 FL — SIGNIFICANT CHANGE UP (ref 80–100)
MCV RBC AUTO: 83.8 FL — SIGNIFICANT CHANGE UP (ref 80–100)
NRBC # BLD: 0 /100 WBCS — SIGNIFICANT CHANGE UP (ref 0–0)
NRBC # BLD: 0 /100 WBCS — SIGNIFICANT CHANGE UP (ref 0–0)
NRBC # FLD: 0 K/UL — SIGNIFICANT CHANGE UP (ref 0–0)
NRBC # FLD: 0 K/UL — SIGNIFICANT CHANGE UP (ref 0–0)
PHOSPHATE SERPL-MCNC: 2.6 MG/DL — SIGNIFICANT CHANGE UP (ref 2.5–4.5)
PHOSPHATE SERPL-MCNC: 2.6 MG/DL — SIGNIFICANT CHANGE UP (ref 2.5–4.5)
PLATELET # BLD AUTO: 458 K/UL — HIGH (ref 150–400)
PLATELET # BLD AUTO: 458 K/UL — HIGH (ref 150–400)
POTASSIUM SERPL-MCNC: 4.2 MMOL/L — SIGNIFICANT CHANGE UP (ref 3.5–5.3)
POTASSIUM SERPL-MCNC: 4.2 MMOL/L — SIGNIFICANT CHANGE UP (ref 3.5–5.3)
POTASSIUM SERPL-SCNC: 4.2 MMOL/L — SIGNIFICANT CHANGE UP (ref 3.5–5.3)
POTASSIUM SERPL-SCNC: 4.2 MMOL/L — SIGNIFICANT CHANGE UP (ref 3.5–5.3)
RBC # BLD: 3.94 M/UL — LOW (ref 4.2–5.8)
RBC # BLD: 3.94 M/UL — LOW (ref 4.2–5.8)
RBC # FLD: 14.5 % — SIGNIFICANT CHANGE UP (ref 10.3–14.5)
RBC # FLD: 14.5 % — SIGNIFICANT CHANGE UP (ref 10.3–14.5)
SODIUM SERPL-SCNC: 134 MMOL/L — LOW (ref 135–145)
SODIUM SERPL-SCNC: 134 MMOL/L — LOW (ref 135–145)
SURGICAL PATHOLOGY STUDY: SIGNIFICANT CHANGE UP
SURGICAL PATHOLOGY STUDY: SIGNIFICANT CHANGE UP
WBC # BLD: 7.79 K/UL — SIGNIFICANT CHANGE UP (ref 3.8–10.5)
WBC # BLD: 7.79 K/UL — SIGNIFICANT CHANGE UP (ref 3.8–10.5)
WBC # FLD AUTO: 7.79 K/UL — SIGNIFICANT CHANGE UP (ref 3.8–10.5)
WBC # FLD AUTO: 7.79 K/UL — SIGNIFICANT CHANGE UP (ref 3.8–10.5)

## 2023-12-12 PROCEDURE — 93010 ELECTROCARDIOGRAM REPORT: CPT

## 2023-12-12 PROCEDURE — 74018 RADEX ABDOMEN 1 VIEW: CPT | Mod: 26

## 2023-12-12 RX ORDER — DEXTROSE MONOHYDRATE, SODIUM CHLORIDE, AND POTASSIUM CHLORIDE 50; .745; 4.5 G/1000ML; G/1000ML; G/1000ML
1000 INJECTION, SOLUTION INTRAVENOUS
Refills: 0 | Status: DISCONTINUED | OUTPATIENT
Start: 2023-12-12 | End: 2023-12-13

## 2023-12-12 RX ORDER — POTASSIUM PHOSPHATE, MONOBASIC POTASSIUM PHOSPHATE, DIBASIC 236; 224 MG/ML; MG/ML
15 INJECTION, SOLUTION INTRAVENOUS ONCE
Refills: 0 | Status: COMPLETED | OUTPATIENT
Start: 2023-12-12 | End: 2023-12-12

## 2023-12-12 RX ORDER — ONDANSETRON 8 MG/1
4 TABLET, FILM COATED ORAL ONCE
Refills: 0 | Status: COMPLETED | OUTPATIENT
Start: 2023-12-12 | End: 2023-12-12

## 2023-12-12 RX ORDER — ONDANSETRON 8 MG/1
4 TABLET, FILM COATED ORAL ONCE
Refills: 0 | Status: DISCONTINUED | OUTPATIENT
Start: 2023-12-12 | End: 2023-12-12

## 2023-12-12 RX ORDER — MAGNESIUM SULFATE 500 MG/ML
2 VIAL (ML) INJECTION ONCE
Refills: 0 | Status: COMPLETED | OUTPATIENT
Start: 2023-12-12 | End: 2023-12-12

## 2023-12-12 RX ORDER — DEXTROSE MONOHYDRATE, SODIUM CHLORIDE, AND POTASSIUM CHLORIDE 50; .745; 4.5 G/1000ML; G/1000ML; G/1000ML
1000 INJECTION, SOLUTION INTRAVENOUS
Refills: 0 | Status: DISCONTINUED | OUTPATIENT
Start: 2023-12-12 | End: 2023-12-12

## 2023-12-12 RX ADMIN — ONDANSETRON 4 MILLIGRAM(S): 8 TABLET, FILM COATED ORAL at 16:05

## 2023-12-12 RX ADMIN — Medication 5 MILLIGRAM(S): at 05:51

## 2023-12-12 RX ADMIN — HEPARIN SODIUM 5000 UNIT(S): 5000 INJECTION INTRAVENOUS; SUBCUTANEOUS at 14:11

## 2023-12-12 RX ADMIN — PANTOPRAZOLE SODIUM 40 MILLIGRAM(S): 20 TABLET, DELAYED RELEASE ORAL at 17:48

## 2023-12-12 RX ADMIN — HEPARIN SODIUM 5000 UNIT(S): 5000 INJECTION INTRAVENOUS; SUBCUTANEOUS at 21:11

## 2023-12-12 RX ADMIN — TAMSULOSIN HYDROCHLORIDE 0.4 MILLIGRAM(S): 0.4 CAPSULE ORAL at 21:11

## 2023-12-12 RX ADMIN — DEXTROSE MONOHYDRATE, SODIUM CHLORIDE, AND POTASSIUM CHLORIDE 50 MILLILITER(S): 50; .745; 4.5 INJECTION, SOLUTION INTRAVENOUS at 08:53

## 2023-12-12 RX ADMIN — POTASSIUM PHOSPHATE, MONOBASIC POTASSIUM PHOSPHATE, DIBASIC 62.5 MILLIMOLE(S): 236; 224 INJECTION, SOLUTION INTRAVENOUS at 11:34

## 2023-12-12 RX ADMIN — Medication 5 MILLIGRAM(S): at 11:34

## 2023-12-12 RX ADMIN — Medication 5 MILLIGRAM(S): at 17:49

## 2023-12-12 RX ADMIN — Medication 25 GRAM(S): at 08:53

## 2023-12-12 RX ADMIN — HEPARIN SODIUM 5000 UNIT(S): 5000 INJECTION INTRAVENOUS; SUBCUTANEOUS at 05:51

## 2023-12-12 RX ADMIN — DEXTROSE MONOHYDRATE, SODIUM CHLORIDE, AND POTASSIUM CHLORIDE 100 MILLILITER(S): 50; .745; 4.5 INJECTION, SOLUTION INTRAVENOUS at 17:48

## 2023-12-12 RX ADMIN — Medication 5 MILLIGRAM(S): at 23:18

## 2023-12-12 RX ADMIN — Medication 5 MILLIGRAM(S): at 00:11

## 2023-12-12 RX ADMIN — PANTOPRAZOLE SODIUM 40 MILLIGRAM(S): 20 TABLET, DELAYED RELEASE ORAL at 05:51

## 2023-12-12 RX ADMIN — Medication 975 MILLIGRAM(S): at 16:30

## 2023-12-12 RX ADMIN — SIMVASTATIN 40 MILLIGRAM(S): 20 TABLET, FILM COATED ORAL at 21:11

## 2023-12-12 RX ADMIN — Medication 975 MILLIGRAM(S): at 15:46

## 2023-12-12 NOTE — PROGRESS NOTE ADULT - SUBJECTIVE AND OBJECTIVE BOX
A TEAM Surgery Progress Note  Patient is a 89y old  Male who presents with a chief complaint of c/f colon cancer (11 Dec 2023 06:01)    INTERVAL EVENTS: No acute events overnight.    SUBJECTIVE: Patient seen and examined at bedside with surgical team, patient complains of mild nausea since last night, without emesis. Denies fever, chills, CP, SOB, abdominal pain. + flatus / + BM    OBJECTIVE:    Vital Signs Last 24 Hrs  T(C): 36.7 (12 Dec 2023 06:07), Max: 37.1 (11 Dec 2023 14:00)  T(F): 98 (12 Dec 2023 06:07), Max: 98.7 (11 Dec 2023 14:00)  HR: 80 (12 Dec 2023 06:07) (71 - 87)  BP: 150/74 (12 Dec 2023 06:07) (131/74 - 150/74)  BP(mean): --  RR: 18 (12 Dec 2023 06:07) (18 - 18)  SpO2: 98% (12 Dec 2023 06:07) (93% - 98%)    Parameters below as of 12 Dec 2023 06:07  Patient On (Oxygen Delivery Method): room air    I&O's Detail    11 Dec 2023 07:01  -  12 Dec 2023 07:00  --------------------------------------------------------  IN:    dextrose 5% + sodium chloride 0.45% w/ Additives: 1200 mL  Total IN: 1200 mL    OUT:    Incontinent per Condom Catheter (mL): 2700 mL    IV PiggyBack: 0 mL    Oral Fluid: 0 mL    Voided (mL): 700 mL  Total OUT: 3400 mL    Total NET: -2200 mL      MEDICATIONS  (STANDING):  dextrose 5% + sodium chloride 0.45% with potassium chloride 20 mEq/L 1000 milliLiter(s) (100 mL/Hr) IV Continuous <Continuous>  heparin   Injectable 5000 Unit(s) SubCutaneous every 8 hours  influenza  Vaccine (HIGH DOSE) 0.7 milliLiter(s) IntraMuscular once  magnesium sulfate  IVPB 2 Gram(s) IV Intermittent once  metoclopramide Injectable 5 milliGRAM(s) IV Push every 6 hours  pantoprazole  Injectable 40 milliGRAM(s) IV Push two times a day  potassium phosphate IVPB 15 milliMole(s) IV Intermittent once  simvastatin 40 milliGRAM(s) Oral at bedtime  tamsulosin 0.4 milliGRAM(s) Oral at bedtime    MEDICATIONS  (PRN):  acetaminophen     Tablet .. 975 milliGRAM(s) Oral every 6 hours PRN Mild Pain (1 - 3)      PHYSICAL EXAM:  Constitutional: A&Ox3, NAD  Respiratory: Unlabored breathing on RA  Abdomen: Soft, mildy distended, NTTP. No rebound or guarding. Incision C/D/I  Extremities: WWP, BARBOSA spontaneously    LABS:                        10.5   7.79  )-----------( 458      ( 12 Dec 2023 06:15 )             33.0     12-12    134<L>  |  100  |  3<L>  ----------------------------<  100<H>  4.2   |  24  |  0.78    Ca    8.2<L>      12 Dec 2023 06:15  Phos  2.6     12-12  Mg     1.70     12-12          Urinalysis Basic - ( 12 Dec 2023 06:15 )    Color: x / Appearance: x / SG: x / pH: x  Gluc: 100 mg/dL / Ketone: x  / Bili: x / Urobili: x   Blood: x / Protein: x / Nitrite: x   Leuk Esterase: x / RBC: x / WBC x   Sq Epi: x / Non Sq Epi: x / Bacteria: x

## 2023-12-12 NOTE — CHART NOTE - NSCHARTNOTEFT_GEN_A_CORE
Source: Patient [ X]   Family [ ]     other [X ] electronic chart, RN, A team    Diet : Diet, Regular (12-12-23 @ 08:39)    Nutrition follow-up note, severe malnutrition. 89 M w/ PMHx GERD, BPH, HLD, open R IHR, LIH (non repaired), presents to ED with suprapubic pain. CT w/ distal ascending and proximal transverse thickening, concern for malignancy. CT chest without evidence of metastasis. CEA 81.9, CA 19-9 225. Colonoscopy done 12/4. Patient currently s/p Laparoscopic extended right hemicolectomy.    Patient diet advanced to regular diet today. Per IDR today, plan for possible consideration of TPN if patient unable to tolerate po intake. Patient w/ poor po intake of 26-50% at this time. No nausea/vomiting/diarrhea/constipation or difficulty chewing and swallowing reported. Continue small frequent po intake as tolerated encouraged. Recommend addition of PO supplement Ensure BID for added calories and protein.     Weight (kg): 50.5 (12-05 @ 18:09)  50.5 (12/1)  50.5 (11/30)  Height (cm): 175.3  BMI= 16.4kg/mw2  % Weight Change- no weight change noted.     Pertinent Medications: MEDICATIONS  (STANDING):  dextrose 5% + sodium chloride 0.45% with potassium chloride 20 mEq/L 1000 milliLiter(s) (50 mL/Hr) IV Continuous <Continuous>  heparin   Injectable 5000 Unit(s) SubCutaneous every 8 hours  influenza  Vaccine (HIGH DOSE) 0.7 milliLiter(s) IntraMuscular once  metoclopramide Injectable 5 milliGRAM(s) IV Push every 6 hours  pantoprazole  Injectable 40 milliGRAM(s) IV Push two times a day  simvastatin 40 milliGRAM(s) Oral at bedtime  tamsulosin 0.4 milliGRAM(s) Oral at bedtime    MEDICATIONS  (PRN):  acetaminophen     Tablet .. 975 milliGRAM(s) Oral every 6 hours PRN Mild Pain (1 - 3)    Pertinent Labs:  12-12 Na134 mmol/L<L> Glu 100 mg/dL<H> K+ 4.2 mmol/L Cr  0.78 mg/dL BUN 3 mg/dL<L> 12-12 Phos 2.6 mg/dL 12-06 Alb 2.8 g/dL<L>      Skin: No pressure ulcers/DTI noted in flowsheets.   No edema per nursing flowsheets.    Estimated Needs:   [X ] no change since previous assessment  [ ] recalculated:     Previous Nutrition Diagnosis: Severe    Nutrition Diagnosis is [ x] ongoing  [ ] resolved [ ] not applicable     New Nutrition Diagnosis: inadequate energy-protein intake related to physiological causes, As evidenced by suboptimal po intake, NPO/liquid diet during the course of admission.    Recommend:  1. Continue current diet order, which remains appropriate at this time.   2. Recommend add Ensure plus BID for added calories and protein.   3. Honor food and beverage preferences within diet restriction of patient in an effort to maximize level of nutrient intake.   4. Please Encourage po intake, assist with meals and menu selections, provide alternatives PRN.   5. Please document % PO intake in nursing flowsheet.       Monitoring and Evaluation:     [X ] PO intake [X ] Tolerance to diet prescription [X ] weights [X ] follow up per protocol    Jt Sharma, MS, CDN, RDN     pager [phone] or TEAMS Source: Patient [ X]   Family [ ]     other [X ] electronic chart, RN, A team    Diet : Diet, Regular (12-12-23 @ 08:39)    Nutrition follow-up note, severe malnutrition. 89 M w/ PMHx GERD, BPH, HLD, open R IHR, LIH (non repaired), presents to ED with suprapubic pain. CT w/ distal ascending and proximal transverse thickening, concern for malignancy. CT chest without evidence of metastasis. CEA 81.9, CA 19-9 225. Colonoscopy done 12/4. Patient currently s/p Laparoscopic extended right hemicolectomy.    Patient diet advanced to regular diet today. Per IDR today, plan for possible consideration of TPN if patient unable to tolerate po intake. Patient w/ poor po intake of 26-50% at this time. No nausea/vomiting/diarrhea/constipation or difficulty chewing and swallowing reported. Continue small frequent po intake as tolerated encouraged. Recommend addition of PO supplement Ensure BID for added calories and protein.     Weight (kg): 50.5 (12-05 @ 18:09)  50.5 (12/1)  50.5 (11/30)  Height (cm): 175.3  BMI= 16.4kg/mw2  % Weight Change- no weight change noted.     Pertinent Medications: MEDICATIONS  (STANDING):  dextrose 5% + sodium chloride 0.45% with potassium chloride 20 mEq/L 1000 milliLiter(s) (50 mL/Hr) IV Continuous <Continuous>  heparin   Injectable 5000 Unit(s) SubCutaneous every 8 hours  influenza  Vaccine (HIGH DOSE) 0.7 milliLiter(s) IntraMuscular once  metoclopramide Injectable 5 milliGRAM(s) IV Push every 6 hours  pantoprazole  Injectable 40 milliGRAM(s) IV Push two times a day  simvastatin 40 milliGRAM(s) Oral at bedtime  tamsulosin 0.4 milliGRAM(s) Oral at bedtime    MEDICATIONS  (PRN):  acetaminophen     Tablet .. 975 milliGRAM(s) Oral every 6 hours PRN Mild Pain (1 - 3)    Pertinent Labs:  12-12 Na134 mmol/L<L> Glu 100 mg/dL<H> K+ 4.2 mmol/L Cr  0.78 mg/dL BUN 3 mg/dL<L> 12-12 Phos 2.6 mg/dL 12-06 Alb 2.8 g/dL<L>      Skin: No pressure ulcers/DTI noted in flowsheets.   No edema per nursing flowsheets.    Estimated Needs:   [X ] no change since previous assessment  [ ] recalculated:     Previous Nutrition Diagnosis: Severe    Nutrition Diagnosis is [ x] ongoing  [ ] resolved [ ] not applicable     New Nutrition Diagnosis: inadequate energy-protein intake related to physiological causes, As evidenced by suboptimal po intake, NPO/liquid diet during the course of admission.    Recommend:  1. Continue current diet order, which remains appropriate at this time.   2. Recommend add Ensure plus BID for added calories and protein.   3. Honor food and beverage preferences within diet restriction of patient in an effort to maximize level of nutrient intake.   4. Please Encourage po intake, assist with meals and menu selections, provide alternatives PRN.   5. Please document % PO intake in nursing flowsheet.       Monitoring and Evaluation:     [X ] PO intake [X ] Tolerance to diet prescription [X ] weights [X ] follow up per protocol    Jt Sharma, MS, CDN, RDN     pager [phone] or TEAMS

## 2023-12-12 NOTE — PROGRESS NOTE ADULT - ASSESSMENT
89 M w/ PMHx GERD, BPH, HLD, open R IHR, LIH (non repaired), presents to ED with suprapubic pain. CT w/ distal ascending and proximal transverse thickening, concern for malignancy. CT chest without evidence of metastasis. CEA 81.9, CA 19-9 225. Colonoscopy done 12/4. Patient currently s/p Laparoscopic extended right hemicolectomy.    Plan:  - NPO - adv diet?  - D5 1/2 NS w 20 KCl @100  - c-diff negative - taken off contact precautions  - Replete electrolytes as needed  - Pain control - Tylenol  - Pt voiding appropriately - encouraged to utilize bedside urinal  - PT rec restorative rehab  - PM+R rec inpatient vs acute rehab  - continue OOB/ambulate as tolerated     A Team Surgery  [id number]. 89 M w/ PMHx GERD, BPH, HLD, open R IHR, LIH (non repaired), presents to ED with suprapubic pain. CT w/ distal ascending and proximal transverse thickening, concern for malignancy. CT chest without evidence of metastasis. CEA 81.9, CA 19-9 225. Colonoscopy done 12/4. Patient currently s/p Laparoscopic extended right hemicolectomy.    Plan:  - NPO - adv diet?  - D5 1/2 NS w 20 KCl @100  - c-diff negative - taken off contact precautions  - Replete electrolytes as needed  - Pain control - Tylenol  - Pt voiding appropriately - encouraged to utilize bedside urinal  - PT rec restorative rehab  - PM+R rec inpatient vs acute rehab  - continue OOB/ambulate as tolerated     A Team Surgery  [id number]. 89 M w/ PMHx GERD, BPH, HLD, open R IHR, LIH (non repaired), presents to ED with suprapubic pain. CT w/ distal ascending and proximal transverse thickening, concern for malignancy. CT chest without evidence of metastasis. CEA 81.9, CA 19-9 225. Colonoscopy done 12/4. Patient currently s/p Laparoscopic extended right hemicolectomy.    Plan:  - diet advanced to regular  - D5 1/2 NS w 20 KCl @50  - EKG for qtc - pt on standing Reglan  - c-diff negative - taken off contact precautions  - Replete electrolytes as needed  - Pain control - Tylenol  - Pt voiding appropriately - encouraged to utilize bedside urinal  - PT rec restorative rehab  - PM+R rec inpatient vs acute rehab  - continue OOB/ambulate as tolerated     A Team Surgery  [id number]. 89 M w/ PMHx GERD, BPH, HLD, open R IHR, LIH (non repaired), presents to ED with suprapubic pain. CT w/ distal ascending and proximal transverse thickening, concern for malignancy. CT chest without evidence of metastasis. CEA 81.9, CA 19-9 225. Colonoscopy done 12/4. Patient currently s/p Laparoscopic extended right hemicolectomy.    Plan:  - diet advanced to regular  - D5 1/2 NS w 20 KCl @50  - EKG for qtc - pt on standing Reglan  - c-diff negative - taken off contact precautions  - Replete electrolytes as needed  - Pain control - Tylenol  - Pt voiding appropriately - encouraged to utilize bedside urinal  - PT rec restorative rehab  - PM+R rec inpatient vs acute rehab  - continue OOB/ambulate as tolerated     A Team Surgery  [id number].

## 2023-12-13 LAB
ANION GAP SERPL CALC-SCNC: 9 MMOL/L — SIGNIFICANT CHANGE UP (ref 7–14)
ANION GAP SERPL CALC-SCNC: 9 MMOL/L — SIGNIFICANT CHANGE UP (ref 7–14)
BUN SERPL-MCNC: 4 MG/DL — LOW (ref 7–23)
BUN SERPL-MCNC: 4 MG/DL — LOW (ref 7–23)
CALCIUM SERPL-MCNC: 8.1 MG/DL — LOW (ref 8.4–10.5)
CALCIUM SERPL-MCNC: 8.1 MG/DL — LOW (ref 8.4–10.5)
CHLORIDE SERPL-SCNC: 101 MMOL/L — SIGNIFICANT CHANGE UP (ref 98–107)
CHLORIDE SERPL-SCNC: 101 MMOL/L — SIGNIFICANT CHANGE UP (ref 98–107)
CO2 SERPL-SCNC: 26 MMOL/L — SIGNIFICANT CHANGE UP (ref 22–31)
CO2 SERPL-SCNC: 26 MMOL/L — SIGNIFICANT CHANGE UP (ref 22–31)
CREAT SERPL-MCNC: 0.85 MG/DL — SIGNIFICANT CHANGE UP (ref 0.5–1.3)
CREAT SERPL-MCNC: 0.85 MG/DL — SIGNIFICANT CHANGE UP (ref 0.5–1.3)
EGFR: 83 ML/MIN/1.73M2 — SIGNIFICANT CHANGE UP
EGFR: 83 ML/MIN/1.73M2 — SIGNIFICANT CHANGE UP
GLUCOSE BLDC GLUCOMTR-MCNC: 111 MG/DL — HIGH (ref 70–99)
GLUCOSE BLDC GLUCOMTR-MCNC: 111 MG/DL — HIGH (ref 70–99)
GLUCOSE SERPL-MCNC: 86 MG/DL — SIGNIFICANT CHANGE UP (ref 70–99)
GLUCOSE SERPL-MCNC: 86 MG/DL — SIGNIFICANT CHANGE UP (ref 70–99)
HCT VFR BLD CALC: 31.3 % — LOW (ref 39–50)
HCT VFR BLD CALC: 31.3 % — LOW (ref 39–50)
HGB BLD-MCNC: 9.8 G/DL — LOW (ref 13–17)
HGB BLD-MCNC: 9.8 G/DL — LOW (ref 13–17)
MAGNESIUM SERPL-MCNC: 1.9 MG/DL — SIGNIFICANT CHANGE UP (ref 1.6–2.6)
MAGNESIUM SERPL-MCNC: 1.9 MG/DL — SIGNIFICANT CHANGE UP (ref 1.6–2.6)
MCHC RBC-ENTMCNC: 26.7 PG — LOW (ref 27–34)
MCHC RBC-ENTMCNC: 26.7 PG — LOW (ref 27–34)
MCHC RBC-ENTMCNC: 31.3 GM/DL — LOW (ref 32–36)
MCHC RBC-ENTMCNC: 31.3 GM/DL — LOW (ref 32–36)
MCV RBC AUTO: 85.3 FL — SIGNIFICANT CHANGE UP (ref 80–100)
MCV RBC AUTO: 85.3 FL — SIGNIFICANT CHANGE UP (ref 80–100)
NRBC # BLD: 0 /100 WBCS — SIGNIFICANT CHANGE UP (ref 0–0)
NRBC # BLD: 0 /100 WBCS — SIGNIFICANT CHANGE UP (ref 0–0)
NRBC # FLD: 0 K/UL — SIGNIFICANT CHANGE UP (ref 0–0)
NRBC # FLD: 0 K/UL — SIGNIFICANT CHANGE UP (ref 0–0)
PHOSPHATE SERPL-MCNC: 2.7 MG/DL — SIGNIFICANT CHANGE UP (ref 2.5–4.5)
PHOSPHATE SERPL-MCNC: 2.7 MG/DL — SIGNIFICANT CHANGE UP (ref 2.5–4.5)
PLATELET # BLD AUTO: 407 K/UL — HIGH (ref 150–400)
PLATELET # BLD AUTO: 407 K/UL — HIGH (ref 150–400)
POTASSIUM SERPL-MCNC: 4.2 MMOL/L — SIGNIFICANT CHANGE UP (ref 3.5–5.3)
POTASSIUM SERPL-MCNC: 4.2 MMOL/L — SIGNIFICANT CHANGE UP (ref 3.5–5.3)
POTASSIUM SERPL-SCNC: 4.2 MMOL/L — SIGNIFICANT CHANGE UP (ref 3.5–5.3)
POTASSIUM SERPL-SCNC: 4.2 MMOL/L — SIGNIFICANT CHANGE UP (ref 3.5–5.3)
RBC # BLD: 3.67 M/UL — LOW (ref 4.2–5.8)
RBC # BLD: 3.67 M/UL — LOW (ref 4.2–5.8)
RBC # FLD: 14.6 % — HIGH (ref 10.3–14.5)
RBC # FLD: 14.6 % — HIGH (ref 10.3–14.5)
SODIUM SERPL-SCNC: 136 MMOL/L — SIGNIFICANT CHANGE UP (ref 135–145)
SODIUM SERPL-SCNC: 136 MMOL/L — SIGNIFICANT CHANGE UP (ref 135–145)
WBC # BLD: 7.14 K/UL — SIGNIFICANT CHANGE UP (ref 3.8–10.5)
WBC # BLD: 7.14 K/UL — SIGNIFICANT CHANGE UP (ref 3.8–10.5)
WBC # FLD AUTO: 7.14 K/UL — SIGNIFICANT CHANGE UP (ref 3.8–10.5)
WBC # FLD AUTO: 7.14 K/UL — SIGNIFICANT CHANGE UP (ref 3.8–10.5)

## 2023-12-13 PROCEDURE — 36573 INSJ PICC RS&I 5 YR+: CPT

## 2023-12-13 PROCEDURE — 99232 SBSQ HOSP IP/OBS MODERATE 35: CPT

## 2023-12-13 PROCEDURE — 99222 1ST HOSP IP/OBS MODERATE 55: CPT

## 2023-12-13 RX ORDER — I.V. FAT EMULSION 20 G/100ML
10.4 EMULSION INTRAVENOUS
Qty: 25 | Refills: 0 | Status: DISCONTINUED | OUTPATIENT
Start: 2023-12-13 | End: 2023-12-14

## 2023-12-13 RX ORDER — ELECTROLYTE SOLUTION,INJ
1 VIAL (ML) INTRAVENOUS
Refills: 0 | Status: DISCONTINUED | OUTPATIENT
Start: 2023-12-13 | End: 2023-12-13

## 2023-12-13 RX ORDER — MAGNESIUM SULFATE 500 MG/ML
1 VIAL (ML) INJECTION ONCE
Refills: 0 | Status: COMPLETED | OUTPATIENT
Start: 2023-12-13 | End: 2023-12-13

## 2023-12-13 RX ADMIN — Medication 100 GRAM(S): at 10:02

## 2023-12-13 RX ADMIN — Medication 5 MILLIGRAM(S): at 11:31

## 2023-12-13 RX ADMIN — HEPARIN SODIUM 5000 UNIT(S): 5000 INJECTION INTRAVENOUS; SUBCUTANEOUS at 22:04

## 2023-12-13 RX ADMIN — SIMVASTATIN 40 MILLIGRAM(S): 20 TABLET, FILM COATED ORAL at 22:04

## 2023-12-13 RX ADMIN — Medication 5 MILLIGRAM(S): at 18:12

## 2023-12-13 RX ADMIN — TAMSULOSIN HYDROCHLORIDE 0.4 MILLIGRAM(S): 0.4 CAPSULE ORAL at 22:04

## 2023-12-13 RX ADMIN — PANTOPRAZOLE SODIUM 40 MILLIGRAM(S): 20 TABLET, DELAYED RELEASE ORAL at 05:17

## 2023-12-13 RX ADMIN — I.V. FAT EMULSION 10.4 ML/HR: 20 EMULSION INTRAVENOUS at 19:21

## 2023-12-13 RX ADMIN — Medication 1 EACH: at 19:20

## 2023-12-13 RX ADMIN — Medication 5 MILLIGRAM(S): at 05:17

## 2023-12-13 RX ADMIN — Medication 5 MILLIGRAM(S): at 23:10

## 2023-12-13 RX ADMIN — HEPARIN SODIUM 5000 UNIT(S): 5000 INJECTION INTRAVENOUS; SUBCUTANEOUS at 05:17

## 2023-12-13 RX ADMIN — HEPARIN SODIUM 5000 UNIT(S): 5000 INJECTION INTRAVENOUS; SUBCUTANEOUS at 14:15

## 2023-12-13 RX ADMIN — Medication 63.75 MILLIMOLE(S): at 11:32

## 2023-12-13 RX ADMIN — DEXTROSE MONOHYDRATE, SODIUM CHLORIDE, AND POTASSIUM CHLORIDE 100 MILLILITER(S): 50; .745; 4.5 INJECTION, SOLUTION INTRAVENOUS at 14:14

## 2023-12-13 NOTE — PROGRESS NOTE ADULT - ASSESSMENT
89 M w/ PMHx GERD, BPH, HLD, open R IHR, LIH (non repaired), presents to ED with suprapubic pain. CT w/ distal ascending and proximal transverse thickening, concern for malignancy. CT chest without evidence of metastasis. CEA 81.9, CA 19-9 225. Colonoscopy done 12/4. Patient currently s/p Laparoscopic extended right hemicolectomy.    Plan:  - PICC/TPN consult  - NPO/IVF  - D5 1/2 NS w 20 KCl @100  - Change Protonix to daily  - EKG for qtc - pt on standing Reglan  - c-diff negative - taken off contact precautions  - Replete electrolytes as needed  - Pain control - Tylenol  - Pt voiding appropriately - encouraged to utilize bedside urinal  - PT rec restorative rehab  - PM+R rec inpatient vs acute rehab  - continue OOB/ambulate as tolerated     A Team Surgery  [id number]. 89 M w/ PMHx GERD, BPH, HLD, open R IHR, LIH (non repaired), presents to ED with suprapubic pain. CT w/ distal ascending and proximal transverse thickening, concern for malignancy. CT chest without evidence of metastasis. CEA 81.9, CA 19-9 225. Colonoscopy done 12/4. Patient currently s/p Laparoscopic extended right hemicolectomy.    Plan:  - PICC/TPN consult  - NPO/IVF  - D5 1/2 NS w 20 KCl @100  - Change Protonix to daily  - EKG for qtc - pt on standing Reglan  - c-diff negative - taken off contact precautions  - Replete electrolytes as needed  - Pain control - Tylenol  - Pt voiding appropriately - encouraged to utilize bedside urinal  - PT rec restorative rehab  - PM+R rec inpatient vs acute rehab  - continue OOB/ambulate as tolerated     A Team Surgery  [id number].

## 2023-12-13 NOTE — CHART NOTE - NSCHARTNOTEFT_GEN_A_CORE
Pre-Interventional Radiology Procedure Note    89y    Male    Procedure: PICC for TPN    Diagnosis/Indication: Patient is a 89y old  Male who presents with a chief complaint of c/f colon cancer (13 Dec 2023 08:18)      Interventional Radiology Attending Physician: GWEN PA    Ordering Attending Physician: Dr. Asia Marie    PAST MEDICAL & SURGICAL HISTORY:  BPH (benign prostatic hyperplasia)      Peripheral neuropathy      HLD (hyperlipidemia)      No significant past surgical history           CBC Full  -  ( 13 Dec 2023 05:02 )  WBC Count : 7.14 K/uL  RBC Count : 3.67 M/uL  Hemoglobin : 9.8 g/dL  Hematocrit : 31.3 %  Platelet Count - Automated : 407 K/uL  Mean Cell Volume : 85.3 fL  Mean Cell Hemoglobin : 26.7 pg  Mean Cell Hemoglobin Concentration : 31.3 gm/dL  Auto Neutrophil # : x  Auto Lymphocyte # : x  Auto Monocyte # : x  Auto Eosinophil # : x  Auto Basophil # : x  Auto Neutrophil % : x  Auto Lymphocyte % : x  Auto Monocyte % : x  Auto Eosinophil % : x  Auto Basophil % : x    12-13    136  |  101  |  4<L>  ----------------------------<  86  4.2   |  26  |  0.85    Ca    8.1<L>      13 Dec 2023 05:02  Phos  2.7     12-13  Mg     1.90     12-13    A Team Surgery  [id number] Pre-Interventional Radiology Procedure Note    89y    Male    Procedure: PICC for TPN    Diagnosis/Indication: Patient is a 89y old  Male who presents with a chief complaint of c/f colon cancer (13 Dec 2023 08:18)      Interventional Radiology Attending Physician: GWEN PA    Ordering Attending Physician: Dr. Asia Marie    PAST MEDICAL & SURGICAL HISTORY:  BPH (benign prostatic hyperplasia)      Peripheral neuropathy      HLD (hyperlipidemia)      No significant past surgical history           CBC Full  -  ( 13 Dec 2023 05:02 )  WBC Count : 7.14 K/uL  RBC Count : 3.67 M/uL  Hemoglobin : 9.8 g/dL  Hematocrit : 31.3 %  Platelet Count - Automated : 407 K/uL  Mean Cell Volume : 85.3 fL  Mean Cell Hemoglobin : 26.7 pg  Mean Cell Hemoglobin Concentration : 31.3 gm/dL  Auto Neutrophil # : x  Auto Lymphocyte # : x  Auto Monocyte # : x  Auto Eosinophil # : x  Auto Basophil # : x  Auto Neutrophil % : x  Auto Lymphocyte % : x  Auto Monocyte % : x  Auto Eosinophil % : x  Auto Basophil % : x    12-13    136  |  101  |  4<L>  ----------------------------<  86  4.2   |  26  |  0.85    Ca    8.1<L>      13 Dec 2023 05:02  Phos  2.7     12-13  Mg     1.90     12-13    A Team Surgery  [id number]

## 2023-12-13 NOTE — PROGRESS NOTE ADULT - SUBJECTIVE AND OBJECTIVE BOX
A Team General Surgery Progress Note    S: Pt seen and examined with team on morning rounds. Patient with emesis last night. Made NPO. Abdominal xray showing dilated loops of bowel. Currently patient with some nausea. No further emesis since yesterday. +Flatus. Last BM 2 days ago.        Vital Signs Last 24 Hrs  T(C): 37.2 (13 Dec 2023 05:20), Max: 37.2 (13 Dec 2023 05:20)  T(F): 99 (13 Dec 2023 05:20), Max: 99 (13 Dec 2023 05:20)  HR: 92 (13 Dec 2023 05:20) (82 - 99)  BP: 142/69 (13 Dec 2023 05:20) (110/59 - 142/69)  BP(mean): --  RR: 17 (13 Dec 2023 05:20) (17 - 17)  SpO2: 95% (13 Dec 2023 05:20) (95% - 98%)    Parameters below as of 13 Dec 2023 05:20  Patient On (Oxygen Delivery Method): room air        I&O's Summary    12 Dec 2023 07:01  -  13 Dec 2023 07:00  --------------------------------------------------------  IN: 2050 mL / OUT: 1050 mL / NET: 1000 mL      I&O's Detail    12 Dec 2023 07:01  -  13 Dec 2023 07:00  --------------------------------------------------------  IN:    dextrose 5% + sodium chloride 0.45% w/ Additives: 200 mL    dextrose 5% + sodium chloride 0.45% w/ Additives: 1400 mL    IV PiggyBack: 300 mL    Oral Fluid: 150 mL  Total IN: 2050 mL    OUT:    Incontinent per Condom Catheter (mL): 100 mL    Voided (mL): 950 mL  Total OUT: 1050 mL    Total NET: 1000 mL          General Appearance: Appears well, NAD  Chest: Breathing comfortably on RA.    CV: S1, S2 @ 92  Abdomen: Less distended than previous, nontender. No rebound or guarding. steris clean/dry/intact.   Extremities: Moves all extremities.    MEDICATIONS  (STANDING):  dextrose 5% + sodium chloride 0.45% with potassium chloride 20 mEq/L 1000 milliLiter(s) (100 mL/Hr) IV Continuous <Continuous>  heparin   Injectable 5000 Unit(s) SubCutaneous every 8 hours  influenza  Vaccine (HIGH DOSE) 0.7 milliLiter(s) IntraMuscular once  metoclopramide Injectable 5 milliGRAM(s) IV Push every 6 hours  pantoprazole  Injectable 40 milliGRAM(s) IV Push two times a day  simvastatin 40 milliGRAM(s) Oral at bedtime  tamsulosin 0.4 milliGRAM(s) Oral at bedtime    MEDICATIONS  (PRN):  acetaminophen     Tablet .. 975 milliGRAM(s) Oral every 6 hours PRN Mild Pain (1 - 3)      LABS:                        9.8    7.14  )-----------( 407      ( 13 Dec 2023 05:02 )             31.3     12-13    136  |  101  |  4<L>  ----------------------------<  86  4.2   |  26  |  0.85    Ca    8.1<L>      13 Dec 2023 05:02  Phos  2.7     12-13  Mg     1.90     12-13        Urinalysis Basic - ( 13 Dec 2023 05:02 )    Color: x / Appearance: x / SG: x / pH: x  Gluc: 86 mg/dL / Ketone: x  / Bili: x / Urobili: x   Blood: x / Protein: x / Nitrite: x   Leuk Esterase: x / RBC: x / WBC x   Sq Epi: x / Non Sq Epi: x / Bacteria: x

## 2023-12-13 NOTE — PROGRESS NOTE ADULT - SUBJECTIVE AND OBJECTIVE BOX
Patient is a 89y old  Male who presents with a chief complaint of c/f colon cancer (13 Dec 2023 11:47)      HPI:  89 yoM with hx of BPH, HLD, B/L inguinal hernia repair with known L recurrence, colonoscopy and endoscopy in sept for anemia on oral iron presents with abdominal pain and constipation over the past three days. Patient states that he hasn't had a bowel movement in the past three days but that he has been passing lots of gas. In the past day started having nausea and vomiting and wasn't able to eat anything then eventually started dry heaving. Denies any blood in vomit. Last BM was brown and regular appearing.     In addition he denies any chest pain, SOB, HA, back pain, LEG swelling, neck pain, recent vision changes, lightheadedness, and has otherwise been feeling healthy.     In the ED patient had labs showing elevated lactate 4.5 that improved to 1.5 s/p IVF hydration. CT abdomen/pelvis c/f primary neoplasm of the ascending and transverse colon. GI consulted and recommended colonoscopy tomorrow for biopsy.  (30 Nov 2023 18:18)    interval history: reports nausea and vomiting today. tolerated bedside PT.    REVIEW OF SYSTEMS  reports nausea and vomiting    PAST MEDICAL & SURGICAL HISTORY  No pertinent past medical history    BPH (benign prostatic hyperplasia)    Peripheral neuropathy    HLD (hyperlipidemia)    No significant past surgical history         CURRENT FUNCTIONAL STATUS  12/13  Bed Mobility  Bed Mobility Training Rehab Potential: good, to achieve stated therapy goals  Bed Mobility Training Sit-to-Supine: minimum assist (75% patient effort);  1 person assist;  nonverbal cues (demo/gestures);  verbal cues  Bed Mobility Training Supine-to-Sit: minimum assist (75% patient effort);  1 person assist;  nonverbal cues (demo/gestures);  verbal cues  Bed Mobility Training Limitations: impaired balance;  decreased strength    Sit-Stand Transfer Training  Sit-to-Stand Transfer Training Rehab Potential: good, to achieve stated therapy goals  Transfer Training Sit-to-Stand Transfer: minimum assist (75% patient effort);  1 person assist;  nonverbal cues (demo/gestures);  verbal cues;  weight-bearing as tolerated   rolling walker  Transfer Training Stand-to-Sit Transfer: minimum assist (75% patient effort);  1 person assist;  nonverbal cues (demo/gestures);  verbal cues;  weight-bearing as tolerated   rolling walker  Sit-to-Stand Transfer Training Transfer Safety Analysis: impaired balance;  decreased strength;  rolling walker    Gait Training  Gait Training Rehab Potential: good, to achieve stated therapy goals  Gait Training: minimum assist (75% patient effort);  1 person assist;  nonverbal cues (demo/gestures);  verbal cues;  weight-bearing as tolerated   rolling walker;  2 side steps. Limited secondary to pt. reporting feeling "woozy". Pt. returned to bed, /65,  bpm. RN made aware.   Gait Analysis: 3-point gait   decreased av;  increased time in double stance;  decreased step length;  decreased stride length;  decreased weight-shifting ability;  impaired balance;  decreased strength;  2 side steps ;  rolling walker      FAMILY HISTORY   No pertinent family history in first degree relatives     RECENT LABS/IMAGING  CBC Full  -  ( 13 Dec 2023 05:02 )  WBC Count : 7.14 K/uL  RBC Count : 3.67 M/uL  Hemoglobin : 9.8 g/dL  Hematocrit : 31.3 %  Platelet Count - Automated : 407 K/uL  Mean Cell Volume : 85.3 fL  Mean Cell Hemoglobin : 26.7 pg  Mean Cell Hemoglobin Concentration : 31.3 gm/dL  Auto Neutrophil # : x  Auto Lymphocyte # : x  Auto Monocyte # : x  Auto Eosinophil # : x  Auto Basophil # : x  Auto Neutrophil % : x  Auto Lymphocyte % : x  Auto Monocyte % : x  Auto Eosinophil % : x  Auto Basophil % : x    12-13    136  |  101  |  4<L>  ----------------------------<  86  4.2   |  26  |  0.85    Ca    8.1<L>      13 Dec 2023 05:02  Phos  2.7     12-13  Mg     1.90     12-13      Urinalysis Basic - ( 13 Dec 2023 05:02 )    Color: x / Appearance: x / SG: x / pH: x  Gluc: 86 mg/dL / Ketone: x  / Bili: x / Urobili: x   Blood: x / Protein: x / Nitrite: x   Leuk Esterase: x / RBC: x / WBC x   Sq Epi: x / Non Sq Epi: x / Bacteria: x        VITALS  T(C): 37.2 (12-13-23 @ 10:00), Max: 37.2 (12-13-23 @ 05:20)  HR: 90 (12-13-23 @ 10:00) (82 - 92)  BP: 136/53 (12-13-23 @ 10:00) (110/59 - 142/69)  RR: 19 (12-13-23 @ 10:00) (17 - 19)  SpO2: 92% (12-13-23 @ 10:00) (92% - 98%)  Wt(kg): --    ALLERGIES  penicillin (Unknown)  aspirin (Unknown)      MEDICATIONS   acetaminophen     Tablet .. 975 milliGRAM(s) Oral every 6 hours PRN  dextrose 5% + sodium chloride 0.45% with potassium chloride 20 mEq/L 1000 milliLiter(s) IV Continuous <Continuous>  heparin   Injectable 5000 Unit(s) SubCutaneous every 8 hours  influenza  Vaccine (HIGH DOSE) 0.7 milliLiter(s) IntraMuscular once  lipid, fat emulsion (Fish Oil and Plant Based) 20% Infusion 10.4 mL/Hr IV Continuous <Continuous>  metoclopramide Injectable 5 milliGRAM(s) IV Push every 6 hours  Parenteral Nutrition - Adult 1 Each TPN Continuous <Continuous>  simvastatin 40 milliGRAM(s) Oral at bedtime  tamsulosin 0.4 milliGRAM(s) Oral at bedtime      ----------------------------------------------------------------------------------------  ----------------------------------------------------------------------------------------  PHYSICAL EXAM  Constitutional - NAD, Comfortable sitting in chair  HEENT - NCAT, EOMI  Chest - no respiratory distress  Cardiovascular - RRR, S1S2   Abdomen -  soft, healing incision  Extremities - No C/C/E, No calf tenderness   Neurologic Exam -                    Cognitive - Awake, Alert, AAO to self, place, date, year, situation     Communication - Fluent, No dysarthria     Cranial Nerves - CN 2-12 intact     Motor - No focal deficits                    LEFT    UE - ShAB 5/5, EF 5/5, EE 5/5, WE 5/5,  5/5                    RIGHT UE - ShAB 5/5, EF 5/5, EE 5/5, WE 5/5,  5/5                    LEFT    LE - HF 5/5, KE 5/5, DF 5/5, PF 5/5                    RIGHT LE - HF 5/5, KE 5/5, DF 5/5, PF 5/5        Sensory - Intact to LT      Balance - WNL Static  Psychiatric - Mood stable, Affect WNL  ----------------------------------------------------------------------------------------  ASSESSMENT/PLAN  89 yo m s/p laparascopic extended right hemicolectomy for GI malignancy  continue bedside PT  please request OT evaluation   DVT PPX -  heparin  Rehab -  patient previously independent, now min assist with PT.   Recommend inpatient acute rehab when medically cleared. Patient can tolerate 3 hours per day of therapy with medical supervision.

## 2023-12-13 NOTE — PROCEDURE NOTE - PROCEDURE FINDINGS AND DETAILS
Successful placement of double lumen PICC via left basilic vein, length: 38cm  Tip of PICC in SVC, confirmed with fluoroscopy.

## 2023-12-13 NOTE — CONSULT NOTE ADULT - SUBJECTIVE AND OBJECTIVE BOX
Nutrition Support Consult Note    HPI:  89 M w/ PMHx GERD, BPH, HLD, open R IHR, LIH (non repaired), admitted with suprapubic pain. CT w/ distal ascending and proximal transverse thickening, concern for malignancy. CT chest without evidence of metastasis. Patient is s/p laparoscopic extended right hemicolectomy on 12/5/23. Nutrition support consult called for initiation of parenteral nutrition in view of severe malnutrition and PO intolerance. Pt was on a PO diet and had emesis, now NPO. Abdoninal xray on 12/12 show dilated loops of bowel. Parenteral nutrition was explained to patient and all questions were answered. Pt denies chest pain, shortness of breath, nausea or vomiting at this time. Plan for PICC placement today and TPN bag ordered for tonight.     ROS: Except as noted above, all other systems reviewed and are negative     Allergies  penicillin (Unknown)  aspirin (Unknown)    PAST MEDICAL & SURGICAL HISTORY:  BPH (benign prostatic hyperplasia)  Peripheral neuropathy  HLD (hyperlipidemia)  No significant past surgical history    FAMILY HISTORY:  No pertinent family history in first degree relatives    Vital Signs Last 24 Hrs  T(C): 37.2 (13 Dec 2023 10:00), Max: 37.2 (13 Dec 2023 05:20)  T(F): 99 (13 Dec 2023 10:00), Max: 99 (13 Dec 2023 05:20)  HR: 90 (13 Dec 2023 10:00) (82 - 92)  BP: 136/53 (13 Dec 2023 10:00) (110/59 - 142/69)  RR: 19 (13 Dec 2023 10:00) (17 - 19)  SpO2: 92% (13 Dec 2023 10:00) (92% - 98%)    MEDICATIONS  (STANDING):  dextrose 5% + sodium chloride 0.45% with potassium chloride 20 mEq/L 1000 milliLiter(s) (100 mL/Hr) IV Continuous <Continuous>  heparin   Injectable 5000 Unit(s) SubCutaneous every 8 hours  influenza  Vaccine (HIGH DOSE) 0.7 milliLiter(s) IntraMuscular once  lipid, fat emulsion (Fish Oil and Plant Based) 20% Infusion 10.4 mL/Hr (10.4 mL/Hr) IV Continuous <Continuous>  metoclopramide Injectable 5 milliGRAM(s) IV Push every 6 hours  Parenteral Nutrition - Adult 1 Each (42 mL/Hr) TPN Continuous <Continuous>  simvastatin 40 milliGRAM(s) Oral at bedtime  tamsulosin 0.4 milliGRAM(s) Oral at bedtime    MEDICATIONS  (PRN):  acetaminophen     Tablet .. 975 milliGRAM(s) Oral every 6 hours PRN Mild Pain (1 - 3)    I&O's Detail    12 Dec 2023 07:01  -  13 Dec 2023 07:00  --------------------------------------------------------  IN:    dextrose 5% + sodium chloride 0.45% w/ Additives: 200 mL    dextrose 5% + sodium chloride 0.45% w/ Additives: 1400 mL    IV PiggyBack: 300 mL    Oral Fluid: 150 mL  Total IN: 2050 mL    OUT:    Incontinent per Condom Catheter (mL): 100 mL    Voided (mL): 950 mL  Total OUT: 1050 mL    Total NET: 1000 mL    PHYSICAL EXAM:  General Appearance: Appears well, NAD, resting comfortably in bed   Respiratory: Breathing comfortably on room air   Abdomen: softly distended, nontender  Extremities: Moves all extremities, warm     LABS:                        9.8    7.14  )-----------( 407      ( 13 Dec 2023 05:02 )             31.3     12-13    136  |  101  |  4<L>  ----------------------------<  86  4.2   |  26  |  0.85    Ca    8.1<L>      13 Dec 2023 05:02  Phos  2.7     12-13  Mg     1.90     12-13    POCT Blood Glucose.: 103 mg/dL (11 Dec 2023 12:28)    Abdominal XRAY on 12/12/23: IMPRESSION: Distended bowel loops consistent with postop ileus.    Current Weight: 50.5 kG  Height: 175.3 cm  Ideal Body Weight: 70 kG  BMI: 16.4   Current Diet: [ x ]NPO  Appetite: [  ]Poor [  ]Adequate [  ]Good    CLINICAL INDICATORS  Severe protein calorie malnutrition in acute illness/ injury; secondary to poor appetite, weight loss >5% in 1 month and/or >7.5% in 3 months, caloric Intake <50% of nutrition needs >= 5 days, temporal wasting, severe loss of muscle mass/atrophy and loss of body fat stores.    Metabolic Requirements:  The patient will require:  ______25_______ kilocalories / kg / day  Diagnosis:  [  ] Mild protein malnutrition  [  ] Moderate protein calorie malnutrition in acute illness/ injury  [ x ] Severe protein calorie malnutrition in acute illness/ injury  [  ] Moderate protein calorie malnutrition in chronic illness  [  ] Severe protein calorie malnutrition in chronic illness    Nutritional Requirements  Carbohydrates: 1 gram = 3.4 kcal   Lipids: 1 gram = 10 kcal  Proteins: 1 gram = 4 kcal     Plan:  [ x ] Initiate TPN formula after PICC placement, TPN volume and calories will be increased tomorrow   Carbohydrates:__200____grams, Amino Acid:___80___grams  SMOF Lipids:___40____ grams, Total volume:__1500_____mL.  1. Dedicated Central line must be placed for TPN.  2. Strict Intake and Output.  3. Weights three times a week  4. Monitor BMP, Mg+, Ionized Ca++, Phosphorus daily  5. Monitor Triglycerides monthly  6. Pre-albumin weekly.  7. Fingersticks to monitor glucose every 6 hours until stable then may be decreased to twice a day.    Nutrition Support [id number]  Nutrition Support Consult Note    HPI:  89 M w/ PMHx GERD, BPH, HLD, open R IHR, LIH (non repaired), admitted with suprapubic pain. CT w/ distal ascending and proximal transverse thickening, concern for malignancy. CT chest without evidence of metastasis. Patient is s/p laparoscopic extended right hemicolectomy on 12/5/23. Nutrition support consult called for initiation of parenteral nutrition in view of severe malnutrition and PO intolerance. Pt was on a PO diet and had emesis, now NPO. Abdoninal xray on 12/12 show dilated loops of bowel. Parenteral nutrition was explained to patient and all questions were answered. Pt denies chest pain, shortness of breath, nausea or vomiting at this time. Plan for PICC placement today and TPN bag ordered for tonight.     ROS: Except as noted above, all other systems reviewed and are negative     Allergies  penicillin (Unknown)  aspirin (Unknown)    PAST MEDICAL & SURGICAL HISTORY:  BPH (benign prostatic hyperplasia)  Peripheral neuropathy  HLD (hyperlipidemia)  No significant past surgical history    FAMILY HISTORY:  No pertinent family history in first degree relatives    Vital Signs Last 24 Hrs  T(C): 37.2 (13 Dec 2023 10:00), Max: 37.2 (13 Dec 2023 05:20)  T(F): 99 (13 Dec 2023 10:00), Max: 99 (13 Dec 2023 05:20)  HR: 90 (13 Dec 2023 10:00) (82 - 92)  BP: 136/53 (13 Dec 2023 10:00) (110/59 - 142/69)  RR: 19 (13 Dec 2023 10:00) (17 - 19)  SpO2: 92% (13 Dec 2023 10:00) (92% - 98%)    MEDICATIONS  (STANDING):  dextrose 5% + sodium chloride 0.45% with potassium chloride 20 mEq/L 1000 milliLiter(s) (100 mL/Hr) IV Continuous <Continuous>  heparin   Injectable 5000 Unit(s) SubCutaneous every 8 hours  influenza  Vaccine (HIGH DOSE) 0.7 milliLiter(s) IntraMuscular once  lipid, fat emulsion (Fish Oil and Plant Based) 20% Infusion 10.4 mL/Hr (10.4 mL/Hr) IV Continuous <Continuous>  metoclopramide Injectable 5 milliGRAM(s) IV Push every 6 hours  Parenteral Nutrition - Adult 1 Each (42 mL/Hr) TPN Continuous <Continuous>  simvastatin 40 milliGRAM(s) Oral at bedtime  tamsulosin 0.4 milliGRAM(s) Oral at bedtime    MEDICATIONS  (PRN):  acetaminophen     Tablet .. 975 milliGRAM(s) Oral every 6 hours PRN Mild Pain (1 - 3)    I&O's Detail    12 Dec 2023 07:01  -  13 Dec 2023 07:00  --------------------------------------------------------  IN:    dextrose 5% + sodium chloride 0.45% w/ Additives: 200 mL    dextrose 5% + sodium chloride 0.45% w/ Additives: 1400 mL    IV PiggyBack: 300 mL    Oral Fluid: 150 mL  Total IN: 2050 mL    OUT:    Incontinent per Condom Catheter (mL): 100 mL    Voided (mL): 950 mL  Total OUT: 1050 mL    Total NET: 1000 mL    PHYSICAL EXAM:  General Appearance: Appears well, NAD, resting comfortably in bed   Respiratory: Breathing comfortably on room air   Abdomen: softly distended, nontender  Extremities: Moves all extremities, warm     LABS:                        9.8    7.14  )-----------( 407      ( 13 Dec 2023 05:02 )             31.3     12-13    136  |  101  |  4<L>  ----------------------------<  86  4.2   |  26  |  0.85    Ca    8.1<L>      13 Dec 2023 05:02  Phos  2.7     12-13  Mg     1.90     12-13    POCT Blood Glucose.: 103 mg/dL (11 Dec 2023 12:28)    Abdominal XRAY on 12/12/23: IMPRESSION: Distended bowel loops consistent with postop ileus.    Current Weight: 50.5 kG  Height: 175.3 cm  Ideal Body Weight: 70 kG  BMI: 16.4   Current Diet: [ x ]NPO  Appetite: [  ]Poor [  ]Adequate [  ]Good    CLINICAL INDICATORS  Severe protein calorie malnutrition in acute illness/ injury; secondary to poor appetite, weight loss >5% in 1 month and/or >7.5% in 3 months, caloric Intake <50% of nutrition needs >= 5 days, temporal wasting, severe loss of muscle mass/atrophy and loss of body fat stores.    Metabolic Requirements:  The patient will require:  ______25_______ kilocalories / kg / day  Diagnosis:  [  ] Mild protein malnutrition  [  ] Moderate protein calorie malnutrition in acute illness/ injury  [ x ] Severe protein calorie malnutrition in acute illness/ injury  [  ] Moderate protein calorie malnutrition in chronic illness  [  ] Severe protein calorie malnutrition in chronic illness    Nutritional Requirements  Carbohydrates: 1 gram = 3.4 kcal   Lipids: 1 gram = 10 kcal  Proteins: 1 gram = 4 kcal     Plan:  [ x ] Initiate TPN formula after PICC placement, TPN volume and calories will be increased tomorrow   Carbohydrates:__200____grams, Amino Acid:___80___grams  SMOF Lipids:___40____ grams, Total volume:__1500_____mL.  1. Dedicated Central line must be placed for TPN.  2. Strict Intake and Output.  3. Weights three times a week  4. Monitor BMP, Mg+, Ionized Ca++, Phosphorus daily  5. Monitor Triglycerides monthly  6. Pre-albumin weekly.  7. Fingersticks to monitor glucose every 6 hours until stable then may be decreased to twice a day.    Nutrition Support [id number]

## 2023-12-13 NOTE — CONSULT NOTE ADULT - NS ATTEND AMEND GEN_ALL_CORE FT
89M previously recommended to have EGD/colonoscopy in 4/2023 for eval of DANG, s/p unremarkable EGD and incomplete colonoscopy 9/2023, presents with constipation, suprapubic abdominal pain and nausea/vomiting over last 3 days and found to have colon mass c/f malignancy on CT.    CBC with mild leukocytosis with left shift, hgb improved from prior (11.9), normal plts. CMP grossly unremarkable, lactate initially 4.5 improved to 1.5 with fluid resuscitation. CTAP with aforementioned lesion and associated small bowel dilation but no chantell LBO. Liver cysts on CTAP but no chantell mets. On my review of CTAP, stool in distal colon and rectum. VSS, abd soft and nontender.    Needs colonoscopy for diagnosis of likely CRC. CT chest and surg eval as above, will tentatively plan for colonoscopy tomorrow, please begin prep ASAP (sips of golytely every 5-10 minutes). NPO apart from prep at midnight, otherwise CLD. Remainder as above.
I agree with the above history, physical examination, chief complaint/diagnosis, and plan, which I have reviewed and edited where appropriate.  I agree with notes/assessment and detailed interval history of health care providers on my service.  I have seen and examined the patient.  I reviewed the laboratory and available data and agree with the history, physical assessment and plan.  I reviewed and discussed with all consultants, house staff and PA's.  The Nutrition Support Team (NST) discusses on an ongoing basis with the primary team and all consultants, House staff and PA's to have a permanent risk benefit analyses on all decisions and coordinating care.  I was physically present for the key portions of the evaluation and management (E/M) service provided.  89 M s/p laparoscopic extended right hemicolectomy. Nutrition support consult called for initiation of parenteral nutrition in view of severe malnutrition and PO intolerance.  PHYSICAL EXAM:  General Appearance: NAD  Respiratory: clear  Abdomen: softly distended, nontender  Extremities: warm  Metabolic Requirements:  The patient will require:  25_kilocalories / kg / day  Diagnosis:  Severe protein calorie malnutrition in acute illness/ injury  Plan:  Initiate TPN

## 2023-12-14 LAB
ANION GAP SERPL CALC-SCNC: 9 MMOL/L — SIGNIFICANT CHANGE UP (ref 7–14)
ANION GAP SERPL CALC-SCNC: 9 MMOL/L — SIGNIFICANT CHANGE UP (ref 7–14)
BUN SERPL-MCNC: 7 MG/DL — SIGNIFICANT CHANGE UP (ref 7–23)
BUN SERPL-MCNC: 7 MG/DL — SIGNIFICANT CHANGE UP (ref 7–23)
CA-I BLD-SCNC: 1.11 MMOL/L — LOW (ref 1.15–1.29)
CA-I BLD-SCNC: 1.11 MMOL/L — LOW (ref 1.15–1.29)
CALCIUM SERPL-MCNC: 7.7 MG/DL — LOW (ref 8.4–10.5)
CALCIUM SERPL-MCNC: 7.7 MG/DL — LOW (ref 8.4–10.5)
CHLORIDE SERPL-SCNC: 98 MMOL/L — SIGNIFICANT CHANGE UP (ref 98–107)
CHLORIDE SERPL-SCNC: 98 MMOL/L — SIGNIFICANT CHANGE UP (ref 98–107)
CO2 SERPL-SCNC: 26 MMOL/L — SIGNIFICANT CHANGE UP (ref 22–31)
CO2 SERPL-SCNC: 26 MMOL/L — SIGNIFICANT CHANGE UP (ref 22–31)
CREAT SERPL-MCNC: 0.81 MG/DL — SIGNIFICANT CHANGE UP (ref 0.5–1.3)
CREAT SERPL-MCNC: 0.81 MG/DL — SIGNIFICANT CHANGE UP (ref 0.5–1.3)
EGFR: 84 ML/MIN/1.73M2 — SIGNIFICANT CHANGE UP
EGFR: 84 ML/MIN/1.73M2 — SIGNIFICANT CHANGE UP
GLUCOSE BLDC GLUCOMTR-MCNC: 106 MG/DL — HIGH (ref 70–99)
GLUCOSE BLDC GLUCOMTR-MCNC: 106 MG/DL — HIGH (ref 70–99)
GLUCOSE BLDC GLUCOMTR-MCNC: 121 MG/DL — HIGH (ref 70–99)
GLUCOSE BLDC GLUCOMTR-MCNC: 121 MG/DL — HIGH (ref 70–99)
GLUCOSE SERPL-MCNC: 106 MG/DL — HIGH (ref 70–99)
GLUCOSE SERPL-MCNC: 106 MG/DL — HIGH (ref 70–99)
HCT VFR BLD CALC: 30.6 % — LOW (ref 39–50)
HCT VFR BLD CALC: 30.6 % — LOW (ref 39–50)
HGB BLD-MCNC: 9.9 G/DL — LOW (ref 13–17)
HGB BLD-MCNC: 9.9 G/DL — LOW (ref 13–17)
MAGNESIUM SERPL-MCNC: 2 MG/DL — SIGNIFICANT CHANGE UP (ref 1.6–2.6)
MAGNESIUM SERPL-MCNC: 2 MG/DL — SIGNIFICANT CHANGE UP (ref 1.6–2.6)
MCHC RBC-ENTMCNC: 26.6 PG — LOW (ref 27–34)
MCHC RBC-ENTMCNC: 26.6 PG — LOW (ref 27–34)
MCHC RBC-ENTMCNC: 32.4 GM/DL — SIGNIFICANT CHANGE UP (ref 32–36)
MCHC RBC-ENTMCNC: 32.4 GM/DL — SIGNIFICANT CHANGE UP (ref 32–36)
MCV RBC AUTO: 82.3 FL — SIGNIFICANT CHANGE UP (ref 80–100)
MCV RBC AUTO: 82.3 FL — SIGNIFICANT CHANGE UP (ref 80–100)
NRBC # BLD: 0 /100 WBCS — SIGNIFICANT CHANGE UP (ref 0–0)
NRBC # BLD: 0 /100 WBCS — SIGNIFICANT CHANGE UP (ref 0–0)
NRBC # FLD: 0 K/UL — SIGNIFICANT CHANGE UP (ref 0–0)
NRBC # FLD: 0 K/UL — SIGNIFICANT CHANGE UP (ref 0–0)
PHOSPHATE SERPL-MCNC: 3.9 MG/DL — SIGNIFICANT CHANGE UP (ref 2.5–4.5)
PHOSPHATE SERPL-MCNC: 3.9 MG/DL — SIGNIFICANT CHANGE UP (ref 2.5–4.5)
PLATELET # BLD AUTO: 371 K/UL — SIGNIFICANT CHANGE UP (ref 150–400)
PLATELET # BLD AUTO: 371 K/UL — SIGNIFICANT CHANGE UP (ref 150–400)
POTASSIUM SERPL-MCNC: 4 MMOL/L — SIGNIFICANT CHANGE UP (ref 3.5–5.3)
POTASSIUM SERPL-MCNC: 4 MMOL/L — SIGNIFICANT CHANGE UP (ref 3.5–5.3)
POTASSIUM SERPL-SCNC: 4 MMOL/L — SIGNIFICANT CHANGE UP (ref 3.5–5.3)
POTASSIUM SERPL-SCNC: 4 MMOL/L — SIGNIFICANT CHANGE UP (ref 3.5–5.3)
RBC # BLD: 3.72 M/UL — LOW (ref 4.2–5.8)
RBC # BLD: 3.72 M/UL — LOW (ref 4.2–5.8)
RBC # FLD: 14.5 % — SIGNIFICANT CHANGE UP (ref 10.3–14.5)
RBC # FLD: 14.5 % — SIGNIFICANT CHANGE UP (ref 10.3–14.5)
SODIUM SERPL-SCNC: 133 MMOL/L — LOW (ref 135–145)
SODIUM SERPL-SCNC: 133 MMOL/L — LOW (ref 135–145)
TRIGL SERPL-MCNC: 91 MG/DL — SIGNIFICANT CHANGE UP
TRIGL SERPL-MCNC: 91 MG/DL — SIGNIFICANT CHANGE UP
WBC # BLD: 6.85 K/UL — SIGNIFICANT CHANGE UP (ref 3.8–10.5)
WBC # BLD: 6.85 K/UL — SIGNIFICANT CHANGE UP (ref 3.8–10.5)
WBC # FLD AUTO: 6.85 K/UL — SIGNIFICANT CHANGE UP (ref 3.8–10.5)
WBC # FLD AUTO: 6.85 K/UL — SIGNIFICANT CHANGE UP (ref 3.8–10.5)

## 2023-12-14 PROCEDURE — 93010 ELECTROCARDIOGRAM REPORT: CPT

## 2023-12-14 PROCEDURE — 99232 SBSQ HOSP IP/OBS MODERATE 35: CPT

## 2023-12-14 RX ORDER — ELECTROLYTE SOLUTION,INJ
1 VIAL (ML) INTRAVENOUS
Refills: 0 | Status: DISCONTINUED | OUTPATIENT
Start: 2023-12-14 | End: 2023-12-14

## 2023-12-14 RX ORDER — CHLORHEXIDINE GLUCONATE 213 G/1000ML
1 SOLUTION TOPICAL DAILY
Refills: 0 | Status: DISCONTINUED | OUTPATIENT
Start: 2023-12-14 | End: 2023-12-17

## 2023-12-14 RX ORDER — I.V. FAT EMULSION 20 G/100ML
16.6 EMULSION INTRAVENOUS
Qty: 40 | Refills: 0 | Status: DISCONTINUED | OUTPATIENT
Start: 2023-12-14 | End: 2023-12-15

## 2023-12-14 RX ORDER — PANTOPRAZOLE SODIUM 20 MG/1
40 TABLET, DELAYED RELEASE ORAL
Refills: 0 | Status: DISCONTINUED | OUTPATIENT
Start: 2023-12-14 | End: 2023-12-20

## 2023-12-14 RX ORDER — PANTOPRAZOLE SODIUM 20 MG/1
40 TABLET, DELAYED RELEASE ORAL
Refills: 0 | Status: DISCONTINUED | OUTPATIENT
Start: 2023-12-14 | End: 2023-12-14

## 2023-12-14 RX ADMIN — SIMVASTATIN 40 MILLIGRAM(S): 20 TABLET, FILM COATED ORAL at 21:49

## 2023-12-14 RX ADMIN — Medication 1 EACH: at 19:06

## 2023-12-14 RX ADMIN — I.V. FAT EMULSION 16.6 ML/HR: 20 EMULSION INTRAVENOUS at 19:07

## 2023-12-14 RX ADMIN — Medication 5 MILLIGRAM(S): at 23:46

## 2023-12-14 RX ADMIN — HEPARIN SODIUM 5000 UNIT(S): 5000 INJECTION INTRAVENOUS; SUBCUTANEOUS at 05:42

## 2023-12-14 RX ADMIN — Medication 5 MILLIGRAM(S): at 18:10

## 2023-12-14 RX ADMIN — HEPARIN SODIUM 5000 UNIT(S): 5000 INJECTION INTRAVENOUS; SUBCUTANEOUS at 21:49

## 2023-12-14 RX ADMIN — Medication 5 MILLIGRAM(S): at 05:43

## 2023-12-14 RX ADMIN — Medication 5 MILLIGRAM(S): at 11:44

## 2023-12-14 RX ADMIN — HEPARIN SODIUM 5000 UNIT(S): 5000 INJECTION INTRAVENOUS; SUBCUTANEOUS at 13:23

## 2023-12-14 RX ADMIN — PANTOPRAZOLE SODIUM 40 MILLIGRAM(S): 20 TABLET, DELAYED RELEASE ORAL at 18:11

## 2023-12-14 RX ADMIN — CHLORHEXIDINE GLUCONATE 1 APPLICATION(S): 213 SOLUTION TOPICAL at 11:44

## 2023-12-14 RX ADMIN — TAMSULOSIN HYDROCHLORIDE 0.4 MILLIGRAM(S): 0.4 CAPSULE ORAL at 21:49

## 2023-12-14 NOTE — PROGRESS NOTE ADULT - ASSESSMENT
89 M w/ PMHx GERD, BPH, HLD, open R IHR, LIH (non repaired), presents to ED with suprapubic pain. CT w/ distal ascending and proximal transverse thickening, concern for malignancy. CT chest without evidence of metastasis. CEA 81.9, CA 19-9 225. Colonoscopy done 12/4. Patient currently s/p Laparoscopic extended right hemicolectomy, 12/13/23 PICC and TPN started due to post op ileus (+flatus/+BM but distention/nausea/emesis with PO)    Plan:  - PICC/TPN 12/13/23  - CLD as tolerated /IVF  - D5 1/2 NS w 20 KCl @100  - Protonix BID (home medication for GERD)  - EKG for qtc - pt on standing Reglan  - c-diff negative - taken off contact precautions  - Replete electrolytes as needed  - Pain control - Tylenol  - Pt voiding appropriately - encouraged to utilize bedside urinal  - PT rec restorative rehab  - PM+R rec inpatient vs acute rehab  - continue OOB/ambulate as tolerated     A Team Surgery  [id number]. 89 M w/ PMHx GERD, BPH, HLD, open R IHR, LIH (non repaired), presents to ED with suprapubic pain. CT w/ distal ascending and proximal transverse thickening, concern for malignancy. CT chest without evidence of metastasis. CEA 81.9, CA 19-9 225. Colonoscopy done 12/4. Patient currently s/p Laparoscopic extended right hemicolectomy, 12/13/23 PICC and TPN started due to post op ileus (+flatus/+BM but distention/nausea/emesis with PO)    Plan:  - PICC/TPN 12/13/23  - CLD as tolerated /IVF  - D5 1/2 NS w 20 KCl @100  - Protonix BID (home medication for GERD)  - EKG for qtc - pt on standing Reglan  - c-diff negative - taken off contact precautions  - Replete electrolytes as needed  - Pain control - Tylenol  - Pt voiding appropriately - encouraged to utilize bedside urinal  - PT rec restorative rehab  - PM+R rec inpatient vs acute rehab  - continue OOB/ambulate as tolerated     A Team Surgery  [id number].

## 2023-12-14 NOTE — PROGRESS NOTE ADULT - NS ATTEND AMEND GEN_ALL_CORE FT
I agree with the above history, physical examination, chief complaint/diagnosis, and plan, which I have reviewed and edited where appropriate.  I agree with notes/assessment and detailed interval history of health care providers on my service.  I have seen and examined the patient.  I reviewed the laboratory and available data and agree with the history, physical assessment and plan.  I reviewed and discussed with all consultants, house staff and PA's.  The Nutrition Support Team (NST) discusses on an ongoing basis with the primary team and all consultants, House staff and PA's to have a permanent risk benefit analyses on all decisions and coordinating care.  I was physically present for the key portions of the evaluation and management (E/M) service provided.  89 M s/p laparoscopic extended right hemicolectomy receiving parenteral nutrition in view of severe malnutrition and PO intolerance.   PHYSICAL EXAM:  NAD  Respiratory: clear  Heart RR  Abdomen: softly distended, nontender  Extremities: warm  labs reviewed - electrolytes adjusted   TPN infusion; .5L/1315 kcal/day

## 2023-12-14 NOTE — PROGRESS NOTE ADULT - SUBJECTIVE AND OBJECTIVE BOX
NUTRITION NOTE  [id number] STOMBER  ===============================    Interval events - Patient was seen and examined at bedside, no acute events overnight. Patient denies chest pain, shortness of breath, nausea or vomiting at this time. PICC placed and TPN was started on 23 for nutritional support. TPN volume and calories increased today.     ROS: Except as noted above, all other systems reviewed and are negative     Allergies  penicillin (Unknown)  aspirin (Unknown)    PAST MEDICAL & SURGICAL HISTORY:  BPH (benign prostatic hyperplasia)  Peripheral neuropathy  HLD (hyperlipidemia)  No significant past surgical history    FAMILY HISTORY:  No pertinent family history in first degree relatives    Vital Signs Last 24 Hrs  T(C): 36.7 (14 Dec 2023 10:00), Max: 37.6 (13 Dec 2023 16:30)  T(F): 98 (14 Dec 2023 10:00), Max: 99.6 (13 Dec 2023 16:30)  HR: 96 (14 Dec 2023 10:00) (87 - 97)  BP: 140/67 (14 Dec 2023 10:00) (118/50 - 146/69)  RR: 17 (14 Dec 2023 10:00) (16 - 18)  SpO2: 96% (14 Dec 2023 10:00) (91% - 96%)    MEDICATIONS  (STANDING):  chlorhexidine 2% Cloths 1 Application(s) Topical daily  heparin   Injectable 5000 Unit(s) SubCutaneous every 8 hours  influenza  Vaccine (HIGH DOSE) 0.7 milliLiter(s) IntraMuscular once  lipid, fat emulsion (Fish Oil and Plant Based) 20% Infusion 16.6 mL/Hr (16.6 mL/Hr) IV Continuous <Continuous>  metoclopramide Injectable 5 milliGRAM(s) IV Push every 6 hours  pantoprazole  Injectable 40 milliGRAM(s) IV Push two times a day  Parenteral Nutrition - Adult 1 Each (63 mL/Hr) TPN Continuous <Continuous>  Parenteral Nutrition - Adult 1 Each (42 mL/Hr) TPN Continuous <Continuous>  simvastatin 40 milliGRAM(s) Oral at bedtime  tamsulosin 0.4 milliGRAM(s) Oral at bedtime    MEDICATIONS  (PRN):  acetaminophen     Tablet .. 975 milliGRAM(s) Oral every 6 hours PRN Mild Pain (1 - 3)    I&O's Detail    13 Dec 2023 07:01  -  14 Dec 2023 07:00  --------------------------------------------------------  IN:    dextrose 5% + sodium chloride 0.45% w/ Additives: 900 mL    Fat Emulsion (Fish Oil &amp; Plant Based) 20% Infusion: 114.4 mL    IV PiggyBack: 350 mL    TPN (Total Parenteral Nutrition): 462 mL  Total IN: 1826.4 mL    OUT:    Incontinent per Condom Catheter (mL): 500 mL    Oral Fluid: 0 mL    Voided (mL): 600 mL  Total OUT: 1100 mL    Total NET: 726.4 mL      14 Dec 2023 07:01  -  14 Dec 2023 12:07  --------------------------------------------------------  IN:    TPN (Total Parenteral Nutrition): 168 mL  Total IN: 168 mL    OUT:    IV PiggyBack: 0 mL    Oral Fluid: 0 mL  Total OUT: 0 mL    Total NET: 168 mL    POCT Blood Glucose.: 106 mg/dL (14 Dec 2023 05:56)  POCT Blood Glucose.: 111 mg/dL (13 Dec 2023 23:37)    Daily Weight in k (14 Dec 2023 05:55)    Drug Dosing Weight  Height (cm): 175.3 (08 Dec 2023 23:06)  Weight (kg): 51 (13 Dec 2023 19:42)  BMI (kg/m2): 16.6 (13 Dec 2023 19:42)  BSA (m2): 1.62 (13 Dec 2023 19:42)    PHYSICAL EXAM:  General Appearance: Appears well, NAD, resting comfortably in bed   Respiratory: Breathing comfortably on room air   Abdomen: softly distended, nontender  Extremities: Moves all extremities, warm   PICC Site: double lumen PICC in E, site clean and dry, [id number]    Diet: clear liquids and TPN/lipids (started on 23)    LABORATORY                        9.9    6.85  )-----------( 371      ( 14 Dec 2023 05:57 )             30.6   12-14    133<L>  |  98  |  7   ----------------------------<  106<H>  4.0   |  26  |  0.81    Ca    7.7<L>      14 Dec 2023 05:57  Phos  3.9     -  Mg     2.00     -    12-14 Chol -- LDL -- HDL -- Trig 91    ASSESSMENT/PLAN:  89 M w/ PMHx GERD, BPH, HLD, open R IHR, LIH (non repaired), admitted with suprapubic pain. CT w/ distal ascending and proximal transverse thickening, concern for malignancy. CT chest without evidence of metastasis. Patient is s/p laparoscopic extended right hemicolectomy on 23. Nutrition support consult called for initiation of parenteral nutrition in view of severe malnutrition and PO intolerance. Pt was on a PO diet and had emesis, now NPO. Abdominal xray on  show dilated loops of bowel. PICC placed and TPN was initiated on 23 for nutritional support.     TPN infusion volume increased to 1.5L, TPN will provide 1315 kcal/day    labs reviewed - electrolytes adjusted in TPN bag     monitor fingersticks, obtain daily weights    continue parenteral nutrition at this time, will follow up with primary team on plan    1.  Severe protein calorie malnutrition being optimized with TPN: CHO [175] gm.  AA [80] gm. SMOF Lipids [40] gm.  2.  Hyperglycemia managed with: [0] units of regular insulin    3.  Check fluid balance daily.  Strict I/O  [ ] [ ]   4.  Daily BMP, Ionized Calcium, Magnesium and Phosphorous   5.  Triglycerides at initiation of TPN and monthly 12-14 Chol -- LDL -- HDL -- Trig 91    Nutrition Support [id number]  NUTRITION NOTE  [id number] STOMBER  ===============================    Interval events - Patient was seen and examined at bedside, no acute events overnight. Patient denies chest pain, shortness of breath, nausea or vomiting at this time. PICC placed and TPN was started on 23 for nutritional support. TPN volume and calories increased today.     ROS: Except as noted above, all other systems reviewed and are negative     Allergies  penicillin (Unknown)  aspirin (Unknown)    PAST MEDICAL & SURGICAL HISTORY:  BPH (benign prostatic hyperplasia)  Peripheral neuropathy  HLD (hyperlipidemia)  No significant past surgical history    FAMILY HISTORY:  No pertinent family history in first degree relatives    Vital Signs Last 24 Hrs  T(C): 36.7 (14 Dec 2023 10:00), Max: 37.6 (13 Dec 2023 16:30)  T(F): 98 (14 Dec 2023 10:00), Max: 99.6 (13 Dec 2023 16:30)  HR: 96 (14 Dec 2023 10:00) (87 - 97)  BP: 140/67 (14 Dec 2023 10:00) (118/50 - 146/69)  RR: 17 (14 Dec 2023 10:00) (16 - 18)  SpO2: 96% (14 Dec 2023 10:00) (91% - 96%)    MEDICATIONS  (STANDING):  chlorhexidine 2% Cloths 1 Application(s) Topical daily  heparin   Injectable 5000 Unit(s) SubCutaneous every 8 hours  influenza  Vaccine (HIGH DOSE) 0.7 milliLiter(s) IntraMuscular once  lipid, fat emulsion (Fish Oil and Plant Based) 20% Infusion 16.6 mL/Hr (16.6 mL/Hr) IV Continuous <Continuous>  metoclopramide Injectable 5 milliGRAM(s) IV Push every 6 hours  pantoprazole  Injectable 40 milliGRAM(s) IV Push two times a day  Parenteral Nutrition - Adult 1 Each (63 mL/Hr) TPN Continuous <Continuous>  Parenteral Nutrition - Adult 1 Each (42 mL/Hr) TPN Continuous <Continuous>  simvastatin 40 milliGRAM(s) Oral at bedtime  tamsulosin 0.4 milliGRAM(s) Oral at bedtime    MEDICATIONS  (PRN):  acetaminophen     Tablet .. 975 milliGRAM(s) Oral every 6 hours PRN Mild Pain (1 - 3)    I&O's Detail    13 Dec 2023 07:01  -  14 Dec 2023 07:00  --------------------------------------------------------  IN:    dextrose 5% + sodium chloride 0.45% w/ Additives: 900 mL    Fat Emulsion (Fish Oil &amp; Plant Based) 20% Infusion: 114.4 mL    IV PiggyBack: 350 mL    TPN (Total Parenteral Nutrition): 462 mL  Total IN: 1826.4 mL    OUT:    Incontinent per Condom Catheter (mL): 500 mL    Oral Fluid: 0 mL    Voided (mL): 600 mL  Total OUT: 1100 mL    Total NET: 726.4 mL      14 Dec 2023 07:01  -  14 Dec 2023 12:07  --------------------------------------------------------  IN:    TPN (Total Parenteral Nutrition): 168 mL  Total IN: 168 mL    OUT:    IV PiggyBack: 0 mL    Oral Fluid: 0 mL  Total OUT: 0 mL    Total NET: 168 mL    POCT Blood Glucose.: 106 mg/dL (14 Dec 2023 05:56)  POCT Blood Glucose.: 111 mg/dL (13 Dec 2023 23:37)    Daily Weight in k (14 Dec 2023 05:55)    Drug Dosing Weight  Height (cm): 175.3 (08 Dec 2023 23:06)  Weight (kg): 51 (13 Dec 2023 19:42)  BMI (kg/m2): 16.6 (13 Dec 2023 19:42)  BSA (m2): 1.62 (13 Dec 2023 19:42)    PHYSICAL EXAM:  General Appearance: Appears well, NAD, resting comfortably in bed   Respiratory: Breathing comfortably on room air   Abdomen: softly distended, nontender  Extremities: Moves all extremities, warm   PICC Site: double lumen PICC in E, site clean and dry, [id number]    Diet: clear liquids and TPN/lipids (started on 23)    LABORATORY                        9.9    6.85  )-----------( 371      ( 14 Dec 2023 05:57 )             30.6   12-14    133<L>  |  98  |  7   ----------------------------<  106<H>  4.0   |  26  |  0.81    Ca    7.7<L>      14 Dec 2023 05:57  Phos  3.9     -  Mg     2.00     -    12-14 Chol -- LDL -- HDL -- Trig 91    ASSESSMENT/PLAN:  89 M w/ PMHx GERD, BPH, HLD, open R IHR, LIH (non repaired), admitted with suprapubic pain. CT w/ distal ascending and proximal transverse thickening, concern for malignancy. CT chest without evidence of metastasis. Patient is s/p laparoscopic extended right hemicolectomy on 23. Nutrition support consult called for initiation of parenteral nutrition in view of severe malnutrition and PO intolerance. Pt was on a PO diet and had emesis, now NPO. Abdominal xray on  show dilated loops of bowel. PICC placed and TPN was initiated on 23 for nutritional support.     TPN infusion volume increased to 1.5L, TPN will provide 1315 kcal/day    labs reviewed - electrolytes adjusted in TPN bag     monitor fingersticks, obtain daily weights    continue parenteral nutrition at this time, will follow up with primary team on plan    1.  Severe protein calorie malnutrition being optimized with TPN: CHO [175] gm.  AA [80] gm. SMOF Lipids [40] gm.  2.  Hyperglycemia managed with: [0] units of regular insulin    3.  Check fluid balance daily.  Strict I/O  [ ] [ ]   4.  Daily BMP, Ionized Calcium, Magnesium and Phosphorous   5.  Triglycerides at initiation of TPN and monthly 12-14 Chol -- LDL -- HDL -- Trig 91    Nutrition Support [id number]

## 2023-12-14 NOTE — PROGRESS NOTE ADULT - SUBJECTIVE AND OBJECTIVE BOX
A Team General Surgery Progress Note    S: Pt seen and examined with team on morning rounds. Patient feeling better. Denies nausea/emesis. Bloating improved. Denies pain. +Flatus/+BM. PICC placed and TPN started yesterday. +voiding.      Vital Signs Last 24 Hrs  T(C): 36.8 (14 Dec 2023 05:55), Max: 37.6 (13 Dec 2023 16:30)  T(F): 98.3 (14 Dec 2023 05:55), Max: 99.6 (13 Dec 2023 16:30)  HR: 93 (14 Dec 2023 05:55) (87 - 97)  BP: 142/74 (14 Dec 2023 05:55) (118/50 - 146/69)  BP(mean): --  RR: 18 (14 Dec 2023 05:55) (16 - 19)  SpO2: 95% (14 Dec 2023 05:55) (91% - 95%)    Parameters below as of 14 Dec 2023 05:55  Patient On (Oxygen Delivery Method): room air        I&O's Summary    13 Dec 2023 07:01  -  14 Dec 2023 07:00  --------------------------------------------------------  IN: 1826.4 mL / OUT: 1100 mL / NET: 726.4 mL      I&O's Detail    13 Dec 2023 07:01  -  14 Dec 2023 07:00  --------------------------------------------------------  IN:    dextrose 5% + sodium chloride 0.45% w/ Additives: 900 mL    Fat Emulsion (Fish Oil &amp; Plant Based) 20% Infusion: 114.4 mL    IV PiggyBack: 350 mL    TPN (Total Parenteral Nutrition): 462 mL  Total IN: 1826.4 mL    OUT:    Incontinent per Condom Catheter (mL): 500 mL    Oral Fluid: 0 mL    Voided (mL): 600 mL  Total OUT: 1100 mL    Total NET: 726.4 mL          General Appearance: Appears well, NAD  Chest: Breathing comfortably on RA.    CV: S1, S2 @ 93  Abdomen: Soft. Minimally distended (much improved) nontender, steris clean/dry/intact  Extremities: Moves all extremities.    MEDICATIONS  (STANDING):  chlorhexidine 2% Cloths 1 Application(s) Topical daily  heparin   Injectable 5000 Unit(s) SubCutaneous every 8 hours  influenza  Vaccine (HIGH DOSE) 0.7 milliLiter(s) IntraMuscular once  lipid, fat emulsion (Fish Oil and Plant Based) 20% Infusion 10.4 mL/Hr (10.4 mL/Hr) IV Continuous <Continuous>  metoclopramide Injectable 5 milliGRAM(s) IV Push every 6 hours  Parenteral Nutrition - Adult 1 Each (42 mL/Hr) TPN Continuous <Continuous>  simvastatin 40 milliGRAM(s) Oral at bedtime  tamsulosin 0.4 milliGRAM(s) Oral at bedtime    MEDICATIONS  (PRN):  acetaminophen     Tablet .. 975 milliGRAM(s) Oral every 6 hours PRN Mild Pain (1 - 3)      LABS:                        9.9    6.85  )-----------( 371      ( 14 Dec 2023 05:57 )             30.6     12-14    133<L>  |  98  |  7   ----------------------------<  106<H>  4.0   |  26  |  0.81    Ca    7.7<L>      14 Dec 2023 05:57  Phos  3.9     12-14  Mg     2.00     12-14        Urinalysis Basic - ( 14 Dec 2023 05:57 )    Color: x / Appearance: x / SG: x / pH: x  Gluc: 106 mg/dL / Ketone: x  / Bili: x / Urobili: x   Blood: x / Protein: x / Nitrite: x   Leuk Esterase: x / RBC: x / WBC x   Sq Epi: x / Non Sq Epi: x / Bacteria: x

## 2023-12-14 NOTE — CHART NOTE - NSCHARTNOTEFT_GEN_A_CORE
Patient being seen for malnutrition follow up. Spoke with pt and obtained subjective information from extensive chart review.     Current Diet : Diet, Clear Liquid (12-14-23 @ 08:43)    Reported: [x] diarrhea   PO intake:  < 50%    Parenteral Nutrition: TPN 1.5L infusing over 24 hrs (80 gm amino acids, 175 gm dextrose, 40 gm SMOF lipids) to provide 1315 kcal/day (25 kcal/kg and 1.5 gm protein/kg per dosing wt 51 kg)    Glucose Infusion Rate: 2.4 gm/kg/min  Lipid Infusion Rate: 0.78 gm/kg/day; 0.065 gm/kg/hr     Current Weight: 53 kg (12/14)  Height (cm): 175.3   Admit Weight (kg): 51   BMI (kg/m2): 16.6     Nutrition Interval Events: Pt unable to tolerate diet advancement. PICC line was placed with Nutrition Support Team initiating TPN. Pt consuming very little liquids as he has been experiencing ongoing loose BMs and feels very weak and drained. Given that pt has malignant ca with recent surgery, recommend increasing calories of TPN to meet estimated calorie need. Weight trend reflective of fluid shifts with edema presently 1+ scrotal. FS over the past 24 hrs 103 - 111 mg/dl. Pt remains at severe risk for malnutrition based on continued physical evidence of muscle and fat wasting. Continue with PN as per Nutrition Support Team. RDN services to remain available as needed.     __________________ Pertinent Medications__________________   MEDICATIONS  (STANDING):  chlorhexidine 2% Cloths 1 Application(s) Topical daily  heparin   Injectable 5000 Unit(s) SubCutaneous every 8 hours  influenza  Vaccine (HIGH DOSE) 0.7 milliLiter(s) IntraMuscular once  lipid, fat emulsion (Fish Oil and Plant Based) 20% Infusion 16.6 mL/Hr (16.6 mL/Hr) IV Continuous <Continuous>  metoclopramide Injectable 5 milliGRAM(s) IV Push every 6 hours  pantoprazole  Injectable 40 milliGRAM(s) IV Push two times a day  Parenteral Nutrition - Adult 1 Each (63 mL/Hr) TPN Continuous <Continuous>  Parenteral Nutrition - Adult 1 Each (42 mL/Hr) TPN Continuous <Continuous>  simvastatin 40 milliGRAM(s) Oral at bedtime  tamsulosin 0.4 milliGRAM(s) Oral at bedtime    __________________ Pertinent Labs__________________   12-14 Na133 mmol/L<L> Glu 106 mg/dL<H> K+ 4.0 mmol/L Cr  0.81 mg/dL BUN 7 mg/dL 12-14 Phos 3.9 mg/dL 12-14 Chol --    LDL --    HDL --    Trig 91 mg/dL      Skin: Intact    Estimated Needs (based on admit -  wt 51 kg):     1428 - 1632 kcals/day (28-32 kcals/kg)  71 - 82 gms protein/day (1.4-1.6 gms protein/kg)      Previous Nutrition Diagnoses:     Inadequate Protein-Energy Intake  Severe Malnutrition     Nutrition Diagnoses are: [x] ongoing       Goal(s):  1. Patient to meet > 75% estimated energy needs    Recommendations:   1. Suggest increasing calories of TPN to meet estimated calorie need.  2. Oral diet as per surgical team.    Monitoring and Evaluation:   1. Monitor weights, labs, BMs, skin integrity, PN tolerance and edema.  2. RD services to remain available.     Education:    [x] Not warranted at present    Kamila Ortiz, MS, RDN, CDN, CNSC on [place] TEAMS or Pager #[phone] Patient being seen for malnutrition follow up. Spoke with pt and obtained subjective information from extensive chart review.     Current Diet : Diet, Clear Liquid (12-14-23 @ 08:43)    Reported: [x] diarrhea   PO intake:  < 50%    Parenteral Nutrition: TPN 1.5L infusing over 24 hrs (80 gm amino acids, 175 gm dextrose, 40 gm SMOF lipids) to provide 1315 kcal/day (25 kcal/kg and 1.5 gm protein/kg per dosing wt 51 kg)    Glucose Infusion Rate: 2.4 gm/kg/min  Lipid Infusion Rate: 0.78 gm/kg/day; 0.065 gm/kg/hr     Current Weight: 53 kg (12/14)  Height (cm): 175.3   Admit Weight (kg): 51   BMI (kg/m2): 16.6     Nutrition Interval Events: Pt unable to tolerate diet advancement. PICC line was placed with Nutrition Support Team initiating TPN. Pt consuming very little liquids as he has been experiencing ongoing loose BMs and feels very weak and drained. Given that pt has malignant ca with recent surgery, recommend increasing calories of TPN to meet estimated calorie need. Weight trend reflective of fluid shifts with edema presently 1+ scrotal. FS over the past 24 hrs 103 - 111 mg/dl. Pt remains at severe risk for malnutrition based on continued physical evidence of muscle and fat wasting. Continue with PN as per Nutrition Support Team. RDN services to remain available as needed.     __________________ Pertinent Medications__________________   MEDICATIONS  (STANDING):  chlorhexidine 2% Cloths 1 Application(s) Topical daily  heparin   Injectable 5000 Unit(s) SubCutaneous every 8 hours  influenza  Vaccine (HIGH DOSE) 0.7 milliLiter(s) IntraMuscular once  lipid, fat emulsion (Fish Oil and Plant Based) 20% Infusion 16.6 mL/Hr (16.6 mL/Hr) IV Continuous <Continuous>  metoclopramide Injectable 5 milliGRAM(s) IV Push every 6 hours  pantoprazole  Injectable 40 milliGRAM(s) IV Push two times a day  Parenteral Nutrition - Adult 1 Each (63 mL/Hr) TPN Continuous <Continuous>  Parenteral Nutrition - Adult 1 Each (42 mL/Hr) TPN Continuous <Continuous>  simvastatin 40 milliGRAM(s) Oral at bedtime  tamsulosin 0.4 milliGRAM(s) Oral at bedtime    __________________ Pertinent Labs__________________   12-14 Na133 mmol/L<L> Glu 106 mg/dL<H> K+ 4.0 mmol/L Cr  0.81 mg/dL BUN 7 mg/dL 12-14 Phos 3.9 mg/dL 12-14 Chol --    LDL --    HDL --    Trig 91 mg/dL      Skin: Intact    Estimated Needs (based on admit -  wt 51 kg):     1428 - 1632 kcals/day (28-32 kcals/kg)  71 - 82 gms protein/day (1.4-1.6 gms protein/kg)      Previous Nutrition Diagnoses:     Inadequate Protein-Energy Intake  Severe Malnutrition     Nutrition Diagnoses are: [x] ongoing       Goal(s):  1. Patient to meet > 75% estimated energy needs    Recommendations:   1. Suggest increasing calories of TPN to meet estimated calorie need.  2. Oral diet as per surgical team.    Monitoring and Evaluation:   1. Monitor weights, labs, BMs, skin integrity, PN tolerance and edema.  2. RD services to remain available.     Education:    [x] Not warranted at present    Kamila Ortiz, MS, RDN, CDN, CNSC on [place] TEAMS or Pager #[phone]

## 2023-12-15 LAB
ANION GAP SERPL CALC-SCNC: 9 MMOL/L — SIGNIFICANT CHANGE UP (ref 7–14)
ANION GAP SERPL CALC-SCNC: 9 MMOL/L — SIGNIFICANT CHANGE UP (ref 7–14)
BUN SERPL-MCNC: 13 MG/DL — SIGNIFICANT CHANGE UP (ref 7–23)
BUN SERPL-MCNC: 13 MG/DL — SIGNIFICANT CHANGE UP (ref 7–23)
CA-I BLD-SCNC: 1.04 MMOL/L — LOW (ref 1.15–1.29)
CA-I BLD-SCNC: 1.04 MMOL/L — LOW (ref 1.15–1.29)
CALCIUM SERPL-MCNC: 7.6 MG/DL — LOW (ref 8.4–10.5)
CALCIUM SERPL-MCNC: 7.6 MG/DL — LOW (ref 8.4–10.5)
CHLORIDE SERPL-SCNC: 99 MMOL/L — SIGNIFICANT CHANGE UP (ref 98–107)
CHLORIDE SERPL-SCNC: 99 MMOL/L — SIGNIFICANT CHANGE UP (ref 98–107)
CO2 SERPL-SCNC: 26 MMOL/L — SIGNIFICANT CHANGE UP (ref 22–31)
CO2 SERPL-SCNC: 26 MMOL/L — SIGNIFICANT CHANGE UP (ref 22–31)
CREAT SERPL-MCNC: 0.72 MG/DL — SIGNIFICANT CHANGE UP (ref 0.5–1.3)
CREAT SERPL-MCNC: 0.72 MG/DL — SIGNIFICANT CHANGE UP (ref 0.5–1.3)
EGFR: 87 ML/MIN/1.73M2 — SIGNIFICANT CHANGE UP
EGFR: 87 ML/MIN/1.73M2 — SIGNIFICANT CHANGE UP
GLUCOSE BLDC GLUCOMTR-MCNC: 121 MG/DL — HIGH (ref 70–99)
GLUCOSE BLDC GLUCOMTR-MCNC: 121 MG/DL — HIGH (ref 70–99)
GLUCOSE SERPL-MCNC: 100 MG/DL — HIGH (ref 70–99)
GLUCOSE SERPL-MCNC: 100 MG/DL — HIGH (ref 70–99)
HCT VFR BLD CALC: 29.1 % — LOW (ref 39–50)
HCT VFR BLD CALC: 29.1 % — LOW (ref 39–50)
HGB BLD-MCNC: 9.5 G/DL — LOW (ref 13–17)
HGB BLD-MCNC: 9.5 G/DL — LOW (ref 13–17)
MAGNESIUM SERPL-MCNC: 1.9 MG/DL — SIGNIFICANT CHANGE UP (ref 1.6–2.6)
MAGNESIUM SERPL-MCNC: 1.9 MG/DL — SIGNIFICANT CHANGE UP (ref 1.6–2.6)
MCHC RBC-ENTMCNC: 27.3 PG — SIGNIFICANT CHANGE UP (ref 27–34)
MCHC RBC-ENTMCNC: 27.3 PG — SIGNIFICANT CHANGE UP (ref 27–34)
MCHC RBC-ENTMCNC: 32.6 GM/DL — SIGNIFICANT CHANGE UP (ref 32–36)
MCHC RBC-ENTMCNC: 32.6 GM/DL — SIGNIFICANT CHANGE UP (ref 32–36)
MCV RBC AUTO: 83.6 FL — SIGNIFICANT CHANGE UP (ref 80–100)
MCV RBC AUTO: 83.6 FL — SIGNIFICANT CHANGE UP (ref 80–100)
NRBC # BLD: 0 /100 WBCS — SIGNIFICANT CHANGE UP (ref 0–0)
NRBC # BLD: 0 /100 WBCS — SIGNIFICANT CHANGE UP (ref 0–0)
NRBC # FLD: 0 K/UL — SIGNIFICANT CHANGE UP (ref 0–0)
NRBC # FLD: 0 K/UL — SIGNIFICANT CHANGE UP (ref 0–0)
PHOSPHATE SERPL-MCNC: 3 MG/DL — SIGNIFICANT CHANGE UP (ref 2.5–4.5)
PHOSPHATE SERPL-MCNC: 3 MG/DL — SIGNIFICANT CHANGE UP (ref 2.5–4.5)
PLATELET # BLD AUTO: 309 K/UL — SIGNIFICANT CHANGE UP (ref 150–400)
PLATELET # BLD AUTO: 309 K/UL — SIGNIFICANT CHANGE UP (ref 150–400)
POTASSIUM SERPL-MCNC: 3.6 MMOL/L — SIGNIFICANT CHANGE UP (ref 3.5–5.3)
POTASSIUM SERPL-MCNC: 3.6 MMOL/L — SIGNIFICANT CHANGE UP (ref 3.5–5.3)
POTASSIUM SERPL-SCNC: 3.6 MMOL/L — SIGNIFICANT CHANGE UP (ref 3.5–5.3)
POTASSIUM SERPL-SCNC: 3.6 MMOL/L — SIGNIFICANT CHANGE UP (ref 3.5–5.3)
RBC # BLD: 3.48 M/UL — LOW (ref 4.2–5.8)
RBC # BLD: 3.48 M/UL — LOW (ref 4.2–5.8)
RBC # FLD: 14.4 % — SIGNIFICANT CHANGE UP (ref 10.3–14.5)
RBC # FLD: 14.4 % — SIGNIFICANT CHANGE UP (ref 10.3–14.5)
SODIUM SERPL-SCNC: 134 MMOL/L — LOW (ref 135–145)
SODIUM SERPL-SCNC: 134 MMOL/L — LOW (ref 135–145)
WBC # BLD: 7.23 K/UL — SIGNIFICANT CHANGE UP (ref 3.8–10.5)
WBC # BLD: 7.23 K/UL — SIGNIFICANT CHANGE UP (ref 3.8–10.5)
WBC # FLD AUTO: 7.23 K/UL — SIGNIFICANT CHANGE UP (ref 3.8–10.5)
WBC # FLD AUTO: 7.23 K/UL — SIGNIFICANT CHANGE UP (ref 3.8–10.5)

## 2023-12-15 PROCEDURE — 99232 SBSQ HOSP IP/OBS MODERATE 35: CPT

## 2023-12-15 RX ORDER — ELECTROLYTE SOLUTION,INJ
1 VIAL (ML) INTRAVENOUS
Refills: 0 | Status: DISCONTINUED | OUTPATIENT
Start: 2023-12-15 | End: 2023-12-16

## 2023-12-15 RX ORDER — I.V. FAT EMULSION 20 G/100ML
16.6 EMULSION INTRAVENOUS
Qty: 40 | Refills: 0 | Status: DISCONTINUED | OUTPATIENT
Start: 2023-12-15 | End: 2023-12-16

## 2023-12-15 RX ADMIN — HEPARIN SODIUM 5000 UNIT(S): 5000 INJECTION INTRAVENOUS; SUBCUTANEOUS at 20:07

## 2023-12-15 RX ADMIN — TAMSULOSIN HYDROCHLORIDE 0.4 MILLIGRAM(S): 0.4 CAPSULE ORAL at 22:27

## 2023-12-15 RX ADMIN — Medication 5 MILLIGRAM(S): at 12:34

## 2023-12-15 RX ADMIN — Medication 5 MILLIGRAM(S): at 20:08

## 2023-12-15 RX ADMIN — Medication 5 MILLIGRAM(S): at 05:13

## 2023-12-15 RX ADMIN — I.V. FAT EMULSION 16.6 ML/HR: 20 EMULSION INTRAVENOUS at 18:54

## 2023-12-15 RX ADMIN — HEPARIN SODIUM 5000 UNIT(S): 5000 INJECTION INTRAVENOUS; SUBCUTANEOUS at 22:27

## 2023-12-15 RX ADMIN — Medication 1 EACH: at 18:55

## 2023-12-15 RX ADMIN — HEPARIN SODIUM 5000 UNIT(S): 5000 INJECTION INTRAVENOUS; SUBCUTANEOUS at 05:09

## 2023-12-15 RX ADMIN — PANTOPRAZOLE SODIUM 40 MILLIGRAM(S): 20 TABLET, DELAYED RELEASE ORAL at 20:07

## 2023-12-15 RX ADMIN — SIMVASTATIN 40 MILLIGRAM(S): 20 TABLET, FILM COATED ORAL at 22:28

## 2023-12-15 RX ADMIN — Medication 5 MILLIGRAM(S): at 23:20

## 2023-12-15 RX ADMIN — CHLORHEXIDINE GLUCONATE 1 APPLICATION(S): 213 SOLUTION TOPICAL at 12:40

## 2023-12-15 RX ADMIN — PANTOPRAZOLE SODIUM 40 MILLIGRAM(S): 20 TABLET, DELAYED RELEASE ORAL at 05:12

## 2023-12-15 NOTE — PROGRESS NOTE ADULT - SUBJECTIVE AND OBJECTIVE BOX
A Team Surgery Daily Resident Progress Note    OVERNIGHT:  No acute events.     SUBJECTIVE: Pt seen and examined with team on morning rounds. Patient feeling better. Denies nausea/emesis. Bloating improved. Denies pain. +Flatus/+BM, +voiding.    OBJECTIVE:  Vital Signs Last 24 Hrs  T(C): 36.9 (15 Dec 2023 06:00), Max: 36.9 (15 Dec 2023 06:00)  T(F): 98.4 (15 Dec 2023 06:00), Max: 98.4 (15 Dec 2023 06:00)  HR: 97 (15 Dec 2023 06:00) (82 - 97)  BP: 130/57 (15 Dec 2023 06:00) (112/50 - 141/70)  BP(mean): --  RR: 18 (15 Dec 2023 06:00) (16 - 18)  SpO2: 100% (15 Dec 2023 06:00) (96% - 100%)    Parameters below as of 15 Dec 2023 06:00  Patient On (Oxygen Delivery Method): room air      Physical Exam:  General Appearance: Appears well, NAD  Chest: Breathing comfortably on RA.    CV: regular rate   Abdomen: Soft. Minimally distended (much improved) nontender, steris clean/dry/intact  Extremities: Moves all extremities.    lipid, fat emulsion (Fish Oil and Plant Based) 20% Infusion mL/Hr (16.6 mL/Hr)  Parenteral Nutrition - Adult Each (63 mL/Hr)      Medications:  MEDICATIONS  (STANDING):  chlorhexidine 2% Cloths 1 Application(s) Topical daily  heparin   Injectable 5000 Unit(s) SubCutaneous every 8 hours  influenza  Vaccine (HIGH DOSE) 0.7 milliLiter(s) IntraMuscular once  lipid, fat emulsion (Fish Oil and Plant Based) 20% Infusion 16.6 mL/Hr (16.6 mL/Hr) IV Continuous <Continuous>  metoclopramide Injectable 5 milliGRAM(s) IV Push every 6 hours  pantoprazole    Tablet 40 milliGRAM(s) Oral two times a day  Parenteral Nutrition - Adult 1 Each (63 mL/Hr) TPN Continuous <Continuous>  simvastatin 40 milliGRAM(s) Oral at bedtime  tamsulosin 0.4 milliGRAM(s) Oral at bedtime    MEDICATIONS  (PRN):  acetaminophen     Tablet .. 975 milliGRAM(s) Oral every 6 hours PRN Mild Pain (1 - 3)    Allergies:  penicillin (Unknown)  aspirin (Unknown)      Labs:  12-15    134<L>  |  99  |  13  ----------------------------<  100<H>  3.6   |  26  |  0.72    Ca    7.6<L>      15 Dec 2023 05:30  Phos  3.0     12-15  Mg     1.90     12-15                            9.5    7.23  )-----------( 309      ( 15 Dec 2023 05:30 )             29.1

## 2023-12-15 NOTE — OCCUPATIONAL THERAPY INITIAL EVALUATION ADULT - ADDITIONAL COMMENTS
As per patient, independent with ADLs prior to admission, ambulated with a cane at baseline. Pt reports no falls in the past 6 months.

## 2023-12-15 NOTE — OCCUPATIONAL THERAPY INITIAL EVALUATION ADULT - LIVES WITH, PROFILE
Pt lives alone in an apartment with 3 steps to enter. Pt uses a cane and was independent with ADLs. Pt reports no falls in the past 6 months.

## 2023-12-15 NOTE — PROGRESS NOTE ADULT - SUBJECTIVE AND OBJECTIVE BOX
NUTRITION NOTE  [id number] STOMBER  ===============================    Interval events - Patient was seen and examined at bedside, no acute events overnight. Patient denies chest pain, shortness of breath, nausea or vomiting at this time. PICC placed and TPN was started on 23 for nutritional support. Pt is tolerating TPN without any issues. Pt tolerated liquids without any nausea or vomiting and is now on a regular diet.     ROS: Except as noted above, all other systems reviewed and are negative     Allergies  penicillin (Unknown)  aspirin (Unknown)    PAST MEDICAL & SURGICAL HISTORY:  BPH (benign prostatic hyperplasia)  Peripheral neuropathy  HLD (hyperlipidemia)  No significant past surgical history    FAMILY HISTORY:  No pertinent family history in first degree relatives    Vital Signs Last 24 Hrs  T(C): 37.6 (15 Dec 2023 09:40), Max: 37.6 (15 Dec 2023 09:40)  T(F): 99.7 (15 Dec 2023 09:40), Max: 99.7 (15 Dec 2023 09:40)  HR: 107 (15 Dec 2023 09:40) (82 - 107)  BP: 165/75 (15 Dec 2023 09:40) (112/50 - 165/75)  RR: 17 (15 Dec 2023 09:40) (16 - 18)  SpO2: 98% (15 Dec 2023 09:40) (97% - 100%)    MEDICATIONS  (STANDING):  chlorhexidine 2% Cloths 1 Application(s) Topical daily  heparin   Injectable 5000 Unit(s) SubCutaneous every 8 hours  influenza  Vaccine (HIGH DOSE) 0.7 milliLiter(s) IntraMuscular once  lipid, fat emulsion (Fish Oil and Plant Based) 20% Infusion 16.6 mL/Hr (16.6 mL/Hr) IV Continuous <Continuous>  metoclopramide Injectable 5 milliGRAM(s) IV Push every 6 hours  pantoprazole    Tablet 40 milliGRAM(s) Oral two times a day  Parenteral Nutrition - Adult 1 Each (63 mL/Hr) TPN Continuous <Continuous>  Parenteral Nutrition - Adult 1 Each (63 mL/Hr) TPN Continuous <Continuous>  simvastatin 40 milliGRAM(s) Oral at bedtime  tamsulosin 0.4 milliGRAM(s) Oral at bedtime    MEDICATIONS  (PRN):  acetaminophen     Tablet .. 975 milliGRAM(s) Oral every 6 hours PRN Mild Pain (1 - 3)    I&O's Detail    14 Dec 2023 07:01  -  15 Dec 2023 07:00  --------------------------------------------------------  IN:    Fat Emulsion (Fish Oil &amp; Plant Based) 20% Infusion: 215.6 mL    Oral Fluid: 650 mL    TPN (Total Parenteral Nutrition): 1008 mL  Total IN: 1873.6 mL    OUT:    IV PiggyBack: 0 mL    Voided (mL): 1400 mL  Total OUT: 1400 mL    Total NET: 473.6 mL      15 Dec 2023 07:01  -  15 Dec 2023 10:39  --------------------------------------------------------  IN:  Total IN: 0 mL    OUT:    Voided (mL): 300 mL  Total OUT: 300 mL    Total NET: -300 mL    POCT Blood Glucose.: 121 mg/dL (14 Dec 2023 17:49)    Daily Weight in k.2 (15 Dec 2023 06:00)    Drug Dosing Weight  Height (cm): 175.3 (08 Dec 2023 23:06)  Weight (kg): 51 (13 Dec 2023 19:42)  BMI (kg/m2): 16.6 (13 Dec 2023 19:42)  BSA (m2): 1.62 (13 Dec 2023 19:42)    PHYSICAL EXAM:  General Appearance: Appears well, NAD, resting comfortably in bed   Respiratory: Breathing comfortably on room air   Abdomen: softly distended, nontender  Extremities: Moves all extremities, warm   PICC Site: double lumen PICC in E, site clean and dry, [id number]/13/23    Diet: regular diet and TPN/lipids (started on 23)    LABORATORY                                 9.5    7.23  )-----------( 309      ( 15 Dec 2023 05:30 )             29.1   12-15    134<L>  |  99  |  13  ----------------------------<  100<H>  3.6   |  26  |  0.72    Ca    7.6<L>      15 Dec 2023 05:30  Phos  3.0     12-15  Mg     1.90     -15    12-14 Chol -- LDL -- HDL -- Trig 91    ASSESSMENT/PLAN:  89 M w/ PMHx GERD, BPH, HLD, open R IHR, LIH (non repaired), admitted with suprapubic pain. CT w/ distal ascending and proximal transverse thickening, concern for malignancy. CT chest without evidence of metastasis. Patient is s/p laparoscopic extended right hemicolectomy on 23. Nutrition support consult called for initiation of parenteral nutrition in view of severe malnutrition and PO intolerance. Pt was on a PO diet and had emesis, now NPO. Abdominal xray on  show dilated loops of bowel. PICC placed and TPN was initiated on 23 for nutritional support.     continue TPN with infusion volume of 1.5L, TPN will provide 1315 kcal/day    labs reviewed - electrolytes adjusted in TPN bag     monitor fingersticks, obtain daily weights    continue parenteral nutrition at this time, will follow up with primary team on plan    1.  Severe protein calorie malnutrition being optimized with TPN: CHO [175] gm.  AA [80] gm. SMOF Lipids [40] gm.  2.  Hyperglycemia managed with: [0] units of regular insulin    3.  Check fluid balance daily.  Strict I/O  [ ] [ ]   4.  Daily BMP, Ionized Calcium, Magnesium and Phosphorous   5.  Triglycerides at initiation of TPN and monthly 12-14 Chol -- LDL -- HDL -- Trig 91    Nutrition Support [id number]  NUTRITION NOTE  [id number] STOMBER  ===============================    Interval events - Patient was seen and examined at bedside, no acute events overnight. Patient denies chest pain, shortness of breath, nausea or vomiting at this time. PICC placed and TPN was started on 23 for nutritional support. Pt is tolerating TPN without any issues. Pt tolerated liquids without any nausea or vomiting and is now on a regular diet.     ROS: Except as noted above, all other systems reviewed and are negative     Allergies  penicillin (Unknown)  aspirin (Unknown)    PAST MEDICAL & SURGICAL HISTORY:  BPH (benign prostatic hyperplasia)  Peripheral neuropathy  HLD (hyperlipidemia)  No significant past surgical history    FAMILY HISTORY:  No pertinent family history in first degree relatives    Vital Signs Last 24 Hrs  T(C): 37.6 (15 Dec 2023 09:40), Max: 37.6 (15 Dec 2023 09:40)  T(F): 99.7 (15 Dec 2023 09:40), Max: 99.7 (15 Dec 2023 09:40)  HR: 107 (15 Dec 2023 09:40) (82 - 107)  BP: 165/75 (15 Dec 2023 09:40) (112/50 - 165/75)  RR: 17 (15 Dec 2023 09:40) (16 - 18)  SpO2: 98% (15 Dec 2023 09:40) (97% - 100%)    MEDICATIONS  (STANDING):  chlorhexidine 2% Cloths 1 Application(s) Topical daily  heparin   Injectable 5000 Unit(s) SubCutaneous every 8 hours  influenza  Vaccine (HIGH DOSE) 0.7 milliLiter(s) IntraMuscular once  lipid, fat emulsion (Fish Oil and Plant Based) 20% Infusion 16.6 mL/Hr (16.6 mL/Hr) IV Continuous <Continuous>  metoclopramide Injectable 5 milliGRAM(s) IV Push every 6 hours  pantoprazole    Tablet 40 milliGRAM(s) Oral two times a day  Parenteral Nutrition - Adult 1 Each (63 mL/Hr) TPN Continuous <Continuous>  Parenteral Nutrition - Adult 1 Each (63 mL/Hr) TPN Continuous <Continuous>  simvastatin 40 milliGRAM(s) Oral at bedtime  tamsulosin 0.4 milliGRAM(s) Oral at bedtime    MEDICATIONS  (PRN):  acetaminophen     Tablet .. 975 milliGRAM(s) Oral every 6 hours PRN Mild Pain (1 - 3)    I&O's Detail    14 Dec 2023 07:01  -  15 Dec 2023 07:00  --------------------------------------------------------  IN:    Fat Emulsion (Fish Oil &amp; Plant Based) 20% Infusion: 215.6 mL    Oral Fluid: 650 mL    TPN (Total Parenteral Nutrition): 1008 mL  Total IN: 1873.6 mL    OUT:    IV PiggyBack: 0 mL    Voided (mL): 1400 mL  Total OUT: 1400 mL    Total NET: 473.6 mL      15 Dec 2023 07:01  -  15 Dec 2023 10:39  --------------------------------------------------------  IN:  Total IN: 0 mL    OUT:    Voided (mL): 300 mL  Total OUT: 300 mL    Total NET: -300 mL    POCT Blood Glucose.: 121 mg/dL (14 Dec 2023 17:49)    Daily Weight in k.2 (15 Dec 2023 06:00)    Drug Dosing Weight  Height (cm): 175.3 (08 Dec 2023 23:06)  Weight (kg): 51 (13 Dec 2023 19:42)  BMI (kg/m2): 16.6 (13 Dec 2023 19:42)  BSA (m2): 1.62 (13 Dec 2023 19:42)    PHYSICAL EXAM:  General Appearance: Appears well, NAD, resting comfortably in bed   Respiratory: Breathing comfortably on room air   Abdomen: softly distended, nontender  Extremities: Moves all extremities, warm   PICC Site: double lumen PICC in E, site clean and dry, [id number]/13/23    Diet: regular diet and TPN/lipids (started on 23)    LABORATORY                                 9.5    7.23  )-----------( 309      ( 15 Dec 2023 05:30 )             29.1   12-15    134<L>  |  99  |  13  ----------------------------<  100<H>  3.6   |  26  |  0.72    Ca    7.6<L>      15 Dec 2023 05:30  Phos  3.0     12-15  Mg     1.90     -15    12-14 Chol -- LDL -- HDL -- Trig 91    ASSESSMENT/PLAN:  89 M w/ PMHx GERD, BPH, HLD, open R IHR, LIH (non repaired), admitted with suprapubic pain. CT w/ distal ascending and proximal transverse thickening, concern for malignancy. CT chest without evidence of metastasis. Patient is s/p laparoscopic extended right hemicolectomy on 23. Nutrition support consult called for initiation of parenteral nutrition in view of severe malnutrition and PO intolerance. Pt was on a PO diet and had emesis, now NPO. Abdominal xray on  show dilated loops of bowel. PICC placed and TPN was initiated on 23 for nutritional support.     continue TPN with infusion volume of 1.5L, TPN will provide 1315 kcal/day    labs reviewed - electrolytes adjusted in TPN bag     monitor fingersticks, obtain daily weights    continue parenteral nutrition at this time, will follow up with primary team on plan    1.  Severe protein calorie malnutrition being optimized with TPN: CHO [175] gm.  AA [80] gm. SMOF Lipids [40] gm.  2.  Hyperglycemia managed with: [0] units of regular insulin    3.  Check fluid balance daily.  Strict I/O  [ ] [ ]   4.  Daily BMP, Ionized Calcium, Magnesium and Phosphorous   5.  Triglycerides at initiation of TPN and monthly 12-14 Chol -- LDL -- HDL -- Trig 91    Nutrition Support [id number]

## 2023-12-15 NOTE — PROGRESS NOTE ADULT - ASSESSMENT
89 M w/ PMHx GERD, BPH, HLD, open R IHR, LIH (non repaired), presents to ED with suprapubic pain. CT w/ distal ascending and proximal transverse thickening, concern for malignancy. CT chest without evidence of metastasis. CEA 81.9, CA 19-9 225. Colonoscopy done 12/4. Patient currently s/p Laparoscopic extended right hemicolectomy, 12/13/23 PICC and TPN started due to post op ileus (+flatus/+BM but distention/nausea/emesis with PO)    Plan:  - PICC/TPN 12/13/23  - CLD as tolerated /IVF  - D5 1/2 NS w 20 KCl @100  - Protonix BID (home medication for GERD)  - EKG for qtc - pt on standing Reglan  - c-diff negative - taken off contact precautions  - Replete electrolytes as needed  - Pain control - Tylenol  - Pt voiding appropriately - encouraged to utilize bedside urinal  - PT rec restorative rehab  - PM+R rec acute rehab  - OT consult   - continue OOB/ambulate as tolerated     A Team Surgery  [id number].   89 M w/ PMHx GERD, BPH, HLD, open R IHR, LIH (non repaired), presents to ED with suprapubic pain. CT w/ distal ascending and proximal transverse thickening, concern for malignancy. CT chest without evidence of metastasis. CEA 81.9, CA 19-9 225. Colonoscopy done 12/4. Patient currently s/p Laparoscopic extended right hemicolectomy, 12/13/23 PICC and TPN started due to post op ileus (+flatus/+BM but distention/nausea/emesis with PO)    Plan:  - PICC/TPN 12/13/23  - CLD as tolerated /IVF  - D5 1/2 NS w 20 KCl @100  - Protonix BID (home medication for GERD)  - EKG for qtc - pt on standing Reglan  - c-diff negative - taken off contact precautions  - Replete electrolytes as needed  - Pain control - Tylenol  - Pt voiding appropriately - encouraged to utilize bedside urinal  - PT rec restorative rehab  - PM+R rec acute rehab  - OT consult   - continue OOB/ambulate as tolerated     A Team Surgery  [id number].   89 M w/ PMHx GERD, BPH, HLD, open R IHR, LIH (non repaired), presents to ED with suprapubic pain. CT w/ distal ascending and proximal transverse thickening, concern for malignancy. CT chest without evidence of metastasis. CEA 81.9, CA 19-9 225. Colonoscopy done 12/4. Patient currently s/p Laparoscopic extended right hemicolectomy, 12/13/23 PICC and TPN started due to post op ileus (+flatus/+BM but distention/nausea/emesis with PO)    Plan:  - PICC/TPN 12/13/23  - CLD as tolerated  - D5 1/2 NS w 20 KCl @100  - Protonix BID (home medication for GERD)  - EKG for qtc - pt on standing Reglan  - c-diff negative - taken off contact precautions  - Replete electrolytes as needed  - Pain control - Tylenol  - Pt voiding appropriately - encouraged to utilize bedside urinal  - PT rec restorative rehab  - PM+R rec acute rehab  - OT consult   - continue OOB/ambulate as tolerated     A Team Surgery  [id number].   89 M w/ PMHx GERD, BPH, HLD, open R IHR, LIH (non repaired), presents to ED with suprapubic pain. CT w/ distal ascending and proximal transverse thickening, concern for malignancy. CT chest without evidence of metastasis. CEA 81.9, CA 19-9 225. Colonoscopy done 12/4. Patient currently s/p Laparoscopic extended right hemicolectomy, 12/13/23 PICC and TPN started due to post op ileus (+flatus/+BM but distention/nausea/emesis with PO)    Plan:  - PICC/TPN 12/13/23  - CLD as tolerated  - D5 1/2 NS w 20 KCl @100  - Protonix BID (home medication for GERD)  - EKG for qtc - pt on standing Reglan  - c-diff negative - taken off contact precautions  - Replete electrolytes as needed  - Pain control - Tylenol  - Pt voiding appropriately - encouraged to utilize bedside urinal  - PT rec restorative rehab  - PM+R rec acute rehab  - OT consult   - continue OOB/ambulate as tolerated     A Team Surgery  [id number].

## 2023-12-15 NOTE — OCCUPATIONAL THERAPY INITIAL EVALUATION ADULT - GENERAL OBSERVATIONS, REHAB EVAL
Upon entry, patient semi-supine in bed, patient agreeable to OT eval, cleared for OT eval as per RODOLFO Pickering, aware and present. Patient left semi-supine in bed, call bell in reach, all lines/tubes intact +PICC line left UE intact, bed alarm activated, vitals stable. Vitals: sp02 99%.

## 2023-12-15 NOTE — OCCUPATIONAL THERAPY INITIAL EVALUATION ADULT - REFERRAL TO ANOTHER SERVICE NEEDED, OT EVAL
Called to follow up on BP readings. He was recently in ED for LE swelling, states he is doubling up on his Lasix. Due to recent med changes, will follow up with patient in a couple weeks once his HF meds are stabilized. BiVP was 88%, overall not terrible but will continue to closely follow before his next EP OV.   physical therapy

## 2023-12-15 NOTE — OCCUPATIONAL THERAPY INITIAL EVALUATION ADULT - PERTINENT HX OF CURRENT PROBLEM, REHAB EVAL
Patient is a 89 year old male, s/p Laparoscopic extended right hemicolectomy. Patient past medical history of GERD, BPH, HLD, open R IHR, LIH (non repaired), presents to ED with suprapubic pain. CT w/ distal ascending and proximal transverse thickening, concern for malignancy. CT chest without evidence of metastasis. CEA 81.9, CA 19-9 225. Colonoscopy done 12/4. Patient currently s/p Laparoscopic extended right hemicolectomy.

## 2023-12-16 LAB
ANION GAP SERPL CALC-SCNC: 7 MMOL/L — SIGNIFICANT CHANGE UP (ref 7–14)
ANION GAP SERPL CALC-SCNC: 7 MMOL/L — SIGNIFICANT CHANGE UP (ref 7–14)
BASOPHILS # BLD AUTO: 0.01 K/UL — SIGNIFICANT CHANGE UP (ref 0–0.2)
BASOPHILS # BLD AUTO: 0.01 K/UL — SIGNIFICANT CHANGE UP (ref 0–0.2)
BASOPHILS NFR BLD AUTO: 0.2 % — SIGNIFICANT CHANGE UP (ref 0–2)
BASOPHILS NFR BLD AUTO: 0.2 % — SIGNIFICANT CHANGE UP (ref 0–2)
BUN SERPL-MCNC: 16 MG/DL — SIGNIFICANT CHANGE UP (ref 7–23)
BUN SERPL-MCNC: 16 MG/DL — SIGNIFICANT CHANGE UP (ref 7–23)
CA-I BLD-SCNC: 1.1 MMOL/L — LOW (ref 1.15–1.29)
CA-I BLD-SCNC: 1.1 MMOL/L — LOW (ref 1.15–1.29)
CALCIUM SERPL-MCNC: 7.8 MG/DL — LOW (ref 8.4–10.5)
CALCIUM SERPL-MCNC: 7.8 MG/DL — LOW (ref 8.4–10.5)
CHLORIDE SERPL-SCNC: 98 MMOL/L — SIGNIFICANT CHANGE UP (ref 98–107)
CHLORIDE SERPL-SCNC: 98 MMOL/L — SIGNIFICANT CHANGE UP (ref 98–107)
CO2 SERPL-SCNC: 27 MMOL/L — SIGNIFICANT CHANGE UP (ref 22–31)
CO2 SERPL-SCNC: 27 MMOL/L — SIGNIFICANT CHANGE UP (ref 22–31)
CREAT SERPL-MCNC: 0.67 MG/DL — SIGNIFICANT CHANGE UP (ref 0.5–1.3)
CREAT SERPL-MCNC: 0.67 MG/DL — SIGNIFICANT CHANGE UP (ref 0.5–1.3)
EGFR: 89 ML/MIN/1.73M2 — SIGNIFICANT CHANGE UP
EGFR: 89 ML/MIN/1.73M2 — SIGNIFICANT CHANGE UP
EOSINOPHIL # BLD AUTO: 0.01 K/UL — SIGNIFICANT CHANGE UP (ref 0–0.5)
EOSINOPHIL # BLD AUTO: 0.01 K/UL — SIGNIFICANT CHANGE UP (ref 0–0.5)
EOSINOPHIL NFR BLD AUTO: 0.2 % — SIGNIFICANT CHANGE UP (ref 0–6)
EOSINOPHIL NFR BLD AUTO: 0.2 % — SIGNIFICANT CHANGE UP (ref 0–6)
GLUCOSE BLDC GLUCOMTR-MCNC: 110 MG/DL — HIGH (ref 70–99)
GLUCOSE BLDC GLUCOMTR-MCNC: 110 MG/DL — HIGH (ref 70–99)
GLUCOSE BLDC GLUCOMTR-MCNC: 115 MG/DL — HIGH (ref 70–99)
GLUCOSE BLDC GLUCOMTR-MCNC: 115 MG/DL — HIGH (ref 70–99)
GLUCOSE BLDC GLUCOMTR-MCNC: 98 MG/DL — SIGNIFICANT CHANGE UP (ref 70–99)
GLUCOSE BLDC GLUCOMTR-MCNC: 98 MG/DL — SIGNIFICANT CHANGE UP (ref 70–99)
GLUCOSE SERPL-MCNC: 103 MG/DL — HIGH (ref 70–99)
GLUCOSE SERPL-MCNC: 103 MG/DL — HIGH (ref 70–99)
HCT VFR BLD CALC: 27.1 % — LOW (ref 39–50)
HCT VFR BLD CALC: 27.1 % — LOW (ref 39–50)
HGB BLD-MCNC: 8.8 G/DL — LOW (ref 13–17)
HGB BLD-MCNC: 8.8 G/DL — LOW (ref 13–17)
IANC: 4.23 K/UL — SIGNIFICANT CHANGE UP (ref 1.8–7.4)
IANC: 4.23 K/UL — SIGNIFICANT CHANGE UP (ref 1.8–7.4)
IMM GRANULOCYTES NFR BLD AUTO: 0.4 % — SIGNIFICANT CHANGE UP (ref 0–0.9)
IMM GRANULOCYTES NFR BLD AUTO: 0.4 % — SIGNIFICANT CHANGE UP (ref 0–0.9)
LYMPHOCYTES # BLD AUTO: 0.56 K/UL — LOW (ref 1–3.3)
LYMPHOCYTES # BLD AUTO: 0.56 K/UL — LOW (ref 1–3.3)
LYMPHOCYTES # BLD AUTO: 10.2 % — LOW (ref 13–44)
LYMPHOCYTES # BLD AUTO: 10.2 % — LOW (ref 13–44)
MAGNESIUM SERPL-MCNC: 2.1 MG/DL — SIGNIFICANT CHANGE UP (ref 1.6–2.6)
MAGNESIUM SERPL-MCNC: 2.1 MG/DL — SIGNIFICANT CHANGE UP (ref 1.6–2.6)
MCHC RBC-ENTMCNC: 26.8 PG — LOW (ref 27–34)
MCHC RBC-ENTMCNC: 26.8 PG — LOW (ref 27–34)
MCHC RBC-ENTMCNC: 32.5 GM/DL — SIGNIFICANT CHANGE UP (ref 32–36)
MCHC RBC-ENTMCNC: 32.5 GM/DL — SIGNIFICANT CHANGE UP (ref 32–36)
MCV RBC AUTO: 82.6 FL — SIGNIFICANT CHANGE UP (ref 80–100)
MCV RBC AUTO: 82.6 FL — SIGNIFICANT CHANGE UP (ref 80–100)
MONOCYTES # BLD AUTO: 0.67 K/UL — SIGNIFICANT CHANGE UP (ref 0–0.9)
MONOCYTES # BLD AUTO: 0.67 K/UL — SIGNIFICANT CHANGE UP (ref 0–0.9)
MONOCYTES NFR BLD AUTO: 12.2 % — SIGNIFICANT CHANGE UP (ref 2–14)
MONOCYTES NFR BLD AUTO: 12.2 % — SIGNIFICANT CHANGE UP (ref 2–14)
NEUTROPHILS # BLD AUTO: 4.23 K/UL — SIGNIFICANT CHANGE UP (ref 1.8–7.4)
NEUTROPHILS # BLD AUTO: 4.23 K/UL — SIGNIFICANT CHANGE UP (ref 1.8–7.4)
NEUTROPHILS NFR BLD AUTO: 76.8 % — SIGNIFICANT CHANGE UP (ref 43–77)
NEUTROPHILS NFR BLD AUTO: 76.8 % — SIGNIFICANT CHANGE UP (ref 43–77)
NRBC # BLD: 0 /100 WBCS — SIGNIFICANT CHANGE UP (ref 0–0)
NRBC # BLD: 0 /100 WBCS — SIGNIFICANT CHANGE UP (ref 0–0)
NRBC # FLD: 0 K/UL — SIGNIFICANT CHANGE UP (ref 0–0)
NRBC # FLD: 0 K/UL — SIGNIFICANT CHANGE UP (ref 0–0)
PHOSPHATE SERPL-MCNC: 2.7 MG/DL — SIGNIFICANT CHANGE UP (ref 2.5–4.5)
PHOSPHATE SERPL-MCNC: 2.7 MG/DL — SIGNIFICANT CHANGE UP (ref 2.5–4.5)
PLATELET # BLD AUTO: 223 K/UL — SIGNIFICANT CHANGE UP (ref 150–400)
PLATELET # BLD AUTO: 223 K/UL — SIGNIFICANT CHANGE UP (ref 150–400)
POTASSIUM SERPL-MCNC: 3.5 MMOL/L — SIGNIFICANT CHANGE UP (ref 3.5–5.3)
POTASSIUM SERPL-MCNC: 3.5 MMOL/L — SIGNIFICANT CHANGE UP (ref 3.5–5.3)
POTASSIUM SERPL-SCNC: 3.5 MMOL/L — SIGNIFICANT CHANGE UP (ref 3.5–5.3)
POTASSIUM SERPL-SCNC: 3.5 MMOL/L — SIGNIFICANT CHANGE UP (ref 3.5–5.3)
RBC # BLD: 3.28 M/UL — LOW (ref 4.2–5.8)
RBC # BLD: 3.28 M/UL — LOW (ref 4.2–5.8)
RBC # FLD: 14.4 % — SIGNIFICANT CHANGE UP (ref 10.3–14.5)
RBC # FLD: 14.4 % — SIGNIFICANT CHANGE UP (ref 10.3–14.5)
SODIUM SERPL-SCNC: 132 MMOL/L — LOW (ref 135–145)
SODIUM SERPL-SCNC: 132 MMOL/L — LOW (ref 135–145)
WBC # BLD: 5.5 K/UL — SIGNIFICANT CHANGE UP (ref 3.8–10.5)
WBC # BLD: 5.5 K/UL — SIGNIFICANT CHANGE UP (ref 3.8–10.5)
WBC # FLD AUTO: 5.5 K/UL — SIGNIFICANT CHANGE UP (ref 3.8–10.5)
WBC # FLD AUTO: 5.5 K/UL — SIGNIFICANT CHANGE UP (ref 3.8–10.5)

## 2023-12-16 PROCEDURE — 71045 X-RAY EXAM CHEST 1 VIEW: CPT | Mod: 26

## 2023-12-16 PROCEDURE — 99232 SBSQ HOSP IP/OBS MODERATE 35: CPT

## 2023-12-16 RX ORDER — SODIUM,POTASSIUM PHOSPHATES 278-250MG
1 POWDER IN PACKET (EA) ORAL
Refills: 0 | Status: COMPLETED | OUTPATIENT
Start: 2023-12-16 | End: 2023-12-16

## 2023-12-16 RX ADMIN — PANTOPRAZOLE SODIUM 40 MILLIGRAM(S): 20 TABLET, DELAYED RELEASE ORAL at 05:36

## 2023-12-16 RX ADMIN — SIMVASTATIN 40 MILLIGRAM(S): 20 TABLET, FILM COATED ORAL at 22:42

## 2023-12-16 RX ADMIN — Medication 1 TABLET(S): at 13:42

## 2023-12-16 RX ADMIN — PANTOPRAZOLE SODIUM 40 MILLIGRAM(S): 20 TABLET, DELAYED RELEASE ORAL at 17:23

## 2023-12-16 RX ADMIN — HEPARIN SODIUM 5000 UNIT(S): 5000 INJECTION INTRAVENOUS; SUBCUTANEOUS at 05:36

## 2023-12-16 RX ADMIN — HEPARIN SODIUM 5000 UNIT(S): 5000 INJECTION INTRAVENOUS; SUBCUTANEOUS at 13:42

## 2023-12-16 RX ADMIN — TAMSULOSIN HYDROCHLORIDE 0.4 MILLIGRAM(S): 0.4 CAPSULE ORAL at 22:42

## 2023-12-16 RX ADMIN — HEPARIN SODIUM 5000 UNIT(S): 5000 INJECTION INTRAVENOUS; SUBCUTANEOUS at 22:42

## 2023-12-16 RX ADMIN — Medication 1 TABLET(S): at 11:17

## 2023-12-16 RX ADMIN — Medication 5 MILLIGRAM(S): at 05:36

## 2023-12-16 RX ADMIN — Medication 5 MILLIGRAM(S): at 17:19

## 2023-12-16 RX ADMIN — CHLORHEXIDINE GLUCONATE 1 APPLICATION(S): 213 SOLUTION TOPICAL at 11:04

## 2023-12-16 NOTE — PROGRESS NOTE ADULT - SUBJECTIVE AND OBJECTIVE BOX
NUTRITION NOTE  [id number] STOMBER  ===============================    Interval events - Patient was seen and examined at bedside, no acute events overnight. Patient denies chest pain, shortness of breath, nausea or vomiting at this time. PICC placed and TPN was started on 23 for nutritional support. Pt is tolerating TPN without any issues. Pt tolerated liquids without any nausea or vomiting and is now on a regular diet. Parenteral nutrition orders d/c'ed by primary team this morning.     ROS: Except as noted above, all other systems reviewed and are negative     Allergies  penicillin (Unknown)  aspirin (Unknown)    PAST MEDICAL & SURGICAL HISTORY:  BPH (benign prostatic hyperplasia)  Peripheral neuropathy  HLD (hyperlipidemia)  No significant past surgical history    FAMILY HISTORY:  No pertinent family history in first degree relatives    Vital Signs Last 24 Hrs  T(C): 37.2 (16 Dec 2023 06:00), Max: 37.6 (15 Dec 2023 09:40)  T(F): 99 (16 Dec 2023 06:00), Max: 99.7 (15 Dec 2023 09:40)  HR: 76 (16 Dec 2023 06:00) (76 - 107)  BP: 136/64 (16 Dec 2023 06:00) (122/99 - 165/75)  RR: 18 (16 Dec 2023 06:00) (17 - 18)  SpO2: 98% (16 Dec 2023 06:00) (96% - 98%)    MEDICATIONS  (STANDING):  chlorhexidine 2% Cloths 1 Application(s) Topical daily  heparin   Injectable 5000 Unit(s) SubCutaneous every 8 hours  influenza  Vaccine (HIGH DOSE) 0.7 milliLiter(s) IntraMuscular once  metoclopramide Injectable 5 milliGRAM(s) IV Push every 6 hours  pantoprazole    Tablet 40 milliGRAM(s) Oral two times a day  simvastatin 40 milliGRAM(s) Oral at bedtime  tamsulosin 0.4 milliGRAM(s) Oral at bedtime    MEDICATIONS  (PRN):  acetaminophen     Tablet .. 975 milliGRAM(s) Oral every 6 hours PRN Mild Pain (1 - 3)    I&O's Detail    15 Dec 2023 07:01  -  16 Dec 2023 07:00  --------------------------------------------------------  IN:    Fat Emulsion (Fish Oil &amp; Plant Based) 20% Infusion: 199.2 mL    Oral Fluid: 50 mL    TPN (Total Parenteral Nutrition): 504 mL  Total IN: 753.2 mL    OUT:    Voided (mL): 1000 mL  Total OUT: 1000 mL    Total NET: -246.8 mL    POCT Blood Glucose.: 110 mg/dL (16 Dec 2023 06:17)  POCT Blood Glucose.: 121 mg/dL (15 Dec 2023 17:29)    Daily Weight in k.9 (16 Dec 2023 06:00)    Drug Dosing Weight  Height (cm): 175.3 (08 Dec 2023 23:06)  Weight (kg): 51 (13 Dec 2023 19:42)  BMI (kg/m2): 16.6 (13 Dec 2023 19:42)  BSA (m2): 1.62 (13 Dec 2023 19:42)    PHYSICAL EXAM:  General Appearance: Appears well, NAD, resting comfortably in bed   Respiratory: Breathing comfortably on room air   Abdomen: softly distended, nontender  Extremities: Moves all extremities, warm   PICC Site: double lumen PICC in E, site clean and dry, [id number]    Diet: regular diet and TPN/lipids (started on 23, d/c on 23)    LABORATORY                                          8.8    5.50  )-----------( 223      ( 16 Dec 2023 07:37 )             27.1   -    132<L>  |  98  |  16  ----------------------------<  103<H>  3.5   |  27  |  0.67    Ca    7.8<L>      16 Dec 2023 07:37  Phos  2.7     12-16  Mg     2.10     -    12-14 Chol -- LDL -- HDL -- Trig 91    ASSESSMENT/PLAN:  89 M w/ PMHx GERD, BPH, HLD, open R IHR, LIH (non repaired), admitted with suprapubic pain. CT w/ distal ascending and proximal transverse thickening, concern for malignancy. CT chest without evidence of metastasis. Patient is s/p laparoscopic extended right hemicolectomy on 23. Nutrition support consult called for initiation of parenteral nutrition in view of severe malnutrition and PO intolerance. Pt was on a PO diet and had emesis, now NPO. Abdominal xray on  show dilated loops of bowel. PICC placed and TPN was initiated on 23 for nutritional support.     Pt is tolerating PO diet without any nausea or vomiting, parenteral nutrition orders discontinued by primary team.    remove PICC, continue to monitor PO intake and tolerance to diet    plan d/w primary team    Nutrition Support [id number]  NUTRITION NOTE  [id number] STOMBER  ===============================    Interval events - Patient was seen and examined at bedside, no acute events overnight. Patient denies chest pain, shortness of breath, nausea or vomiting at this time. PICC placed and TPN was started on 23 for nutritional support. Pt is tolerating TPN without any issues. Pt tolerated liquids without any nausea or vomiting and is now on a regular diet. Parenteral nutrition orders d/c'ed by primary team this morning.     ROS: Except as noted above, all other systems reviewed and are negative     Allergies  penicillin (Unknown)  aspirin (Unknown)    PAST MEDICAL & SURGICAL HISTORY:  BPH (benign prostatic hyperplasia)  Peripheral neuropathy  HLD (hyperlipidemia)  No significant past surgical history    FAMILY HISTORY:  No pertinent family history in first degree relatives    Vital Signs Last 24 Hrs  T(C): 37.2 (16 Dec 2023 06:00), Max: 37.6 (15 Dec 2023 09:40)  T(F): 99 (16 Dec 2023 06:00), Max: 99.7 (15 Dec 2023 09:40)  HR: 76 (16 Dec 2023 06:00) (76 - 107)  BP: 136/64 (16 Dec 2023 06:00) (122/99 - 165/75)  RR: 18 (16 Dec 2023 06:00) (17 - 18)  SpO2: 98% (16 Dec 2023 06:00) (96% - 98%)    MEDICATIONS  (STANDING):  chlorhexidine 2% Cloths 1 Application(s) Topical daily  heparin   Injectable 5000 Unit(s) SubCutaneous every 8 hours  influenza  Vaccine (HIGH DOSE) 0.7 milliLiter(s) IntraMuscular once  metoclopramide Injectable 5 milliGRAM(s) IV Push every 6 hours  pantoprazole    Tablet 40 milliGRAM(s) Oral two times a day  simvastatin 40 milliGRAM(s) Oral at bedtime  tamsulosin 0.4 milliGRAM(s) Oral at bedtime    MEDICATIONS  (PRN):  acetaminophen     Tablet .. 975 milliGRAM(s) Oral every 6 hours PRN Mild Pain (1 - 3)    I&O's Detail    15 Dec 2023 07:01  -  16 Dec 2023 07:00  --------------------------------------------------------  IN:    Fat Emulsion (Fish Oil &amp; Plant Based) 20% Infusion: 199.2 mL    Oral Fluid: 50 mL    TPN (Total Parenteral Nutrition): 504 mL  Total IN: 753.2 mL    OUT:    Voided (mL): 1000 mL  Total OUT: 1000 mL    Total NET: -246.8 mL    POCT Blood Glucose.: 110 mg/dL (16 Dec 2023 06:17)  POCT Blood Glucose.: 121 mg/dL (15 Dec 2023 17:29)    Daily Weight in k.9 (16 Dec 2023 06:00)    Drug Dosing Weight  Height (cm): 175.3 (08 Dec 2023 23:06)  Weight (kg): 51 (13 Dec 2023 19:42)  BMI (kg/m2): 16.6 (13 Dec 2023 19:42)  BSA (m2): 1.62 (13 Dec 2023 19:42)    PHYSICAL EXAM:  General Appearance: Appears well, NAD, resting comfortably in bed   Respiratory: Breathing comfortably on room air   Abdomen: softly distended, nontender  Extremities: Moves all extremities, warm   PICC Site: double lumen PICC in E, site clean and dry, [id number]    Diet: regular diet and TPN/lipids (started on 23, d/c on 23)    LABORATORY                                          8.8    5.50  )-----------( 223      ( 16 Dec 2023 07:37 )             27.1   -    132<L>  |  98  |  16  ----------------------------<  103<H>  3.5   |  27  |  0.67    Ca    7.8<L>      16 Dec 2023 07:37  Phos  2.7     12-16  Mg     2.10     -    12-14 Chol -- LDL -- HDL -- Trig 91    ASSESSMENT/PLAN:  89 M w/ PMHx GERD, BPH, HLD, open R IHR, LIH (non repaired), admitted with suprapubic pain. CT w/ distal ascending and proximal transverse thickening, concern for malignancy. CT chest without evidence of metastasis. Patient is s/p laparoscopic extended right hemicolectomy on 23. Nutrition support consult called for initiation of parenteral nutrition in view of severe malnutrition and PO intolerance. Pt was on a PO diet and had emesis, now NPO. Abdominal xray on  show dilated loops of bowel. PICC placed and TPN was initiated on 23 for nutritional support.     Pt is tolerating PO diet without any nausea or vomiting, parenteral nutrition orders discontinued by primary team.    remove PICC, continue to monitor PO intake and tolerance to diet    plan d/w primary team    Nutrition Support [id number]  NUTRITION NOTE  [id number] STOMBER  ===============================    Interval events - Patient was seen and examined at bedside, no acute events overnight. Patient denies chest pain, shortness of breath, nausea or vomiting at this time. PICC placed and TPN was started on 23 for nutritional support. Pt tolerated liquids without any nausea or vomiting and is now on a regular diet. Parenteral nutrition orders d/c'ed by primary team this morning.     ROS: Except as noted above, all other systems reviewed and are negative     Allergies  penicillin (Unknown)  aspirin (Unknown)    PAST MEDICAL & SURGICAL HISTORY:  BPH (benign prostatic hyperplasia)  Peripheral neuropathy  HLD (hyperlipidemia)  No significant past surgical history    FAMILY HISTORY:  No pertinent family history in first degree relatives    Vital Signs Last 24 Hrs  T(C): 37.2 (16 Dec 2023 06:00), Max: 37.6 (15 Dec 2023 09:40)  T(F): 99 (16 Dec 2023 06:00), Max: 99.7 (15 Dec 2023 09:40)  HR: 76 (16 Dec 2023 06:00) (76 - 107)  BP: 136/64 (16 Dec 2023 06:00) (122/99 - 165/75)  RR: 18 (16 Dec 2023 06:00) (17 - 18)  SpO2: 98% (16 Dec 2023 06:00) (96% - 98%)    MEDICATIONS  (STANDING):  chlorhexidine 2% Cloths 1 Application(s) Topical daily  heparin   Injectable 5000 Unit(s) SubCutaneous every 8 hours  influenza  Vaccine (HIGH DOSE) 0.7 milliLiter(s) IntraMuscular once  metoclopramide Injectable 5 milliGRAM(s) IV Push every 6 hours  pantoprazole    Tablet 40 milliGRAM(s) Oral two times a day  simvastatin 40 milliGRAM(s) Oral at bedtime  tamsulosin 0.4 milliGRAM(s) Oral at bedtime    MEDICATIONS  (PRN):  acetaminophen     Tablet .. 975 milliGRAM(s) Oral every 6 hours PRN Mild Pain (1 - 3)    I&O's Detail    15 Dec 2023 07:01  -  16 Dec 2023 07:00  --------------------------------------------------------  IN:    Fat Emulsion (Fish Oil &amp; Plant Based) 20% Infusion: 199.2 mL    Oral Fluid: 50 mL    TPN (Total Parenteral Nutrition): 504 mL  Total IN: 753.2 mL    OUT:    Voided (mL): 1000 mL  Total OUT: 1000 mL    Total NET: -246.8 mL    POCT Blood Glucose.: 110 mg/dL (16 Dec 2023 06:17)  POCT Blood Glucose.: 121 mg/dL (15 Dec 2023 17:29)    Daily Weight in k.9 (16 Dec 2023 06:00)    Drug Dosing Weight  Height (cm): 175.3 (08 Dec 2023 23:06)  Weight (kg): 51 (13 Dec 2023 19:42)  BMI (kg/m2): 16.6 (13 Dec 2023 19:42)  BSA (m2): 1.62 (13 Dec 2023 19:42)    PHYSICAL EXAM:  General Appearance: Appears well, NAD, resting comfortably in bed   Respiratory: Breathing comfortably on room air   Abdomen: softly distended, nontender  Extremities: Moves all extremities, warm   PICC Site: double lumen PICC in E, site clean and dry, [id number]    Diet: regular diet and TPN/lipids (started on 23, d/c on 23)    LABORATORY                                          8.8    5.50  )-----------( 223      ( 16 Dec 2023 07:37 )             27.1       132<L>  |  98  |  16  ----------------------------<  103<H>  3.5   |  27  |  0.67    Ca    7.8<L>      16 Dec 2023 07:37  Phos  2.7     12-16  Mg     2.10     -    12-14 Chol -- LDL -- HDL -- Trig 91    ASSESSMENT/PLAN:  89 M w/ PMHx GERD, BPH, HLD, open R IHR, LIH (non repaired), admitted with suprapubic pain. CT w/ distal ascending and proximal transverse thickening, concern for malignancy. CT chest without evidence of metastasis. Patient is s/p laparoscopic extended right hemicolectomy on 23. Nutrition support consult called for initiation of parenteral nutrition in view of severe malnutrition and PO intolerance. Pt was on a PO diet and had emesis, now NPO. Abdominal xray on  show dilated loops of bowel. PICC placed and TPN was initiated on 23 for nutritional support.     Pt is tolerating PO diet without any nausea or vomiting, parenteral nutrition orders discontinued by primary team.    remove PICC, continue to monitor PO intake and tolerance to diet    plan d/w primary team    Nutrition Support [id number]  NUTRITION NOTE  [id number] STOMBER  ===============================    Interval events - Patient was seen and examined at bedside, no acute events overnight. Patient denies chest pain, shortness of breath, nausea or vomiting at this time. PICC placed and TPN was started on 23 for nutritional support. Pt tolerated liquids without any nausea or vomiting and is now on a regular diet. Parenteral nutrition orders d/c'ed by primary team this morning.     ROS: Except as noted above, all other systems reviewed and are negative     Allergies  penicillin (Unknown)  aspirin (Unknown)    PAST MEDICAL & SURGICAL HISTORY:  BPH (benign prostatic hyperplasia)  Peripheral neuropathy  HLD (hyperlipidemia)  No significant past surgical history    FAMILY HISTORY:  No pertinent family history in first degree relatives    Vital Signs Last 24 Hrs  T(C): 37.2 (16 Dec 2023 06:00), Max: 37.6 (15 Dec 2023 09:40)  T(F): 99 (16 Dec 2023 06:00), Max: 99.7 (15 Dec 2023 09:40)  HR: 76 (16 Dec 2023 06:00) (76 - 107)  BP: 136/64 (16 Dec 2023 06:00) (122/99 - 165/75)  RR: 18 (16 Dec 2023 06:00) (17 - 18)  SpO2: 98% (16 Dec 2023 06:00) (96% - 98%)    MEDICATIONS  (STANDING):  chlorhexidine 2% Cloths 1 Application(s) Topical daily  heparin   Injectable 5000 Unit(s) SubCutaneous every 8 hours  influenza  Vaccine (HIGH DOSE) 0.7 milliLiter(s) IntraMuscular once  metoclopramide Injectable 5 milliGRAM(s) IV Push every 6 hours  pantoprazole    Tablet 40 milliGRAM(s) Oral two times a day  simvastatin 40 milliGRAM(s) Oral at bedtime  tamsulosin 0.4 milliGRAM(s) Oral at bedtime    MEDICATIONS  (PRN):  acetaminophen     Tablet .. 975 milliGRAM(s) Oral every 6 hours PRN Mild Pain (1 - 3)    I&O's Detail    15 Dec 2023 07:01  -  16 Dec 2023 07:00  --------------------------------------------------------  IN:    Fat Emulsion (Fish Oil &amp; Plant Based) 20% Infusion: 199.2 mL    Oral Fluid: 50 mL    TPN (Total Parenteral Nutrition): 504 mL  Total IN: 753.2 mL    OUT:    Voided (mL): 1000 mL  Total OUT: 1000 mL    Total NET: -246.8 mL    POCT Blood Glucose.: 110 mg/dL (16 Dec 2023 06:17)  POCT Blood Glucose.: 121 mg/dL (15 Dec 2023 17:29)    Daily Weight in k.9 (16 Dec 2023 06:00)    Drug Dosing Weight  Height (cm): 175.3 (08 Dec 2023 23:06)  Weight (kg): 51 (13 Dec 2023 19:42)  BMI (kg/m2): 16.6 (13 Dec 2023 19:42)  BSA (m2): 1.62 (13 Dec 2023 19:42)    PHYSICAL EXAM:  General Appearance: Appears well, NAD, resting comfortably in bed   Respiratory: Breathing comfortably on room air   Abdomen: softly distended, nontender  Extremities: Moves all extremities, warm   PICC Site: double lumen PICC in E, site clean and dry, [id number]    Diet: regular diet and TPN/lipids (started on 23, d/c on 23)    LABORATORY                                          8.8    5.50  )-----------( 223      ( 16 Dec 2023 07:37 )             27.1       132<L>  |  98  |  16  ----------------------------<  103<H>  3.5   |  27  |  0.67    Ca    7.8<L>      16 Dec 2023 07:37  Phos  2.7     12-16  Mg     2.10     -    12-14 Chol -- LDL -- HDL -- Trig 91    ASSESSMENT/PLAN:  89 M w/ PMHx GERD, BPH, HLD, open R IHR, LIH (non repaired), admitted with suprapubic pain. CT w/ distal ascending and proximal transverse thickening, concern for malignancy. CT chest without evidence of metastasis. Patient is s/p laparoscopic extended right hemicolectomy on 23. Nutrition support consult called for initiation of parenteral nutrition in view of severe malnutrition and PO intolerance. Pt was on a PO diet and had emesis, now NPO. Abdominal xray on  show dilated loops of bowel. PICC placed and TPN was initiated on 23 for nutritional support.     Pt is tolerating PO diet without any nausea or vomiting, parenteral nutrition orders discontinued by primary team.    remove PICC, continue to monitor PO intake and tolerance to diet    plan d/w primary team    Nutrition Support [id number]

## 2023-12-16 NOTE — PROGRESS NOTE ADULT - ASSESSMENT
89 M w/ PMHx GERD, BPH, HLD, open R IHR, LIH (non repaired), presents to ED with suprapubic pain. CT w/ distal ascending and proximal transverse thickening, concern for malignancy. CT chest without evidence of metastasis. CEA 81.9, CA 19-9 225. Colonoscopy done 12/4. Patient currently s/p Laparoscopic extended right hemicolectomy, 12/13/23 PICC and TPN started due to post op ileus (+flatus/+BM but distention/nausea/emesis with PO)    Plan:  - D/c TPN today   - Diet: Regular  - Protonix BID (home medication for GERD)  - EKG for qtc - pt on standing Reglan  - c-diff negative - taken off contact precautions  - Replete electrolytes as needed  - Pain control - Tylenol  - Pt voiding appropriately - encouraged to utilize bedside urinal  - PT rec restorative rehab  - PM+R rec acute rehab  - OT consult   - continue OOB/ambulate as tolerated     A Team Surgery  [id number]. 89 M w/ PMHx GERD, BPH, HLD, open R IHR, LIH (non repaired), presents to ED with suprapubic pain. CT w/ distal ascending and proximal transverse thickening, concern for malignancy. CT chest without evidence of metastasis. CEA 81.9, CA 19-9 225. Colonoscopy done 12/4. Patient currently s/p Laparoscopic extended right hemicolectomy, 12/13/23 PICC and TPN started due to post op ileus (+flatus/+BM but distention/nausea/emesis with PO)    Plan:  - D/c TPN today   - Diet: Regular  - Protonix BID (home medication for GERD)  - EKG for qtc - pt on standing Reglan  - c-diff negative - taken off contact precautions  - Replete electrolytes as needed  - Pain control - Tylenol  - Pt voiding appropriately - encouraged to utilize bedside urinal  - PT rec restorative rehab  - PM+R rec acute rehab  - OT consult   - continue OOB/ambulate as tolerated     A Team Surgery  [id number].

## 2023-12-16 NOTE — PROGRESS NOTE ADULT - SUBJECTIVE AND OBJECTIVE BOX
A Team Surgery Daily Resident Progress Note    OVERNIGHT:  No acute events.     SUBJECTIVE: Pt seen and examined with team on morning rounds. Patient feeling better. Tolerating regular diet w/o nausea or vomits. Denies nausea/emesis. Bloating improved. Denies pain. +Flatus/+BM, +voiding.    OBJECTIVE:  Vital Signs Last 24 Hrs  T(C): 36.6 (16 Dec 2023 09:38), Max: 37.4 (15 Dec 2023 16:57)  T(F): 97.8 (16 Dec 2023 09:38), Max: 99.4 (15 Dec 2023 16:57)  HR: 85 (16 Dec 2023 09:38) (76 - 100)  BP: 136/75 (16 Dec 2023 09:38) (122/99 - 141/68)  BP(mean): --  RR: 18 (16 Dec 2023 09:38) (17 - 18)  SpO2: 98% (16 Dec 2023 09:38) (96% - 98%)    Parameters below as of 16 Dec 2023 09:38  Patient On (Oxygen Delivery Method): room air      Physical Exam:  General Appearance: Appears well, NAD  Chest: Breathing comfortably on RA.    CV: regular rate   Abdomen: Soft. Minimally distended (much improved) nontender, steris clean/dry/intact  Extremities: Moves all extremities.        Medications:  MEDICATIONS  (STANDING):  chlorhexidine 2% Cloths 1 Application(s) Topical daily  heparin   Injectable 5000 Unit(s) SubCutaneous every 8 hours  influenza  Vaccine (HIGH DOSE) 0.7 milliLiter(s) IntraMuscular once  metoclopramide Injectable 5 milliGRAM(s) IV Push every 6 hours  pantoprazole    Tablet 40 milliGRAM(s) Oral two times a day  simvastatin 40 milliGRAM(s) Oral at bedtime  tamsulosin 0.4 milliGRAM(s) Oral at bedtime    MEDICATIONS  (PRN):  acetaminophen     Tablet .. 975 milliGRAM(s) Oral every 6 hours PRN Mild Pain (1 - 3)    Allergies:  penicillin (Unknown)  aspirin (Unknown)      Labs:  12-16    132<L>  |  98  |  16  ----------------------------<  103<H>  3.5   |  27  |  0.67    Ca    7.8<L>      16 Dec 2023 07:37  Phos  2.7     12-16  Mg     2.10     12-16                            8.8    5.50  )-----------( 223      ( 16 Dec 2023 07:37 )             27.1

## 2023-12-17 LAB
ANION GAP SERPL CALC-SCNC: 9 MMOL/L — SIGNIFICANT CHANGE UP (ref 7–14)
ANION GAP SERPL CALC-SCNC: 9 MMOL/L — SIGNIFICANT CHANGE UP (ref 7–14)
BUN SERPL-MCNC: 16 MG/DL — SIGNIFICANT CHANGE UP (ref 7–23)
BUN SERPL-MCNC: 16 MG/DL — SIGNIFICANT CHANGE UP (ref 7–23)
CALCIUM SERPL-MCNC: 7.9 MG/DL — LOW (ref 8.4–10.5)
CALCIUM SERPL-MCNC: 7.9 MG/DL — LOW (ref 8.4–10.5)
CHLORIDE SERPL-SCNC: 99 MMOL/L — SIGNIFICANT CHANGE UP (ref 98–107)
CHLORIDE SERPL-SCNC: 99 MMOL/L — SIGNIFICANT CHANGE UP (ref 98–107)
CO2 SERPL-SCNC: 25 MMOL/L — SIGNIFICANT CHANGE UP (ref 22–31)
CO2 SERPL-SCNC: 25 MMOL/L — SIGNIFICANT CHANGE UP (ref 22–31)
CREAT SERPL-MCNC: 0.63 MG/DL — SIGNIFICANT CHANGE UP (ref 0.5–1.3)
CREAT SERPL-MCNC: 0.63 MG/DL — SIGNIFICANT CHANGE UP (ref 0.5–1.3)
EGFR: 90 ML/MIN/1.73M2 — SIGNIFICANT CHANGE UP
EGFR: 90 ML/MIN/1.73M2 — SIGNIFICANT CHANGE UP
GLUCOSE SERPL-MCNC: 82 MG/DL — SIGNIFICANT CHANGE UP (ref 70–99)
GLUCOSE SERPL-MCNC: 82 MG/DL — SIGNIFICANT CHANGE UP (ref 70–99)
HCT VFR BLD CALC: 27.7 % — LOW (ref 39–50)
HCT VFR BLD CALC: 27.7 % — LOW (ref 39–50)
HGB BLD-MCNC: 8.9 G/DL — LOW (ref 13–17)
HGB BLD-MCNC: 8.9 G/DL — LOW (ref 13–17)
MAGNESIUM SERPL-MCNC: 1.9 MG/DL — SIGNIFICANT CHANGE UP (ref 1.6–2.6)
MAGNESIUM SERPL-MCNC: 1.9 MG/DL — SIGNIFICANT CHANGE UP (ref 1.6–2.6)
MCHC RBC-ENTMCNC: 27.1 PG — SIGNIFICANT CHANGE UP (ref 27–34)
MCHC RBC-ENTMCNC: 27.1 PG — SIGNIFICANT CHANGE UP (ref 27–34)
MCHC RBC-ENTMCNC: 32.1 GM/DL — SIGNIFICANT CHANGE UP (ref 32–36)
MCHC RBC-ENTMCNC: 32.1 GM/DL — SIGNIFICANT CHANGE UP (ref 32–36)
MCV RBC AUTO: 84.2 FL — SIGNIFICANT CHANGE UP (ref 80–100)
MCV RBC AUTO: 84.2 FL — SIGNIFICANT CHANGE UP (ref 80–100)
NRBC # BLD: 0 /100 WBCS — SIGNIFICANT CHANGE UP (ref 0–0)
NRBC # BLD: 0 /100 WBCS — SIGNIFICANT CHANGE UP (ref 0–0)
NRBC # FLD: 0 K/UL — SIGNIFICANT CHANGE UP (ref 0–0)
NRBC # FLD: 0 K/UL — SIGNIFICANT CHANGE UP (ref 0–0)
PHOSPHATE SERPL-MCNC: 2.5 MG/DL — SIGNIFICANT CHANGE UP (ref 2.5–4.5)
PHOSPHATE SERPL-MCNC: 2.5 MG/DL — SIGNIFICANT CHANGE UP (ref 2.5–4.5)
PLATELET # BLD AUTO: 222 K/UL — SIGNIFICANT CHANGE UP (ref 150–400)
PLATELET # BLD AUTO: 222 K/UL — SIGNIFICANT CHANGE UP (ref 150–400)
POTASSIUM SERPL-MCNC: 3.6 MMOL/L — SIGNIFICANT CHANGE UP (ref 3.5–5.3)
POTASSIUM SERPL-MCNC: 3.6 MMOL/L — SIGNIFICANT CHANGE UP (ref 3.5–5.3)
POTASSIUM SERPL-SCNC: 3.6 MMOL/L — SIGNIFICANT CHANGE UP (ref 3.5–5.3)
POTASSIUM SERPL-SCNC: 3.6 MMOL/L — SIGNIFICANT CHANGE UP (ref 3.5–5.3)
RBC # BLD: 3.29 M/UL — LOW (ref 4.2–5.8)
RBC # BLD: 3.29 M/UL — LOW (ref 4.2–5.8)
RBC # FLD: 14.6 % — HIGH (ref 10.3–14.5)
RBC # FLD: 14.6 % — HIGH (ref 10.3–14.5)
SODIUM SERPL-SCNC: 133 MMOL/L — LOW (ref 135–145)
SODIUM SERPL-SCNC: 133 MMOL/L — LOW (ref 135–145)
WBC # BLD: 6.34 K/UL — SIGNIFICANT CHANGE UP (ref 3.8–10.5)
WBC # BLD: 6.34 K/UL — SIGNIFICANT CHANGE UP (ref 3.8–10.5)
WBC # FLD AUTO: 6.34 K/UL — SIGNIFICANT CHANGE UP (ref 3.8–10.5)
WBC # FLD AUTO: 6.34 K/UL — SIGNIFICANT CHANGE UP (ref 3.8–10.5)

## 2023-12-17 RX ORDER — SODIUM,POTASSIUM PHOSPHATES 278-250MG
1 POWDER IN PACKET (EA) ORAL ONCE
Refills: 0 | Status: COMPLETED | OUTPATIENT
Start: 2023-12-17 | End: 2023-12-17

## 2023-12-17 RX ORDER — MAGNESIUM SULFATE 500 MG/ML
1 VIAL (ML) INJECTION ONCE
Refills: 0 | Status: COMPLETED | OUTPATIENT
Start: 2023-12-17 | End: 2023-12-17

## 2023-12-17 RX ADMIN — HEPARIN SODIUM 5000 UNIT(S): 5000 INJECTION INTRAVENOUS; SUBCUTANEOUS at 22:22

## 2023-12-17 RX ADMIN — HEPARIN SODIUM 5000 UNIT(S): 5000 INJECTION INTRAVENOUS; SUBCUTANEOUS at 05:54

## 2023-12-17 RX ADMIN — Medication 5 MILLIGRAM(S): at 11:57

## 2023-12-17 RX ADMIN — TAMSULOSIN HYDROCHLORIDE 0.4 MILLIGRAM(S): 0.4 CAPSULE ORAL at 22:23

## 2023-12-17 RX ADMIN — SIMVASTATIN 40 MILLIGRAM(S): 20 TABLET, FILM COATED ORAL at 22:23

## 2023-12-17 RX ADMIN — Medication 1 TABLET(S): at 20:09

## 2023-12-17 RX ADMIN — HEPARIN SODIUM 5000 UNIT(S): 5000 INJECTION INTRAVENOUS; SUBCUTANEOUS at 13:06

## 2023-12-17 RX ADMIN — Medication 5 MILLIGRAM(S): at 05:55

## 2023-12-17 RX ADMIN — Medication 5 MILLIGRAM(S): at 23:24

## 2023-12-17 RX ADMIN — Medication 100 GRAM(S): at 17:42

## 2023-12-17 RX ADMIN — PANTOPRAZOLE SODIUM 40 MILLIGRAM(S): 20 TABLET, DELAYED RELEASE ORAL at 17:16

## 2023-12-17 RX ADMIN — PANTOPRAZOLE SODIUM 40 MILLIGRAM(S): 20 TABLET, DELAYED RELEASE ORAL at 05:55

## 2023-12-17 RX ADMIN — Medication 5 MILLIGRAM(S): at 17:09

## 2023-12-17 NOTE — PROGRESS NOTE ADULT - SUBJECTIVE AND OBJECTIVE BOX
A Team Surgery Daily Resident Progress Note    OVERNIGHT:  No acute events.     SUBJECTIVE:  Pt seen and examined with team on morning rounds. Patient feeling better. Tolerating regular diet w/o nausea or vomits. Denies nausea/emesis. Bloating improved. Denies pain. +Flatus/-BM, +voiding.    OBJECTIVE:  Vital Signs Last 24 Hrs  T(C): 36.8 (17 Dec 2023 09:55), Max: 37.6 (16 Dec 2023 17:10)  T(F): 98.3 (17 Dec 2023 09:55), Max: 99.6 (16 Dec 2023 17:10)  HR: 68 (17 Dec 2023 09:55) (68 - 99)  BP: 143/69 (17 Dec 2023 09:55) (114/61 - 153/88)  BP(mean): --  RR: 18 (17 Dec 2023 09:55) (18 - 19)  SpO2: 95% (17 Dec 2023 09:55) (95% - 100%)    Parameters below as of 17 Dec 2023 09:55  Patient On (Oxygen Delivery Method): room air      Physical Exam:  General Appearance: Appears well, NAD  Chest: Breathing comfortably on RA.    CV: regular rate   Abdomen: Soft. Minimally distended (much improved) nontender, steris clean/dry/intact  Extremities: Moves all extremities.        Medications:  MEDICATIONS  (STANDING):  heparin   Injectable 5000 Unit(s) SubCutaneous every 8 hours  influenza  Vaccine (HIGH DOSE) 0.7 milliLiter(s) IntraMuscular once  metoclopramide Injectable 5 milliGRAM(s) IV Push every 6 hours  pantoprazole    Tablet 40 milliGRAM(s) Oral two times a day  simvastatin 40 milliGRAM(s) Oral at bedtime  tamsulosin 0.4 milliGRAM(s) Oral at bedtime    MEDICATIONS  (PRN):  acetaminophen     Tablet .. 975 milliGRAM(s) Oral every 6 hours PRN Mild Pain (1 - 3)    Allergies:  penicillin (Unknown)  aspirin (Unknown)      Labs:  12-17    133<L>  |  99  |  16  ----------------------------<  82  3.6   |  25  |  0.63    Ca    7.9<L>      17 Dec 2023 05:35  Phos  2.5     12-17  Mg     1.90     12-17                            8.9    6.34  )-----------( 222      ( 17 Dec 2023 05:35 )             27.7

## 2023-12-17 NOTE — PROGRESS NOTE ADULT - ASSESSMENT
89 M w/ PMHx GERD, BPH, HLD, open R IHR, LIH (non repaired), presents to ED with suprapubic pain. CT w/ distal ascending and proximal transverse thickening, concern for malignancy. CT chest without evidence of metastasis. CEA 81.9, CA 19-9 225. Colonoscopy done 12/4. Patient currently s/p Laparoscopic extended right hemicolectomy, 12/13/23 PICC and TPN started due to post op ileus (+flatus/+BM but distention/nausea/emesis with PO)    Plan:  - Diet: Regular  - Protonix BID (home medication for GERD)  - EKG for qtc - pt on standing Reglan  - c-diff negative - taken off contact precautions  - Replete electrolytes as needed  - Pain control - Tylenol  - Pt voiding appropriately - encouraged to utilize bedside urinal  - PT rec restorative rehab  - PM+R rec acute rehab  - OT consult   - continue OOB/ambulate as tolerated  - ISS  - Dispo planning    A Team Surgery  [id number]. 89 M w/ PMHx GERD, BPH, HLD, open R IHR, LIH (non repaired), presents to ED with suprapubic pain. CT w/ distal ascending and proximal transverse thickening, concern for malignancy. CT chest without evidence of metastasis. CEA 81.9, CA 19-9 225. Colonoscopy done 12/4. Patient currently s/p Laparoscopic extended right hemicolectomy, 12/13/23 PICC and TPN started due to post op ileus (+flatus/+BM but distention/nausea/emesis with PO)    Plan:  - Diet: Regular  - Protonix BID (home medication for GERD)  - EKG for qtc - pt on standing Reglan  - c-diff negative - taken off contact precautions  - Replete electrolytes as needed  - Pain control - Tylenol  - Pt voiding appropriately - encouraged to utilize bedside urinal  - PT rec restorative rehab  - PM+R rec acute rehab  - OT consult   - continue OOB/ambulate as tolerated  - ISS  - Dispo planning    A Team Surgery  [id number].

## 2023-12-18 LAB
ANION GAP SERPL CALC-SCNC: 13 MMOL/L — SIGNIFICANT CHANGE UP (ref 7–14)
ANION GAP SERPL CALC-SCNC: 13 MMOL/L — SIGNIFICANT CHANGE UP (ref 7–14)
BUN SERPL-MCNC: 14 MG/DL — SIGNIFICANT CHANGE UP (ref 7–23)
BUN SERPL-MCNC: 14 MG/DL — SIGNIFICANT CHANGE UP (ref 7–23)
CALCIUM SERPL-MCNC: 7.8 MG/DL — LOW (ref 8.4–10.5)
CALCIUM SERPL-MCNC: 7.8 MG/DL — LOW (ref 8.4–10.5)
CHLORIDE SERPL-SCNC: 98 MMOL/L — SIGNIFICANT CHANGE UP (ref 98–107)
CHLORIDE SERPL-SCNC: 98 MMOL/L — SIGNIFICANT CHANGE UP (ref 98–107)
CO2 SERPL-SCNC: 23 MMOL/L — SIGNIFICANT CHANGE UP (ref 22–31)
CO2 SERPL-SCNC: 23 MMOL/L — SIGNIFICANT CHANGE UP (ref 22–31)
CREAT SERPL-MCNC: 0.66 MG/DL — SIGNIFICANT CHANGE UP (ref 0.5–1.3)
CREAT SERPL-MCNC: 0.66 MG/DL — SIGNIFICANT CHANGE UP (ref 0.5–1.3)
EGFR: 89 ML/MIN/1.73M2 — SIGNIFICANT CHANGE UP
EGFR: 89 ML/MIN/1.73M2 — SIGNIFICANT CHANGE UP
GLUCOSE SERPL-MCNC: 85 MG/DL — SIGNIFICANT CHANGE UP (ref 70–99)
GLUCOSE SERPL-MCNC: 85 MG/DL — SIGNIFICANT CHANGE UP (ref 70–99)
HCT VFR BLD CALC: 27 % — LOW (ref 39–50)
HCT VFR BLD CALC: 27 % — LOW (ref 39–50)
HGB BLD-MCNC: 8.7 G/DL — LOW (ref 13–17)
HGB BLD-MCNC: 8.7 G/DL — LOW (ref 13–17)
MAGNESIUM SERPL-MCNC: 1.9 MG/DL — SIGNIFICANT CHANGE UP (ref 1.6–2.6)
MAGNESIUM SERPL-MCNC: 1.9 MG/DL — SIGNIFICANT CHANGE UP (ref 1.6–2.6)
MCHC RBC-ENTMCNC: 26.2 PG — LOW (ref 27–34)
MCHC RBC-ENTMCNC: 26.2 PG — LOW (ref 27–34)
MCHC RBC-ENTMCNC: 32.2 GM/DL — SIGNIFICANT CHANGE UP (ref 32–36)
MCHC RBC-ENTMCNC: 32.2 GM/DL — SIGNIFICANT CHANGE UP (ref 32–36)
MCV RBC AUTO: 81.3 FL — SIGNIFICANT CHANGE UP (ref 80–100)
MCV RBC AUTO: 81.3 FL — SIGNIFICANT CHANGE UP (ref 80–100)
NRBC # BLD: 0 /100 WBCS — SIGNIFICANT CHANGE UP (ref 0–0)
NRBC # BLD: 0 /100 WBCS — SIGNIFICANT CHANGE UP (ref 0–0)
NRBC # FLD: 0 K/UL — SIGNIFICANT CHANGE UP (ref 0–0)
NRBC # FLD: 0 K/UL — SIGNIFICANT CHANGE UP (ref 0–0)
PHOSPHATE SERPL-MCNC: 2.7 MG/DL — SIGNIFICANT CHANGE UP (ref 2.5–4.5)
PHOSPHATE SERPL-MCNC: 2.7 MG/DL — SIGNIFICANT CHANGE UP (ref 2.5–4.5)
PLATELET # BLD AUTO: 237 K/UL — SIGNIFICANT CHANGE UP (ref 150–400)
PLATELET # BLD AUTO: 237 K/UL — SIGNIFICANT CHANGE UP (ref 150–400)
POTASSIUM SERPL-MCNC: 3.6 MMOL/L — SIGNIFICANT CHANGE UP (ref 3.5–5.3)
POTASSIUM SERPL-MCNC: 3.6 MMOL/L — SIGNIFICANT CHANGE UP (ref 3.5–5.3)
POTASSIUM SERPL-SCNC: 3.6 MMOL/L — SIGNIFICANT CHANGE UP (ref 3.5–5.3)
POTASSIUM SERPL-SCNC: 3.6 MMOL/L — SIGNIFICANT CHANGE UP (ref 3.5–5.3)
RBC # BLD: 3.32 M/UL — LOW (ref 4.2–5.8)
RBC # BLD: 3.32 M/UL — LOW (ref 4.2–5.8)
RBC # FLD: 14.5 % — SIGNIFICANT CHANGE UP (ref 10.3–14.5)
RBC # FLD: 14.5 % — SIGNIFICANT CHANGE UP (ref 10.3–14.5)
SODIUM SERPL-SCNC: 134 MMOL/L — LOW (ref 135–145)
SODIUM SERPL-SCNC: 134 MMOL/L — LOW (ref 135–145)
WBC # BLD: 5.03 K/UL — SIGNIFICANT CHANGE UP (ref 3.8–10.5)
WBC # BLD: 5.03 K/UL — SIGNIFICANT CHANGE UP (ref 3.8–10.5)
WBC # FLD AUTO: 5.03 K/UL — SIGNIFICANT CHANGE UP (ref 3.8–10.5)
WBC # FLD AUTO: 5.03 K/UL — SIGNIFICANT CHANGE UP (ref 3.8–10.5)

## 2023-12-18 PROCEDURE — 99232 SBSQ HOSP IP/OBS MODERATE 35: CPT

## 2023-12-18 RX ORDER — METOCLOPRAMIDE HCL 10 MG
5 TABLET ORAL EVERY 6 HOURS
Refills: 0 | Status: DISCONTINUED | OUTPATIENT
Start: 2023-12-18 | End: 2023-12-19

## 2023-12-18 RX ORDER — SODIUM,POTASSIUM PHOSPHATES 278-250MG
1 POWDER IN PACKET (EA) ORAL
Refills: 0 | Status: COMPLETED | OUTPATIENT
Start: 2023-12-18 | End: 2023-12-18

## 2023-12-18 RX ORDER — POTASSIUM CHLORIDE 20 MEQ
40 PACKET (EA) ORAL ONCE
Refills: 0 | Status: COMPLETED | OUTPATIENT
Start: 2023-12-18 | End: 2023-12-19

## 2023-12-18 RX ADMIN — PANTOPRAZOLE SODIUM 40 MILLIGRAM(S): 20 TABLET, DELAYED RELEASE ORAL at 06:07

## 2023-12-18 RX ADMIN — Medication 1 TABLET(S): at 09:03

## 2023-12-18 RX ADMIN — TAMSULOSIN HYDROCHLORIDE 0.4 MILLIGRAM(S): 0.4 CAPSULE ORAL at 22:07

## 2023-12-18 RX ADMIN — Medication 1 TABLET(S): at 17:56

## 2023-12-18 RX ADMIN — Medication 5 MILLIGRAM(S): at 06:07

## 2023-12-18 RX ADMIN — HEPARIN SODIUM 5000 UNIT(S): 5000 INJECTION INTRAVENOUS; SUBCUTANEOUS at 14:08

## 2023-12-18 RX ADMIN — HEPARIN SODIUM 5000 UNIT(S): 5000 INJECTION INTRAVENOUS; SUBCUTANEOUS at 22:15

## 2023-12-18 RX ADMIN — Medication 5 MILLIGRAM(S): at 11:34

## 2023-12-18 RX ADMIN — SIMVASTATIN 40 MILLIGRAM(S): 20 TABLET, FILM COATED ORAL at 22:09

## 2023-12-18 RX ADMIN — Medication 5 MILLIGRAM(S): at 17:56

## 2023-12-18 RX ADMIN — PANTOPRAZOLE SODIUM 40 MILLIGRAM(S): 20 TABLET, DELAYED RELEASE ORAL at 17:56

## 2023-12-18 RX ADMIN — HEPARIN SODIUM 5000 UNIT(S): 5000 INJECTION INTRAVENOUS; SUBCUTANEOUS at 06:07

## 2023-12-18 NOTE — PROGRESS NOTE ADULT - SUBJECTIVE AND OBJECTIVE BOX
TEAM [ A ] Surgery Daily Progress Note  =====================================================    SUBJECTIVE: Patient seen and examined at bedside on AM rounds. Patient reports that they're feeling well this morning. Limited appetite. Tolerating some diet, denies nausea, vomiting. Encouraged more eating, +/+. OOB/Amublating as tolerated. Denies fever, chills.    PMH:   PAST MEDICAL & SURGICAL HISTORY:  BPH (benign prostatic hyperplasia)      Peripheral neuropathy      HLD (hyperlipidemia)      No significant past surgical history          ALLERGIES:  penicillin (Unknown)  aspirin (Unknown)      --------------------------------------------------------------------------------------    MEDICATIONS:    Neurologic Medications  acetaminophen     Tablet .. 975 milliGRAM(s) Oral every 6 hours PRN Mild Pain (1 - 3)    Respiratory Medications    Cardiovascular Medications    Gastrointestinal Medications  metoclopramide 5 milliGRAM(s) Oral every 6 hours  pantoprazole    Tablet 40 milliGRAM(s) Oral two times a day  potassium chloride    Tablet ER 40 milliEquivalent(s) Oral once  potassium phosphate / sodium phosphate Tablet (K-PHOS No. 2) 1 Tablet(s) Oral two times a day    Genitourinary Medications  tamsulosin 0.4 milliGRAM(s) Oral at bedtime    Hematologic/Oncologic Medications  heparin   Injectable 5000 Unit(s) SubCutaneous every 8 hours  influenza  Vaccine (HIGH DOSE) 0.7 milliLiter(s) IntraMuscular once    Antimicrobial/Immunologic Medications    Endocrine/Metabolic Medications  simvastatin 40 milliGRAM(s) Oral at bedtime    Topical/Other Medications    --------------------------------------------------------------------------------------    VITAL SIGNS:    T(C): 36.8 (12-18-23 @ 06:00), Max: 37.2 (12-17-23 @ 17:30)  HR: 80 (12-18-23 @ 06:00) (67 - 97)  BP: 136/57 (12-18-23 @ 06:00) (131/82 - 160/90)  RR: 18 (12-18-23 @ 06:00) (18 - 18)  SpO2: 98% (12-18-23 @ 06:00) (95% - 99%)    --------------------------------------------------------------------------------------    Physical Exam:  General Appearance: Appears well, NAD  Chest: Breathing comfortably on RA.    CV: regular rate   Abdomen: Soft. Minimally distended (much improved) nontender, steris clean/dry/intact  Extremities: Moves all extremities.    --------------------------------------------------------------------------------------    LABS                          8.7    5.03  )-----------( 237      ( 18 Dec 2023 06:53 )             27.0   12-18    134<L>  |  98  |  14  ----------------------------<  85  3.6   |  23  |  0.66    Ca    7.8<L>      18 Dec 2023 06:53  Phos  2.7     12-18  Mg     1.90     12-18            --------------------------------------------------------------------------------------    MELIA AND JUAN:    12-17-23 @ 07:01  -  12-18-23 @ 07:00  --------------------------------------------------------  IN: 880 mL / OUT: 550 mL / NET: 330 mL        --------------------------------------------------------------------------------------

## 2023-12-18 NOTE — PROGRESS NOTE ADULT - ASSESSMENT
89 M w/ PMHx GERD, BPH, HLD, open R IHR, LIH (non repaired), presents to ED with suprapubic pain. CT w/ distal ascending and proximal transverse thickening, concern for malignancy. CT chest without evidence of metastasis. CEA 81.9, CA 19-9 225. Colonoscopy done 12/4. Patient currently s/p Laparoscopic extended right hemicolectomy, 12/13/23 PICC and TPN started due to post op ileus (+flatus/+BM but distention/nausea/emesis with PO). S/p TPN. Jyothi regular    Plan:  - Diet: Regular  - Protonix BID (home medication for GERD)  - EKG for qtc - pt on standing Reglan  - c-diff negative - taken off contact precautions  - Replete electrolytes as needed  - Pain control - Tylenol  - OOB and IS  - Pt voiding appropriately - encouraged to utilize bedside urinal. Continued to urinate into bed chux x2 per pt.   - Dispo: Acute rehab planning - pending acute rehab placement      [facility] Team Surgery  [id number].   89 M w/ PMHx GERD, BPH, HLD, open R IHR, LIH (non repaired), presents to ED with suprapubic pain. CT w/ distal ascending and proximal transverse thickening, concern for malignancy. CT chest without evidence of metastasis. CEA 81.9, CA 19-9 225. Colonoscopy done 12/4. Patient currently s/p Laparoscopic extended right hemicolectomy, 12/13/23 PICC and TPN started due to post op ileus (+flatus/+BM but distention/nausea/emesis with PO). S/p TPN. Jyothi regular    Plan:  - Diet: Regular  - Protonix BID (home medication for GERD)  - EKG for qtc - pt on standing Reglan  - c-diff negative - taken off contact precautions  - Replete electrolytes as needed  - Pain control - Tylenol  - OOB and IS  - Pt voiding appropriately - encouraged to utilize bedside urinal. Continued to urinate into bed chux x2 per pt.   - Dispo: Acute rehab planning - pending acute rehab placement      [facility] Surgery  [id number].

## 2023-12-18 NOTE — CHART NOTE - NSCHARTNOTEFT_GEN_A_CORE
PICC line removed (confirmed 38cc out from L basilic vein 12/18 ~10am. Held pressure for 3-5 mins. hemostatic. No complications/collection. covered by gauze and tegaderm.    Adriel Cho PGY-1  A team [id number] PICC line removed (confirmed 38cc out from L basilic vein 12/18 ~10am. Held pressure for 3-5 mins. hemostatic. No complications/collection. covered by gauze and tegaderm.    Adriel Cho PGY-1  A team [id number]

## 2023-12-18 NOTE — CHART NOTE - NSCHARTNOTESELECT_GEN_ALL_CORE
Nutrition follow-up note/Nutrition Services
PICC removal
Event Note
Nutrition Follow Up/Nutrition Services
Event Note
POSTOP CHECK
Pre-IR Note/Event Note
Medical Clearance/Event Note

## 2023-12-18 NOTE — PROGRESS NOTE ADULT - SUBJECTIVE AND OBJECTIVE BOX
Patient is a 89y old  Male who presents with a chief complaint of c/f colon cancer (18 Dec 2023 09:36)      HPI:  89 yoM with hx of BPH, HLD, B/L inguinal hernia repair with known L recurrence, colonoscopy and endoscopy in sept for anemia on oral iron presents with abdominal pain and constipation over the past three days. Patient states that he hasn't had a bowel movement in the past three days but that he has been passing lots of gas. In the past day started having nausea and vomiting and wasn't able to eat anything then eventually started dry heaving. Denies any blood in vomit. Last BM was brown and regular appearing.     In addition he denies any chest pain, SOB, HA, back pain, LEG swelling, neck pain, recent vision changes, lightheadedness, and has otherwise been feeling healthy.     In the ED patient had labs showing elevated lactate 4.5 that improved to 1.5 s/p IVF hydration. CT abdomen/pelvis c/f primary neoplasm of the ascending and transverse colon. GI consulted and recommended colonoscopy tomorrow for biopsy.  (30 Nov 2023 18:18)      REVIEW OF SYSTEMS  Constitutional - No fever, No weight loss, No fatigue  HEENT - No eye pain, No visual disturbances, No difficulty hearing, No tinnitus, No vertigo, No neck pain  Respiratory - No cough, No wheezing, No shortness of breath  Cardiovascular - No chest pain, No palpitations  Gastrointestinal - No abdominal pain, No nausea, No vomiting, No diarrhea, No constipation  Genitourinary - No dysuria, No frequency, No hematuria, No incontinence  Neurological - No headaches, No memory loss, No loss of strength, No numbness, No tremors  Skin - No itching, No rashes, No lesions   Endocrine - No temperature intolerance  Musculoskeletal - No joint pain, No joint swelling, No muscle pain  Psychiatric - No depression, No anxiety    PAST MEDICAL & SURGICAL HISTORY  No pertinent past medical history    BPH (benign prostatic hyperplasia)    Peripheral neuropathy    HLD (hyperlipidemia)    No significant past surgical history            CURRENT FUNCTIONAL STATUS  12/18 Bed Mobility  Bed Mobility Training Rehab Potential: good, to achieve stated therapy goals  Bed Mobility Training Symptoms Noted During/After Treatment: fatigue  Bed Mobility Training Rolling/Turning: supervsion;  supervision  Bed Mobility Training Sit-to-Supine: supervsion;  supervision  Bed Mobility Training Limitations: decreased strength    Sit-Stand Transfer Training  Sit-to-Stand Transfer Training Rehab Potential: good, to achieve stated therapy goals  Sit-to-Stand Transfer Training Symptoms Noted During/After Treatment: fatigue  Transfer Training Sit-to-Stand Transfer: minimum assist (75% patient effort);  contact guard;  1 person assist;  full weight-bearing  Sit-to-Stand Transfer Training Transfer Safety Analysis: impaired balance;  decreased strength;  decreased ROM;  decreased flexibility    Gait Training  Gait Training Rehab Potential: good, to achieve stated therapy goals  Gait Training Symptoms Noted During/After Treatment: fatigue  Gait Training: contact guard;  minimum assist (75% patient effort);  1 person assist;  full weight-bearing   rolling walker;  50 feet  Gait Analysis: shuffling;  retropulsion;  decreased strength;  decreased ROM;  decreased flexibility  Gait Number of Times:: x 2  Type of Rest Type of Rest: standing    Therapeutic Exercise  Therapeutic Exercise Rehab Effort: good  Therapeutic Exercise Symptoms Noted During/After Treatment: fatigue  Therapeutic Exercise Detail: Patient completed exercises seated in chair to  bilateral lower extremities/upper extremities  to improve strength and activity tolerance, for increased safety with all functional activities.           FAMILY HISTORY   No pertinent family history in first degree relatives    No pertinent family history in first degree relatives        RECENT LABS/IMAGING  CBC Full  -  ( 18 Dec 2023 06:53 )  WBC Count : 5.03 K/uL  RBC Count : 3.32 M/uL  Hemoglobin : 8.7 g/dL  Hematocrit : 27.0 %  Platelet Count - Automated : 237 K/uL  Mean Cell Volume : 81.3 fL  Mean Cell Hemoglobin : 26.2 pg  Mean Cell Hemoglobin Concentration : 32.2 gm/dL  Auto Neutrophil # : x  Auto Lymphocyte # : x  Auto Monocyte # : x  Auto Eosinophil # : x  Auto Basophil # : x  Auto Neutrophil % : x  Auto Lymphocyte % : x  Auto Monocyte % : x  Auto Eosinophil % : x  Auto Basophil % : x    12-18    134<L>  |  98  |  14  ----------------------------<  85  3.6   |  23  |  0.66    Ca    7.8<L>      18 Dec 2023 06:53  Phos  2.7     12-18  Mg     1.90     12-18      Urinalysis Basic - ( 18 Dec 2023 06:53 )    Color: x / Appearance: x / SG: x / pH: x  Gluc: 85 mg/dL / Ketone: x  / Bili: x / Urobili: x   Blood: x / Protein: x / Nitrite: x   Leuk Esterase: x / RBC: x / WBC x   Sq Epi: x / Non Sq Epi: x / Bacteria: x        VITALS  T(C): 36.7 (12-18-23 @ 09:49), Max: 37.2 (12-17-23 @ 17:30)  HR: 92 (12-18-23 @ 09:49) (67 - 97)  BP: 134/72 (12-18-23 @ 09:49) (131/82 - 160/90)  RR: 17 (12-18-23 @ 09:49) (17 - 18)  SpO2: 93% (12-18-23 @ 09:49) (93% - 99%)  Wt(kg): --    ALLERGIES  penicillin (Unknown)  aspirin (Unknown)      MEDICATIONS   acetaminophen     Tablet .. 975 milliGRAM(s) Oral every 6 hours PRN  heparin   Injectable 5000 Unit(s) SubCutaneous every 8 hours  influenza  Vaccine (HIGH DOSE) 0.7 milliLiter(s) IntraMuscular once  metoclopramide 5 milliGRAM(s) Oral every 6 hours  pantoprazole    Tablet 40 milliGRAM(s) Oral two times a day  potassium chloride    Tablet ER 40 milliEquivalent(s) Oral once  potassium phosphate / sodium phosphate Tablet (K-PHOS No. 2) 1 Tablet(s) Oral two times a day  simvastatin 40 milliGRAM(s) Oral at bedtime  tamsulosin 0.4 milliGRAM(s) Oral at bedtime      ----------------------------------------------------------------------------------------  PHYSICAL EXAM  Constitutional - NAD   HEENT - NCAT, EOMI  Chest - no respiratory distress  Cardiovascular - RRR, S1S2   Abdomen -  soft, healing incision  Extremities - No C/C/E, No calf tenderness   Neurologic Exam -                    Cognitive - Awake, Alert, AAO to self, place, date, year, situation     Communication - Fluent, No dysarthria     Cranial Nerves - CN 2-12 intact     Motor -                      LEFT    UE - ShAB 5/5, EF 5/5, EE 5/5, WE 5/5,  5/5                    RIGHT UE - ShAB 5/5, EF 5/5, EE 5/5, WE 5/5,  5/5                    LEFT    LE - HF 4+/5, KE 5/5, DF 5/5, PF 5/5                    RIGHT LE - HF 4+/5, KE 5/5, DF 5/5, PF 5/5        Sensory - Intact to LT      Balance - WNL Static  Psychiatric - Mood stable, Affect WNL  ----------------------------------------------------------------------------------------  ASSESSMENT/PLAN  89 yo m s/p laparascopic extended right hemicolectomy for GI malignancy  continue bedside PT and OT  DVT PPX -  heparin  Rehab -   Recommend inpatient acute rehab when medically cleared. Patient can tolerate 3 hours per day of therapy with medical supervision.   Patient is a 89y old  Male who presents with a chief complaint of c/f colon cancer (18 Dec 2023 09:36)      HPI:  89 yoM with hx of BPH, HLD, B/L inguinal hernia repair with known L recurrence, colonoscopy and endoscopy in sept for anemia on oral iron presents with abdominal pain and constipation over the past three days. Patient states that he hasn't had a bowel movement in the past three days but that he has been passing lots of gas. In the past day started having nausea and vomiting and wasn't able to eat anything then eventually started dry heaving. Denies any blood in vomit. Last BM was brown and regular appearing.     In addition he denies any chest pain, SOB, HA, back pain, LEG swelling, neck pain, recent vision changes, lightheadedness, and has otherwise been feeling healthy.     In the ED patient had labs showing elevated lactate 4.5 that improved to 1.5 s/p IVF hydration. CT abdomen/pelvis c/f primary neoplasm of the ascending and transverse colon. GI consulted and recommended colonoscopy tomorrow for biopsy.  (30 Nov 2023 18:18)      REVIEW OF SYSTEMS  Constitutional - No fever, No weight loss, No fatigue  HEENT - No eye pain, No visual disturbances, No difficulty hearing, No tinnitus, No vertigo, No neck pain  Respiratory - No cough, No wheezing, No shortness of breath  Cardiovascular - No chest pain, No palpitations  Gastrointestinal - No abdominal pain, No nausea, No vomiting, No diarrhea, No constipation  Genitourinary - No dysuria, No frequency, No hematuria, No incontinence  Neurological - No headaches, No memory loss, No loss of strength, No numbness, No tremors  Skin - No itching, No rashes, No lesions   Endocrine - No temperature intolerance  Musculoskeletal - No joint pain, No joint swelling, No muscle pain  Psychiatric - No depression, No anxiety    PAST MEDICAL & SURGICAL HISTORY  No pertinent past medical history    BPH (benign prostatic hyperplasia)    Peripheral neuropathy    HLD (hyperlipidemia)    No significant past surgical history            CURRENT FUNCTIONAL STATUS  12/18 Bed Mobility  Bed Mobility Training Rehab Potential: good, to achieve stated therapy goals  Bed Mobility Training Symptoms Noted During/After Treatment: fatigue  Bed Mobility Training Rolling/Turning: supervsion;  supervision  Bed Mobility Training Sit-to-Supine: supervsion;  supervision  Bed Mobility Training Limitations: decreased strength    Sit-Stand Transfer Training  Sit-to-Stand Transfer Training Rehab Potential: good, to achieve stated therapy goals  Sit-to-Stand Transfer Training Symptoms Noted During/After Treatment: fatigue  Transfer Training Sit-to-Stand Transfer: minimum assist (75% patient effort);  contact guard;  1 person assist;  full weight-bearing  Sit-to-Stand Transfer Training Transfer Safety Analysis: impaired balance;  decreased strength;  decreased ROM;  decreased flexibility    Gait Training  Gait Training Rehab Potential: good, to achieve stated therapy goals  Gait Training Symptoms Noted During/After Treatment: fatigue  Gait Training: contact guard;  minimum assist (75% patient effort);  1 person assist;  full weight-bearing   rolling walker;  50 feet  Gait Analysis: shuffling;  retropulsion;  decreased strength;  decreased ROM;  decreased flexibility  Gait Number of Times:: x 2  Type of Rest Type of Rest: standing    Therapeutic Exercise  Therapeutic Exercise Rehab Effort: good  Therapeutic Exercise Symptoms Noted During/After Treatment: fatigue  Therapeutic Exercise Detail: Patient completed exercises seated in chair to  bilateral lower extremities/upper extremities  to improve strength and activity tolerance, for increased safety with all functional activities.           FAMILY HISTORY   No pertinent family history in first degree relatives    No pertinent family history in first degree relatives        RECENT LABS/IMAGING  CBC Full  -  ( 18 Dec 2023 06:53 )  WBC Count : 5.03 K/uL  RBC Count : 3.32 M/uL  Hemoglobin : 8.7 g/dL  Hematocrit : 27.0 %  Platelet Count - Automated : 237 K/uL  Mean Cell Volume : 81.3 fL  Mean Cell Hemoglobin : 26.2 pg  Mean Cell Hemoglobin Concentration : 32.2 gm/dL  Auto Neutrophil # : x  Auto Lymphocyte # : x  Auto Monocyte # : x  Auto Eosinophil # : x  Auto Basophil # : x  Auto Neutrophil % : x  Auto Lymphocyte % : x  Auto Monocyte % : x  Auto Eosinophil % : x  Auto Basophil % : x    12-18    134<L>  |  98  |  14  ----------------------------<  85  3.6   |  23  |  0.66    Ca    7.8<L>      18 Dec 2023 06:53  Phos  2.7     12-18  Mg     1.90     12-18      Urinalysis Basic - ( 18 Dec 2023 06:53 )    Color: x / Appearance: x / SG: x / pH: x  Gluc: 85 mg/dL / Ketone: x  / Bili: x / Urobili: x   Blood: x / Protein: x / Nitrite: x   Leuk Esterase: x / RBC: x / WBC x   Sq Epi: x / Non Sq Epi: x / Bacteria: x        VITALS  T(C): 36.7 (12-18-23 @ 09:49), Max: 37.2 (12-17-23 @ 17:30)  HR: 92 (12-18-23 @ 09:49) (67 - 97)  BP: 134/72 (12-18-23 @ 09:49) (131/82 - 160/90)  RR: 17 (12-18-23 @ 09:49) (17 - 18)  SpO2: 93% (12-18-23 @ 09:49) (93% - 99%)  Wt(kg): --    ALLERGIES  penicillin (Unknown)  aspirin (Unknown)      MEDICATIONS   acetaminophen     Tablet .. 975 milliGRAM(s) Oral every 6 hours PRN  heparin   Injectable 5000 Unit(s) SubCutaneous every 8 hours  influenza  Vaccine (HIGH DOSE) 0.7 milliLiter(s) IntraMuscular once  metoclopramide 5 milliGRAM(s) Oral every 6 hours  pantoprazole    Tablet 40 milliGRAM(s) Oral two times a day  potassium chloride    Tablet ER 40 milliEquivalent(s) Oral once  potassium phosphate / sodium phosphate Tablet (K-PHOS No. 2) 1 Tablet(s) Oral two times a day  simvastatin 40 milliGRAM(s) Oral at bedtime  tamsulosin 0.4 milliGRAM(s) Oral at bedtime      ----------------------------------------------------------------------------------------  PHYSICAL EXAM  Constitutional - NAD   HEENT - NCAT, EOMI  Chest - no respiratory distress  Cardiovascular - RRR, S1S2   Abdomen -  soft, healing incision  Extremities - No C/C/E, No calf tenderness   Neurologic Exam -                    Cognitive - Awake, Alert, AAO to self, place, date, year, situation     Communication - Fluent, No dysarthria     Cranial Nerves - CN 2-12 intact     Motor -                      LEFT    UE - ShAB 5/5, EF 5/5, EE 5/5, WE 5/5,  5/5                    RIGHT UE - ShAB 5/5, EF 5/5, EE 5/5, WE 5/5,  5/5                    LEFT    LE - HF 4+/5, KE 5/5, DF 5/5, PF 5/5                    RIGHT LE - HF 4+/5, KE 5/5, DF 5/5, PF 5/5        Sensory - Intact to LT      Balance - WNL Static  Psychiatric - Mood stable, Affect WNL  ----------------------------------------------------------------------------------------  ASSESSMENT/PLAN  89 yo m s/p laparascopic extended right hemicolectomy for GI malignancy  continue bedside PT and OT  DVT PPX -  heparin  Rehab -   Recommend inpatient acute rehab when medically cleared. Patient can tolerate 3 hours per day of therapy with medical supervision.

## 2023-12-19 LAB
ANION GAP SERPL CALC-SCNC: 10 MMOL/L — SIGNIFICANT CHANGE UP (ref 7–14)
ANION GAP SERPL CALC-SCNC: 10 MMOL/L — SIGNIFICANT CHANGE UP (ref 7–14)
BUN SERPL-MCNC: 16 MG/DL — SIGNIFICANT CHANGE UP (ref 7–23)
BUN SERPL-MCNC: 16 MG/DL — SIGNIFICANT CHANGE UP (ref 7–23)
CALCIUM SERPL-MCNC: 8 MG/DL — LOW (ref 8.4–10.5)
CALCIUM SERPL-MCNC: 8 MG/DL — LOW (ref 8.4–10.5)
CHLORIDE SERPL-SCNC: 98 MMOL/L — SIGNIFICANT CHANGE UP (ref 98–107)
CHLORIDE SERPL-SCNC: 98 MMOL/L — SIGNIFICANT CHANGE UP (ref 98–107)
CO2 SERPL-SCNC: 26 MMOL/L — SIGNIFICANT CHANGE UP (ref 22–31)
CO2 SERPL-SCNC: 26 MMOL/L — SIGNIFICANT CHANGE UP (ref 22–31)
CREAT SERPL-MCNC: 0.7 MG/DL — SIGNIFICANT CHANGE UP (ref 0.5–1.3)
CREAT SERPL-MCNC: 0.7 MG/DL — SIGNIFICANT CHANGE UP (ref 0.5–1.3)
EGFR: 88 ML/MIN/1.73M2 — SIGNIFICANT CHANGE UP
EGFR: 88 ML/MIN/1.73M2 — SIGNIFICANT CHANGE UP
GLUCOSE SERPL-MCNC: 82 MG/DL — SIGNIFICANT CHANGE UP (ref 70–99)
GLUCOSE SERPL-MCNC: 82 MG/DL — SIGNIFICANT CHANGE UP (ref 70–99)
HCT VFR BLD CALC: 29.4 % — LOW (ref 39–50)
HCT VFR BLD CALC: 29.4 % — LOW (ref 39–50)
HGB BLD-MCNC: 9.3 G/DL — LOW (ref 13–17)
HGB BLD-MCNC: 9.3 G/DL — LOW (ref 13–17)
MAGNESIUM SERPL-MCNC: 1.8 MG/DL — SIGNIFICANT CHANGE UP (ref 1.6–2.6)
MAGNESIUM SERPL-MCNC: 1.8 MG/DL — SIGNIFICANT CHANGE UP (ref 1.6–2.6)
MCHC RBC-ENTMCNC: 26.4 PG — LOW (ref 27–34)
MCHC RBC-ENTMCNC: 26.4 PG — LOW (ref 27–34)
MCHC RBC-ENTMCNC: 31.6 GM/DL — LOW (ref 32–36)
MCHC RBC-ENTMCNC: 31.6 GM/DL — LOW (ref 32–36)
MCV RBC AUTO: 83.5 FL — SIGNIFICANT CHANGE UP (ref 80–100)
MCV RBC AUTO: 83.5 FL — SIGNIFICANT CHANGE UP (ref 80–100)
NRBC # BLD: 0 /100 WBCS — SIGNIFICANT CHANGE UP (ref 0–0)
NRBC # BLD: 0 /100 WBCS — SIGNIFICANT CHANGE UP (ref 0–0)
NRBC # FLD: 0 K/UL — SIGNIFICANT CHANGE UP (ref 0–0)
NRBC # FLD: 0 K/UL — SIGNIFICANT CHANGE UP (ref 0–0)
PHOSPHATE SERPL-MCNC: 3.2 MG/DL — SIGNIFICANT CHANGE UP (ref 2.5–4.5)
PHOSPHATE SERPL-MCNC: 3.2 MG/DL — SIGNIFICANT CHANGE UP (ref 2.5–4.5)
PLATELET # BLD AUTO: 281 K/UL — SIGNIFICANT CHANGE UP (ref 150–400)
PLATELET # BLD AUTO: 281 K/UL — SIGNIFICANT CHANGE UP (ref 150–400)
POTASSIUM SERPL-MCNC: 3.6 MMOL/L — SIGNIFICANT CHANGE UP (ref 3.5–5.3)
POTASSIUM SERPL-MCNC: 3.6 MMOL/L — SIGNIFICANT CHANGE UP (ref 3.5–5.3)
POTASSIUM SERPL-SCNC: 3.6 MMOL/L — SIGNIFICANT CHANGE UP (ref 3.5–5.3)
POTASSIUM SERPL-SCNC: 3.6 MMOL/L — SIGNIFICANT CHANGE UP (ref 3.5–5.3)
RBC # BLD: 3.52 M/UL — LOW (ref 4.2–5.8)
RBC # BLD: 3.52 M/UL — LOW (ref 4.2–5.8)
RBC # FLD: 14.7 % — HIGH (ref 10.3–14.5)
RBC # FLD: 14.7 % — HIGH (ref 10.3–14.5)
SODIUM SERPL-SCNC: 134 MMOL/L — LOW (ref 135–145)
SODIUM SERPL-SCNC: 134 MMOL/L — LOW (ref 135–145)
WBC # BLD: 4.37 K/UL — SIGNIFICANT CHANGE UP (ref 3.8–10.5)
WBC # BLD: 4.37 K/UL — SIGNIFICANT CHANGE UP (ref 3.8–10.5)
WBC # FLD AUTO: 4.37 K/UL — SIGNIFICANT CHANGE UP (ref 3.8–10.5)
WBC # FLD AUTO: 4.37 K/UL — SIGNIFICANT CHANGE UP (ref 3.8–10.5)

## 2023-12-19 RX ADMIN — HEPARIN SODIUM 5000 UNIT(S): 5000 INJECTION INTRAVENOUS; SUBCUTANEOUS at 21:23

## 2023-12-19 RX ADMIN — HEPARIN SODIUM 5000 UNIT(S): 5000 INJECTION INTRAVENOUS; SUBCUTANEOUS at 05:05

## 2023-12-19 RX ADMIN — Medication 40 MILLIEQUIVALENT(S): at 07:59

## 2023-12-19 RX ADMIN — Medication 5 MILLIGRAM(S): at 11:32

## 2023-12-19 RX ADMIN — Medication 5 MILLIGRAM(S): at 05:05

## 2023-12-19 RX ADMIN — TAMSULOSIN HYDROCHLORIDE 0.4 MILLIGRAM(S): 0.4 CAPSULE ORAL at 21:23

## 2023-12-19 RX ADMIN — HEPARIN SODIUM 5000 UNIT(S): 5000 INJECTION INTRAVENOUS; SUBCUTANEOUS at 14:37

## 2023-12-19 RX ADMIN — Medication 5 MILLIGRAM(S): at 00:21

## 2023-12-19 RX ADMIN — SIMVASTATIN 40 MILLIGRAM(S): 20 TABLET, FILM COATED ORAL at 21:23

## 2023-12-19 RX ADMIN — PANTOPRAZOLE SODIUM 40 MILLIGRAM(S): 20 TABLET, DELAYED RELEASE ORAL at 05:05

## 2023-12-19 RX ADMIN — PANTOPRAZOLE SODIUM 40 MILLIGRAM(S): 20 TABLET, DELAYED RELEASE ORAL at 18:33

## 2023-12-19 NOTE — PROVIDER CONTACT NOTE (OTHER) - ACTION/TREATMENT ORDERED:
md notified will examine pt at bedside
provider aware
MD Bud Ken notified. plan of care continued.
provider aware
fluid changed to d5 0.45ns + potassium ; recheck fs in 1 hr
md made aware, will come see patient
Provider aware. Placed C.diff rule out orders. and came to bedside to assess pt.
provider notified. no further orders at this time
md made aware; encourage incentive spirometer, possible chest xray
md notified will examine pt at bedside
md will see pt at bedside
will come to assess patient
md made aware; no further intervention made at this time

## 2023-12-19 NOTE — PROVIDER CONTACT NOTE (OTHER) - DATE AND TIME:
06-Dec-2023 15:25
06-Dec-2023 20:20
08-Dec-2023 18:00
10-Dec-2023 02:30
03-Dec-2023 22:55
08-Dec-2023 09:21
08-Dec-2023 12:00
09-Dec-2023 11:26
01-Dec-2023 21:34
06-Dec-2023 18:07
10-Dec-2023 20:37
19-Dec-2023 01:00
18-Dec-2023 14:00

## 2023-12-19 NOTE — PROVIDER CONTACT NOTE (OTHER) - SITUATION
Pt refusing IV access
More than 3 loose stools in during shift
patient complaining of nauseous
pt threw up after eating half of jello & sip of ginger ale
Patient vomiting
Pt c/o pressure in head
fs 76
BP of 112/46
Pt O2 sat 91-94%. Pt has small cough, non productive.
not allowed to break seal to flush bruner
pt c/o numbness & "nerve like pain" on b/l feet + worsening weakness
high bp 164/83
pt reports feeling " woozy" & " not clear in the head/unable to think clearly"

## 2023-12-19 NOTE — PROVIDER CONTACT NOTE (OTHER) - BACKGROUND
intra abd & pelvic swelling of mass
status post lap extended right hemicolectomy
patient admitted with intra abdominal and pelvic swelling with a pmh of HLD, BPH
pt denies dizziness or pain but reports worsening "woozy" feeling in the head
patient admitted with intra abdominal and pelvic swelling with a pmh of HLD, BPH
status post lap extended right hemicolectomy
pt s/p lap extended r hemicolectomy
patient admitted with intra abdominal and pelvic swelling with a pmh of HLD, BPH
pt s/p lap extended r hemicolectomy
pt glucose from bloodwork around 5am showed 68; rechecked fs 8:30am 76
pt s/p lap extended r hemicolectomy

## 2023-12-19 NOTE — PROVIDER CONTACT NOTE (OTHER) - REASON
More than 3 loose stool in during shift
Patient vomiting
pt c/o numbness & "nerve like pain" on b/l feet + worsening weakness
BP of 112/46
Pt O2 sat 91-94%
pt fs 76
Pt refusing IV access
patient complaining of nauseous
Pt c/o pressure in head
pt threw up after eating half of jello & sip of ginger ale
high bp 164/83
not allowed to break seal to flush bruner
pt reports feeling " woozy" & " not clear in the head/unable to think clearly"

## 2023-12-19 NOTE — PROVIDER CONTACT NOTE (OTHER) - ASSESSMENT
patient complaining of nauseous
vss. no n/v, no dizziness or pain.
high bp 164/83
patient exhibiting no signs of distress. denies chest pain, dizziness, headache
unstable on feet
Closure 2 Information: This tab is for additional flaps and grafts, including complex repair and grafts and complex repair and flaps. You can also specify a different location for the additional defect, if the location is the same you do not need to select a new one. We will insert the automated text for the repair you select below just as we do for solitary flaps and grafts. Please note that at this time if you select a location with a different insurance zone you will need to override the ICD10 and CPT if appropriate.
patient exhibiting no signs of distress
aox3, vss
Pt is asymptomatic.
pt threw up small amt of brown liquid vss
reports headache + pressure not relieved with tylenol
pt npo on LR
not allowed to break seal to flush bruner
Patient has had more than 3 loose stools during shift. pt denies abd discomfort. vitals stable.

## 2023-12-19 NOTE — PROVIDER CONTACT NOTE (OTHER) - RECOMMENDATIONS
notified provider, recommended chest xray
notify MD Bud Ken
notify provider
notify md
notify md; educate pt not to eat jello or drink carbonated beverage
Make provider aware, c. diff rule out ?
notify provider
provider notify
high bp 164/83
notify md
notify md & change fluid to d5
give zofran as ordered
notify md

## 2023-12-19 NOTE — PROGRESS NOTE ADULT - SUBJECTIVE AND OBJECTIVE BOX
TEAM SURGERY PROGRESS NOTE    POST OPERATIVE DAY #: 6 s/p lap right hemicolectomy    SUBJECTIVE: Patient seen and examined at bedside on AM rounds, patient resting comfortably. Reports coughing overnight, denies fever, sob, chest pain. Passing flatus, having bm.      Vital Signs Last 24 Hrs  T(C): 36.8 (19 Dec 2023 06:00), Max: 37.8 (18 Dec 2023 17:45)  T(F): 98.3 (19 Dec 2023 06:00), Max: 100 (18 Dec 2023 17:45)  HR: 96 (19 Dec 2023 06:00) (83 - 96)  BP: 144/66 (19 Dec 2023 06:00) (125/79 - 153/67)  BP(mean): --  RR: 18 (19 Dec 2023 06:00) (17 - 18)  SpO2: 93% (19 Dec 2023 06:00) (93% - 99%)    Parameters below as of 19 Dec 2023 06:00  Patient On (Oxygen Delivery Method): room air      I&O's Detail    18 Dec 2023 07:01  -  19 Dec 2023 07:00  --------------------------------------------------------  IN:    Oral Fluid: 755 mL  Total IN: 755 mL    OUT:    Voided (mL): 275 mL  Total OUT: 275 mL    Total NET: 480 mL        MEDICATIONS  (STANDING):  heparin   Injectable 5000 Unit(s) SubCutaneous every 8 hours  influenza  Vaccine (HIGH DOSE) 0.7 milliLiter(s) IntraMuscular once  metoclopramide 5 milliGRAM(s) Oral every 6 hours  pantoprazole    Tablet 40 milliGRAM(s) Oral two times a day  potassium chloride    Tablet ER 40 milliEquivalent(s) Oral once  simvastatin 40 milliGRAM(s) Oral at bedtime  tamsulosin 0.4 milliGRAM(s) Oral at bedtime    MEDICATIONS  (PRN):  acetaminophen     Tablet .. 975 milliGRAM(s) Oral every 6 hours PRN Mild Pain (1 - 3)      Physical Exam  General: A&Ox3, NAD  Respiratory: Equal bilateral expansion  Cardiovascular: Regular rate & rhythm  Abdominal: soft. NTND.     LABS:                        8.7    5.03  )-----------( 237      ( 18 Dec 2023 06:53 )             27.0     12-18    134<L>  |  98  |  14  ----------------------------<  85  3.6   |  23  |  0.66    Ca    7.8<L>      18 Dec 2023 06:53  Phos  2.7     12-18  Mg     1.90     12-18        Urinalysis Basic - ( 18 Dec 2023 06:53 )    Color: x / Appearance: x / SG: x / pH: x  Gluc: 85 mg/dL / Ketone: x  / Bili: x / Urobili: x   Blood: x / Protein: x / Nitrite: x   Leuk Esterase: x / RBC: x / WBC x   Sq Epi: x / Non Sq Epi: x / Bacteria: x          Patient is a 89y Male

## 2023-12-19 NOTE — PROGRESS NOTE ADULT - ASSESSMENT
· Assessment	  89 M w/ PMHx GERD, BPH, HLD, open R IHR, LIH (non repaired), presents to ED with suprapubic pain. CT w/ distal ascending and proximal transverse thickening, concern for malignancy. CT chest without evidence of metastasis. CEA 81.9, CA 19-9 225. Colonoscopy done 12/4. Patient currently s/p Laparoscopic extended right hemicolectomy, 12/13/23 PICC and TPN started due to post op ileus (+flatus/+BM but distention/nausea/emesis with PO). S/p TPN. Jyothi regular    Plan:  - Diet: Regular  - Protonix BID (home medication for GERD)  - EKG for qtc - pt on standing Reglan  - c-diff negative - taken off contact precautions  - Replete electrolytes as needed  - Pain control - Tylenol  - Encourage OOB and IS  - Pt voiding appropriately - encouraged to utilize bedside urinal. Continued to urinate into bed chux x2 per pt.   - Dispo: Rehab planning - pending rehab placement      A Team Surgery  [id number]. · Assessment	  89 M w/ PMHx GERD, BPH, HLD, open R IHR, LIH (non repaired), presents to ED with suprapubic pain. CT w/ distal ascending and proximal transverse thickening, concern for malignancy. CT chest without evidence of metastasis. CEA 81.9, CA 19-9 225. Colonoscopy done 12/4. Patient currently s/p Laparoscopic extended right hemicolectomy, 12/13/23 PICC and TPN started due to post op ileus (+flatus/+BM but distention/nausea/emesis with PO). S/p TPN. Jyothi regular    Plan:  - Diet: Regular  - Protonix BID (home medication for GERD)  - EKG for qtc - pt on standing Reglan  - c-diff negative - taken off contact precautions  - Replete electrolytes as needed  - Pain control - Tylenol  - Encourage OOB and IS  - Pt voiding appropriately - encouraged to utilize bedside urinal. Continued to urinate into bed chux x2 per pt.   - Dispo: Rehab planning - pending rehab placement      A Team Surgery  [id number].

## 2023-12-20 ENCOUNTER — TRANSCRIPTION ENCOUNTER (OUTPATIENT)
Age: 88
End: 2023-12-20

## 2023-12-20 VITALS
TEMPERATURE: 98 F | SYSTOLIC BLOOD PRESSURE: 150 MMHG | RESPIRATION RATE: 16 BRPM | OXYGEN SATURATION: 96 % | DIASTOLIC BLOOD PRESSURE: 47 MMHG | HEART RATE: 92 BPM

## 2023-12-20 PROCEDURE — 99231 SBSQ HOSP IP/OBS SF/LOW 25: CPT

## 2023-12-20 RX ORDER — PANTOPRAZOLE SODIUM 20 MG/1
40 TABLET, DELAYED RELEASE ORAL
Refills: 0 | Status: DISCONTINUED | OUTPATIENT
Start: 2023-12-20 | End: 2023-12-20

## 2023-12-20 RX ORDER — ACETAMINOPHEN 500 MG
2 TABLET ORAL
Qty: 0 | Refills: 0 | DISCHARGE
Start: 2023-12-20

## 2023-12-20 RX ADMIN — PANTOPRAZOLE SODIUM 40 MILLIGRAM(S): 20 TABLET, DELAYED RELEASE ORAL at 06:03

## 2023-12-20 RX ADMIN — HEPARIN SODIUM 5000 UNIT(S): 5000 INJECTION INTRAVENOUS; SUBCUTANEOUS at 06:04

## 2023-12-20 RX ADMIN — HEPARIN SODIUM 5000 UNIT(S): 5000 INJECTION INTRAVENOUS; SUBCUTANEOUS at 13:37

## 2023-12-20 NOTE — DISCHARGE NOTE NURSING/CASE MANAGEMENT/SOCIAL WORK - NSDCPEFALRISK_GEN_ALL_CORE
For information on Fall & Injury Prevention, visit: [email or web address] OR  [email or web address] OR  https://www.cdc.gov/steadi/patient.html For information on Fall & Injury Prevention, visit: [email or web address] OR  [email or web address] OR  https://www.cdc.gov/steadi/patient.html

## 2023-12-20 NOTE — PROGRESS NOTE ADULT - SUBJECTIVE AND OBJECTIVE BOX
A Team General Surgery Progress Note    S: Pt seen and examined with team on morning rounds. Patient feels well. Denies nausea/emesis. Tolerating Regular Diet. +Flatus/+BM. Patient admits to some coughing. Working with Incentive Spirometry.       Vital Signs Last 24 Hrs  T(C): 36.7 (20 Dec 2023 05:09), Max: 37.2 (19 Dec 2023 18:00)  T(F): 98.1 (20 Dec 2023 05:09), Max: 98.9 (19 Dec 2023 18:00)  HR: 76 (20 Dec 2023 05:09) (66 - 99)  BP: 143/62 (20 Dec 2023 05:09) (125/59 - 149/58)  BP(mean): --  RR: 15 (20 Dec 2023 05:09) (15 - 18)  SpO2: 96% (20 Dec 2023 05:09) (93% - 97%)    Parameters below as of 20 Dec 2023 05:09  Patient On (Oxygen Delivery Method): room air        I&O's Summary    19 Dec 2023 07:01  -  20 Dec 2023 07:00  --------------------------------------------------------  IN: 590 mL / OUT: 145 mL / NET: 445 mL      I&O's Detail    19 Dec 2023 07:01  -  20 Dec 2023 07:00  --------------------------------------------------------  IN:    Oral Fluid: 590 mL  Total IN: 590 mL    OUT:    Voided (mL): 145 mL +Unsaved voids  Total OUT: 145 mL    Total NET: 445 mL          General Appearance: Appears well, NAD  Chest: Breathing comfortably on RA.    CV: S1, S2 @76  Abdomen: Soft, nondistended, nontender, steris clean/dry/intact.   Extremities: Moves all extremities.    MEDICATIONS  (STANDING):  heparin   Injectable 5000 Unit(s) SubCutaneous every 8 hours  influenza  Vaccine (HIGH DOSE) 0.7 milliLiter(s) IntraMuscular once  pantoprazole  Injectable 40 milliGRAM(s) IV Push two times a day  simvastatin 40 milliGRAM(s) Oral at bedtime  tamsulosin 0.4 milliGRAM(s) Oral at bedtime    MEDICATIONS  (PRN):  acetaminophen     Tablet .. 975 milliGRAM(s) Oral every 6 hours PRN Mild Pain (1 - 3)      LABS:                        9.3    4.37  )-----------( 281      ( 19 Dec 2023 07:37 )             29.4     12-19    134<L>  |  98  |  16  ----------------------------<  82  3.6   |  26  |  0.70    Ca    8.0<L>      19 Dec 2023 07:37  Phos  3.2     12-19  Mg     1.80     12-19        Urinalysis Basic - ( 19 Dec 2023 07:37 )    Color: x / Appearance: x / SG: x / pH: x  Gluc: 82 mg/dL / Ketone: x  / Bili: x / Urobili: x   Blood: x / Protein: x / Nitrite: x   Leuk Esterase: x / RBC: x / WBC x   Sq Epi: x / Non Sq Epi: x / Bacteria: x

## 2023-12-20 NOTE — DISCHARGE NOTE NURSING/CASE MANAGEMENT/SOCIAL WORK - NSDCFUADDAPPT_GEN_ALL_CORE_FT
Please follow up with your primary care provider 1-2 weeks after discharge regarding recent hospitalization/medical problems/medications.

## 2023-12-20 NOTE — PROGRESS NOTE ADULT - PROVIDER SPECIALTY LIST ADULT
Colorectal Surgery
Colorectal Surgery
Internal Medicine
Internal Medicine
Rehab Medicine
Rehab Medicine
Surgery
Colorectal Surgery
Nutrition Support
Rehab Medicine
Colorectal Surgery
Nutrition Support
Surgery
Colorectal Surgery
Surgery
Colorectal Surgery
Gastroenterology
Surgery
Surgery
Nutrition Support
Internal Medicine
Internal Medicine
Surgery
Gastroenterology
Internal Medicine
Internal Medicine

## 2023-12-20 NOTE — DISCHARGE NOTE NURSING/CASE MANAGEMENT/SOCIAL WORK - NSDCPNINST_GEN_ALL_CORE
I made post op call to patient per Dr. Micheal Chester request. Patient states she is doing well. When asked about her pain level, she states it is mostly hurts when she attempts to sit up, pain level being a 6 at that time. She states she took a  Percocet last night then again at 5:30 this morning and at 10 oclock she took a 12 hour aleve. She is otherwise eating, drinking, urinating and has not had a bowel movement since surgery. I encouraged her to drink plenty of fluids, and eat peaches, prunes, pears, pineapples to see if that helped with her stools and if not she could take an OTC stool softener. She has a post op appointment on 4-19-17.  She will call back if she has any concerns prior to that appt, Patient discharged to home. Discharge instructions given, medication teaching performed, and pt verbalized understanding. IV removed. Patient left via wheelchair.

## 2023-12-20 NOTE — PROGRESS NOTE ADULT - NUTRITIONAL ASSESSMENT
Severe protein calorie malnutrition in acute illness/ injury; secondary to poor appetite, weight loss >5% in 1 month and/or >7.5% in 3 months, caloric Intake <50% of nutrition needs >= 5 days, temporal wasting, severe loss of muscle mass/atrophy and loss of body fat stores.    Diet, Regular:   Fiber/Residue Restricted (LOWFIBER) (12-15-23 @ 10:25) [Active]    **Greater than 50% of the encounter was spent counseling and/coordination of care on parenteral nutrition. 25 minutes were spent face to face with the patient.
This patient has been assessed with a concern for Malnutrition and has been determined to have a diagnosis/diagnoses of Severe protein-calorie malnutrition and Underweight (BMI < 19).    This patient is being managed with:   Diet NPO after Midnight-     NPO Start Date: 03-Dec-2023   NPO Start Time: 23:59  Entered: Dec  3 2023  1:05PM    Diet Clear Liquid-  Entered: Dec  3 2023  9:52AM  
This patient has been assessed with a concern for Malnutrition and has been determined to have a diagnosis/diagnoses of Severe protein-calorie malnutrition and Underweight (BMI < 19).    This patient is being managed with:   Diet Regular-  Fiber/Residue Restricted (LOWFIBER)  Entered: Dec 15 2023 10:25AM  
This patient has been assessed with a concern for Malnutrition and has been determined to have a diagnosis/diagnoses of Severe protein-calorie malnutrition and Underweight (BMI < 19).    This patient is being managed with:   Diet Regular-  Fiber/Residue Restricted (LOWFIBER)  Supplement Feeding Modality:  Oral  Ensure Max Cans or Servings Per Day:  1       Frequency:  Daily  Entered: Dec 18 2023  6:28PM  
This patient has been assessed with a concern for Malnutrition and has been determined to have a diagnosis/diagnoses of Severe protein-calorie malnutrition and Underweight (BMI < 19).    This patient is being managed with:   Diet NPO-  Except Medications  Entered: Dec 12 2023  8:14PM  
Severe protein calorie malnutrition in acute illness/ injury; secondary to poor appetite, weight loss >5% in 1 month and/or >7.5% in 3 months, caloric Intake <50% of nutrition needs >= 5 days, temporal wasting, severe loss of muscle mass/atrophy and loss of body fat stores.    Diet, Regular:   Fiber/Residue Restricted (LOWFIBER) (12-15-23 @ 10:25) [Active]    lipid, fat emulsion (Fish Oil and Plant Based) 20% Infusion 16.6 mL/Hr (16.6 mL/Hr) IV Continuous <Continuous>  Parenteral Nutrition - Adult 1 Each (63 mL/Hr) TPN Continuous <Continuous>, 12-15-23 @ 17:00, 17:00, Stop order after: 1 Days    **Greater than 50% of the encounter was spent counseling and/coordination of care on parenteral nutrition. 25 minutes were spent face to face with the patient.
This patient has been assessed with a concern for Malnutrition and has been determined to have a diagnosis/diagnoses of Severe protein-calorie malnutrition and Underweight (BMI < 19).    This patient is being managed with:   Diet Clear Liquid-  Entered: Dec  5 2023  7:11PM  
This patient has been assessed with a concern for Malnutrition and has been determined to have a diagnosis/diagnoses of Severe protein-calorie malnutrition and Underweight (BMI < 19).    This patient is being managed with:   Diet Clear Liquid-  Entered: Dec 10 2023  8:43AM  
This patient has been assessed with a concern for Malnutrition and has been determined to have a diagnosis/diagnoses of Severe protein-calorie malnutrition and Underweight (BMI < 19).    This patient is being managed with:   Diet NPO after Midnight-     NPO Start Date: 03-Dec-2023   NPO Start Time: 23:59  Entered: Dec  3 2023  1:05PM    Diet Clear Liquid-  Entered: Dec  3 2023  9:52AM  
This patient has been assessed with a concern for Malnutrition and has been determined to have a diagnosis/diagnoses of Severe protein-calorie malnutrition and Underweight (BMI < 19).    This patient is being managed with:   Diet Regular-  Fiber/Residue Restricted (LOWFIBER)  Entered: Dec 15 2023 10:25AM  
This patient has been assessed with a concern for Malnutrition and has been determined to have a diagnosis/diagnoses of Severe protein-calorie malnutrition and Underweight (BMI < 19).    This patient is being managed with:   Diet NPO-  Entered: Dec  6 2023  7:00PM  
This patient has been assessed with a concern for Malnutrition and has been determined to have a diagnosis/diagnoses of Severe protein-calorie malnutrition and Underweight (BMI < 19).    This patient is being managed with:   Diet Clear Liquid-  Entered: Dec 10 2023  8:43AM  
This patient has been assessed with a concern for Malnutrition and has been determined to have a diagnosis/diagnoses of Severe protein-calorie malnutrition and Underweight (BMI < 19).    This patient is being managed with:   Diet NPO-  Except Medications  With Ice Chips/Sips of Water  Entered: Dec  8 2023  1:23PM  
This patient has been assessed with a concern for Malnutrition and has been determined to have a diagnosis/diagnoses of Severe protein-calorie malnutrition and Underweight (BMI < 19).    This patient is being managed with:   Parenteral Nutrition - Adult-  Entered: Dec 14 2023  5:00PM    lipid fat emulsion (Fish Oil and Plant Based) 20% Infusion-[Known as SMOFLIPID 20% Infusion]  40 Gram(s) in IV Solution 200 milliLiter(s) infuse at 16.6 mL/Hr  Dose Rate: 16.6 mL/Hr Infuse Over: 12 Hours  Administration Instructions: Use 1.2 micron in-line filter  Entered: Dec 14 2023  5:00PM    Diet Clear Liquid-  Entered: Dec 14 2023  8:43AM  
Severe protein calorie malnutrition in acute illness/ injury; secondary to poor appetite, weight loss >5% in 1 month and/or >7.5% in 3 months, caloric Intake <50% of nutrition needs >= 5 days, temporal wasting, severe loss of muscle mass/atrophy and loss of body fat stores.    Diet, Clear Liquid (12-14-23 @ 08:43) [Active]    Parenteral Nutrition - Adult 1 Each (63 mL/Hr) TPN Continuous <Continuous>, 12-14-23 @ 17:00, 17:00, Stop order after: 1 Days  Parenteral Nutrition - Adult 1 Each (42 mL/Hr) TPN Continuous <Continuous>, 12-13-23 @ 17:00, 17:00, Stop order after: 1 Days    **Greater than 50% of the encounter was spent counseling and/coordination of care on parenteral nutrition. 25 minutes were spent face to face with the patient.
This patient has been assessed with a concern for Malnutrition and has been determined to have a diagnosis/diagnoses of Severe protein-calorie malnutrition and Underweight (BMI < 19).    This patient is being managed with:   Diet NPO-  Entered: Dec  6 2023  7:00PM  
This patient has been assessed with a concern for Malnutrition and has been determined to have a diagnosis/diagnoses of Severe protein-calorie malnutrition and Underweight (BMI < 19).    This patient is being managed with:   Diet Regular-  Fiber/Residue Restricted (LOWFIBER)  Supplement Feeding Modality:  Oral  Ensure Max Cans or Servings Per Day:  1       Frequency:  Daily  Entered: Dec 18 2023  6:28PM  
This patient has been assessed with a concern for Malnutrition and has been determined to have a diagnosis/diagnoses of Severe protein-calorie malnutrition and Underweight (BMI < 19).    This patient is being managed with:   Parenteral Nutrition - Adult-  Entered: Dec 13 2023  5:00PM    lipid fat emulsion (Fish Oil and Plant Based) 20% Infusion-[Known as SMOFLIPID 20% Infusion]  25 Gram(s) in IV Solution 125 milliLiter(s) infuse at 10.4 mL/Hr  Dose Rate: 10.4 mL/Hr Infuse Over: 12 Hours  Administration Instructions: Use 1.2 micron in-line filter  Entered: Dec 13 2023  5:00PM    Diet NPO-  Except Medications  Entered: Dec 12 2023  8:14PM  
This patient has been assessed with a concern for Malnutrition and has been determined to have a diagnosis/diagnoses of Severe protein-calorie malnutrition and Underweight (BMI < 19).    This patient is being managed with:   Diet NPO after Midnight-     NPO Start Date: 03-Dec-2023   NPO Start Time: 23:59  Entered: Dec  3 2023  1:05PM    Diet Clear Liquid-  Entered: Dec  3 2023  9:52AM  
This patient has been assessed with a concern for Malnutrition and has been determined to have a diagnosis/diagnoses of Severe protein-calorie malnutrition and Underweight (BMI < 19).    This patient is being managed with:   Diet NPO-  Except Medications  Entered: Dec 11 2023 10:07PM  
This patient has been assessed with a concern for Malnutrition and has been determined to have a diagnosis/diagnoses of Severe protein-calorie malnutrition and Underweight (BMI < 19).    This patient is being managed with:   Diet Clear Liquid-  Entered: Dec  5 2023  7:11PM  
This patient has been assessed with a concern for Malnutrition and has been determined to have a diagnosis/diagnoses of Severe protein-calorie malnutrition and Underweight (BMI < 19).    This patient is being managed with:   Diet NPO-  NPO for Procedure/Test     NPO Start Date: 04-Dec-2023   NPO Start Time: 23:59  Except Medications  Entered: Dec  4 2023  9:08AM    Diet NPO after Midnight-     NPO Start Date: 03-Dec-2023   NPO Start Time: 23:59  Entered: Dec  3 2023  1:05PM    Diet Clear Liquid-  Entered: Dec  3 2023  9:52AM

## 2023-12-20 NOTE — PROGRESS NOTE ADULT - ASSESSMENT
89 M w/ PMHx GERD, BPH, HLD, open R IHR, LIH (non repaired), presents to ED with suprapubic pain. CT w/ distal ascending and proximal transverse thickening, concern for malignancy. CT chest without evidence of metastasis. CEA 81.9, CA 19-9 225. Colonoscopy done 12/4. Patient currently s/p Laparoscopic extended right hemicolectomy, 12/13/23 PICC and TPN started due to post op ileus (+flatus/+BM but distention/nausea/emesis with PO). S/p TPN. Jyothi regular    Plan:  - Diet: Regular  - Protonix BID (home medication for GERD)  - EKG for qtc - pt on standing Reglan  - c-diff negative - taken off contact precautions  - Replete electrolytes as needed  - Pain control - Tylenol  - Encourage OOB and IS  - Pt voiding appropriately - encouraged to utilize bedside urinal. Continued to urinate into bed chux.   - Dispo: Rehab - Patient stable for d/c to rehab when bed available.      A Team Surgery  [id number].   89 M w/ PMHx GERD, BPH, HLD, open R IHR, LIH (non repaired), presents to ED with suprapubic pain. CT w/ distal ascending and proximal transverse thickening, concern for malignancy. CT chest without evidence of metastasis. CEA 81.9, CA 19-9 225. Colonoscopy done 12/4. Patient currently s/p Laparoscopic extended right hemicolectomy, 12/13/23 PICC and TPN started due to post op ileus (+flatus/+BM but distention/nausea/emesis with PO). S/p TPN. Jyothi regular    Plan:  - Diet: Regular  - Protonix BID (home medication for GERD)  - EKG for qtc - pt on standing Reglan  - c-diff negative - taken off contact precautions  - Replete electrolytes as needed  - Pain control - Tylenol  - Encourage OOB and IS  - Pt voiding appropriately - encouraged to utilize bedside urinal. Continued to urinate into bed chux.   - Dispo: Rehab - Patient stable for d/c to rehab when bed available.      A Team Surgery  [id number].

## 2023-12-20 NOTE — PROGRESS NOTE ADULT - SUBJECTIVE AND OBJECTIVE BOX
Patient is a 89y old  Male who presents with a chief complaint of c/f colon cancer (20 Dec 2023 07:36)      Interval history: patient denies pain      REVIEW OF SYSTEMS  weakness      PAST MEDICAL & SURGICAL HISTORY   BPH (benign prostatic hyperplasia)    Peripheral neuropathy    HLD (hyperlipidemia)    No significant past surgical history         CURRENT FUNCTIONAL STATUS  12/18 Bed Mobility  Bed Mobility Training Rehab Potential: good, to achieve stated therapy goals  Bed Mobility Training Symptoms Noted During/After Treatment: fatigue  Bed Mobility Training Rolling/Turning: supervsion;  supervision  Bed Mobility Training Sit-to-Supine: supervsion;  supervision  Bed Mobility Training Limitations: decreased strength    Sit-Stand Transfer Training  Sit-to-Stand Transfer Training Rehab Potential: good, to achieve stated therapy goals  Sit-to-Stand Transfer Training Symptoms Noted During/After Treatment: fatigue  Transfer Training Sit-to-Stand Transfer: minimum assist (75% patient effort);  contact guard;  1 person assist;  full weight-bearing  Sit-to-Stand Transfer Training Transfer Safety Analysis: impaired balance;  decreased strength;  decreased ROM;  decreased flexibility    Gait Training  Gait Training Rehab Potential: good, to achieve stated therapy goals  Gait Training Symptoms Noted During/After Treatment: fatigue  Gait Training: contact guard;  minimum assist (75% patient effort);  1 person assist;  full weight-bearing   rolling walker;  50 feet  Gait Analysis: shuffling;  retropulsion;  decreased strength;  decreased ROM;  decreased flexibility  Gait Number of Times:: x 2  Type of Rest Type of Rest: standing    Therapeutic Exercise  Therapeutic Exercise Rehab Effort: good  Therapeutic Exercise Symptoms Noted During/After Treatment: fatigue  Therapeutic Exercise Detail: Patient completed exercises seated in chair to  bilateral lower extremities/upper extremities  to improve strength and activity tolerance, for increased safety with all functional activities.          FAMILY HISTORY   No pertinent family history in first degree relatives       RECENT LABS/IMAGING  CBC Full  -  ( 19 Dec 2023 07:37 )  WBC Count : 4.37 K/uL  RBC Count : 3.52 M/uL  Hemoglobin : 9.3 g/dL  Hematocrit : 29.4 %  Platelet Count - Automated : 281 K/uL  Mean Cell Volume : 83.5 fL  Mean Cell Hemoglobin : 26.4 pg  Mean Cell Hemoglobin Concentration : 31.6 gm/dL  Auto Neutrophil # : x  Auto Lymphocyte # : x  Auto Monocyte # : x  Auto Eosinophil # : x  Auto Basophil # : x  Auto Neutrophil % : x  Auto Lymphocyte % : x  Auto Monocyte % : x  Auto Eosinophil % : x  Auto Basophil % : x    12-19    134<L>  |  98  |  16  ----------------------------<  82  3.6   |  26  |  0.70    Ca    8.0<L>      19 Dec 2023 07:37  Phos  3.2     12-19  Mg     1.80     12-19      Urinalysis Basic - ( 19 Dec 2023 07:37 )    Color: x / Appearance: x / SG: x / pH: x  Gluc: 82 mg/dL / Ketone: x  / Bili: x / Urobili: x   Blood: x / Protein: x / Nitrite: x   Leuk Esterase: x / RBC: x / WBC x   Sq Epi: x / Non Sq Epi: x / Bacteria: x        VITALS  T(C): 36.7 (12-20-23 @ 09:34), Max: 37.2 (12-19-23 @ 18:00)  HR: 86 (12-20-23 @ 09:34) (76 - 99)  BP: 153/61 (12-20-23 @ 09:34) (125/59 - 153/61)  RR: 16 (12-20-23 @ 09:34) (15 - 18)  SpO2: 95% (12-20-23 @ 09:34) (93% - 97%)  Wt(kg): --    ALLERGIES  penicillin (Unknown)  aspirin (Unknown)      MEDICATIONS   acetaminophen     Tablet .. 975 milliGRAM(s) Oral every 6 hours PRN  heparin   Injectable 5000 Unit(s) SubCutaneous every 8 hours  influenza  Vaccine (HIGH DOSE) 0.7 milliLiter(s) IntraMuscular once  pantoprazole  Injectable 40 milliGRAM(s) IV Push two times a day  simvastatin 40 milliGRAM(s) Oral at bedtime  tamsulosin 0.4 milliGRAM(s) Oral at bedtime      ----------------------------------------------------------------------------------------  PHYSICAL EXAM  Constitutional - NAD, frail appearing. sleepy but easily awakened to voice  HEENT - NCAT, EOMI  Chest - no respiratory distress  Cardiovascular - RRR, S1S2   Abdomen -  soft   Extremities - No C/C/E, No calf tenderness   Neurologic Exam -                    Cognitive - Awake, Alert, AAO to self, place, date, year, situation     Communication - Fluent, No dysarthria     Cranial Nerves - CN 2-12 intact     Motor -                      LEFT    UE - ShAB 5/5, EF 5/5, EE 5/5, WE 5/5,  5/5                    RIGHT UE - ShAB 5/5, EF 5/5, EE 5/5, WE 5/5,  5/5                    LEFT    LE - HF 4/5, KE 5/5, DF 5/5, PF 5/5                    RIGHT LE - HF 4/5, KE 5/5, DF 5/5, PF 5/5        Sensory - Intact to LT      Balance - WNL Static  Psychiatric - Mood stable, Affect WNL  ----------------------------------------------------------------------------------------  ASSESSMENT/PLAN  89 yo m s/p laparascopic extended right hemicolectomy for GI malignancy  continue bedside PT and OT  pain- acetaminophen prn  DVT PPX -  heparin  Rehab -   Recommend inpatient acute rehab when medically cleared. Patient can tolerate 3 hours per day of therapy with medical supervision. will monitor for fatigue and tolerance with bedside therapy     Patient is a 89y old  Male who presents with a chief complaint of c/f colon cancer (20 Dec 2023 07:36)      Interval history: patient denies pain      REVIEW OF SYSTEMS  weakness      PAST MEDICAL & SURGICAL HISTORY   BPH (benign prostatic hyperplasia)    Peripheral neuropathy    HLD (hyperlipidemia)    No significant past surgical history         CURRENT FUNCTIONAL STATUS  12/18 Bed Mobility  Bed Mobility Training Rehab Potential: good, to achieve stated therapy goals  Bed Mobility Training Symptoms Noted During/After Treatment: fatigue  Bed Mobility Training Rolling/Turning: supervsion;  supervision  Bed Mobility Training Sit-to-Supine: supervsion;  supervision  Bed Mobility Training Limitations: decreased strength    Sit-Stand Transfer Training  Sit-to-Stand Transfer Training Rehab Potential: good, to achieve stated therapy goals  Sit-to-Stand Transfer Training Symptoms Noted During/After Treatment: fatigue  Transfer Training Sit-to-Stand Transfer: minimum assist (75% patient effort);  contact guard;  1 person assist;  full weight-bearing  Sit-to-Stand Transfer Training Transfer Safety Analysis: impaired balance;  decreased strength;  decreased ROM;  decreased flexibility    Gait Training  Gait Training Rehab Potential: good, to achieve stated therapy goals  Gait Training Symptoms Noted During/After Treatment: fatigue  Gait Training: contact guard;  minimum assist (75% patient effort);  1 person assist;  full weight-bearing   rolling walker;  50 feet  Gait Analysis: shuffling;  retropulsion;  decreased strength;  decreased ROM;  decreased flexibility  Gait Number of Times:: x 2  Type of Rest Type of Rest: standing    Therapeutic Exercise  Therapeutic Exercise Rehab Effort: good  Therapeutic Exercise Symptoms Noted During/After Treatment: fatigue  Therapeutic Exercise Detail: Patient completed exercises seated in chair to  bilateral lower extremities/upper extremities  to improve strength and activity tolerance, for increased safety with all functional activities.          FAMILY HISTORY   No pertinent family history in first degree relatives       RECENT LABS/IMAGING  CBC Full  -  ( 19 Dec 2023 07:37 )  WBC Count : 4.37 K/uL  RBC Count : 3.52 M/uL  Hemoglobin : 9.3 g/dL  Hematocrit : 29.4 %  Platelet Count - Automated : 281 K/uL  Mean Cell Volume : 83.5 fL  Mean Cell Hemoglobin : 26.4 pg  Mean Cell Hemoglobin Concentration : 31.6 gm/dL  Auto Neutrophil # : x  Auto Lymphocyte # : x  Auto Monocyte # : x  Auto Eosinophil # : x  Auto Basophil # : x  Auto Neutrophil % : x  Auto Lymphocyte % : x  Auto Monocyte % : x  Auto Eosinophil % : x  Auto Basophil % : x    12-19    134<L>  |  98  |  16  ----------------------------<  82  3.6   |  26  |  0.70    Ca    8.0<L>      19 Dec 2023 07:37  Phos  3.2     12-19  Mg     1.80     12-19      Urinalysis Basic - ( 19 Dec 2023 07:37 )    Color: x / Appearance: x / SG: x / pH: x  Gluc: 82 mg/dL / Ketone: x  / Bili: x / Urobili: x   Blood: x / Protein: x / Nitrite: x   Leuk Esterase: x / RBC: x / WBC x   Sq Epi: x / Non Sq Epi: x / Bacteria: x        VITALS  T(C): 36.7 (12-20-23 @ 09:34), Max: 37.2 (12-19-23 @ 18:00)  HR: 86 (12-20-23 @ 09:34) (76 - 99)  BP: 153/61 (12-20-23 @ 09:34) (125/59 - 153/61)  RR: 16 (12-20-23 @ 09:34) (15 - 18)  SpO2: 95% (12-20-23 @ 09:34) (93% - 97%)  Wt(kg): --    ALLERGIES  penicillin (Unknown)  aspirin (Unknown)      MEDICATIONS   acetaminophen     Tablet .. 975 milliGRAM(s) Oral every 6 hours PRN  heparin   Injectable 5000 Unit(s) SubCutaneous every 8 hours  influenza  Vaccine (HIGH DOSE) 0.7 milliLiter(s) IntraMuscular once  pantoprazole  Injectable 40 milliGRAM(s) IV Push two times a day  simvastatin 40 milliGRAM(s) Oral at bedtime  tamsulosin 0.4 milliGRAM(s) Oral at bedtime      ----------------------------------------------------------------------------------------  PHYSICAL EXAM  Constitutional - NAD, frail appearing. sleepy but easily awakened to voice  HEENT - NCAT, EOMI  Chest - no respiratory distress  Cardiovascular - RRR, S1S2   Abdomen -  soft   Extremities - No C/C/E, No calf tenderness   Neurologic Exam -                    Cognitive - Awake, Alert, AAO to self, place, date, year, situation     Communication - Fluent, No dysarthria     Cranial Nerves - CN 2-12 intact     Motor -                      LEFT    UE - ShAB 5/5, EF 5/5, EE 5/5, WE 5/5,  5/5                    RIGHT UE - ShAB 5/5, EF 5/5, EE 5/5, WE 5/5,  5/5                    LEFT    LE - HF 4/5, KE 5/5, DF 5/5, PF 5/5                    RIGHT LE - HF 4/5, KE 5/5, DF 5/5, PF 5/5        Sensory - Intact to LT      Balance - WNL Static  Psychiatric - Mood stable, Affect WNL  ----------------------------------------------------------------------------------------  ASSESSMENT/PLAN  89 yo m s/p laparascopic extended right hemicolectomy for GI malignancy  continue bedside PT and OT  pain- acetaminophen prn  DVT PPX -  heparin  Rehab -   Recommend inpatient acute rehab when medically cleared. Patient can tolerate 3 hours per day of therapy with medical supervision. will monitor for fatigue and tolerance with bedside therapy

## 2023-12-20 NOTE — PROGRESS NOTE ADULT - REASON FOR ADMISSION
c/f colon cancer

## 2023-12-20 NOTE — PROGRESS NOTE ADULT - ATTENDING COMMENTS
Colonoscopy unsuccessful due to poor prep  Will await results of colonoscopy before surgery  Discussed with patient risks/benefits/alternatives to surgery and he agreed to proceed  Tentatively planning for Tuesday 12/5 once colonoscopy is completed and medically cleared
Tolerating LRD  Abd soft, nondistended. Incisions clean  Passing flatus  TPN stopped  Ok for discharge to acute rehab
DATE OF SERVICE: 12-10-23 @ 14:10    +BM unsure if flatus  Abdomen less distended, softer    CLD today
Feels less nauseated today. Feels frustrated with lack of ambulation  Abd softer, nontender  Labs appropriate  CLD today, continue TPN  PT  OOB to chair
POD 1 lap extended right colectomy  No acute events  Abd mildly distended, appropriately tender  Continue clears  Ambulation  Monitor UOP and dc Gill at midnight if adequate
POD 2 lap extended R colectomy  Emesis overnight, low UOP  Abd softly distended, appropriately tender  NGT  Continue IV hydration, strict I/O's
POD 8 s/p extended lap right with postop ileus  Passing flatus and BM but feeling nauseous  Dilated small bowel loops on Xray  Labs appropriate  Encouraged increased ambulation  Start TPN
Pt seen/examined, agree with above.    - cont PT  - rehab planning
Abd soft. Incisions well healed  Dc to inpatient rehab today  Pathology still pending
DATE OF SERVICE: 12-09-23 @ 18:26    No events, no bowel fx  abd soft mildly distended  sips/chips for now, await bowel fx  oob and ambulate
Colonoscopy results noted  Abd mildly distended  For OR tomorrow for lap extended right colectomy, possible open  Prep with antibiotics today
Continues to improve  Tolerating LRD for breakfast  Abd soft, nontender  Wean TPN tomorrow
Passing flatus and BM. Lack of appetite  Abd distended but softer  Great UOP, vital and labs appropriate  Will try LRD today
89 yo M with PMH of HLD, BPH, DANG, presenting with 3 days of N/V and inability to tolerate PO.     Of note, patient had a hospitalization in 04/2023 for lightheadedness/weakness and was admitted for microcytic iron deficiency anemia potentially secondary to GI bleed vs malignancy requiring 2u pRBCs. Patient was recommended to have endoscopic evaluation for further workup. However, patient ultimately declined inpatient EGD + colonoscopy, He subsequently followed with GI as OP and completed a colonoscopy a few months ago, however, due to insufficient prep, the results were inconclusive. Gi recommended a CT scan to further assess.     Patient now presents with pain below the umbilicus, dull, intermittent, not responsive to Tylenol, associated with N/V (NBNB) for past few days. CTAP in the ED showed mass lesions involving the ascending and transverse colon with upstream cecal and small bowel distention, consistent with primary neoplasm. GI was consulted, colonoscopy completed on 12/1, however, prep was once again incomplete. Plan to repeat endoscopy on 12/4. Colorectal surgery may operate next week. c/w protonix     Of note: patient does not have a designated POA. His daughter has special needs and he is not close to anyone else. Counseled on picking a POA. Will continue GOC discussions     Discussed with HS 4
Seen ambulating in hallway with PT  One episode of emesis overnight  Passing flatus and diarrhea  Abdomen distended  Labs and vitals appropriate  NPO currently. Start standing Reglan. Resume diet tomorrow if improved.
Agree with above exam and plan
89 year old man with a past medical history BPH, HLD, inguinal hernia repairs, anemia, presenting with 3 days of NV and constipation with CT findings c/f primary colon malignancy undergoing staging and colonoscopy for biopsy.  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  For surgical resection tomorrow.  Will need repeat the colonoscopy in 3-6 months after the planned surgery to complete the evaluation.   rest as above.
89 M PMH BPH, HLD, inguinal hernia repairs, anemia, presenting with 3 days of NV and constipation with CT findings concerning for primary colon malignancy, pending repeat colonoscopy on 12/4. Surgery consulted, plan for colon resection on 12/5.

## 2023-12-20 NOTE — DISCHARGE NOTE NURSING/CASE MANAGEMENT/SOCIAL WORK - PATIENT PORTAL LINK FT
You can access the FollowMyHealth Patient Portal offered by [facility] by registering at the following website: [email or web address]. By joining [facility]’s FollowMyHealth portal, you will also be able to view your health information using other applications (apps) compatible with our system. You can access the FollowMyHealth Patient Portal offered by [facility] by registering at the following website: [email or web address]. By joining [facility]’s FollowMyHealth portal, you will also be able to view your health information using other applications (apps) compatible with our system.

## 2023-12-27 LAB
SURGICAL PATHOLOGY STUDY: SIGNIFICANT CHANGE UP
SURGICAL PATHOLOGY STUDY: SIGNIFICANT CHANGE UP

## 2023-12-31 NOTE — ED CDU PROVIDER INITIAL DAY NOTE - MUSCULOSKELETAL, MLM
12/11/2023  I, Mark Fernández MD, saw and evaluated the patient. I have discussed the patient with the resident/non-physician practitioner and agree with the resident's/non-physician practitioner's findings, Plan of Care, and MDM as documented in the resident's/non-physician practitioner's note, except where noted. All available labs and Radiology studies were reviewed.  I was present for key portions of any procedure(s) performed by the resident/non-physician practitioner and I was immediately available to provide assistance.       At this point I agree with the current assessment done in the Emergency Department.  I have conducted an independent evaluation of this patient a history and physical is as follows:    ED Course     Patient presents for evaluation after having a seizure-like episode today while at school.  Patient has a history of pseudoseizures.  Patient is not on AEDs and has had negative workup for epilepsy.  Patient is currently back at baseline.  No additional complaints. A/P: Seizure-like activity.  Will check fingerstick glucose and pregnancy test.    Critical Care Time  Procedures      
Spine appears normal, range of motion is not limited, no muscle or joint tenderness

## 2024-01-01 ENCOUNTER — EMERGENCY (EMERGENCY)
Facility: HOSPITAL | Age: 88
LOS: 1 days | Discharge: HOME | End: 2024-01-01
Attending: STUDENT IN AN ORGANIZED HEALTH CARE EDUCATION/TRAINING PROGRAM | Admitting: STUDENT IN AN ORGANIZED HEALTH CARE EDUCATION/TRAINING PROGRAM
Payer: MEDICARE

## 2024-01-01 VITALS
DIASTOLIC BLOOD PRESSURE: 62 MMHG | OXYGEN SATURATION: 100 % | RESPIRATION RATE: 16 BRPM | TEMPERATURE: 98 F | SYSTOLIC BLOOD PRESSURE: 114 MMHG | HEART RATE: 72 BPM

## 2024-01-01 VITALS
RESPIRATION RATE: 15 BRPM | SYSTOLIC BLOOD PRESSURE: 156 MMHG | HEART RATE: 95 BPM | TEMPERATURE: 98 F | OXYGEN SATURATION: 97 % | WEIGHT: 134.92 LBS | DIASTOLIC BLOOD PRESSURE: 76 MMHG

## 2024-01-01 DIAGNOSIS — Z90.49 ACQUIRED ABSENCE OF OTHER SPECIFIED PARTS OF DIGESTIVE TRACT: Chronic | ICD-10-CM

## 2024-01-01 LAB
ALBUMIN SERPL ELPH-MCNC: 3.4 G/DL — SIGNIFICANT CHANGE UP (ref 3.3–5)
ALP SERPL-CCNC: 76 U/L — SIGNIFICANT CHANGE UP (ref 40–120)
ALT FLD-CCNC: 12 U/L — SIGNIFICANT CHANGE UP (ref 4–41)
ANION GAP SERPL CALC-SCNC: 14 MMOL/L — SIGNIFICANT CHANGE UP (ref 7–14)
ANION GAP SERPL CALC-SCNC: 9 MMOL/L — SIGNIFICANT CHANGE UP (ref 7–14)
APPEARANCE UR: CLEAR — SIGNIFICANT CHANGE UP
AST SERPL-CCNC: 14 U/L — SIGNIFICANT CHANGE UP (ref 4–40)
BACTERIA # UR AUTO: NEGATIVE /HPF — SIGNIFICANT CHANGE UP
BASOPHILS # BLD AUTO: 0.08 K/UL — SIGNIFICANT CHANGE UP (ref 0–0.2)
BASOPHILS NFR BLD AUTO: 0.9 % — SIGNIFICANT CHANGE UP (ref 0–2)
BILIRUB SERPL-MCNC: 0.5 MG/DL — SIGNIFICANT CHANGE UP (ref 0.2–1.2)
BILIRUB UR-MCNC: NEGATIVE — SIGNIFICANT CHANGE UP
BUN SERPL-MCNC: 26 MG/DL — HIGH (ref 7–23)
BUN SERPL-MCNC: 32 MG/DL — HIGH (ref 7–23)
CALCIUM SERPL-MCNC: 8.2 MG/DL — LOW (ref 8.4–10.5)
CALCIUM SERPL-MCNC: 8.5 MG/DL — SIGNIFICANT CHANGE UP (ref 8.4–10.5)
CAST: 4 /LPF — SIGNIFICANT CHANGE UP (ref 0–4)
CHLORIDE SERPL-SCNC: 100 MMOL/L — SIGNIFICANT CHANGE UP (ref 98–107)
CHLORIDE SERPL-SCNC: 107 MMOL/L — SIGNIFICANT CHANGE UP (ref 98–107)
CO2 SERPL-SCNC: 23 MMOL/L — SIGNIFICANT CHANGE UP (ref 22–31)
CO2 SERPL-SCNC: 24 MMOL/L — SIGNIFICANT CHANGE UP (ref 22–31)
COLOR SPEC: YELLOW — SIGNIFICANT CHANGE UP
CREAT SERPL-MCNC: 1.62 MG/DL — HIGH (ref 0.5–1.3)
CREAT SERPL-MCNC: 2.04 MG/DL — HIGH (ref 0.5–1.3)
CULTURE RESULTS: NO GROWTH — SIGNIFICANT CHANGE UP
DIFF PNL FLD: ABNORMAL
EGFR: 30 ML/MIN/1.73M2 — LOW
EGFR: 30 ML/MIN/1.73M2 — LOW
EGFR: 40 ML/MIN/1.73M2 — LOW
EGFR: 40 ML/MIN/1.73M2 — LOW
EOSINOPHIL # BLD AUTO: 0.14 K/UL — SIGNIFICANT CHANGE UP (ref 0–0.5)
EOSINOPHIL NFR BLD AUTO: 1.6 % — SIGNIFICANT CHANGE UP (ref 0–6)
GLUCOSE SERPL-MCNC: 109 MG/DL — HIGH (ref 70–99)
GLUCOSE SERPL-MCNC: 88 MG/DL — SIGNIFICANT CHANGE UP (ref 70–99)
GLUCOSE UR QL: NEGATIVE MG/DL — SIGNIFICANT CHANGE UP
HCT VFR BLD CALC: 34.1 % — LOW (ref 39–50)
HGB BLD-MCNC: 11.1 G/DL — LOW (ref 13–17)
IANC: 6.5 K/UL — SIGNIFICANT CHANGE UP (ref 1.8–7.4)
IMM GRANULOCYTES NFR BLD AUTO: 1.7 % — HIGH (ref 0–0.9)
KETONES UR-MCNC: NEGATIVE MG/DL — SIGNIFICANT CHANGE UP
LEUKOCYTE ESTERASE UR-ACNC: NEGATIVE — SIGNIFICANT CHANGE UP
LYMPHOCYTES # BLD AUTO: 1.16 K/UL — SIGNIFICANT CHANGE UP (ref 1–3.3)
LYMPHOCYTES # BLD AUTO: 13.5 % — SIGNIFICANT CHANGE UP (ref 13–44)
MCHC RBC-ENTMCNC: 28.3 PG — SIGNIFICANT CHANGE UP (ref 27–34)
MCHC RBC-ENTMCNC: 32.6 GM/DL — SIGNIFICANT CHANGE UP (ref 32–36)
MCV RBC AUTO: 87 FL — SIGNIFICANT CHANGE UP (ref 80–100)
MONOCYTES # BLD AUTO: 0.57 K/UL — SIGNIFICANT CHANGE UP (ref 0–0.9)
MONOCYTES NFR BLD AUTO: 6.6 % — SIGNIFICANT CHANGE UP (ref 2–14)
NEUTROPHILS # BLD AUTO: 6.5 K/UL — SIGNIFICANT CHANGE UP (ref 1.8–7.4)
NEUTROPHILS NFR BLD AUTO: 75.7 % — SIGNIFICANT CHANGE UP (ref 43–77)
NITRITE UR-MCNC: NEGATIVE — SIGNIFICANT CHANGE UP
NRBC # BLD AUTO: 0 K/UL — SIGNIFICANT CHANGE UP (ref 0–0)
NRBC # BLD: 0 /100 WBCS — SIGNIFICANT CHANGE UP (ref 0–0)
NRBC # FLD: 0 K/UL — SIGNIFICANT CHANGE UP (ref 0–0)
NRBC BLD-RTO: 0 /100 WBCS — SIGNIFICANT CHANGE UP (ref 0–0)
PH UR: 5.5 — SIGNIFICANT CHANGE UP (ref 5–8)
PLATELET # BLD AUTO: 365 K/UL — SIGNIFICANT CHANGE UP (ref 150–400)
POTASSIUM SERPL-MCNC: 3.7 MMOL/L — SIGNIFICANT CHANGE UP (ref 3.5–5.3)
POTASSIUM SERPL-MCNC: 3.7 MMOL/L — SIGNIFICANT CHANGE UP (ref 3.5–5.3)
POTASSIUM SERPL-SCNC: 3.7 MMOL/L — SIGNIFICANT CHANGE UP (ref 3.5–5.3)
POTASSIUM SERPL-SCNC: 3.7 MMOL/L — SIGNIFICANT CHANGE UP (ref 3.5–5.3)
PROT SERPL-MCNC: 6.3 G/DL — SIGNIFICANT CHANGE UP (ref 6–8.3)
PROT UR-MCNC: NEGATIVE MG/DL — SIGNIFICANT CHANGE UP
RBC # BLD: 3.92 M/UL — LOW (ref 4.2–5.8)
RBC # FLD: 15.7 % — HIGH (ref 10.3–14.5)
RBC CASTS # UR COMP ASSIST: 2 /HPF — SIGNIFICANT CHANGE UP (ref 0–4)
SODIUM SERPL-SCNC: 137 MMOL/L — SIGNIFICANT CHANGE UP (ref 135–145)
SODIUM SERPL-SCNC: 140 MMOL/L — SIGNIFICANT CHANGE UP (ref 135–145)
SP GR SPEC: 1.01 — SIGNIFICANT CHANGE UP (ref 1–1.03)
SPECIMEN SOURCE: SIGNIFICANT CHANGE UP
SQUAMOUS # UR AUTO: 1 /HPF — SIGNIFICANT CHANGE UP (ref 0–5)
UROBILINOGEN FLD QL: 0.2 MG/DL — SIGNIFICANT CHANGE UP (ref 0.2–1)
WBC # BLD: 8.6 K/UL — SIGNIFICANT CHANGE UP (ref 3.8–10.5)
WBC # FLD AUTO: 8.6 K/UL — SIGNIFICANT CHANGE UP (ref 3.8–10.5)
WBC UR QL: 1 /HPF — SIGNIFICANT CHANGE UP (ref 0–5)

## 2024-01-01 PROCEDURE — 99284 EMERGENCY DEPT VISIT MOD MDM: CPT

## 2024-01-01 RX ORDER — ACETAMINOPHEN 500 MG/5ML
975 LIQUID (ML) ORAL ONCE
Refills: 0 | Status: COMPLETED | OUTPATIENT
Start: 2024-01-01 | End: 2024-01-01

## 2024-01-01 RX ORDER — PHENAZOPYRIDINE HCL 100 MG
100 TABLET ORAL ONCE
Refills: 0 | Status: COMPLETED | OUTPATIENT
Start: 2024-01-01 | End: 2024-01-01

## 2024-01-01 RX ADMIN — Medication 975 MILLIGRAM(S): at 06:09

## 2024-01-01 RX ADMIN — Medication 100 MILLIGRAM(S): at 06:09

## 2024-01-01 RX ADMIN — Medication 1000 MILLILITER(S): at 07:58

## 2024-02-08 ENCOUNTER — INPATIENT (INPATIENT)
Facility: HOSPITAL | Age: 89
LOS: 9 days | Discharge: HOME HEALTH | End: 2024-02-18
Attending: STUDENT IN AN ORGANIZED HEALTH CARE EDUCATION/TRAINING PROGRAM | Admitting: STUDENT IN AN ORGANIZED HEALTH CARE EDUCATION/TRAINING PROGRAM
Payer: MEDICARE

## 2024-02-08 VITALS
HEART RATE: 89 BPM | TEMPERATURE: 98 F | SYSTOLIC BLOOD PRESSURE: 148 MMHG | OXYGEN SATURATION: 100 % | DIASTOLIC BLOOD PRESSURE: 76 MMHG | RESPIRATION RATE: 17 BRPM

## 2024-02-08 DIAGNOSIS — M79.89 OTHER SPECIFIED SOFT TISSUE DISORDERS: ICD-10-CM

## 2024-02-08 LAB
ALBUMIN SERPL ELPH-MCNC: 3.2 G/DL — LOW (ref 3.3–5)
ALP SERPL-CCNC: 99 U/L — SIGNIFICANT CHANGE UP (ref 40–120)
ALT FLD-CCNC: 8 U/L — SIGNIFICANT CHANGE UP (ref 4–41)
ANION GAP SERPL CALC-SCNC: 10 MMOL/L — SIGNIFICANT CHANGE UP (ref 7–14)
APTT BLD: 24.2 SEC — LOW (ref 24.5–35.6)
AST SERPL-CCNC: 16 U/L — SIGNIFICANT CHANGE UP (ref 4–40)
BASOPHILS # BLD AUTO: 0.05 K/UL — SIGNIFICANT CHANGE UP (ref 0–0.2)
BASOPHILS NFR BLD AUTO: 0.5 % — SIGNIFICANT CHANGE UP (ref 0–2)
BILIRUB SERPL-MCNC: 0.5 MG/DL — SIGNIFICANT CHANGE UP (ref 0.2–1.2)
BUN SERPL-MCNC: 16 MG/DL — SIGNIFICANT CHANGE UP (ref 7–23)
CALCIUM SERPL-MCNC: 8.5 MG/DL — SIGNIFICANT CHANGE UP (ref 8.4–10.5)
CHLORIDE SERPL-SCNC: 105 MMOL/L — SIGNIFICANT CHANGE UP (ref 98–107)
CO2 SERPL-SCNC: 25 MMOL/L — SIGNIFICANT CHANGE UP (ref 22–31)
CREAT SERPL-MCNC: 0.85 MG/DL — SIGNIFICANT CHANGE UP (ref 0.5–1.3)
EGFR: 83 ML/MIN/1.73M2 — SIGNIFICANT CHANGE UP
EOSINOPHIL # BLD AUTO: 0.09 K/UL — SIGNIFICANT CHANGE UP (ref 0–0.5)
EOSINOPHIL NFR BLD AUTO: 1 % — SIGNIFICANT CHANGE UP (ref 0–6)
GLUCOSE SERPL-MCNC: 79 MG/DL — SIGNIFICANT CHANGE UP (ref 70–99)
HCT VFR BLD CALC: 30.1 % — LOW (ref 39–50)
HGB BLD-MCNC: 9.3 G/DL — LOW (ref 13–17)
IANC: 7 K/UL — SIGNIFICANT CHANGE UP (ref 1.8–7.4)
IMM GRANULOCYTES NFR BLD AUTO: 0.5 % — SIGNIFICANT CHANGE UP (ref 0–0.9)
INR BLD: 0.96 RATIO — SIGNIFICANT CHANGE UP (ref 0.85–1.18)
LYMPHOCYTES # BLD AUTO: 1.04 K/UL — SIGNIFICANT CHANGE UP (ref 1–3.3)
LYMPHOCYTES # BLD AUTO: 11.3 % — LOW (ref 13–44)
MCHC RBC-ENTMCNC: 26.4 PG — LOW (ref 27–34)
MCHC RBC-ENTMCNC: 30.9 GM/DL — LOW (ref 32–36)
MCV RBC AUTO: 85.5 FL — SIGNIFICANT CHANGE UP (ref 80–100)
MONOCYTES # BLD AUTO: 0.94 K/UL — HIGH (ref 0–0.9)
MONOCYTES NFR BLD AUTO: 10.3 % — SIGNIFICANT CHANGE UP (ref 2–14)
NEUTROPHILS # BLD AUTO: 7 K/UL — SIGNIFICANT CHANGE UP (ref 1.8–7.4)
NEUTROPHILS NFR BLD AUTO: 76.4 % — SIGNIFICANT CHANGE UP (ref 43–77)
NRBC # BLD: 0 /100 WBCS — SIGNIFICANT CHANGE UP (ref 0–0)
NRBC # FLD: 0 K/UL — SIGNIFICANT CHANGE UP (ref 0–0)
NT-PROBNP SERPL-SCNC: 951 PG/ML — HIGH
PLATELET # BLD AUTO: 307 K/UL — SIGNIFICANT CHANGE UP (ref 150–400)
POTASSIUM SERPL-MCNC: 4.3 MMOL/L — SIGNIFICANT CHANGE UP (ref 3.5–5.3)
POTASSIUM SERPL-SCNC: 4.3 MMOL/L — SIGNIFICANT CHANGE UP (ref 3.5–5.3)
PROT SERPL-MCNC: 6.8 G/DL — SIGNIFICANT CHANGE UP (ref 6–8.3)
PROTHROM AB SERPL-ACNC: 10.8 SEC — SIGNIFICANT CHANGE UP (ref 9.5–13)
RBC # BLD: 3.52 M/UL — LOW (ref 4.2–5.8)
RBC # FLD: 20.3 % — HIGH (ref 10.3–14.5)
SODIUM SERPL-SCNC: 140 MMOL/L — SIGNIFICANT CHANGE UP (ref 135–145)
TROPONIN T, HIGH SENSITIVITY RESULT: 20 NG/L — SIGNIFICANT CHANGE UP
TROPONIN T, HIGH SENSITIVITY RESULT: 23 NG/L — SIGNIFICANT CHANGE UP
WBC # BLD: 9.17 K/UL — SIGNIFICANT CHANGE UP (ref 3.8–10.5)
WBC # FLD AUTO: 9.17 K/UL — SIGNIFICANT CHANGE UP (ref 3.8–10.5)

## 2024-02-08 PROCEDURE — 93970 EXTREMITY STUDY: CPT | Mod: 26

## 2024-02-08 PROCEDURE — 71045 X-RAY EXAM CHEST 1 VIEW: CPT | Mod: 26

## 2024-02-08 PROCEDURE — 99285 EMERGENCY DEPT VISIT HI MDM: CPT

## 2024-02-08 PROCEDURE — 99223 1ST HOSP IP/OBS HIGH 75: CPT

## 2024-02-08 RX ORDER — FOLIC ACID 0.8 MG
1 TABLET ORAL DAILY
Refills: 0 | Status: DISCONTINUED | OUTPATIENT
Start: 2024-02-08 | End: 2024-02-18

## 2024-02-08 RX ORDER — FUROSEMIDE 40 MG
20 TABLET ORAL DAILY
Refills: 0 | Status: DISCONTINUED | OUTPATIENT
Start: 2024-02-08 | End: 2024-02-09

## 2024-02-08 RX ORDER — ONDANSETRON 8 MG/1
4 TABLET, FILM COATED ORAL EVERY 8 HOURS
Refills: 0 | Status: DISCONTINUED | OUTPATIENT
Start: 2024-02-08 | End: 2024-02-18

## 2024-02-08 RX ORDER — LANOLIN ALCOHOL/MO/W.PET/CERES
3 CREAM (GRAM) TOPICAL AT BEDTIME
Refills: 0 | Status: DISCONTINUED | OUTPATIENT
Start: 2024-02-08 | End: 2024-02-18

## 2024-02-08 RX ORDER — ACETAMINOPHEN 500 MG
650 TABLET ORAL ONCE
Refills: 0 | Status: COMPLETED | OUTPATIENT
Start: 2024-02-08 | End: 2024-02-08

## 2024-02-08 RX ORDER — FOLIC ACID 0.8 MG
1 TABLET ORAL
Refills: 0 | DISCHARGE

## 2024-02-08 RX ORDER — PANTOPRAZOLE SODIUM 20 MG/1
40 TABLET, DELAYED RELEASE ORAL
Refills: 0 | Status: DISCONTINUED | OUTPATIENT
Start: 2024-02-08 | End: 2024-02-18

## 2024-02-08 RX ORDER — FUROSEMIDE 40 MG
20 TABLET ORAL
Refills: 0 | Status: DISCONTINUED | OUTPATIENT
Start: 2024-02-08 | End: 2024-02-08

## 2024-02-08 RX ORDER — ATORVASTATIN CALCIUM 80 MG/1
80 TABLET, FILM COATED ORAL AT BEDTIME
Refills: 0 | Status: DISCONTINUED | OUTPATIENT
Start: 2024-02-08 | End: 2024-02-16

## 2024-02-08 RX ORDER — ACETAMINOPHEN 500 MG
975 TABLET ORAL ONCE
Refills: 0 | Status: COMPLETED | OUTPATIENT
Start: 2024-02-08 | End: 2024-02-08

## 2024-02-08 RX ORDER — ENOXAPARIN SODIUM 100 MG/ML
40 INJECTION SUBCUTANEOUS EVERY 24 HOURS
Refills: 0 | Status: DISCONTINUED | OUTPATIENT
Start: 2024-02-08 | End: 2024-02-18

## 2024-02-08 RX ORDER — TAMSULOSIN HYDROCHLORIDE 0.4 MG/1
0.4 CAPSULE ORAL AT BEDTIME
Refills: 0 | Status: DISCONTINUED | OUTPATIENT
Start: 2024-02-08 | End: 2024-02-18

## 2024-02-08 RX ORDER — GABAPENTIN 400 MG/1
400 CAPSULE ORAL AT BEDTIME
Refills: 0 | Status: DISCONTINUED | OUTPATIENT
Start: 2024-02-08 | End: 2024-02-18

## 2024-02-08 RX ORDER — ACETAMINOPHEN 500 MG
650 TABLET ORAL EVERY 6 HOURS
Refills: 0 | Status: DISCONTINUED | OUTPATIENT
Start: 2024-02-08 | End: 2024-02-18

## 2024-02-08 RX ADMIN — Medication 650 MILLIGRAM(S): at 21:53

## 2024-02-08 RX ADMIN — Medication 20 MILLIGRAM(S): at 22:59

## 2024-02-08 NOTE — ED PROVIDER NOTE - NSICDXPASTSURGICALHX_GEN_ALL_CORE_FT
oriented to person, place, time and situation PAST SURGICAL HISTORY:  No significant past surgical history

## 2024-02-08 NOTE — H&P ADULT - PROBLEM SELECTOR PLAN 4
-cytopathology w/ confirmed malignancy. Not treated with chemo  -surgical site appears well healed  -outpt colorectal, onc follow up

## 2024-02-08 NOTE — H&P ADULT - NSHPREVIEWOFSYSTEMS_GEN_ALL_CORE
CONSTITUTIONAL:  +fatigue No weight loss, fever, chills  HEENT:  Eyes:  No visual loss, blurred vision, double vision or yellow sclerae. Ears, Nose, Throat:  No hearing loss, sneezing, congestion, runny nose or sore throat.  SKIN: +LE mild erythema  CARDIOVASCULAR:  +LE edema No chest pain, chest pressure or chest discomfort. No palpitations.  RESPIRATORY:  No shortness of breath, cough or sputum.  GASTROINTESTINAL: +diarrhea No anorexia, nausea, vomiting. No abdominal pain or blood.  GENITOURINARY: +dysuria Denies hematuria  NEUROLOGICAL:  No headache, dizziness, syncope, paralysis, ataxia, numbness or tingling in the extremities. No change in bowel or bladder control.  MUSCULOSKELETAL:  No muscle, back pain, joint pain or stiffness.  PSYCHIATRIC:  No history of depression or anxiety.  ENDOCRINOLOGIC:  No reports of sweating, cold or heat intolerance. No polyuria or polydipsia.  ALLERGIES:  No history of asthma, hives, eczema or rhinitis.

## 2024-02-08 NOTE — H&P ADULT - HISTORY OF PRESENT ILLNESS
89 y/o male with pmhx of BPH, HLD, B/L inguinal hernia repair with known L recurrence, recent colon cancer s/p hemicolectomy (not on chemo) in December presenting with bilateral lower extremity swelling. Patient reports his legs have been swollen for the past several weeks however acutely worsened in the last few days with associated pain. Pain mildly worse in the right leg. Pt also reports onset of mostly watery diarrhea since yesterday night. Had had multiple episodes since, denies melena or hematochezia. Denies abdominal pain, fevers. Reports penile discomfort at the meatus and some dysuria. States he recently completed treatment with bactrim for a UTI. Denies orthopnea or dyspnea on exertion. Denies LOC, chest pain, cough, shortness of breath, palpitations, diaphoresis, history of blood clots, hemoptysis, recent travel/bedbound nature.

## 2024-02-08 NOTE — ED ADULT NURSE REASSESSMENT NOTE - NS ED NURSE REASSESS COMMENT FT1
Report given by Previous RN. Pt refusing VS and to be placed on cardiac monitor. MD made aware. Report given to Katherine CARDOSO 1 RN. Pt transported over safely.

## 2024-02-08 NOTE — H&P ADULT - ASSESSMENT
89 y/o male with pmhx of BPH, HLD, B/L inguinal hernia repair with known L recurrence, recent colon cancer s/p hemicolectomy (not on chemo) in December presenting with bilateral lower extremity swelling likely 2/2 acute diastolic heart failure

## 2024-02-08 NOTE — H&P ADULT - NSHPLABSRESULTS_GEN_ALL_CORE
(02-08 @ 14:00)                      9.3  9.17 )-----------( 307                 30.1    Neutrophils = 7.00 (76.4%)  Lymphocytes = 1.04 (11.3%)  Eosinophils = 0.09 (1.0%)  Basophils = 0.05 (0.5%)  Monocytes = 0.94 (10.3%)  Bands = --%    02-08    140  |  105  |  16  ----------------------------<  79  4.3   |  25  |  0.85    Ca    8.5      08 Feb 2024 14:00    TPro  6.8  /  Alb  3.2<L>  /  TBili  0.5  /  DBili  x   /  AST  16  /  ALT  8   /  AlkPhos  99  02-08    ( 08 Feb 2024 14:00 )   PT: 10.8 sec;   INR: 0.96 ratio;       PTT:24.2 sec      Urinalysis Basic - ( 08 Feb 2024 14:00 )    Color: x / Appearance: x / SG: x / pH: x  Gluc: 79 mg/dL / Ketone: x  / Bili: x / Urobili: x   Blood: x / Protein: x / Nitrite: x   Leuk Esterase: x / RBC: x / WBC x   Sq Epi: x / Non Sq Epi: x / Bacteria: x    < from: Xray Chest 1 View- PORTABLE-Urgent (02.08.24 @ 13:27) >      IMPRESSION: Clear lungs.    < end of copied text >    < from: US Duplex Venous Lower Ext Complete, Bilateral (02.08.24 @ 14:53) >    IMPRESSION:  No evidence of deep venous thrombosis in the visualized bilateral lower   extremities.    < end of copied text >        Labs personally reviewed  Imaging reviewed  EKG: NSR no acute ST changes

## 2024-02-08 NOTE — H&P ADULT - PROBLEM SELECTOR PLAN 1
-pt denies SOB, MORALES or orthopnea however w/ worsening LE edema. Sats also noted to be as low as 92% (also observed on last admission), pt asymptomatic however   -treat with lasix 20 mg IV BID, monitor renal function  -Strict Is and Os, daily weights  -fluid restriction  -repeat TTE

## 2024-02-08 NOTE — ED ADULT NURSE NOTE - OBJECTIVE STATEMENT
Pt received with b/l LE swelling and redness. Denies SOB, denies chest pain. respirtations even and unlabored. 20g IV placed in L AC, labs drawn as ordered. Pt taken to ultrasound. Pt received with b/l LE swelling and redness. Denies SOB, denies chest pain. respirataions even and unlabored. 20g IV placed in L AC, labs drawn as ordered. Pt taken to ultrasound.

## 2024-02-08 NOTE — H&P ADULT - PROBLEM SELECTOR PLAN 2
-dopplers neg for DVT  -suspect some component of chronic lymphedema with possibly acute HFpEF  -mild erythema likely related to edema especially since symptoms progressed despite pt being on a course of bactrim for UTI  -if erythema progresses or doesn't improve with diuresis, consider treating for cellulitis  -elevate legs, compression stockings

## 2024-02-08 NOTE — ED PROVIDER NOTE - ATTENDING CONTRIBUTION TO CARE
I performed a history and physical exam of the patient and discussed their management with the resident/fellow/ACP/student. I have reviewed the resident/fellow/ACP/student note and agree with the documented findings and plan of care, except as noted. I have personally performed a substantive portion of the visit including all aspects of the medical decision making. My medical decision making and observations are found above. Please refer to any progress notes for updates on clinical course.     89 y/o male with pmhx of BPH, HLD, B/L inguinal hernia repair with known L recurrence, recent colon cancer s/p surgery hemicolectomy (not on chemo) in December presenting with bilateral lower extremity swelling. Patient reports his legs have been swollen for the past several weeks, recently worsening since yesterday with associated pain, pain worse in the right leg with no LOC, chest pain, shortness of breath, palpitations, diaphoresis, history of blood clots, hemoptysis, recent travel/bedbound nature.  Does report overlying redness of swelling with no fever/chills, N/V/D, cough, or changes in urination. No abdominal pain.     Gen: WDWN, NAD, comfortable appearing, afebrile   HEENT: No nasal discharge, mucous membranes moist  CV: RRR, +S1/S2, no M/R/G, equal b/l radial pulses 2+  Resp: CTAB, no W/R/R, SPO2 >95% on RA, no increased WOB   GI: Abdomen soft non-distended, NTTP, no masses/organomegaly   MSK/Skin: No CVA tenderness, no open wounds, no bruising, b/l lower extremity symmetric pitting edema  with b/l TTP; worse on right with overlying erythema with no crepitus and soft compartments.  Bilateral palpable 2+ DP/PT pulses and gross sensation intact  Neuro: A&Ox4, moving all 4 extremities spontaneously, gross sensation intact in UE and LE BL  Psych: appropriate mood    MDM:   89 y/o male with pmhx of BPH, HLD, B/L inguinal hernia repair with known L recurrence, recent colon cancer s/p surgery presenting with bilateral lower extremity swelling with mild overlying erythema, TTP bilaterally but worse on right, no chest pain/shortness of breath, lungs CTAB.  Exam/history concerning for but not limited to kidney/liver dysfunction versus CHF versus DVT.  Will evaluate with basic labs, cardiac enzymes/proBNP, bilateral ultrasound of lower extremity, pain control and reassess

## 2024-02-08 NOTE — PHARMACOTHERAPY INTERVENTION NOTE - COMMENTS
Medication list updated in Outpatient Medication Record (OMR). OMR updated and verified with [facility]

## 2024-02-08 NOTE — H&P ADULT - NSICDXPASTMEDICALHX_GEN_ALL_CORE_FT
PAST MEDICAL HISTORY:  BPH (benign prostatic hyperplasia)     Heart failure, diastolic, chronic     Peripheral neuropathy

## 2024-02-08 NOTE — H&P ADULT - NSHPPHYSICALEXAM_GEN_ALL_CORE
GENERAL APPEARANCE: Well developed, alert and cooperative. NAD.   HEENT:  PERRL, EOMI. External auditory canals normal, hearing grossly intact.  NECK: Neck supple, non-tender   CARDIAC: Normal S1 and S2. No S3, S4 or murmurs. Rhythm is regular.  LUNGS: Clear to auscultation and percussion without rales, rhonchi, wheezing or diminished breath sounds.  ABDOMEN: Positive bowel sounds. Soft, nondistended, nontender. Surgical scan C/D/I. No guarding or rebound.   : Exam performed with RN Sivan Copeland. no obvious penile lesions, no scrotal pain  MUSCULOSKELETAL: ROM intact spine and extremities. No joint erythema or tenderness.   BACK: normal posture, no spinal deformity, symmetry of spinal muscles, without tenderness, decreased range of motion.  EXTREMITIES: No significant deformity or joint abnormality. +3 b/l pitting edema with b/l erythema, slightly worse on right shin. Peripheral pulses intact.   NEUROLOGICAL: CN II-XII intact. Strength and sensation symmetric and intact throughout.   SKIN: no lower extremity wounds noted  PSYCHIATRIC: AOx3.Normal affect and behavior.

## 2024-02-08 NOTE — H&P ADULT - PROBLEM SELECTOR PLAN 3
-no fevers or leukocytosis however given recent hospitalization and multiple BMs, will r/o c diff. Check GI PCR and stool cultures  -contact precautions for now

## 2024-02-08 NOTE — ED ADULT TRIAGE NOTE - CHIEF COMPLAINT QUOTE
Pt presenting for swelling and redness to lower extremities. Pt states legs have been swollen for several weeks but worsened yesterday. Phx neuropathy.

## 2024-02-09 DIAGNOSIS — C18.9 MALIGNANT NEOPLASM OF COLON, UNSPECIFIED: ICD-10-CM

## 2024-02-09 DIAGNOSIS — G62.9 POLYNEUROPATHY, UNSPECIFIED: ICD-10-CM

## 2024-02-09 DIAGNOSIS — R60.0 LOCALIZED EDEMA: ICD-10-CM

## 2024-02-09 DIAGNOSIS — Z29.9 ENCOUNTER FOR PROPHYLACTIC MEASURES, UNSPECIFIED: ICD-10-CM

## 2024-02-09 DIAGNOSIS — N40.0 BENIGN PROSTATIC HYPERPLASIA WITHOUT LOWER URINARY TRACT SYMPTOMS: ICD-10-CM

## 2024-02-09 DIAGNOSIS — N17.9 ACUTE KIDNEY FAILURE, UNSPECIFIED: ICD-10-CM

## 2024-02-09 DIAGNOSIS — R19.7 DIARRHEA, UNSPECIFIED: ICD-10-CM

## 2024-02-09 DIAGNOSIS — Z90.49 ACQUIRED ABSENCE OF OTHER SPECIFIED PARTS OF DIGESTIVE TRACT: Chronic | ICD-10-CM

## 2024-02-09 DIAGNOSIS — I50.33 ACUTE ON CHRONIC DIASTOLIC (CONGESTIVE) HEART FAILURE: ICD-10-CM

## 2024-02-09 LAB
ANION GAP SERPL CALC-SCNC: 12 MMOL/L — SIGNIFICANT CHANGE UP (ref 7–14)
BUN SERPL-MCNC: 21 MG/DL — SIGNIFICANT CHANGE UP (ref 7–23)
CALCIUM SERPL-MCNC: 8.3 MG/DL — LOW (ref 8.4–10.5)
CHLORIDE SERPL-SCNC: 110 MMOL/L — HIGH (ref 98–107)
CO2 SERPL-SCNC: 20 MMOL/L — LOW (ref 22–31)
CREAT SERPL-MCNC: 1.8 MG/DL — HIGH (ref 0.5–1.3)
EGFR: 35 ML/MIN/1.73M2 — LOW
GLUCOSE SERPL-MCNC: 79 MG/DL — SIGNIFICANT CHANGE UP (ref 70–99)
HCT VFR BLD CALC: 27.5 % — LOW (ref 39–50)
HGB BLD-MCNC: 8.7 G/DL — LOW (ref 13–17)
MAGNESIUM SERPL-MCNC: 1.9 MG/DL — SIGNIFICANT CHANGE UP (ref 1.6–2.6)
MCHC RBC-ENTMCNC: 26.6 PG — LOW (ref 27–34)
MCHC RBC-ENTMCNC: 31.6 GM/DL — LOW (ref 32–36)
MCV RBC AUTO: 84.1 FL — SIGNIFICANT CHANGE UP (ref 80–100)
NRBC # BLD: 0 /100 WBCS — SIGNIFICANT CHANGE UP (ref 0–0)
NRBC # FLD: 0 K/UL — SIGNIFICANT CHANGE UP (ref 0–0)
PHOSPHATE SERPL-MCNC: 3.9 MG/DL — SIGNIFICANT CHANGE UP (ref 2.5–4.5)
PLATELET # BLD AUTO: 361 K/UL — SIGNIFICANT CHANGE UP (ref 150–400)
POTASSIUM SERPL-MCNC: 4 MMOL/L — SIGNIFICANT CHANGE UP (ref 3.5–5.3)
POTASSIUM SERPL-SCNC: 4 MMOL/L — SIGNIFICANT CHANGE UP (ref 3.5–5.3)
PROCALCITONIN SERPL-MCNC: 0.04 NG/ML — SIGNIFICANT CHANGE UP (ref 0.02–0.1)
RBC # BLD: 3.27 M/UL — LOW (ref 4.2–5.8)
RBC # FLD: 20.4 % — HIGH (ref 10.3–14.5)
SODIUM SERPL-SCNC: 142 MMOL/L — SIGNIFICANT CHANGE UP (ref 135–145)
WBC # BLD: 9.18 K/UL — SIGNIFICANT CHANGE UP (ref 3.8–10.5)
WBC # FLD AUTO: 9.18 K/UL — SIGNIFICANT CHANGE UP (ref 3.8–10.5)

## 2024-02-09 PROCEDURE — 99233 SBSQ HOSP IP/OBS HIGH 50: CPT

## 2024-02-09 RX ORDER — FUROSEMIDE 40 MG
20 TABLET ORAL
Refills: 0 | Status: DISCONTINUED | OUTPATIENT
Start: 2024-02-09 | End: 2024-02-10

## 2024-02-09 RX ORDER — SODIUM CHLORIDE 0.65 %
1 AEROSOL, SPRAY (ML) NASAL ONCE
Refills: 0 | Status: DISCONTINUED | OUTPATIENT
Start: 2024-02-09 | End: 2024-02-18

## 2024-02-09 RX ADMIN — Medication 3 MILLIGRAM(S): at 21:40

## 2024-02-09 RX ADMIN — ENOXAPARIN SODIUM 40 MILLIGRAM(S): 100 INJECTION SUBCUTANEOUS at 05:37

## 2024-02-09 RX ADMIN — PANTOPRAZOLE SODIUM 40 MILLIGRAM(S): 20 TABLET, DELAYED RELEASE ORAL at 17:06

## 2024-02-09 RX ADMIN — PANTOPRAZOLE SODIUM 40 MILLIGRAM(S): 20 TABLET, DELAYED RELEASE ORAL at 05:37

## 2024-02-09 RX ADMIN — Medication 1 MILLIGRAM(S): at 10:43

## 2024-02-09 RX ADMIN — Medication 20 MILLIGRAM(S): at 10:42

## 2024-02-09 RX ADMIN — GABAPENTIN 400 MILLIGRAM(S): 400 CAPSULE ORAL at 21:40

## 2024-02-09 RX ADMIN — TAMSULOSIN HYDROCHLORIDE 0.4 MILLIGRAM(S): 0.4 CAPSULE ORAL at 10:46

## 2024-02-09 RX ADMIN — ATORVASTATIN CALCIUM 80 MILLIGRAM(S): 80 TABLET, FILM COATED ORAL at 21:40

## 2024-02-09 RX ADMIN — Medication 20 MILLIGRAM(S): at 14:25

## 2024-02-09 NOTE — PHYSICAL THERAPY INITIAL EVALUATION ADULT - ADDITIONAL COMMENTS
Pt. reports owning a rolling walker     Pt. was left in bed post PT Evaluation, no apparent distress, all lines intact, HR 87 bpm. Fannie REYNOLDS made aware.

## 2024-02-09 NOTE — PHYSICAL THERAPY INITIAL EVALUATION ADULT - LEVEL OF INDEPENDENCE: GAIT, REHAB EVAL
to be assessed. deferred as while standing, pt. reported he felt like he had/needed to have a bowel movement.

## 2024-02-09 NOTE — PHYSICAL THERAPY INITIAL EVALUATION ADULT - GENERAL OBSERVATIONS, REHAB EVAL
Consult received, chart reviewed. Patient received in bed, no apparent distress, +cardiac monitor. Pt. agreeable to participate in PT.

## 2024-02-09 NOTE — PHYSICAL THERAPY INITIAL EVALUATION ADULT - PERTINENT HX OF CURRENT PROBLEM, REHAB EVAL
Per documentation, Pt. admitted for LE edema, diarrhea. Acute on chronic diastolic congestive heart failure, dopplers negative for DVT.

## 2024-02-10 LAB
A1C WITH ESTIMATED AVERAGE GLUCOSE RESULT: 4.6 % — SIGNIFICANT CHANGE UP (ref 4–5.6)
ANION GAP SERPL CALC-SCNC: 10 MMOL/L — SIGNIFICANT CHANGE UP (ref 7–14)
APPEARANCE UR: CLEAR — SIGNIFICANT CHANGE UP
BACTERIA # UR AUTO: NEGATIVE /HPF — SIGNIFICANT CHANGE UP
BILIRUB UR-MCNC: NEGATIVE — SIGNIFICANT CHANGE UP
BUN SERPL-MCNC: 21 MG/DL — SIGNIFICANT CHANGE UP (ref 7–23)
C DIFF BY PCR RESULT: SIGNIFICANT CHANGE UP
CALCIUM SERPL-MCNC: 8.4 MG/DL — SIGNIFICANT CHANGE UP (ref 8.4–10.5)
CAST: 1 /LPF — SIGNIFICANT CHANGE UP (ref 0–4)
CHLORIDE SERPL-SCNC: 106 MMOL/L — SIGNIFICANT CHANGE UP (ref 98–107)
CHOLEST SERPL-MCNC: 115 MG/DL — SIGNIFICANT CHANGE UP
CO2 SERPL-SCNC: 26 MMOL/L — SIGNIFICANT CHANGE UP (ref 22–31)
COLOR SPEC: YELLOW — SIGNIFICANT CHANGE UP
CREAT SERPL-MCNC: 2.35 MG/DL — HIGH (ref 0.5–1.3)
DIFF PNL FLD: ABNORMAL
EGFR: 26 ML/MIN/1.73M2 — LOW
ESTIMATED AVERAGE GLUCOSE: 85 — SIGNIFICANT CHANGE UP
GI PCR PANEL: SIGNIFICANT CHANGE UP
GLUCOSE SERPL-MCNC: 84 MG/DL — SIGNIFICANT CHANGE UP (ref 70–99)
GLUCOSE UR QL: NEGATIVE MG/DL — SIGNIFICANT CHANGE UP
HCT VFR BLD CALC: 27.4 % — LOW (ref 39–50)
HDLC SERPL-MCNC: 48 MG/DL — SIGNIFICANT CHANGE UP
HGB BLD-MCNC: 8.7 G/DL — LOW (ref 13–17)
KETONES UR-MCNC: NEGATIVE MG/DL — SIGNIFICANT CHANGE UP
LEUKOCYTE ESTERASE UR-ACNC: NEGATIVE — SIGNIFICANT CHANGE UP
LIPID PNL WITH DIRECT LDL SERPL: 54 MG/DL — SIGNIFICANT CHANGE UP
MAGNESIUM SERPL-MCNC: 2 MG/DL — SIGNIFICANT CHANGE UP (ref 1.6–2.6)
MCHC RBC-ENTMCNC: 26.6 PG — LOW (ref 27–34)
MCHC RBC-ENTMCNC: 31.8 GM/DL — LOW (ref 32–36)
MCV RBC AUTO: 83.8 FL — SIGNIFICANT CHANGE UP (ref 80–100)
NITRITE UR-MCNC: NEGATIVE — SIGNIFICANT CHANGE UP
NON HDL CHOLESTEROL: 67 MG/DL — SIGNIFICANT CHANGE UP
NRBC # BLD: 0 /100 WBCS — SIGNIFICANT CHANGE UP (ref 0–0)
NRBC # FLD: 0 K/UL — SIGNIFICANT CHANGE UP (ref 0–0)
NT-PROBNP SERPL-SCNC: 2851 PG/ML — HIGH
PH UR: 6.5 — SIGNIFICANT CHANGE UP (ref 5–8)
PHOSPHATE SERPL-MCNC: 4.4 MG/DL — SIGNIFICANT CHANGE UP (ref 2.5–4.5)
PLATELET # BLD AUTO: 336 K/UL — SIGNIFICANT CHANGE UP (ref 150–400)
POTASSIUM SERPL-MCNC: 3.7 MMOL/L — SIGNIFICANT CHANGE UP (ref 3.5–5.3)
POTASSIUM SERPL-SCNC: 3.7 MMOL/L — SIGNIFICANT CHANGE UP (ref 3.5–5.3)
PROT UR-MCNC: NEGATIVE MG/DL — SIGNIFICANT CHANGE UP
RBC # BLD: 3.27 M/UL — LOW (ref 4.2–5.8)
RBC # FLD: 20.8 % — HIGH (ref 10.3–14.5)
RBC CASTS # UR COMP ASSIST: 4 /HPF — SIGNIFICANT CHANGE UP (ref 0–4)
SODIUM SERPL-SCNC: 142 MMOL/L — SIGNIFICANT CHANGE UP (ref 135–145)
SP GR SPEC: 1.01 — SIGNIFICANT CHANGE UP (ref 1–1.03)
SQUAMOUS # UR AUTO: 0 /HPF — SIGNIFICANT CHANGE UP (ref 0–5)
TRIGL SERPL-MCNC: 65 MG/DL — SIGNIFICANT CHANGE UP
TSH SERPL-MCNC: 4.99 UIU/ML — HIGH (ref 0.27–4.2)
UROBILINOGEN FLD QL: 0.2 MG/DL — SIGNIFICANT CHANGE UP (ref 0.2–1)
WBC # BLD: 7.52 K/UL — SIGNIFICANT CHANGE UP (ref 3.8–10.5)
WBC # FLD AUTO: 7.52 K/UL — SIGNIFICANT CHANGE UP (ref 3.8–10.5)
WBC UR QL: 1 /HPF — SIGNIFICANT CHANGE UP (ref 0–5)

## 2024-02-10 PROCEDURE — 99232 SBSQ HOSP IP/OBS MODERATE 35: CPT

## 2024-02-10 PROCEDURE — 76770 US EXAM ABDO BACK WALL COMP: CPT | Mod: 26

## 2024-02-10 RX ORDER — SODIUM CHLORIDE 9 MG/ML
1000 INJECTION INTRAMUSCULAR; INTRAVENOUS; SUBCUTANEOUS
Refills: 0 | Status: DISCONTINUED | OUTPATIENT
Start: 2024-02-10 | End: 2024-02-18

## 2024-02-10 RX ADMIN — PANTOPRAZOLE SODIUM 40 MILLIGRAM(S): 20 TABLET, DELAYED RELEASE ORAL at 05:46

## 2024-02-10 RX ADMIN — PANTOPRAZOLE SODIUM 40 MILLIGRAM(S): 20 TABLET, DELAYED RELEASE ORAL at 20:29

## 2024-02-10 RX ADMIN — Medication 20 MILLIGRAM(S): at 05:45

## 2024-02-10 RX ADMIN — GABAPENTIN 400 MILLIGRAM(S): 400 CAPSULE ORAL at 22:38

## 2024-02-10 RX ADMIN — TAMSULOSIN HYDROCHLORIDE 0.4 MILLIGRAM(S): 0.4 CAPSULE ORAL at 22:38

## 2024-02-10 RX ADMIN — ENOXAPARIN SODIUM 40 MILLIGRAM(S): 100 INJECTION SUBCUTANEOUS at 05:45

## 2024-02-10 RX ADMIN — Medication 1 MILLIGRAM(S): at 20:29

## 2024-02-10 RX ADMIN — ATORVASTATIN CALCIUM 80 MILLIGRAM(S): 80 TABLET, FILM COATED ORAL at 22:38

## 2024-02-10 NOTE — PATIENT PROFILE ADULT - FALL HARM RISK - HARM RISK INTERVENTIONS
Assistance with ambulation/Assistance OOB with selected safe patient handling equipment/Communicate Risk of Fall with Harm to all staff/Discuss with provider need for PT consult/Monitor gait and stability/Reinforce activity limits and safety measures with patient and family/Tailored Fall Risk Interventions/Visual Cue: Yellow wristband and red socks/Bed in lowest position, wheels locked, appropriate side rails in place/Call bell, personal items and telephone in reach/Instruct patient to call for assistance before getting out of bed or chair/Non-slip footwear when patient is out of bed/[place] to call system/Physically safe environment - no spills, clutter or unnecessary equipment/Purposeful Proactive Rounding/Room/bathroom lighting operational, light cord in reach

## 2024-02-10 NOTE — DIETITIAN INITIAL EVALUATION ADULT - ADD RECOMMEND
1. Please obtain height and weight measurements for current admission   2. Defer fluid management to medical team   3. Monitor weights, labs, BM's, skin integrity, p.o. intake.   4. Please monitor % PO intake on flowsheets

## 2024-02-10 NOTE — DIETITIAN INITIAL EVALUATION ADULT - PERTINENT LABORATORY DATA
02-10    142  |  106  |  21  ----------------------------<  84  3.7   |  26  |  2.35<H>    Ca    8.4      10 Feb 2024 06:00  Phos  4.4     02-10  Mg     2.00     02-10    TPro  6.8  /  Alb  3.2<L>  /  TBili  0.5  /  DBili  x   /  AST  16  /  ALT  8   /  AlkPhos  99  02-08  A1C with Estimated Average Glucose Result: 4.6 % (02-10-24 @ 06:00)  A1C with Estimated Average Glucose Result: 5.0 % (04-15-23 @ 05:55)

## 2024-02-10 NOTE — DIETITIAN INITIAL EVALUATION ADULT - NS FNS DIET ORDER
Diet, Regular:   DASH/TLC {Sodium & Cholesterol Restricted} (DASH)  1500mL Fluid Restriction ([id number]) (02-09-24 @ 02:26) [Active]

## 2024-02-10 NOTE — DIETITIAN INITIAL EVALUATION ADULT - ORAL INTAKE PTA/DIET HISTORY
Patient reports no known food allergies or food intolerances. Patient does not take any nutrition supplements at home. Patient denies any chewing or swallowing difficulty with regular solids or thin liquids. Patient reports a good appetite at baseline, endorses consuming three meals daily with no recent changes.    No height or weight documentation noted for current admission.   [facility] Unable to determine BMI without current weight measurement  Patient reports usual body weight 135lb, current weight 108lb. Endorses weight loss over "several months"  Per [facility], noted the following weight history: 51kg (12/14), 50.5kg (11/30), 57.8kg (4/13)  Objective weight measurements suggest 6.8g (12%) weight loss x8 months period of time

## 2024-02-10 NOTE — CHART NOTE - NSCHARTNOTEFT_GEN_A_CORE
Called by RN due to patient not urinating all day. Bladder scan showed > 999. Will straight cath and see output amount. Patient already on Flomax and receiving Lasix IV 20 mg BID. Will add senna and Miralax prn for constipation. Will do bladder scan Q 8 hrs x 3.
Medicine PA Note    Pt had 3 occasions of urinary retention with > 700cc of urine on bladder scan and straight cath. Gill Catheter ordered to be placed. Pt noted to have PAT with Cr of 1.8 with a baseline of 0.75. Bladder/Renal US ordered to r/o hydronephrosis. Will continue to monitor.    Connor Martin PA-C  x [phone]
RN notified that patient was desatting to 90-92 on RA while sleeping. patient was evaluated at bedside. Patient denies any SOB or chest pain or other complaints.     General no d istress  HearT: +S1, +S2  Lungs: CTA    Hypoxia while sleeping     -O2 improves to % when patient is awake and talking     -Otherwise, O2 was 90-92. Patient states he feels fine and does not want to wear oxygen.      -Patient is A&Ox4, it was explained to the patient the importance of wearing oxygen to improve oxygenation, but still refused.      -monitor pulse ox closely

## 2024-02-10 NOTE — DIETITIAN INITIAL EVALUATION ADULT - REASON FOR ADMISSION
Other disorders of soft tissue    Patient is a 89y Male with PMH BPH, dCHF, HLD, B/L inguinal hernia repair with known L recurrence, recent colon cancer status post hemicolectomy (not on chemo) in December who presents to [facility] with bilateral lower extremity swelling

## 2024-02-10 NOTE — DIETITIAN INITIAL EVALUATION ADULT - PERTINENT MEDS FT
MEDICATIONS  (STANDING):  atorvastatin 80 milliGRAM(s) Oral at bedtime  enoxaparin Injectable 40 milliGRAM(s) SubCutaneous every 24 hours  folic acid 1 milliGRAM(s) Oral daily  furosemide   Injectable 20 milliGRAM(s) IV Push two times a day  gabapentin 400 milliGRAM(s) Oral at bedtime  pantoprazole    Tablet 40 milliGRAM(s) Oral two times a day  tamsulosin 0.4 milliGRAM(s) Oral at bedtime    MEDICATIONS  (PRN):  acetaminophen     Tablet .. 650 milliGRAM(s) Oral every 6 hours PRN Temp greater or equal to 38C (100.4F), Mild Pain (1 - 3)  aluminum hydroxide/magnesium hydroxide/simethicone Suspension 30 milliLiter(s) Oral every 4 hours PRN Dyspepsia  melatonin 3 milliGRAM(s) Oral at bedtime PRN Insomnia  ondansetron Injectable 4 milliGRAM(s) IV Push every 8 hours PRN Nausea and/or Vomiting  sodium chloride 0.65% Nasal 1 Spray(s) Both Nostrils once PRN Nasal Congestion

## 2024-02-10 NOTE — PROVIDER CONTACT NOTE (CHANGE IN STATUS NOTIFICATION) - SITUATION
Pt retaining urine 770cc bladder scan and no wet diaper since 8pm. Pt straight cath yesterday 4pm drained 900cc. RN drained 800cc urine at this time
pt retaining urine, pt straight cath at 4pm yesterday 900cc output, at midnight bladder scan of 770cc with 800cc output. Pt urinated a small amount in diaper after giving 20mg lasix IV. Bladder scan as of now 750cc

## 2024-02-10 NOTE — DIETITIAN NUTRITION RISK NOTIFICATION - ADDITIONAL COMMENTS/DIETITIAN RECOMMENDATIONS
Please see Dietitian Initial Assessment for complete recommendations.    Remedios Abarca MS RDN CDN  On [employer] Teams, Pager #[phone]

## 2024-02-10 NOTE — DIETITIAN INITIAL EVALUATION ADULT - FACTORS AFF FOOD INTAKE
none Finasteride Counseling:  I discussed with the patient the risks of use of finasteride including but not limited to decreased libido, decreased ejaculate volume, gynecomastia, and depression. Women should not handle medication.  All of the patient's questions and concerns were addressed. Finasteride Male Counseling: Finasteride Counseling:  I discussed with the patient the risks of use of finasteride including but not limited to decreased libido, decreased ejaculate volume, gynecomastia, and depression. Women should not handle medication.  All of the patient's questions and concerns were addressed.

## 2024-02-10 NOTE — DIETITIAN INITIAL EVALUATION ADULT - OTHER INFO
Patient is currently ordered for a PO diet with 1500mL fluid restriction. Patient reports a good appetite and PO intake at meals during course of admission. Patient denies any chewing or swallowing difficulty with current diet order. No report of GI distress (nausea, vomiting, diarrhea, constipation). Last BM 2/9/24 per RN flowsheet documentation. Not noted to be on a bowel regimen. Noted supplementation with folic acid per review of medication list.     Writer provided verbal education regarding current diet order and nutrition recommendations for after discharge, including nutrition in the context of congestive heart failure. Patient verbalized understanding to the discussion.

## 2024-02-11 LAB
ANION GAP SERPL CALC-SCNC: 10 MMOL/L — SIGNIFICANT CHANGE UP (ref 7–14)
BLD GP AB SCN SERPL QL: NEGATIVE — SIGNIFICANT CHANGE UP
BUN SERPL-MCNC: 19 MG/DL — SIGNIFICANT CHANGE UP (ref 7–23)
CALCIUM SERPL-MCNC: 8.3 MG/DL — LOW (ref 8.4–10.5)
CHLORIDE SERPL-SCNC: 105 MMOL/L — SIGNIFICANT CHANGE UP (ref 98–107)
CO2 SERPL-SCNC: 25 MMOL/L — SIGNIFICANT CHANGE UP (ref 22–31)
CREAT SERPL-MCNC: 1.26 MG/DL — SIGNIFICANT CHANGE UP (ref 0.5–1.3)
CULTURE RESULTS: NO GROWTH — SIGNIFICANT CHANGE UP
EGFR: 54 ML/MIN/1.73M2 — LOW
GLUCOSE SERPL-MCNC: 91 MG/DL — SIGNIFICANT CHANGE UP (ref 70–99)
HCT VFR BLD CALC: 25.6 % — LOW (ref 39–50)
HGB BLD-MCNC: 8 G/DL — LOW (ref 13–17)
MAGNESIUM SERPL-MCNC: 1.7 MG/DL — SIGNIFICANT CHANGE UP (ref 1.6–2.6)
MCHC RBC-ENTMCNC: 26.3 PG — LOW (ref 27–34)
MCHC RBC-ENTMCNC: 31.3 GM/DL — LOW (ref 32–36)
MCV RBC AUTO: 84.2 FL — SIGNIFICANT CHANGE UP (ref 80–100)
NRBC # BLD: 0 /100 WBCS — SIGNIFICANT CHANGE UP (ref 0–0)
NRBC # FLD: 0 K/UL — SIGNIFICANT CHANGE UP (ref 0–0)
PHOSPHATE SERPL-MCNC: 4.1 MG/DL — SIGNIFICANT CHANGE UP (ref 2.5–4.5)
PLATELET # BLD AUTO: 311 K/UL — SIGNIFICANT CHANGE UP (ref 150–400)
POTASSIUM SERPL-MCNC: 4.3 MMOL/L — SIGNIFICANT CHANGE UP (ref 3.5–5.3)
POTASSIUM SERPL-SCNC: 4.3 MMOL/L — SIGNIFICANT CHANGE UP (ref 3.5–5.3)
RBC # BLD: 3.04 M/UL — LOW (ref 4.2–5.8)
RBC # FLD: 20.5 % — HIGH (ref 10.3–14.5)
RH IG SCN BLD-IMP: NEGATIVE — SIGNIFICANT CHANGE UP
SODIUM SERPL-SCNC: 140 MMOL/L — SIGNIFICANT CHANGE UP (ref 135–145)
SPECIMEN SOURCE: SIGNIFICANT CHANGE UP
WBC # BLD: 6.99 K/UL — SIGNIFICANT CHANGE UP (ref 3.8–10.5)
WBC # FLD AUTO: 6.99 K/UL — SIGNIFICANT CHANGE UP (ref 3.8–10.5)

## 2024-02-11 PROCEDURE — 99232 SBSQ HOSP IP/OBS MODERATE 35: CPT

## 2024-02-11 RX ADMIN — ATORVASTATIN CALCIUM 80 MILLIGRAM(S): 80 TABLET, FILM COATED ORAL at 21:53

## 2024-02-11 RX ADMIN — ENOXAPARIN SODIUM 40 MILLIGRAM(S): 100 INJECTION SUBCUTANEOUS at 06:28

## 2024-02-11 RX ADMIN — Medication 650 MILLIGRAM(S): at 09:24

## 2024-02-11 RX ADMIN — Medication 650 MILLIGRAM(S): at 09:54

## 2024-02-11 RX ADMIN — Medication 1 MILLIGRAM(S): at 09:24

## 2024-02-11 RX ADMIN — GABAPENTIN 400 MILLIGRAM(S): 400 CAPSULE ORAL at 21:53

## 2024-02-11 RX ADMIN — PANTOPRAZOLE SODIUM 40 MILLIGRAM(S): 20 TABLET, DELAYED RELEASE ORAL at 17:12

## 2024-02-11 RX ADMIN — PANTOPRAZOLE SODIUM 40 MILLIGRAM(S): 20 TABLET, DELAYED RELEASE ORAL at 06:28

## 2024-02-11 RX ADMIN — TAMSULOSIN HYDROCHLORIDE 0.4 MILLIGRAM(S): 0.4 CAPSULE ORAL at 21:53

## 2024-02-12 ENCOUNTER — RESULT REVIEW (OUTPATIENT)
Age: 89
End: 2024-02-12

## 2024-02-12 LAB
ANION GAP SERPL CALC-SCNC: 12 MMOL/L — SIGNIFICANT CHANGE UP (ref 7–14)
BUN SERPL-MCNC: 18 MG/DL — SIGNIFICANT CHANGE UP (ref 7–23)
CALCIUM SERPL-MCNC: 8.9 MG/DL — SIGNIFICANT CHANGE UP (ref 8.4–10.5)
CHLORIDE SERPL-SCNC: 103 MMOL/L — SIGNIFICANT CHANGE UP (ref 98–107)
CO2 SERPL-SCNC: 22 MMOL/L — SIGNIFICANT CHANGE UP (ref 22–31)
CREAT SERPL-MCNC: 0.96 MG/DL — SIGNIFICANT CHANGE UP (ref 0.5–1.3)
CULTURE RESULTS: SIGNIFICANT CHANGE UP
EGFR: 75 ML/MIN/1.73M2 — SIGNIFICANT CHANGE UP
GLUCOSE SERPL-MCNC: 92 MG/DL — SIGNIFICANT CHANGE UP (ref 70–99)
HCT VFR BLD CALC: 32.6 % — LOW (ref 39–50)
HGB BLD-MCNC: 9.9 G/DL — LOW (ref 13–17)
MAGNESIUM SERPL-MCNC: 1.7 MG/DL — SIGNIFICANT CHANGE UP (ref 1.6–2.6)
MCHC RBC-ENTMCNC: 26 PG — LOW (ref 27–34)
MCHC RBC-ENTMCNC: 30.4 GM/DL — LOW (ref 32–36)
MCV RBC AUTO: 85.6 FL — SIGNIFICANT CHANGE UP (ref 80–100)
NRBC # BLD: 0 /100 WBCS — SIGNIFICANT CHANGE UP (ref 0–0)
NRBC # FLD: 0 K/UL — SIGNIFICANT CHANGE UP (ref 0–0)
PHOSPHATE SERPL-MCNC: 3.6 MG/DL — SIGNIFICANT CHANGE UP (ref 2.5–4.5)
PLATELET # BLD AUTO: 352 K/UL — SIGNIFICANT CHANGE UP (ref 150–400)
POTASSIUM SERPL-MCNC: 4.6 MMOL/L — SIGNIFICANT CHANGE UP (ref 3.5–5.3)
POTASSIUM SERPL-SCNC: 4.6 MMOL/L — SIGNIFICANT CHANGE UP (ref 3.5–5.3)
RBC # BLD: 3.81 M/UL — LOW (ref 4.2–5.8)
RBC # FLD: 19.9 % — HIGH (ref 10.3–14.5)
SODIUM SERPL-SCNC: 137 MMOL/L — SIGNIFICANT CHANGE UP (ref 135–145)
SPECIMEN SOURCE: SIGNIFICANT CHANGE UP
WBC # BLD: 7.98 K/UL — SIGNIFICANT CHANGE UP (ref 3.8–10.5)
WBC # FLD AUTO: 7.98 K/UL — SIGNIFICANT CHANGE UP (ref 3.8–10.5)

## 2024-02-12 PROCEDURE — 99233 SBSQ HOSP IP/OBS HIGH 50: CPT

## 2024-02-12 PROCEDURE — 93306 TTE W/DOPPLER COMPLETE: CPT | Mod: 26

## 2024-02-12 RX ORDER — CHOLESTYRAMINE 4 G/9G
2 POWDER, FOR SUSPENSION ORAL
Refills: 0 | Status: DISCONTINUED | OUTPATIENT
Start: 2024-02-12 | End: 2024-02-18

## 2024-02-12 RX ORDER — CHOLESTYRAMINE 4 G/9G
4 POWDER, FOR SUSPENSION ORAL DAILY
Refills: 0 | Status: DISCONTINUED | OUTPATIENT
Start: 2024-02-12 | End: 2024-02-12

## 2024-02-12 RX ORDER — TRAMADOL HYDROCHLORIDE 50 MG/1
25 TABLET ORAL ONCE
Refills: 0 | Status: DISCONTINUED | OUTPATIENT
Start: 2024-02-12 | End: 2024-02-12

## 2024-02-12 RX ADMIN — ENOXAPARIN SODIUM 40 MILLIGRAM(S): 100 INJECTION SUBCUTANEOUS at 05:44

## 2024-02-12 RX ADMIN — TAMSULOSIN HYDROCHLORIDE 0.4 MILLIGRAM(S): 0.4 CAPSULE ORAL at 22:21

## 2024-02-12 RX ADMIN — GABAPENTIN 400 MILLIGRAM(S): 400 CAPSULE ORAL at 22:21

## 2024-02-12 RX ADMIN — TRAMADOL HYDROCHLORIDE 25 MILLIGRAM(S): 50 TABLET ORAL at 06:23

## 2024-02-12 RX ADMIN — Medication 650 MILLIGRAM(S): at 05:40

## 2024-02-12 RX ADMIN — TRAMADOL HYDROCHLORIDE 25 MILLIGRAM(S): 50 TABLET ORAL at 05:53

## 2024-02-12 RX ADMIN — Medication 1 MILLIGRAM(S): at 08:43

## 2024-02-12 RX ADMIN — ATORVASTATIN CALCIUM 80 MILLIGRAM(S): 80 TABLET, FILM COATED ORAL at 22:21

## 2024-02-12 RX ADMIN — PANTOPRAZOLE SODIUM 40 MILLIGRAM(S): 20 TABLET, DELAYED RELEASE ORAL at 17:43

## 2024-02-12 RX ADMIN — PANTOPRAZOLE SODIUM 40 MILLIGRAM(S): 20 TABLET, DELAYED RELEASE ORAL at 05:43

## 2024-02-12 RX ADMIN — Medication 650 MILLIGRAM(S): at 06:10

## 2024-02-12 NOTE — PROVIDER CONTACT NOTE (OTHER) - SITUATION
patient had 2 loose BMs
Pt refusing O2 supplementation. Pt O2 sustaining 91-92% on RA, states "I feel fine, I do not want that". Denies SOB. Pt currently awake.

## 2024-02-12 NOTE — CONSULT NOTE ADULT - ASSESSMENT
s/p lap R hemicolectomy for Stage 3 colon cancer with two synchronous lesions  Patient has not been able to follow up as an outpatient due to hospital readmissions   Pathology discussed with patient    Rec cholestyramine QID for diarrhea  Oncology referral upon discharge

## 2024-02-12 NOTE — PROVIDER CONTACT NOTE (OTHER) - RECOMMENDATIONS
No further interventions at this time.
ACP notified and aware, prn immodium. PRN tylenol to be given.

## 2024-02-12 NOTE — PROVIDER CONTACT NOTE (OTHER) - ASSESSMENT
Pt is A&Ox3, denies SOB, CP, palpitations.
Pt is A&Ox3, denies SOB, CP, palpitations. Patient had 2 loose BMs during shift. Patient complains of abdominal discomfort

## 2024-02-12 NOTE — CONSULT NOTE ADULT - SUBJECTIVE AND OBJECTIVE BOX
Colorectal Surgery Consult Note      HPI:  89 y/o male with pmhx of BPH, HLD, B/L inguinal hernia repair with known L recurrence, recent colon cancer s/p hemicolectomy (not on chemo) in December presenting with bilateral lower extremity swelling. Has been having diarrhea  Final pathology T4N1 colon cancer with two synchronous lesions  Has not been able to follow up as an outpatient due to multiple medical issues          PAST MEDICAL & SURGICAL HISTORY:  BPH (benign prostatic hyperplasia)      Peripheral neuropathy      Heart failure, diastolic, chronic      H/O hemicolectomy          ALLERGIES:    HOME MEDICATIONS:    MEDICATIONS  (STANDING):  atorvastatin 80 milliGRAM(s) Oral at bedtime  enoxaparin Injectable 40 milliGRAM(s) SubCutaneous every 24 hours  folic acid 1 milliGRAM(s) Oral daily  gabapentin 400 milliGRAM(s) Oral at bedtime  pantoprazole    Tablet 40 milliGRAM(s) Oral two times a day  sodium chloride 0.9%. 1000 milliLiter(s) (75 mL/Hr) IV Continuous <Continuous>  tamsulosin 0.4 milliGRAM(s) Oral at bedtime      SOCIAL HISTORY:    FAMILY HISTORY:    ___________________________________________  REVIEW OF SYSTEMS:  Constitutional: No fevers, chills, no recent weight loss  ENMT: No changes in hearing, no changes in vision, no sore throat, no cough  Respiratory: No shortness of breath  Cardiovascular: No chest pain, palpitations  Gastrointestinal: No abdominal pain, no diarrhea/constipation  Genitourinary: No dysuria, frequency, or urgency    Extremities: No joint swelling, no limited range of movement  Neurological: No paresthesia  Skin: No rashes    ___________________________________________  PHYSICAL EXAM:  Vital Signs Last 24 Hrs  T(C): 36.3 (12 Feb 2024 10:05), Max: 37.2 (11 Feb 2024 21:53)  T(F): 97.3 (12 Feb 2024 10:05), Max: 98.9 (11 Feb 2024 21:53)  HR: 70 (12 Feb 2024 10:05) (70 - 88)  BP: 153/89 (12 Feb 2024 05:40) (127/61 - 153/89)  BP(mean): --  RR: 17 (12 Feb 2024 10:05) (17 - 18)  SpO2: 94% (12 Feb 2024 10:05) (94% - 100%)    Parameters below as of 12 Feb 2024 10:05  Patient On (Oxygen Delivery Method): room air    CAPILLARY BLOOD GLUCOSE        I&O's Detail    11 Feb 2024 07:01  -  12 Feb 2024 07:00  --------------------------------------------------------  IN:  Total IN: 0 mL    OUT:    Indwelling Catheter - Urethral (mL): 1000 mL  Total OUT: 1000 mL    Total NET: -1000 mL          General: A&O x3, NAD  Neuro: CN II-XII intact, motor and sensory grossly intact with no focal deficits.  HEENT: Normocephalic, atraumatic, EOMI, anicteric sclerae.  Respiratory: Breathing non-labored.  Abdomen: Soft, nondistended, nontender. Incisions well healed  Extremities: Warm bilaterally with palpable pulses.  MSK: Intact ROM.  ____________________________________________  LABS:  CBC Full  -  ( 12 Feb 2024 05:00 )  WBC Count : 7.98 K/uL  RBC Count : 3.81 M/uL  Hemoglobin : 9.9 g/dL  Hematocrit : 32.6 %  Platelet Count - Automated : 352 K/uL  Mean Cell Volume : 85.6 fL  Mean Cell Hemoglobin : 26.0 pg  Mean Cell Hemoglobin Concentration : 30.4 gm/dL  Auto Neutrophil # : x  Auto Lymphocyte # : x  Auto Monocyte # : x  Auto Eosinophil # : x  Auto Basophil # : x  Auto Neutrophil % : x  Auto Lymphocyte % : x  Auto Monocyte % : x  Auto Eosinophil % : x  Auto Basophil % : x    02-12    137  |  103  |  18  ----------------------------<  92  4.6   |  22  |  0.96    Ca    8.9      12 Feb 2024 05:00  Phos  3.6     02-12  Mg     1.70     02-12          Urinalysis Basic - ( 12 Feb 2024 05:00 )    Color: x / Appearance: x / SG: x / pH: x  Gluc: 92 mg/dL / Ketone: x  / Bili: x / Urobili: x   Blood: x / Protein: x / Nitrite: x   Leuk Esterase: x / RBC: x / WBC x   Sq Epi: x / Non Sq Epi: x / Bacteria: x

## 2024-02-13 LAB
ANION GAP SERPL CALC-SCNC: 10 MMOL/L — SIGNIFICANT CHANGE UP (ref 7–14)
BUN SERPL-MCNC: 18 MG/DL — SIGNIFICANT CHANGE UP (ref 7–23)
CALCIUM SERPL-MCNC: 8.4 MG/DL — SIGNIFICANT CHANGE UP (ref 8.4–10.5)
CHLORIDE SERPL-SCNC: 105 MMOL/L — SIGNIFICANT CHANGE UP (ref 98–107)
CO2 SERPL-SCNC: 23 MMOL/L — SIGNIFICANT CHANGE UP (ref 22–31)
CREAT SERPL-MCNC: 0.85 MG/DL — SIGNIFICANT CHANGE UP (ref 0.5–1.3)
EGFR: 83 ML/MIN/1.73M2 — SIGNIFICANT CHANGE UP
GLUCOSE SERPL-MCNC: 90 MG/DL — SIGNIFICANT CHANGE UP (ref 70–99)
HCT VFR BLD CALC: 28.5 % — LOW (ref 39–50)
HGB BLD-MCNC: 8.9 G/DL — LOW (ref 13–17)
MAGNESIUM SERPL-MCNC: 1.6 MG/DL — SIGNIFICANT CHANGE UP (ref 1.6–2.6)
MCHC RBC-ENTMCNC: 26.5 PG — LOW (ref 27–34)
MCHC RBC-ENTMCNC: 31.2 GM/DL — LOW (ref 32–36)
MCV RBC AUTO: 84.8 FL — SIGNIFICANT CHANGE UP (ref 80–100)
NRBC # BLD: 0 /100 WBCS — SIGNIFICANT CHANGE UP (ref 0–0)
NRBC # FLD: 0 K/UL — SIGNIFICANT CHANGE UP (ref 0–0)
PHOSPHATE SERPL-MCNC: 3.2 MG/DL — SIGNIFICANT CHANGE UP (ref 2.5–4.5)
PLATELET # BLD AUTO: 315 K/UL — SIGNIFICANT CHANGE UP (ref 150–400)
POTASSIUM SERPL-MCNC: 4.5 MMOL/L — SIGNIFICANT CHANGE UP (ref 3.5–5.3)
POTASSIUM SERPL-SCNC: 4.5 MMOL/L — SIGNIFICANT CHANGE UP (ref 3.5–5.3)
RBC # BLD: 3.36 M/UL — LOW (ref 4.2–5.8)
RBC # FLD: 19.5 % — HIGH (ref 10.3–14.5)
SODIUM SERPL-SCNC: 138 MMOL/L — SIGNIFICANT CHANGE UP (ref 135–145)
WBC # BLD: 5.66 K/UL — SIGNIFICANT CHANGE UP (ref 3.8–10.5)
WBC # FLD AUTO: 5.66 K/UL — SIGNIFICANT CHANGE UP (ref 3.8–10.5)

## 2024-02-13 PROCEDURE — 99232 SBSQ HOSP IP/OBS MODERATE 35: CPT

## 2024-02-13 RX ADMIN — ENOXAPARIN SODIUM 40 MILLIGRAM(S): 100 INJECTION SUBCUTANEOUS at 07:08

## 2024-02-13 RX ADMIN — CHOLESTYRAMINE 2 GRAM(S): 4 POWDER, FOR SUSPENSION ORAL at 18:24

## 2024-02-13 RX ADMIN — GABAPENTIN 400 MILLIGRAM(S): 400 CAPSULE ORAL at 22:08

## 2024-02-13 RX ADMIN — CHOLESTYRAMINE 2 GRAM(S): 4 POWDER, FOR SUSPENSION ORAL at 17:32

## 2024-02-13 RX ADMIN — ATORVASTATIN CALCIUM 80 MILLIGRAM(S): 80 TABLET, FILM COATED ORAL at 22:07

## 2024-02-13 RX ADMIN — CHOLESTYRAMINE 2 GRAM(S): 4 POWDER, FOR SUSPENSION ORAL at 04:16

## 2024-02-13 RX ADMIN — TAMSULOSIN HYDROCHLORIDE 0.4 MILLIGRAM(S): 0.4 CAPSULE ORAL at 22:08

## 2024-02-13 RX ADMIN — PANTOPRAZOLE SODIUM 40 MILLIGRAM(S): 20 TABLET, DELAYED RELEASE ORAL at 07:08

## 2024-02-13 RX ADMIN — CHOLESTYRAMINE 2 GRAM(S): 4 POWDER, FOR SUSPENSION ORAL at 11:31

## 2024-02-13 RX ADMIN — Medication 1 MILLIGRAM(S): at 13:34

## 2024-02-13 RX ADMIN — PANTOPRAZOLE SODIUM 40 MILLIGRAM(S): 20 TABLET, DELAYED RELEASE ORAL at 17:33

## 2024-02-13 RX ADMIN — CHOLESTYRAMINE 2 GRAM(S): 4 POWDER, FOR SUSPENSION ORAL at 13:33

## 2024-02-13 RX ADMIN — Medication 650 MILLIGRAM(S): at 18:24

## 2024-02-14 LAB
ANION GAP SERPL CALC-SCNC: 10 MMOL/L — SIGNIFICANT CHANGE UP (ref 7–14)
BUN SERPL-MCNC: 19 MG/DL — SIGNIFICANT CHANGE UP (ref 7–23)
CALCIUM SERPL-MCNC: 8.7 MG/DL — SIGNIFICANT CHANGE UP (ref 8.4–10.5)
CHLORIDE SERPL-SCNC: 104 MMOL/L — SIGNIFICANT CHANGE UP (ref 98–107)
CO2 SERPL-SCNC: 23 MMOL/L — SIGNIFICANT CHANGE UP (ref 22–31)
CREAT SERPL-MCNC: 0.99 MG/DL — SIGNIFICANT CHANGE UP (ref 0.5–1.3)
EGFR: 72 ML/MIN/1.73M2 — SIGNIFICANT CHANGE UP
GLUCOSE SERPL-MCNC: 86 MG/DL — SIGNIFICANT CHANGE UP (ref 70–99)
HCT VFR BLD CALC: 30.6 % — LOW (ref 39–50)
HGB BLD-MCNC: 9.6 G/DL — LOW (ref 13–17)
MAGNESIUM SERPL-MCNC: 1.7 MG/DL — SIGNIFICANT CHANGE UP (ref 1.6–2.6)
MCHC RBC-ENTMCNC: 26.4 PG — LOW (ref 27–34)
MCHC RBC-ENTMCNC: 31.4 GM/DL — LOW (ref 32–36)
MCV RBC AUTO: 84.1 FL — SIGNIFICANT CHANGE UP (ref 80–100)
NRBC # BLD: 0 /100 WBCS — SIGNIFICANT CHANGE UP (ref 0–0)
NRBC # FLD: 0 K/UL — SIGNIFICANT CHANGE UP (ref 0–0)
PHOSPHATE SERPL-MCNC: 3.5 MG/DL — SIGNIFICANT CHANGE UP (ref 2.5–4.5)
PLATELET # BLD AUTO: 330 K/UL — SIGNIFICANT CHANGE UP (ref 150–400)
POTASSIUM SERPL-MCNC: 3.9 MMOL/L — SIGNIFICANT CHANGE UP (ref 3.5–5.3)
POTASSIUM SERPL-SCNC: 3.9 MMOL/L — SIGNIFICANT CHANGE UP (ref 3.5–5.3)
RBC # BLD: 3.64 M/UL — LOW (ref 4.2–5.8)
RBC # FLD: 19 % — HIGH (ref 10.3–14.5)
SODIUM SERPL-SCNC: 137 MMOL/L — SIGNIFICANT CHANGE UP (ref 135–145)
WBC # BLD: 7.67 K/UL — SIGNIFICANT CHANGE UP (ref 3.8–10.5)
WBC # FLD AUTO: 7.67 K/UL — SIGNIFICANT CHANGE UP (ref 3.8–10.5)

## 2024-02-14 PROCEDURE — 99233 SBSQ HOSP IP/OBS HIGH 50: CPT

## 2024-02-14 RX ADMIN — GABAPENTIN 400 MILLIGRAM(S): 400 CAPSULE ORAL at 22:56

## 2024-02-14 RX ADMIN — PANTOPRAZOLE SODIUM 40 MILLIGRAM(S): 20 TABLET, DELAYED RELEASE ORAL at 17:36

## 2024-02-14 RX ADMIN — CHOLESTYRAMINE 2 GRAM(S): 4 POWDER, FOR SUSPENSION ORAL at 17:37

## 2024-02-14 RX ADMIN — CHOLESTYRAMINE 2 GRAM(S): 4 POWDER, FOR SUSPENSION ORAL at 09:14

## 2024-02-14 RX ADMIN — ENOXAPARIN SODIUM 40 MILLIGRAM(S): 100 INJECTION SUBCUTANEOUS at 05:25

## 2024-02-14 RX ADMIN — CHOLESTYRAMINE 2 GRAM(S): 4 POWDER, FOR SUSPENSION ORAL at 00:08

## 2024-02-14 RX ADMIN — PANTOPRAZOLE SODIUM 40 MILLIGRAM(S): 20 TABLET, DELAYED RELEASE ORAL at 05:26

## 2024-02-14 RX ADMIN — TAMSULOSIN HYDROCHLORIDE 0.4 MILLIGRAM(S): 0.4 CAPSULE ORAL at 22:56

## 2024-02-14 RX ADMIN — Medication 1 MILLIGRAM(S): at 10:15

## 2024-02-14 RX ADMIN — CHOLESTYRAMINE 2 GRAM(S): 4 POWDER, FOR SUSPENSION ORAL at 05:26

## 2024-02-14 RX ADMIN — ATORVASTATIN CALCIUM 80 MILLIGRAM(S): 80 TABLET, FILM COATED ORAL at 22:56

## 2024-02-15 LAB
ANION GAP SERPL CALC-SCNC: 10 MMOL/L — SIGNIFICANT CHANGE UP (ref 7–14)
BUN SERPL-MCNC: 24 MG/DL — HIGH (ref 7–23)
CALCIUM SERPL-MCNC: 8.9 MG/DL — SIGNIFICANT CHANGE UP (ref 8.4–10.5)
CHLORIDE SERPL-SCNC: 105 MMOL/L — SIGNIFICANT CHANGE UP (ref 98–107)
CO2 SERPL-SCNC: 22 MMOL/L — SIGNIFICANT CHANGE UP (ref 22–31)
CREAT SERPL-MCNC: 0.87 MG/DL — SIGNIFICANT CHANGE UP (ref 0.5–1.3)
EGFR: 82 ML/MIN/1.73M2 — SIGNIFICANT CHANGE UP
GLUCOSE SERPL-MCNC: 81 MG/DL — SIGNIFICANT CHANGE UP (ref 70–99)
HCT VFR BLD CALC: 32.4 % — LOW (ref 39–50)
HGB BLD-MCNC: 10 G/DL — LOW (ref 13–17)
MAGNESIUM SERPL-MCNC: 1.6 MG/DL — SIGNIFICANT CHANGE UP (ref 1.6–2.6)
MCHC RBC-ENTMCNC: 26.5 PG — LOW (ref 27–34)
MCHC RBC-ENTMCNC: 30.9 GM/DL — LOW (ref 32–36)
MCV RBC AUTO: 85.7 FL — SIGNIFICANT CHANGE UP (ref 80–100)
NRBC # BLD: 0 /100 WBCS — SIGNIFICANT CHANGE UP (ref 0–0)
NRBC # FLD: 0 K/UL — SIGNIFICANT CHANGE UP (ref 0–0)
PHOSPHATE SERPL-MCNC: 3.2 MG/DL — SIGNIFICANT CHANGE UP (ref 2.5–4.5)
PLATELET # BLD AUTO: 374 K/UL — SIGNIFICANT CHANGE UP (ref 150–400)
POTASSIUM SERPL-MCNC: 4 MMOL/L — SIGNIFICANT CHANGE UP (ref 3.5–5.3)
POTASSIUM SERPL-SCNC: 4 MMOL/L — SIGNIFICANT CHANGE UP (ref 3.5–5.3)
RBC # BLD: 3.78 M/UL — LOW (ref 4.2–5.8)
RBC # FLD: 18.8 % — HIGH (ref 10.3–14.5)
SODIUM SERPL-SCNC: 137 MMOL/L — SIGNIFICANT CHANGE UP (ref 135–145)
WBC # BLD: 9.16 K/UL — SIGNIFICANT CHANGE UP (ref 3.8–10.5)
WBC # FLD AUTO: 9.16 K/UL — SIGNIFICANT CHANGE UP (ref 3.8–10.5)

## 2024-02-15 PROCEDURE — 99233 SBSQ HOSP IP/OBS HIGH 50: CPT

## 2024-02-15 RX ADMIN — TAMSULOSIN HYDROCHLORIDE 0.4 MILLIGRAM(S): 0.4 CAPSULE ORAL at 21:39

## 2024-02-15 RX ADMIN — PANTOPRAZOLE SODIUM 40 MILLIGRAM(S): 20 TABLET, DELAYED RELEASE ORAL at 19:23

## 2024-02-15 RX ADMIN — CHOLESTYRAMINE 2 GRAM(S): 4 POWDER, FOR SUSPENSION ORAL at 01:43

## 2024-02-15 RX ADMIN — ATORVASTATIN CALCIUM 80 MILLIGRAM(S): 80 TABLET, FILM COATED ORAL at 21:39

## 2024-02-15 RX ADMIN — PANTOPRAZOLE SODIUM 40 MILLIGRAM(S): 20 TABLET, DELAYED RELEASE ORAL at 06:19

## 2024-02-15 RX ADMIN — ENOXAPARIN SODIUM 40 MILLIGRAM(S): 100 INJECTION SUBCUTANEOUS at 06:18

## 2024-02-15 RX ADMIN — Medication 1 MILLIGRAM(S): at 11:35

## 2024-02-15 RX ADMIN — CHOLESTYRAMINE 2 GRAM(S): 4 POWDER, FOR SUSPENSION ORAL at 11:35

## 2024-02-15 RX ADMIN — CHOLESTYRAMINE 2 GRAM(S): 4 POWDER, FOR SUSPENSION ORAL at 23:41

## 2024-02-15 RX ADMIN — GABAPENTIN 400 MILLIGRAM(S): 400 CAPSULE ORAL at 21:39

## 2024-02-16 PROCEDURE — 99233 SBSQ HOSP IP/OBS HIGH 50: CPT

## 2024-02-16 RX ORDER — ATORVASTATIN CALCIUM 80 MG/1
40 TABLET, FILM COATED ORAL AT BEDTIME
Refills: 0 | Status: DISCONTINUED | OUTPATIENT
Start: 2024-02-16 | End: 2024-02-18

## 2024-02-16 RX ORDER — MAGNESIUM OXIDE 400 MG ORAL TABLET 241.3 MG
600 TABLET ORAL
Refills: 0 | Status: COMPLETED | OUTPATIENT
Start: 2024-02-16 | End: 2024-02-18

## 2024-02-16 RX ADMIN — Medication 1 MILLIGRAM(S): at 12:09

## 2024-02-16 RX ADMIN — ENOXAPARIN SODIUM 40 MILLIGRAM(S): 100 INJECTION SUBCUTANEOUS at 05:31

## 2024-02-16 RX ADMIN — TAMSULOSIN HYDROCHLORIDE 0.4 MILLIGRAM(S): 0.4 CAPSULE ORAL at 21:59

## 2024-02-16 RX ADMIN — PANTOPRAZOLE SODIUM 40 MILLIGRAM(S): 20 TABLET, DELAYED RELEASE ORAL at 18:28

## 2024-02-16 RX ADMIN — PANTOPRAZOLE SODIUM 40 MILLIGRAM(S): 20 TABLET, DELAYED RELEASE ORAL at 05:31

## 2024-02-16 RX ADMIN — ATORVASTATIN CALCIUM 40 MILLIGRAM(S): 80 TABLET, FILM COATED ORAL at 21:59

## 2024-02-16 RX ADMIN — GABAPENTIN 400 MILLIGRAM(S): 400 CAPSULE ORAL at 21:59

## 2024-02-16 RX ADMIN — CHOLESTYRAMINE 2 GRAM(S): 4 POWDER, FOR SUSPENSION ORAL at 12:09

## 2024-02-17 ENCOUNTER — TRANSCRIPTION ENCOUNTER (OUTPATIENT)
Age: 89
End: 2024-02-17

## 2024-02-17 PROCEDURE — 99233 SBSQ HOSP IP/OBS HIGH 50: CPT

## 2024-02-17 RX ORDER — LIDOCAINE HCL 20 MG/ML
10 VIAL (ML) INJECTION DAILY
Refills: 0 | Status: DISCONTINUED | OUTPATIENT
Start: 2024-02-17 | End: 2024-02-18

## 2024-02-17 RX ORDER — LIDOCAINE 4 G/100G
1 CREAM TOPICAL DAILY
Refills: 0 | Status: DISCONTINUED | OUTPATIENT
Start: 2024-02-17 | End: 2024-02-17

## 2024-02-17 RX ADMIN — MAGNESIUM OXIDE 400 MG ORAL TABLET 600 MILLIGRAM(S): 241.3 TABLET ORAL at 05:46

## 2024-02-17 RX ADMIN — Medication 1 MILLIGRAM(S): at 13:10

## 2024-02-17 RX ADMIN — MAGNESIUM OXIDE 400 MG ORAL TABLET 600 MILLIGRAM(S): 241.3 TABLET ORAL at 17:03

## 2024-02-17 RX ADMIN — ENOXAPARIN SODIUM 40 MILLIGRAM(S): 100 INJECTION SUBCUTANEOUS at 05:46

## 2024-02-17 RX ADMIN — TAMSULOSIN HYDROCHLORIDE 0.4 MILLIGRAM(S): 0.4 CAPSULE ORAL at 22:06

## 2024-02-17 RX ADMIN — ATORVASTATIN CALCIUM 40 MILLIGRAM(S): 80 TABLET, FILM COATED ORAL at 22:07

## 2024-02-17 RX ADMIN — GABAPENTIN 400 MILLIGRAM(S): 400 CAPSULE ORAL at 22:06

## 2024-02-17 RX ADMIN — PANTOPRAZOLE SODIUM 40 MILLIGRAM(S): 20 TABLET, DELAYED RELEASE ORAL at 17:03

## 2024-02-17 RX ADMIN — PANTOPRAZOLE SODIUM 40 MILLIGRAM(S): 20 TABLET, DELAYED RELEASE ORAL at 05:46

## 2024-02-17 NOTE — DISCHARGE NOTE PROVIDER - NSDCMRMEDTOKEN_GEN_ALL_CORE_FT
atorvastatin 80 mg oral tablet: 1 tab(s) orally once a day (at bedtime)  folic acid 1 mg oral tablet: 1 tab(s) orally once a day  gabapentin 400 mg oral capsule: 1 cap(s) orally once a day (at bedtime)  pantoprazole 40 mg oral delayed release tablet: 1 tab(s) orally 2 times a day  tamsulosin 0.4 mg oral capsule: 1 cap(s) orally once a day (at bedtime)   atorvastatin 40 mg oral tablet: 1 tab(s) orally once a day (at bedtime)  cholestyramine 4 g/9 g oral powder for reconstitution: 2 gram(s) orally every 6 hours  folic acid 1 mg oral tablet: 1 tab(s) orally once a day  gabapentin 400 mg oral capsule: 1 cap(s) orally once a day (at bedtime)  pantoprazole 40 mg oral delayed release tablet: 1 tab(s) orally 2 times a day  tamsulosin 0.4 mg oral capsule: 1 cap(s) orally once a day (at bedtime)

## 2024-02-17 NOTE — PROGRESS NOTE ADULT - TIME BILLING
Review of laboratory data, radiology results, consultants' recommendations, documentation in [place], discussion with patient/house staff and interdisciplinary staff (such as , social workers, etc). Interventions were performed as documented above.
Review of laboratory data, radiology results, consultants' recommendations, documentation in [place], discussion with patient/house staff and interdisciplinary staff (such as , social workers, etc). Interventions were performed as documented above.
Review of laboratory data, radiology results, consultants' recommendations, documentation in [place], discussion with patient/house staff and interdisciplinary staff (such as , social workers, etc). Interventions were performed as documented above.
Review of laboratory data, radiology results, consultants' recommendations, documentation in [place], discussion with patient/house staff and interdisciplinary staff (such as , social workers, etc). Interventions were performed as documented above.
Time spent on review of vitals, physical exam, documentation, and discussion of plan of care with patient and patient family.
Review of laboratory data, radiology results, consultants' recommendations, documentation in [place], discussion with patient/house staff and interdisciplinary staff (such as , social workers, etc). Interventions were performed as documented above.
Review of laboratory data, radiology results, consultants' recommendations, documentation in [place], discussion with patient/house staff and interdisciplinary staff (such as , social workers, etc). Interventions were performed as documented above.

## 2024-02-17 NOTE — DISCHARGE NOTE PROVIDER - NSDCFUADDINST_GEN_ALL_CORE_FT
flush bruner catheter with 5 cc normal saline daily. pt does not need irrigation. tov with [facility] with urologist.    1500 ml fluid restriction per day  pt does not need irrigation or bruner catheter flushed. tov with [facility] with urologist.    1500 ml fluid restriction per day

## 2024-02-17 NOTE — DISCHARGE NOTE PROVIDER - HOSPITAL COURSE
Patient seen and evaluated, no acute somatic complaints. Reviewed discharge medications with patient; All new medications requiring new prescriptions were sent to the pharmacy of patient's choice. Reviewed need for prescription for previous home medications and new prescriptions sent if requested. Medically cleared/stable for discharge as per   with close outpatient follow up within 1-2 weeks of discharge. Patient understands and agrees with plan of care.  89 y/o male with pmhx of BPH, HLD, B/L inguinal hernia repair with known L recurrence, recent colon cancer s/p hemicolectomy (not on chemo) for stage 3 colon cancer with 2 synchronous lesions in December presenting with bilateral lower extremity swelling believed likely 2/2 acute diastolic heart failure; however, TTE this admission w/o any systolic or diastolic function. Attempting to find safe dispo for patient. Once again failed TOV, replaced.     Problem/Plan - 1:  ·  Problem: Acute on chronic diastolic congestive heart failure.   ·  Plan: - pt denies SOB, MORALES or orthopnea however w/ worsening LE edema. Sats also noted to be as low as 92% (also observed on last admission), pt asymptomatic however   - Last TTE (reviewed)  4/2023 with EF 65% with diastolic dysfunction and pulm valve regurgitation   - however, TTE this admission demonstrates both normal LV systolic and diastolic function. Note; pt had no dyspnea, unlikely LE edema 2/2 HF  - s/p Lasix IV 20 mg BID this admission; d/shima on 2/10.     Problem/Plan - 2:  ·  Problem: PAT (acute kidney injury).   ·  Plan: - Peaked at 2.35, was 0.85 on admission. Now improved   - bladder u/s with >900cc in bladder x2 , s/p bruner   - renal US without hydro  - failed TOV on 2/12; bruner replaced. Retried TOV on 2/17; thus far, needed straight cath x 2. Pt does not want to continue being straigth cathd and would prefer to have bruner reinserted. Will need bruner teaching.     Problem/Plan - 3:  ·  Problem: Leg edema.   ·  Plan: - dopplers neg for DVT; suspect some component of chronic lymphedema . Given normal TTE, unlikely   - mild erythema likely related to edema especially since symptoms progressed despite pt being on a course of bactrim for UTI  - continue to monitor.     Problem/Plan - 4:  ·  Problem: Diarrhea.   ·  Plan: - no fevers or leukocytosis however given recent hospitalization and multiple BMs, will r/o c diff.   - GI PCR negative, cdiff pending and stool cultures pending   - patient states that he has a prostate biopsy? and was on antibiotics for UTI  - spoke with surgeon who performed hemicolectomy; suspect diarrhea 2/2 surgery. Recommended cholestyramine qid.     Problem/Plan - 5:  ·  Problem: Colon cancer.   ·  Plan: - cytopathology w/ confirmed malignancy. Not treated with chemo  - surgical site appears well healed  - outpt colorectal, onc follow up.     Problem/Plan - 6:  ·  Problem: Peripheral neuropathy.   ·  Plan: - c/w gabapentin.     Problem/Plan - 7:  ·  Problem: BPH (benign prostatic hyperplasia).   ·  Plan: -c/w tamsulosin.     Problem/Plan - 8:  ·  Problem: Need for prophylactic measure.   ·  Plan: DVT ppx: lovenox  Dispo: PT was able to ambulate with PT. Recs changed to O/p PT. Failed reattempt at TOV. Will need to be taught bruner care. Though pt appears to have some mild cognitive impairment, and as capacity fluctuates, he currently appears to have capacity as he is able to verbalize risks of refusing straight cath. Agrees to bruner. Will need CM assistance to continue with dispo planning.    Patient seen and evaluated, no acute somatic complaints. Reviewed discharge medications with patient; All new medications requiring new prescriptions were sent to the pharmacy of patient's choice. Reviewed need for prescription for previous home medications and new prescriptions sent if requested. Medically cleared/stable for discharge as per Dr. García on 2/18/24 with close outpatient follow up within 1-2 weeks of discharge. Patient understands and agrees with plan of care.

## 2024-02-17 NOTE — DISCHARGE NOTE PROVIDER - CARE PROVIDER_API CALL
Opal Yadav  Urology  [address] [place] [id number]  Phone: [phone]  Fax: [phone]  Follow Up Time:    Opal Yadav  Urology  [address] [place] [address]  Phone: [phone]  Fax: [phone]  Follow Up Time:     Asia Marie  Surgery  [address] [place] [address]  Phone: [phone]  Fax: [phone]  Follow Up Time:     Arnie Lehman  Internal Medicine  [address] [place] [address]  Phone: [phone]  Fax: [phone]  Follow Up Time:

## 2024-02-17 NOTE — DISCHARGE NOTE PROVIDER - NSFOLLOWUPCLINICS_GEN_ALL_ED_FT
[facility]  Hematology/Oncology  [address] [id number]  Phone: [phone]  Fax:

## 2024-02-17 NOTE — DISCHARGE NOTE PROVIDER - NSDCCPCAREPLAN_GEN_ALL_CORE_FT
PRINCIPAL DISCHARGE DIAGNOSIS  Diagnosis: Leg swelling  Assessment and Plan of Treatment: - Echocardiogram this admission demonstrates both normal LV systolic and diastolic function , you received lasix for a few days but since has been stopped      SECONDARY DISCHARGE DIAGNOSES  Diagnosis: Colon cancer  Assessment and Plan of Treatment: Follow up with oncology. cholesytramine was ordered per your surgeon who performed hemicolectomy as diarrhea that you had was suspected to be a side effect of surgery    Diagnosis: BPH (benign prostatic hyperplasia)  Assessment and Plan of Treatment: Gill catheter was placed due to urinary retention which caused worsening kidney function. this has now improved. please follow up with urologist for trial of void (removal) prior to removing. flush appropriately. kidney sonogram was normal.

## 2024-02-17 NOTE — DISCHARGE NOTE PROVIDER - CARE PROVIDERS DIRECT ADDRESSES
[email or web address],DirectAddress_Unknown

## 2024-02-17 NOTE — DISCHARGE NOTE PROVIDER - PROVIDER TOKENS
PROVIDER:[TOKEN:[[id number]]] PROVIDER:[TOKEN:[[id number]]],PROVIDER:[TOKEN:[[id number]]],PROVIDER:[TOKEN:[[id number]]]

## 2024-02-18 ENCOUNTER — TRANSCRIPTION ENCOUNTER (OUTPATIENT)
Age: 89
End: 2024-02-18

## 2024-02-18 VITALS
RESPIRATION RATE: 18 BRPM | DIASTOLIC BLOOD PRESSURE: 63 MMHG | HEART RATE: 80 BPM | TEMPERATURE: 98 F | SYSTOLIC BLOOD PRESSURE: 123 MMHG | OXYGEN SATURATION: 100 %

## 2024-02-18 LAB
ANION GAP SERPL CALC-SCNC: 8 MMOL/L — SIGNIFICANT CHANGE UP (ref 7–14)
BUN SERPL-MCNC: 19 MG/DL — SIGNIFICANT CHANGE UP (ref 7–23)
CALCIUM SERPL-MCNC: 8.5 MG/DL — SIGNIFICANT CHANGE UP (ref 8.4–10.5)
CHLORIDE SERPL-SCNC: 105 MMOL/L — SIGNIFICANT CHANGE UP (ref 98–107)
CO2 SERPL-SCNC: 24 MMOL/L — SIGNIFICANT CHANGE UP (ref 22–31)
CREAT SERPL-MCNC: 0.74 MG/DL — SIGNIFICANT CHANGE UP (ref 0.5–1.3)
EGFR: 86 ML/MIN/1.73M2 — SIGNIFICANT CHANGE UP
GLUCOSE SERPL-MCNC: 83 MG/DL — SIGNIFICANT CHANGE UP (ref 70–99)
HCT VFR BLD CALC: 30.7 % — LOW (ref 39–50)
HGB BLD-MCNC: 9.5 G/DL — LOW (ref 13–17)
MAGNESIUM SERPL-MCNC: 1.9 MG/DL — SIGNIFICANT CHANGE UP (ref 1.6–2.6)
MCHC RBC-ENTMCNC: 26 PG — LOW (ref 27–34)
MCHC RBC-ENTMCNC: 30.9 GM/DL — LOW (ref 32–36)
MCV RBC AUTO: 84.1 FL — SIGNIFICANT CHANGE UP (ref 80–100)
NRBC # BLD: 0 /100 WBCS — SIGNIFICANT CHANGE UP (ref 0–0)
NRBC # FLD: 0 K/UL — SIGNIFICANT CHANGE UP (ref 0–0)
PHOSPHATE SERPL-MCNC: 3.3 MG/DL — SIGNIFICANT CHANGE UP (ref 2.5–4.5)
PLATELET # BLD AUTO: 375 K/UL — SIGNIFICANT CHANGE UP (ref 150–400)
POTASSIUM SERPL-MCNC: 3.8 MMOL/L — SIGNIFICANT CHANGE UP (ref 3.5–5.3)
POTASSIUM SERPL-SCNC: 3.8 MMOL/L — SIGNIFICANT CHANGE UP (ref 3.5–5.3)
RBC # BLD: 3.65 M/UL — LOW (ref 4.2–5.8)
RBC # FLD: 18.3 % — HIGH (ref 10.3–14.5)
SODIUM SERPL-SCNC: 137 MMOL/L — SIGNIFICANT CHANGE UP (ref 135–145)
WBC # BLD: 5.86 K/UL — SIGNIFICANT CHANGE UP (ref 3.8–10.5)
WBC # FLD AUTO: 5.86 K/UL — SIGNIFICANT CHANGE UP (ref 3.8–10.5)

## 2024-02-18 PROCEDURE — 99239 HOSP IP/OBS DSCHRG MGMT >30: CPT

## 2024-02-18 RX ORDER — ATORVASTATIN CALCIUM 80 MG/1
1 TABLET, FILM COATED ORAL
Qty: 30 | Refills: 0
Start: 2024-02-18 | End: 2024-03-18

## 2024-02-18 RX ORDER — CHOLESTYRAMINE 4 G/9G
2 POWDER, FOR SUSPENSION ORAL
Qty: 1 | Refills: 0
Start: 2024-02-18 | End: 2024-03-18

## 2024-02-18 RX ADMIN — PANTOPRAZOLE SODIUM 40 MILLIGRAM(S): 20 TABLET, DELAYED RELEASE ORAL at 05:24

## 2024-02-18 RX ADMIN — Medication 1 MILLIGRAM(S): at 12:46

## 2024-02-18 RX ADMIN — ENOXAPARIN SODIUM 40 MILLIGRAM(S): 100 INJECTION SUBCUTANEOUS at 05:23

## 2024-02-18 RX ADMIN — MAGNESIUM OXIDE 400 MG ORAL TABLET 600 MILLIGRAM(S): 241.3 TABLET ORAL at 17:48

## 2024-02-18 RX ADMIN — PANTOPRAZOLE SODIUM 40 MILLIGRAM(S): 20 TABLET, DELAYED RELEASE ORAL at 17:47

## 2024-02-18 RX ADMIN — CHOLESTYRAMINE 2 GRAM(S): 4 POWDER, FOR SUSPENSION ORAL at 12:45

## 2024-02-18 RX ADMIN — GABAPENTIN 400 MILLIGRAM(S): 400 CAPSULE ORAL at 22:05

## 2024-02-18 RX ADMIN — ATORVASTATIN CALCIUM 40 MILLIGRAM(S): 80 TABLET, FILM COATED ORAL at 22:04

## 2024-02-18 RX ADMIN — TAMSULOSIN HYDROCHLORIDE 0.4 MILLIGRAM(S): 0.4 CAPSULE ORAL at 22:05

## 2024-02-18 RX ADMIN — MAGNESIUM OXIDE 400 MG ORAL TABLET 600 MILLIGRAM(S): 241.3 TABLET ORAL at 07:06

## 2024-02-18 NOTE — PROGRESS NOTE ADULT - PROBLEM SELECTOR PROBLEM 2
PAT (acute kidney injury)

## 2024-02-18 NOTE — PROGRESS NOTE ADULT - PROBLEM SELECTOR PLAN 2
- Cr 1.8 this AM, was 0.85 on admission   - bladder u/s with >900cc in bladder, s/p straight cath x1   - will need bladder scan q6hrs if continue to retain, will need bruner placed   - trend Cr daily
- Peaked at 2.35, was 0.85 on admission. Now improved to 0.96  - bladder u/s with >900cc in bladder x2 , s/p bruner   - renal US without hydro  - failed TOV on 2/12; bruner replaced
- Peaked at 2.35, was 0.85 on admission. Now improved to 0.96  - bladder u/s with >900cc in bladder x2 , s/p bruner   - renal US without hydro  - failed TOV on 2/12; bruner replaced
- Peaked at 2.35, was 0.85 on admission. Now improved   - bladder u/s with >900cc in bladder x2 , s/p bruner   - renal US without hydro  - failed TOV on 2/12; bruner replaced. Retried TOV on 2/17; thus far, needed straight cath x 2. Pt does not want to continue being straigth cathd and would prefer to have bruner reinserted. Will need bruner teaching
- Peaked at 2.35, was 0.85 on admission. Now improved to 0.96  - bladder u/s with >900cc in bladder x2 , s/p bruner   - renal US without hydro  - failed TOV on 2/12; bruner replaced
- Cr 1.8 this AM, was 0.85 on admission   - bladder u/s with >900cc in bladder x2 , s/p bruner   - pending kidney U/S   - Cr increased to 2.35 from 1.8, continue to monitor  - initiate TOV tonight
- Cr 1.8 this AM, was 0.85 on admission   - bladder u/s with >900cc in bladder x2 , s/p bruner   - pending kidney U/S   - Cr increased to 2.35 from 1.8, continue to monitor  - hold lasix for now, will start IVF given large amount of urine via bruner
- Peaked at 2.35, was 0.85 on admission. Now improved to 0.96  - bladder u/s with >900cc in bladder x2 , s/p bruner   - renal US without hydro  - failed TOV on 2/12; bruner replaced. Will undergo TOV again tonight.
- Peaked at 2.35, was 0.85 on admission. Now improved   - bladder u/s with >900cc in bladder x2 , s/p bruner   - renal US without hydro  - failed TOV on 2/12; bruner replaced. Retried TOV on 2/17; thus far, needed straight cath x 2. Pt does not want to continue being straigth cathd and would prefer to have bruner reinserted. Taught to manage bruner.
- Peaked at 2.35, was 0.85 on admission. Now improved to 0.96  - bladder u/s with >900cc in bladder x2 , s/p bruner   - renal US without hydro  - failed TOV on 2/12; bruner replaced

## 2024-02-18 NOTE — PROGRESS NOTE ADULT - PROBLEM SELECTOR PLAN 1
- pt denies SOB, MORALES or orthopnea however w/ worsening LE edema. Sats also noted to be as low as 92% (also observed on last admission), pt asymptomatic however   - Last TTE (reviewed)  4/2023 with EF 65% with diastolic dysfunction and pulm valve regurgitation   - however, TTE this admission demonstrates both normal LV systolic and diastolic function. Note; pt had no dyspnea, unlikely LE edema 2/2 HF  - s/p Lasix IV 20 mg BID this admission; d/shima on 2/10
- pt denies SOB, MORALES or orthopnea however w/ worsening LE edema. Sats also noted to be as low as 92% (also observed on last admission), pt asymptomatic however   - Last TTE (reviewed)  4/2023 with EF 65% with diastolic dysfunction and pulm valve regurgitation   - c/w lasix 20 mg IV BID, monitor renal function  - Strict Is and Os, daily weights  - repeat TTE pending
- pt denies SOB, MORALES or orthopnea however w/ worsening LE edema. Sats also noted to be as low as 92% (also observed on last admission), pt asymptomatic however   - Last TTE (reviewed)  4/2023 with EF 65% with diastolic dysfunction and pulm valve regurgitation   - however, TTE this admission demonstrates both normal LV systolic and diastolic function. Note; pt had no dyspnea, unlikely LE edema 2/2 HF  - s/p Lasix IV 20 mg BID this admission; d/shima on 2/10
- pt denies SOB, MORALES or orthopnea however w/ worsening LE edema. Sats also noted to be as low as 92% (also observed on last admission), pt asymptomatic however   - Last TTE (reviewed)  4/2023 with EF 65% with diastolic dysfucntion and pulm valve regurgitation   - c/w lasix 20 mg IV BID, monitor renal function  - Strict Is and Os, daily weights  - repeat TTE pending
- pt denies SOB, MORALES or orthopnea however w/ worsening LE edema. Sats also noted to be as low as 92% (also observed on last admission), pt asymptomatic however   - Last TTE (reviewed)  4/2023 with EF 65% with diastolic dysfucntion and pulm valve regurgitation   - c/w lasix 20 mg IV BID, monitor renal function  - Strict Is and Os, daily weights  - repeat TTE pending
- pt denies SOB, MORALES or orthopnea however w/ worsening LE edema. Sats also noted to be as low as 92% (also observed on last admission), pt asymptomatic however   - Last TTE (reviewed)  4/2023 with EF 65% with diastolic dysfunction and pulm valve regurgitation   - c/w lasix 20 mg IV BID, monitor renal function  - Strict Is and Os, daily weights  - repeat TTE pending

## 2024-02-18 NOTE — PROGRESS NOTE ADULT - ASSESSMENT
89 y/o male with pmhx of BPH, HLD, B/L inguinal hernia repair with known L recurrence, recent colon cancer s/p hemicolectomy (not on chemo) for stage 3 colon cancer with 2 synchronous lesions in December presenting with bilateral lower extremity swelling believed likely 2/2 acute diastolic heart failure; however, TTE this admission w/o any systolic or diastolic function.     
89 y/o male with pmhx of BPH, HLD, B/L inguinal hernia repair with known L recurrence, recent colon cancer s/p hemicolectomy (not on chemo) for stage 3 colon cancer with 2 synchronous lesions in December presenting with bilateral lower extremity swelling likely 2/2 acute diastolic heart failure, pending TTE.     
89 y/o male with pmhx of BPH, HLD, B/L inguinal hernia repair with known L recurrence, recent colon cancer s/p hemicolectomy (not on chemo) in December presenting with bilateral lower extremity swelling likely 2/2 acute diastolic heart failure, pending TTE. 
89 y/o male with pmhx of BPH, HLD, B/L inguinal hernia repair with known L recurrence, recent colon cancer s/p hemicolectomy (not on chemo) for stage 3 colon cancer with 2 synchronous lesions in December presenting with bilateral lower extremity swelling believed likely 2/2 acute diastolic heart failure; however, TTE this admission w/o any systolic or diastolic function. Attempting to find safe dispo for patient. Once again failed TOV.     
89 y/o male with pmhx of BPH, HLD, B/L inguinal hernia repair with known L recurrence, recent colon cancer s/p hemicolectomy (not on chemo) in December presenting with bilateral lower extremity swelling likely 2/2 acute diastolic heart failure, pending TTE. 
89 y/o male with pmhx of BPH, HLD, B/L inguinal hernia repair with known L recurrence, recent colon cancer s/p hemicolectomy (not on chemo) for stage 3 colon cancer with 2 synchronous lesions in December presenting with bilateral lower extremity swelling believed likely 2/2 acute diastolic heart failure; however, TTE this admission w/o any systolic or diastolic function. Unable to determine safe dispo for pt.     
89 y/o male with pmhx of BPH, HLD, B/L inguinal hernia repair with known L recurrence, recent colon cancer s/p hemicolectomy (not on chemo) in December presenting with bilateral lower extremity swelling likely 2/2 acute diastolic heart failure, pending TTE. 
89 y/o male with pmhx of BPH, HLD, B/L inguinal hernia repair with known L recurrence, recent colon cancer s/p hemicolectomy (not on chemo) for stage 3 colon cancer with 2 synchronous lesions in December presenting with bilateral lower extremity swelling believed likely 2/2 acute diastolic heart failure; however, TTE this admission w/o any systolic or diastolic function.     
89 y/o male with pmhx of BPH, HLD, B/L inguinal hernia repair with known L recurrence, recent colon cancer s/p hemicolectomy (not on chemo) for stage 3 colon cancer with 2 synchronous lesions in December presenting with bilateral lower extremity swelling believed likely 2/2 acute diastolic heart failure; however, TTE this admission w/o any systolic or diastolic function. Attempting to find safe dispo for patient. Once again failed TOV and brunre was replaced. D/c home with home care today.     
89 y/o male with pmhx of BPH, HLD, B/L inguinal hernia repair with known L recurrence, recent colon cancer s/p hemicolectomy (not on chemo) for stage 3 colon cancer with 2 synchronous lesions in December presenting with bilateral lower extremity swelling believed likely 2/2 acute diastolic heart failure; however, TTE this admission w/o any systolic or diastolic function. Attempting to find safe dispo for patient.

## 2024-02-18 NOTE — PROGRESS NOTE ADULT - REASON FOR ADMISSION
LE edema, diarrhea

## 2024-02-18 NOTE — PROGRESS NOTE ADULT - PROBLEM SELECTOR PROBLEM 5
Colon cancer

## 2024-02-18 NOTE — PROGRESS NOTE ADULT - NSPROGADDITIONALINFOA_GEN_ALL_CORE
Pt is medically stable for discharge with home care. A total of 40 minutes were spent on discharge coordination.

## 2024-02-18 NOTE — PROGRESS NOTE ADULT - PROBLEM SELECTOR PLAN 5
- cytopathology w/ confirmed malignancy. Not treated with chemo  - surgical site appears well healed  - outpt colorectal, onc follow up

## 2024-02-18 NOTE — PROGRESS NOTE ADULT - NUTRITIONAL ASSESSMENT
This patient has been assessed with a concern for Malnutrition and has been determined to have a diagnosis/diagnoses of Severe protein-calorie malnutrition.    This patient is being managed with:   Diet Regular-  DASH/TLC {Sodium & Cholesterol Restricted} (DASH)  1500mL Fluid Restriction ([id number])  Entered: Feb 9 2024  2:23AM  
This patient has been assessed with a concern for Malnutrition and has been determined to have a diagnosis/diagnoses of Severe protein-calorie malnutrition.    This patient is being managed with:   Diet Regular-  DASH/TLC {Sodium & Cholesterol Restricted} (DASH)  1500mL Fluid Restriction ([id number])  Entered: Feb 9 2024  2:23AM  
This patient has been assessed with a concern for Malnutrition and has been determined to have a diagnosis/diagnoses of Severe protein-calorie malnutrition.    This patient is being managed with:   Diet Regular-  DASH/TLC {Sodium & Cholesterol Restricted} (DASH)  1500mL Fluid Restriction ([id number])  Entered: Feb 9 2024  2:23AM  
This patient has been assessed with a concern for Malnutrition and has been determined to have a diagnosis/diagnoses of Severe protein-calorie malnutrition.    This patient is being managed with:   Diet Regular-  DASH/TLC {Sodium & Cholesterol Restricted} (DASH)  1500mL Fluid Restriction ([id number])  Entered: Feb 9 2024  2:23AM  
This patient has been assessed with a concern for Malnutrition and has been determined to have a diagnosis/diagnoses of Severe protein-calorie malnutrition.    This patient is being managed with:   Diet Regular-  DASH/TLC {Sodium & Cholesterol Restricted} (DASH)  1500mL Fluid Restriction ([id number])  Entered: Feb 9 2024  2:23AM  
This patient has been assessed with a concern for Malnutrition and has been determined to have a diagnosis/diagnoses of Severe protein-calorie malnutrition.    This patient is being managed with:   Diet Regular-  DASH/TLC {Sodium & Cholesterol Restricted} (DASH)  1500mL Fluid Restriction ([id number])  Entered: Feb 9 2024  2:23AM  
This patient has been assessed with a concern for Malnutrition and has been determined to have a diagnosis/diagnoses of Severe protein-calorie malnutrition.    This patient is being managed with:   Diet Regular-  DASH/TLC {Sodium & Cholesterol Restricted} (DASH)  1500mL Fluid Restriction ([id number])  Entered: Feb 9 2024  2:23AM  
This patient has been assessed with a concern for Malnutrition and has been determined to have a diagnosis/diagnoses of Severe protein-calorie malnutrition.    This patient is being managed with:   Diet Regular-  DASH/TLC {Sodium & Cholesterol Restricted} (DASH)  1500mL Fluid Restriction ([id number])  Entered: Feb 9 2024  2:23AM  
This patient has been assessed with a concern for Malnutrition and has been determined to have a diagnosis/diagnoses of Severe protein-calorie malnutrition.    This patient is being managed with:   Diet Regular-  DASH/TLC {Sodium & Cholesterol Restricted} (DASH)  1500mL Fluid Restriction ([id number])  Entered: Feb 9 2024  2:23AM

## 2024-02-18 NOTE — PROGRESS NOTE ADULT - PROVIDER SPECIALTY LIST ADULT
Hospitalist
Internal Medicine
Hospitalist
Internal Medicine
Internal Medicine
Hospitalist

## 2024-02-18 NOTE — PROGRESS NOTE ADULT - PROBLEM SELECTOR PLAN 4
- no fevers or leukocytosis however given recent hospitalization and multiple BMs, will r/o c diff.   - GI PCR negative, cdiff pending and stool cultures pending   - patient states that he has a prostate biopsy? and was on antibiotics for UTI  - spoke with surgeon who performed hemicolectomy; suspect diarrhea 2/2 surgery. Recommended cholestyramine qid
- no fevers or leukocytosis however given recent hospitalization and multiple BMs, will r/o c diff.   - GI PCR negative, cdiff pending and stool cultures pending   - patient states that he has a prostate biopsy? and was on antibiotics for UTI  - c/w with contact precautions for now
- no fevers or leukocytosis however given recent hospitalization and multiple BMs, will r/o c diff.   - GI PCR negative, cdiff pending and stool cultures pending   - patient states that he has a prostate biopsy? and was on antibiotics for UTI  - spoke with surgeon who performed hemicolectomy; suspect diarrhea 2/2 surgery. Recommended cholestyramine qid
- no fevers or leukocytosis however given recent hospitalization and multiple BMs, will r/o c diff.   - GI PCR negative, cdiff pending and stool cultures pending   - patient states that he has a prostate biopsy? and was on antibiotics for UTI  - spoke with surgeon who performed hemicolectomy; suspect diarrhea 2/2 surgery. Recommended cholestyramine qid
- no fevers or leukocytosis however given recent hospitalization and multiple BMs, will r/o c diff.   - GI PCR and stool cultures pending   - patient states that he has a prostate biopsy and was on antibiotics for 2 days before being admitted  - c/w with contact precautions for now
- no fevers or leukocytosis however given recent hospitalization and multiple BMs, will r/o c diff.   - GI PCR negative, cdiff pending and stool cultures pending   - patient states that he has a prostate biopsy? and was on antibiotics for UTI  - spoke with surgeon who performed hemicolectomy; suspect diarrhea 2/2 surgery. Recommended cholestyramine qid
- no fevers or leukocytosis however given recent hospitalization and multiple BMs, will r/o c diff.   - GI PCR negative, cdiff pending and stool cultures pending   - patient states that he has a prostate biopsy? and was on antibiotics for UTI  - c/w with contact precautions for now

## 2024-02-18 NOTE — DISCHARGE NOTE NURSING/CASE MANAGEMENT/SOCIAL WORK - PATIENT PORTAL LINK FT
You can access the FollowMyHealth Patient Portal offered by [facility] by registering at the following website: [email or web address]. By joining [facility]’s FollowMyHealth portal, you will also be able to view your health information using other applications (apps) compatible with our system.

## 2024-02-18 NOTE — PROGRESS NOTE ADULT - SUBJECTIVE AND OBJECTIVE BOX
[facility]  SCOTTayan Abby [place]  Pager [phone]  Available via MS Teams    SUBJECTIVE / OVERNIGHT EVENTS: Patient with Over 1L in bladder x2, bruner placed. Denies any chest pain or SOB.     ADDITIONAL REVIEW OF SYSTEMS:    MEDICATIONS  (STANDING):  atorvastatin 80 milliGRAM(s) Oral at bedtime  enoxaparin Injectable 40 milliGRAM(s) SubCutaneous every 24 hours  folic acid 1 milliGRAM(s) Oral daily  furosemide   Injectable 20 milliGRAM(s) IV Push two times a day  gabapentin 400 milliGRAM(s) Oral at bedtime  pantoprazole    Tablet 40 milliGRAM(s) Oral two times a day  tamsulosin 0.4 milliGRAM(s) Oral at bedtime    MEDICATIONS  (PRN):  acetaminophen     Tablet .. 650 milliGRAM(s) Oral every 6 hours PRN Temp greater or equal to 38C (100.4F), Mild Pain (1 - 3)  aluminum hydroxide/magnesium hydroxide/simethicone Suspension 30 milliLiter(s) Oral every 4 hours PRN Dyspepsia  melatonin 3 milliGRAM(s) Oral at bedtime PRN Insomnia  ondansetron Injectable 4 milliGRAM(s) IV Push every 8 hours PRN Nausea and/or Vomiting  sodium chloride 0.65% Nasal 1 Spray(s) Both Nostrils once PRN Nasal Congestion      I&O's Summary    2024 07:  -  10 Feb 2024 07:00  --------------------------------------------------------  IN: 0 mL / OUT: 800 mL / NET: -800 mL    10 Feb 2024 07:01  -  10 Feb 2024 15:15  --------------------------------------------------------  IN: 0 mL / OUT: 1600 mL / NET: -1600 mL        PHYSICAL EXAM:  Vital Signs Last 24 Hrs  T(C): 36.7 (10 Feb 2024 09:45), Max: 37 (2024 21:39)  T(F): 98 (10 Feb 2024 09:45), Max: 98.6 (2024 21:39)  HR: 75 (10 Feb 2024 09:45) (75 - 88)  BP: 112/47 (10 Feb 2024 09:45) (100/50 - 155/62)  BP(mean): --  RR: 20 (10 Feb 2024 09:45) (18 - 21)  SpO2: 95% (10 Feb 2024 09:45) (95% - 96%)    Parameters below as of 10 Feb 2024 09:45  Patient On (Oxygen Delivery Method): nasal cannula      CONSTITUTIONAL: NAD,  elderly male   RESPIRATORY: Normal respiratory effort; lungs are clear to auscultation bilaterally  CARDIOVASCULAR: Regular rate and rhythm, normal S1 and S2, no murmur/rub/gallop; 1+ lower extremity edema  ABDOMEN: Nontender to palpation, normoactive bowel sounds  PSYCH: A+O to person, place,   NEUROLOGY: no gross sensory deficits   SKIN: No rashes; no palpable lesions    LABS:                        8.7    7.52  )-----------( 336      ( 10 Feb 2024 06:00 )             27.4     02-10    142  |  106  |  21  ----------------------------<  84  3.7   |  26  |  2.35<H>    Ca    8.4      10 Feb 2024 06:00  Phos  4.4     02-10  Mg     2.00     02-10            Urinalysis Basic - ( 10 Feb 2024 12:54 )    Color: Yellow / Appearance: Clear / S.008 / pH: x  Gluc: x / Ketone: Negative mg/dL  / Bili: Negative / Urobili: 0.2 mg/dL   Blood: x / Protein: Negative mg/dL / Nitrite: Negative   Leuk Esterase: Negative / RBC: 4 /HPF / WBC 1 /HPF   Sq Epi: x / Non Sq Epi: 0 /HPF / Bacteria: Negative /HPF            
PROGRESS NOTE:   Authored by Dr. Ashley García MD, pager [phone]     Patient is a 89y old  Male who presents with a chief complaint of LE edema, diarrhea (12 Feb 2024 16:07)      SUBJECTIVE / OVERNIGHT EVENTS:  Pt is sitting lying in bed. He endorses anxiety regarding going home to "check [his] mail." When informed that physical therapy recommended [facility], pt becomes angry and states physical therapy only walked a few steps with him. States he can manage his bruner. When asked if he is willing to take the risk and potentially falling and causing a fracture, he states he is willing to do whatever he has to do to be discharged home.     ADDITIONAL REVIEW OF SYSTEMS:  No fevers, chills, N/V/D, dysuria, hematuria, CP, or SOB.     MEDICATIONS  (STANDING):  atorvastatin 80 milliGRAM(s) Oral at bedtime  cholestyramine Powder (Sugar-Free) 2 Gram(s) Oral four times a day  enoxaparin Injectable 40 milliGRAM(s) SubCutaneous every 24 hours  folic acid 1 milliGRAM(s) Oral daily  gabapentin 400 milliGRAM(s) Oral at bedtime  pantoprazole    Tablet 40 milliGRAM(s) Oral two times a day  sodium chloride 0.9%. 1000 milliLiter(s) (75 mL/Hr) IV Continuous <Continuous>  tamsulosin 0.4 milliGRAM(s) Oral at bedtime    MEDICATIONS  (PRN):  acetaminophen     Tablet .. 650 milliGRAM(s) Oral every 6 hours PRN Temp greater or equal to 38C (100.4F), Mild Pain (1 - 3)  aluminum hydroxide/magnesium hydroxide/simethicone Suspension 30 milliLiter(s) Oral every 4 hours PRN Dyspepsia  melatonin 3 milliGRAM(s) Oral at bedtime PRN Insomnia  ondansetron Injectable 4 milliGRAM(s) IV Push every 8 hours PRN Nausea and/or Vomiting  sodium chloride 0.65% Nasal 1 Spray(s) Both Nostrils once PRN Nasal Congestion      CAPILLARY BLOOD GLUCOSE        I&O's Summary    12 Feb 2024 07:01  -  13 Feb 2024 07:00  --------------------------------------------------------  IN: 0 mL / OUT: 900 mL / NET: -900 mL        PHYSICAL EXAM:  Vital Signs Last 24 Hrs  T(C): 36.6 (13 Feb 2024 10:21), Max: 36.6 (13 Feb 2024 10:21)  T(F): 97.9 (13 Feb 2024 10:21), Max: 97.9 (13 Feb 2024 10:21)  HR: 69 (13 Feb 2024 10:21) (66 - 75)  BP: 99/56 (13 Feb 2024 10:21) (99/56 - 150/57)  BP(mean): --  RR: 18 (13 Feb 2024 10:21) (18 - 18)  SpO2: 92% (13 Feb 2024 10:21) (92% - 97%)    Parameters below as of 13 Feb 2024 10:21  Patient On (Oxygen Delivery Method): room air        CONSTITUTIONAL: Elderly gentleman in NAD  RESPIRATORY: Normal respiratory effort; lungs are clear to auscultation bilaterally  CARDIOVASCULAR: Regular rate and rhythm, normal S1 and S2, no murmur/rub/gallop; No lower extremity edema; Peripheral pulses are 2+ bilaterally  ABDOMEN: Nontender to palpation, normoactive bowel sounds, no rebound/guarding; No hepatosplenomegaly  : Bruner in place  MUSCULOSKELETAL: no clubbing or cyanosis of digits; no joint swelling or tenderness to palpation  PSYCH: Alert, irritable    LABS:                        8.9    5.66  )-----------( 315      ( 13 Feb 2024 07:26 )             28.5     02-13    138  |  105  |  18  ----------------------------<  90  4.5   |  23  |  0.85    Ca    8.4      13 Feb 2024 07:26  Phos  3.2     02-13  Mg     1.60     02-13            Urinalysis Basic - ( 13 Feb 2024 07:26 )    Color: x / Appearance: x / SG: x / pH: x  Gluc: 90 mg/dL / Ketone: x  / Bili: x / Urobili: x   Blood: x / Protein: x / Nitrite: x   Leuk Esterase: x / RBC: x / WBC x   Sq Epi: x / Non Sq Epi: x / Bacteria: x          RADIOLOGY & ADDITIONAL TESTS:  Results Reviewed:   Imaging Personally Reviewed:  Electrocardiogram Personally Reviewed:    COORDINATION OF CARE:  Care Discussed with Consultants/Other Providers [Y/N]:  Prior or Outpatient Records Reviewed [Y/N]:  
PROGRESS NOTE:   Authored by Dr. Ashley García MD, pager [phone]     Patient is a 89y old  Male who presents with a chief complaint of LE edema, diarrhea (14 Feb 2024 15:27)      SUBJECTIVE / OVERNIGHT EVENTS:  Pt is sitting up in bed. He is unable to remember that PT re-evaluated him yesterday. He becomes very angry and accuses provider of lying to him trying to trick him. Denies all other complaints.     ADDITIONAL REVIEW OF SYSTEMS:    MEDICATIONS  (STANDING):  atorvastatin 80 milliGRAM(s) Oral at bedtime  cholestyramine Powder (Sugar-Free) 2 Gram(s) Oral four times a day  enoxaparin Injectable 40 milliGRAM(s) SubCutaneous every 24 hours  folic acid 1 milliGRAM(s) Oral daily  gabapentin 400 milliGRAM(s) Oral at bedtime  pantoprazole    Tablet 40 milliGRAM(s) Oral two times a day  sodium chloride 0.9%. 1000 milliLiter(s) (75 mL/Hr) IV Continuous <Continuous>  tamsulosin 0.4 milliGRAM(s) Oral at bedtime    MEDICATIONS  (PRN):  acetaminophen     Tablet .. 650 milliGRAM(s) Oral every 6 hours PRN Temp greater or equal to 38C (100.4F), Mild Pain (1 - 3)  aluminum hydroxide/magnesium hydroxide/simethicone Suspension 30 milliLiter(s) Oral every 4 hours PRN Dyspepsia  melatonin 3 milliGRAM(s) Oral at bedtime PRN Insomnia  ondansetron Injectable 4 milliGRAM(s) IV Push every 8 hours PRN Nausea and/or Vomiting  sodium chloride 0.65% Nasal 1 Spray(s) Both Nostrils once PRN Nasal Congestion      CAPILLARY BLOOD GLUCOSE        I&O's Summary    14 Feb 2024 07:01  -  15 Feb 2024 07:00  --------------------------------------------------------  IN: 200 mL / OUT: 1800 mL / NET: -1600 mL        PHYSICAL EXAM:  Vital Signs Last 24 Hrs  T(C): 36.7 (15 Feb 2024 06:15), Max: 37.2 (14 Feb 2024 18:12)  T(F): 98 (15 Feb 2024 06:15), Max: 98.9 (14 Feb 2024 18:12)  HR: 71 (15 Feb 2024 09:25) (71 - 77)  BP: 102/55 (15 Feb 2024 09:25) (102/55 - 158/69)  BP(mean): --  RR: 20 (15 Feb 2024 09:25) (19 - 20)  SpO2: 98% (15 Feb 2024 09:25) (96% - 100%)    Parameters below as of 15 Feb 2024 09:25  Patient On (Oxygen Delivery Method): room air        CONSTITUTIONAL: Thin, elderly gentleman in NAD  RESPIRATORY: Normal respiratory effort; lungs are clear to auscultation bilaterally  CARDIOVASCULAR: Regular rate and rhythm, normal S1 and S2, no murmur/rub/gallop; No lower extremity edema; Peripheral pulses are 2+ bilaterally  ABDOMEN: Nontender to palpation, normoactive bowel sounds, no rebound/guarding; No hepatosplenomegaly  : Gill in place  MUSCULOSKELETAL: no clubbing or cyanosis of digits; no joint swelling or tenderness to palpation  PSYCH: Irritable    LABS:                        10.0   9.16  )-----------( 374      ( 15 Feb 2024 05:00 )             32.4     02-15    137  |  105  |  24<H>  ----------------------------<  81  4.0   |  22  |  0.87    Ca    8.9      15 Feb 2024 05:00  Phos  3.2     02-15  Mg     1.60     02-15            Urinalysis Basic - ( 15 Feb 2024 05:00 )    Color: x / Appearance: x / SG: x / pH: x  Gluc: 81 mg/dL / Ketone: x  / Bili: x / Urobili: x   Blood: x / Protein: x / Nitrite: x   Leuk Esterase: x / RBC: x / WBC x   Sq Epi: x / Non Sq Epi: x / Bacteria: x          RADIOLOGY & ADDITIONAL TESTS:  Results Reviewed:   Imaging Personally Reviewed:  Electrocardiogram Personally Reviewed:    COORDINATION OF CARE:  Care Discussed with Consultants/Other Providers [Y/N]:  Prior or Outpatient Records Reviewed [Y/N]:  
PROGRESS NOTE:   Authored by Dr. Ashley García MD, pager [phone]     Patient is a 89y old  Male who presents with a chief complaint of LE edema, diarrhea (13 Feb 2024 16:30)      SUBJECTIVE / OVERNIGHT EVENTS:  Pt is lying in bed. Per RN, not ambulating and needs help even with transferring. Pt states he feels he was "cheated" out of a full PT evaluation; PT to re-examine today.     ADDITIONAL REVIEW OF SYSTEMS:    MEDICATIONS  (STANDING):  atorvastatin 80 milliGRAM(s) Oral at bedtime  cholestyramine Powder (Sugar-Free) 2 Gram(s) Oral four times a day  enoxaparin Injectable 40 milliGRAM(s) SubCutaneous every 24 hours  folic acid 1 milliGRAM(s) Oral daily  gabapentin 400 milliGRAM(s) Oral at bedtime  pantoprazole    Tablet 40 milliGRAM(s) Oral two times a day  sodium chloride 0.9%. 1000 milliLiter(s) (75 mL/Hr) IV Continuous <Continuous>  tamsulosin 0.4 milliGRAM(s) Oral at bedtime    MEDICATIONS  (PRN):  acetaminophen     Tablet .. 650 milliGRAM(s) Oral every 6 hours PRN Temp greater or equal to 38C (100.4F), Mild Pain (1 - 3)  aluminum hydroxide/magnesium hydroxide/simethicone Suspension 30 milliLiter(s) Oral every 4 hours PRN Dyspepsia  melatonin 3 milliGRAM(s) Oral at bedtime PRN Insomnia  ondansetron Injectable 4 milliGRAM(s) IV Push every 8 hours PRN Nausea and/or Vomiting  sodium chloride 0.65% Nasal 1 Spray(s) Both Nostrils once PRN Nasal Congestion      CAPILLARY BLOOD GLUCOSE        I&O's Summary    13 Feb 2024 07:01  -  14 Feb 2024 07:00  --------------------------------------------------------  IN: 0 mL / OUT: 2000 mL / NET: -2000 mL        PHYSICAL EXAM:  Vital Signs Last 24 Hrs  T(C): 36.6 (14 Feb 2024 09:35), Max: 36.7 (13 Feb 2024 22:00)  T(F): 97.8 (14 Feb 2024 09:35), Max: 98.1 (13 Feb 2024 22:00)  HR: 72 (14 Feb 2024 09:35) (67 - 78)  BP: 96/50 (14 Feb 2024 09:35) (96/50 - 122/76)  BP(mean): --  RR: 18 (14 Feb 2024 09:35) (18 - 18)  SpO2: 97% (14 Feb 2024 09:35) (95% - 97%)    Parameters below as of 14 Feb 2024 09:35  Patient On (Oxygen Delivery Method): room air        CONSTITUTIONAL: Elderly male in NAD  RESPIRATORY: Normal respiratory effort; lungs are clear to auscultation bilaterally  CARDIOVASCULAR: Regular rate and rhythm, normal S1 and S2, no murmur/rub/gallop; No lower extremity edema; Peripheral pulses are 2+ bilaterally  ABDOMEN: Nontender to palpation, normoactive bowel sounds, no rebound/guarding; No hepatosplenomegaly  MUSCULOSKELETAL: no clubbing or cyanosis of digits; no joint swelling or tenderness to palpation  : Gill in place  PSYCH: Alert and calm    LABS:                        9.6    7.67  )-----------( 330      ( 14 Feb 2024 05:22 )             30.6     02-14    137  |  104  |  19  ----------------------------<  86  3.9   |  23  |  0.99    Ca    8.7      14 Feb 2024 05:22  Phos  3.5     02-14  Mg     1.70     02-14            Urinalysis Basic - ( 14 Feb 2024 05:22 )    Color: x / Appearance: x / SG: x / pH: x  Gluc: 86 mg/dL / Ketone: x  / Bili: x / Urobili: x   Blood: x / Protein: x / Nitrite: x   Leuk Esterase: x / RBC: x / WBC x   Sq Epi: x / Non Sq Epi: x / Bacteria: x          RADIOLOGY & ADDITIONAL TESTS:  Results Reviewed:   Imaging Personally Reviewed:  Electrocardiogram Personally Reviewed:    COORDINATION OF CARE:  Care Discussed with Consultants/Other Providers [Y/N]:  Prior or Outpatient Records Reviewed [Y/N]:  
PROGRESS NOTE:   Authored by Dr. Ashley García MD, pager [phone]     Patient is a 89y old  Male who presents with a chief complaint of LE edema, diarrhea (12 Feb 2024 15:32)      SUBJECTIVE / OVERNIGHT EVENTS:  Patient failed TOV ON and bruner was replaced. Pt is anxious about being discharged. No fevers, chills, N/V, CP, or SOB.     ADDITIONAL REVIEW OF SYSTEMS:    MEDICATIONS  (STANDING):  atorvastatin 80 milliGRAM(s) Oral at bedtime  cholestyramine Powder (Sugar-Free) 4 Gram(s) Oral daily  enoxaparin Injectable 40 milliGRAM(s) SubCutaneous every 24 hours  folic acid 1 milliGRAM(s) Oral daily  gabapentin 400 milliGRAM(s) Oral at bedtime  pantoprazole    Tablet 40 milliGRAM(s) Oral two times a day  sodium chloride 0.9%. 1000 milliLiter(s) (75 mL/Hr) IV Continuous <Continuous>  tamsulosin 0.4 milliGRAM(s) Oral at bedtime    MEDICATIONS  (PRN):  acetaminophen     Tablet .. 650 milliGRAM(s) Oral every 6 hours PRN Temp greater or equal to 38C (100.4F), Mild Pain (1 - 3)  aluminum hydroxide/magnesium hydroxide/simethicone Suspension 30 milliLiter(s) Oral every 4 hours PRN Dyspepsia  melatonin 3 milliGRAM(s) Oral at bedtime PRN Insomnia  ondansetron Injectable 4 milliGRAM(s) IV Push every 8 hours PRN Nausea and/or Vomiting  sodium chloride 0.65% Nasal 1 Spray(s) Both Nostrils once PRN Nasal Congestion      CAPILLARY BLOOD GLUCOSE        I&O's Summary    11 Feb 2024 07:01  -  12 Feb 2024 07:00  --------------------------------------------------------  IN: 0 mL / OUT: 1000 mL / NET: -1000 mL        PHYSICAL EXAM:  Vital Signs Last 24 Hrs  T(C): 36.3 (12 Feb 2024 10:05), Max: 37.2 (11 Feb 2024 21:53)  T(F): 97.3 (12 Feb 2024 10:05), Max: 98.9 (11 Feb 2024 21:53)  HR: 70 (12 Feb 2024 10:05) (70 - 88)  BP: 153/89 (12 Feb 2024 05:40) (127/61 - 153/89)  BP(mean): --  RR: 17 (12 Feb 2024 10:05) (17 - 18)  SpO2: 94% (12 Feb 2024 10:05) (94% - 100%)    Parameters below as of 12 Feb 2024 10:05  Patient On (Oxygen Delivery Method): room air        CONSTITUTIONAL: NAD, elderly male  RESPIRATORY: Normal respiratory effort; lungs are clear to auscultation bilaterally  CARDIOVASCULAR: Regular rate and rhythm, normal S1 and S2, no murmur/rub/gallop; No lower extremity edema; Peripheral pulses are 2+ bilaterally  ABDOMEN: Nontender to palpation, normoactive bowel sounds, no rebound/guarding; No hepatosplenomegaly  MUSCULOSKELETAL: no clubbing or cyanosis of digits; no joint swelling or tenderness to palpation  : Bruner in place  PSYCH: Alert and calm    LABS:                        9.9    7.98  )-----------( 352      ( 12 Feb 2024 05:00 )             32.6     02-12    137  |  103  |  18  ----------------------------<  92  4.6   |  22  |  0.96    Ca    8.9      12 Feb 2024 05:00  Phos  3.6     02-12  Mg     1.70     02-12            Urinalysis Basic - ( 12 Feb 2024 05:00 )    Color: x / Appearance: x / SG: x / pH: x  Gluc: 92 mg/dL / Ketone: x  / Bili: x / Urobili: x   Blood: x / Protein: x / Nitrite: x   Leuk Esterase: x / RBC: x / WBC x   Sq Epi: x / Non Sq Epi: x / Bacteria: x        Culture - Urine (collected 10 Feb 2024 12:20)  Source: Clean Catch Clean Catch (Midstream)  Final Report (11 Feb 2024 14:20):    No growth    Culture - Stool (collected 09 Feb 2024 22:00)  Source: .Stool Feces  Final Report (12 Feb 2024 13:13):    No enteric pathogens isolated.    (Stool culture examined for Salmonella,    Shigella, Campylobacter, Aeromonas, Plesiomonas,    Vibrio, E.coli O157 and Yersinia)        RADIOLOGY & ADDITIONAL TESTS:  Results Reviewed:   Imaging Personally Reviewed:  Electrocardiogram Personally Reviewed:    COORDINATION OF CARE:  Care Discussed with Consultants/Other Providers [Y/N]:  Prior or Outpatient Records Reviewed [Y/N]:  
PROGRESS NOTE:   Authored by Dr. Ashley García MD, pager [phone]     Patient is a 89y old  Male who presents with a chief complaint of LE edema, diarrhea (17 Feb 2024 15:48)      SUBJECTIVE / OVERNIGHT EVENTS:  Pt is sitting up in bed. Undergoing TOV. Required straight cath x 2 thus far. Pt does not want continued straight cath due to pain. Will replace bruner. No fevers, chills,  N/V/D.     ADDITIONAL REVIEW OF SYSTEMS:    MEDICATIONS  (STANDING):  atorvastatin 40 milliGRAM(s) Oral at bedtime  cholestyramine Powder (Sugar-Free) 2 Gram(s) Oral four times a day  enoxaparin Injectable 40 milliGRAM(s) SubCutaneous every 24 hours  folic acid 1 milliGRAM(s) Oral daily  gabapentin 400 milliGRAM(s) Oral at bedtime  magnesium oxide 600 milliGRAM(s) Oral two times a day with meals  pantoprazole    Tablet 40 milliGRAM(s) Oral two times a day  sodium chloride 0.9%. 1000 milliLiter(s) (75 mL/Hr) IV Continuous <Continuous>  tamsulosin 0.4 milliGRAM(s) Oral at bedtime    MEDICATIONS  (PRN):  acetaminophen     Tablet .. 650 milliGRAM(s) Oral every 6 hours PRN Temp greater or equal to 38C (100.4F), Mild Pain (1 - 3)  aluminum hydroxide/magnesium hydroxide/simethicone Suspension 30 milliLiter(s) Oral every 4 hours PRN Dyspepsia  lidocaine 2% Jelly 10 milliLiter(s) IntraUrethral daily PRN bruner lubricant  melatonin 3 milliGRAM(s) Oral at bedtime PRN Insomnia  ondansetron Injectable 4 milliGRAM(s) IV Push every 8 hours PRN Nausea and/or Vomiting  sodium chloride 0.65% Nasal 1 Spray(s) Both Nostrils once PRN Nasal Congestion      CAPILLARY BLOOD GLUCOSE        I&O's Summary    16 Feb 2024 07:01  -  17 Feb 2024 07:00  --------------------------------------------------------  IN: 0 mL / OUT: 800 mL / NET: -800 mL    17 Feb 2024 07:01  -  17 Feb 2024 16:29  --------------------------------------------------------  IN: 0 mL / OUT: 770 mL / NET: -770 mL        PHYSICAL EXAM:  Vital Signs Last 24 Hrs  T(C): 36.4 (17 Feb 2024 10:03), Max: 36.7 (17 Feb 2024 04:39)  T(F): 97.6 (17 Feb 2024 10:03), Max: 98.1 (17 Feb 2024 04:39)  HR: 75 (17 Feb 2024 10:03) (68 - 78)  BP: 109/56 (17 Feb 2024 10:03) (109/56 - 120/65)  BP(mean): 81 (17 Feb 2024 04:39) (81 - 81)  RR: 19 (17 Feb 2024 10:03) (17 - 19)  SpO2: 100% (17 Feb 2024 10:03) (96% - 100%)    Parameters below as of 17 Feb 2024 10:03  Patient On (Oxygen Delivery Method): room air    CONSTITUTIONAL: NAD, frail, elderly gentleman  RESPIRATORY: Normal respiratory effort; lungs are clear to auscultation bilaterally  CARDIOVASCULAR: Regular rate and rhythm, normal S1 and S2, no murmur/rub/gallop; No lower extremity edema; Peripheral pulses are 2+ bilaterally  ABDOMEN: Nontender to palpation, normoactive bowel sounds, no rebound/guarding; No hepatosplenomegaly  : Bruner in place  MUSCULOSKELETAL: no clubbing or cyanosis of digits; no joint swelling or tenderness to palpation  PSYCH: A+O to person, place. Calm       LABS:                      RADIOLOGY & ADDITIONAL TESTS:  Results Reviewed:   Imaging Personally Reviewed:  Electrocardiogram Personally Reviewed:    COORDINATION OF CARE:  Care Discussed with Consultants/Other Providers [Y/N]:  Prior or Outpatient Records Reviewed [Y/N]:  
PROGRESS NOTE:   Authored by Dr. Ashley García MD, pager [phone]     Patient is a 89y old  Male who presents with a chief complaint of LE edema, diarrhea (17 Feb 2024 16:29)      SUBJECTIVE / OVERNIGHT EVENTS:  Pt is sitting up in bed. Eager to go home. Willing to be taught to manage bruner. No fevers, chills, N/V/D.   ADDITIONAL REVIEW OF SYSTEMS:    MEDICATIONS  (STANDING):  atorvastatin 40 milliGRAM(s) Oral at bedtime  cholestyramine Powder (Sugar-Free) 2 Gram(s) Oral four times a day  enoxaparin Injectable 40 milliGRAM(s) SubCutaneous every 24 hours  folic acid 1 milliGRAM(s) Oral daily  gabapentin 400 milliGRAM(s) Oral at bedtime  magnesium oxide 600 milliGRAM(s) Oral two times a day with meals  pantoprazole    Tablet 40 milliGRAM(s) Oral two times a day  sodium chloride 0.9%. 1000 milliLiter(s) (75 mL/Hr) IV Continuous <Continuous>  tamsulosin 0.4 milliGRAM(s) Oral at bedtime    MEDICATIONS  (PRN):  acetaminophen     Tablet .. 650 milliGRAM(s) Oral every 6 hours PRN Temp greater or equal to 38C (100.4F), Mild Pain (1 - 3)  aluminum hydroxide/magnesium hydroxide/simethicone Suspension 30 milliLiter(s) Oral every 4 hours PRN Dyspepsia  lidocaine 2% Jelly 10 milliLiter(s) IntraUrethral daily PRN bruner lubricant  melatonin 3 milliGRAM(s) Oral at bedtime PRN Insomnia  ondansetron Injectable 4 milliGRAM(s) IV Push every 8 hours PRN Nausea and/or Vomiting  sodium chloride 0.65% Nasal 1 Spray(s) Both Nostrils once PRN Nasal Congestion      CAPILLARY BLOOD GLUCOSE        I&O's Summary    17 Feb 2024 07:01  -  18 Feb 2024 07:00  --------------------------------------------------------  IN: 0 mL / OUT: 2770 mL / NET: -2770 mL        PHYSICAL EXAM:  Vital Signs Last 24 Hrs  T(C): 36.4 (18 Feb 2024 10:58), Max: 36.8 (18 Feb 2024 04:35)  T(F): 97.6 (18 Feb 2024 10:58), Max: 98.3 (18 Feb 2024 04:35)  HR: 66 (18 Feb 2024 04:35) (66 - 79)  BP: 103/43 (18 Feb 2024 10:58) (103/43 - 131/55)  BP(mean): --  RR: 18 (18 Feb 2024 10:58) (17 - 19)  SpO2: 98% (18 Feb 2024 10:58) (96% - 98%)    Parameters below as of 18 Feb 2024 10:58  Patient On (Oxygen Delivery Method): room air      CONSTITUTIONAL: NAD, frail, elderly gentleman  RESPIRATORY: Normal respiratory effort; lungs are clear to auscultation bilaterally  CARDIOVASCULAR: Regular rate and rhythm, normal S1 and S2, no murmur/rub/gallop; No lower extremity edema; Peripheral pulses are 2+ bilaterally  ABDOMEN: Nontender to palpation, normoactive bowel sounds, no rebound/guarding; No hepatosplenomegaly  : Bruner in place  MUSCULOSKELETAL: no clubbing or cyanosis of digits; no joint swelling or tenderness to palpation  PSYCH: A+O to person, place. Calm       LABS:                        9.5    5.86  )-----------( 375      ( 18 Feb 2024 05:17 )             30.7     02-18    137  |  105  |  19  ----------------------------<  83  3.8   |  24  |  0.74    Ca    8.5      18 Feb 2024 05:17  Phos  3.3     02-18  Mg     1.90     02-18            Urinalysis Basic - ( 18 Feb 2024 05:17 )    Color: x / Appearance: x / SG: x / pH: x  Gluc: 83 mg/dL / Ketone: x  / Bili: x / Urobili: x   Blood: x / Protein: x / Nitrite: x   Leuk Esterase: x / RBC: x / WBC x   Sq Epi: x / Non Sq Epi: x / Bacteria: x          RADIOLOGY & ADDITIONAL TESTS:  Results Reviewed:   Imaging Personally Reviewed:  Electrocardiogram Personally Reviewed:    COORDINATION OF CARE:  Care Discussed with Consultants/Other Providers [Y/N]:  Prior or Outpatient Records Reviewed [Y/N]:  
[facility] Division of [facility] Medicine  Kaylee Rocamariano [place]  Pager [phone]  Available via MS Teams    SUBJECTIVE / OVERNIGHT EVENTS: No acute events overnight.    ADDITIONAL REVIEW OF SYSTEMS:    MEDICATIONS  (STANDING):  atorvastatin 80 milliGRAM(s) Oral at bedtime  enoxaparin Injectable 40 milliGRAM(s) SubCutaneous every 24 hours  folic acid 1 milliGRAM(s) Oral daily  gabapentin 400 milliGRAM(s) Oral at bedtime  pantoprazole    Tablet 40 milliGRAM(s) Oral two times a day  sodium chloride 0.9%. 1000 milliLiter(s) (75 mL/Hr) IV Continuous <Continuous>  tamsulosin 0.4 milliGRAM(s) Oral at bedtime    MEDICATIONS  (PRN):  acetaminophen     Tablet .. 650 milliGRAM(s) Oral every 6 hours PRN Temp greater or equal to 38C (100.4F), Mild Pain (1 - 3)  aluminum hydroxide/magnesium hydroxide/simethicone Suspension 30 milliLiter(s) Oral every 4 hours PRN Dyspepsia  melatonin 3 milliGRAM(s) Oral at bedtime PRN Insomnia  ondansetron Injectable 4 milliGRAM(s) IV Push every 8 hours PRN Nausea and/or Vomiting  sodium chloride 0.65% Nasal 1 Spray(s) Both Nostrils once PRN Nasal Congestion      I&O's Summary    10 Feb 2024 07:01  -  11 Feb 2024 07:00  --------------------------------------------------------  IN: 0 mL / OUT: 1600 mL / NET: -1600 mL        PHYSICAL EXAM:  Vital Signs Last 24 Hrs  T(C): 36.3 (11 Feb 2024 09:30), Max: 37.1 (11 Feb 2024 06:31)  T(F): 97.3 (11 Feb 2024 09:30), Max: 98.7 (11 Feb 2024 06:31)  HR: 80 (11 Feb 2024 09:30) (73 - 81)  BP: 110/58 (11 Feb 2024 09:30) (110/58 - 140/65)  BP(mean): --  RR: 18 (11 Feb 2024 09:30) (18 - 19)  SpO2: 96% (11 Feb 2024 09:30) (96% - 100%)    Parameters below as of 11 Feb 2024 09:30  Patient On (Oxygen Delivery Method): nasal cannula      CONSTITUTIONAL: NAD, elderly male   RESPIRATORY: Normal respiratory effort; lungs are clear to auscultation bilaterally  CARDIOVASCULAR: Regular rate and rhythm, normal S1 and S2, no murmur/rub/gallop  ABDOMEN: Nontender to palpation, MUSCULOSKELETAL:  Normal gait; no clubbing or cyanosis of digits; no joint swelling or tenderness to palpation  PSYCH: A+O to person, place, and time; affect appropriate  NEUROLOGY: CN 2-12 are intact and symmetric; no gross sensory deficits   SKIN: No rashes; no palpable lesions    LABS:                        8.0    6.99  )-----------( 311      ( 11 Feb 2024 06:00 )             25.6     02-11    140  |  105  |  19  ----------------------------<  91  4.3   |  25  |  1.26    Ca    8.3<L>      11 Feb 2024 06:00  Phos  4.1     02-11  Mg     1.70     02-11            Urinalysis Basic - ( 11 Feb 2024 06:00 )    Color: x / Appearance: x / SG: x / pH: x  Gluc: 91 mg/dL / Ketone: x  / Bili: x / Urobili: x   Blood: x / Protein: x / Nitrite: x   Leuk Esterase: x / RBC: x / WBC x   Sq Epi: x / Non Sq Epi: x / Bacteria: x        Culture - Urine (collected 10 Feb 2024 12:20)  Source: Clean Catch Clean Catch (Midstream)  Final Report (11 Feb 2024 14:20):    No growth    Culture - Stool (collected 09 Feb 2024 22:00)  Source: .Stool Feces  Preliminary Report (11 Feb 2024 07:05):    No enteric pathogens to date: Final culture pending            
[facility] Division of Hospital Medicine  SCOTTayan Abby [place]  Pager [phone]  Available via MS Teams    SUBJECTIVE / OVERNIGHT EVENTS: Overnight O2 dropped to 90-92%range, patient refused supplemental O2. Patient states that he feels better this AM.     ADDITIONAL REVIEW OF SYSTEMS:    MEDICATIONS  (STANDING):  atorvastatin 80 milliGRAM(s) Oral at bedtime  enoxaparin Injectable 40 milliGRAM(s) SubCutaneous every 24 hours  folic acid 1 milliGRAM(s) Oral daily  furosemide   Injectable 20 milliGRAM(s) IV Push two times a day  gabapentin 400 milliGRAM(s) Oral at bedtime  pantoprazole    Tablet 40 milliGRAM(s) Oral two times a day  tamsulosin 0.4 milliGRAM(s) Oral at bedtime    MEDICATIONS  (PRN):  acetaminophen     Tablet .. 650 milliGRAM(s) Oral every 6 hours PRN Temp greater or equal to 38C (100.4F), Mild Pain (1 - 3)  aluminum hydroxide/magnesium hydroxide/simethicone Suspension 30 milliLiter(s) Oral every 4 hours PRN Dyspepsia  melatonin 3 milliGRAM(s) Oral at bedtime PRN Insomnia  ondansetron Injectable 4 milliGRAM(s) IV Push every 8 hours PRN Nausea and/or Vomiting  sodium chloride 0.65% Nasal 1 Spray(s) Both Nostrils once PRN Nasal Congestion      I&O's Summary      PHYSICAL EXAM:  Vital Signs Last 24 Hrs  T(C): 36.7 (09 Feb 2024 14:11), Max: 36.8 (09 Feb 2024 05:21)  T(F): 98 (09 Feb 2024 14:11), Max: 98.2 (09 Feb 2024 05:21)  HR: 80 (09 Feb 2024 14:11) (67 - 99)  BP: 148/62 (09 Feb 2024 14:11) (123/60 - 152/86)  BP(mean): --  RR: 18 (09 Feb 2024 14:11) (17 - 23)  SpO2: 97% (09 Feb 2024 14:11) (92% - 100%)    Parameters below as of 09 Feb 2024 14:11  Patient On (Oxygen Delivery Method): room air      CONSTITUTIONAL: NAD, elderly male   RESPIRATORY: Normal respiratory effort; lungs are clear to auscultation bilaterally  CARDIOVASCULAR: Regular rate and rhythm, normal S1 and S2, no murmur/rub/gallop; 2+ LE noted bilaterally, erythema noted on exam   ABDOMEN: Nontender to palpation, normoactive bowel sounds, no rebound/guarding; No hepatosplenomegaly  MUSCULOSKELETAL:  Normal gait; no clubbing or cyanosis of digits; no joint swelling or tenderness to palpation  PSYCH: A+O to person, place, and time; affect appropriate  NEUROLOGY: CN 2-12 are intact and symmetric; no gross sensory deficits   SKIN: No rashes; no palpable lesions    LABS:                        8.7    9.18  )-----------( 361      ( 09 Feb 2024 06:00 )             27.5     02-09    142  |  110<H>  |  21  ----------------------------<  79  4.0   |  20<L>  |  1.80<H>    Ca    8.3<L>      09 Feb 2024 06:00  Phos  3.9     02-09  Mg     1.90     02-09    TPro  6.8  /  Alb  3.2<L>  /  TBili  0.5  /  DBili  x   /  AST  16  /  ALT  8   /  AlkPhos  99  02-08    PT/INR - ( 08 Feb 2024 14:00 )   PT: 10.8 sec;   INR: 0.96 ratio         PTT - ( 08 Feb 2024 14:00 )  PTT:24.2 sec      Urinalysis Basic - ( 09 Feb 2024 06:00 )    Color: x / Appearance: x / SG: x / pH: x  Gluc: 79 mg/dL / Ketone: x  / Bili: x / Urobili: x   Blood: x / Protein: x / Nitrite: x   Leuk Esterase: x / RBC: x / WBC x   Sq Epi: x / Non Sq Epi: x / Bacteria: x            
PROGRESS NOTE:   Authored by Dr. Ashley García MD, pager [phone]     Patient is a 89y old  Male who presents with a chief complaint of LE edema, diarrhea (15 Feb 2024 17:02)      SUBJECTIVE / OVERNIGHT EVENTS:  Pt sitting up in bed. Later this afternoon, heard was able to walk to elevator.   ADDITIONAL REVIEW OF SYSTEMS:    MEDICATIONS  (STANDING):  atorvastatin 80 milliGRAM(s) Oral at bedtime  cholestyramine Powder (Sugar-Free) 2 Gram(s) Oral four times a day  enoxaparin Injectable 40 milliGRAM(s) SubCutaneous every 24 hours  folic acid 1 milliGRAM(s) Oral daily  gabapentin 400 milliGRAM(s) Oral at bedtime  pantoprazole    Tablet 40 milliGRAM(s) Oral two times a day  sodium chloride 0.9%. 1000 milliLiter(s) (75 mL/Hr) IV Continuous <Continuous>  tamsulosin 0.4 milliGRAM(s) Oral at bedtime    MEDICATIONS  (PRN):  acetaminophen     Tablet .. 650 milliGRAM(s) Oral every 6 hours PRN Temp greater or equal to 38C (100.4F), Mild Pain (1 - 3)  aluminum hydroxide/magnesium hydroxide/simethicone Suspension 30 milliLiter(s) Oral every 4 hours PRN Dyspepsia  melatonin 3 milliGRAM(s) Oral at bedtime PRN Insomnia  ondansetron Injectable 4 milliGRAM(s) IV Push every 8 hours PRN Nausea and/or Vomiting  sodium chloride 0.65% Nasal 1 Spray(s) Both Nostrils once PRN Nasal Congestion      CAPILLARY BLOOD GLUCOSE        I&O's Summary    15 Feb 2024 07:01  -  16 Feb 2024 07:00  --------------------------------------------------------  IN: 600 mL / OUT: 700 mL / NET: -100 mL    16 Feb 2024 07:01  -  16 Feb 2024 17:42  --------------------------------------------------------  IN: 0 mL / OUT: 800 mL / NET: -800 mL        PHYSICAL EXAM:  Vital Signs Last 24 Hrs  T(C): 36.6 (16 Feb 2024 10:30), Max: 37.1 (15 Feb 2024 19:30)  T(F): 97.8 (16 Feb 2024 10:30), Max: 98.8 (15 Feb 2024 19:30)  HR: 79 (16 Feb 2024 10:30) (69 - 80)  BP: 119/64 (16 Feb 2024 10:30) (119/58 - 126/57)  BP(mean): --  RR: 18 (16 Feb 2024 10:30) (18 - 18)  SpO2: 97% (16 Feb 2024 10:30) (97% - 98%)    Parameters below as of 16 Feb 2024 10:30  Patient On (Oxygen Delivery Method): room air        CONSTITUTIONAL: NAD, frail, elderly gentleman  RESPIRATORY: Normal respiratory effort; lungs are clear to auscultation bilaterally  CARDIOVASCULAR: Regular rate and rhythm, normal S1 and S2, no murmur/rub/gallop; No lower extremity edema; Peripheral pulses are 2+ bilaterally  ABDOMEN: Nontender to palpation, normoactive bowel sounds, no rebound/guarding; No hepatosplenomegaly  : Gill in place  MUSCULOSKELETAL: no clubbing or cyanosis of digits; no joint swelling or tenderness to palpation  PSYCH: A+O to person, place. Calm     LABS:                        10.0   9.16  )-----------( 374      ( 15 Feb 2024 05:00 )             32.4     02-15    137  |  105  |  24<H>  ----------------------------<  81  4.0   |  22  |  0.87    Ca    8.9      15 Feb 2024 05:00  Phos  3.2     02-15  Mg     1.60     02-15            Urinalysis Basic - ( 15 Feb 2024 05:00 )    Color: x / Appearance: x / SG: x / pH: x  Gluc: 81 mg/dL / Ketone: x  / Bili: x / Urobili: x   Blood: x / Protein: x / Nitrite: x   Leuk Esterase: x / RBC: x / WBC x   Sq Epi: x / Non Sq Epi: x / Bacteria: x          RADIOLOGY & ADDITIONAL TESTS:  Results Reviewed:   Imaging Personally Reviewed:  Electrocardiogram Personally Reviewed:    COORDINATION OF CARE:  Care Discussed with Consultants/Other Providers [Y/N]:  Prior or Outpatient Records Reviewed [Y/N]:

## 2024-02-18 NOTE — PROGRESS NOTE ADULT - PROBLEM SELECTOR PLAN 7
-c/w tamsulosin

## 2024-02-18 NOTE — PROGRESS NOTE ADULT - PROBLEM SELECTOR PLAN 6
- c/w gabapentin

## 2024-02-18 NOTE — PROGRESS NOTE ADULT - PROBLEM SELECTOR PLAN 8
DVT ppx: lovenox  Dispo: PT was able to ambulate with PT. Recs changed to O/p PT. Will retrial TOV.
DVT ppx: lovenox  PT eval pending, lives alone at home
DVT ppx: lovenox  PT eval pending, lives alone at home
DVT ppx: lovenox  Dispo: PT was able to ambulate with PT. Recs changed to O/p PT. Failed reattempt at TO. Will need to be taught bruner care. Though pt appears to have some mild cognitive impairment, and as capacity fluctuates, he currently appears to have capacity as he is able to verbalize risks of refusing straight cath. Agrees to bruner. Will need  assistance to continue with dispo planning.
DVT ppx: lovenox  PT eval pending, lives alone at home
DVT ppx: lovenox  Dispo: PT recommends [facility]. Discussed with pt; he is unable to remember that he was re-evaluated by PT yesterday. Has also poor insight into his limited mobility. Unable to verbalize risks of going home on his own and states he has always managed. At this time, pt does not have capacity to make safe decisions regarding discharge.
DVT ppx: lovenox  PT eval pending
DVT ppx: lovenox  Dispo: Home today with home care
DVT ppx: lovenox  Dispo: PT recommends [facility]. However, pt adamantly proclaims he wants to be discharged home. However, unsafe d/c. RN states pt is not walking and needs assistance transferring. PT re-eval pending.
DVT ppx: lovenox  Dispo: PT recommends [facility]. However, pt adamantly proclaims he wants to be discharged home. However, unsafe d/c. RN states pt is not walking and needs assistance transferring. Will need to reasses capacity tomorrow once pt is less agitated. Unfortunately, has no family and his only child is special needs and lives in a facility. Unclear dispo plan.

## 2024-02-18 NOTE — PROGRESS NOTE ADULT - PROBLEM SELECTOR PLAN 3
- dopplers neg for DVT; suspect some component of chronic lymphedema   - mild erythema likely related to edema especially since symptoms progressed despite pt being on a course of bactrim for UTI  - continue to monitor
- dopplers neg for DVT; suspect some component of chronic lymphedema . Given normal TTE, unlikely   - mild erythema likely related to edema especially since symptoms progressed despite pt being on a course of bactrim for UTI  - continue to monitor
- dopplers neg for DVT; suspect some component of chronic lymphedema . Given normal TTE, unlikely   - mild erythema likely related to edema especially since symptoms progressed despite pt being on a course of bactrim for UTI  - continue to monitor
- dopplers neg for DVT; suspect some component of chronic lymphedema with possibly acute HFpEF  - mild erythema likely related to edema especially since symptoms progressed despite pt being on a course of bactrim for UTI  -if erythema progresses or doesn't improve with diuresis, consider treating for cellulitis  - continue to monitor
- dopplers neg for DVT; suspect some component of chronic lymphedema . Given normal TTE, unlikely   - mild erythema likely related to edema especially since symptoms progressed despite pt being on a course of bactrim for UTI  - continue to monitor
- dopplers neg for DVT; suspect some component of chronic lymphedema . Given normal TTE, unlikely   - mild erythema likely related to edema especially since symptoms progressed despite pt being on a course of bactrim for UTI  - continue to monitor
- dopplers neg for DVT; suspect some component of chronic lymphedema with possibly acute HFpEF  - pending TTE   - mild erythema likely related to edema especially since symptoms progressed despite pt being on a course of bactrim for UTI  - continue to monitor
- dopplers neg for DVT; suspect some component of chronic lymphedema . Given normal TTE, unlikely   - mild erythema likely related to edema especially since symptoms progressed despite pt being on a course of bactrim for UTI  - continue to monitor
- dopplers neg for DVT; suspect some component of chronic lymphedema with possibly acute HFpEF  - mild erythema likely related to edema especially since symptoms progressed despite pt being on a course of bactrim for UTI  -if erythema progresses or doesn't improve with diuresis, consider treating for cellulitis  - continue to monitor
- dopplers neg for DVT; suspect some component of chronic lymphedema with possibly acute HFpEF  - pending TTE   - mild erythema likely related to edema especially since symptoms progressed despite pt being on a course of bactrim for UTI  - continue to monitor

## 2024-02-22 PROBLEM — I50.32 CHRONIC DIASTOLIC (CONGESTIVE) HEART FAILURE: Chronic | Status: ACTIVE | Noted: 2024-02-09

## 2024-02-29 NOTE — DISCHARGE NOTE NURSING/CASE MANAGEMENT/SOCIAL WORK - NSDCPNPNATDISSUGG_GEN_ALL_CORE
Your blood pressure is currently well-controlled; we will continue your current medications and see you back in heart clinic in ~ 6 months.     Thank you for your visit today. Please contact our office (via [employer] or phone) with any additional questions.     [facility]    Verena, RN/Clinic Nurse for:    Dr. Madina Rowley    [address], [address]  [place] [address]    Phone: [phone] Press Option 5 then Option 3 to speak with the Clinic Nurse (Verena)    _____    To Reach:    Billing Questions -    [phone]  Scheduling / Rescheduling -  Option 1  Refills / Medication Requests -  Option 3  General Office /  -  Option 4  Results -     Option 6  Medical Records -    Option 7  Repeat Options -    Option 9      
No

## 2024-03-11 ENCOUNTER — OUTPATIENT (OUTPATIENT)
Dept: OUTPATIENT SERVICES | Facility: HOSPITAL | Age: 89
LOS: 1 days | End: 2024-03-11
Payer: MEDICARE

## 2024-03-11 ENCOUNTER — APPOINTMENT (OUTPATIENT)
Dept: UROLOGY | Facility: CLINIC | Age: 89
End: 2024-03-11
Payer: MEDICARE

## 2024-03-11 VITALS
DIASTOLIC BLOOD PRESSURE: 65 MMHG | HEART RATE: 88 BPM | BODY MASS INDEX: 17.27 KG/M2 | SYSTOLIC BLOOD PRESSURE: 163 MMHG | WEIGHT: 110 LBS | HEIGHT: 67 IN | OXYGEN SATURATION: 96 %

## 2024-03-11 DIAGNOSIS — Z90.49 ACQUIRED ABSENCE OF OTHER SPECIFIED PARTS OF DIGESTIVE TRACT: Chronic | ICD-10-CM

## 2024-03-11 PROCEDURE — 51703 INSERT BLADDER CATH COMPLEX: CPT

## 2024-03-11 PROCEDURE — 99204 OFFICE O/P NEW MOD 45 MIN: CPT | Mod: 25

## 2024-03-11 RX ORDER — TAMSULOSIN HYDROCHLORIDE 0.4 MG/1
0.4 CAPSULE ORAL
Qty: 60 | Refills: 11 | Status: ACTIVE | COMMUNITY
Start: 2024-03-11 | End: 1900-01-01

## 2024-03-11 RX ORDER — FINASTERIDE 5 MG/1
5 TABLET, FILM COATED ORAL DAILY
Qty: 90 | Refills: 3 | Status: ACTIVE | COMMUNITY
Start: 2024-03-11 | End: 1900-01-01

## 2024-03-12 ENCOUNTER — APPOINTMENT (OUTPATIENT)
Dept: INTERNAL MEDICINE | Facility: CLINIC | Age: 89
End: 2024-03-12
Payer: MEDICARE

## 2024-03-12 VITALS
WEIGHT: 133 LBS | DIASTOLIC BLOOD PRESSURE: 61 MMHG | OXYGEN SATURATION: 93 % | TEMPERATURE: 98.8 F | HEART RATE: 97 BPM | HEIGHT: 67 IN | SYSTOLIC BLOOD PRESSURE: 136 MMHG | BODY MASS INDEX: 20.88 KG/M2

## 2024-03-12 DIAGNOSIS — Z00.00 ENCOUNTER FOR GENERAL ADULT MEDICAL EXAMINATION W/OUT ABNORMAL FINDINGS: ICD-10-CM

## 2024-03-12 DIAGNOSIS — Z78.9 OTHER SPECIFIED HEALTH STATUS: ICD-10-CM

## 2024-03-12 PROCEDURE — 99204 OFFICE O/P NEW MOD 45 MIN: CPT | Mod: 25

## 2024-03-12 PROCEDURE — G0439: CPT

## 2024-03-12 NOTE — ADDENDUM
[FreeTextEntry1] : I, Ailyn Le, acted as a scribe on behalf of Dr. Alf Blank MD, on 03/12/2024.   All medical entries made by the scribe were at my, Dr. Alf Blank MD, direction and personally dictated by me on 03/12/2024. I have reviewed the chart and agree that the record accurately reflects my personal performance of the history, physical exam, assessment and plan. I have also personally directed, reviewed, and agreed with the chart.

## 2024-03-12 NOTE — HEALTH RISK ASSESSMENT
[Good] : ~his/her~  mood as  good [Never] : Never [No] : In the past 12 months have you used drugs other than those required for medical reasons? No [No falls in past year] : Patient reported no falls in the past year [Feels Safe at Home] : Feels safe at home [Fully functional (bathing, dressing, toileting, transferring, walking, feeding)] : Fully functional (bathing, dressing, toileting, transferring, walking, feeding) [Fully functional (using the telephone, shopping, preparing meals, housekeeping, doing laundry, using] : Fully functional and needs no help or supervision to perform IADLs (using the telephone, shopping, preparing meals, housekeeping, doing laundry, using transportation, managing medications and managing finances) [Reports normal functional visual acuity (ie: able to read med bottle)] : Reports normal functional visual acuity [Smoke Detector] : smoke detector [Carbon Monoxide Detector] : carbon monoxide detector [Safety elements used in home] : safety elements used in home [Seat Belt] :  uses seat belt [Sunscreen] : uses sunscreen [FreeTextEntry1] : Health Maintenance [de-identified] : Urology, GI [Language] : denies difficulty with language [Change in mental status noted] : No change in mental status noted [Learning/Retaining New Information] : denies difficulty learning/retaining new information [Behavior] : denies difficulty with behavior [Handling Complex Tasks] : denies difficulty handling complex tasks [Reasoning] : denies difficulty with reasoning [Spatial Ability and Orientation] : denies difficulty with spatial ability and orientation [Reports changes in hearing] : Reports no changes in hearing [Reports changes in vision] : Reports no changes in vision [Reports changes in dental health] : Reports no changes in dental health [Guns at Home] : no guns at home [Travel to Developing Areas] : does not  travel to developing areas [TB Exposure] : is not being exposed to tuberculosis

## 2024-03-12 NOTE — ASSESSMENT
[FreeTextEntry1] : Annual Wellness Visit -BP is stable. Continue current management. -Name of Cardiologist provided for heart failure -Check A1c, lipid panel and Vitamin levels -To start furosemide given LE edema. Advised to weight daily to check if gaining weight. Check renal function. -Discussed shingles and pneumonia vaccine. -Continue with Healthy diet. -RTO in 1 week for reassessment.

## 2024-03-12 NOTE — HISTORY OF PRESENT ILLNESS
[FreeTextEntry1] : Patient presents to hospitals care and annual Medicare wellness visit.  [de-identified] : A 89 y/o M pt presents to office with Hx of GERD, CHF. Notes of colon resection and he did have diarrhea for weeks after the operation.  Reports was in the hospital for diarrhea and LE edema.  Pt will f/u with Dr Marie this week. States he was on Lasix when he was in the hospital, wishes to continue taking and requesting for prescription.   Currently wearing Gill catheter. Denies any CP, chest tightness or SOB. Denies any abd pain, urinary symptom, changes in appetite. Requesting for Pantoprazole refills.

## 2024-03-12 NOTE — PHYSICAL EXAM
[No Acute Distress] : no acute distress [Well Nourished] : well nourished [Well Developed] : well developed [Well-Appearing] : well-appearing [Normal Sclera/Conjunctiva] : normal sclera/conjunctiva [PERRL] : pupils equal round and reactive to light [EOMI] : extraocular movements intact [Normal Oropharynx] : the oropharynx was normal [No JVD] : no jugular venous distention [No Lymphadenopathy] : no lymphadenopathy [Thyroid Normal, No Nodules] : the thyroid was normal and there were no nodules present [Supple] : supple [No Respiratory Distress] : no respiratory distress  [Clear to Auscultation] : lungs were clear to auscultation bilaterally [No Accessory Muscle Use] : no accessory muscle use [Normal Rate] : normal rate  [Normal S1, S2] : normal S1 and S2 [Regular Rhythm] : with a regular rhythm [No Murmur] : no murmur heard [No Carotid Bruits] : no carotid bruits [No Abdominal Bruit] : a ~M bruit was not heard ~T in the abdomen [Pedal Pulses Present] : the pedal pulses are present [No Varicosities] : no varicosities [No Palpable Aorta] : no palpable aorta [No Edema] : there was no peripheral edema [No Extremity Clubbing/Cyanosis] : no extremity clubbing/cyanosis [Soft] : abdomen soft [Non Tender] : non-tender [No Masses] : no abdominal mass palpated [Non-distended] : non-distended [Normal Bowel Sounds] : normal bowel sounds [No HSM] : no HSM [Normal Posterior Cervical Nodes] : no posterior cervical lymphadenopathy [Normal Anterior Cervical Nodes] : no anterior cervical lymphadenopathy [No CVA Tenderness] : no CVA  tenderness [No Spinal Tenderness] : no spinal tenderness [No Joint Swelling] : no joint swelling [Grossly Normal Strength/Tone] : grossly normal strength/tone [Coordination Grossly Intact] : coordination grossly intact [No Rash] : no rash [No Focal Deficits] : no focal deficits [Normal Gait] : normal gait [Deep Tendon Reflexes (DTR)] : deep tendon reflexes were 2+ and symmetric [Normal Insight/Judgement] : insight and judgment were intact [Normal Affect] : the affect was normal [de-identified] : Bilateral cerumen impaction

## 2024-03-13 NOTE — ASSESSMENT
[FreeTextEntry1] : Urinary retention after colectomy. Suspect baseline incomplete emptying with BPH without eval prior. Pt overall improved relative to hospitalization, not on pain meds, ambulatory etc. Attempted TOV today. Pt with 400cc instilled without discomfort suggesting high capacity bladder.  --Cont flomax. Increase to 0.8mg --Add finasteride --RTC in 1-2 weeks for repeat TOV

## 2024-03-13 NOTE — PHYSICAL EXAM
[General Appearance - Well Developed] : well developed [General Appearance - Well Nourished] : well nourished [General Appearance - In No Acute Distress] : no acute distress [Edema] : no peripheral edema [Exaggerated Use Of Accessory Muscles For Inspiration] : no accessory muscle use [Abdomen Soft] : soft [Abdomen Tenderness] : non-tender [Costovertebral Angle Tenderness] : no ~M costovertebral angle tenderness [Normal Station and Gait] : the gait and station were normal for the patient's age [Oriented To Time, Place, And Person] : oriented to person, place, and time [de-identified] : thin

## 2024-03-13 NOTE — HISTORY OF PRESENT ILLNESS
[FreeTextEntry1] : 89yo gentleman with cc of urinary retention. Pt admitted in Dec with abd pain. Found to have large colon mass. Underwent resection. He was discharged to rehab on TPN. He was readmitted in Feb with LE edema and diarrhea. Found to be in heart failure and admitted. Also found to be in retention with PAT (Cr 1.8 from baseline about 0.9). He failed void trail x3 with straight cath and had bruner placed (>700cc per report). US with bruner in place was negative for hydro. He reports bowels are still loose. He is on flomax. Is on gabapentin long term for LE neuropathy.   Was on silodosin in the past. No prior hx of retention. No prior hx of UTI. At baseline, denies urinary complaints but would wake up 3-4 times to void. No straining. Had some urgency at baseline and urge leakage. no pads. Not daily.

## 2024-03-14 DIAGNOSIS — N40.1 BENIGN PROSTATIC HYPERPLASIA WITH LOWER URINARY TRACT SYMPTOMS: ICD-10-CM

## 2024-03-14 DIAGNOSIS — R33.9 RETENTION OF URINE, UNSPECIFIED: ICD-10-CM

## 2024-03-15 ENCOUNTER — RX RENEWAL (OUTPATIENT)
Age: 89
End: 2024-03-15

## 2024-03-19 ENCOUNTER — APPOINTMENT (OUTPATIENT)
Dept: INTERNAL MEDICINE | Facility: CLINIC | Age: 89
End: 2024-03-19
Payer: MEDICARE

## 2024-03-19 VITALS
HEART RATE: 78 BPM | OXYGEN SATURATION: 100 % | HEIGHT: 67 IN | WEIGHT: 133 LBS | SYSTOLIC BLOOD PRESSURE: 138 MMHG | DIASTOLIC BLOOD PRESSURE: 60 MMHG | TEMPERATURE: 97.5 F | BODY MASS INDEX: 20.88 KG/M2

## 2024-03-19 DIAGNOSIS — Z23 ENCOUNTER FOR IMMUNIZATION: ICD-10-CM

## 2024-03-19 DIAGNOSIS — R79.89 OTHER SPECIFIED ABNORMAL FINDINGS OF BLOOD CHEMISTRY: ICD-10-CM

## 2024-03-19 DIAGNOSIS — Z90.49 ACQUIRED ABSENCE OF OTHER SPECIFIED PARTS OF DIGESTIVE TRACT: ICD-10-CM

## 2024-03-19 DIAGNOSIS — D72.828 OTHER ELEVATED WHITE BLOOD CELL COUNT: ICD-10-CM

## 2024-03-19 LAB
25(OH)D3 SERPL-MCNC: 14.5 NG/ML
ALBUMIN SERPL ELPH-MCNC: 3.5 G/DL
ALP BLD-CCNC: 91 U/L
ALT SERPL-CCNC: 7 U/L
ANION GAP SERPL CALC-SCNC: 13 MMOL/L
AST SERPL-CCNC: 12 U/L
BASOPHILS # BLD AUTO: 0.05 K/UL
BASOPHILS NFR BLD AUTO: 0.3 %
BILIRUB SERPL-MCNC: 0.5 MG/DL
BUN SERPL-MCNC: 15 MG/DL
CALCIUM SERPL-MCNC: 8.8 MG/DL
CHLORIDE SERPL-SCNC: 103 MMOL/L
CHOLEST SERPL-MCNC: 121 MG/DL
CO2 SERPL-SCNC: 25 MMOL/L
CREAT SERPL-MCNC: 0.97 MG/DL
EGFR: 74 ML/MIN/1.73M2
EOSINOPHIL # BLD AUTO: 0.01 K/UL
EOSINOPHIL NFR BLD AUTO: 0.1 %
ESTIMATED AVERAGE GLUCOSE: 94 MG/DL
GLUCOSE SERPL-MCNC: 84 MG/DL
HBA1C MFR BLD HPLC: 4.9 %
HCT VFR BLD CALC: 33.6 %
HDLC SERPL-MCNC: 61 MG/DL
HGB BLD-MCNC: 9.6 G/DL
IMM GRANULOCYTES NFR BLD AUTO: 0.5 %
LDLC SERPL CALC-MCNC: 47 MG/DL
LYMPHOCYTES # BLD AUTO: 0.72 K/UL
LYMPHOCYTES NFR BLD AUTO: 3.9 %
MAN DIFF?: NORMAL
MCHC RBC-ENTMCNC: 26.3 PG
MCHC RBC-ENTMCNC: 28.6 GM/DL
MCV RBC AUTO: 92.1 FL
MONOCYTES # BLD AUTO: 1.52 K/UL
MONOCYTES NFR BLD AUTO: 8.2 %
NEUTROPHILS # BLD AUTO: 16.17 K/UL
NEUTROPHILS NFR BLD AUTO: 87 %
NONHDLC SERPL-MCNC: 60 MG/DL
NT-PROBNP SERPL-MCNC: 1517 PG/ML
PLATELET # BLD AUTO: 291 K/UL
POTASSIUM SERPL-SCNC: 3.7 MMOL/L
PROT SERPL-MCNC: 6.4 G/DL
RBC # BLD: 3.65 M/UL
RBC # FLD: 19.7 %
SODIUM SERPL-SCNC: 141 MMOL/L
TRIGL SERPL-MCNC: 54 MG/DL
TSH SERPL-ACNC: 2.33 UIU/ML
VIT B12 SERPL-MCNC: 260 PG/ML
WBC # FLD AUTO: 18.57 K/UL

## 2024-03-19 PROCEDURE — 36415 COLL VENOUS BLD VENIPUNCTURE: CPT

## 2024-03-19 PROCEDURE — 99214 OFFICE O/P EST MOD 30 MIN: CPT | Mod: 25

## 2024-03-19 PROCEDURE — G0009: CPT

## 2024-03-19 PROCEDURE — 90677 PCV20 VACCINE IM: CPT

## 2024-03-19 RX ORDER — FOLIC ACID 1 MG/1
1 TABLET ORAL
Qty: 90 | Refills: 3 | Status: ACTIVE | COMMUNITY
Start: 2024-03-19 | End: 1900-01-01

## 2024-03-19 RX ORDER — PANTOPRAZOLE 40 MG/1
40 TABLET, DELAYED RELEASE ORAL
Qty: 180 | Refills: 0 | Status: ACTIVE | COMMUNITY
Start: 2024-03-12

## 2024-03-19 NOTE — ADDENDUM
[FreeTextEntry1] : I, Ailyn Le, acted as a scribe on behalf of Dr. Alf Blank MD, on 03/19/2024.   All medical entries made by the scribe were at my, Dr. Alf Blank MD, direction and personally dictated by me on 03/19/2024. I have reviewed the chart and agree that the record accurately reflects my personal performance of the history, physical exam, assessment and plan. I have also personally directed, reviewed, and agreed with the chart.

## 2024-03-19 NOTE — ASSESSMENT
[FreeTextEntry1] : -Blood work results discussed with pt. All questions and concerns answered. -Check flow cytometry, CBC, ProBNP.-patient denies any signs of infection Lungs CTA b/l -Folic Acid refill, Vit B12 supplement sent to pharmacy. -Pneumonia vaccine administered today. Pt tolerated the procedure well.

## 2024-03-19 NOTE — HISTORY OF PRESENT ILLNESS
[FreeTextEntry1] : Patient presents for follow-up for LE swelling. [de-identified] : Patient did see some improvement with furosemide bid. LE swelling swell and comes down. Pt tries to keep legs elevated. Notes there would be visiting nurse coming this Thursday. Denies any SOB, coughing, wheezing. Denies any pelvic pain. Reports he has f/u with Urologist in a week. Requesting for Folic Acid to be refilled.

## 2024-03-22 ENCOUNTER — OUTPATIENT (OUTPATIENT)
Dept: OUTPATIENT SERVICES | Facility: HOSPITAL | Age: 89
LOS: 1 days | Discharge: HOME | End: 2024-03-22

## 2024-03-22 DIAGNOSIS — Z90.49 ACQUIRED ABSENCE OF OTHER SPECIFIED PARTS OF DIGESTIVE TRACT: Chronic | ICD-10-CM

## 2024-03-22 DIAGNOSIS — C18.9 MALIGNANT NEOPLASM OF COLON, UNSPECIFIED: ICD-10-CM

## 2024-03-25 ENCOUNTER — OUTPATIENT (OUTPATIENT)
Dept: OUTPATIENT SERVICES | Facility: HOSPITAL | Age: 89
LOS: 1 days | End: 2024-03-25
Payer: MEDICARE

## 2024-03-25 ENCOUNTER — APPOINTMENT (OUTPATIENT)
Dept: UROLOGY | Facility: CLINIC | Age: 89
End: 2024-03-25
Payer: MEDICARE

## 2024-03-25 DIAGNOSIS — Z90.49 ACQUIRED ABSENCE OF OTHER SPECIFIED PARTS OF DIGESTIVE TRACT: Chronic | ICD-10-CM

## 2024-03-25 DIAGNOSIS — R35.0 FREQUENCY OF MICTURITION: ICD-10-CM

## 2024-03-25 PROCEDURE — 51703 INSERT BLADDER CATH COMPLEX: CPT

## 2024-03-25 RX ORDER — FUROSEMIDE 40 MG/1
40 TABLET ORAL
Qty: 90 | Refills: 0 | Status: ACTIVE | COMMUNITY
Start: 2024-03-12 | End: 1900-01-01

## 2024-03-26 ENCOUNTER — RESULT REVIEW (OUTPATIENT)
Age: 89
End: 2024-03-26

## 2024-03-26 ENCOUNTER — NON-APPOINTMENT (OUTPATIENT)
Age: 89
End: 2024-03-26

## 2024-03-26 ENCOUNTER — APPOINTMENT (OUTPATIENT)
Dept: HEMATOLOGY ONCOLOGY | Facility: CLINIC | Age: 89
End: 2024-03-26
Payer: MEDICARE

## 2024-03-26 VITALS
SYSTOLIC BLOOD PRESSURE: 146 MMHG | HEIGHT: 66.46 IN | TEMPERATURE: 97.9 F | HEART RATE: 80 BPM | BODY MASS INDEX: 19.18 KG/M2 | DIASTOLIC BLOOD PRESSURE: 60 MMHG | OXYGEN SATURATION: 96 % | WEIGHT: 120.79 LBS | RESPIRATION RATE: 18 BRPM

## 2024-03-26 LAB
BASOPHILS # BLD AUTO: 0.05 K/UL — SIGNIFICANT CHANGE UP (ref 0–0.2)
BASOPHILS # BLD AUTO: 0.07 K/UL
BASOPHILS NFR BLD AUTO: 0.5 % — SIGNIFICANT CHANGE UP (ref 0–2)
BASOPHILS NFR BLD AUTO: 0.9 %
EOSINOPHIL # BLD AUTO: 0.15 K/UL
EOSINOPHIL # BLD AUTO: 0.2 K/UL — SIGNIFICANT CHANGE UP (ref 0–0.5)
EOSINOPHIL NFR BLD AUTO: 1.8 % — SIGNIFICANT CHANGE UP (ref 0–6)
EOSINOPHIL NFR BLD AUTO: 2 %
HCT VFR BLD CALC: 34 % — LOW (ref 39–50)
HCT VFR BLD CALC: 35.6 %
HGB BLD-MCNC: 10.7 G/DL — LOW (ref 13–17)
HGB BLD-MCNC: 10.8 G/DL
IMM GRANULOCYTES NFR BLD AUTO: 0.6 % — SIGNIFICANT CHANGE UP (ref 0–0.9)
IMM GRANULOCYTES NFR BLD AUTO: 1.2 %
LYMPHOCYTES # BLD AUTO: 1.12 K/UL
LYMPHOCYTES # BLD AUTO: 1.7 K/UL — SIGNIFICANT CHANGE UP (ref 1–3.3)
LYMPHOCYTES # BLD AUTO: 15.6 % — SIGNIFICANT CHANGE UP (ref 13–44)
LYMPHOCYTES NFR BLD AUTO: 15 %
MAN DIFF?: NORMAL
MCHC RBC-ENTMCNC: 26 PG
MCHC RBC-ENTMCNC: 26.6 PG — LOW (ref 27–34)
MCHC RBC-ENTMCNC: 30.3 GM/DL
MCHC RBC-ENTMCNC: 31.5 G/DL — LOW (ref 32–36)
MCV RBC AUTO: 84.6 FL — SIGNIFICANT CHANGE UP (ref 80–100)
MCV RBC AUTO: 85.8 FL
MONOCYTES # BLD AUTO: 0.62 K/UL
MONOCYTES # BLD AUTO: 0.76 K/UL — SIGNIFICANT CHANGE UP (ref 0–0.9)
MONOCYTES NFR BLD AUTO: 7 % — SIGNIFICANT CHANGE UP (ref 2–14)
MONOCYTES NFR BLD AUTO: 8.3 %
NEUTROPHILS # BLD AUTO: 5.44 K/UL
NEUTROPHILS # BLD AUTO: 8.11 K/UL — HIGH (ref 1.8–7.4)
NEUTROPHILS NFR BLD AUTO: 72.6 %
NEUTROPHILS NFR BLD AUTO: 74.5 % — SIGNIFICANT CHANGE UP (ref 43–77)
NRBC # BLD: 0 /100 WBCS — SIGNIFICANT CHANGE UP (ref 0–0)
NT-PROBNP SERPL-MCNC: 717 PG/ML
PLATELET # BLD AUTO: 491 K/UL
PLATELET # BLD AUTO: 495 K/UL — HIGH (ref 150–400)
RBC # BLD: 4.02 M/UL — LOW (ref 4.2–5.8)
RBC # BLD: 4.15 M/UL
RBC # FLD: 17.9 % — HIGH (ref 10.3–14.5)
RBC # FLD: 18.6 %
WBC # BLD: 10.89 K/UL — HIGH (ref 3.8–10.5)
WBC # FLD AUTO: 10.89 K/UL — HIGH (ref 3.8–10.5)
WBC # FLD AUTO: 7.49 K/UL

## 2024-03-26 PROCEDURE — G2212 PROLONG OUTPT/OFFICE VIS: CPT

## 2024-03-26 PROCEDURE — 99205 OFFICE O/P NEW HI 60 MIN: CPT

## 2024-03-26 PROCEDURE — G2211 COMPLEX E/M VISIT ADD ON: CPT

## 2024-03-26 NOTE — ASSESSMENT
[FreeTextEntry1] : COLON CANCER: Patient has TWO right sided colon cancers. 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 Tumor #1 is in the ascending colon, and is a T4aN0 lesion with absence of MLH1 and PMS2 on IHC staining. In order to investigate whether this is germline or somatic-only, BRAF V600E testing was performed and is negative. It is stated that MLH1 was then performed but the report is not available at present. I will address with pathologist. Tumor #2 is in the transverse colon and LN + = 1/30; there is also PNI+, LVI+, mucinous features, and focal signet ring features It is thought that the single +LN is from this tumor, since both the primary and the node have intact expression of all 4 MMR proteins by IHC (ie MMR-proficient); so it is staged as T4aN1 (stage 3).  In terms of adjuvant chemotherapy, I would discount (ie, not include) the MMR deficient Tumor #1, since it is stage 2 and its relapse risk is low. On the other hand, Tumor #2 is a higher risk stage 3. Given the patient's current medical condition, multiple medical comorbidities, social situation and renal insufficiency, I do not think we can administer chemotherapy at this time. I would like to repeat labs, and see overall improvement of his health and organ function over the next few weeks, and then revisit potential adjuvant chemotherapy. The latter would consist of Xeloda as a single agent, as dual chemotherapy (eg FOLFOX) will not be tolerated.   Will present at GI tumor board on 4/10/24 to review pathology.  Recommend surveillance CAT CAP q6 months post-op - next in June Will have him RTO in 6 weeks and assess status and BUN/Cr. If BUN/Cr improved, will obtain CTs with w/IV contrast.  LOOSE STOOL: Have advised to use Immodium at least prior to travel. May use as needed. Does not seem to be C Diff.  CHF: Using Diuretics at present.  URINARY RETENTION: Urinary retention after colectomy. Suspect baseline incomplete emptying with BPH without eval prior. Pt overall improved relative to hospitalization, not on pain meds, ambulatory etc. Pt with 400cc instilled without discomfort suggesting high capacity bladder. Urology recommended to:  --Cont flomax. Increase to 0.8mg --Added  finasteride --RTC in 1-2 weeks for repeat TOV.  Time = 90 minutes to review inpatient charts, medical conditions, and pathology. RTO in 6 weeks, and appt made today.

## 2024-03-26 NOTE — CONSULT LETTER
[Dear  ___] : Dear  [unfilled], [Consult Letter:] : I had the pleasure of evaluating your patient, [unfilled]. [Please see my note below.] : Please see my note below. [Consult Closing:] : Thank you very much for allowing me to participate in the care of this patient.  If you have any questions, please do not hesitate to contact me. [Sincerely,] : Sincerely, [DrTima  ___] : Dr. FLORES [DrTima ___] : Dr. FLORES

## 2024-03-26 NOTE — HISTORY OF PRESENT ILLNESS
[de-identified] : Mr. Ngo is a 89 year old gentlemen presenting to the office for an initial consultation of colon CA. Pt admitted in Dec with abd pain. Found to have large colon mass. Underwent resection. He was discharged to rehab on TPN. He was readmitted in Feb with LE edema and diarrhea. Found to be in heart failure and admitted. Also found to be in retention with PAT (Cr 1.8 from baseline about 0.9). He failed void trail x3 with straight cath and had bruner placed (>700cc per report). US with bruner in place was negative for hydro. He reports bowels are still loose.   He is s/p Right and transverse colectomy (not on chemo) for stage 3 colon cancer with 2 synchronous lesions December 5, 2023. Had multiple issues in post-op setting urinary retention, COVID, rehabiliation.   Right colon, extended right hemicolectomy: - Tumor #1: Invasive moderately-differentiated adenocarcinoma with mucinous features (4.6 cm) of the ascending colon - Adenocarcinoma invades serosa  - Tumor #2: Invasive mucinous adenocarcinoma, moderately-differentiated (7.5 cm) ofthe transverse colon - Adenocarcinoma invades serosa - Margins of resection, negative for dysplasia or adenocarcinoma - One out of thirty lymph nodes, involved by adenocarcinoma (1/30) - Pathologic Stage (AJCC 8th Ed.): (m) pT4a N1a  ACCESSION No:  [id number] Collected Date/Time:  12/5/2023 17:33 EST MLH1 methylation testing is in progress and will be reported as an addendum. Verified by: Yannick Smith MD PHD  Addendum reason: To report the results of previously ordered immunohistochemical stains on block 1AD and special stains on block 1C. There is no change to the final diagnosis. Addendum report: Part 1.  The focus of adenocarcinoma involving a lymph node (seen in slide 1AD-1) shows intact nuclear expression of MLH1 and PMS2. Given these findings, this focus is favored to have originated from Tumor #1. Addendum report: Part 1.  MMR (by IHC) - Reporting Template (CAP October 1st, 2013) -  Tumor #1 Immunohistochemistry Testing (IHC) for Mismatch Repair Proteins performed on tissue block 1L shows following result: MLH1:  Intact nuclear expression MSH2:  Intact nuclear expression MSH6:  Intact nuclear expression PMS2:  Intact nuclear expression Interpretation:  No loss of nuclear expression of MMR proteins  MMR (by IHC)- reporting Template (CAP October 1st, 2013) -    Tumor #2 Immunohistochemistry Testing (IHC) for Mismatch Repair Proteins performed on tissue block 1W shows the following result: MLH1 : Loss of nuclear expression MSH2 :   Intact nuclear expression MSH6:   Intact nuclear expression PMS2 : Loss of nuclear expression Interpretation:  There is a loss of nuclear expression in MLH1 and PMS2, while MSH2 and MSH6 show intact nuclear expression. deficiency and high frequency of microsatellite instability (MSI-H). BRAF mutational analysis is in progress and will be reported as an addendum.  Final Diagnosis 1. 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 - Adenocarcinoma invades serosa - Margins of resection, negative for dysplasia or adenocarcinoma - One out of thirty lymph nodes, involved by adenocarcinoma (1/30) - Pathologic Stage (AJCC 8th Ed.): (m) pT4a N1a - See synoptic summaries  Synoptic Summary Tumor #1 1: Colon and Rectum - Resection Procedure:  Laparoscopic assisted extended right hemicolectomy Tumor Site:  Ascending colon Histologic Type:   Adenocarcinoma with mucinous features Histologic Grade:   G2, moderately differentiated Tumor Size:  4.6 Centimeters (cm) Multiple Primary Sites:   Present - (see synoptic summary #2 for Tumor #2) Tumor Extent:   Invades visceral peritoneum Macroscopic Tumor Perforation:   Not identified Lymphovascular Invasion:   Small vessel Perineural Invasion:  Present Treatment Effect:   No known presurgical therapy Margin Status for Invasive Carcinoma:    All margins negative for invasive carcinoma. Regional Lymph Node Status:   Tumor present in regional lymph node(s) Number of Lymph Nodes with Tumor:    1 Number of Lymph Nodes Examined:   30 Pathologic Stage Classification (pTNM, AJCC 8th Edition) pT Category:   pT4a pN Category:   pN1a  Tumor #2 1: Colon and Rectum - Resection 2 Procedure:  Laparoscopic assisted extended right hemicolectomy Tumor Site:  Transverse colon Histologic Type:   Mucinous adenocarcinoma Histologic Type Comment:   Focal signet ring cell component is identified involving less than 5% of the tumor volume. Histologic Grade:   G2, moderately differentiated Tumor Size:  7.5 Centimeters (cm) Multiple Primary Sites:   Present (see synoptic summary #1 for Tumor #1) Tumor Extent:   Invades visceral peritoneum Macroscopic Tumor Perforation:   Not identified Lymphovascular Invasion:   Small vessel Perineural Invasion:   Present Treatment Effect:   No known presurgical therapy Margin Status for Invasive Carcinoma:    All margins negative for invasive carcinoma Regional Lymph Node Status:   Tumor present in regional lymph node(s) Number of Lymph Nodes with Tumor:   1 Number of Lymph Nodes Examined:   30 Tumor Deposits:   Not identified Regional Lymph Node Comment:   The lymph nodes reported here are the same ones reported in the first synoptic summary for tumor. Tumor #1 and Tumor #2 share overlapping morphologic features; thus, the primary site associated with this focus of lymph node involvement cannot be determined with certainty. Pathologic Stage Classification (pTNM, AJCC 8th Edition) pT Category:   pT4a pN Category:   pN1a   [de-identified] : Surgeon: Asia Marie Urology: Opal Yadav PCP: Alf Blank

## 2024-03-27 LAB
ALBUMIN SERPL ELPH-MCNC: 4.4 G/DL
ALP BLD-CCNC: 101 U/L
ALT SERPL-CCNC: 6 U/L
ANION GAP SERPL CALC-SCNC: 16 MMOL/L
APTT BLD: 29.2 SEC
AST SERPL-CCNC: 12 U/L
BILIRUB SERPL-MCNC: 0.3 MG/DL
BUN SERPL-MCNC: 26 MG/DL
CALCIUM SERPL-MCNC: 9.3 MG/DL
CEA SERPL-MCNC: 2.3 NG/ML
CHLORIDE SERPL-SCNC: 96 MMOL/L
CO2 SERPL-SCNC: 27 MMOL/L
CREAT SERPL-MCNC: 1.23 MG/DL
EGFR: 56 ML/MIN/1.73M2
ESTIMATED AVERAGE GLUCOSE: 105 MG/DL
FERRITIN SERPL-MCNC: 34 NG/ML
GLUCOSE SERPL-MCNC: 86 MG/DL
HBA1C MFR BLD HPLC: 5.3 %
HBV SURFACE AB SER QL: NONREACTIVE
HBV SURFACE AG SER QL: NONREACTIVE
HCV AB SER QL: NONREACTIVE
HCV S/CO RATIO: 0.13 S/CO
INR PPP: 0.94 RATIO
MAGNESIUM SERPL-MCNC: 2 MG/DL
NT-PROBNP SERPL-MCNC: 264 PG/ML
POTASSIUM SERPL-SCNC: 5 MMOL/L
PROT SERPL-MCNC: 7.6 G/DL
PT BLD: 10.6 SEC
SODIUM SERPL-SCNC: 139 MMOL/L

## 2024-03-29 LAB
ANNOTATION COMMENT IMP: NORMAL
FULL GENE SEQUENCE RESULT: NORMAL
INTERPRETATION PGDFRB: NORMAL
Lab: NORMAL
REF LAB TEST METHOD: NORMAL
REVIEWED BY: NORMAL
TA REPEAT RESULT: NORMAL
TEST PERFORMANCE INFO SPEC: NORMAL

## 2024-04-01 ENCOUNTER — APPOINTMENT (OUTPATIENT)
Dept: UROLOGY | Facility: CLINIC | Age: 89
End: 2024-04-01

## 2024-04-01 LAB
DPYD GENOTYPE: NORMAL
DPYD PHENOTYPE: NORMAL
DPYD SPECIMEN: NORMAL
PATHOLOGY STUDY: NORMAL

## 2024-04-02 DIAGNOSIS — R33.9 RETENTION OF URINE, UNSPECIFIED: ICD-10-CM

## 2024-04-10 ENCOUNTER — OUTPATIENT (OUTPATIENT)
Dept: OUTPATIENT SERVICES | Facility: HOSPITAL | Age: 89
LOS: 1 days | End: 2024-04-10
Payer: MEDICARE

## 2024-04-10 ENCOUNTER — APPOINTMENT (OUTPATIENT)
Dept: UROLOGY | Facility: CLINIC | Age: 89
End: 2024-04-10

## 2024-04-10 ENCOUNTER — APPOINTMENT (OUTPATIENT)
Dept: UROLOGY | Facility: CLINIC | Age: 89
End: 2024-04-10
Payer: MEDICARE

## 2024-04-10 VITALS — DIASTOLIC BLOOD PRESSURE: 68 MMHG | SYSTOLIC BLOOD PRESSURE: 129 MMHG | HEART RATE: 76 BPM

## 2024-04-10 DIAGNOSIS — R33.9 RETENTION OF URINE, UNSPECIFIED: ICD-10-CM

## 2024-04-10 DIAGNOSIS — R35.0 FREQUENCY OF MICTURITION: ICD-10-CM

## 2024-04-10 DIAGNOSIS — Z90.49 ACQUIRED ABSENCE OF OTHER SPECIFIED PARTS OF DIGESTIVE TRACT: Chronic | ICD-10-CM

## 2024-04-10 PROCEDURE — 51703 INSERT BLADDER CATH COMPLEX: CPT

## 2024-04-15 ENCOUNTER — APPOINTMENT (OUTPATIENT)
Dept: UROLOGY | Facility: CLINIC | Age: 89
End: 2024-04-15

## 2024-04-22 PROBLEM — R33.9 URINARY RETENTION: Status: ACTIVE | Noted: 2024-03-11

## 2024-04-23 DIAGNOSIS — N40.1 BENIGN PROSTATIC HYPERPLASIA WITH LOWER URINARY TRACT SYMPTOMS: ICD-10-CM

## 2024-04-23 DIAGNOSIS — R33.9 RETENTION OF URINE, UNSPECIFIED: ICD-10-CM

## 2024-04-30 ENCOUNTER — APPOINTMENT (OUTPATIENT)
Dept: INTERNAL MEDICINE | Facility: CLINIC | Age: 89
End: 2024-04-30
Payer: MEDICARE

## 2024-04-30 VITALS
WEIGHT: 118 LBS | HEIGHT: 66.46 IN | HEART RATE: 85 BPM | SYSTOLIC BLOOD PRESSURE: 116 MMHG | OXYGEN SATURATION: 98 % | DIASTOLIC BLOOD PRESSURE: 60 MMHG | BODY MASS INDEX: 18.74 KG/M2 | TEMPERATURE: 97.7 F

## 2024-04-30 DIAGNOSIS — I50.9 HEART FAILURE, UNSPECIFIED: ICD-10-CM

## 2024-04-30 DIAGNOSIS — G62.89 OTHER SPECIFIED POLYNEUROPATHIES: ICD-10-CM

## 2024-04-30 DIAGNOSIS — K21.9 GASTRO-ESOPHAGEAL REFLUX DISEASE W/OUT ESOPHAGITIS: ICD-10-CM

## 2024-04-30 PROCEDURE — 36415 COLL VENOUS BLD VENIPUNCTURE: CPT

## 2024-04-30 PROCEDURE — 99214 OFFICE O/P EST MOD 30 MIN: CPT

## 2024-04-30 PROCEDURE — G2211 COMPLEX E/M VISIT ADD ON: CPT

## 2024-04-30 RX ORDER — ATORVASTATIN CALCIUM 40 MG/1
40 TABLET, FILM COATED ORAL
Qty: 90 | Refills: 1 | Status: ACTIVE | COMMUNITY
Start: 2024-04-30 | End: 1900-01-01

## 2024-05-01 NOTE — ADDENDUM
[FreeTextEntry1] : I, Elida Alvarado, acted as a scribe on behalf of Dr. Alf Blank MD, on 04/30/2024.   All medical entries made by the scribe were at my, Dr. Alf Blank MD, direction and personally dictated by me on 04/30/2024. I have reviewed the chart and agree that the record accurately reflects my personal performance of the history, physical exam, assessment and plan. I have also personally directed, reviewed, and agreed with the chart.

## 2024-05-01 NOTE — HISTORY OF PRESENT ILLNESS
[FreeTextEntry1] : Patient presents for follow up.  [de-identified] : Patient is doing well overall. Endorses neuropathy, takes Gabapentin with minimal relief. Denies fatigue or sleepiness. Notes new occurrence of pain behind knee when squatting to pick something up, imbalance and difficulty putting on socks.   Notes swelling in legs has gotten better, takes Lasix which helps.   Denies any CP, chest tightness or SOB. Denies any abdominal pain or change in bowel habits. Denies any fever, chills, or night sweats.  will follow up with Hem-Oncology.  Endorses inability to empty bladder on his own, uses bruner. Follows with Urology.

## 2024-05-01 NOTE — REVIEW OF SYSTEMS
[Negative] : Heme/Lymph [Incontinence] : incontinence [FreeTextEntry8] : urinary retention [FreeTextEntry9] : posterior knee pain [de-identified] : neuropathy, loss of balance

## 2024-05-01 NOTE — ASSESSMENT
[FreeTextEntry1] : - BP is stable. Continue current management. - Check A1c. Lipid panel, vitamin levels, BNP, and kidney function.  Refill for Atorvastatin and Lasix sent.   knee pain -Discussed PT referral, patient deferred.   Neuropathy Gabapentin increased to 600mg, Refill sent.   - RTO in 3 months/ as needed.

## 2024-05-07 ENCOUNTER — NON-APPOINTMENT (OUTPATIENT)
Age: 89
End: 2024-05-07

## 2024-05-07 LAB
ANION GAP SERPL CALC-SCNC: 12 MMOL/L
BASOPHILS # BLD AUTO: 0.05 K/UL
BASOPHILS NFR BLD AUTO: 0.7 %
BUN SERPL-MCNC: 32 MG/DL
CALCIUM SERPL-MCNC: 9.7 MG/DL
CHLORIDE SERPL-SCNC: 100 MMOL/L
CO2 SERPL-SCNC: 28 MMOL/L
CREAT SERPL-MCNC: 1.04 MG/DL
EGFR: 68 ML/MIN/1.73M2
EOSINOPHIL # BLD AUTO: 0.18 K/UL
EOSINOPHIL NFR BLD AUTO: 2.4 %
GLUCOSE SERPL-MCNC: 95 MG/DL
HCT VFR BLD CALC: 34 %
HGB BLD-MCNC: 10.3 G/DL
IMM GRANULOCYTES NFR BLD AUTO: 0.5 %
LYMPHOCYTES # BLD AUTO: 1.43 K/UL
LYMPHOCYTES NFR BLD AUTO: 19.3 %
MAN DIFF?: NORMAL
MCHC RBC-ENTMCNC: 26 PG
MCHC RBC-ENTMCNC: 30.3 GM/DL
MCV RBC AUTO: 85.9 FL
MONOCYTES # BLD AUTO: 0.89 K/UL
MONOCYTES NFR BLD AUTO: 12 %
NEUTROPHILS # BLD AUTO: 4.83 K/UL
NEUTROPHILS NFR BLD AUTO: 65.1 %
NT-PROBNP SERPL-MCNC: 244 PG/ML
PLATELET # BLD AUTO: 314 K/UL
POTASSIUM SERPL-SCNC: 4 MMOL/L
RBC # BLD: 3.96 M/UL
RBC # FLD: 17.6 %
SODIUM SERPL-SCNC: 141 MMOL/L
WBC # FLD AUTO: 7.42 K/UL

## 2024-05-08 ENCOUNTER — APPOINTMENT (OUTPATIENT)
Dept: HEMATOLOGY ONCOLOGY | Facility: CLINIC | Age: 89
End: 2024-05-08
Payer: MEDICARE

## 2024-05-08 VITALS
SYSTOLIC BLOOD PRESSURE: 144 MMHG | OXYGEN SATURATION: 98 % | DIASTOLIC BLOOD PRESSURE: 69 MMHG | TEMPERATURE: 97.6 F | WEIGHT: 128.28 LBS | HEART RATE: 90 BPM | BODY MASS INDEX: 20.42 KG/M2 | RESPIRATION RATE: 18 BRPM

## 2024-05-08 DIAGNOSIS — C18.9 MALIGNANT NEOPLASM OF COLON, UNSPECIFIED: ICD-10-CM

## 2024-05-08 PROCEDURE — 99214 OFFICE O/P EST MOD 30 MIN: CPT

## 2024-05-08 PROCEDURE — G2211 COMPLEX E/M VISIT ADD ON: CPT

## 2024-05-08 NOTE — CONSULT LETTER
Assumed care of pt at this time. No signs of distress noted.   [Dear  ___] : Dear  [unfilled], [Please see my note below.] : Please see my note below. [Consult Closing:] : Thank you very much for allowing me to participate in the care of this patient.  If you have any questions, please do not hesitate to contact me. [Sincerely,] : Sincerely, [DrTima  ___] : Dr. FLORES [DrTima ___] : Dr. FLORES [Courtesy Letter:] : I had the pleasure of seeing your patient, [unfilled], in my office today.

## 2024-05-08 NOTE — HISTORY OF PRESENT ILLNESS
[Disease: _____________________] : Disease: [unfilled] [T: ___] : T[unfilled] [N: ___] : N[unfilled] [M: ___] : M[unfilled] [AJCC Stage: ____] : AJCC Stage: [unfilled] [Date: ____________] : Patient's last distress assessment performed on [unfilled]. [0 - No Distress] : Distress Level: 0 [Patient given social work contact information and resource sheet] : Patient was given social work contact information and resource sheet [de-identified] : Mr. Ngo is a 89 year old gentlemen presenting to the office for an initial consultation of colon CA. Pt admitted in Dec with abd pain. Found to have large colon mass. Underwent resection. He was discharged to rehab on TPN. He was readmitted in Feb with LE edema and diarrhea. Found to be in heart failure and admitted. Also found to be in retention with PAT (Cr 1.8 from baseline about 0.9). He failed void trail x3 with straight cath and had bruner placed (>700cc per report). US with bruner in place was negative for hydro. He reports bowels are still loose.   He is s/p Right and transverse colectomy (not on chemo) for stage 3 colon cancer with 2 synchronous lesions December 5, 2023. Had multiple issues in post-op setting urinary retention, COVID, rehabiliation.   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 Verified by: Yannick Smith MD PHD  Addendum reason: To report the results of previously ordered immunohistochemical stains on block 1AD and special stains on block 1C. There is no change to the final diagnosis. Addendum report: Part 1.  The focus of adenocarcinoma involving a lymph node (seen in slide 1AD-1) shows intact nuclear expression of MLH1 and PMS2. Given these findings, this focus is favored to have originated from Tumor #1. Addendum report: Part 1.  MMR (by IHC) - Reporting Template (CAP October 1st, 2013) -  Tumor #1 Immunohistochemistry Testing (IHC) for Mismatch Repair Proteins performed on tissue block 1L shows following result: MLH1:  Intact nuclear expression MSH2:  Intact nuclear expression MSH6:  Intact nuclear expression PMS2:  Intact nuclear expression Interpretation:  No loss of nuclear expression of MMR proteins  MMR (by IHC)- reporting Template (CAP October 1st, 2013) -    Tumor #2 Immunohistochemistry Testing (IHC) for Mismatch Repair Proteins performed on tissue block 1W shows the following result: MLH1 : Loss of nuclear expression MSH2 :   Intact nuclear expression MSH6:   Intact nuclear expression PMS2 : Loss of nuclear expression Interpretation:  There is a loss of nuclear expression in MLH1 and PMS2, while MSH2 and MSH6 show intact nuclear expression. deficiency and high frequency of microsatellite instability (MSI-H). BRAF mutational analysis is in progress and will be reported as an addendum.  Final Diagnosis 1. 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 - Adenocarcinoma invades serosa - Margins of resection, negative for dysplasia or adenocarcinoma - One out of thirty lymph nodes, involved by adenocarcinoma (1/30) - Pathologic Stage (AJCC 8th Ed.): (m) pT4a N1a - See synoptic summaries  Synoptic Summary Tumor #1 1: Colon and Rectum - Resection Procedure:  Laparoscopic assisted extended right hemicolectomy Tumor Site:  Ascending colon Histologic Type:   Adenocarcinoma with mucinous features Histologic Grade:   G2, moderately differentiated Tumor Size:  4.6 Centimeters (cm) Multiple Primary Sites:   Present - (see synoptic summary #2 for Tumor #2) Tumor Extent:   Invades visceral peritoneum Macroscopic Tumor Perforation:   Not identified Lymphovascular Invasion:   Small vessel Perineural Invasion:  Present Treatment Effect:   No known presurgical therapy Margin Status for Invasive Carcinoma:    All margins negative for invasive carcinoma. Regional Lymph Node Status:   Tumor present in regional lymph node(s) Number of Lymph Nodes with Tumor:    1 Number of Lymph Nodes Examined:   30 Pathologic Stage Classification (pTNM, AJCC 8th Edition) pT Category:   pT4a pN Category:   pN1a  Tumor #2 1: Colon and Rectum - Resection 2 Procedure:  Laparoscopic assisted extended right hemicolectomy Tumor Site:  Transverse colon Histologic Type:   Mucinous adenocarcinoma Histologic Type Comment:   Focal signet ring cell component is identified involving less than 5% of the tumor volume. Histologic Grade:   G2, moderately differentiated Tumor Size:  7.5 Centimeters (cm) Multiple Primary Sites:   Present (see synoptic summary #1 for Tumor #1) Tumor Extent:   Invades visceral peritoneum Macroscopic Tumor Perforation:   Not identified Lymphovascular Invasion:   Small vessel Perineural Invasion:   Present Treatment Effect:   No known presurgical therapy Margin Status for Invasive Carcinoma:    All margins negative for invasive carcinoma Regional Lymph Node Status:   Tumor present in regional lymph node(s) Number of Lymph Nodes with Tumor:   1 Number of Lymph Nodes Examined:   30 Tumor Deposits:   Not identified Regional Lymph Node Comment:   The lymph nodes reported here are the same ones reported in the first synoptic summary for tumor. Tumor #1 and Tumor #2 share overlapping morphologic features; thus, the primary site associated with this focus of lymph node involvement cannot be determined with certainty. Pathologic Stage Classification (pTNM, AJCC 8th Edition) pT Category:   pT4a pN Category:   pN1a  5/8/24 Patient here for surveillance of colon cancer s/p surgery in 12/2023. He had 2 lesions - one is MMR-d and low risk (#2), and the other is higher risk MMR-p (tumor #1) with 1+ lymph node. I discussed with pathology that MLH hypermethylation is present, therefore this is a somatic mutation and NOT germline.  He is not a candidate for adjuvant chemotherapy, and is only undergoing surveillance with CEA levels, and CAT scan (non-contrast) q 6 months. Should see his GI 1 year post-op as well for consideration of colonoscopy, but age and co-morbidities are a factor. CEA = 81.9 (11/30/2023 preoperative) CEA = 2.3 on 3/27/2024 Will continue to follow patient with CEA and CAT over time q 4-6 months either at my office or with PCP. I do not need to see patient to do this if easier for him to have done by PCP.  [de-identified] : adenocarcinoma [de-identified] : CEA = 81.9 (11/30/2023 preoperative) [de-identified] : Surgeon: Asia Marie Urology: Opal Yadav PCP: Afl Blank GI: Nii Mondragon  Patient phone [phone]

## 2024-05-08 NOTE — ASSESSMENT
[FreeTextEntry1] : COLON CANCER: Patient has TWO right sided colon cancers. Tumor #2 is MMR-deficient, and #1 is MMR-proficient. Tumor #2 is in the transverse colon, and is a T4aN0 lesion with absence of MLH1 and PMS2 on IHC staining. In order to investigate whether this is germline or somatic-only, BRAF V600E testing was performed and is negative. It is stated that MLH1 was then performed. I discussed with pathology that MLH hypermethylation is present, therefore this is a somatic mutation and NOT germline.  Tumor #2 is in the ascending colon and LN + = 1/30; there is also PNI+, LVI+, mucinous features, and focal signet ring features It is thought that the single +LN is from this tumor, since both the primary and the node have intact expression of all 4 MMR proteins by IHC (ie MMR-proficient); so it is staged as T4aN1 (stage 3).  Patient here today 5/8/24 for surveillance of colon cancer s/p surgery in 12/2023. He had 2 lesions - one is MMR-d and low risk (#2), and the other is higher risk MMR-p (tumor #1) with 1+ lymph node. Today, I discussed with pathology that MLH hypermethylation on tumor #2 is present, therefore this is a somatic mutation and NOT germline.  He is not a candidate for adjuvant chemotherapy, and is only undergoing surveillance with CEA levels, and CAT scan (non-contrast) q 6 months. Should see his GI 1 year post-op as well for consideration of colonoscopy, but age and co-morbidities are a factor. CEA = 81.9 (11/30/2023 preoperative) CEA = 2.3 on 3/27/2024 Will continue to follow patient with CEA and CAT over time q 4-6 months either at my office or with PCP. I do not necessarily need to see patient to do this if easier for him to have done by PCP.  CT CAP ordered. RTO in 6 months.

## 2024-05-21 ENCOUNTER — APPOINTMENT (OUTPATIENT)
Dept: UROLOGY | Facility: CLINIC | Age: 89
End: 2024-05-21
Payer: MEDICARE

## 2024-05-21 ENCOUNTER — OUTPATIENT (OUTPATIENT)
Dept: OUTPATIENT SERVICES | Facility: HOSPITAL | Age: 89
LOS: 1 days | End: 2024-05-21
Payer: MEDICARE

## 2024-05-21 DIAGNOSIS — N13.8 BENIGN PROSTATIC HYPERPLASIA WITH LOWER URINARY TRACT SYMPMS: ICD-10-CM

## 2024-05-21 DIAGNOSIS — Z90.49 ACQUIRED ABSENCE OF OTHER SPECIFIED PARTS OF DIGESTIVE TRACT: Chronic | ICD-10-CM

## 2024-05-21 DIAGNOSIS — N40.1 BENIGN PROSTATIC HYPERPLASIA WITH LOWER URINARY TRACT SYMPMS: ICD-10-CM

## 2024-05-21 PROCEDURE — 51703 INSERT BLADDER CATH COMPLEX: CPT

## 2024-05-21 PROCEDURE — 99214 OFFICE O/P EST MOD 30 MIN: CPT | Mod: 25

## 2024-05-22 ENCOUNTER — APPOINTMENT (OUTPATIENT)
Dept: CT IMAGING | Facility: IMAGING CENTER | Age: 89
End: 2024-05-22
Payer: MEDICARE

## 2024-05-22 ENCOUNTER — OUTPATIENT (OUTPATIENT)
Dept: OUTPATIENT SERVICES | Facility: HOSPITAL | Age: 89
LOS: 1 days | End: 2024-05-22
Payer: MEDICARE

## 2024-05-22 DIAGNOSIS — C18.9 MALIGNANT NEOPLASM OF COLON, UNSPECIFIED: ICD-10-CM

## 2024-05-22 DIAGNOSIS — Z90.49 ACQUIRED ABSENCE OF OTHER SPECIFIED PARTS OF DIGESTIVE TRACT: Chronic | ICD-10-CM

## 2024-05-22 PROCEDURE — 71250 CT THORAX DX C-: CPT | Mod: 26

## 2024-05-22 PROCEDURE — 71250 CT THORAX DX C-: CPT

## 2024-05-22 PROCEDURE — 74176 CT ABD & PELVIS W/O CONTRAST: CPT

## 2024-05-22 PROCEDURE — 74176 CT ABD & PELVIS W/O CONTRAST: CPT | Mod: 26

## 2024-05-24 DIAGNOSIS — N40.1 BENIGN PROSTATIC HYPERPLASIA WITH LOWER URINARY TRACT SYMPTOMS: ICD-10-CM

## 2024-05-24 DIAGNOSIS — N13.8 OTHER OBSTRUCTIVE AND REFLUX UROPATHY: ICD-10-CM

## 2024-05-28 ENCOUNTER — APPOINTMENT (OUTPATIENT)
Dept: UROLOGY | Facility: CLINIC | Age: 89
End: 2024-05-28
Payer: MEDICARE

## 2024-05-28 ENCOUNTER — OUTPATIENT (OUTPATIENT)
Dept: OUTPATIENT SERVICES | Facility: HOSPITAL | Age: 89
LOS: 1 days | End: 2024-05-28
Payer: MEDICARE

## 2024-05-28 DIAGNOSIS — Z90.49 ACQUIRED ABSENCE OF OTHER SPECIFIED PARTS OF DIGESTIVE TRACT: Chronic | ICD-10-CM

## 2024-05-28 PROCEDURE — 51797 INTRAABDOMINAL PRESSURE TEST: CPT | Mod: 26

## 2024-05-28 PROCEDURE — 76705 ECHO EXAM OF ABDOMEN: CPT | Mod: 26

## 2024-05-28 PROCEDURE — 52000 CYSTOURETHROSCOPY: CPT

## 2024-05-28 PROCEDURE — 99213 OFFICE O/P EST LOW 20 MIN: CPT | Mod: 25

## 2024-05-28 PROCEDURE — 74455 X-RAY URETHRA/BLADDER: CPT | Mod: 26,59

## 2024-05-28 PROCEDURE — 51741 ELECTRO-UROFLOWMETRY FIRST: CPT

## 2024-05-28 PROCEDURE — 51600 INJECTION FOR BLADDER X-RAY: CPT

## 2024-05-28 PROCEDURE — 51728 CYSTOMETROGRAM W/VP: CPT | Mod: 26

## 2024-05-28 PROCEDURE — 51784 ANAL/URINARY MUSCLE STUDY: CPT | Mod: 26

## 2024-05-28 PROCEDURE — 51728 CYSTOMETROGRAM W/VP: CPT

## 2024-05-28 PROCEDURE — 76705 ECHO EXAM OF ABDOMEN: CPT

## 2024-05-28 PROCEDURE — 51797 INTRAABDOMINAL PRESSURE TEST: CPT

## 2024-05-28 PROCEDURE — 51784 ANAL/URINARY MUSCLE STUDY: CPT

## 2024-05-28 PROCEDURE — 51741 ELECTRO-UROFLOWMETRY FIRST: CPT | Mod: 26

## 2024-05-28 PROCEDURE — 74455 X-RAY URETHRA/BLADDER: CPT | Mod: 59

## 2024-06-04 DIAGNOSIS — N40.1 BENIGN PROSTATIC HYPERPLASIA WITH LOWER URINARY TRACT SYMPTOMS: ICD-10-CM

## 2024-06-17 NOTE — DIETITIAN INITIAL EVALUATION ADULT - NUTRITION DIAGNOSIS
OCCUPATIONAL THERAPY EVALUATION  Patient: Maddie Goldberg (61 y.o. 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 female presenting to [facility] with c/o worsening abdominal pain x1 week associated with bloating, constipation and chills, admitted 6/2/2024 and currently being treated for cecal volvulus, large bowel ischemia, hypotension d/t hypovolemia, mass of colon, leak of anastomosis b/w gastrointestinal structures, adhesion of intestine, open wound of abdomen, and acute respiratory failure w/ hypoxia. Pt received semi-supine in bed upon arrival, AXO x4, and agreeable to OT evaluation.     Based on current observations, pt presents with decreased  functional mobility, independence in ADLs, high-level IADLs, ROM, strength, body mechanics, activity tolerance, endurance, safety awareness, coordination, balance, posture, increased pain levels (see below for objective details and assist levels).     Overall, pt tolerates session fair today with signs and symptoms of orthostatic hypotension when seated EOB. Pt dependent for donning socks at bed level. Pt required MaxA to thread BUEs through gown at bed level. Pt performed bed mobility maxAx2 with multimodal cueing for log roll technique and [place] to reach for bed rail. Upon sitting EOB, pt reported some dizzines, BP obtained 97/62, deferred transferring to recliner. Pt stood from EOB minAx2 and took 2 side steps toward HOB. MaxAx2 for  Dressing Skilled Clinical Factors: Pt completed donning gown at bed level    LE Dressing: Dependent/Total  LE Dressing Skilled Clinical Factors: Dependent to don socks seated EOB                   Therapeutic Exercises:       Functional Measure:    [facility]\"                                                       Daily Activity Inpatient Short Form  How much help from another person does the patient currently need... Total; A Lot A Little None   1.  Putting on and taking off regular lower body clothing? [x]  1 []  2 []  3 []  4   2.  Bathing (including washing, rinsing, drying)? [x]  1 []  2 []  3 []  4   3.  Toileting, which includes using toilet, bedpan or urinal? [x] 1 []  2 []  3 []  4   4.  Putting on and taking off regular upper body clothing? []  1 [x]  2 []  3 []  4   5.  Taking care of personal grooming such as brushing teeth? []  1 [x]  2 []  3 []  4   6.  Eating meals? []  1 []  2 [x]  3 []  4   © 2007, Trustees of [facility], under license to [employer]. All rights reserved     Score: 10/24     Interpretation of Tool:  Represents clinically-significant functional categories (i.e. Activities of daily living).  Percentage of Impairment CH    0%   CI    1-19% CJ    20-39% CK    40-59% CL    60-79% CM    80-99% CN     100%   [facility]  Score 6-24 24 23 20-22 15-19 10-14 7-9 6     Occupational Therapy Evaluation Charge Determination   History Examination Decision-Making   MEDIUM Complexity : Expanded review of history including physical, cognitive and psychocial history  LOW Complexity: 1-3 Performance deficits relating to physical, cognitive, or psychosocial skills that result in activity limitations and/or participation restrictions MEDIUM Complexity: Patient may present with comorbidities that affect occupational performance. Minimal to moderate modifications of tasks or assist (eg. physical or verbal) with assist is necessary to enable pt to complete eval      Based on the  yes...

## 2024-07-01 ENCOUNTER — RX RENEWAL (OUTPATIENT)
Age: 89
End: 2024-07-01

## 2024-07-10 ENCOUNTER — NON-APPOINTMENT (OUTPATIENT)
Age: 89
End: 2024-07-10

## 2024-07-10 ENCOUNTER — APPOINTMENT (OUTPATIENT)
Dept: UROLOGY | Facility: CLINIC | Age: 89
End: 2024-07-10
Payer: MEDICARE

## 2024-07-10 ENCOUNTER — OUTPATIENT (OUTPATIENT)
Dept: OUTPATIENT SERVICES | Facility: HOSPITAL | Age: 89
LOS: 1 days | End: 2024-07-10
Payer: MEDICARE

## 2024-07-10 VITALS — RESPIRATION RATE: 16 BRPM | HEART RATE: 88 BPM | DIASTOLIC BLOOD PRESSURE: 62 MMHG | SYSTOLIC BLOOD PRESSURE: 147 MMHG

## 2024-07-10 DIAGNOSIS — R35.0 FREQUENCY OF MICTURITION: ICD-10-CM

## 2024-07-10 DIAGNOSIS — Z90.49 ACQUIRED ABSENCE OF OTHER SPECIFIED PARTS OF DIGESTIVE TRACT: Chronic | ICD-10-CM

## 2024-07-10 DIAGNOSIS — B36.9 SUPERFICIAL MYCOSIS, UNSPECIFIED: ICD-10-CM

## 2024-07-10 DIAGNOSIS — R33.9 RETENTION OF URINE, UNSPECIFIED: ICD-10-CM

## 2024-07-10 PROCEDURE — 51703 INSERT BLADDER CATH COMPLEX: CPT

## 2024-07-10 RX ORDER — NYSTATIN 100000 U/G
100000 OINTMENT TOPICAL 3 TIMES DAILY
Qty: 1 | Refills: 0 | Status: ACTIVE | COMMUNITY
Start: 2024-07-10 | End: 1900-01-01

## 2024-07-10 RX ORDER — FLUCONAZOLE 100 MG/1
100 TABLET ORAL DAILY
Qty: 3 | Refills: 0 | Status: ACTIVE | COMMUNITY
Start: 2024-07-10 | End: 1900-01-01

## 2024-07-11 DIAGNOSIS — B36.9 SUPERFICIAL MYCOSIS, UNSPECIFIED: ICD-10-CM

## 2024-07-11 DIAGNOSIS — R33.9 RETENTION OF URINE, UNSPECIFIED: ICD-10-CM

## 2024-08-30 ENCOUNTER — APPOINTMENT (OUTPATIENT)
Dept: UROLOGY | Facility: CLINIC | Age: 89
End: 2024-08-30
Payer: MEDICARE

## 2024-08-30 ENCOUNTER — APPOINTMENT (OUTPATIENT)
Dept: UROLOGY | Facility: CLINIC | Age: 89
End: 2024-08-30

## 2024-08-30 ENCOUNTER — OUTPATIENT (OUTPATIENT)
Dept: OUTPATIENT SERVICES | Facility: HOSPITAL | Age: 89
LOS: 1 days | End: 2024-08-30
Payer: MEDICARE

## 2024-08-30 VITALS — HEART RATE: 78 BPM | SYSTOLIC BLOOD PRESSURE: 130 MMHG | DIASTOLIC BLOOD PRESSURE: 60 MMHG | RESPIRATION RATE: 17 BRPM

## 2024-08-30 DIAGNOSIS — N40.1 BENIGN PROSTATIC HYPERPLASIA WITH LOWER URINARY TRACT SYMPMS: ICD-10-CM

## 2024-08-30 DIAGNOSIS — N40.1 BENIGN PROSTATIC HYPERPLASIA WITH LOWER URINARY TRACT SYMPTOMS: ICD-10-CM

## 2024-08-30 DIAGNOSIS — R33.9 RETENTION OF URINE, UNSPECIFIED: ICD-10-CM

## 2024-08-30 DIAGNOSIS — Z90.49 ACQUIRED ABSENCE OF OTHER SPECIFIED PARTS OF DIGESTIVE TRACT: Chronic | ICD-10-CM

## 2024-08-30 DIAGNOSIS — N13.8 BENIGN PROSTATIC HYPERPLASIA WITH LOWER URINARY TRACT SYMPMS: ICD-10-CM

## 2024-08-30 PROCEDURE — 51702 INSERT TEMP BLADDER CATH: CPT

## 2024-09-11 DIAGNOSIS — R35.0 FREQUENCY OF MICTURITION: ICD-10-CM

## 2024-09-25 ENCOUNTER — APPOINTMENT (OUTPATIENT)
Dept: UROLOGY | Facility: CLINIC | Age: 89
End: 2024-09-25
Payer: MEDICARE

## 2024-09-25 DIAGNOSIS — R33.9 RETENTION OF URINE, UNSPECIFIED: ICD-10-CM

## 2024-09-25 PROCEDURE — 99214 OFFICE O/P EST MOD 30 MIN: CPT

## 2024-09-25 NOTE — ASSESSMENT
[FreeTextEntry1] : I filled his bladder with 300 cc sterile water He was able to void without difficulty PVR was 17 He walked around the office and voised again PVR 100cc He went home He had some frequency and urgency Reassure that it should resolve  He will return to the office if he cannot void

## 2024-09-25 NOTE — HISTORY OF PRESENT ILLNESS
[FreeTextEntry1] : Mr Ngo is here for atrial void He has had several attempts since March He is anxious to have the catheter removed  UDS /cystoscopy done by Dr Moore  50 cc prostate and obstructed

## 2024-10-01 LAB — BACTERIA UR CULT: ABNORMAL

## 2024-10-01 RX ORDER — CIPROFLOXACIN HYDROCHLORIDE 500 MG/1
500 TABLET, FILM COATED ORAL TWICE DAILY
Qty: 14 | Refills: 0 | Status: ACTIVE | COMMUNITY
Start: 2024-10-01 | End: 1900-01-01

## 2024-10-08 ENCOUNTER — APPOINTMENT (OUTPATIENT)
Dept: INTERNAL MEDICINE | Facility: CLINIC | Age: 89
End: 2024-10-08
Payer: MEDICARE

## 2024-10-08 VITALS
OXYGEN SATURATION: 99 % | TEMPERATURE: 98 F | HEIGHT: 66.5 IN | DIASTOLIC BLOOD PRESSURE: 67 MMHG | HEART RATE: 83 BPM | WEIGHT: 123 LBS | SYSTOLIC BLOOD PRESSURE: 116 MMHG | BODY MASS INDEX: 19.53 KG/M2

## 2024-10-08 DIAGNOSIS — R39.9 UNSPECIFIED SYMPTOMS AND SIGNS INVOLVING THE GENITOURINARY SYSTEM: ICD-10-CM

## 2024-10-08 DIAGNOSIS — Z23 ENCOUNTER FOR IMMUNIZATION: ICD-10-CM

## 2024-10-08 PROCEDURE — G2211 COMPLEX E/M VISIT ADD ON: CPT

## 2024-10-08 PROCEDURE — G0008: CPT

## 2024-10-08 PROCEDURE — 99213 OFFICE O/P EST LOW 20 MIN: CPT

## 2024-10-08 PROCEDURE — 90662 IIV NO PRSV INCREASED AG IM: CPT

## 2024-10-08 RX ORDER — CLOTRIMAZOLE AND BETAMETHASONE DIPROPIONATE 10; .5 MG/G; MG/G
1-0.05 CREAM TOPICAL 3 TIMES DAILY
Qty: 1 | Refills: 0 | Status: ACTIVE | COMMUNITY
Start: 2024-10-08 | End: 1900-01-01

## 2024-11-13 ENCOUNTER — OUTPATIENT (OUTPATIENT)
Dept: OUTPATIENT SERVICES | Facility: HOSPITAL | Age: 88
LOS: 1 days | Discharge: HOME | End: 2024-11-13

## 2024-11-13 DIAGNOSIS — C18.9 MALIGNANT NEOPLASM OF COLON, UNSPECIFIED: ICD-10-CM

## 2024-11-13 DIAGNOSIS — Z90.49 ACQUIRED ABSENCE OF OTHER SPECIFIED PARTS OF DIGESTIVE TRACT: Chronic | ICD-10-CM

## 2024-11-18 ENCOUNTER — RESULT REVIEW (OUTPATIENT)
Age: 89
End: 2024-11-18

## 2024-11-18 ENCOUNTER — APPOINTMENT (OUTPATIENT)
Dept: HEMATOLOGY ONCOLOGY | Facility: CLINIC | Age: 89
End: 2024-11-18
Payer: MEDICARE

## 2024-11-18 VITALS
SYSTOLIC BLOOD PRESSURE: 168 MMHG | WEIGHT: 130.07 LBS | DIASTOLIC BLOOD PRESSURE: 61 MMHG | BODY MASS INDEX: 20.68 KG/M2 | RESPIRATION RATE: 17 BRPM | TEMPERATURE: 97.4 F | OXYGEN SATURATION: 97 % | HEART RATE: 86 BPM

## 2024-11-18 DIAGNOSIS — C18.9 MALIGNANT NEOPLASM OF COLON, UNSPECIFIED: ICD-10-CM

## 2024-11-18 LAB
BASOPHILS # BLD AUTO: 0.04 K/UL — SIGNIFICANT CHANGE UP (ref 0–0.2)
BASOPHILS NFR BLD AUTO: 0.5 % — SIGNIFICANT CHANGE UP (ref 0–2)
EOSINOPHIL # BLD AUTO: 0.09 K/UL — SIGNIFICANT CHANGE UP (ref 0–0.5)
EOSINOPHIL NFR BLD AUTO: 1.2 % — SIGNIFICANT CHANGE UP (ref 0–6)
HCT VFR BLD CALC: 36.2 % — LOW (ref 39–50)
HGB BLD-MCNC: 11.9 G/DL — LOW (ref 13–17)
IMM GRANULOCYTES NFR BLD AUTO: 0.4 % — SIGNIFICANT CHANGE UP (ref 0–0.9)
LYMPHOCYTES # BLD AUTO: 1.03 K/UL — SIGNIFICANT CHANGE UP (ref 1–3.3)
LYMPHOCYTES # BLD AUTO: 14 % — SIGNIFICANT CHANGE UP (ref 13–44)
MCHC RBC-ENTMCNC: 29.7 PG — SIGNIFICANT CHANGE UP (ref 27–34)
MCHC RBC-ENTMCNC: 32.9 G/DL — SIGNIFICANT CHANGE UP (ref 32–36)
MCV RBC AUTO: 90.3 FL — SIGNIFICANT CHANGE UP (ref 80–100)
MONOCYTES # BLD AUTO: 0.54 K/UL — SIGNIFICANT CHANGE UP (ref 0–0.9)
MONOCYTES NFR BLD AUTO: 7.3 % — SIGNIFICANT CHANGE UP (ref 2–14)
NEUTROPHILS # BLD AUTO: 5.63 K/UL — SIGNIFICANT CHANGE UP (ref 1.8–7.4)
NEUTROPHILS NFR BLD AUTO: 76.6 % — SIGNIFICANT CHANGE UP (ref 43–77)
NRBC # BLD: 0 /100 WBCS — SIGNIFICANT CHANGE UP (ref 0–0)
NRBC BLD-RTO: 0 /100 WBCS — SIGNIFICANT CHANGE UP (ref 0–0)
PLATELET # BLD AUTO: 249 K/UL — SIGNIFICANT CHANGE UP (ref 150–400)
RBC # BLD: 4.01 M/UL — LOW (ref 4.2–5.8)
RBC # FLD: 15.3 % — HIGH (ref 10.3–14.5)
WBC # BLD: 7.36 K/UL — SIGNIFICANT CHANGE UP (ref 3.8–10.5)
WBC # FLD AUTO: 7.36 K/UL — SIGNIFICANT CHANGE UP (ref 3.8–10.5)

## 2024-11-18 PROCEDURE — 99214 OFFICE O/P EST MOD 30 MIN: CPT

## 2024-11-18 PROCEDURE — G2211 COMPLEX E/M VISIT ADD ON: CPT

## 2024-11-19 LAB
ALBUMIN SERPL ELPH-MCNC: 4.2 G/DL
ALP BLD-CCNC: 104 U/L
ALT SERPL-CCNC: 8 U/L
ANION GAP SERPL CALC-SCNC: 9 MMOL/L
AST SERPL-CCNC: 15 U/L
BILIRUB SERPL-MCNC: 0.8 MG/DL
BUN SERPL-MCNC: 19 MG/DL
CALCIUM SERPL-MCNC: 9.2 MG/DL
CEA SERPL-MCNC: 2.5 NG/ML
CHLORIDE SERPL-SCNC: 104 MMOL/L
CO2 SERPL-SCNC: 28 MMOL/L
CREAT SERPL-MCNC: 0.97 MG/DL
EGFR: 74 ML/MIN/1.73M2
GLUCOSE SERPL-MCNC: 103 MG/DL
POTASSIUM SERPL-SCNC: 4.3 MMOL/L
PROT SERPL-MCNC: 7 G/DL
SODIUM SERPL-SCNC: 141 MMOL/L

## 2024-11-21 ENCOUNTER — RX RENEWAL (OUTPATIENT)
Age: 89
End: 2024-11-21

## 2024-11-22 ENCOUNTER — APPOINTMENT (OUTPATIENT)
Dept: UROLOGY | Facility: CLINIC | Age: 89
End: 2024-11-22

## 2024-11-26 ENCOUNTER — EMERGENCY (EMERGENCY)
Facility: HOSPITAL | Age: 89
LOS: 1 days | Discharge: HOME | End: 2024-11-26
Attending: STUDENT IN AN ORGANIZED HEALTH CARE EDUCATION/TRAINING PROGRAM | Admitting: STUDENT IN AN ORGANIZED HEALTH CARE EDUCATION/TRAINING PROGRAM
Payer: MEDICARE

## 2024-11-26 VITALS
DIASTOLIC BLOOD PRESSURE: 70 MMHG | SYSTOLIC BLOOD PRESSURE: 150 MMHG | WEIGHT: 132.94 LBS | OXYGEN SATURATION: 97 % | TEMPERATURE: 97 F | HEART RATE: 84 BPM | RESPIRATION RATE: 16 BRPM

## 2024-11-26 VITALS
RESPIRATION RATE: 17 BRPM | HEART RATE: 73 BPM | TEMPERATURE: 98 F | SYSTOLIC BLOOD PRESSURE: 127 MMHG | DIASTOLIC BLOOD PRESSURE: 59 MMHG | OXYGEN SATURATION: 100 %

## 2024-11-26 DIAGNOSIS — Z90.49 ACQUIRED ABSENCE OF OTHER SPECIFIED PARTS OF DIGESTIVE TRACT: Chronic | ICD-10-CM

## 2024-11-26 LAB
APPEARANCE UR: CLEAR — SIGNIFICANT CHANGE UP
BACTERIA # UR AUTO: ABNORMAL /HPF
BILIRUB UR-MCNC: NEGATIVE — SIGNIFICANT CHANGE UP
CAST: 7 /LPF — HIGH (ref 0–4)
COLOR SPEC: YELLOW — SIGNIFICANT CHANGE UP
DIFF PNL FLD: ABNORMAL
GLUCOSE UR QL: NEGATIVE MG/DL — SIGNIFICANT CHANGE UP
KETONES UR-MCNC: NEGATIVE MG/DL — SIGNIFICANT CHANGE UP
LEUKOCYTE ESTERASE UR-ACNC: ABNORMAL
NITRITE UR-MCNC: NEGATIVE — SIGNIFICANT CHANGE UP
PH UR: 6.5 — SIGNIFICANT CHANGE UP (ref 5–8)
PROT UR-MCNC: ABNORMAL MG/DL
RBC CASTS # UR COMP ASSIST: 2 /HPF — SIGNIFICANT CHANGE UP (ref 0–4)
REVIEW: SIGNIFICANT CHANGE UP
SP GR SPEC: 1.02 — SIGNIFICANT CHANGE UP (ref 1–1.03)
SQUAMOUS # UR AUTO: 2 /HPF — SIGNIFICANT CHANGE UP (ref 0–5)
UROBILINOGEN FLD QL: 0.2 MG/DL — SIGNIFICANT CHANGE UP (ref 0.2–1)
WBC UR QL: 40 /HPF — HIGH (ref 0–5)

## 2024-11-26 PROCEDURE — 99284 EMERGENCY DEPT VISIT MOD MDM: CPT

## 2024-11-26 RX ORDER — CEFPODOXIME PROXETIL 200 MG/1
200 TABLET, FILM COATED ORAL ONCE
Refills: 0 | Status: COMPLETED | OUTPATIENT
Start: 2024-11-26 | End: 2024-11-26

## 2024-11-26 RX ORDER — CEFPODOXIME PROXETIL 200 MG/1
1 TABLET, FILM COATED ORAL
Qty: 14 | Refills: 0
Start: 2024-11-26 | End: 2024-12-02

## 2024-11-26 RX ADMIN — CEFPODOXIME PROXETIL 200 MILLIGRAM(S): 200 TABLET, FILM COATED ORAL at 23:49

## 2024-11-26 NOTE — ED PROVIDER NOTE - NSFOLLOWUPINSTRUCTIONS_ED_ALL_ED_FT
WHAT YOU NEED TO KNOW:    What is a Gill catheter? A Gill catheter is a thin, flexible, sterile tube that drains urine from your bladder. It is also called an indwelling urinary catheter. The tip of the catheter has a small balloon filled with solution that holds the catheter in your bladder.        How is a Gill catheter placed? Your healthcare provider will clean your genital area to prevent infection. Your provider will insert the catheter into your urethra. When urine begins to flow into the tubing, the balloon is filled to keep the catheter in place. The open end of the catheter is attached to a drainage bag. Urine drains from your bladder, through the tube into the drainage bag.    Why is catheter care important? An infection can develop when bacteria get inside the catheter or drainage system. This can happen when the urine bag is changed or when a urine sample is collected. You can get an infection if you do not wash your hands. You can also get an infection if the catheter equipment is not cleaned properly. Urinary catheter-based infections can lead to serious illness. The following can help prevent infection:    Care for your catheter as directed. Follow directions on how to clean and care for your catheter, insertion site, and drainage bag.    Wash your hands often. Always wash with soap and water before and after you touch your catheter, tubing, or drainage bag. Wear clean medical gloves when you care for your catheter or disconnect the drainage bag. This will help stop germs from getting into your catheter. Remind anyone who cares for your catheter or drainage system to wash his or her hands.  Handwashing      Clean your genital area. Wash the catheter area 2 times every day. Wash your anal opening after every bowel movement. Use a soapy cloth to clean the area:  If you are male, clean the tip of your penis. Start where the catheter enters. Wipe backward, making sure to pull back the foreskin. Then use a cloth with clear water in the same direction to clean away the soap.    If you are female, clean the area where the catheter enters your body. Separate your labia (the smaller folds of your skin around your vaginal opening) and wipe toward the anus. Then use a cloth with clear water and wipe in the same direction.    Secure the catheter tube. Healthcare providers will show you how to use medical tape or a strap to secure the catheter tube to your leg. Do not pull or move the catheter. This helps prevent pain and bladder spasms.    Drink liquids as directed. This will help keep your urine clear and prevent catheter blockage and infection. Good choices for most people include water, juice, and milk. Ask how much liquid to drink each day and which liquids are best for you.  How do I care for my drainage bag? You will use a leg bag during the day. You may need a larger drainage bag at night.    Keep a closed drainage system. Your catheter should always be attached to the drainage bag to form a closed system. Do not disconnect any part of the closed system unless you need to change the bag.    Position the drainage bag properly. Keep the drainage bag below the level of your waist. This helps stop urine from moving back up the tubing and into your bladder. Do not loop or kink the tubing. This can cause urine to back up and collect in your bladder. Do not let the drainage bag touch or lie on the floor. Do not lie down or sleep with your drainage bag attached to your leg.    Empty the drainage bag when needed. The weight of a full drainage bag can pull on the catheter and cause pain. Empty the drainage bag every 3 to 6 hours or when it is ? full.    Clean and change the drainage bag as directed. Ask your healthcare provider how often you should change the drainage bag and which cleaning solution to use. Wear disposable gloves when you change the bag. Do not allow the end of the catheter or tubing to touch anything. Clean the ends with an alcohol pad before you reconnect them.  What can I do if problems develop?    No urine is draining into the bag:  Check for kinks in the tubing and straighten them out.    Check the tape or strap used to secure the catheter tube to your skin. Make sure it is not blocking the tube.    Make sure you are not sitting or lying on the tubing.    Make sure the urine bag is hanging below the level of your waist.    Urine leaks from or around the catheter, tubing, or drainage bag: Check if the closed drainage system has accidently come open or apart. Clean the catheter and tubing ends with a new alcohol pad and reconnect them.  When should I seek immediate care?    Your catheter comes out.    You suddenly have material that looks like sand in the tubing or drainage bag.    You see blood in the tubing or drainage bag.    Little or no urine is draining into the bag, and you have checked the system.    You have pain in your hip, back, pelvis, or lower abdomen.    You are confused or cannot think clearly.    You see swelling, redness, pus, or burning where the catheter goes into your body.  When should I call my doctor?    You have a fever or shaking chills.    You have bladder spasms for more than 1 day after the catheter is placed.    Your urine has a strong smell.    You have a rash or itching where the catheter tube is secured to your skin.    Urine leaks from or around the catheter, tubing, or drainage bag.    The closed drainage system has accidentally come open or apart.    You see a layer of crystals inside the tubing.    You have questions or concerns about your condition or care.  CARE AGREEMENT:    You have the right to help plan your care. Learn about your health condition and how it may be treated. Discuss treatment options with your healthcare providers to decide what care you want to receive. You always have the right to refuse treatment.

## 2024-11-26 NOTE — ED ADULT TRIAGE NOTE - CHIEF COMPLAINT QUOTE
pt brought in by EMS from home complaining of leakage from his bruner. pt denies chest pain, sob, n/v/d, fever or chills.

## 2024-11-26 NOTE — ED ADULT NURSE NOTE - OBJECTIVE STATEMENT
petr RN: pt presents to ED A&04 complaining of bruner catheter leakage. Patient had bruner placed 5-6 weeks ago. History of BPH, colon CA s/p colectomy. Respirations even and unlabored. Lung sounds clear with equal chest rise bilaterally. No complaints of chest pain, headache, nausea, dizziness, vomiting  SOB, fever, chills verbalized. previous 16F bruner disocntinued, replaced with 16Ffoley draining clear yellow urine using sterile technique. urine sent. report given to primary RN

## 2024-11-26 NOTE — ED PROVIDER NOTE - ATTENDING CONTRIBUTION TO CARE
I, Jack Valentine DO,  performed the initial face to face bedside interview with this patient regarding history of present illness, review of symptoms and relevant past medical, social and family history.  I completed an independent physical examination.  I was the initial provider who evaluated this patient. I have signed out the follow up of any pending tests (i.e. labs, radiological studies) to the resident.  I have communicated the patient’s plan of care and disposition with the resident.  The history, relevant review of systems, past medical and surgical history, medical decision making, and physical examination was documented by the scribe in my presence and I attest to the accuracy of the documentation.    Elderly male comes to the ED with leakage around his chronic Gill.  No pain no abdominal pain no vomiting no fevers normal vitals.  Gill was exchanged in the ED.  The leakage stopped.  Pending UA.  Plan for discharge back to facility after UA results.

## 2024-11-26 NOTE — ED PROVIDER NOTE - PHYSICAL EXAMINATION
PHYSICAL EXAM:  GENERAL: NAD, lying in bed comfortably  HEAD:  Atraumatic, Normocephalic  EYES: EOMI, PERRLA, conjunctiva and sclera clear  ENT: No erythema/pallor/petechiae/lesions  NECK: Supple  LUNG: CTA b/l; no r/r/w  HEART: RRR, +S1/S2; No m/r/g  ABDOMEN: soft, NT/ND; BS audible. Gill in place, contains turbid urine, disconnected to leg bag. No periurethral site urinary leakage noted.   EXTREMITIES:  2+ Peripheral Pulses, brisk cap refill. No clubbing, cyanosis, or edema  NERVOUS SYSTEM:  AAOx3, speech clear. No sensory/motor deficits   SKIN: No rashes or lesions

## 2024-11-26 NOTE — ED PROVIDER NOTE - CLINICAL SUMMARY MEDICAL DECISION MAKING FREE TEXT BOX
89 male with history of BPH, colon cancer status post colectomy, Gill's catheter in place, was inserted 5 to 6 weeks ago, now presents with Gill catheter leakage. VSS. On exam Gill disconnected from leg bag, also contains turbid urine. Will replace Gill as it was placed 5-6 wks ago. Will check UA for UTI.

## 2024-11-26 NOTE — ED PROVIDER NOTE - OBJECTIVE STATEMENT
89 male with history of BPH, colon cancer status post colectomy, Gill's catheter in place, was inserted 5 to 6 weeks ago, now presents with Gill catheter leakage.  Upon patient review, noticed that Gill's was disconnected from leg bag and was leaking, no periurethral site leakage identified.  Patient denies fevers, headaches, sore throat, chest pain, difficult breathing, vomiting, abdominal pain, bowel complaints.

## 2024-11-26 NOTE — ED PROVIDER NOTE - PATIENT PORTAL LINK FT
You can access the FollowMyHealth Patient Portal offered by [facility] by registering at the following website: [email or web address]. By joining [facility]’s FollowMyHealth portal, you will also be able to view your health information using other applications (apps) compatible with our system.

## 2024-11-26 NOTE — ED PROVIDER NOTE - CARE PLAN
Principal Discharge DX:	Malfunction of Gill catheter  Secondary Diagnosis:	UTI (urinary tract infection)   1

## 2024-11-27 ENCOUNTER — APPOINTMENT (OUTPATIENT)
Dept: UROLOGY | Facility: CLINIC | Age: 89
End: 2024-11-27

## 2024-11-27 ENCOUNTER — EMERGENCY (EMERGENCY)
Facility: HOSPITAL | Age: 89
LOS: 1 days | Discharge: HOME | End: 2024-11-27
Attending: STUDENT IN AN ORGANIZED HEALTH CARE EDUCATION/TRAINING PROGRAM | Admitting: STUDENT IN AN ORGANIZED HEALTH CARE EDUCATION/TRAINING PROGRAM
Payer: MEDICARE

## 2024-11-27 VITALS
OXYGEN SATURATION: 97 % | HEART RATE: 95 BPM | TEMPERATURE: 98 F | WEIGHT: 134.92 LBS | RESPIRATION RATE: 18 BRPM | SYSTOLIC BLOOD PRESSURE: 151 MMHG | DIASTOLIC BLOOD PRESSURE: 63 MMHG

## 2024-11-27 DIAGNOSIS — Z90.49 ACQUIRED ABSENCE OF OTHER SPECIFIED PARTS OF DIGESTIVE TRACT: Chronic | ICD-10-CM

## 2024-11-27 PROCEDURE — 99284 EMERGENCY DEPT VISIT MOD MDM: CPT

## 2024-11-27 NOTE — ED PROVIDER NOTE - NSFOLLOWUPINSTRUCTIONS_ED_ALL_ED_FT
Indwelling Urinary Catheter Bag Care, Adult    Lower part of the body, showing a catheter going from the bladder to a drainage bag outside the body.  An indwelling urinary catheter is a soft tube that is placed into the bladder to help drain pee (urine) out of the body. The catheter is put in through the urethra and is held inside your bladder by a balloon filled with water. The urethra is the part of the body that drains pee from the bladder.    Pee drains from the catheter into a drainage bag outside of the body. Taking good care of your catheter, catheter tubing, and the drainage bag will keep your catheter working well. It will also help prevent problems like urinary tract infections (UTIs).    What are the risks?  Germs (bacteria) may get into your bladder and cause a UTI.  The flow of pee can become blocked. This can happen if:  The catheter is not connected to the drainage bag correctly.  You have sediment or a blood clot in your bladder or catheter.  How to wear the catheter and drainage bag  Supplies needed:    Adhesive tape or a leg strap.  If you use tape you will need:  Alcohol wipes or soap and water.  A clean towel.  Overnight drainage bag.  Smaller drainage bag (leg bag).  Wearing your catheter and bag    A drainage bag attached to a person's leg. A close-up shows the catheter going from the bladder to the leg bag.  Use tape or a leg strap to attach the catheter and tubing to your leg.  Make sure the catheter is not pulled tight.  If a leg strap gets wet, replace it with a dry one.  If you use tape:  Use an alcohol wipe or soap and water to wash off any stickiness on your skin where you had tape before.  Use a clean towel to pat-dry the area.  Apply the new tape.  You should have received a large overnight drainage bag and a smaller leg bag that fits under clothing.  You may wear the overnight bag at any time, but you should not wear the leg bag at night.  Make sure the overnight drainage bag is always lower than the level of your bladder. But do not let the bag touch the floor.  Before you go to sleep, hang the bag on the side of a wastebasket that is covered by a clean plastic bag.  Secure the leg bag according to the 's instructions. This may be above or below the knee, depending on the length of the tubing. Make sure that:  The leg bag is below your bladder.  The tubing does not have loops or too much tension.  How to empty the drainage bag  Supplies needed:    Rubbing alcohol or alcohol wipes.  Gauze pad or cotton ball.  Toilet or a clean container.  Emptying the bag    Empty your drainage bag when it is ?–½ full, or at least 2–3 times a day. Clean the bag according to the 's instructions or as told by your health care provider.  Wash your hands with soap and water for at least 20 seconds. If soap and water are not available, use hand .  Hold the drainage bag over the toilet or the clean container used with the drainage bag. Make sure the drainage bag is lower than your hips and bladder. This stops pee from going back into the tubing and into your bladder.  Open the pour spout at the bottom of the bag.  Empty the pee into the toilet or container. Do not let the pour spout touch any surface. This prevents germs from getting in the bag and causing infection.  Use the gauze pad or cotton ball soaked with rubbing alcohol or an alcohol wipe to clean the pour spout.  Close the pour spout.  Wash your hands again with soap and water for at least 20 seconds.  How to change the drainage bag  Supplies needed:    Alcohol wipes.  A new drainage bag.  Adhesive tape or a leg strap.  Changing the bag    Change your drainage bag twice a day. Also replace your drainage bag with a new bag if it leaks, starts to smell bad, or looks dirty. Be sure to empty your drainage bag before you change bags.  Wash your hands with soap and water for at least 20 seconds. If soap and water are not available, use hand .  Detach the tubing or drainage bag from your skin.  Pinch the catheter with your fingers so that pee does not spill out.  Disconnect the catheter from the drainage bag tubing at the connection valve. Do not let the end of the catheter touch any surface.  Use an alcohol wipe to clean the end of the catheter and the end of the drainage bag tubing being connected.  Connect the catheter to the drainage bag tubing.  Attach the bag and tubing to your leg with tape or a leg strap. Avoid attaching the bag too tightly.  Wash your hands again with soap and water for at least 20 seconds.  General instructions  A person washing hands with soap and water.  Always wash your hands for at least 20 seconds before and after you handle your catheter or drainage bag. Use a mild, fragrance-free soap.  Never pull on your catheter or try to remove it. Pulling can damage your internal tissues.  Always make sure there are no leaks around the catheter or from the drainage bag and tubing.  Always make sure there are no twists, bends, or kinks in the catheter or the tubing.  Drink enough fluid to keep your pee pale yellow.  Do not take baths, swim, or use a hot tub.  If you are female, wipe from front to back after having a bowel movement.  Contact a health care provider if:  Your catheter starts to leak.  Your bladder feels full.  You have tiredness (lethargy), excessive sleepiness, or confusion.  You have signs of a UTI, such as:  A fever or chills.  Pain in your abdomen, legs, lower back, or bladder.  Get help right away if:  Your pee is pink or red or you see blood in the catheter.  Your pee is not draining into the bag.  Your catheter gets pulled out.  This information is not intended to replace advice given to you by your health care provider. Make sure you discuss any questions you have with your health care provider.

## 2024-11-27 NOTE — ED ADULT TRIAGE NOTE - CHIEF COMPLAINT QUOTE
C/o bruner catheter issue. pt had bruner replaced this afternoon, no output since 0030. endorsing "pinching" feeling at insertion site and scant amount of blood. denies abd pain, fevers. Hx BPH C/o bruner catheter issue. pt had bruner replaced this afternoon, no output since 1230. endorsing "pinching" feeling at insertion site and scant amount of blood. denies abd pain, fevers. Hx BPH

## 2024-11-27 NOTE — ED ADULT NURSE NOTE - CHIEF COMPLAINT QUOTE
C/o bruner catheter issue. pt had bruner replaced this afternoon, no output since 1230. endorsing "pinching" feeling at insertion site and scant amount of blood. denies abd pain, fevers. Hx BPH

## 2024-11-27 NOTE — ED PROVIDER NOTE - ATTENDING APP SHARED VISIT CONTRIBUTION OF CARE
89 y/o M, with history of BPH with chronic bruner, colon cancer status post colectomy, c/o bruner not working. States since has noticed no drainaged since 12:30am. Bruner was just changed in [facility] ED yesterday. Pt was also prescribed abx for UTI. Denies abd pain, n/v, or any other symptoms. VSS. Physical exam unremarkable. Bruner site appears c/d/i and appropriately placed. Bruner successfully flushed here w/o hematuria or clots. Bedside POCUS shows bruner in collapsed bladder. Seemingly bruner catheter functioning well. Will DC w/ return precautions and recommended outpatient Urology follow up.

## 2024-11-27 NOTE — ED PROVIDER NOTE - OBJECTIVE STATEMENT
89 male with history of BPH with chronic bruner, colon cancer status post colectomy, presenting with c/o concerns that his bruner is not working, after it was changed   yesterday here in [facility] ED. patient denying abdominal pain, n/v, fever, chills. patient has been rx abx for UTI

## 2024-11-27 NOTE — ED PROVIDER NOTE - PATIENT PORTAL LINK FT
You can access the FollowMyHealth Patient Portal offered by [facility] by registering at the following website: [email or web address]. By joining [employer]’s FollowMyHealth portal, you will also be able to view your health information using other applications (apps) compatible with our system.

## 2024-11-27 NOTE — ED PROVIDER NOTE - CLINICAL SUMMARY MEDICAL DECISION MAKING FREE TEXT BOX
89 male with history of BPH with chronic bruner, colon cancer status post colectomy, presenting with c/o concerns that his bruner is not working, after it was changed   yesterday here in [facility] ED. patient denying abdominal pain, n/v, fever, chills. on presentation patient well appearing, not In acute distress. bruner in place. will attempt to irrigate bruner catheter and reassess.

## 2024-11-29 LAB
-  AMIKACIN: SIGNIFICANT CHANGE UP
-  AMPICILLIN/SULBACTAM: SIGNIFICANT CHANGE UP
-  AMPICILLIN: SIGNIFICANT CHANGE UP
-  CEFEPIME: SIGNIFICANT CHANGE UP
-  CEFTAZIDIME: SIGNIFICANT CHANGE UP
-  CEFTRIAXONE: SIGNIFICANT CHANGE UP
-  CIPROFLOXACIN: SIGNIFICANT CHANGE UP
-  CIPROFLOXACIN: SIGNIFICANT CHANGE UP
-  GENTAMICIN: SIGNIFICANT CHANGE UP
-  IMIPENEM: SIGNIFICANT CHANGE UP
-  LEVOFLOXACIN: SIGNIFICANT CHANGE UP
-  LEVOFLOXACIN: SIGNIFICANT CHANGE UP
-  MEROPENEM: SIGNIFICANT CHANGE UP
-  NITROFURANTOIN: SIGNIFICANT CHANGE UP
-  PIPERACILLIN/TAZOBACTAM: SIGNIFICANT CHANGE UP
-  TETRACYCLINE: SIGNIFICANT CHANGE UP
-  TOBRAMYCIN: SIGNIFICANT CHANGE UP
-  TRIMETHOPRIM/SULFAMETHOXAZOLE: SIGNIFICANT CHANGE UP
-  VANCOMYCIN: SIGNIFICANT CHANGE UP
CULTURE RESULTS: ABNORMAL
METHOD TYPE: SIGNIFICANT CHANGE UP
ORGANISM # SPEC MICROSCOPIC CNT: ABNORMAL
SPECIMEN SOURCE: SIGNIFICANT CHANGE UP

## 2024-12-20 ENCOUNTER — RX RENEWAL (OUTPATIENT)
Age: 88
End: 2024-12-20

## 2025-01-01 ENCOUNTER — OUTPATIENT (OUTPATIENT)
Dept: OUTPATIENT SERVICES | Facility: HOSPITAL | Age: 89
LOS: 1 days | Discharge: HOME | End: 2025-01-01

## 2025-01-01 ENCOUNTER — INPATIENT (INPATIENT)
Facility: HOSPITAL | Age: 89
LOS: 31 days | End: 2025-04-22
Attending: STUDENT IN AN ORGANIZED HEALTH CARE EDUCATION/TRAINING PROGRAM | Admitting: STUDENT IN AN ORGANIZED HEALTH CARE EDUCATION/TRAINING PROGRAM
Payer: MEDICARE

## 2025-01-01 VITALS
SYSTOLIC BLOOD PRESSURE: 121 MMHG | OXYGEN SATURATION: 96 % | DIASTOLIC BLOOD PRESSURE: 71 MMHG | RESPIRATION RATE: 19 BRPM | HEART RATE: 84 BPM | TEMPERATURE: 98 F | WEIGHT: 130.07 LBS

## 2025-01-01 VITALS
SYSTOLIC BLOOD PRESSURE: 102 MMHG | RESPIRATION RATE: 16 BRPM | TEMPERATURE: 98 F | HEART RATE: 102 BPM | DIASTOLIC BLOOD PRESSURE: 37 MMHG | OXYGEN SATURATION: 95 %

## 2025-01-01 DIAGNOSIS — R52 PAIN, UNSPECIFIED: ICD-10-CM

## 2025-01-01 DIAGNOSIS — Z98.890 OTHER SPECIFIED POSTPROCEDURAL STATES: Chronic | ICD-10-CM

## 2025-01-01 DIAGNOSIS — Z71.89 OTHER SPECIFIED COUNSELING: ICD-10-CM

## 2025-01-01 DIAGNOSIS — Z90.49 ACQUIRED ABSENCE OF OTHER SPECIFIED PARTS OF DIGESTIVE TRACT: Chronic | ICD-10-CM

## 2025-01-01 DIAGNOSIS — K81.9 CHOLECYSTITIS, UNSPECIFIED: ICD-10-CM

## 2025-01-01 DIAGNOSIS — C18.9 MALIGNANT NEOPLASM OF COLON, UNSPECIFIED: ICD-10-CM

## 2025-01-01 DIAGNOSIS — R11.2 NAUSEA WITH VOMITING, UNSPECIFIED: ICD-10-CM

## 2025-01-01 DIAGNOSIS — D72.829 ELEVATED WHITE BLOOD CELL COUNT, UNSPECIFIED: ICD-10-CM

## 2025-01-01 DIAGNOSIS — Z51.5 ENCOUNTER FOR PALLIATIVE CARE: ICD-10-CM

## 2025-01-01 DIAGNOSIS — R41.82 ALTERED MENTAL STATUS, UNSPECIFIED: ICD-10-CM

## 2025-01-01 DIAGNOSIS — E43 UNSPECIFIED SEVERE PROTEIN-CALORIE MALNUTRITION: ICD-10-CM

## 2025-01-01 DIAGNOSIS — N40.0 BENIGN PROSTATIC HYPERPLASIA WITHOUT LOWER URINARY TRACT SYMPTOMS: ICD-10-CM

## 2025-01-01 DIAGNOSIS — R53.81 OTHER MALAISE: ICD-10-CM

## 2025-01-01 DIAGNOSIS — K56.609 UNSPECIFIED INTESTINAL OBSTRUCTION, UNSPECIFIED AS TO PARTIAL VERSUS COMPLETE OBSTRUCTION: ICD-10-CM

## 2025-01-01 DIAGNOSIS — E87.1 HYPO-OSMOLALITY AND HYPONATREMIA: ICD-10-CM

## 2025-01-01 LAB
-  AMPICILLIN: SIGNIFICANT CHANGE UP
-  CIPROFLOXACIN: SIGNIFICANT CHANGE UP
-  LEVOFLOXACIN: SIGNIFICANT CHANGE UP
-  NITROFURANTOIN: SIGNIFICANT CHANGE UP
-  TETRACYCLINE: SIGNIFICANT CHANGE UP
-  VANCOMYCIN: SIGNIFICANT CHANGE UP
ACANTHOCYTES BLD QL SMEAR: SLIGHT — SIGNIFICANT CHANGE UP
ADD ON TEST-SPECIMEN IN LAB: SIGNIFICANT CHANGE UP
ADD ON TEST-SPECIMEN IN LAB: SIGNIFICANT CHANGE UP
ALBUMIN SERPL ELPH-MCNC: 2.1 G/DL — LOW (ref 3.3–5)
ALBUMIN SERPL ELPH-MCNC: 2.1 G/DL — LOW (ref 3.3–5)
ALBUMIN SERPL ELPH-MCNC: 2.2 G/DL — LOW (ref 3.3–5)
ALBUMIN SERPL ELPH-MCNC: 2.3 G/DL — LOW (ref 3.3–5)
ALBUMIN SERPL ELPH-MCNC: 2.4 G/DL — LOW (ref 3.3–5)
ALBUMIN SERPL ELPH-MCNC: 2.8 G/DL — LOW (ref 3.3–5)
ALBUMIN SERPL ELPH-MCNC: 3.5 G/DL — SIGNIFICANT CHANGE UP (ref 3.3–5)
ALP SERPL-CCNC: 104 U/L — SIGNIFICANT CHANGE UP (ref 40–120)
ALP SERPL-CCNC: 105 U/L — SIGNIFICANT CHANGE UP (ref 40–120)
ALP SERPL-CCNC: 146 U/L — HIGH (ref 40–120)
ALP SERPL-CCNC: 58 U/L — SIGNIFICANT CHANGE UP (ref 40–120)
ALP SERPL-CCNC: 62 U/L — SIGNIFICANT CHANGE UP (ref 40–120)
ALP SERPL-CCNC: 75 U/L — SIGNIFICANT CHANGE UP (ref 40–120)
ALP SERPL-CCNC: 78 U/L — SIGNIFICANT CHANGE UP (ref 40–120)
ALT FLD-CCNC: 23 U/L — SIGNIFICANT CHANGE UP (ref 4–41)
ALT FLD-CCNC: 26 U/L — SIGNIFICANT CHANGE UP (ref 4–41)
ALT FLD-CCNC: 42 U/L — HIGH (ref 4–41)
ALT FLD-CCNC: 44 U/L — HIGH (ref 4–41)
ALT FLD-CCNC: 44 U/L — HIGH (ref 4–41)
ALT FLD-CCNC: 47 U/L — HIGH (ref 4–41)
ALT FLD-CCNC: 6 U/L — SIGNIFICANT CHANGE UP (ref 4–41)
ANION GAP SERPL CALC-SCNC: 10 MMOL/L — SIGNIFICANT CHANGE UP (ref 7–14)
ANION GAP SERPL CALC-SCNC: 11 MMOL/L — SIGNIFICANT CHANGE UP (ref 7–14)
ANION GAP SERPL CALC-SCNC: 12 MMOL/L — SIGNIFICANT CHANGE UP (ref 7–14)
ANION GAP SERPL CALC-SCNC: 13 MMOL/L — SIGNIFICANT CHANGE UP (ref 7–14)
ANION GAP SERPL CALC-SCNC: 13 MMOL/L — SIGNIFICANT CHANGE UP (ref 7–14)
ANION GAP SERPL CALC-SCNC: 15 MMOL/L — HIGH (ref 7–14)
ANION GAP SERPL CALC-SCNC: 15 MMOL/L — HIGH (ref 7–14)
ANION GAP SERPL CALC-SCNC: 16 MMOL/L — HIGH (ref 7–14)
ANION GAP SERPL CALC-SCNC: 16 MMOL/L — HIGH (ref 7–14)
ANION GAP SERPL CALC-SCNC: 17 MMOL/L — HIGH (ref 7–14)
ANION GAP SERPL CALC-SCNC: 17 MMOL/L — HIGH (ref 7–14)
ANION GAP SERPL CALC-SCNC: 19 MMOL/L — HIGH (ref 7–14)
ANION GAP SERPL CALC-SCNC: 21 MMOL/L — HIGH (ref 7–14)
ANION GAP SERPL CALC-SCNC: 22 MMOL/L — HIGH (ref 7–14)
ANION GAP SERPL CALC-SCNC: 8 MMOL/L — SIGNIFICANT CHANGE UP (ref 7–14)
ANION GAP SERPL CALC-SCNC: 9 MMOL/L — SIGNIFICANT CHANGE UP (ref 7–14)
ANISOCYTOSIS BLD QL: SLIGHT — SIGNIFICANT CHANGE UP
APPEARANCE UR: ABNORMAL
APTT BLD: 25.9 SEC — SIGNIFICANT CHANGE UP (ref 24.5–35.6)
APTT BLD: 28.3 SEC — SIGNIFICANT CHANGE UP (ref 24.5–35.6)
APTT BLD: 35.5 SEC — SIGNIFICANT CHANGE UP (ref 24.5–35.6)
AST SERPL-CCNC: 15 U/L — SIGNIFICANT CHANGE UP (ref 4–40)
AST SERPL-CCNC: 23 U/L — SIGNIFICANT CHANGE UP (ref 4–40)
AST SERPL-CCNC: 23 U/L — SIGNIFICANT CHANGE UP (ref 4–40)
AST SERPL-CCNC: 25 U/L — SIGNIFICANT CHANGE UP (ref 4–40)
AST SERPL-CCNC: 30 U/L — SIGNIFICANT CHANGE UP (ref 4–40)
AST SERPL-CCNC: 32 U/L — SIGNIFICANT CHANGE UP (ref 4–40)
AST SERPL-CCNC: 36 U/L — SIGNIFICANT CHANGE UP (ref 4–40)
BACTERIA # UR AUTO: NEGATIVE /HPF — SIGNIFICANT CHANGE UP
BASOPHILS # BLD AUTO: 0 K/UL — SIGNIFICANT CHANGE UP (ref 0–0.2)
BASOPHILS # BLD AUTO: 0.01 K/UL — SIGNIFICANT CHANGE UP (ref 0–0.2)
BASOPHILS # BLD AUTO: 0.02 K/UL — SIGNIFICANT CHANGE UP (ref 0–0.2)
BASOPHILS # BLD AUTO: 0.02 K/UL — SIGNIFICANT CHANGE UP (ref 0–0.2)
BASOPHILS # BLD AUTO: 0.03 K/UL — SIGNIFICANT CHANGE UP (ref 0–0.2)
BASOPHILS # BLD AUTO: 0.07 K/UL — SIGNIFICANT CHANGE UP (ref 0–0.2)
BASOPHILS # BLD AUTO: 0.1 K/UL — SIGNIFICANT CHANGE UP (ref 0–0.2)
BASOPHILS # BLD AUTO: 0.12 K/UL — SIGNIFICANT CHANGE UP (ref 0–0.2)
BASOPHILS NFR BLD AUTO: 0 % — SIGNIFICANT CHANGE UP (ref 0–2)
BASOPHILS NFR BLD AUTO: 0.1 % — SIGNIFICANT CHANGE UP (ref 0–2)
BASOPHILS NFR BLD AUTO: 0.2 % — SIGNIFICANT CHANGE UP (ref 0–2)
BASOPHILS NFR BLD AUTO: 0.2 % — SIGNIFICANT CHANGE UP (ref 0–2)
BASOPHILS NFR BLD AUTO: 0.4 % — SIGNIFICANT CHANGE UP (ref 0–2)
BASOPHILS NFR BLD AUTO: 0.4 % — SIGNIFICANT CHANGE UP (ref 0–2)
BASOPHILS NFR BLD AUTO: 0.7 % — SIGNIFICANT CHANGE UP (ref 0–2)
BASOPHILS NFR BLD AUTO: 0.7 % — SIGNIFICANT CHANGE UP (ref 0–2)
BILIRUB DIRECT SERPL-MCNC: 0.3 MG/DL — SIGNIFICANT CHANGE UP (ref 0–0.3)
BILIRUB DIRECT SERPL-MCNC: 0.4 MG/DL — HIGH (ref 0–0.3)
BILIRUB DIRECT SERPL-MCNC: 0.4 MG/DL — HIGH (ref 0–0.3)
BILIRUB DIRECT SERPL-MCNC: 0.5 MG/DL — HIGH (ref 0–0.3)
BILIRUB DIRECT SERPL-MCNC: 0.5 MG/DL — HIGH (ref 0–0.3)
BILIRUB INDIRECT FLD-MCNC: 0.4 MG/DL — SIGNIFICANT CHANGE UP (ref 0–1)
BILIRUB INDIRECT FLD-MCNC: 0.4 MG/DL — SIGNIFICANT CHANGE UP (ref 0–1)
BILIRUB INDIRECT FLD-MCNC: 0.6 MG/DL — SIGNIFICANT CHANGE UP (ref 0–1)
BILIRUB INDIRECT FLD-MCNC: 0.7 MG/DL — SIGNIFICANT CHANGE UP (ref 0–1)
BILIRUB INDIRECT FLD-MCNC: 0.7 MG/DL — SIGNIFICANT CHANGE UP (ref 0–1)
BILIRUB SERPL-MCNC: 0.5 MG/DL — SIGNIFICANT CHANGE UP (ref 0.2–1.2)
BILIRUB SERPL-MCNC: 0.7 MG/DL — SIGNIFICANT CHANGE UP (ref 0.2–1.2)
BILIRUB SERPL-MCNC: 0.8 MG/DL — SIGNIFICANT CHANGE UP (ref 0.2–1.2)
BILIRUB SERPL-MCNC: 1.1 MG/DL — SIGNIFICANT CHANGE UP (ref 0.2–1.2)
BILIRUB SERPL-MCNC: 1.2 MG/DL — SIGNIFICANT CHANGE UP (ref 0.2–1.2)
BILIRUB UR-MCNC: NEGATIVE — SIGNIFICANT CHANGE UP
BLD GP AB SCN SERPL QL: NEGATIVE — SIGNIFICANT CHANGE UP
BLOOD GAS ARTERIAL - LYTES,HGB,ICA,LACT RESULT: SIGNIFICANT CHANGE UP
BUN SERPL-MCNC: 12 MG/DL — SIGNIFICANT CHANGE UP (ref 7–23)
BUN SERPL-MCNC: 15 MG/DL — SIGNIFICANT CHANGE UP (ref 7–23)
BUN SERPL-MCNC: 16 MG/DL — SIGNIFICANT CHANGE UP (ref 7–23)
BUN SERPL-MCNC: 20 MG/DL — SIGNIFICANT CHANGE UP (ref 7–23)
BUN SERPL-MCNC: 21 MG/DL — SIGNIFICANT CHANGE UP (ref 7–23)
BUN SERPL-MCNC: 22 MG/DL — SIGNIFICANT CHANGE UP (ref 7–23)
BUN SERPL-MCNC: 22 MG/DL — SIGNIFICANT CHANGE UP (ref 7–23)
BUN SERPL-MCNC: 23 MG/DL — SIGNIFICANT CHANGE UP (ref 7–23)
BUN SERPL-MCNC: 24 MG/DL — HIGH (ref 7–23)
BUN SERPL-MCNC: 25 MG/DL — HIGH (ref 7–23)
BUN SERPL-MCNC: 26 MG/DL — HIGH (ref 7–23)
BUN SERPL-MCNC: 27 MG/DL — HIGH (ref 7–23)
BUN SERPL-MCNC: 32 MG/DL — HIGH (ref 7–23)
BUN SERPL-MCNC: 33 MG/DL — HIGH (ref 7–23)
BUN SERPL-MCNC: 34 MG/DL — HIGH (ref 7–23)
BUN SERPL-MCNC: 34 MG/DL — HIGH (ref 7–23)
BUN SERPL-MCNC: 35 MG/DL — HIGH (ref 7–23)
BUN SERPL-MCNC: 36 MG/DL — HIGH (ref 7–23)
BUN SERPL-MCNC: 36 MG/DL — HIGH (ref 7–23)
BUN SERPL-MCNC: 39 MG/DL — HIGH (ref 7–23)
BUN SERPL-MCNC: 40 MG/DL — HIGH (ref 7–23)
BUN SERPL-MCNC: 48 MG/DL — HIGH (ref 7–23)
BUN SERPL-MCNC: 51 MG/DL — HIGH (ref 7–23)
BURR CELLS BLD QL SMEAR: PRESENT — SIGNIFICANT CHANGE UP
CA-I BLD-SCNC: 1.05 MMOL/L — LOW (ref 1.15–1.29)
CA-I BLD-SCNC: 1.08 MMOL/L — LOW (ref 1.15–1.29)
CA-I BLD-SCNC: 1.11 MMOL/L — LOW (ref 1.15–1.29)
CA-I BLD-SCNC: 1.12 MMOL/L — LOW (ref 1.15–1.29)
CA-I BLD-SCNC: 1.13 MMOL/L — LOW (ref 1.15–1.29)
CA-I BLD-SCNC: 1.14 MMOL/L — LOW (ref 1.15–1.29)
CA-I BLD-SCNC: 1.15 MMOL/L — SIGNIFICANT CHANGE UP (ref 1.15–1.29)
CA-I BLD-SCNC: 1.16 MMOL/L — SIGNIFICANT CHANGE UP (ref 1.15–1.29)
CA-I BLD-SCNC: 1.2 MMOL/L — SIGNIFICANT CHANGE UP (ref 1.15–1.29)
CALCIUM SERPL-MCNC: 7.8 MG/DL — LOW (ref 8.4–10.5)
CALCIUM SERPL-MCNC: 7.9 MG/DL — LOW (ref 8.4–10.5)
CALCIUM SERPL-MCNC: 8 MG/DL — LOW (ref 8.4–10.5)
CALCIUM SERPL-MCNC: 8.1 MG/DL — LOW (ref 8.4–10.5)
CALCIUM SERPL-MCNC: 8.1 MG/DL — LOW (ref 8.4–10.5)
CALCIUM SERPL-MCNC: 8.2 MG/DL — LOW (ref 8.4–10.5)
CALCIUM SERPL-MCNC: 8.3 MG/DL — LOW (ref 8.4–10.5)
CALCIUM SERPL-MCNC: 8.4 MG/DL — SIGNIFICANT CHANGE UP (ref 8.4–10.5)
CALCIUM SERPL-MCNC: 8.4 MG/DL — SIGNIFICANT CHANGE UP (ref 8.4–10.5)
CALCIUM SERPL-MCNC: 8.5 MG/DL — SIGNIFICANT CHANGE UP (ref 8.4–10.5)
CALCIUM SERPL-MCNC: 8.5 MG/DL — SIGNIFICANT CHANGE UP (ref 8.4–10.5)
CALCIUM SERPL-MCNC: 8.7 MG/DL — SIGNIFICANT CHANGE UP (ref 8.4–10.5)
CALCIUM SERPL-MCNC: 8.7 MG/DL — SIGNIFICANT CHANGE UP (ref 8.4–10.5)
CALCIUM SERPL-MCNC: 9 MG/DL — SIGNIFICANT CHANGE UP (ref 8.4–10.5)
CALCIUM SERPL-MCNC: 9.1 MG/DL — SIGNIFICANT CHANGE UP (ref 8.4–10.5)
CALCIUM SERPL-MCNC: 9.1 MG/DL — SIGNIFICANT CHANGE UP (ref 8.4–10.5)
CALCIUM SERPL-MCNC: 9.2 MG/DL — SIGNIFICANT CHANGE UP (ref 8.4–10.5)
CALCIUM SERPL-MCNC: 9.2 MG/DL — SIGNIFICANT CHANGE UP (ref 8.4–10.5)
CAST: 4 /LPF — SIGNIFICANT CHANGE UP (ref 0–4)
CHLORIDE SERPL-SCNC: 102 MMOL/L — SIGNIFICANT CHANGE UP (ref 98–107)
CHLORIDE SERPL-SCNC: 103 MMOL/L — SIGNIFICANT CHANGE UP (ref 98–107)
CHLORIDE SERPL-SCNC: 104 MMOL/L — SIGNIFICANT CHANGE UP (ref 98–107)
CHLORIDE SERPL-SCNC: 106 MMOL/L — SIGNIFICANT CHANGE UP (ref 98–107)
CHLORIDE SERPL-SCNC: 108 MMOL/L — HIGH (ref 98–107)
CHLORIDE SERPL-SCNC: 109 MMOL/L — HIGH (ref 98–107)
CHLORIDE SERPL-SCNC: 110 MMOL/L — HIGH (ref 98–107)
CHLORIDE SERPL-SCNC: 93 MMOL/L — LOW (ref 98–107)
CHLORIDE SERPL-SCNC: 95 MMOL/L — LOW (ref 98–107)
CHLORIDE SERPL-SCNC: 96 MMOL/L — LOW (ref 98–107)
CHLORIDE SERPL-SCNC: 97 MMOL/L — LOW (ref 98–107)
CHLORIDE SERPL-SCNC: 98 MMOL/L — SIGNIFICANT CHANGE UP (ref 98–107)
CHLORIDE SERPL-SCNC: 99 MMOL/L — SIGNIFICANT CHANGE UP (ref 98–107)
CO2 SERPL-SCNC: 18 MMOL/L — LOW (ref 22–31)
CO2 SERPL-SCNC: 19 MMOL/L — LOW (ref 22–31)
CO2 SERPL-SCNC: 20 MMOL/L — LOW (ref 22–31)
CO2 SERPL-SCNC: 20 MMOL/L — LOW (ref 22–31)
CO2 SERPL-SCNC: 21 MMOL/L — LOW (ref 22–31)
CO2 SERPL-SCNC: 22 MMOL/L — SIGNIFICANT CHANGE UP (ref 22–31)
CO2 SERPL-SCNC: 23 MMOL/L — SIGNIFICANT CHANGE UP (ref 22–31)
CO2 SERPL-SCNC: 24 MMOL/L — SIGNIFICANT CHANGE UP (ref 22–31)
CO2 SERPL-SCNC: 24 MMOL/L — SIGNIFICANT CHANGE UP (ref 22–31)
CO2 SERPL-SCNC: 25 MMOL/L — SIGNIFICANT CHANGE UP (ref 22–31)
CO2 SERPL-SCNC: 26 MMOL/L — SIGNIFICANT CHANGE UP (ref 22–31)
CO2 SERPL-SCNC: 27 MMOL/L — SIGNIFICANT CHANGE UP (ref 22–31)
COLOR SPEC: YELLOW — SIGNIFICANT CHANGE UP
CREAT SERPL-MCNC: 0.47 MG/DL — LOW (ref 0.5–1.3)
CREAT SERPL-MCNC: 0.48 MG/DL — LOW (ref 0.5–1.3)
CREAT SERPL-MCNC: 0.49 MG/DL — LOW (ref 0.5–1.3)
CREAT SERPL-MCNC: 0.56 MG/DL — SIGNIFICANT CHANGE UP (ref 0.5–1.3)
CREAT SERPL-MCNC: 0.57 MG/DL — SIGNIFICANT CHANGE UP (ref 0.5–1.3)
CREAT SERPL-MCNC: 0.57 MG/DL — SIGNIFICANT CHANGE UP (ref 0.5–1.3)
CREAT SERPL-MCNC: 0.58 MG/DL — SIGNIFICANT CHANGE UP (ref 0.5–1.3)
CREAT SERPL-MCNC: 0.59 MG/DL — SIGNIFICANT CHANGE UP (ref 0.5–1.3)
CREAT SERPL-MCNC: 0.6 MG/DL — SIGNIFICANT CHANGE UP (ref 0.5–1.3)
CREAT SERPL-MCNC: 0.63 MG/DL — SIGNIFICANT CHANGE UP (ref 0.5–1.3)
CREAT SERPL-MCNC: 0.63 MG/DL — SIGNIFICANT CHANGE UP (ref 0.5–1.3)
CREAT SERPL-MCNC: 0.66 MG/DL — SIGNIFICANT CHANGE UP (ref 0.5–1.3)
CREAT SERPL-MCNC: 0.66 MG/DL — SIGNIFICANT CHANGE UP (ref 0.5–1.3)
CREAT SERPL-MCNC: 0.76 MG/DL — SIGNIFICANT CHANGE UP (ref 0.5–1.3)
CREAT SERPL-MCNC: 0.77 MG/DL — SIGNIFICANT CHANGE UP (ref 0.5–1.3)
CREAT SERPL-MCNC: 0.79 MG/DL — SIGNIFICANT CHANGE UP (ref 0.5–1.3)
CREAT SERPL-MCNC: 0.8 MG/DL — SIGNIFICANT CHANGE UP (ref 0.5–1.3)
CREAT SERPL-MCNC: 0.8 MG/DL — SIGNIFICANT CHANGE UP (ref 0.5–1.3)
CREAT SERPL-MCNC: 0.81 MG/DL — SIGNIFICANT CHANGE UP (ref 0.5–1.3)
CREAT SERPL-MCNC: 0.82 MG/DL — SIGNIFICANT CHANGE UP (ref 0.5–1.3)
CREAT SERPL-MCNC: 0.89 MG/DL — SIGNIFICANT CHANGE UP (ref 0.5–1.3)
CREAT SERPL-MCNC: 1.01 MG/DL — SIGNIFICANT CHANGE UP (ref 0.5–1.3)
CREAT SERPL-MCNC: 1.04 MG/DL — SIGNIFICANT CHANGE UP (ref 0.5–1.3)
CREAT SERPL-MCNC: 1.33 MG/DL — HIGH (ref 0.5–1.3)
CREAT SERPL-MCNC: 1.69 MG/DL — HIGH (ref 0.5–1.3)
CULTURE RESULTS: ABNORMAL
CULTURE RESULTS: SIGNIFICANT CHANGE UP
CULTURE RESULTS: SIGNIFICANT CHANGE UP
DIFF PNL FLD: NEGATIVE — SIGNIFICANT CHANGE UP
EGFR: 38 ML/MIN/1.73M2 — LOW
EGFR: 38 ML/MIN/1.73M2 — LOW
EGFR: 50 ML/MIN/1.73M2 — LOW
EGFR: 50 ML/MIN/1.73M2 — LOW
EGFR: 68 ML/MIN/1.73M2 — SIGNIFICANT CHANGE UP
EGFR: 68 ML/MIN/1.73M2 — SIGNIFICANT CHANGE UP
EGFR: 70 ML/MIN/1.73M2 — SIGNIFICANT CHANGE UP
EGFR: 70 ML/MIN/1.73M2 — SIGNIFICANT CHANGE UP
EGFR: 81 ML/MIN/1.73M2 — SIGNIFICANT CHANGE UP
EGFR: 81 ML/MIN/1.73M2 — SIGNIFICANT CHANGE UP
EGFR: 83 ML/MIN/1.73M2 — SIGNIFICANT CHANGE UP
EGFR: 84 ML/MIN/1.73M2 — SIGNIFICANT CHANGE UP
EGFR: 85 ML/MIN/1.73M2 — SIGNIFICANT CHANGE UP
EGFR: 89 ML/MIN/1.73M2 — SIGNIFICANT CHANGE UP
EGFR: 90 ML/MIN/1.73M2 — SIGNIFICANT CHANGE UP
EGFR: 91 ML/MIN/1.73M2 — SIGNIFICANT CHANGE UP
EGFR: 91 ML/MIN/1.73M2 — SIGNIFICANT CHANGE UP
EGFR: 92 ML/MIN/1.73M2 — SIGNIFICANT CHANGE UP
EGFR: 93 ML/MIN/1.73M2 — SIGNIFICANT CHANGE UP
EGFR: 97 ML/MIN/1.73M2 — SIGNIFICANT CHANGE UP
EGFR: 98 ML/MIN/1.73M2 — SIGNIFICANT CHANGE UP
EGFR: 98 ML/MIN/1.73M2 — SIGNIFICANT CHANGE UP
ELLIPTOCYTES BLD QL SMEAR: SLIGHT — SIGNIFICANT CHANGE UP
EOSINOPHIL # BLD AUTO: 0 K/UL — SIGNIFICANT CHANGE UP (ref 0–0.5)
EOSINOPHIL # BLD AUTO: 0.01 K/UL — SIGNIFICANT CHANGE UP (ref 0–0.5)
EOSINOPHIL # BLD AUTO: 0.02 K/UL — SIGNIFICANT CHANGE UP (ref 0–0.5)
EOSINOPHIL # BLD AUTO: 0.03 K/UL — SIGNIFICANT CHANGE UP (ref 0–0.5)
EOSINOPHIL # BLD AUTO: 0.16 K/UL — SIGNIFICANT CHANGE UP (ref 0–0.5)
EOSINOPHIL # BLD AUTO: 0.18 K/UL — SIGNIFICANT CHANGE UP (ref 0–0.5)
EOSINOPHIL # BLD AUTO: 0.33 K/UL — SIGNIFICANT CHANGE UP (ref 0–0.5)
EOSINOPHIL NFR BLD AUTO: 0 % — SIGNIFICANT CHANGE UP (ref 0–6)
EOSINOPHIL NFR BLD AUTO: 0.1 % — SIGNIFICANT CHANGE UP (ref 0–6)
EOSINOPHIL NFR BLD AUTO: 0.1 % — SIGNIFICANT CHANGE UP (ref 0–6)
EOSINOPHIL NFR BLD AUTO: 0.2 % — SIGNIFICANT CHANGE UP (ref 0–6)
EOSINOPHIL NFR BLD AUTO: 1.1 % — SIGNIFICANT CHANGE UP (ref 0–6)
EOSINOPHIL NFR BLD AUTO: 2 % — SIGNIFICANT CHANGE UP (ref 0–6)
EOSINOPHIL NFR BLD AUTO: 2.7 % — SIGNIFICANT CHANGE UP (ref 0–6)
FLUAV AG NPH QL: SIGNIFICANT CHANGE UP
FLUBV AG NPH QL: SIGNIFICANT CHANGE UP
GAS PNL BLDA: SIGNIFICANT CHANGE UP
GAS PNL BLDV: SIGNIFICANT CHANGE UP
GIANT PLATELETS BLD QL SMEAR: PRESENT — SIGNIFICANT CHANGE UP
GLUCOSE BLDC GLUCOMTR-MCNC: 100 MG/DL — HIGH (ref 70–99)
GLUCOSE BLDC GLUCOMTR-MCNC: 101 MG/DL — HIGH (ref 70–99)
GLUCOSE BLDC GLUCOMTR-MCNC: 103 MG/DL — HIGH (ref 70–99)
GLUCOSE BLDC GLUCOMTR-MCNC: 105 MG/DL — HIGH (ref 70–99)
GLUCOSE BLDC GLUCOMTR-MCNC: 113 MG/DL — HIGH (ref 70–99)
GLUCOSE BLDC GLUCOMTR-MCNC: 114 MG/DL — HIGH (ref 70–99)
GLUCOSE BLDC GLUCOMTR-MCNC: 116 MG/DL — HIGH (ref 70–99)
GLUCOSE BLDC GLUCOMTR-MCNC: 121 MG/DL — HIGH (ref 70–99)
GLUCOSE BLDC GLUCOMTR-MCNC: 122 MG/DL — HIGH (ref 70–99)
GLUCOSE BLDC GLUCOMTR-MCNC: 122 MG/DL — HIGH (ref 70–99)
GLUCOSE BLDC GLUCOMTR-MCNC: 125 MG/DL — HIGH (ref 70–99)
GLUCOSE BLDC GLUCOMTR-MCNC: 126 MG/DL — HIGH (ref 70–99)
GLUCOSE BLDC GLUCOMTR-MCNC: 127 MG/DL — HIGH (ref 70–99)
GLUCOSE BLDC GLUCOMTR-MCNC: 129 MG/DL — HIGH (ref 70–99)
GLUCOSE BLDC GLUCOMTR-MCNC: 130 MG/DL — HIGH (ref 70–99)
GLUCOSE BLDC GLUCOMTR-MCNC: 130 MG/DL — HIGH (ref 70–99)
GLUCOSE BLDC GLUCOMTR-MCNC: 131 MG/DL — HIGH (ref 70–99)
GLUCOSE BLDC GLUCOMTR-MCNC: 133 MG/DL — HIGH (ref 70–99)
GLUCOSE BLDC GLUCOMTR-MCNC: 135 MG/DL — HIGH (ref 70–99)
GLUCOSE BLDC GLUCOMTR-MCNC: 136 MG/DL — HIGH (ref 70–99)
GLUCOSE BLDC GLUCOMTR-MCNC: 136 MG/DL — HIGH (ref 70–99)
GLUCOSE BLDC GLUCOMTR-MCNC: 137 MG/DL — HIGH (ref 70–99)
GLUCOSE BLDC GLUCOMTR-MCNC: 138 MG/DL — HIGH (ref 70–99)
GLUCOSE BLDC GLUCOMTR-MCNC: 140 MG/DL — HIGH (ref 70–99)
GLUCOSE BLDC GLUCOMTR-MCNC: 141 MG/DL — HIGH (ref 70–99)
GLUCOSE BLDC GLUCOMTR-MCNC: 142 MG/DL — HIGH (ref 70–99)
GLUCOSE BLDC GLUCOMTR-MCNC: 144 MG/DL — HIGH (ref 70–99)
GLUCOSE BLDC GLUCOMTR-MCNC: 144 MG/DL — HIGH (ref 70–99)
GLUCOSE BLDC GLUCOMTR-MCNC: 145 MG/DL — HIGH (ref 70–99)
GLUCOSE BLDC GLUCOMTR-MCNC: 148 MG/DL — HIGH (ref 70–99)
GLUCOSE BLDC GLUCOMTR-MCNC: 150 MG/DL — HIGH (ref 70–99)
GLUCOSE BLDC GLUCOMTR-MCNC: 150 MG/DL — HIGH (ref 70–99)
GLUCOSE BLDC GLUCOMTR-MCNC: 155 MG/DL — HIGH (ref 70–99)
GLUCOSE BLDC GLUCOMTR-MCNC: 157 MG/DL — HIGH (ref 70–99)
GLUCOSE BLDC GLUCOMTR-MCNC: 159 MG/DL — HIGH (ref 70–99)
GLUCOSE BLDC GLUCOMTR-MCNC: 161 MG/DL — HIGH (ref 70–99)
GLUCOSE BLDC GLUCOMTR-MCNC: 167 MG/DL — HIGH (ref 70–99)
GLUCOSE BLDC GLUCOMTR-MCNC: 174 MG/DL — HIGH (ref 70–99)
GLUCOSE BLDC GLUCOMTR-MCNC: 175 MG/DL — HIGH (ref 70–99)
GLUCOSE BLDC GLUCOMTR-MCNC: 176 MG/DL — HIGH (ref 70–99)
GLUCOSE BLDC GLUCOMTR-MCNC: 176 MG/DL — HIGH (ref 70–99)
GLUCOSE BLDC GLUCOMTR-MCNC: 181 MG/DL — HIGH (ref 70–99)
GLUCOSE BLDC GLUCOMTR-MCNC: 183 MG/DL — HIGH (ref 70–99)
GLUCOSE BLDC GLUCOMTR-MCNC: 192 MG/DL — HIGH (ref 70–99)
GLUCOSE BLDC GLUCOMTR-MCNC: 73 MG/DL — SIGNIFICANT CHANGE UP (ref 70–99)
GLUCOSE BLDC GLUCOMTR-MCNC: 85 MG/DL — SIGNIFICANT CHANGE UP (ref 70–99)
GLUCOSE BLDC GLUCOMTR-MCNC: 86 MG/DL — SIGNIFICANT CHANGE UP (ref 70–99)
GLUCOSE BLDC GLUCOMTR-MCNC: 87 MG/DL — SIGNIFICANT CHANGE UP (ref 70–99)
GLUCOSE BLDC GLUCOMTR-MCNC: 93 MG/DL — SIGNIFICANT CHANGE UP (ref 70–99)
GLUCOSE BLDC GLUCOMTR-MCNC: 93 MG/DL — SIGNIFICANT CHANGE UP (ref 70–99)
GLUCOSE BLDC GLUCOMTR-MCNC: 97 MG/DL — SIGNIFICANT CHANGE UP (ref 70–99)
GLUCOSE SERPL-MCNC: 102 MG/DL — HIGH (ref 70–99)
GLUCOSE SERPL-MCNC: 106 MG/DL — HIGH (ref 70–99)
GLUCOSE SERPL-MCNC: 110 MG/DL — HIGH (ref 70–99)
GLUCOSE SERPL-MCNC: 113 MG/DL — HIGH (ref 70–99)
GLUCOSE SERPL-MCNC: 126 MG/DL — HIGH (ref 70–99)
GLUCOSE SERPL-MCNC: 131 MG/DL — HIGH (ref 70–99)
GLUCOSE SERPL-MCNC: 131 MG/DL — HIGH (ref 70–99)
GLUCOSE SERPL-MCNC: 134 MG/DL — HIGH (ref 70–99)
GLUCOSE SERPL-MCNC: 135 MG/DL — HIGH (ref 70–99)
GLUCOSE SERPL-MCNC: 138 MG/DL — HIGH (ref 70–99)
GLUCOSE SERPL-MCNC: 141 MG/DL — HIGH (ref 70–99)
GLUCOSE SERPL-MCNC: 149 MG/DL — HIGH (ref 70–99)
GLUCOSE SERPL-MCNC: 155 MG/DL — HIGH (ref 70–99)
GLUCOSE SERPL-MCNC: 156 MG/DL — HIGH (ref 70–99)
GLUCOSE SERPL-MCNC: 156 MG/DL — HIGH (ref 70–99)
GLUCOSE SERPL-MCNC: 158 MG/DL — HIGH (ref 70–99)
GLUCOSE SERPL-MCNC: 160 MG/DL — HIGH (ref 70–99)
GLUCOSE SERPL-MCNC: 170 MG/DL — HIGH (ref 70–99)
GLUCOSE SERPL-MCNC: 186 MG/DL — HIGH (ref 70–99)
GLUCOSE SERPL-MCNC: 34 MG/DL — CRITICAL LOW (ref 70–99)
GLUCOSE SERPL-MCNC: 73 MG/DL — SIGNIFICANT CHANGE UP (ref 70–99)
GLUCOSE SERPL-MCNC: 79 MG/DL — SIGNIFICANT CHANGE UP (ref 70–99)
GLUCOSE SERPL-MCNC: 82 MG/DL — SIGNIFICANT CHANGE UP (ref 70–99)
GLUCOSE SERPL-MCNC: 83 MG/DL — SIGNIFICANT CHANGE UP (ref 70–99)
GLUCOSE SERPL-MCNC: 85 MG/DL — SIGNIFICANT CHANGE UP (ref 70–99)
GLUCOSE SERPL-MCNC: 87 MG/DL — SIGNIFICANT CHANGE UP (ref 70–99)
GLUCOSE SERPL-MCNC: 88 MG/DL — SIGNIFICANT CHANGE UP (ref 70–99)
GLUCOSE SERPL-MCNC: 92 MG/DL — SIGNIFICANT CHANGE UP (ref 70–99)
GLUCOSE SERPL-MCNC: 94 MG/DL — SIGNIFICANT CHANGE UP (ref 70–99)
GLUCOSE SERPL-MCNC: 98 MG/DL — SIGNIFICANT CHANGE UP (ref 70–99)
GLUCOSE UR QL: NEGATIVE MG/DL — SIGNIFICANT CHANGE UP
HCT VFR BLD CALC: 23 % — LOW (ref 39–50)
HCT VFR BLD CALC: 25 % — LOW (ref 39–50)
HCT VFR BLD CALC: 27 % — LOW (ref 39–50)
HCT VFR BLD CALC: 27.1 % — LOW (ref 39–50)
HCT VFR BLD CALC: 28.4 % — LOW (ref 39–50)
HCT VFR BLD CALC: 28.6 % — LOW (ref 39–50)
HCT VFR BLD CALC: 29.9 % — LOW (ref 39–50)
HCT VFR BLD CALC: 32.2 % — LOW (ref 39–50)
HCT VFR BLD CALC: 34.1 % — LOW (ref 39–50)
HCT VFR BLD CALC: 35 % — LOW (ref 39–50)
HCT VFR BLD CALC: 35.3 % — LOW (ref 39–50)
HCT VFR BLD CALC: 37 % — LOW (ref 39–50)
HCT VFR BLD CALC: 37.1 % — LOW (ref 39–50)
HCT VFR BLD CALC: 37.4 % — LOW (ref 39–50)
HCT VFR BLD CALC: 38.3 % — LOW (ref 39–50)
HCT VFR BLD CALC: 38.5 % — LOW (ref 39–50)
HCT VFR BLD CALC: 38.5 % — LOW (ref 39–50)
HCT VFR BLD CALC: 38.6 % — LOW (ref 39–50)
HCT VFR BLD CALC: 39.2 % — SIGNIFICANT CHANGE UP (ref 39–50)
HCT VFR BLD CALC: 39.4 % — SIGNIFICANT CHANGE UP (ref 39–50)
HCT VFR BLD CALC: 40.5 % — SIGNIFICANT CHANGE UP (ref 39–50)
HCT VFR BLD CALC: 41.3 % — SIGNIFICANT CHANGE UP (ref 39–50)
HCT VFR BLD CALC: 42 % — SIGNIFICANT CHANGE UP (ref 39–50)
HCT VFR BLD CALC: 42 % — SIGNIFICANT CHANGE UP (ref 39–50)
HCT VFR BLD CALC: 42.1 % — SIGNIFICANT CHANGE UP (ref 39–50)
HCT VFR BLD CALC: 42.5 % — SIGNIFICANT CHANGE UP (ref 39–50)
HCT VFR BLD CALC: 43.4 % — SIGNIFICANT CHANGE UP (ref 39–50)
HCT VFR BLD CALC: 44.9 % — SIGNIFICANT CHANGE UP (ref 39–50)
HCT VFR BLD CALC: 45.4 % — SIGNIFICANT CHANGE UP (ref 39–50)
HGB BLD-MCNC: 10.5 G/DL — LOW (ref 13–17)
HGB BLD-MCNC: 11.5 G/DL — LOW (ref 13–17)
HGB BLD-MCNC: 11.6 G/DL — LOW (ref 13–17)
HGB BLD-MCNC: 11.8 G/DL — LOW (ref 13–17)
HGB BLD-MCNC: 12.3 G/DL — LOW (ref 13–17)
HGB BLD-MCNC: 12.3 G/DL — LOW (ref 13–17)
HGB BLD-MCNC: 12.6 G/DL — LOW (ref 13–17)
HGB BLD-MCNC: 12.8 G/DL — LOW (ref 13–17)
HGB BLD-MCNC: 13 G/DL — SIGNIFICANT CHANGE UP (ref 13–17)
HGB BLD-MCNC: 13.3 G/DL — SIGNIFICANT CHANGE UP (ref 13–17)
HGB BLD-MCNC: 13.3 G/DL — SIGNIFICANT CHANGE UP (ref 13–17)
HGB BLD-MCNC: 13.7 G/DL — SIGNIFICANT CHANGE UP (ref 13–17)
HGB BLD-MCNC: 13.8 G/DL — SIGNIFICANT CHANGE UP (ref 13–17)
HGB BLD-MCNC: 13.9 G/DL — SIGNIFICANT CHANGE UP (ref 13–17)
HGB BLD-MCNC: 14.1 G/DL — SIGNIFICANT CHANGE UP (ref 13–17)
HGB BLD-MCNC: 14.2 G/DL — SIGNIFICANT CHANGE UP (ref 13–17)
HGB BLD-MCNC: 14.2 G/DL — SIGNIFICANT CHANGE UP (ref 13–17)
HGB BLD-MCNC: 14.5 G/DL — SIGNIFICANT CHANGE UP (ref 13–17)
HGB BLD-MCNC: 14.8 G/DL — SIGNIFICANT CHANGE UP (ref 13–17)
HGB BLD-MCNC: 15.1 G/DL — SIGNIFICANT CHANGE UP (ref 13–17)
HGB BLD-MCNC: 7.8 G/DL — LOW (ref 13–17)
HGB BLD-MCNC: 8.2 G/DL — LOW (ref 13–17)
HGB BLD-MCNC: 8.8 G/DL — LOW (ref 13–17)
HGB BLD-MCNC: 8.8 G/DL — LOW (ref 13–17)
HGB BLD-MCNC: 9.4 G/DL — LOW (ref 13–17)
HGB BLD-MCNC: 9.4 G/DL — LOW (ref 13–17)
HGB BLD-MCNC: 9.5 G/DL — LOW (ref 13–17)
IANC: 10.72 K/UL — HIGH (ref 1.8–7.4)
IANC: 11.3 K/UL — HIGH (ref 1.8–7.4)
IANC: 11.87 K/UL — HIGH (ref 1.8–7.4)
IANC: 12.44 K/UL — HIGH (ref 1.8–7.4)
IANC: 12.62 K/UL — HIGH (ref 1.8–7.4)
IANC: 16.35 K/UL — HIGH (ref 1.8–7.4)
IANC: 17.65 K/UL — HIGH (ref 1.8–7.4)
IANC: 20.72 K/UL — HIGH (ref 1.8–7.4)
IANC: 5.81 K/UL — SIGNIFICANT CHANGE UP (ref 1.8–7.4)
IANC: 6.73 K/UL — SIGNIFICANT CHANGE UP (ref 1.8–7.4)
IANC: 8.6 K/UL — HIGH (ref 1.8–7.4)
IANC: 9.23 K/UL — HIGH (ref 1.8–7.4)
IANC: 9.24 K/UL — HIGH (ref 1.8–7.4)
IANC: 9.42 K/UL — HIGH (ref 1.8–7.4)
IANC: 9.49 K/UL — HIGH (ref 1.8–7.4)
IMM GRANULOCYTES NFR BLD AUTO: 1.2 % — HIGH (ref 0–0.9)
IMM GRANULOCYTES NFR BLD AUTO: 1.7 % — HIGH (ref 0–0.9)
IMM GRANULOCYTES NFR BLD AUTO: 1.7 % — HIGH (ref 0–0.9)
IMM GRANULOCYTES NFR BLD AUTO: 1.8 % — HIGH (ref 0–0.9)
IMM GRANULOCYTES NFR BLD AUTO: 10.1 % — HIGH (ref 0–0.9)
IMM GRANULOCYTES NFR BLD AUTO: 2.4 % — HIGH (ref 0–0.9)
IMM GRANULOCYTES NFR BLD AUTO: 3 % — HIGH (ref 0–0.9)
IMM GRANULOCYTES NFR BLD AUTO: 5.5 % — HIGH (ref 0–0.9)
IMM GRANULOCYTES NFR BLD AUTO: 8.1 % — HIGH (ref 0–0.9)
INR BLD: 0.94 RATIO — SIGNIFICANT CHANGE UP (ref 0.85–1.16)
INR BLD: <0.9 RATIO — SIGNIFICANT CHANGE UP (ref 0.85–1.16)
INR BLD: <0.9 RATIO — SIGNIFICANT CHANGE UP (ref 0.85–1.16)
KETONES UR-MCNC: 15 MG/DL
LEUKOCYTE ESTERASE UR-ACNC: ABNORMAL
LYMPHOCYTES # BLD AUTO: 0.33 K/UL — LOW (ref 1–3.3)
LYMPHOCYTES # BLD AUTO: 0.57 K/UL — LOW (ref 1–3.3)
LYMPHOCYTES # BLD AUTO: 0.7 K/UL — LOW (ref 1–3.3)
LYMPHOCYTES # BLD AUTO: 0.76 K/UL — LOW (ref 1–3.3)
LYMPHOCYTES # BLD AUTO: 0.81 K/UL — LOW (ref 1–3.3)
LYMPHOCYTES # BLD AUTO: 0.86 K/UL — LOW (ref 1–3.3)
LYMPHOCYTES # BLD AUTO: 0.99 K/UL — LOW (ref 1–3.3)
LYMPHOCYTES # BLD AUTO: 1.09 K/UL — SIGNIFICANT CHANGE UP (ref 1–3.3)
LYMPHOCYTES # BLD AUTO: 1.23 K/UL — SIGNIFICANT CHANGE UP (ref 1–3.3)
LYMPHOCYTES # BLD AUTO: 1.4 K/UL — SIGNIFICANT CHANGE UP (ref 1–3.3)
LYMPHOCYTES # BLD AUTO: 1.4 K/UL — SIGNIFICANT CHANGE UP (ref 1–3.3)
LYMPHOCYTES # BLD AUTO: 1.6 K/UL — SIGNIFICANT CHANGE UP (ref 1–3.3)
LYMPHOCYTES # BLD AUTO: 1.78 K/UL — SIGNIFICANT CHANGE UP (ref 1–3.3)
LYMPHOCYTES # BLD AUTO: 1.97 K/UL — SIGNIFICANT CHANGE UP (ref 1–3.3)
LYMPHOCYTES # BLD AUTO: 10.1 % — LOW (ref 13–44)
LYMPHOCYTES # BLD AUTO: 10.5 % — LOW (ref 13–44)
LYMPHOCYTES # BLD AUTO: 12.9 % — LOW (ref 13–44)
LYMPHOCYTES # BLD AUTO: 13.4 % — SIGNIFICANT CHANGE UP (ref 13–44)
LYMPHOCYTES # BLD AUTO: 14.3 % — SIGNIFICANT CHANGE UP (ref 13–44)
LYMPHOCYTES # BLD AUTO: 15.5 % — SIGNIFICANT CHANGE UP (ref 13–44)
LYMPHOCYTES # BLD AUTO: 2.27 K/UL — SIGNIFICANT CHANGE UP (ref 1–3.3)
LYMPHOCYTES # BLD AUTO: 2.7 % — LOW (ref 13–44)
LYMPHOCYTES # BLD AUTO: 3.9 % — LOW (ref 13–44)
LYMPHOCYTES # BLD AUTO: 4.3 % — LOW (ref 13–44)
LYMPHOCYTES # BLD AUTO: 4.7 % — LOW (ref 13–44)
LYMPHOCYTES # BLD AUTO: 5.5 % — LOW (ref 13–44)
LYMPHOCYTES # BLD AUTO: 5.9 % — LOW (ref 13–44)
LYMPHOCYTES # BLD AUTO: 7.1 % — LOW (ref 13–44)
LYMPHOCYTES # BLD AUTO: 8.8 % — LOW (ref 13–44)
LYMPHOCYTES # BLD AUTO: 9.1 % — LOW (ref 13–44)
MACROCYTES BLD QL: SLIGHT — SIGNIFICANT CHANGE UP
MAGNESIUM SERPL-MCNC: 1.8 MG/DL — SIGNIFICANT CHANGE UP (ref 1.6–2.6)
MAGNESIUM SERPL-MCNC: 1.8 MG/DL — SIGNIFICANT CHANGE UP (ref 1.6–2.6)
MAGNESIUM SERPL-MCNC: 1.9 MG/DL — SIGNIFICANT CHANGE UP (ref 1.6–2.6)
MAGNESIUM SERPL-MCNC: 2 MG/DL — SIGNIFICANT CHANGE UP (ref 1.6–2.6)
MAGNESIUM SERPL-MCNC: 2.1 MG/DL — SIGNIFICANT CHANGE UP (ref 1.6–2.6)
MAGNESIUM SERPL-MCNC: 2.1 MG/DL — SIGNIFICANT CHANGE UP (ref 1.6–2.6)
MAGNESIUM SERPL-MCNC: 2.2 MG/DL — SIGNIFICANT CHANGE UP (ref 1.6–2.6)
MAGNESIUM SERPL-MCNC: 2.3 MG/DL — SIGNIFICANT CHANGE UP (ref 1.6–2.6)
MAGNESIUM SERPL-MCNC: 2.4 MG/DL — SIGNIFICANT CHANGE UP (ref 1.6–2.6)
MANUAL SMEAR VERIFICATION: SIGNIFICANT CHANGE UP
MCHC RBC-ENTMCNC: 29.7 PG — SIGNIFICANT CHANGE UP (ref 27–34)
MCHC RBC-ENTMCNC: 29.7 PG — SIGNIFICANT CHANGE UP (ref 27–34)
MCHC RBC-ENTMCNC: 29.8 PG — SIGNIFICANT CHANGE UP (ref 27–34)
MCHC RBC-ENTMCNC: 29.9 PG — SIGNIFICANT CHANGE UP (ref 27–34)
MCHC RBC-ENTMCNC: 30 PG — SIGNIFICANT CHANGE UP (ref 27–34)
MCHC RBC-ENTMCNC: 30.1 PG — SIGNIFICANT CHANGE UP (ref 27–34)
MCHC RBC-ENTMCNC: 30.2 PG — SIGNIFICANT CHANGE UP (ref 27–34)
MCHC RBC-ENTMCNC: 30.2 PG — SIGNIFICANT CHANGE UP (ref 27–34)
MCHC RBC-ENTMCNC: 30.3 PG — SIGNIFICANT CHANGE UP (ref 27–34)
MCHC RBC-ENTMCNC: 30.4 PG — SIGNIFICANT CHANGE UP (ref 27–34)
MCHC RBC-ENTMCNC: 30.5 PG — SIGNIFICANT CHANGE UP (ref 27–34)
MCHC RBC-ENTMCNC: 30.6 PG — SIGNIFICANT CHANGE UP (ref 27–34)
MCHC RBC-ENTMCNC: 30.6 PG — SIGNIFICANT CHANGE UP (ref 27–34)
MCHC RBC-ENTMCNC: 30.7 PG — SIGNIFICANT CHANGE UP (ref 27–34)
MCHC RBC-ENTMCNC: 30.8 PG — SIGNIFICANT CHANGE UP (ref 27–34)
MCHC RBC-ENTMCNC: 30.8 PG — SIGNIFICANT CHANGE UP (ref 27–34)
MCHC RBC-ENTMCNC: 31 PG — SIGNIFICANT CHANGE UP (ref 27–34)
MCHC RBC-ENTMCNC: 31.4 G/DL — LOW (ref 32–36)
MCHC RBC-ENTMCNC: 32.5 G/DL — SIGNIFICANT CHANGE UP (ref 32–36)
MCHC RBC-ENTMCNC: 32.6 G/DL — SIGNIFICANT CHANGE UP (ref 32–36)
MCHC RBC-ENTMCNC: 32.8 G/DL — SIGNIFICANT CHANGE UP (ref 32–36)
MCHC RBC-ENTMCNC: 32.9 G/DL — SIGNIFICANT CHANGE UP (ref 32–36)
MCHC RBC-ENTMCNC: 33 G/DL — SIGNIFICANT CHANGE UP (ref 32–36)
MCHC RBC-ENTMCNC: 33.1 G/DL — SIGNIFICANT CHANGE UP (ref 32–36)
MCHC RBC-ENTMCNC: 33.2 G/DL — SIGNIFICANT CHANGE UP (ref 32–36)
MCHC RBC-ENTMCNC: 33.4 G/DL — SIGNIFICANT CHANGE UP (ref 32–36)
MCHC RBC-ENTMCNC: 33.4 G/DL — SIGNIFICANT CHANGE UP (ref 32–36)
MCHC RBC-ENTMCNC: 33.6 G/DL — SIGNIFICANT CHANGE UP (ref 32–36)
MCHC RBC-ENTMCNC: 33.6 G/DL — SIGNIFICANT CHANGE UP (ref 32–36)
MCHC RBC-ENTMCNC: 33.7 G/DL — SIGNIFICANT CHANGE UP (ref 32–36)
MCHC RBC-ENTMCNC: 33.8 G/DL — SIGNIFICANT CHANGE UP (ref 32–36)
MCHC RBC-ENTMCNC: 33.9 G/DL — SIGNIFICANT CHANGE UP (ref 32–36)
MCHC RBC-ENTMCNC: 34.1 G/DL — SIGNIFICANT CHANGE UP (ref 32–36)
MCV RBC AUTO: 88.1 FL — SIGNIFICANT CHANGE UP (ref 80–100)
MCV RBC AUTO: 88.5 FL — SIGNIFICANT CHANGE UP (ref 80–100)
MCV RBC AUTO: 88.9 FL — SIGNIFICANT CHANGE UP (ref 80–100)
MCV RBC AUTO: 89.2 FL — SIGNIFICANT CHANGE UP (ref 80–100)
MCV RBC AUTO: 89.3 FL — SIGNIFICANT CHANGE UP (ref 80–100)
MCV RBC AUTO: 89.3 FL — SIGNIFICANT CHANGE UP (ref 80–100)
MCV RBC AUTO: 89.5 FL — SIGNIFICANT CHANGE UP (ref 80–100)
MCV RBC AUTO: 89.5 FL — SIGNIFICANT CHANGE UP (ref 80–100)
MCV RBC AUTO: 89.6 FL — SIGNIFICANT CHANGE UP (ref 80–100)
MCV RBC AUTO: 89.7 FL — SIGNIFICANT CHANGE UP (ref 80–100)
MCV RBC AUTO: 90 FL — SIGNIFICANT CHANGE UP (ref 80–100)
MCV RBC AUTO: 90.1 FL — SIGNIFICANT CHANGE UP (ref 80–100)
MCV RBC AUTO: 90.2 FL — SIGNIFICANT CHANGE UP (ref 80–100)
MCV RBC AUTO: 90.2 FL — SIGNIFICANT CHANGE UP (ref 80–100)
MCV RBC AUTO: 90.9 FL — SIGNIFICANT CHANGE UP (ref 80–100)
MCV RBC AUTO: 91 FL — SIGNIFICANT CHANGE UP (ref 80–100)
MCV RBC AUTO: 91.3 FL — SIGNIFICANT CHANGE UP (ref 80–100)
MCV RBC AUTO: 91.5 FL — SIGNIFICANT CHANGE UP (ref 80–100)
MCV RBC AUTO: 91.9 FL — SIGNIFICANT CHANGE UP (ref 80–100)
MCV RBC AUTO: 92.3 FL — SIGNIFICANT CHANGE UP (ref 80–100)
MCV RBC AUTO: 92.7 FL — SIGNIFICANT CHANGE UP (ref 80–100)
MCV RBC AUTO: 93.3 FL — SIGNIFICANT CHANGE UP (ref 80–100)
MCV RBC AUTO: 93.8 FL — SIGNIFICANT CHANGE UP (ref 80–100)
MCV RBC AUTO: 94 FL — SIGNIFICANT CHANGE UP (ref 80–100)
MCV RBC AUTO: 95.5 FL — SIGNIFICANT CHANGE UP (ref 80–100)
METAMYELOCYTES # FLD: 1.9 % — HIGH (ref 0–1)
METAMYELOCYTES # FLD: 1.9 % — HIGH (ref 0–1)
METAMYELOCYTES NFR BLD: 1.9 % — HIGH (ref 0–1)
METAMYELOCYTES NFR BLD: 1.9 % — HIGH (ref 0–1)
METHOD TYPE: SIGNIFICANT CHANGE UP
MONOCYTES # BLD AUTO: 0.22 K/UL — SIGNIFICANT CHANGE UP (ref 0–0.9)
MONOCYTES # BLD AUTO: 0.4 K/UL — SIGNIFICANT CHANGE UP (ref 0–0.9)
MONOCYTES # BLD AUTO: 0.43 K/UL — SIGNIFICANT CHANGE UP (ref 0–0.9)
MONOCYTES # BLD AUTO: 0.46 K/UL — SIGNIFICANT CHANGE UP (ref 0–0.9)
MONOCYTES # BLD AUTO: 0.54 K/UL — SIGNIFICANT CHANGE UP (ref 0–0.9)
MONOCYTES # BLD AUTO: 0.62 K/UL — SIGNIFICANT CHANGE UP (ref 0–0.9)
MONOCYTES # BLD AUTO: 0.63 K/UL — SIGNIFICANT CHANGE UP (ref 0–0.9)
MONOCYTES # BLD AUTO: 0.7 K/UL — SIGNIFICANT CHANGE UP (ref 0–0.9)
MONOCYTES # BLD AUTO: 0.84 K/UL — SIGNIFICANT CHANGE UP (ref 0–0.9)
MONOCYTES # BLD AUTO: 0.92 K/UL — HIGH (ref 0–0.9)
MONOCYTES # BLD AUTO: 0.97 K/UL — HIGH (ref 0–0.9)
MONOCYTES # BLD AUTO: 0.99 K/UL — HIGH (ref 0–0.9)
MONOCYTES # BLD AUTO: 1.18 K/UL — HIGH (ref 0–0.9)
MONOCYTES # BLD AUTO: 1.27 K/UL — HIGH (ref 0–0.9)
MONOCYTES # BLD AUTO: 1.75 K/UL — HIGH (ref 0–0.9)
MONOCYTES NFR BLD AUTO: 1.8 % — LOW (ref 2–14)
MONOCYTES NFR BLD AUTO: 13.7 % — SIGNIFICANT CHANGE UP (ref 2–14)
MONOCYTES NFR BLD AUTO: 3.1 % — SIGNIFICANT CHANGE UP (ref 2–14)
MONOCYTES NFR BLD AUTO: 3.2 % — SIGNIFICANT CHANGE UP (ref 2–14)
MONOCYTES NFR BLD AUTO: 3.5 % — SIGNIFICANT CHANGE UP (ref 2–14)
MONOCYTES NFR BLD AUTO: 4.2 % — SIGNIFICANT CHANGE UP (ref 2–14)
MONOCYTES NFR BLD AUTO: 5.7 % — SIGNIFICANT CHANGE UP (ref 2–14)
MONOCYTES NFR BLD AUTO: 5.7 % — SIGNIFICANT CHANGE UP (ref 2–14)
MONOCYTES NFR BLD AUTO: 6 % — SIGNIFICANT CHANGE UP (ref 2–14)
MONOCYTES NFR BLD AUTO: 6.5 % — SIGNIFICANT CHANGE UP (ref 2–14)
MONOCYTES NFR BLD AUTO: 8.1 % — SIGNIFICANT CHANGE UP (ref 2–14)
MONOCYTES NFR BLD AUTO: 8.6 % — SIGNIFICANT CHANGE UP (ref 2–14)
MONOCYTES NFR BLD AUTO: 8.7 % — SIGNIFICANT CHANGE UP (ref 2–14)
MYELOCYTES NFR BLD: 0.9 % — HIGH (ref 0–0)
MYELOCYTES NFR BLD: 2.8 % — HIGH (ref 0–0)
MYELOCYTES NFR BLD: 3.8 % — HIGH (ref 0–0)
NEUTROPHILS # BLD AUTO: 11.26 K/UL — HIGH (ref 1.8–7.4)
NEUTROPHILS # BLD AUTO: 11.3 K/UL — HIGH (ref 1.8–7.4)
NEUTROPHILS # BLD AUTO: 11.37 K/UL — HIGH (ref 1.8–7.4)
NEUTROPHILS # BLD AUTO: 11.87 K/UL — HIGH (ref 1.8–7.4)
NEUTROPHILS # BLD AUTO: 12.62 K/UL — HIGH (ref 1.8–7.4)
NEUTROPHILS # BLD AUTO: 13.68 K/UL — HIGH (ref 1.8–7.4)
NEUTROPHILS # BLD AUTO: 16.35 K/UL — HIGH (ref 1.8–7.4)
NEUTROPHILS # BLD AUTO: 18.31 K/UL — HIGH (ref 1.8–7.4)
NEUTROPHILS # BLD AUTO: 20.72 K/UL — HIGH (ref 1.8–7.4)
NEUTROPHILS # BLD AUTO: 5.81 K/UL — SIGNIFICANT CHANGE UP (ref 1.8–7.4)
NEUTROPHILS # BLD AUTO: 6.73 K/UL — SIGNIFICANT CHANGE UP (ref 1.8–7.4)
NEUTROPHILS # BLD AUTO: 9.23 K/UL — HIGH (ref 1.8–7.4)
NEUTROPHILS # BLD AUTO: 9.42 K/UL — HIGH (ref 1.8–7.4)
NEUTROPHILS # BLD AUTO: 9.86 K/UL — HIGH (ref 1.8–7.4)
NEUTROPHILS # BLD AUTO: 9.98 K/UL — HIGH (ref 1.8–7.4)
NEUTROPHILS NFR BLD AUTO: 68.2 % — SIGNIFICANT CHANGE UP (ref 43–77)
NEUTROPHILS NFR BLD AUTO: 74.2 % — SIGNIFICANT CHANGE UP (ref 43–77)
NEUTROPHILS NFR BLD AUTO: 74.5 % — SIGNIFICANT CHANGE UP (ref 43–77)
NEUTROPHILS NFR BLD AUTO: 74.7 % — SIGNIFICANT CHANGE UP (ref 43–77)
NEUTROPHILS NFR BLD AUTO: 76 % — SIGNIFICANT CHANGE UP (ref 43–77)
NEUTROPHILS NFR BLD AUTO: 77.1 % — HIGH (ref 43–77)
NEUTROPHILS NFR BLD AUTO: 77.9 % — HIGH (ref 43–77)
NEUTROPHILS NFR BLD AUTO: 83.9 % — HIGH (ref 43–77)
NEUTROPHILS NFR BLD AUTO: 84 % — HIGH (ref 43–77)
NEUTROPHILS NFR BLD AUTO: 84.7 % — HIGH (ref 43–77)
NEUTROPHILS NFR BLD AUTO: 85.1 % — HIGH (ref 43–77)
NEUTROPHILS NFR BLD AUTO: 85.8 % — HIGH (ref 43–77)
NEUTROPHILS NFR BLD AUTO: 87.4 % — HIGH (ref 43–77)
NEUTROPHILS NFR BLD AUTO: 89.6 % — HIGH (ref 43–77)
NEUTROPHILS NFR BLD AUTO: 91.9 % — HIGH (ref 43–77)
NEUTS BAND # BLD: 0.9 % — SIGNIFICANT CHANGE UP (ref 0–6)
NEUTS BAND # BLD: 1.7 % — SIGNIFICANT CHANGE UP (ref 0–6)
NEUTS BAND # BLD: 1.8 % — SIGNIFICANT CHANGE UP (ref 0–6)
NEUTS BAND NFR BLD: 0.9 % — SIGNIFICANT CHANGE UP (ref 0–6)
NEUTS BAND NFR BLD: 1.7 % — SIGNIFICANT CHANGE UP (ref 0–6)
NEUTS BAND NFR BLD: 1.8 % — SIGNIFICANT CHANGE UP (ref 0–6)
NITRITE UR-MCNC: NEGATIVE — SIGNIFICANT CHANGE UP
NRBC # BLD AUTO: 0 K/UL — SIGNIFICANT CHANGE UP (ref 0–0)
NRBC # BLD AUTO: 0.02 K/UL — HIGH (ref 0–0)
NRBC # FLD: 0 K/UL — SIGNIFICANT CHANGE UP (ref 0–0)
NRBC # FLD: 0.02 K/UL — HIGH (ref 0–0)
NRBC BLD AUTO-RTO: 0 /100 WBCS — SIGNIFICANT CHANGE UP (ref 0–0)
ORGANISM # SPEC MICROSCOPIC CNT: ABNORMAL
ORGANISM # SPEC MICROSCOPIC CNT: ABNORMAL
OVALOCYTES BLD QL SMEAR: SIGNIFICANT CHANGE UP
OVALOCYTES BLD QL SMEAR: SLIGHT — SIGNIFICANT CHANGE UP
OVALOCYTES BLD QL SMEAR: SLIGHT — SIGNIFICANT CHANGE UP
PH UR: 5.5 — SIGNIFICANT CHANGE UP (ref 5–8)
PHOSPHATE SERPL-MCNC: 1.9 MG/DL — LOW (ref 2.5–4.5)
PHOSPHATE SERPL-MCNC: 2.1 MG/DL — LOW (ref 2.5–4.5)
PHOSPHATE SERPL-MCNC: 2.1 MG/DL — LOW (ref 2.5–4.5)
PHOSPHATE SERPL-MCNC: 2.2 MG/DL — LOW (ref 2.5–4.5)
PHOSPHATE SERPL-MCNC: 2.2 MG/DL — LOW (ref 2.5–4.5)
PHOSPHATE SERPL-MCNC: 2.5 MG/DL — SIGNIFICANT CHANGE UP (ref 2.5–4.5)
PHOSPHATE SERPL-MCNC: 2.5 MG/DL — SIGNIFICANT CHANGE UP (ref 2.5–4.5)
PHOSPHATE SERPL-MCNC: 2.6 MG/DL — SIGNIFICANT CHANGE UP (ref 2.5–4.5)
PHOSPHATE SERPL-MCNC: 2.7 MG/DL — SIGNIFICANT CHANGE UP (ref 2.5–4.5)
PHOSPHATE SERPL-MCNC: 2.7 MG/DL — SIGNIFICANT CHANGE UP (ref 2.5–4.5)
PHOSPHATE SERPL-MCNC: 2.8 MG/DL — SIGNIFICANT CHANGE UP (ref 2.5–4.5)
PHOSPHATE SERPL-MCNC: 2.8 MG/DL — SIGNIFICANT CHANGE UP (ref 2.5–4.5)
PHOSPHATE SERPL-MCNC: 2.9 MG/DL — SIGNIFICANT CHANGE UP (ref 2.5–4.5)
PHOSPHATE SERPL-MCNC: 3 MG/DL — SIGNIFICANT CHANGE UP (ref 2.5–4.5)
PHOSPHATE SERPL-MCNC: 3.1 MG/DL — SIGNIFICANT CHANGE UP (ref 2.5–4.5)
PHOSPHATE SERPL-MCNC: 3.1 MG/DL — SIGNIFICANT CHANGE UP (ref 2.5–4.5)
PHOSPHATE SERPL-MCNC: 3.2 MG/DL — SIGNIFICANT CHANGE UP (ref 2.5–4.5)
PHOSPHATE SERPL-MCNC: 3.3 MG/DL — SIGNIFICANT CHANGE UP (ref 2.5–4.5)
PHOSPHATE SERPL-MCNC: 3.7 MG/DL — SIGNIFICANT CHANGE UP (ref 2.5–4.5)
PHOSPHATE SERPL-MCNC: 4.7 MG/DL — HIGH (ref 2.5–4.5)
PLAT MORPH BLD: ABNORMAL
PLAT MORPH BLD: ABNORMAL
PLAT MORPH BLD: NORMAL — SIGNIFICANT CHANGE UP
PLATELET # BLD AUTO: 110 K/UL — LOW (ref 150–400)
PLATELET # BLD AUTO: 124 K/UL — LOW (ref 150–400)
PLATELET # BLD AUTO: 131 K/UL — LOW (ref 150–400)
PLATELET # BLD AUTO: 144 K/UL — LOW (ref 150–400)
PLATELET # BLD AUTO: 154 K/UL — SIGNIFICANT CHANGE UP (ref 150–400)
PLATELET # BLD AUTO: 157 K/UL — SIGNIFICANT CHANGE UP (ref 150–400)
PLATELET # BLD AUTO: 166 K/UL — SIGNIFICANT CHANGE UP (ref 150–400)
PLATELET # BLD AUTO: 173 K/UL — SIGNIFICANT CHANGE UP (ref 150–400)
PLATELET # BLD AUTO: 177 K/UL — SIGNIFICANT CHANGE UP (ref 150–400)
PLATELET # BLD AUTO: 185 K/UL — SIGNIFICANT CHANGE UP (ref 150–400)
PLATELET # BLD AUTO: 193 K/UL — SIGNIFICANT CHANGE UP (ref 150–400)
PLATELET # BLD AUTO: 195 K/UL — SIGNIFICANT CHANGE UP (ref 150–400)
PLATELET # BLD AUTO: 197 K/UL — SIGNIFICANT CHANGE UP (ref 150–400)
PLATELET # BLD AUTO: 198 K/UL — SIGNIFICANT CHANGE UP (ref 150–400)
PLATELET # BLD AUTO: 202 K/UL — SIGNIFICANT CHANGE UP (ref 150–400)
PLATELET # BLD AUTO: 205 K/UL — SIGNIFICANT CHANGE UP (ref 150–400)
PLATELET # BLD AUTO: 207 K/UL — SIGNIFICANT CHANGE UP (ref 150–400)
PLATELET # BLD AUTO: 219 K/UL — SIGNIFICANT CHANGE UP (ref 150–400)
PLATELET # BLD AUTO: 221 K/UL — SIGNIFICANT CHANGE UP (ref 150–400)
PLATELET # BLD AUTO: 225 K/UL — SIGNIFICANT CHANGE UP (ref 150–400)
PLATELET # BLD AUTO: 258 K/UL — SIGNIFICANT CHANGE UP (ref 150–400)
PLATELET # BLD AUTO: 259 K/UL — SIGNIFICANT CHANGE UP (ref 150–400)
PLATELET # BLD AUTO: 314 K/UL — SIGNIFICANT CHANGE UP (ref 150–400)
PLATELET # BLD AUTO: 318 K/UL — SIGNIFICANT CHANGE UP (ref 150–400)
PLATELET # BLD AUTO: 351 K/UL — SIGNIFICANT CHANGE UP (ref 150–400)
PLATELET # BLD AUTO: 352 K/UL — SIGNIFICANT CHANGE UP (ref 150–400)
PLATELET # BLD AUTO: 381 K/UL — SIGNIFICANT CHANGE UP (ref 150–400)
PLATELET # BLD AUTO: 86 K/UL — LOW (ref 150–400)
PLATELET # BLD AUTO: 92 K/UL — LOW (ref 150–400)
PLATELET COUNT - ESTIMATE: ABNORMAL
PLATELET COUNT - ESTIMATE: ABNORMAL
PLATELET COUNT - ESTIMATE: NORMAL — SIGNIFICANT CHANGE UP
POIKILOCYTOSIS BLD QL AUTO: SIGNIFICANT CHANGE UP
POIKILOCYTOSIS BLD QL AUTO: SLIGHT — SIGNIFICANT CHANGE UP
POIKILOCYTOSIS BLD QL AUTO: SLIGHT — SIGNIFICANT CHANGE UP
POLYCHROMASIA BLD QL SMEAR: SIGNIFICANT CHANGE UP
POLYCHROMASIA BLD QL SMEAR: SIGNIFICANT CHANGE UP
POLYCHROMASIA BLD QL SMEAR: SLIGHT — SIGNIFICANT CHANGE UP
POTASSIUM SERPL-MCNC: 3.6 MMOL/L — SIGNIFICANT CHANGE UP (ref 3.5–5.3)
POTASSIUM SERPL-MCNC: 3.7 MMOL/L — SIGNIFICANT CHANGE UP (ref 3.5–5.3)
POTASSIUM SERPL-MCNC: 3.7 MMOL/L — SIGNIFICANT CHANGE UP (ref 3.5–5.3)
POTASSIUM SERPL-MCNC: 3.8 MMOL/L — SIGNIFICANT CHANGE UP (ref 3.5–5.3)
POTASSIUM SERPL-MCNC: 3.9 MMOL/L — SIGNIFICANT CHANGE UP (ref 3.5–5.3)
POTASSIUM SERPL-MCNC: 3.9 MMOL/L — SIGNIFICANT CHANGE UP (ref 3.5–5.3)
POTASSIUM SERPL-MCNC: 4 MMOL/L — SIGNIFICANT CHANGE UP (ref 3.5–5.3)
POTASSIUM SERPL-MCNC: 4 MMOL/L — SIGNIFICANT CHANGE UP (ref 3.5–5.3)
POTASSIUM SERPL-MCNC: 4.1 MMOL/L — SIGNIFICANT CHANGE UP (ref 3.5–5.3)
POTASSIUM SERPL-MCNC: 4.2 MMOL/L — SIGNIFICANT CHANGE UP (ref 3.5–5.3)
POTASSIUM SERPL-MCNC: 4.3 MMOL/L — SIGNIFICANT CHANGE UP (ref 3.5–5.3)
POTASSIUM SERPL-MCNC: 4.4 MMOL/L — SIGNIFICANT CHANGE UP (ref 3.5–5.3)
POTASSIUM SERPL-MCNC: 4.5 MMOL/L — SIGNIFICANT CHANGE UP (ref 3.5–5.3)
POTASSIUM SERPL-MCNC: 4.7 MMOL/L — SIGNIFICANT CHANGE UP (ref 3.5–5.3)
POTASSIUM SERPL-MCNC: 4.8 MMOL/L — SIGNIFICANT CHANGE UP (ref 3.5–5.3)
POTASSIUM SERPL-MCNC: 5.1 MMOL/L — SIGNIFICANT CHANGE UP (ref 3.5–5.3)
POTASSIUM SERPL-SCNC: 3.6 MMOL/L — SIGNIFICANT CHANGE UP (ref 3.5–5.3)
POTASSIUM SERPL-SCNC: 3.7 MMOL/L — SIGNIFICANT CHANGE UP (ref 3.5–5.3)
POTASSIUM SERPL-SCNC: 3.7 MMOL/L — SIGNIFICANT CHANGE UP (ref 3.5–5.3)
POTASSIUM SERPL-SCNC: 3.8 MMOL/L — SIGNIFICANT CHANGE UP (ref 3.5–5.3)
POTASSIUM SERPL-SCNC: 3.9 MMOL/L — SIGNIFICANT CHANGE UP (ref 3.5–5.3)
POTASSIUM SERPL-SCNC: 3.9 MMOL/L — SIGNIFICANT CHANGE UP (ref 3.5–5.3)
POTASSIUM SERPL-SCNC: 4 MMOL/L — SIGNIFICANT CHANGE UP (ref 3.5–5.3)
POTASSIUM SERPL-SCNC: 4 MMOL/L — SIGNIFICANT CHANGE UP (ref 3.5–5.3)
POTASSIUM SERPL-SCNC: 4.1 MMOL/L — SIGNIFICANT CHANGE UP (ref 3.5–5.3)
POTASSIUM SERPL-SCNC: 4.2 MMOL/L — SIGNIFICANT CHANGE UP (ref 3.5–5.3)
POTASSIUM SERPL-SCNC: 4.3 MMOL/L — SIGNIFICANT CHANGE UP (ref 3.5–5.3)
POTASSIUM SERPL-SCNC: 4.4 MMOL/L — SIGNIFICANT CHANGE UP (ref 3.5–5.3)
POTASSIUM SERPL-SCNC: 4.5 MMOL/L — SIGNIFICANT CHANGE UP (ref 3.5–5.3)
POTASSIUM SERPL-SCNC: 4.7 MMOL/L — SIGNIFICANT CHANGE UP (ref 3.5–5.3)
POTASSIUM SERPL-SCNC: 4.8 MMOL/L — SIGNIFICANT CHANGE UP (ref 3.5–5.3)
POTASSIUM SERPL-SCNC: 5.1 MMOL/L — SIGNIFICANT CHANGE UP (ref 3.5–5.3)
PROT SERPL-MCNC: 4.4 G/DL — LOW (ref 6–8.3)
PROT SERPL-MCNC: 4.5 G/DL — LOW (ref 6–8.3)
PROT SERPL-MCNC: 4.5 G/DL — LOW (ref 6–8.3)
PROT SERPL-MCNC: 4.8 G/DL — LOW (ref 6–8.3)
PROT SERPL-MCNC: 5 G/DL — LOW (ref 6–8.3)
PROT SERPL-MCNC: 5.6 G/DL — LOW (ref 6–8.3)
PROT SERPL-MCNC: 6.5 G/DL — SIGNIFICANT CHANGE UP (ref 6–8.3)
PROT UR-MCNC: NEGATIVE MG/DL — SIGNIFICANT CHANGE UP
PROTHROM AB SERPL-ACNC: 10.5 SEC — SIGNIFICANT CHANGE UP (ref 9.9–13.4)
PROTHROM AB SERPL-ACNC: 10.6 SEC — SIGNIFICANT CHANGE UP (ref 9.9–13.4)
PROTHROM AB SERPL-ACNC: 11.2 SEC — SIGNIFICANT CHANGE UP (ref 9.9–13.4)
RBC # BLD: 2.52 M/UL — LOW (ref 4.2–5.8)
RBC # BLD: 2.66 M/UL — LOW (ref 4.2–5.8)
RBC # BLD: 2.88 M/UL — LOW (ref 4.2–5.8)
RBC # BLD: 2.95 M/UL — LOW (ref 4.2–5.8)
RBC # BLD: 3.1 M/UL — LOW (ref 4.2–5.8)
RBC # BLD: 3.12 M/UL — LOW (ref 4.2–5.8)
RBC # BLD: 3.13 M/UL — LOW (ref 4.2–5.8)
RBC # BLD: 3.45 M/UL — LOW (ref 4.2–5.8)
RBC # BLD: 3.81 M/UL — LOW (ref 4.2–5.8)
RBC # BLD: 3.86 M/UL — LOW (ref 4.2–5.8)
RBC # BLD: 3.92 M/UL — LOW (ref 4.2–5.8)
RBC # BLD: 3.99 M/UL — LOW (ref 4.2–5.8)
RBC # BLD: 4.08 M/UL — LOW (ref 4.2–5.8)
RBC # BLD: 4.18 M/UL — LOW (ref 4.2–5.8)
RBC # BLD: 4.19 M/UL — LOW (ref 4.2–5.8)
RBC # BLD: 4.31 M/UL — SIGNIFICANT CHANGE UP (ref 4.2–5.8)
RBC # BLD: 4.36 M/UL — SIGNIFICANT CHANGE UP (ref 4.2–5.8)
RBC # BLD: 4.37 M/UL — SIGNIFICANT CHANGE UP (ref 4.2–5.8)
RBC # BLD: 4.43 M/UL — SIGNIFICANT CHANGE UP (ref 4.2–5.8)
RBC # BLD: 4.45 M/UL — SIGNIFICANT CHANGE UP (ref 4.2–5.8)
RBC # BLD: 4.5 M/UL — SIGNIFICANT CHANGE UP (ref 4.2–5.8)
RBC # BLD: 4.61 M/UL — SIGNIFICANT CHANGE UP (ref 4.2–5.8)
RBC # BLD: 4.67 M/UL — SIGNIFICANT CHANGE UP (ref 4.2–5.8)
RBC # BLD: 4.68 M/UL — SIGNIFICANT CHANGE UP (ref 4.2–5.8)
RBC # BLD: 4.71 M/UL — SIGNIFICANT CHANGE UP (ref 4.2–5.8)
RBC # BLD: 4.71 M/UL — SIGNIFICANT CHANGE UP (ref 4.2–5.8)
RBC # BLD: 4.86 M/UL — SIGNIFICANT CHANGE UP (ref 4.2–5.8)
RBC # BLD: 4.94 M/UL — SIGNIFICANT CHANGE UP (ref 4.2–5.8)
RBC # BLD: 4.98 M/UL — SIGNIFICANT CHANGE UP (ref 4.2–5.8)
RBC # FLD: 14.5 % — SIGNIFICANT CHANGE UP (ref 10.3–14.5)
RBC # FLD: 14.5 % — SIGNIFICANT CHANGE UP (ref 10.3–14.5)
RBC # FLD: 14.6 % — HIGH (ref 10.3–14.5)
RBC # FLD: 14.7 % — HIGH (ref 10.3–14.5)
RBC # FLD: 14.8 % — HIGH (ref 10.3–14.5)
RBC # FLD: 14.8 % — HIGH (ref 10.3–14.5)
RBC # FLD: 15.1 % — HIGH (ref 10.3–14.5)
RBC # FLD: 15.4 % — HIGH (ref 10.3–14.5)
RBC # FLD: 16 % — HIGH (ref 10.3–14.5)
RBC # FLD: 16.1 % — HIGH (ref 10.3–14.5)
RBC # FLD: 16.2 % — HIGH (ref 10.3–14.5)
RBC # FLD: 16.2 % — HIGH (ref 10.3–14.5)
RBC # FLD: 16.6 % — HIGH (ref 10.3–14.5)
RBC # FLD: 16.8 % — HIGH (ref 10.3–14.5)
RBC # FLD: 17 % — HIGH (ref 10.3–14.5)
RBC # FLD: 17.2 % — HIGH (ref 10.3–14.5)
RBC # FLD: 17.7 % — HIGH (ref 10.3–14.5)
RBC # FLD: 17.7 % — HIGH (ref 10.3–14.5)
RBC # FLD: 18.2 % — HIGH (ref 10.3–14.5)
RBC # FLD: 18.6 % — HIGH (ref 10.3–14.5)
RBC # FLD: 18.6 % — HIGH (ref 10.3–14.5)
RBC BLD AUTO: ABNORMAL
RBC CASTS # UR COMP ASSIST: 1 /HPF — SIGNIFICANT CHANGE UP (ref 0–4)
REVIEW: SIGNIFICANT CHANGE UP
RH IG SCN BLD-IMP: NEGATIVE — SIGNIFICANT CHANGE UP
RSV RNA NPH QL NAA+NON-PROBE: SIGNIFICANT CHANGE UP
SARS-COV-2 RNA SPEC QL NAA+PROBE: SIGNIFICANT CHANGE UP
SMUDGE CELLS # BLD: PRESENT — SIGNIFICANT CHANGE UP
SODIUM SERPL-SCNC: 130 MMOL/L — LOW (ref 135–145)
SODIUM SERPL-SCNC: 131 MMOL/L — LOW (ref 135–145)
SODIUM SERPL-SCNC: 132 MMOL/L — LOW (ref 135–145)
SODIUM SERPL-SCNC: 132 MMOL/L — LOW (ref 135–145)
SODIUM SERPL-SCNC: 133 MMOL/L — LOW (ref 135–145)
SODIUM SERPL-SCNC: 134 MMOL/L — LOW (ref 135–145)
SODIUM SERPL-SCNC: 134 MMOL/L — LOW (ref 135–145)
SODIUM SERPL-SCNC: 135 MMOL/L — SIGNIFICANT CHANGE UP (ref 135–145)
SODIUM SERPL-SCNC: 136 MMOL/L — SIGNIFICANT CHANGE UP (ref 135–145)
SODIUM SERPL-SCNC: 137 MMOL/L — SIGNIFICANT CHANGE UP (ref 135–145)
SODIUM SERPL-SCNC: 138 MMOL/L — SIGNIFICANT CHANGE UP (ref 135–145)
SODIUM SERPL-SCNC: 138 MMOL/L — SIGNIFICANT CHANGE UP (ref 135–145)
SODIUM SERPL-SCNC: 140 MMOL/L — SIGNIFICANT CHANGE UP (ref 135–145)
SODIUM SERPL-SCNC: 141 MMOL/L — SIGNIFICANT CHANGE UP (ref 135–145)
SODIUM SERPL-SCNC: 142 MMOL/L — SIGNIFICANT CHANGE UP (ref 135–145)
SODIUM SERPL-SCNC: 142 MMOL/L — SIGNIFICANT CHANGE UP (ref 135–145)
SODIUM SERPL-SCNC: 144 MMOL/L — SIGNIFICANT CHANGE UP (ref 135–145)
SOURCE RESPIRATORY: SIGNIFICANT CHANGE UP
SP GR SPEC: 1.01 — SIGNIFICANT CHANGE UP (ref 1–1.03)
SPECIMEN SOURCE: SIGNIFICANT CHANGE UP
SQUAMOUS # UR AUTO: 1 /HPF — SIGNIFICANT CHANGE UP (ref 0–5)
TRIGL SERPL-MCNC: 104 MG/DL — SIGNIFICANT CHANGE UP
URATE CRY FLD QL MICRO: PRESENT
UROBILINOGEN FLD QL: 0.2 MG/DL — SIGNIFICANT CHANGE UP (ref 0.2–1)
VARIANT LYMPHS # BLD: 0.9 % — SIGNIFICANT CHANGE UP (ref 0–6)
VARIANT LYMPHS # BLD: 1.8 % — SIGNIFICANT CHANGE UP (ref 0–6)
VARIANT LYMPHS # BLD: 3.8 % — SIGNIFICANT CHANGE UP (ref 0–6)
VARIANT LYMPHS NFR BLD MANUAL: 0.9 % — SIGNIFICANT CHANGE UP (ref 0–6)
VARIANT LYMPHS NFR BLD MANUAL: 1.8 % — SIGNIFICANT CHANGE UP (ref 0–6)
VARIANT LYMPHS NFR BLD MANUAL: 3.8 % — SIGNIFICANT CHANGE UP (ref 0–6)
WBC # BLD: 10.09 K/UL — SIGNIFICANT CHANGE UP (ref 3.8–10.5)
WBC # BLD: 10.89 K/UL — HIGH (ref 3.8–10.5)
WBC # BLD: 11.35 K/UL — HIGH (ref 3.8–10.5)
WBC # BLD: 11.39 K/UL — HIGH (ref 3.8–10.5)
WBC # BLD: 12.25 K/UL — HIGH (ref 3.8–10.5)
WBC # BLD: 12.4 K/UL — HIGH (ref 3.8–10.5)
WBC # BLD: 12.64 K/UL — HIGH (ref 3.8–10.5)
WBC # BLD: 12.67 K/UL — HIGH (ref 3.8–10.5)
WBC # BLD: 12.79 K/UL — HIGH (ref 3.8–10.5)
WBC # BLD: 13.4 K/UL — HIGH (ref 3.8–10.5)
WBC # BLD: 13.5 K/UL — HIGH (ref 3.8–10.5)
WBC # BLD: 13.8 K/UL — HIGH (ref 3.8–10.5)
WBC # BLD: 13.96 K/UL — HIGH (ref 3.8–10.5)
WBC # BLD: 14.5 K/UL — HIGH (ref 3.8–10.5)
WBC # BLD: 14.6 K/UL — HIGH (ref 3.8–10.5)
WBC # BLD: 14.79 K/UL — HIGH (ref 3.8–10.5)
WBC # BLD: 15.15 K/UL — HIGH (ref 3.8–10.5)
WBC # BLD: 15.22 K/UL — HIGH (ref 3.8–10.5)
WBC # BLD: 15.46 K/UL — HIGH (ref 3.8–10.5)
WBC # BLD: 16.11 K/UL — HIGH (ref 3.8–10.5)
WBC # BLD: 16.91 K/UL — HIGH (ref 3.8–10.5)
WBC # BLD: 19.49 K/UL — HIGH (ref 3.8–10.5)
WBC # BLD: 20.05 K/UL — HIGH (ref 3.8–10.5)
WBC # BLD: 21.58 K/UL — HIGH (ref 3.8–10.5)
WBC # BLD: 23.73 K/UL — HIGH (ref 3.8–10.5)
WBC # BLD: 24.54 K/UL — HIGH (ref 3.8–10.5)
WBC # BLD: 7.28 K/UL — SIGNIFICANT CHANGE UP (ref 3.8–10.5)
WBC # BLD: 7.64 K/UL — SIGNIFICANT CHANGE UP (ref 3.8–10.5)
WBC # BLD: 9.03 K/UL — SIGNIFICANT CHANGE UP (ref 3.8–10.5)
WBC # FLD AUTO: 10.09 K/UL — SIGNIFICANT CHANGE UP (ref 3.8–10.5)
WBC # FLD AUTO: 10.89 K/UL — HIGH (ref 3.8–10.5)
WBC # FLD AUTO: 11.35 K/UL — HIGH (ref 3.8–10.5)
WBC # FLD AUTO: 11.39 K/UL — HIGH (ref 3.8–10.5)
WBC # FLD AUTO: 12.25 K/UL — HIGH (ref 3.8–10.5)
WBC # FLD AUTO: 12.4 K/UL — HIGH (ref 3.8–10.5)
WBC # FLD AUTO: 12.64 K/UL — HIGH (ref 3.8–10.5)
WBC # FLD AUTO: 12.67 K/UL — HIGH (ref 3.8–10.5)
WBC # FLD AUTO: 12.79 K/UL — HIGH (ref 3.8–10.5)
WBC # FLD AUTO: 13.4 K/UL — HIGH (ref 3.8–10.5)
WBC # FLD AUTO: 13.5 K/UL — HIGH (ref 3.8–10.5)
WBC # FLD AUTO: 13.8 K/UL — HIGH (ref 3.8–10.5)
WBC # FLD AUTO: 13.96 K/UL — HIGH (ref 3.8–10.5)
WBC # FLD AUTO: 14.5 K/UL — HIGH (ref 3.8–10.5)
WBC # FLD AUTO: 14.6 K/UL — HIGH (ref 3.8–10.5)
WBC # FLD AUTO: 14.79 K/UL — HIGH (ref 3.8–10.5)
WBC # FLD AUTO: 15.15 K/UL — HIGH (ref 3.8–10.5)
WBC # FLD AUTO: 15.22 K/UL — HIGH (ref 3.8–10.5)
WBC # FLD AUTO: 15.46 K/UL — HIGH (ref 3.8–10.5)
WBC # FLD AUTO: 16.11 K/UL — HIGH (ref 3.8–10.5)
WBC # FLD AUTO: 16.91 K/UL — HIGH (ref 3.8–10.5)
WBC # FLD AUTO: 19.49 K/UL — HIGH (ref 3.8–10.5)
WBC # FLD AUTO: 20.05 K/UL — HIGH (ref 3.8–10.5)
WBC # FLD AUTO: 21.58 K/UL — HIGH (ref 3.8–10.5)
WBC # FLD AUTO: 23.73 K/UL — HIGH (ref 3.8–10.5)
WBC # FLD AUTO: 24.54 K/UL — HIGH (ref 3.8–10.5)
WBC # FLD AUTO: 7.28 K/UL — SIGNIFICANT CHANGE UP (ref 3.8–10.5)
WBC # FLD AUTO: 7.64 K/UL — SIGNIFICANT CHANGE UP (ref 3.8–10.5)
WBC # FLD AUTO: 9.03 K/UL — SIGNIFICANT CHANGE UP (ref 3.8–10.5)
WBC UR QL: 6 /HPF — HIGH (ref 0–5)

## 2025-01-01 PROCEDURE — 99223 1ST HOSP IP/OBS HIGH 75: CPT | Mod: GC

## 2025-01-01 PROCEDURE — 99497 ADVNCD CARE PLAN 30 MIN: CPT | Mod: 25

## 2025-01-01 PROCEDURE — 99232 SBSQ HOSP IP/OBS MODERATE 35: CPT

## 2025-01-01 PROCEDURE — 71045 X-RAY EXAM CHEST 1 VIEW: CPT | Mod: 26

## 2025-01-01 PROCEDURE — 74177 CT ABD & PELVIS W/CONTRAST: CPT | Mod: 26

## 2025-01-01 PROCEDURE — 74018 RADEX ABDOMEN 1 VIEW: CPT | Mod: 26

## 2025-01-01 PROCEDURE — 99285 EMERGENCY DEPT VISIT HI MDM: CPT

## 2025-01-01 PROCEDURE — 99233 SBSQ HOSP IP/OBS HIGH 50: CPT

## 2025-01-01 PROCEDURE — G0545: CPT

## 2025-01-01 PROCEDURE — 99231 SBSQ HOSP IP/OBS SF/LOW 25: CPT

## 2025-01-01 PROCEDURE — 99233 SBSQ HOSP IP/OBS HIGH 50: CPT | Mod: GC

## 2025-01-01 PROCEDURE — 74018 RADEX ABDOMEN 1 VIEW: CPT | Mod: 26,76

## 2025-01-01 PROCEDURE — 99497 ADVNCD CARE PLAN 30 MIN: CPT | Mod: GC,25

## 2025-01-01 PROCEDURE — 93010 ELECTROCARDIOGRAM REPORT: CPT

## 2025-01-01 PROCEDURE — 99223 1ST HOSP IP/OBS HIGH 75: CPT | Mod: 25

## 2025-01-01 PROCEDURE — 90792 PSYCH DIAG EVAL W/MED SRVCS: CPT

## 2025-01-01 PROCEDURE — 71260 CT THORAX DX C+: CPT | Mod: 26

## 2025-01-01 PROCEDURE — 99222 1ST HOSP IP/OBS MODERATE 55: CPT

## 2025-01-01 PROCEDURE — 74019 RADEX ABDOMEN 2 VIEWS: CPT | Mod: 26

## 2025-01-01 PROCEDURE — 71045 X-RAY EXAM CHEST 1 VIEW: CPT | Mod: 26,76

## 2025-01-01 PROCEDURE — 99232 SBSQ HOSP IP/OBS MODERATE 35: CPT | Mod: GC

## 2025-01-01 PROCEDURE — 36573 INSJ PICC RS&I 5 YR+: CPT

## 2025-01-01 PROCEDURE — G0316 PROLONG INPT EVAL ADD15 M: CPT | Mod: GC

## 2025-01-01 DEVICE — IMPLANTABLE DEVICE: Type: IMPLANTABLE DEVICE | Status: FUNCTIONAL

## 2025-01-01 DEVICE — LIGATING CLIPS WECK HORIZON MEDIUM (BLUE) 24: Type: IMPLANTABLE DEVICE | Status: FUNCTIONAL

## 2025-01-01 DEVICE — LIGATING CLIPS WECK HORIZON LARGE (ORANGE) 24: Type: IMPLANTABLE DEVICE | Status: FUNCTIONAL

## 2025-01-01 DEVICE — LIGATING CLIPS WECK HEMOLOK POLYMER MEDIUM-LARGE (GREEN) 6: Type: IMPLANTABLE DEVICE | Status: FUNCTIONAL

## 2025-01-01 RX ORDER — CIPROFLOXACIN HCL 250 MG
400 TABLET ORAL ONCE
Refills: 0 | Status: COMPLETED | OUTPATIENT
Start: 2025-01-01 | End: 2025-01-01

## 2025-01-01 RX ORDER — LORAZEPAM 4 MG/ML
0.25 VIAL (ML) INJECTION EVERY 6 HOURS
Refills: 0 | Status: DISCONTINUED | OUTPATIENT
Start: 2025-01-01 | End: 2025-01-01

## 2025-01-01 RX ORDER — HALOPERIDOL 10 MG/1
0.5 TABLET ORAL EVERY 6 HOURS
Refills: 0 | Status: DISCONTINUED | OUTPATIENT
Start: 2025-01-01 | End: 2025-01-01

## 2025-01-01 RX ORDER — SODIUM/POT/MAG/CALC/CHLOR/ACET 35-20-5MEQ
1 VIAL (ML) INTRAVENOUS
Refills: 0 | Status: DISCONTINUED | OUTPATIENT
Start: 2025-01-01 | End: 2025-01-01

## 2025-01-01 RX ORDER — POTASSIUM PHOSPHATE, MONOBASIC POTASSIUM PHOSPHATE, DIBASIC INJECTION, 236; 224 MG/ML; MG/ML
15 SOLUTION, CONCENTRATE INTRAVENOUS ONCE
Refills: 0 | Status: COMPLETED | OUTPATIENT
Start: 2025-01-01 | End: 2025-01-01

## 2025-01-01 RX ORDER — ACETAMINOPHEN 500 MG/5ML
1000 LIQUID (ML) ORAL EVERY 6 HOURS
Refills: 0 | Status: COMPLETED | OUTPATIENT
Start: 2025-01-01 | End: 2025-01-01

## 2025-01-01 RX ORDER — POTASSIUM CHLORIDE, DEXTROSE MONOHYDRATE AND SODIUM CHLORIDE 150; 5; 900 MG/100ML; G/100ML; MG/100ML
1000 INJECTION, SOLUTION INTRAVENOUS
Refills: 0 | Status: DISCONTINUED | OUTPATIENT
Start: 2025-01-01 | End: 2025-01-01

## 2025-01-01 RX ORDER — ENOXAPARIN SODIUM 100 MG/ML
40 INJECTION SUBCUTANEOUS EVERY 24 HOURS
Refills: 0 | Status: DISCONTINUED | OUTPATIENT
Start: 2025-01-01 | End: 2025-01-01

## 2025-01-01 RX ORDER — CIPROFLOXACIN HCL 250 MG
TABLET ORAL
Refills: 0 | Status: DISCONTINUED | OUTPATIENT
Start: 2025-01-01 | End: 2025-01-01

## 2025-01-01 RX ORDER — I.V. FAT EMULSION 20 G/100ML
16.6 EMULSION INTRAVENOUS
Qty: 40 | Refills: 0 | Status: DISCONTINUED | OUTPATIENT
Start: 2025-01-01 | End: 2025-01-01

## 2025-01-01 RX ORDER — MELATONIN 5 MG
6 TABLET ORAL AT BEDTIME
Refills: 0 | Status: DISCONTINUED | OUTPATIENT
Start: 2025-01-01 | End: 2025-01-01

## 2025-01-01 RX ORDER — INFLUENZA A VIRUS A/IDAHO/07/2018 (H1N1) ANTIGEN (MDCK CELL DERIVED, PROPIOLACTONE INACTIVATED, INFLUENZA A VIRUS A/INDIANA/08/2018 (H3N2) ANTIGEN (MDCK CELL DERIVED, PROPIOLACTONE INACTIVATED), INFLUENZA B VIRUS B/SINGAPORE/INFTT-16-0610/2016 ANTIGEN (MDCK CELL DERIVED, PROPIOLACTONE INACTIVATED), INFLUENZA B VIRUS B/IOWA/06/2017 ANTIGEN (MDCK CELL DERIVED, PROPIOLACTONE INACTIVATED) 15; 15; 15; 15 UG/.5ML; UG/.5ML; UG/.5ML; UG/.5ML
0.5 INJECTION, SUSPENSION INTRAMUSCULAR ONCE
Refills: 0 | Status: DISCONTINUED | OUTPATIENT
Start: 2025-01-01 | End: 2025-01-01

## 2025-01-01 RX ORDER — HYDROMORPHONE/SOD CHLOR,ISO/PF 2 MG/10 ML
0.2 SYRINGE (ML) INJECTION EVERY 12 HOURS
Refills: 0 | Status: DISCONTINUED | OUTPATIENT
Start: 2025-01-01 | End: 2025-01-01

## 2025-01-01 RX ORDER — I.V. FAT EMULSION 20 G/100ML
10.4 EMULSION INTRAVENOUS
Qty: 25 | Refills: 0 | Status: DISCONTINUED | OUTPATIENT
Start: 2025-01-01 | End: 2025-01-01

## 2025-01-01 RX ORDER — BISACODYL 5 MG
10 TABLET, DELAYED RELEASE (ENTERIC COATED) ORAL ONCE
Refills: 0 | Status: COMPLETED | OUTPATIENT
Start: 2025-01-01 | End: 2025-01-01

## 2025-01-01 RX ORDER — POLYETHYLENE GLYCOL 3350 17 G/17G
17 POWDER, FOR SOLUTION ORAL DAILY
Refills: 0 | Status: DISCONTINUED | OUTPATIENT
Start: 2025-01-01 | End: 2025-01-01

## 2025-01-01 RX ORDER — SODIUM CHLORIDE 9 G/1000ML
1000 INJECTION, SOLUTION INTRAVENOUS
Refills: 0 | Status: DISCONTINUED | OUTPATIENT
Start: 2025-01-01 | End: 2025-01-01

## 2025-01-01 RX ORDER — I.V. FAT EMULSION 20 G/100ML
20.8 EMULSION INTRAVENOUS
Qty: 50 | Refills: 0 | Status: DISCONTINUED | OUTPATIENT
Start: 2025-01-01 | End: 2025-01-01

## 2025-01-01 RX ORDER — CIPROFLOXACIN HCL 250 MG
400 TABLET ORAL EVERY 12 HOURS
Refills: 0 | Status: DISCONTINUED | OUTPATIENT
Start: 2025-01-01 | End: 2025-01-01

## 2025-01-01 RX ORDER — HYDROMORPHONE/SOD CHLOR,ISO/PF 2 MG/10 ML
0.2 SYRINGE (ML) INJECTION EVERY 4 HOURS
Refills: 0 | Status: DISCONTINUED | OUTPATIENT
Start: 2025-01-01 | End: 2025-01-01

## 2025-01-01 RX ORDER — ONDANSETRON HCL/PF 4 MG/2 ML
4 VIAL (ML) INJECTION EVERY 8 HOURS
Refills: 0 | Status: DISCONTINUED | OUTPATIENT
Start: 2025-01-01 | End: 2025-01-01

## 2025-01-01 RX ORDER — VANCOMYCIN HCL IN 5 % DEXTROSE 1.5G/250ML
1000 PLASTIC BAG, INJECTION (ML) INTRAVENOUS EVERY 24 HOURS
Refills: 0 | Status: DISCONTINUED | OUTPATIENT
Start: 2025-01-01 | End: 2025-01-01

## 2025-01-01 RX ORDER — DEXAMETHASONE 0.5 MG/1
4 TABLET ORAL EVERY 12 HOURS
Refills: 0 | Status: DISCONTINUED | OUTPATIENT
Start: 2025-01-01 | End: 2025-01-01

## 2025-01-01 RX ORDER — TAMSULOSIN HYDROCHLORIDE 0.4 MG/1
0.8 CAPSULE ORAL AT BEDTIME
Refills: 0 | Status: DISCONTINUED | OUTPATIENT
Start: 2025-01-01 | End: 2025-01-01

## 2025-01-01 RX ORDER — MAGNESIUM SULFATE 500 MG/ML
2 SYRINGE (ML) INJECTION ONCE
Refills: 0 | Status: COMPLETED | OUTPATIENT
Start: 2025-01-01 | End: 2025-01-01

## 2025-01-01 RX ORDER — METRONIDAZOLE 250 MG
500 TABLET ORAL EVERY 8 HOURS
Refills: 0 | Status: DISCONTINUED | OUTPATIENT
Start: 2025-01-01 | End: 2025-01-01

## 2025-01-01 RX ORDER — ONDANSETRON HCL/PF 4 MG/2 ML
4 VIAL (ML) INJECTION EVERY 6 HOURS
Refills: 0 | Status: DISCONTINUED | OUTPATIENT
Start: 2025-01-01 | End: 2025-01-01

## 2025-01-01 RX ORDER — OXYCODONE HYDROCHLORIDE 30 MG/1
2.5 TABLET ORAL EVERY 6 HOURS
Refills: 0 | Status: DISCONTINUED | OUTPATIENT
Start: 2025-01-01 | End: 2025-01-01

## 2025-01-01 RX ORDER — METOCLOPRAMIDE HCL 10 MG
10 TABLET ORAL EVERY 8 HOURS
Refills: 0 | Status: DISCONTINUED | OUTPATIENT
Start: 2025-01-01 | End: 2025-01-01

## 2025-01-01 RX ORDER — DEXAMETHASONE 0.5 MG/1
4 TABLET ORAL DAILY
Refills: 0 | Status: DISCONTINUED | OUTPATIENT
Start: 2025-01-01 | End: 2025-01-01

## 2025-01-01 RX ORDER — ACETAMINOPHEN 500 MG/5ML
1000 LIQUID (ML) ORAL EVERY 6 HOURS
Refills: 0 | Status: DISCONTINUED | OUTPATIENT
Start: 2025-01-01 | End: 2025-01-01

## 2025-01-01 RX ORDER — SENNA 187 MG
2 TABLET ORAL AT BEDTIME
Refills: 0 | Status: DISCONTINUED | OUTPATIENT
Start: 2025-01-01 | End: 2025-01-01

## 2025-01-01 RX ORDER — ACETAMINOPHEN 500 MG/5ML
1000 LIQUID (ML) ORAL ONCE
Refills: 0 | Status: COMPLETED | OUTPATIENT
Start: 2025-01-01 | End: 2025-01-01

## 2025-01-01 RX ORDER — OCTREOTIDE ACETATE 500 UG/ML
100 INJECTION, SOLUTION INTRAVENOUS; SUBCUTANEOUS EVERY 8 HOURS
Refills: 0 | Status: DISCONTINUED | OUTPATIENT
Start: 2025-01-01 | End: 2025-01-01

## 2025-01-01 RX ORDER — VANCOMYCIN HCL IN 5 % DEXTROSE 1.5G/250ML
PLASTIC BAG, INJECTION (ML) INTRAVENOUS
Refills: 0 | Status: DISCONTINUED | OUTPATIENT
Start: 2025-01-01 | End: 2025-01-01

## 2025-01-01 RX ORDER — OLANZAPINE 10 MG/1
2.5 TABLET ORAL ONCE
Refills: 0 | Status: COMPLETED | OUTPATIENT
Start: 2025-01-01 | End: 2025-01-01

## 2025-01-01 RX ORDER — VANCOMYCIN HCL IN 5 % DEXTROSE 1.5G/250ML
1000 PLASTIC BAG, INJECTION (ML) INTRAVENOUS ONCE
Refills: 0 | Status: COMPLETED | OUTPATIENT
Start: 2025-01-01 | End: 2025-01-01

## 2025-01-01 RX ORDER — CEFTRIAXONE 500 MG/1
1000 INJECTION, POWDER, FOR SOLUTION INTRAMUSCULAR; INTRAVENOUS EVERY 24 HOURS
Refills: 0 | Status: DISCONTINUED | OUTPATIENT
Start: 2025-01-01 | End: 2025-01-01

## 2025-01-01 RX ORDER — SODIUM PHOSPHATE,DIBASIC DIHYD
15 POWDER (GRAM) MISCELLANEOUS ONCE
Refills: 0 | Status: COMPLETED | OUTPATIENT
Start: 2025-01-01 | End: 2025-01-01

## 2025-01-01 RX ORDER — METRONIDAZOLE 250 MG
TABLET ORAL
Refills: 0 | Status: DISCONTINUED | OUTPATIENT
Start: 2025-01-01 | End: 2025-01-01

## 2025-01-01 RX ORDER — METRONIDAZOLE 250 MG
500 TABLET ORAL ONCE
Refills: 0 | Status: COMPLETED | OUTPATIENT
Start: 2025-01-01 | End: 2025-01-01

## 2025-01-01 RX ORDER — HYDROMORPHONE/SOD CHLOR,ISO/PF 2 MG/10 ML
0.2 SYRINGE (ML) INJECTION ONCE
Refills: 0 | Status: DISCONTINUED | OUTPATIENT
Start: 2025-01-01 | End: 2025-01-01

## 2025-01-01 RX ORDER — ONDANSETRON HCL/PF 4 MG/2 ML
4 VIAL (ML) INJECTION ONCE
Refills: 0 | Status: COMPLETED | OUTPATIENT
Start: 2025-01-01 | End: 2025-01-01

## 2025-01-01 RX ORDER — MAGNESIUM SULFATE 500 MG/ML
1 SYRINGE (ML) INJECTION ONCE
Refills: 0 | Status: COMPLETED | OUTPATIENT
Start: 2025-01-01 | End: 2025-01-01

## 2025-01-01 RX ORDER — GABAPENTIN 400 MG/1
400 CAPSULE ORAL AT BEDTIME
Refills: 0 | Status: DISCONTINUED | OUTPATIENT
Start: 2025-01-01 | End: 2025-01-01

## 2025-01-01 RX ORDER — ONDANSETRON HCL/PF 4 MG/2 ML
4 VIAL (ML) INJECTION ONCE
Refills: 0 | Status: DISCONTINUED | OUTPATIENT
Start: 2025-01-01 | End: 2025-01-01

## 2025-01-01 RX ORDER — DIATRIZOATE MEGLUMINE, SODIUM 66 %-10 %
30 VIAL (ML) INJECTION ONCE
Refills: 0 | Status: COMPLETED | OUTPATIENT
Start: 2025-01-01 | End: 2025-01-01

## 2025-01-01 RX ORDER — DIATRIZOATE MEGLUMINE, SODIUM 66 %-10 %
1 VIAL (ML) INJECTION ONCE
Refills: 0 | Status: DISCONTINUED | OUTPATIENT
Start: 2025-01-01 | End: 2025-01-01

## 2025-01-01 RX ORDER — BENZOCAINE 220 MG/G
1 SPRAY, METERED PERIODONTAL THREE TIMES A DAY
Refills: 0 | Status: DISCONTINUED | OUTPATIENT
Start: 2025-01-01 | End: 2025-01-01

## 2025-01-01 RX ORDER — SODIUM PHOSPHATE,DIBASIC DIHYD
30 POWDER (GRAM) MISCELLANEOUS ONCE
Refills: 0 | Status: COMPLETED | OUTPATIENT
Start: 2025-01-01 | End: 2025-01-01

## 2025-01-01 RX ORDER — HYDROMORPHONE/SOD CHLOR,ISO/PF 2 MG/10 ML
0.2 SYRINGE (ML) INJECTION
Refills: 0 | Status: DISCONTINUED | OUTPATIENT
Start: 2025-01-01 | End: 2025-01-01

## 2025-01-01 RX ORDER — HYDROMORPHONE/SOD CHLOR,ISO/PF 2 MG/10 ML
0.5 SYRINGE (ML) INJECTION EVERY 4 HOURS
Refills: 0 | Status: DISCONTINUED | OUTPATIENT
Start: 2025-01-01 | End: 2025-01-01

## 2025-01-01 RX ORDER — METOCLOPRAMIDE HCL 10 MG
10 TABLET ORAL EVERY 6 HOURS
Refills: 0 | Status: DISCONTINUED | OUTPATIENT
Start: 2025-01-01 | End: 2025-01-01

## 2025-01-01 RX ORDER — QUETIAPINE FUMARATE 25 MG/1
12.5 TABLET ORAL ONCE
Refills: 0 | Status: COMPLETED | OUTPATIENT
Start: 2025-01-01 | End: 2025-01-01

## 2025-01-01 RX ORDER — HEPARIN SODIUM 1000 [USP'U]/ML
5000 INJECTION INTRAVENOUS; SUBCUTANEOUS EVERY 8 HOURS
Refills: 0 | Status: DISCONTINUED | OUTPATIENT
Start: 2025-01-01 | End: 2025-01-01

## 2025-01-01 RX ORDER — SIMETHICONE 80 MG
80 TABLET,CHEWABLE ORAL THREE TIMES A DAY
Refills: 0 | Status: DISCONTINUED | OUTPATIENT
Start: 2025-01-01 | End: 2025-01-01

## 2025-01-01 RX ORDER — DIATRIZOATE MEGLUMINE, SODIUM 66 %-10 %
30 VIAL (ML) INJECTION ONCE
Refills: 0 | Status: DISCONTINUED | OUTPATIENT
Start: 2025-01-01 | End: 2025-01-01

## 2025-01-01 RX ORDER — CALCIUM GLUCONATE 20 MG/ML
1 INJECTION, SOLUTION INTRAVENOUS ONCE
Refills: 0 | Status: COMPLETED | OUTPATIENT
Start: 2025-01-01 | End: 2025-01-01

## 2025-01-01 RX ORDER — FINASTERIDE 1 MG/1
5 TABLET, FILM COATED ORAL DAILY
Refills: 0 | Status: DISCONTINUED | OUTPATIENT
Start: 2025-01-01 | End: 2025-01-01

## 2025-01-01 RX ORDER — GLYCOPYRROLATE 0.2 MG/ML
0.2 INJECTION INTRAMUSCULAR; INTRAVENOUS EVERY 6 HOURS
Refills: 0 | Status: DISCONTINUED | OUTPATIENT
Start: 2025-01-01 | End: 2025-01-01

## 2025-01-01 RX ORDER — GABAPENTIN 400 MG/1
400 CAPSULE ORAL DAILY
Refills: 0 | Status: DISCONTINUED | OUTPATIENT
Start: 2025-01-01 | End: 2025-01-01

## 2025-01-01 RX ORDER — ATORVASTATIN CALCIUM 80 MG/1
40 TABLET, FILM COATED ORAL AT BEDTIME
Refills: 0 | Status: DISCONTINUED | OUTPATIENT
Start: 2025-01-01 | End: 2025-01-01

## 2025-01-01 RX ORDER — MELATONIN 5 MG
3 TABLET ORAL AT BEDTIME
Refills: 0 | Status: COMPLETED | OUTPATIENT
Start: 2025-01-01 | End: 2025-01-01

## 2025-01-01 RX ORDER — FUROSEMIDE 10 MG/ML
1 INJECTION INTRAMUSCULAR; INTRAVENOUS
Refills: 0 | DISCHARGE

## 2025-01-01 RX ORDER — HYDROMORPHONE/SOD CHLOR,ISO/PF 2 MG/10 ML
0.5 SYRINGE (ML) INJECTION
Refills: 0 | Status: DISCONTINUED | OUTPATIENT
Start: 2025-01-01 | End: 2025-01-01

## 2025-01-01 RX ORDER — HYDROMORPHONE/SOD CHLOR,ISO/PF 2 MG/10 ML
0.5 SYRINGE (ML) INJECTION ONCE
Refills: 0 | Status: DISCONTINUED | OUTPATIENT
Start: 2025-01-01 | End: 2025-01-01

## 2025-01-01 RX ORDER — FINASTERIDE 1 MG/1
1 TABLET, FILM COATED ORAL
Refills: 0 | DISCHARGE

## 2025-01-01 RX ADMIN — DEXAMETHASONE 4 MILLIGRAM(S): 0.5 TABLET ORAL at 05:10

## 2025-01-01 RX ADMIN — OCTREOTIDE ACETATE 100 MICROGRAM(S): 500 INJECTION, SOLUTION INTRAVENOUS; SUBCUTANEOUS at 09:05

## 2025-01-01 RX ADMIN — OCTREOTIDE ACETATE 100 MICROGRAM(S): 500 INJECTION, SOLUTION INTRAVENOUS; SUBCUTANEOUS at 16:21

## 2025-01-01 RX ADMIN — Medication 1 APPLICATION(S): at 11:44

## 2025-01-01 RX ADMIN — OCTREOTIDE ACETATE 100 MICROGRAM(S): 500 INJECTION, SOLUTION INTRAVENOUS; SUBCUTANEOUS at 21:50

## 2025-01-01 RX ADMIN — Medication 400 MILLIGRAM(S): at 15:58

## 2025-01-01 RX ADMIN — DEXAMETHASONE 4 MILLIGRAM(S): 0.5 TABLET ORAL at 18:05

## 2025-01-01 RX ADMIN — Medication 10 MILLIGRAM(S): at 16:32

## 2025-01-01 RX ADMIN — Medication 0.2 MILLIGRAM(S): at 01:02

## 2025-01-01 RX ADMIN — OXYCODONE HYDROCHLORIDE 2.5 MILLIGRAM(S): 30 TABLET ORAL at 18:52

## 2025-01-01 RX ADMIN — Medication 85 MILLIMOLE(S): at 09:17

## 2025-01-01 RX ADMIN — Medication 1000 MILLIGRAM(S): at 06:11

## 2025-01-01 RX ADMIN — FINASTERIDE 5 MILLIGRAM(S): 1 TABLET, FILM COATED ORAL at 13:26

## 2025-01-01 RX ADMIN — I.V. FAT EMULSION 20.8 ML/HR: 20 EMULSION INTRAVENOUS at 00:40

## 2025-01-01 RX ADMIN — POTASSIUM PHOSPHATE, MONOBASIC POTASSIUM PHOSPHATE, DIBASIC INJECTION, 62.5 MILLIMOLE(S): 236; 224 SOLUTION, CONCENTRATE INTRAVENOUS at 10:02

## 2025-01-01 RX ADMIN — Medication 1 APPLICATION(S): at 13:35

## 2025-01-01 RX ADMIN — Medication 1 APPLICATION(S): at 11:24

## 2025-01-01 RX ADMIN — Medication 1 APPLICATION(S): at 12:04

## 2025-01-01 RX ADMIN — Medication 40 MILLIGRAM(S): at 15:07

## 2025-01-01 RX ADMIN — Medication 40 MILLIGRAM(S): at 11:10

## 2025-01-01 RX ADMIN — HEPARIN SODIUM 5000 UNIT(S): 1000 INJECTION INTRAVENOUS; SUBCUTANEOUS at 15:20

## 2025-01-01 RX ADMIN — Medication 0.5 MILLIGRAM(S): at 04:06

## 2025-01-01 RX ADMIN — Medication 10 MILLIGRAM(S): at 15:56

## 2025-01-01 RX ADMIN — Medication 25 GRAM(S): at 18:07

## 2025-01-01 RX ADMIN — OCTREOTIDE ACETATE 100 MICROGRAM(S): 500 INJECTION, SOLUTION INTRAVENOUS; SUBCUTANEOUS at 22:35

## 2025-01-01 RX ADMIN — HEPARIN SODIUM 5000 UNIT(S): 1000 INJECTION INTRAVENOUS; SUBCUTANEOUS at 22:35

## 2025-01-01 RX ADMIN — DEXAMETHASONE 4 MILLIGRAM(S): 0.5 TABLET ORAL at 06:19

## 2025-01-01 RX ADMIN — Medication 40 MILLIGRAM(S): at 12:34

## 2025-01-01 RX ADMIN — Medication 1000 MILLIGRAM(S): at 00:16

## 2025-01-01 RX ADMIN — DEXAMETHASONE 4 MILLIGRAM(S): 0.5 TABLET ORAL at 05:16

## 2025-01-01 RX ADMIN — Medication 400 MILLIGRAM(S): at 11:08

## 2025-01-01 RX ADMIN — Medication 63.75 MILLIMOLE(S): at 10:15

## 2025-01-01 RX ADMIN — Medication 10 MILLIGRAM(S): at 05:52

## 2025-01-01 RX ADMIN — OCTREOTIDE ACETATE 100 MICROGRAM(S): 500 INJECTION, SOLUTION INTRAVENOUS; SUBCUTANEOUS at 20:48

## 2025-01-01 RX ADMIN — Medication 40 MILLIGRAM(S): at 11:35

## 2025-01-01 RX ADMIN — Medication 40 MILLIGRAM(S): at 12:16

## 2025-01-01 RX ADMIN — ENOXAPARIN SODIUM 40 MILLIGRAM(S): 100 INJECTION SUBCUTANEOUS at 15:34

## 2025-01-01 RX ADMIN — Medication 10 MILLIGRAM(S): at 15:49

## 2025-01-01 RX ADMIN — OXYCODONE HYDROCHLORIDE 2.5 MILLIGRAM(S): 30 TABLET ORAL at 09:21

## 2025-01-01 RX ADMIN — Medication 200 MILLIGRAM(S): at 20:54

## 2025-01-01 RX ADMIN — OCTREOTIDE ACETATE 100 MICROGRAM(S): 500 INJECTION, SOLUTION INTRAVENOUS; SUBCUTANEOUS at 22:09

## 2025-01-01 RX ADMIN — HEPARIN SODIUM 5000 UNIT(S): 1000 INJECTION INTRAVENOUS; SUBCUTANEOUS at 13:40

## 2025-01-01 RX ADMIN — Medication 10 MILLIGRAM(S): at 15:06

## 2025-01-01 RX ADMIN — Medication 1000 MILLIGRAM(S): at 13:15

## 2025-01-01 RX ADMIN — OCTREOTIDE ACETATE 100 MICROGRAM(S): 500 INJECTION, SOLUTION INTRAVENOUS; SUBCUTANEOUS at 02:12

## 2025-01-01 RX ADMIN — Medication 1 APPLICATION(S): at 18:39

## 2025-01-01 RX ADMIN — DEXAMETHASONE 4 MILLIGRAM(S): 0.5 TABLET ORAL at 18:01

## 2025-01-01 RX ADMIN — DEXAMETHASONE 4 MILLIGRAM(S): 0.5 TABLET ORAL at 05:17

## 2025-01-01 RX ADMIN — Medication 100 MILLIGRAM(S): at 05:16

## 2025-01-01 RX ADMIN — SODIUM CHLORIDE 75 MILLILITER(S): 9 INJECTION, SOLUTION INTRAVENOUS at 12:10

## 2025-01-01 RX ADMIN — POTASSIUM CHLORIDE, DEXTROSE MONOHYDRATE AND SODIUM CHLORIDE 75 MILLILITER(S): 150; 5; 900 INJECTION, SOLUTION INTRAVENOUS at 18:02

## 2025-01-01 RX ADMIN — Medication 40 MILLIGRAM(S): at 12:17

## 2025-01-01 RX ADMIN — HEPARIN SODIUM 5000 UNIT(S): 1000 INJECTION INTRAVENOUS; SUBCUTANEOUS at 06:54

## 2025-01-01 RX ADMIN — POTASSIUM CHLORIDE, DEXTROSE MONOHYDRATE AND SODIUM CHLORIDE 100 MILLILITER(S): 150; 5; 900 INJECTION, SOLUTION INTRAVENOUS at 09:33

## 2025-01-01 RX ADMIN — Medication 1000 MILLIGRAM(S): at 22:36

## 2025-01-01 RX ADMIN — Medication 400 MILLIGRAM(S): at 23:43

## 2025-01-01 RX ADMIN — Medication 1000 MILLIGRAM(S): at 04:50

## 2025-01-01 RX ADMIN — Medication 1000 MILLIGRAM(S): at 09:50

## 2025-01-01 RX ADMIN — OCTREOTIDE ACETATE 100 MICROGRAM(S): 500 INJECTION, SOLUTION INTRAVENOUS; SUBCUTANEOUS at 08:17

## 2025-01-01 RX ADMIN — Medication 1000 MILLIGRAM(S): at 12:00

## 2025-01-01 RX ADMIN — Medication 100 GRAM(S): at 14:18

## 2025-01-01 RX ADMIN — Medication 0.2 MILLIGRAM(S): at 04:31

## 2025-01-01 RX ADMIN — Medication 10 MILLIGRAM(S): at 23:16

## 2025-01-01 RX ADMIN — HEPARIN SODIUM 5000 UNIT(S): 1000 INJECTION INTRAVENOUS; SUBCUTANEOUS at 13:10

## 2025-01-01 RX ADMIN — OCTREOTIDE ACETATE 100 MICROGRAM(S): 500 INJECTION, SOLUTION INTRAVENOUS; SUBCUTANEOUS at 13:10

## 2025-01-01 RX ADMIN — SODIUM CHLORIDE 75 MILLILITER(S): 9 INJECTION, SOLUTION INTRAVENOUS at 18:08

## 2025-01-01 RX ADMIN — Medication 10 MILLIGRAM(S): at 19:03

## 2025-01-01 RX ADMIN — Medication 6 MILLIGRAM(S): at 02:59

## 2025-01-01 RX ADMIN — OXYCODONE HYDROCHLORIDE 2.5 MILLIGRAM(S): 30 TABLET ORAL at 17:28

## 2025-01-01 RX ADMIN — Medication 1 APPLICATION(S): at 12:43

## 2025-01-01 RX ADMIN — HEPARIN SODIUM 5000 UNIT(S): 1000 INJECTION INTRAVENOUS; SUBCUTANEOUS at 15:49

## 2025-01-01 RX ADMIN — DEXAMETHASONE 4 MILLIGRAM(S): 0.5 TABLET ORAL at 05:23

## 2025-01-01 RX ADMIN — Medication 1000 MILLIGRAM(S): at 11:30

## 2025-01-01 RX ADMIN — Medication 40 MILLIGRAM(S): at 13:35

## 2025-01-01 RX ADMIN — Medication 40 MILLIGRAM(S): at 12:28

## 2025-01-01 RX ADMIN — Medication 100 MILLIGRAM(S): at 15:36

## 2025-01-01 RX ADMIN — Medication 400 MILLIGRAM(S): at 21:42

## 2025-01-01 RX ADMIN — Medication 10 MILLIGRAM(S): at 06:47

## 2025-01-01 RX ADMIN — Medication 10 MILLIGRAM(S): at 13:22

## 2025-01-01 RX ADMIN — ENOXAPARIN SODIUM 40 MILLIGRAM(S): 100 INJECTION SUBCUTANEOUS at 16:45

## 2025-01-01 RX ADMIN — Medication 80 MILLIGRAM(S): at 14:00

## 2025-01-01 RX ADMIN — HEPARIN SODIUM 5000 UNIT(S): 1000 INJECTION INTRAVENOUS; SUBCUTANEOUS at 07:12

## 2025-01-01 RX ADMIN — I.V. FAT EMULSION 20.8 ML/HR: 20 EMULSION INTRAVENOUS at 00:11

## 2025-01-01 RX ADMIN — HEPARIN SODIUM 5000 UNIT(S): 1000 INJECTION INTRAVENOUS; SUBCUTANEOUS at 05:22

## 2025-01-01 RX ADMIN — Medication 10 MILLIGRAM(S): at 23:55

## 2025-01-01 RX ADMIN — POTASSIUM CHLORIDE, DEXTROSE MONOHYDRATE AND SODIUM CHLORIDE 100 MILLILITER(S): 150; 5; 900 INJECTION, SOLUTION INTRAVENOUS at 10:04

## 2025-01-01 RX ADMIN — Medication 40 MILLIGRAM(S): at 10:45

## 2025-01-01 RX ADMIN — Medication 63 EACH: at 18:55

## 2025-01-01 RX ADMIN — Medication 0.2 MILLIGRAM(S): at 17:11

## 2025-01-01 RX ADMIN — Medication 400 MILLIGRAM(S): at 22:13

## 2025-01-01 RX ADMIN — TAMSULOSIN HYDROCHLORIDE 0.8 MILLIGRAM(S): 0.4 CAPSULE ORAL at 21:37

## 2025-01-01 RX ADMIN — Medication 30 MILLILITER(S): at 10:29

## 2025-01-01 RX ADMIN — HEPARIN SODIUM 5000 UNIT(S): 1000 INJECTION INTRAVENOUS; SUBCUTANEOUS at 05:19

## 2025-01-01 RX ADMIN — Medication 10 MILLIGRAM(S): at 06:17

## 2025-01-01 RX ADMIN — DEXAMETHASONE 4 MILLIGRAM(S): 0.5 TABLET ORAL at 18:46

## 2025-01-01 RX ADMIN — OCTREOTIDE ACETATE 100 MICROGRAM(S): 500 INJECTION, SOLUTION INTRAVENOUS; SUBCUTANEOUS at 05:22

## 2025-01-01 RX ADMIN — Medication 1000 MILLIGRAM(S): at 11:44

## 2025-01-01 RX ADMIN — Medication 10 MILLIGRAM(S): at 05:19

## 2025-01-01 RX ADMIN — ENOXAPARIN SODIUM 40 MILLIGRAM(S): 100 INJECTION SUBCUTANEOUS at 16:11

## 2025-01-01 RX ADMIN — DEXAMETHASONE 4 MILLIGRAM(S): 0.5 TABLET ORAL at 05:22

## 2025-01-01 RX ADMIN — Medication 75 EACH: at 00:39

## 2025-01-01 RX ADMIN — Medication 1 APPLICATION(S): at 12:33

## 2025-01-01 RX ADMIN — Medication 400 MILLIGRAM(S): at 09:20

## 2025-01-01 RX ADMIN — Medication 40 MILLIGRAM(S): at 13:18

## 2025-01-01 RX ADMIN — Medication 400 MILLIGRAM(S): at 21:14

## 2025-01-01 RX ADMIN — DEXAMETHASONE 4 MILLIGRAM(S): 0.5 TABLET ORAL at 05:33

## 2025-01-01 RX ADMIN — Medication 0.2 MILLIGRAM(S): at 17:51

## 2025-01-01 RX ADMIN — Medication 80 MILLIGRAM(S): at 05:52

## 2025-01-01 RX ADMIN — Medication 1 APPLICATION(S): at 11:14

## 2025-01-01 RX ADMIN — HEPARIN SODIUM 5000 UNIT(S): 1000 INJECTION INTRAVENOUS; SUBCUTANEOUS at 16:03

## 2025-01-01 RX ADMIN — Medication 40 MILLIGRAM(S): at 12:08

## 2025-01-01 RX ADMIN — Medication 400 MILLIGRAM(S): at 11:35

## 2025-01-01 RX ADMIN — Medication 0.2 MILLIGRAM(S): at 00:16

## 2025-01-01 RX ADMIN — Medication 75 EACH: at 01:01

## 2025-01-01 RX ADMIN — Medication 80 MILLIGRAM(S): at 23:43

## 2025-01-01 RX ADMIN — OCTREOTIDE ACETATE 100 MICROGRAM(S): 500 INJECTION, SOLUTION INTRAVENOUS; SUBCUTANEOUS at 05:19

## 2025-01-01 RX ADMIN — HEPARIN SODIUM 5000 UNIT(S): 1000 INJECTION INTRAVENOUS; SUBCUTANEOUS at 22:05

## 2025-01-01 RX ADMIN — Medication 10 MILLIGRAM(S): at 00:30

## 2025-01-01 RX ADMIN — Medication 63 EACH: at 18:38

## 2025-01-01 RX ADMIN — OXYCODONE HYDROCHLORIDE 2.5 MILLIGRAM(S): 30 TABLET ORAL at 00:01

## 2025-01-01 RX ADMIN — I.V. FAT EMULSION 20.8 ML/HR: 20 EMULSION INTRAVENOUS at 00:05

## 2025-01-01 RX ADMIN — DEXAMETHASONE 4 MILLIGRAM(S): 0.5 TABLET ORAL at 05:57

## 2025-01-01 RX ADMIN — Medication 400 MILLIGRAM(S): at 15:41

## 2025-01-01 RX ADMIN — OCTREOTIDE ACETATE 100 MICROGRAM(S): 500 INJECTION, SOLUTION INTRAVENOUS; SUBCUTANEOUS at 06:27

## 2025-01-01 RX ADMIN — Medication 10 MILLIGRAM(S): at 22:14

## 2025-01-01 RX ADMIN — Medication 400 MILLIGRAM(S): at 03:50

## 2025-01-01 RX ADMIN — Medication 40 MILLIGRAM(S): at 16:54

## 2025-01-01 RX ADMIN — Medication 1 APPLICATION(S): at 13:01

## 2025-01-01 RX ADMIN — Medication 1000 MILLIGRAM(S): at 16:31

## 2025-01-01 RX ADMIN — HEPARIN SODIUM 5000 UNIT(S): 1000 INJECTION INTRAVENOUS; SUBCUTANEOUS at 05:33

## 2025-01-01 RX ADMIN — DEXAMETHASONE 4 MILLIGRAM(S): 0.5 TABLET ORAL at 06:50

## 2025-01-01 RX ADMIN — DEXAMETHASONE 4 MILLIGRAM(S): 0.5 TABLET ORAL at 17:15

## 2025-01-01 RX ADMIN — OXYCODONE HYDROCHLORIDE 2.5 MILLIGRAM(S): 30 TABLET ORAL at 03:06

## 2025-01-01 RX ADMIN — OCTREOTIDE ACETATE 100 MICROGRAM(S): 500 INJECTION, SOLUTION INTRAVENOUS; SUBCUTANEOUS at 21:29

## 2025-01-01 RX ADMIN — ENOXAPARIN SODIUM 40 MILLIGRAM(S): 100 INJECTION SUBCUTANEOUS at 15:37

## 2025-01-01 RX ADMIN — Medication 10 MILLIGRAM(S): at 22:39

## 2025-01-01 RX ADMIN — HEPARIN SODIUM 5000 UNIT(S): 1000 INJECTION INTRAVENOUS; SUBCUTANEOUS at 16:04

## 2025-01-01 RX ADMIN — Medication 0.2 MILLIGRAM(S): at 18:05

## 2025-01-01 RX ADMIN — Medication 1000 MILLIGRAM(S): at 06:57

## 2025-01-01 RX ADMIN — OCTREOTIDE ACETATE 100 MICROGRAM(S): 500 INJECTION, SOLUTION INTRAVENOUS; SUBCUTANEOUS at 16:02

## 2025-01-01 RX ADMIN — Medication 10 MILLIGRAM(S): at 05:18

## 2025-01-01 RX ADMIN — I.V. FAT EMULSION 16.6 ML/HR: 20 EMULSION INTRAVENOUS at 18:38

## 2025-01-01 RX ADMIN — Medication 10 MILLIGRAM(S): at 12:35

## 2025-01-01 RX ADMIN — Medication 40 MILLIGRAM(S): at 11:42

## 2025-01-01 RX ADMIN — Medication 75 EACH: at 01:10

## 2025-01-01 RX ADMIN — Medication 400 MILLIGRAM(S): at 11:18

## 2025-01-01 RX ADMIN — Medication 0.2 MILLIGRAM(S): at 11:23

## 2025-01-01 RX ADMIN — Medication 400 MILLIGRAM(S): at 21:36

## 2025-01-01 RX ADMIN — HEPARIN SODIUM 5000 UNIT(S): 1000 INJECTION INTRAVENOUS; SUBCUTANEOUS at 05:55

## 2025-01-01 RX ADMIN — Medication 400 MILLIGRAM(S): at 18:27

## 2025-01-01 RX ADMIN — Medication 40 MILLIGRAM(S): at 12:11

## 2025-01-01 RX ADMIN — DEXAMETHASONE 4 MILLIGRAM(S): 0.5 TABLET ORAL at 17:26

## 2025-01-01 RX ADMIN — OCTREOTIDE ACETATE 100 MICROGRAM(S): 500 INJECTION, SOLUTION INTRAVENOUS; SUBCUTANEOUS at 13:23

## 2025-01-01 RX ADMIN — Medication 4 MILLIGRAM(S): at 09:18

## 2025-01-01 RX ADMIN — OCTREOTIDE ACETATE 100 MICROGRAM(S): 500 INJECTION, SOLUTION INTRAVENOUS; SUBCUTANEOUS at 06:18

## 2025-01-01 RX ADMIN — Medication 75 MILLILITER(S): at 21:55

## 2025-01-01 RX ADMIN — HEPARIN SODIUM 5000 UNIT(S): 1000 INJECTION INTRAVENOUS; SUBCUTANEOUS at 15:06

## 2025-01-01 RX ADMIN — Medication 10 MILLIGRAM(S): at 21:54

## 2025-01-01 RX ADMIN — Medication 1 APPLICATION(S): at 13:32

## 2025-01-01 RX ADMIN — OCTREOTIDE ACETATE 100 MICROGRAM(S): 500 INJECTION, SOLUTION INTRAVENOUS; SUBCUTANEOUS at 19:58

## 2025-01-01 RX ADMIN — DEXAMETHASONE 4 MILLIGRAM(S): 0.5 TABLET ORAL at 18:44

## 2025-01-01 RX ADMIN — Medication 10 MILLIGRAM(S): at 23:26

## 2025-01-01 RX ADMIN — Medication 42 EACH: at 18:33

## 2025-01-01 RX ADMIN — Medication 400 MILLIGRAM(S): at 12:56

## 2025-01-01 RX ADMIN — HEPARIN SODIUM 5000 UNIT(S): 1000 INJECTION INTRAVENOUS; SUBCUTANEOUS at 06:08

## 2025-01-01 RX ADMIN — Medication 400 MILLIGRAM(S): at 03:22

## 2025-01-01 RX ADMIN — I.V. FAT EMULSION 16.6 ML/HR: 20 EMULSION INTRAVENOUS at 18:53

## 2025-01-01 RX ADMIN — Medication 1000 MILLIGRAM(S): at 12:10

## 2025-01-01 RX ADMIN — POTASSIUM CHLORIDE, DEXTROSE MONOHYDRATE AND SODIUM CHLORIDE 100 MILLILITER(S): 150; 5; 900 INJECTION, SOLUTION INTRAVENOUS at 18:24

## 2025-01-01 RX ADMIN — OCTREOTIDE ACETATE 100 MICROGRAM(S): 500 INJECTION, SOLUTION INTRAVENOUS; SUBCUTANEOUS at 19:06

## 2025-01-01 RX ADMIN — Medication 1000 MILLIGRAM(S): at 18:52

## 2025-01-01 RX ADMIN — OXYCODONE HYDROCHLORIDE 2.5 MILLIGRAM(S): 30 TABLET ORAL at 07:50

## 2025-01-01 RX ADMIN — GABAPENTIN 400 MILLIGRAM(S): 400 CAPSULE ORAL at 21:39

## 2025-01-01 RX ADMIN — Medication 400 MILLIGRAM(S): at 23:16

## 2025-01-01 RX ADMIN — Medication 1000 MILLIGRAM(S): at 13:40

## 2025-01-01 RX ADMIN — FINASTERIDE 5 MILLIGRAM(S): 1 TABLET, FILM COATED ORAL at 11:54

## 2025-01-01 RX ADMIN — Medication 0.5 MILLIGRAM(S): at 04:36

## 2025-01-01 RX ADMIN — Medication 400 MILLIGRAM(S): at 06:18

## 2025-01-01 RX ADMIN — Medication 1 APPLICATION(S): at 11:42

## 2025-01-01 RX ADMIN — Medication 40 MILLIGRAM(S): at 12:05

## 2025-01-01 RX ADMIN — OCTREOTIDE ACETATE 100 MICROGRAM(S): 500 INJECTION, SOLUTION INTRAVENOUS; SUBCUTANEOUS at 15:53

## 2025-01-01 RX ADMIN — Medication 80 MILLIGRAM(S): at 23:16

## 2025-01-01 RX ADMIN — DEXAMETHASONE 4 MILLIGRAM(S): 0.5 TABLET ORAL at 17:53

## 2025-01-01 RX ADMIN — Medication 63.75 MILLIMOLE(S): at 11:18

## 2025-01-01 RX ADMIN — Medication 1000 MILLIGRAM(S): at 00:43

## 2025-01-01 RX ADMIN — Medication 1000 MILLIGRAM(S): at 16:28

## 2025-01-01 RX ADMIN — DEXAMETHASONE 4 MILLIGRAM(S): 0.5 TABLET ORAL at 17:50

## 2025-01-01 RX ADMIN — Medication 10 MILLIGRAM(S): at 17:06

## 2025-01-01 RX ADMIN — OCTREOTIDE ACETATE 100 MICROGRAM(S): 500 INJECTION, SOLUTION INTRAVENOUS; SUBCUTANEOUS at 13:26

## 2025-01-01 RX ADMIN — Medication 10 MILLIGRAM(S): at 15:50

## 2025-01-01 RX ADMIN — Medication 400 MILLIGRAM(S): at 18:11

## 2025-01-01 RX ADMIN — OLANZAPINE 2.5 MILLIGRAM(S): 10 TABLET ORAL at 16:59

## 2025-01-01 RX ADMIN — I.V. FAT EMULSION 10.4 ML/HR: 20 EMULSION INTRAVENOUS at 00:32

## 2025-01-01 RX ADMIN — DEXAMETHASONE 4 MILLIGRAM(S): 0.5 TABLET ORAL at 07:14

## 2025-01-01 RX ADMIN — OCTREOTIDE ACETATE 100 MICROGRAM(S): 500 INJECTION, SOLUTION INTRAVENOUS; SUBCUTANEOUS at 12:40

## 2025-01-01 RX ADMIN — Medication 1000 MILLIGRAM(S): at 22:14

## 2025-01-01 RX ADMIN — OCTREOTIDE ACETATE 100 MICROGRAM(S): 500 INJECTION, SOLUTION INTRAVENOUS; SUBCUTANEOUS at 15:46

## 2025-01-01 RX ADMIN — Medication 75 EACH: at 18:43

## 2025-01-01 RX ADMIN — OXYCODONE HYDROCHLORIDE 2.5 MILLIGRAM(S): 30 TABLET ORAL at 10:17

## 2025-01-01 RX ADMIN — Medication 10 MILLIGRAM(S): at 23:03

## 2025-01-01 RX ADMIN — Medication 40 MILLIGRAM(S): at 12:41

## 2025-01-01 RX ADMIN — Medication 1000 MILLIGRAM(S): at 07:18

## 2025-01-01 RX ADMIN — Medication 75 EACH: at 00:11

## 2025-01-01 RX ADMIN — Medication 10 MILLIGRAM(S): at 15:35

## 2025-01-01 RX ADMIN — GABAPENTIN 400 MILLIGRAM(S): 400 CAPSULE ORAL at 21:21

## 2025-01-01 RX ADMIN — TAMSULOSIN HYDROCHLORIDE 0.8 MILLIGRAM(S): 0.4 CAPSULE ORAL at 21:21

## 2025-01-01 RX ADMIN — DEXAMETHASONE 4 MILLIGRAM(S): 0.5 TABLET ORAL at 05:54

## 2025-01-01 RX ADMIN — Medication 10 MILLIGRAM(S): at 16:02

## 2025-01-01 RX ADMIN — Medication 10 MILLIGRAM(S): at 06:09

## 2025-01-01 RX ADMIN — Medication 10 MILLIGRAM(S): at 23:06

## 2025-01-01 RX ADMIN — Medication 75 MILLILITER(S): at 12:15

## 2025-01-01 RX ADMIN — Medication 100 MILLIGRAM(S): at 23:16

## 2025-01-01 RX ADMIN — DEXAMETHASONE 4 MILLIGRAM(S): 0.5 TABLET ORAL at 06:15

## 2025-01-01 RX ADMIN — OCTREOTIDE ACETATE 100 MICROGRAM(S): 500 INJECTION, SOLUTION INTRAVENOUS; SUBCUTANEOUS at 13:32

## 2025-01-01 RX ADMIN — Medication 80 MILLIGRAM(S): at 05:36

## 2025-01-01 RX ADMIN — HEPARIN SODIUM 5000 UNIT(S): 1000 INJECTION INTRAVENOUS; SUBCUTANEOUS at 13:11

## 2025-01-01 RX ADMIN — Medication 10 MILLIGRAM(S): at 13:10

## 2025-01-01 RX ADMIN — OCTREOTIDE ACETATE 100 MICROGRAM(S): 500 INJECTION, SOLUTION INTRAVENOUS; SUBCUTANEOUS at 05:15

## 2025-01-01 RX ADMIN — Medication 10 MILLIGRAM(S): at 22:35

## 2025-01-01 RX ADMIN — Medication 100 GRAM(S): at 11:36

## 2025-01-01 RX ADMIN — Medication 1000 MILLIGRAM(S): at 18:04

## 2025-01-01 RX ADMIN — Medication 10 MILLIGRAM(S): at 06:15

## 2025-01-01 RX ADMIN — Medication 63.75 MILLIMOLE(S): at 09:36

## 2025-01-01 RX ADMIN — Medication 10 MILLIGRAM(S): at 08:29

## 2025-01-01 RX ADMIN — Medication 1000 MILLIGRAM(S): at 06:36

## 2025-01-01 RX ADMIN — SODIUM CHLORIDE 75 MILLILITER(S): 9 INJECTION, SOLUTION INTRAVENOUS at 09:44

## 2025-01-01 RX ADMIN — Medication 1000 MILLIGRAM(S): at 17:28

## 2025-01-01 RX ADMIN — I.V. FAT EMULSION 20.8 ML/HR: 20 EMULSION INTRAVENOUS at 18:13

## 2025-01-01 RX ADMIN — HEPARIN SODIUM 5000 UNIT(S): 1000 INJECTION INTRAVENOUS; SUBCUTANEOUS at 23:04

## 2025-01-01 RX ADMIN — DEXAMETHASONE 4 MILLIGRAM(S): 0.5 TABLET ORAL at 06:27

## 2025-01-01 RX ADMIN — Medication 100 GRAM(S): at 09:35

## 2025-01-01 RX ADMIN — OCTREOTIDE ACETATE 100 MICROGRAM(S): 500 INJECTION, SOLUTION INTRAVENOUS; SUBCUTANEOUS at 21:21

## 2025-01-01 RX ADMIN — Medication 0.2 MILLIGRAM(S): at 00:46

## 2025-01-01 RX ADMIN — HALOPERIDOL 0.5 MILLIGRAM(S): 10 TABLET ORAL at 05:20

## 2025-01-01 RX ADMIN — I.V. FAT EMULSION 20.8 ML/HR: 20 EMULSION INTRAVENOUS at 01:04

## 2025-01-01 RX ADMIN — SODIUM CHLORIDE 75 MILLILITER(S): 9 INJECTION, SOLUTION INTRAVENOUS at 12:28

## 2025-01-01 RX ADMIN — Medication 400 MILLIGRAM(S): at 12:34

## 2025-01-01 RX ADMIN — OCTREOTIDE ACETATE 100 MICROGRAM(S): 500 INJECTION, SOLUTION INTRAVENOUS; SUBCUTANEOUS at 23:55

## 2025-01-01 RX ADMIN — HEPARIN SODIUM 5000 UNIT(S): 1000 INJECTION INTRAVENOUS; SUBCUTANEOUS at 21:21

## 2025-01-01 RX ADMIN — Medication 1000 MILLIGRAM(S): at 00:57

## 2025-01-01 RX ADMIN — Medication 400 MILLIGRAM(S): at 05:36

## 2025-01-01 RX ADMIN — Medication 1 APPLICATION(S): at 12:02

## 2025-01-01 RX ADMIN — Medication 250 MILLIGRAM(S): at 19:55

## 2025-01-01 RX ADMIN — Medication 1000 MILLILITER(S): at 21:42

## 2025-01-01 RX ADMIN — DEXAMETHASONE 4 MILLIGRAM(S): 0.5 TABLET ORAL at 17:05

## 2025-01-01 RX ADMIN — Medication 10 MILLIGRAM(S): at 05:22

## 2025-01-01 RX ADMIN — Medication 10 MILLIGRAM(S): at 14:35

## 2025-01-01 RX ADMIN — Medication 400 MILLIGRAM(S): at 00:32

## 2025-01-01 RX ADMIN — HEPARIN SODIUM 5000 UNIT(S): 1000 INJECTION INTRAVENOUS; SUBCUTANEOUS at 05:49

## 2025-01-01 RX ADMIN — Medication 400 MILLIGRAM(S): at 11:36

## 2025-01-01 RX ADMIN — Medication 400 MILLIGRAM(S): at 05:09

## 2025-01-01 RX ADMIN — Medication 400 MILLIGRAM(S): at 05:52

## 2025-01-01 RX ADMIN — HALOPERIDOL 0.5 MILLIGRAM(S): 10 TABLET ORAL at 22:02

## 2025-01-01 RX ADMIN — Medication 75 MILLILITER(S): at 10:41

## 2025-01-01 RX ADMIN — POTASSIUM CHLORIDE, DEXTROSE MONOHYDRATE AND SODIUM CHLORIDE 100 MILLILITER(S): 150; 5; 900 INJECTION, SOLUTION INTRAVENOUS at 05:47

## 2025-01-01 RX ADMIN — ENOXAPARIN SODIUM 40 MILLIGRAM(S): 100 INJECTION SUBCUTANEOUS at 15:13

## 2025-01-01 RX ADMIN — Medication 10 MILLIGRAM(S): at 05:49

## 2025-01-01 RX ADMIN — Medication 40 MILLIGRAM(S): at 10:29

## 2025-01-01 RX ADMIN — Medication 10 MILLIGRAM(S): at 13:40

## 2025-01-01 RX ADMIN — OXYCODONE HYDROCHLORIDE 2.5 MILLIGRAM(S): 30 TABLET ORAL at 02:06

## 2025-01-01 RX ADMIN — Medication 4 MILLIGRAM(S): at 17:52

## 2025-01-01 RX ADMIN — Medication 10 MILLIGRAM(S): at 13:11

## 2025-01-01 RX ADMIN — Medication 40 MILLIGRAM(S): at 11:16

## 2025-01-01 RX ADMIN — Medication 0.5 MILLIGRAM(S): at 16:58

## 2025-01-01 RX ADMIN — Medication 1 APPLICATION(S): at 11:37

## 2025-01-01 RX ADMIN — HEPARIN SODIUM 5000 UNIT(S): 1000 INJECTION INTRAVENOUS; SUBCUTANEOUS at 21:37

## 2025-01-01 RX ADMIN — POTASSIUM CHLORIDE, DEXTROSE MONOHYDRATE AND SODIUM CHLORIDE 100 MILLILITER(S): 150; 5; 900 INJECTION, SOLUTION INTRAVENOUS at 11:35

## 2025-01-01 RX ADMIN — OCTREOTIDE ACETATE 100 MICROGRAM(S): 500 INJECTION, SOLUTION INTRAVENOUS; SUBCUTANEOUS at 05:08

## 2025-01-01 RX ADMIN — OCTREOTIDE ACETATE 100 MICROGRAM(S): 500 INJECTION, SOLUTION INTRAVENOUS; SUBCUTANEOUS at 15:07

## 2025-01-01 RX ADMIN — Medication 10 MILLIGRAM(S): at 03:12

## 2025-01-01 RX ADMIN — OCTREOTIDE ACETATE 100 MICROGRAM(S): 500 INJECTION, SOLUTION INTRAVENOUS; SUBCUTANEOUS at 21:38

## 2025-01-01 RX ADMIN — Medication 40 MILLIGRAM(S): at 12:57

## 2025-01-01 RX ADMIN — Medication 1000 MILLIGRAM(S): at 23:30

## 2025-01-01 RX ADMIN — OCTREOTIDE ACETATE 100 MICROGRAM(S): 500 INJECTION, SOLUTION INTRAVENOUS; SUBCUTANEOUS at 23:03

## 2025-01-01 RX ADMIN — Medication 10 MILLIGRAM(S): at 06:54

## 2025-01-01 RX ADMIN — Medication 1000 MILLIGRAM(S): at 17:15

## 2025-01-01 RX ADMIN — HEPARIN SODIUM 5000 UNIT(S): 1000 INJECTION INTRAVENOUS; SUBCUTANEOUS at 05:15

## 2025-01-01 RX ADMIN — Medication 1000 MILLIGRAM(S): at 01:57

## 2025-01-01 RX ADMIN — Medication 10 MILLIGRAM(S): at 22:08

## 2025-01-01 RX ADMIN — Medication 1000 MILLIGRAM(S): at 13:04

## 2025-01-01 RX ADMIN — Medication 1000 MILLIGRAM(S): at 01:32

## 2025-01-01 RX ADMIN — HEPARIN SODIUM 5000 UNIT(S): 1000 INJECTION INTRAVENOUS; SUBCUTANEOUS at 13:33

## 2025-01-01 RX ADMIN — Medication 1 APPLICATION(S): at 12:41

## 2025-01-01 RX ADMIN — Medication 25 GRAM(S): at 09:35

## 2025-01-01 RX ADMIN — DEXAMETHASONE 4 MILLIGRAM(S): 0.5 TABLET ORAL at 18:54

## 2025-01-01 RX ADMIN — OCTREOTIDE ACETATE 100 MICROGRAM(S): 500 INJECTION, SOLUTION INTRAVENOUS; SUBCUTANEOUS at 07:49

## 2025-01-01 RX ADMIN — HEPARIN SODIUM 5000 UNIT(S): 1000 INJECTION INTRAVENOUS; SUBCUTANEOUS at 21:50

## 2025-01-01 RX ADMIN — Medication 10 MILLIGRAM(S): at 21:19

## 2025-01-01 RX ADMIN — OCTREOTIDE ACETATE 100 MICROGRAM(S): 500 INJECTION, SOLUTION INTRAVENOUS; SUBCUTANEOUS at 05:27

## 2025-01-01 RX ADMIN — Medication 0.2 MILLIGRAM(S): at 11:53

## 2025-01-01 RX ADMIN — Medication 100 MILLIGRAM(S): at 21:53

## 2025-01-01 RX ADMIN — TAMSULOSIN HYDROCHLORIDE 0.8 MILLIGRAM(S): 0.4 CAPSULE ORAL at 21:58

## 2025-01-01 RX ADMIN — OCTREOTIDE ACETATE 100 MICROGRAM(S): 500 INJECTION, SOLUTION INTRAVENOUS; SUBCUTANEOUS at 13:34

## 2025-01-01 RX ADMIN — OCTREOTIDE ACETATE 100 MICROGRAM(S): 500 INJECTION, SOLUTION INTRAVENOUS; SUBCUTANEOUS at 08:21

## 2025-01-01 RX ADMIN — Medication 1000 MILLIGRAM(S): at 04:52

## 2025-01-01 RX ADMIN — SODIUM CHLORIDE 75 MILLILITER(S): 9 INJECTION, SOLUTION INTRAVENOUS at 21:40

## 2025-01-01 RX ADMIN — Medication 42 EACH: at 00:31

## 2025-01-01 RX ADMIN — Medication 10 MILLIGRAM(S): at 06:27

## 2025-01-01 RX ADMIN — Medication 10 MILLIGRAM(S): at 22:07

## 2025-01-01 RX ADMIN — Medication 400 MILLIGRAM(S): at 17:37

## 2025-01-01 RX ADMIN — Medication 10 MILLIGRAM(S): at 00:42

## 2025-01-01 RX ADMIN — OCTREOTIDE ACETATE 100 MICROGRAM(S): 500 INJECTION, SOLUTION INTRAVENOUS; SUBCUTANEOUS at 12:32

## 2025-01-01 RX ADMIN — Medication 400 MILLIGRAM(S): at 05:11

## 2025-01-01 RX ADMIN — Medication 10 MILLIGRAM(S): at 07:14

## 2025-01-01 RX ADMIN — HEPARIN SODIUM 5000 UNIT(S): 1000 INJECTION INTRAVENOUS; SUBCUTANEOUS at 21:19

## 2025-01-01 RX ADMIN — Medication 1000 MILLIGRAM(S): at 19:27

## 2025-01-01 RX ADMIN — DEXAMETHASONE 4 MILLIGRAM(S): 0.5 TABLET ORAL at 17:42

## 2025-01-01 RX ADMIN — DEXAMETHASONE 4 MILLIGRAM(S): 0.5 TABLET ORAL at 06:54

## 2025-01-01 RX ADMIN — HEPARIN SODIUM 5000 UNIT(S): 1000 INJECTION INTRAVENOUS; SUBCUTANEOUS at 06:27

## 2025-01-01 RX ADMIN — SODIUM CHLORIDE 50 MILLILITER(S): 9 INJECTION, SOLUTION INTRAVENOUS at 08:12

## 2025-01-01 RX ADMIN — OXYCODONE HYDROCHLORIDE 2.5 MILLIGRAM(S): 30 TABLET ORAL at 08:45

## 2025-01-01 RX ADMIN — Medication 75 MILLILITER(S): at 08:20

## 2025-01-01 RX ADMIN — Medication 0.2 MILLIGRAM(S): at 01:30

## 2025-01-01 RX ADMIN — Medication 1000 MILLIGRAM(S): at 12:06

## 2025-01-01 RX ADMIN — HEPARIN SODIUM 5000 UNIT(S): 1000 INJECTION INTRAVENOUS; SUBCUTANEOUS at 13:21

## 2025-01-01 RX ADMIN — Medication 1000 MILLIGRAM(S): at 13:25

## 2025-01-01 RX ADMIN — HEPARIN SODIUM 5000 UNIT(S): 1000 INJECTION INTRAVENOUS; SUBCUTANEOUS at 22:09

## 2025-01-01 RX ADMIN — Medication 200 MILLIGRAM(S): at 08:58

## 2025-01-01 RX ADMIN — OCTREOTIDE ACETATE 100 MICROGRAM(S): 500 INJECTION, SOLUTION INTRAVENOUS; SUBCUTANEOUS at 13:40

## 2025-01-01 RX ADMIN — HEPARIN SODIUM 5000 UNIT(S): 1000 INJECTION INTRAVENOUS; SUBCUTANEOUS at 15:56

## 2025-01-01 RX ADMIN — Medication 40 MILLIGRAM(S): at 11:36

## 2025-01-01 RX ADMIN — Medication 1000 MILLIGRAM(S): at 06:09

## 2025-01-01 RX ADMIN — Medication 400 MILLIGRAM(S): at 06:00

## 2025-01-01 RX ADMIN — Medication 63 EACH: at 19:00

## 2025-01-01 RX ADMIN — HEPARIN SODIUM 5000 UNIT(S): 1000 INJECTION INTRAVENOUS; SUBCUTANEOUS at 05:16

## 2025-01-01 RX ADMIN — Medication 63.75 MILLIMOLE(S): at 14:05

## 2025-01-01 RX ADMIN — Medication 10 MILLIGRAM(S): at 06:50

## 2025-01-01 RX ADMIN — DEXAMETHASONE 4 MILLIGRAM(S): 0.5 TABLET ORAL at 05:18

## 2025-01-01 RX ADMIN — DEXAMETHASONE 4 MILLIGRAM(S): 0.5 TABLET ORAL at 03:12

## 2025-01-01 RX ADMIN — POTASSIUM CHLORIDE, DEXTROSE MONOHYDRATE AND SODIUM CHLORIDE 100 MILLILITER(S): 150; 5; 900 INJECTION, SOLUTION INTRAVENOUS at 06:54

## 2025-01-01 RX ADMIN — DEXAMETHASONE 4 MILLIGRAM(S): 0.5 TABLET ORAL at 05:49

## 2025-01-01 RX ADMIN — HEPARIN SODIUM 5000 UNIT(S): 1000 INJECTION INTRAVENOUS; SUBCUTANEOUS at 06:47

## 2025-01-01 RX ADMIN — POTASSIUM PHOSPHATE, MONOBASIC POTASSIUM PHOSPHATE, DIBASIC INJECTION, 62.5 MILLIMOLE(S): 236; 224 SOLUTION, CONCENTRATE INTRAVENOUS at 12:28

## 2025-01-01 RX ADMIN — Medication 0.25 MILLIGRAM(S): at 00:53

## 2025-01-01 RX ADMIN — OCTREOTIDE ACETATE 100 MICROGRAM(S): 500 INJECTION, SOLUTION INTRAVENOUS; SUBCUTANEOUS at 15:57

## 2025-01-01 RX ADMIN — Medication 0.2 MILLIGRAM(S): at 16:41

## 2025-01-01 RX ADMIN — I.V. FAT EMULSION 16.6 ML/HR: 20 EMULSION INTRAVENOUS at 18:56

## 2025-01-01 RX ADMIN — OCTREOTIDE ACETATE 100 MICROGRAM(S): 500 INJECTION, SOLUTION INTRAVENOUS; SUBCUTANEOUS at 08:51

## 2025-01-01 RX ADMIN — Medication 40 MILLIGRAM(S): at 11:24

## 2025-01-01 RX ADMIN — I.V. FAT EMULSION 10.4 ML/HR: 20 EMULSION INTRAVENOUS at 18:59

## 2025-01-01 RX ADMIN — Medication 40 MILLIGRAM(S): at 12:37

## 2025-01-01 RX ADMIN — Medication 40 MILLIGRAM(S): at 13:26

## 2025-01-01 RX ADMIN — Medication 400 MILLIGRAM(S): at 16:41

## 2025-01-01 RX ADMIN — OCTREOTIDE ACETATE 100 MICROGRAM(S): 500 INJECTION, SOLUTION INTRAVENOUS; SUBCUTANEOUS at 07:07

## 2025-01-01 RX ADMIN — Medication 40 MILLIGRAM(S): at 11:26

## 2025-01-01 RX ADMIN — Medication 10 MILLIGRAM(S): at 05:55

## 2025-01-01 RX ADMIN — Medication 40 MILLIGRAM(S): at 12:33

## 2025-01-01 RX ADMIN — OCTREOTIDE ACETATE 100 MICROGRAM(S): 500 INJECTION, SOLUTION INTRAVENOUS; SUBCUTANEOUS at 00:51

## 2025-01-01 RX ADMIN — Medication 1000 MILLIGRAM(S): at 12:44

## 2025-01-01 RX ADMIN — ENOXAPARIN SODIUM 40 MILLIGRAM(S): 100 INJECTION SUBCUTANEOUS at 15:58

## 2025-01-01 RX ADMIN — Medication 400 MILLIGRAM(S): at 11:56

## 2025-01-01 RX ADMIN — DEXAMETHASONE 4 MILLIGRAM(S): 0.5 TABLET ORAL at 06:10

## 2025-01-01 RX ADMIN — Medication 0.2 MILLIGRAM(S): at 18:30

## 2025-01-01 RX ADMIN — Medication 80 MILLIGRAM(S): at 13:05

## 2025-01-01 RX ADMIN — Medication 1 APPLICATION(S): at 11:20

## 2025-01-01 RX ADMIN — ATORVASTATIN CALCIUM 40 MILLIGRAM(S): 80 TABLET, FILM COATED ORAL at 21:38

## 2025-01-01 RX ADMIN — Medication 1000 MILLIGRAM(S): at 12:30

## 2025-01-01 RX ADMIN — Medication 40 MILLIGRAM(S): at 13:31

## 2025-01-01 RX ADMIN — OCTREOTIDE ACETATE 100 MICROGRAM(S): 500 INJECTION, SOLUTION INTRAVENOUS; SUBCUTANEOUS at 04:41

## 2025-01-01 RX ADMIN — OCTREOTIDE ACETATE 100 MICROGRAM(S): 500 INJECTION, SOLUTION INTRAVENOUS; SUBCUTANEOUS at 05:33

## 2025-01-01 RX ADMIN — OCTREOTIDE ACETATE 100 MICROGRAM(S): 500 INJECTION, SOLUTION INTRAVENOUS; SUBCUTANEOUS at 21:19

## 2025-01-01 RX ADMIN — GABAPENTIN 400 MILLIGRAM(S): 400 CAPSULE ORAL at 21:56

## 2025-01-01 RX ADMIN — OCTREOTIDE ACETATE 100 MICROGRAM(S): 500 INJECTION, SOLUTION INTRAVENOUS; SUBCUTANEOUS at 22:04

## 2025-01-01 RX ADMIN — Medication 400 MILLIGRAM(S): at 18:24

## 2025-01-01 RX ADMIN — I.V. FAT EMULSION 20.8 ML/HR: 20 EMULSION INTRAVENOUS at 01:10

## 2025-01-01 RX ADMIN — Medication 42 EACH: at 00:32

## 2025-01-01 RX ADMIN — Medication 10 MILLIGRAM(S): at 22:05

## 2025-01-01 RX ADMIN — ENOXAPARIN SODIUM 40 MILLIGRAM(S): 100 INJECTION SUBCUTANEOUS at 16:42

## 2025-01-01 RX ADMIN — HEPARIN SODIUM 5000 UNIT(S): 1000 INJECTION INTRAVENOUS; SUBCUTANEOUS at 23:56

## 2025-01-01 RX ADMIN — Medication 0.2 MILLIGRAM(S): at 03:21

## 2025-01-01 RX ADMIN — OCTREOTIDE ACETATE 100 MICROGRAM(S): 500 INJECTION, SOLUTION INTRAVENOUS; SUBCUTANEOUS at 00:13

## 2025-01-01 RX ADMIN — Medication 10 MILLIGRAM(S): at 13:37

## 2025-01-01 RX ADMIN — DEXAMETHASONE 4 MILLIGRAM(S): 0.5 TABLET ORAL at 18:50

## 2025-01-01 RX ADMIN — HEPARIN SODIUM 5000 UNIT(S): 1000 INJECTION INTRAVENOUS; SUBCUTANEOUS at 22:59

## 2025-01-01 RX ADMIN — Medication 40 MILLIGRAM(S): at 13:32

## 2025-01-01 RX ADMIN — HEPARIN SODIUM 5000 UNIT(S): 1000 INJECTION INTRAVENOUS; SUBCUTANEOUS at 05:28

## 2025-01-01 RX ADMIN — OCTREOTIDE ACETATE 100 MICROGRAM(S): 500 INJECTION, SOLUTION INTRAVENOUS; SUBCUTANEOUS at 22:10

## 2025-01-01 RX ADMIN — Medication 4 MILLIGRAM(S): at 16:54

## 2025-01-01 RX ADMIN — DEXAMETHASONE 4 MILLIGRAM(S): 0.5 TABLET ORAL at 06:17

## 2025-01-01 RX ADMIN — Medication 0.2 MILLIGRAM(S): at 18:13

## 2025-01-01 RX ADMIN — DEXAMETHASONE 4 MILLIGRAM(S): 0.5 TABLET ORAL at 19:07

## 2025-01-01 RX ADMIN — Medication 63 EACH: at 18:53

## 2025-01-01 RX ADMIN — Medication 75 EACH: at 00:06

## 2025-01-01 RX ADMIN — Medication 1000 MILLIGRAM(S): at 12:35

## 2025-01-01 RX ADMIN — CALCIUM GLUCONATE 100 GRAM(S): 20 INJECTION, SOLUTION INTRAVENOUS at 11:36

## 2025-01-01 RX ADMIN — Medication 10 MILLIGRAM(S): at 05:23

## 2025-01-01 RX ADMIN — ATORVASTATIN CALCIUM 40 MILLIGRAM(S): 80 TABLET, FILM COATED ORAL at 21:21

## 2025-01-01 RX ADMIN — OLANZAPINE 2.5 MILLIGRAM(S): 10 TABLET ORAL at 21:10

## 2025-01-01 RX ADMIN — Medication 1000 MILLIGRAM(S): at 18:17

## 2025-01-01 RX ADMIN — OCTREOTIDE ACETATE 100 MICROGRAM(S): 500 INJECTION, SOLUTION INTRAVENOUS; SUBCUTANEOUS at 10:35

## 2025-01-01 RX ADMIN — OCTREOTIDE ACETATE 100 MICROGRAM(S): 500 INJECTION, SOLUTION INTRAVENOUS; SUBCUTANEOUS at 15:49

## 2025-01-01 RX ADMIN — Medication 1000 MILLIGRAM(S): at 18:59

## 2025-01-01 RX ADMIN — ENOXAPARIN SODIUM 40 MILLIGRAM(S): 100 INJECTION SUBCUTANEOUS at 17:07

## 2025-01-01 RX ADMIN — Medication 10 MILLIGRAM(S): at 16:54

## 2025-01-01 RX ADMIN — Medication 100 MILLIGRAM(S): at 05:52

## 2025-01-01 RX ADMIN — Medication 400 MILLIGRAM(S): at 10:42

## 2025-02-05 ENCOUNTER — APPOINTMENT (OUTPATIENT)
Dept: UROLOGY | Facility: CLINIC | Age: 89
End: 2025-02-05

## 2025-02-06 NOTE — ED PROVIDER NOTE - EYES [-], MLM
Leia Coyle MD  Performed by: Leia Coyle MD  Authorized by: Leia Coyle no visual changes

## 2025-02-07 ENCOUNTER — APPOINTMENT (OUTPATIENT)
Dept: UROLOGY | Facility: CLINIC | Age: 89
End: 2025-02-07
Payer: MEDICARE

## 2025-02-07 ENCOUNTER — OUTPATIENT (OUTPATIENT)
Dept: OUTPATIENT SERVICES | Facility: HOSPITAL | Age: 89
LOS: 1 days | End: 2025-02-07
Payer: MEDICARE

## 2025-02-07 VITALS — HEART RATE: 93 BPM | RESPIRATION RATE: 19 BRPM | DIASTOLIC BLOOD PRESSURE: 75 MMHG | SYSTOLIC BLOOD PRESSURE: 165 MMHG

## 2025-02-07 DIAGNOSIS — N13.8 BENIGN PROSTATIC HYPERPLASIA WITH LOWER URINARY TRACT SYMPMS: ICD-10-CM

## 2025-02-07 DIAGNOSIS — R35.0 FREQUENCY OF MICTURITION: ICD-10-CM

## 2025-02-07 DIAGNOSIS — R33.9 RETENTION OF URINE, UNSPECIFIED: ICD-10-CM

## 2025-02-07 DIAGNOSIS — Z90.49 ACQUIRED ABSENCE OF OTHER SPECIFIED PARTS OF DIGESTIVE TRACT: Chronic | ICD-10-CM

## 2025-02-07 DIAGNOSIS — N40.1 BENIGN PROSTATIC HYPERPLASIA WITH LOWER URINARY TRACT SYMPMS: ICD-10-CM

## 2025-02-07 PROCEDURE — 51702 INSERT TEMP BLADDER CATH: CPT

## 2025-02-10 DIAGNOSIS — N40.1 BENIGN PROSTATIC HYPERPLASIA WITH LOWER URINARY TRACT SYMPTOMS: ICD-10-CM

## 2025-02-10 DIAGNOSIS — R33.9 RETENTION OF URINE, UNSPECIFIED: ICD-10-CM

## 2025-02-11 LAB — BACTERIA UR CULT: ABNORMAL

## 2025-02-11 RX ORDER — SULFAMETHOXAZOLE AND TRIMETHOPRIM 800; 160 MG/1; MG/1
800-160 TABLET ORAL
Qty: 10 | Refills: 0 | Status: ACTIVE | COMMUNITY
Start: 2025-02-11 | End: 1900-01-01

## 2025-03-01 ENCOUNTER — EMERGENCY (EMERGENCY)
Facility: HOSPITAL | Age: 89
LOS: 1 days | Discharge: HOME | End: 2025-03-01
Attending: EMERGENCY MEDICINE | Admitting: EMERGENCY MEDICINE
Payer: MEDICARE

## 2025-03-01 VITALS
DIASTOLIC BLOOD PRESSURE: 68 MMHG | WEIGHT: 134.92 LBS | OXYGEN SATURATION: 96 % | TEMPERATURE: 97 F | RESPIRATION RATE: 20 BRPM | SYSTOLIC BLOOD PRESSURE: 149 MMHG | HEART RATE: 90 BPM

## 2025-03-01 VITALS
OXYGEN SATURATION: 99 % | SYSTOLIC BLOOD PRESSURE: 131 MMHG | DIASTOLIC BLOOD PRESSURE: 79 MMHG | HEART RATE: 81 BPM | RESPIRATION RATE: 18 BRPM | TEMPERATURE: 98 F

## 2025-03-01 DIAGNOSIS — Z90.49 ACQUIRED ABSENCE OF OTHER SPECIFIED PARTS OF DIGESTIVE TRACT: Chronic | ICD-10-CM

## 2025-03-01 LAB
ALBUMIN SERPL ELPH-MCNC: 4 G/DL — SIGNIFICANT CHANGE UP (ref 3.3–5)
ALP SERPL-CCNC: 88 U/L — SIGNIFICANT CHANGE UP (ref 40–120)
ALT FLD-CCNC: 5 U/L — SIGNIFICANT CHANGE UP (ref 4–41)
ANION GAP SERPL CALC-SCNC: 17 MMOL/L — HIGH (ref 7–14)
APPEARANCE UR: CLEAR — SIGNIFICANT CHANGE UP
APTT BLD: 27 SEC — SIGNIFICANT CHANGE UP (ref 24.5–35.6)
AST SERPL-CCNC: 13 U/L — SIGNIFICANT CHANGE UP (ref 4–40)
BACTERIA # UR AUTO: ABNORMAL /HPF
BASOPHILS # BLD AUTO: 0.03 K/UL — SIGNIFICANT CHANGE UP (ref 0–0.2)
BASOPHILS NFR BLD AUTO: 0.2 % — SIGNIFICANT CHANGE UP (ref 0–2)
BILIRUB SERPL-MCNC: 1.2 MG/DL — SIGNIFICANT CHANGE UP (ref 0.2–1.2)
BILIRUB UR-MCNC: NEGATIVE — SIGNIFICANT CHANGE UP
BLOOD GAS VENOUS COMPREHENSIVE RESULT: SIGNIFICANT CHANGE UP
BUN SERPL-MCNC: 20 MG/DL — SIGNIFICANT CHANGE UP (ref 7–23)
CALCIUM SERPL-MCNC: 9.2 MG/DL — SIGNIFICANT CHANGE UP (ref 8.4–10.5)
CAST: 4 /LPF — SIGNIFICANT CHANGE UP (ref 0–4)
CHLORIDE SERPL-SCNC: 102 MMOL/L — SIGNIFICANT CHANGE UP (ref 98–107)
CO2 SERPL-SCNC: 23 MMOL/L — SIGNIFICANT CHANGE UP (ref 22–31)
COLOR SPEC: YELLOW — SIGNIFICANT CHANGE UP
CREAT SERPL-MCNC: 0.94 MG/DL — SIGNIFICANT CHANGE UP (ref 0.5–1.3)
DIFF PNL FLD: ABNORMAL
EGFR: 77 ML/MIN/1.73M2 — SIGNIFICANT CHANGE UP
EOSINOPHIL # BLD AUTO: 0.01 K/UL — SIGNIFICANT CHANGE UP (ref 0–0.5)
EOSINOPHIL NFR BLD AUTO: 0.1 % — SIGNIFICANT CHANGE UP (ref 0–6)
GLUCOSE SERPL-MCNC: 113 MG/DL — HIGH (ref 70–99)
GLUCOSE UR QL: NEGATIVE MG/DL — SIGNIFICANT CHANGE UP
HCT VFR BLD CALC: 40 % — SIGNIFICANT CHANGE UP (ref 39–50)
HGB BLD-MCNC: 13.5 G/DL — SIGNIFICANT CHANGE UP (ref 13–17)
IANC: 10.87 K/UL — HIGH (ref 1.8–7.4)
IMM GRANULOCYTES NFR BLD AUTO: 0.6 % — SIGNIFICANT CHANGE UP (ref 0–0.9)
INR BLD: 1.01 RATIO — SIGNIFICANT CHANGE UP (ref 0.85–1.16)
KETONES UR-MCNC: 15 MG/DL
LEUKOCYTE ESTERASE UR-ACNC: ABNORMAL
LIDOCAIN IGE QN: 12 U/L — SIGNIFICANT CHANGE UP (ref 7–60)
LYMPHOCYTES # BLD AUTO: 0.68 K/UL — LOW (ref 1–3.3)
LYMPHOCYTES # BLD AUTO: 5.4 % — LOW (ref 13–44)
MCHC RBC-ENTMCNC: 29.9 PG — SIGNIFICANT CHANGE UP (ref 27–34)
MCHC RBC-ENTMCNC: 33.8 G/DL — SIGNIFICANT CHANGE UP (ref 32–36)
MCV RBC AUTO: 88.7 FL — SIGNIFICANT CHANGE UP (ref 80–100)
MONOCYTES # BLD AUTO: 0.9 K/UL — SIGNIFICANT CHANGE UP (ref 0–0.9)
MONOCYTES NFR BLD AUTO: 7.2 % — SIGNIFICANT CHANGE UP (ref 2–14)
NEUTROPHILS # BLD AUTO: 10.87 K/UL — HIGH (ref 1.8–7.4)
NEUTROPHILS NFR BLD AUTO: 86.5 % — HIGH (ref 43–77)
NITRITE UR-MCNC: NEGATIVE — SIGNIFICANT CHANGE UP
NRBC # BLD AUTO: 0 K/UL — SIGNIFICANT CHANGE UP (ref 0–0)
NRBC # FLD: 0 K/UL — SIGNIFICANT CHANGE UP (ref 0–0)
NRBC BLD AUTO-RTO: 0 /100 WBCS — SIGNIFICANT CHANGE UP (ref 0–0)
PH UR: 6.5 — SIGNIFICANT CHANGE UP (ref 5–8)
PLATELET # BLD AUTO: 259 K/UL — SIGNIFICANT CHANGE UP (ref 150–400)
POTASSIUM SERPL-MCNC: 3.9 MMOL/L — SIGNIFICANT CHANGE UP (ref 3.5–5.3)
POTASSIUM SERPL-SCNC: 3.9 MMOL/L — SIGNIFICANT CHANGE UP (ref 3.5–5.3)
PROT SERPL-MCNC: 7.1 G/DL — SIGNIFICANT CHANGE UP (ref 6–8.3)
PROT UR-MCNC: 30 MG/DL
PROTHROM AB SERPL-ACNC: 11.7 SEC — SIGNIFICANT CHANGE UP (ref 9.9–13.4)
RBC # BLD: 4.51 M/UL — SIGNIFICANT CHANGE UP (ref 4.2–5.8)
RBC # FLD: 13.4 % — SIGNIFICANT CHANGE UP (ref 10.3–14.5)
RBC CASTS # UR COMP ASSIST: 18 /HPF — HIGH (ref 0–4)
REVIEW: SIGNIFICANT CHANGE UP
SODIUM SERPL-SCNC: 142 MMOL/L — SIGNIFICANT CHANGE UP (ref 135–145)
SP GR SPEC: 1.07 — HIGH (ref 1–1.03)
SQUAMOUS # UR AUTO: 0 /HPF — SIGNIFICANT CHANGE UP (ref 0–5)
UROBILINOGEN FLD QL: 0.2 MG/DL — SIGNIFICANT CHANGE UP (ref 0.2–1)
WBC # BLD: 12.56 K/UL — HIGH (ref 3.8–10.5)
WBC # FLD AUTO: 12.56 K/UL — HIGH (ref 3.8–10.5)
WBC UR QL: 32 /HPF — HIGH (ref 0–5)

## 2025-03-01 PROCEDURE — 99285 EMERGENCY DEPT VISIT HI MDM: CPT

## 2025-03-01 PROCEDURE — 74177 CT ABD & PELVIS W/CONTRAST: CPT | Mod: 26

## 2025-03-01 RX ORDER — ONDANSETRON HCL/PF 4 MG/2 ML
4 VIAL (ML) INJECTION ONCE
Refills: 0 | Status: COMPLETED | OUTPATIENT
Start: 2025-03-01 | End: 2025-03-01

## 2025-03-01 RX ORDER — PHENAZOPYRIDINE HCL 100 MG
1 TABLET ORAL
Qty: 6 | Refills: 0
Start: 2025-03-01 | End: 2025-03-02

## 2025-03-01 RX ORDER — ONDANSETRON HCL/PF 4 MG/2 ML
1 VIAL (ML) INJECTION
Qty: 2 | Refills: 0
Start: 2025-03-01 | End: 2025-03-07

## 2025-03-01 RX ORDER — SULFAMETHOXAZOLE/TRIMETHOPRIM 800-160 MG
1 TABLET ORAL
Qty: 28 | Refills: 0
Start: 2025-03-01 | End: 2025-03-14

## 2025-03-01 RX ORDER — ACETAMINOPHEN 500 MG/5ML
1000 LIQUID (ML) ORAL ONCE
Refills: 0 | Status: COMPLETED | OUTPATIENT
Start: 2025-03-01 | End: 2025-03-01

## 2025-03-01 RX ADMIN — Medication 400 MILLIGRAM(S): at 16:51

## 2025-03-01 RX ADMIN — Medication 4 MILLIGRAM(S): at 16:51

## 2025-03-01 RX ADMIN — Medication 20 MILLIGRAM(S): at 16:51

## 2025-03-01 NOTE — ED PROVIDER NOTE - NSFOLLOWUPINSTRUCTIONS_ED_ALL_ED_FT
1) Follow up with your doctor in 1 week. [facility] They will contact you to help set up an appointment.   2) Return to the ED immediately for new or worsening symptoms: fever, vomiting, difficulty breathing, dark/bloody stools, lightheadedness, dizziness, difficulty urinating   3) Please continue to take any home medications as prescribed  4) Your test results from your ED visit were discussed with you prior to discharge  5) You were provided with a copy of your test results  6) Finally, please follow up with [your PCP/a specialist] regarding the incidental findings seen on your imaging (please see discharge paperwork for results).    Urinary Tract Infection    A urinary tract infection (UTI) is an infection of any part of the urinary tract, which includes the kidneys, ureters, bladder, and urethra. Risk factors include ignoring your need to urinate, wiping back to front if female, being an uncircumcised male, and having diabetes or a weak immune system. Symptoms include frequent urination, pain or burning with urination, foul smelling urine, cloudy urine, pain in the lower abdomen, blood in the urine, and fever. If you were prescribed an antibiotic medicine, take it as told by your health care provider. Do not stop taking the antibiotic even if you start to feel better.    SEEK IMMEDIATE MEDICAL CARE IF YOU HAVE ANY OF THE FOLLOWING SYMPTOMS: severe back or abdominal pain, fever, inability to keep fluids or medicine down, dizziness/lightheadedness, or a change in mental status.    Abdominal Pain  Many things can cause abdominal pain. Many times, abdominal pain is not caused by a disease and will improve without treatment. Your health care provider will do a physical exam to determine if there is a dangerous cause of your pain; blood tests and imaging may help determine the cause of your pain. However, in many cases, no cause may be found and you may need further testing as an outpatient. Monitor your abdominal pain for any changes.     SEEK IMMEDIATE MEDICAL CARE IF YOU HAVE ANY OF THE FOLLOWING SYMPTOMS: worsening abdominal pain, uncontrollable vomiting, profuse diarrhea, inability to have bowel movements or pass gas, black or bloody stools, fever accompanying chest pain or back pain, or fainting. These symptoms may represent a serious problem that is an emergency. Do not wait to see if the symptoms will go away. Get medical help right away. Call 911 and do not drive yourself to the hospital.

## 2025-03-01 NOTE — ED ADULT NURSE NOTE - CCCP TRG CHIEF CMPLNT
General Surgery   Consult Note      Patient's Name/Date of Birth: Erna Stoner / 1962    Date: 2024     PCP: Elia Daniel MD     Chief Complaint: hematemesis    HPI:   Erna Stoner is a 61 y.o. female who presents for evaluation of hematemesis. Timing is couple times a day, radiation to nowhere, alleviated by nothing and started 3 days ago and severity is 4/10.  Patient reports that she had hematemesis with bright red blood about 3 days ago.  She has never had anything like this before.  She had an EGD 5 years ago at [facility] that she reports was normal.  She had an EGD and colonoscopy in  with GI which showed ulcers, gastritis, and negative colonoscopy.  She reports that she has been having brown stool.  She has frequent heartburn that she manages with the PPI.  She is not on any anticoagulation.    Hgb 7.4 from 9.3.      Patient Active Problem List   Diagnosis    Hematemesis       No Known Allergies    Past Medical History:   Diagnosis Date    Asthma     stable, is mild    Back pain     Diabetes mellitus (HCC)     Hypothyroidism     Mass of nasal sinus     for OR 20     Thyroid disease        Past Surgical History:   Procedure Laterality Date    CARPAL TUNNEL RELEASE      bilateral     SECTION  1992    CHOLECYSTECTOMY  1998    COLONOSCOPY  2012    HYSTERECTOMY (CERVIX STATUS UNKNOWN)  2003    LIVER BIOPSY  2017    NOSE SURGERY N/A 2020    EXCISIONAL BIOPSY OF RIGHT NARES (PATHOLOGY NOTIFIED) performed by Bandar Solis Jr., MD at [facility] OR    OVARY REMOVAL      TONSILLECTOMY      UPPER GASTROINTESTINAL ENDOSCOPY  2012       Social History     Socioeconomic History    Marital status:      Spouse name: Not on file    Number of children: Not on file    Years of education: Not on file    Highest education level: Not on file   Occupational History    Not on file   Tobacco Use    Smoking status: Never    Smokeless tobacco: Never    Substance and Sexual Activity    Alcohol use: Yes     Comment: occasional    Drug use: No    Sexual activity: Not Currently   Other Topics Concern    Not on file   Social History Narrative    Not on file     Social Determinants of Health     Financial Resource Strain: Not on file   Food Insecurity: Not on file   Transportation Needs: Not on file   Physical Activity: Not on file   Stress: Not on file   Social Connections: Not on file   Intimate Partner Violence: Not on file   Housing Stability: Not on file       The patient has a family history that is negative for severe cardiovascular or respiratory issues, negative for reaction to anesthesia.     Recent Labs     04/16/24 1937 04/17/24  0639   WBC 5.5  --    HGB 9.3* 7.4*   HCT 28.4* 22.5*   MCV 89.3  --      --        Recent Labs     04/16/24 1937 04/17/24  0639    141   K 4.5 3.8   CO2 25 23   BUN 29* 22   CREATININE 0.5 0.5       Recent Labs     04/16/24 1937 04/17/24  0639   ALKPHOS 70 53   ALT 24 19   AST 37* 27   PROT 5.6* 4.5*   BILITOT 1.8* 1.3*   INR  --  1.4   LIPASE 55  --        No intake or output data in the 24 hours ending 04/17/24 0902    I have reviewed relevant labs from this admission and my interpretation is included in my assessment and plan      Review of Systems  A complete 10 system review was performed and are otherwise negative unless mentioned in the above HPI. Specific negatives are listed below but may not include all those reviewed.    General ROS: negative obtundation, AMS  ENT ROS: negative rhinorrhea, epistaxis  Allergy and Immunology ROS: negative itchy/watery eyes or nasal congestion  Hematological and Lymphatic ROS: negative spontaneous bleeding or bruising  Endocrine ROS: negative  lethargy, mood swings, palpitations or polydipsia/polyuria  Respiratory ROS: negative sputum changes, stridor, tachypnea or wheezing  Cardiovascular ROS: negative for - loss of consciousness, murmur or orthopnea  Gastrointestinal  abdominal pain

## 2025-03-01 NOTE — ED ADULT NURSE REASSESSMENT NOTE - NS ED NURSE REASSESS COMMENT FT1
Pt's bruner from home removed. New 16F bruner placed. Pt tolerated well. Clear yellow urine noted in drainage tubing. UA/UC collected and sent. No acute distress noted. Respirations even and unlabored, chest rise equal b/l. NSR noted on monitor. Safety maintained throughout.

## 2025-03-01 NOTE — ED PROVIDER NOTE - OBJECTIVE STATEMENT
89-year-old Male with a history of BPH with Gill, HLD, who presents with periumbilical pain for the past day. Reports burning sensation associated with nausea and 1 episode of nbnb vomiting. Last BM yesterday, which was normal. States no exacerbating or alleviating factors. Recently diagnosed with a UTI where has taken 2 doses of Bactrim. Denies fever, chills, nausea, vomiting, chest pain, shortness of breath, diarrhea. Gill changed 3 weeks ago and due for exchange in 10 days.    abdominal surgeries: appendectomy, 2 hernia repairs, colon cancer s/p resection

## 2025-03-01 NOTE — ED PROVIDER NOTE - PHYSICAL EXAMINATION
INITIAL VITAL SIGNS: Reviewed by me.  GENERAL: In no acute distress.  HEAD: Normocephalic. Atraumatic.  EYES: EOMI. PERRL.  ENT: Moist mucous membranes. Oropharynx is clear.   NECK: Supple. No meningismus.   CV: Regular rate and rhythm. No murmurs, rubs, or gallops.  RESPIRATORY: Unlabored respirations. Clear to ascultation bilaterally.  ABDOMEN: Soft, non-distended, non-tender. No guarding or rebound.  : (+) Gill in place: cloudy urine noted in bag.   BACK: No CVA tenderness.  EXTREMITIES: No deformities. No edema.   SKIN: Warm and dry. No rashes or petechiae.  NEURO: Grossly non-focal.

## 2025-03-01 NOTE — ED ADULT TRIAGE NOTE - CHIEF COMPLAINT QUOTE
Patient presents to ED with complaints of lower abdominal pain, N/V x 1 day. Patient recently completed abx therapy last week for UTI, currently has bruner in place. Patient reports taking tylnol prior to arrival but threw up the dose. pmhx; GERD, HLD, BPH, colon cancer (no treatment at this time)

## 2025-03-01 NOTE — ED PROVIDER NOTE - CARE PLAN
1 Principal Discharge DX:	Acute abdominal pain   Principal Discharge DX:	Peritoneal carcinomatosis  Secondary Diagnosis:	Acute UTI

## 2025-03-01 NOTE — ED ADULT NURSE REASSESSMENT NOTE - NS ED NURSE REASSESS COMMENT FT1
Pt is A&Ox4, resting in stretcher with no complaints at this time. Respirations even and unlabored, chest rise equal b/l. VS as noted in flow sheets. Pt denies chest pain, SOB, abdominal pain, N/V/D, h/a, dizziness, numbness/tingling or any urinary symptoms at this time. No acute distress noted. Gill leg bag placed. IV removed and discharge paperwork given to pt by provider. Safety maintained throughout.

## 2025-03-01 NOTE — ED PROVIDER NOTE - WET READ LAUNCH FT
There are no Wet Read(s) to document. bilateral upper extremity ROM was WFL (within functional limits)/bilateral lower extremity ROM was WFL (within functional limits)

## 2025-03-01 NOTE — ED PROVIDER NOTE - PROGRESS NOTE DETAILS
Old records reviewed. Patient's urine culture on 2/7/25 grew serratia marcesens which sensitive to Bactrim and enterococcus faecalis. Patient was diagnosed with UTI last week, but stopped taking Bactrim after 2 doses. Will exchange Gill. UA positive for infection. Complicated UTI in setting of male and Gill, will give a 14 day course of   Results discussed with patient. Patient requesting to go home. States he followed with Dr. Mirza for prior colon cancer. Will give referral to Cancer Center. Recommended taking Tylenol for pain. Prescription given for famotidine, Zofran, Azo, and Bactrim. Instructed the importance of following up with their PMD. Strict return precautions given. The patient expressed understanding and feels comfortable being discharged home. Labs showed leukocytosis at 12.56. No severe anemia. No evidence of severe electrolyte abnormalities, liver disease, or renal disorder. No severe coagulapathy. Lactate 1.9 CT showed new spiculated soft tissue within the right mid abdomen concerning for peritoneal carcinomatosis. There is also new ascites within the pelvis.

## 2025-03-01 NOTE — ED PROVIDER NOTE - CLINICAL SUMMARY MEDICAL DECISION MAKING FREE TEXT BOX
89-year-old Male with a history of BPH with Gill, HLD, who presents with burning periumbilical abdominal pain for the past day. Associated with nausea and 1 episode of nbnb vomiting. Last BM yesterday and was normal. Recently diagnosed with UTI where took 2 doses of Bactrim. Afebrile. VSS. On exam, non-tender abdomen. Gill in place with cloudy urine. Considered UTI, intraabdominal infection. Low suspicion for SBO. Will obtain labs and CTAP. The patient initially treated with Tylenol IV, Pepcid IV, and Zofran IV for pain and nausea.

## 2025-03-01 NOTE — ED ADULT NURSE NOTE - OBJECTIVE STATEMENT
pt arrives to ED pt alert responsive and appropriate pt appears well c/o some intermittent abd discomfort that started last night denies any discomfort currently md painter in progress

## 2025-03-01 NOTE — ED PROVIDER NOTE - ATTENDING CONTRIBUTION TO CARE
Brief HPI:  72-year-old female past medical history of breast cancer on chemotherapy 89-year-old male past medical history of BPH with Gill, hyperlipidemia, colon cancer status post resection, appendectomy presents with lower abdominal pain, vomiting.  Patient recently diagnosed with UTI and reports taking 2 doses of Bactrim.  Denies fever, chills, diarrhea, bloody or dark stool, bloody output from Gill.    Vitals:   Reviewed    Exam:    GEN:  Non-toxic appearing, non-distressed, speaking full sentences, non-diaphoretic, AAOx3  HEENT:  NCAT, neck supple, EOMI, PERRLA, sclera anicteric, no conjunctival pallor or injection, no stridor, normal voice, no tonsillar exudate, uvula midline  CV:  regular rhythm and rate, s1/s2 audible, no murmurs, rubs or gallops, peripheral pulses 2+ and symmetric  PULM:  non-labored respirations, lungs clear to auscultation bilaterally, no wheezes, crackles or rales  ABD:  non distended,  Minimal suprapubic tenderness , no rebound, no guarding, negative Beavers's sign, bowel sounds normal, no cvat  MSK:  no gross deformity, non-tender extremities and joints, range of motion grossly normal appearing, no extremity edema, extremities warm and well perfused   NEURO:  AAOx3, CN II-XII intact, motor 5/5 in upper and lower extremities bilaterally, sensation grossly intact in extremities and trunk  SKIN:  warm, dry, no rash or vesicles   : Gill in place with yellow non-bloody urine     A/P:  72-year-old female past medical history of breast cancer on chemotherapy 89-year-old male past medical history of BPH with Gill, hyperlipidemia, colon cancer status post resection, appendectomy presents with lower abdominal pain, vomiting.   VSS AF.  Exam non-toxic appearing.  Minimal suprapubic tenderness on exam without peritonitis.  Plan for labs, ctap, supportive care.  Dispo pending.

## 2025-03-01 NOTE — ED PROVIDER NOTE - PATIENT PORTAL LINK FT
You can access the FollowMyHealth Patient Portal offered by [facility] by registering at the following website: [email or web address]. By joining [facility]’s FollowMyHealth portal, you will also be able to view your health information using other applications (apps) compatible with our system.

## 2025-03-03 ENCOUNTER — NON-APPOINTMENT (OUTPATIENT)
Age: 89
End: 2025-03-03

## 2025-03-04 ENCOUNTER — APPOINTMENT (OUTPATIENT)
Dept: UROLOGY | Facility: CLINIC | Age: 89
End: 2025-03-04
Payer: MEDICARE

## 2025-03-04 ENCOUNTER — OUTPATIENT (OUTPATIENT)
Dept: OUTPATIENT SERVICES | Facility: HOSPITAL | Age: 89
LOS: 1 days | End: 2025-03-04

## 2025-03-04 VITALS
BODY MASS INDEX: 20.65 KG/M2 | HEIGHT: 66.5 IN | WEIGHT: 130 LBS | DIASTOLIC BLOOD PRESSURE: 68 MMHG | RESPIRATION RATE: 17 BRPM | SYSTOLIC BLOOD PRESSURE: 149 MMHG | HEART RATE: 89 BPM

## 2025-03-04 DIAGNOSIS — R35.0 FREQUENCY OF MICTURITION: ICD-10-CM

## 2025-03-04 DIAGNOSIS — R33.9 RETENTION OF URINE, UNSPECIFIED: ICD-10-CM

## 2025-03-04 DIAGNOSIS — R39.9 UNSPECIFIED SYMPTOMS AND SIGNS INVOLVING THE GENITOURINARY SYSTEM: ICD-10-CM

## 2025-03-04 DIAGNOSIS — N40.1 BENIGN PROSTATIC HYPERPLASIA WITH LOWER URINARY TRACT SYMPMS: ICD-10-CM

## 2025-03-04 DIAGNOSIS — N13.8 BENIGN PROSTATIC HYPERPLASIA WITH LOWER URINARY TRACT SYMPMS: ICD-10-CM

## 2025-03-04 LAB
-  AMOXICILLIN/CLAVULANIC ACID: SIGNIFICANT CHANGE UP
-  AMPICILLIN/SULBACTAM: SIGNIFICANT CHANGE UP
-  AMPICILLIN: SIGNIFICANT CHANGE UP
-  AMPICILLIN: SIGNIFICANT CHANGE UP
-  AZTREONAM: SIGNIFICANT CHANGE UP
-  CEFAZOLIN: SIGNIFICANT CHANGE UP
-  CEFEPIME: SIGNIFICANT CHANGE UP
-  CEFOXITIN: SIGNIFICANT CHANGE UP
-  CEFTRIAXONE: SIGNIFICANT CHANGE UP
-  CIPROFLOXACIN: SIGNIFICANT CHANGE UP
-  CIPROFLOXACIN: SIGNIFICANT CHANGE UP
-  ERTAPENEM: SIGNIFICANT CHANGE UP
-  GENTAMICIN: SIGNIFICANT CHANGE UP
-  LEVOFLOXACIN: SIGNIFICANT CHANGE UP
-  LEVOFLOXACIN: SIGNIFICANT CHANGE UP
-  MEROPENEM: SIGNIFICANT CHANGE UP
-  NITROFURANTOIN: SIGNIFICANT CHANGE UP
-  NITROFURANTOIN: SIGNIFICANT CHANGE UP
-  PIPERACILLIN/TAZOBACTAM: SIGNIFICANT CHANGE UP
-  TETRACYCLINE: SIGNIFICANT CHANGE UP
-  TOBRAMYCIN: SIGNIFICANT CHANGE UP
-  TRIMETHOPRIM/SULFAMETHOXAZOLE: SIGNIFICANT CHANGE UP
-  VANCOMYCIN: SIGNIFICANT CHANGE UP
CULTURE RESULTS: ABNORMAL
METHOD TYPE: SIGNIFICANT CHANGE UP
METHOD TYPE: SIGNIFICANT CHANGE UP
ORGANISM # SPEC MICROSCOPIC CNT: ABNORMAL
SPECIMEN SOURCE: SIGNIFICANT CHANGE UP

## 2025-03-04 PROCEDURE — 51702 INSERT TEMP BLADDER CATH: CPT

## 2025-03-13 ENCOUNTER — APPOINTMENT (OUTPATIENT)
Dept: INTERNAL MEDICINE | Facility: CLINIC | Age: 89
End: 2025-03-13

## 2025-03-19 ENCOUNTER — APPOINTMENT (OUTPATIENT)
Dept: HEMATOLOGY ONCOLOGY | Facility: CLINIC | Age: 89
End: 2025-03-19

## 2025-03-19 DIAGNOSIS — C18.9 MALIGNANT NEOPLASM OF COLON, UNSPECIFIED: ICD-10-CM

## 2025-03-21 ENCOUNTER — NON-APPOINTMENT (OUTPATIENT)
Age: 89
End: 2025-03-21

## 2025-03-21 NOTE — ED PROVIDER NOTE - ATTENDING CONTRIBUTION TO CARE
Dr. Garcia, Attending Physician-  I performed a face to face bedside interview with patient regarding history of present illness, review of symptoms and past medical history. I completed an independent physical exam.  I have discussed patient's plan of care with the resident.    see MDM for AA

## 2025-03-21 NOTE — ED ADULT NURSE NOTE - OBJECTIVE STATEMENT
Received pt into spot 27 via stretcher. Pt c/o suprapubic pain describing as burning sensation with nausea and constipation for several days. Indwelling bruner catheter noted from home which pt states has been changed 2 weeks ago. Draining yellow urine. Last BM 10 days ago . Pt states he has not been eating much in the last 10 days. Abdomen soft/nondistended, nontender. Oral mucosa very dry. Pt states not drinking fluids enough at home. Skin intact but dry. #20 gauge angio cath place to left antecubital saline locked. Blood work sent as ordered.

## 2025-03-21 NOTE — ED PROVIDER NOTE - IV ALTEPLASE EXCL ABS HIDDEN
show

## 2025-03-21 NOTE — ED PROVIDER NOTE - CLINICAL SUMMARY MEDICAL DECISION MAKING FREE TEXT BOX
89yoM w/Hx of HLD, BPH w/chronic indwelling bruner p/w abdominal pain. Recently seen here 2w ago CT at that time notable for new peritoneal carcinomatosis and ascites, pt was scheduled to follow up with heme/onc outpatient today (on chart review of HIE) did not make it to appt, came to ER d/t new abdominal pain, arrives unaccompanied from home, disheveled. Denies fever, HA, SOB, CP, nausea/vomiting, changes in bruner output/hematuria, hematochezia/melena, numbness/tingling, falls, focal weakness. ROS otherwise neg. No AC use.     VS unremarkable  General: disheveled/poorly kept   Head: Atraumatic  Eyes: EOM grossly in tact, no scleral icterus  ENT: dry mucous membranes  Neurology: A&Ox3 (self, year, hospital), nonfocal, BARBOSA x 4  Respiratory: normal respiratory effort, ctab b/l no wrr  CV: Extremities warm and well perfused, rrr no mrg  Abdominal: Soft, non-distended, non-tender no cva ttp  Extremities: No edema  Skin: No rashes    DDx incl. FTT 2/2 malignancy vs electrolyte/metabolic abnormality vs UTI vs URI vs other intra-abd pathology; plan for labs, CT a/p, urine studies, pain control, dispo TBA for further malignancy eval pending workup as pt unable to care for himself appropriately outpatient. - Reji Garcia MD. EM Attending

## 2025-03-21 NOTE — ED ADULT NURSE REASSESSMENT NOTE - NS ED NURSE REASSESS COMMENT FT1
Pt refusing replacement indwelling bruner placement. Explained the risks and benefits of foregoing tx, verbalizes understanding, but adamantly refusing. States he does not want to change his bruner until he sees in Nephrologist in 2 weeks. MD Garcia made aware. Safety maintained. Pt refusing indwelling bruner replacement. Explained the risks and benefits of foregoing tx, verbalizes understanding, but adamantly refusing. States he does not want to change his bruner until he sees in Nephrologist in 2 weeks. MD Garcia made aware. Safety maintained.

## 2025-03-21 NOTE — ED ADULT NURSE NOTE - NSFALLRISKINTERV_ED_ALL_ED

## 2025-03-21 NOTE — ED ADULT TRIAGE NOTE - CHIEF COMPLAINT QUOTE
Pt arrives via EMS from home c/o abd pain, N/V, headache, loss of appetite for roughly the past 1.5 wks. No BM x8 days. A&Ox4, RR even/unlabored. PHx: HF, BPH, colon ca s/p hemicolectomy.

## 2025-03-22 NOTE — H&P ADULT - ASSESSMENT
89M hx BPH (chronic indwelling Gill), HLD, B/L inguinal hernia repair with known left recurrence, colon cancer (T4N1 w/ two synchronous lesions) s/p extended right hemicolectomy w/ ileocolic anastomosis on 12/23 (not on chemo), recent presentation 2/24 for acute diastolic heart failure, presenting with abdominal pain, found to have high grade SBO w/ RUQ TP at a spiculated soft tissue lesion concerning for peritoneal carcinomatosis    Plan:  -Admit to A Team, Dr. Galicia.  -NPO/IVF/NGT to LCS.  -Malignant SBO protocol.  -Exam before analgesics.  -Med-onc consult in AM.  -VTE ppx.  -Pt unsure of his medications, med rec to be performed in AM w/ patient's pharmacy.    Discussed with senior resident on call on behalf of Dr. Galicia.    A Team Surgery  Please page [phone] for all questions.

## 2025-03-22 NOTE — H&P ADULT - HISTORY OF PRESENT ILLNESS
89M hx BPH (chronic indwelling Gill), HLD, B/L inguinal hernia repair with known left recurrence, colon cancer (T4N1 w/ two synchronous lesions) s/p extended right hemicolectomy w/ ileocolic anastomosis on 12/23 (not on chemo), recent presentation 2/24 for acute diastolic heart failure, presenting with abdominal pain. Patient recently presented to ED on 3/1 for AP and was found to have a new right midabdominal lesion cf peritoneal carcinomatosis. Pt was scheduled to follow up with heme/onc outpatient today to determine a plan for his new peritoneal carcinomatosis (Dr. Ramirez), but instead presented to ED due to his pain. He is reporting several weeks of intermittent lower midline AP rated up to 8/10 in intensity. He also states he feels a lot of intestinal movement and his abdomen makes "noises like aliens in my intestines." He has not been tolerating PO solids well, mostly vomiting his food, "for a few weeks," however, he has been able to hold down fluids. He reports passing gas on the morning of 3/21, but hasn't had a bowel movement in possibly 12d. He is unsure if he has had unintended weight loss of late, but supposes he has. He denies hematemesis, hematochezia, fevers, or chills. He also reports dizziness, headache, and back and shoulder pain.    In ED, vss. WBC 7.64. Cr. 1.69. Vlac 1.0. CTAP IV cf high grade SBO w/ RUQ TP at a spiculated soft tissue lesion, stable from prior, which is concerning for peritoneal carcinomatosis. No evidence of pneumatosis or portal venous gas.

## 2025-03-22 NOTE — H&P ADULT - NSHPPHYSICALEXAM_GEN_ALL_CORE
General: not acutely distressed, cachectic w/ temporal wasting  Neurological: AO X4  Respiratory: nonlabored respirations on room air  Abdominal: soft, nontender, nondistended, no rebound, no guarding, no palpable mass  Extremities: warm, no significant peripheral edema

## 2025-03-22 NOTE — H&P ADULT - NSICDXPASTMEDICALHX_GEN_ALL_CORE_FT
PAST MEDICAL HISTORY:  Bilateral inguinal hernia     BPH (benign prostatic hyperplasia)     Colon cancer     GERD (gastroesophageal reflux disease)     Heart failure, diastolic, chronic     Peripheral neuropathy     Recurrent inguinal hernia

## 2025-03-22 NOTE — H&P ADULT - NSHPLABSRESULTS_GEN_ALL_CORE
< from: CT Abdomen and Pelvis w/ IV Cont (03.21.25 @ 22:30) >      ACC: [id number] EXAM:  CT ABDOMEN AND PELVIS IC   ORDERED BY: IVY VALE     PROCEDURE DATE:  03/21/2025          INTERPRETATION:  CLINICAL INFORMATION: Abdominal pain and nausea,   evaluate for small bowel obstruction.    COMPARISON: CT abdomen pelvis 3/1/2025    CONTRAST/COMPLICATIONS:  IV Contrast: Omnipaque 350  70 cc administered   30 cc discarded  Oral Contrast: None    PROCEDURE:  CT of the Abdomen and Pelvis was performed.  Sagittal and coronal reformats were performed.    FINDINGS:  LOWER CHEST: Coronary artery calcifications. Small hiatal hernia.    LIVER: Hepatic cysts and additional subcentimeter hypodensities too small   to characterize, stable.  BILE DUCTS: Normal caliber.  GALLBLADDER: Trace cholelithiasis.  SPLEEN: Within normal limits.  PANCREAS: Within normal limits.  ADRENALS: Within normal limits.  KIDNEYS/URETERS: Stable bilateral simple and hemorrhagic/proteinaceous   cysts, stable. No hydronephrosis. Nonobstructive punctate left renal   stone. New trace hyperdensematerial within the dependent lumen.    BLADDER: Gill catheter. The bladder is incompletely decompressed,   correlate for Gill catheter function.  REPRODUCTIVE ORGANS: Prostate is enlarged.    BOWEL: Status post right hemicolectomy with ileocolic anastomosis. New   high-grade small bowel obstruction to the level of a single transition   point in the right upper quadrant (301-45, 602-33) at a spiculated soft   tissue lesion which does not appear substantially changed since prior   study, howeverconcerning for peritoneal carcinomatosis. Small bowel   loops distal to the transition point are collapsed. No evidence of   pneumatosis or portal venous gas. Moderate colonic stool burden.  PERITONEUM/RETROPERITONEUM: No pneumoperitoneum.  VESSELS: Chronic moderate to severe narrowing of the celiac axis.   Atherosclerotic changes.  LYMPH NODES: No lymphadenopathy.  ABDOMINAL WALL: Stable small fluid-containing right internal hernia.   Small left inguinal hernia which appears to contain a knuckleof colon,   unchanged.  BONES: Degenerative changes.    IMPRESSION:    High-grade small bowel obstruction to the level of a single transition   point in the right upper quadrant at which there is a similar appearing   spiculated soft tissue lesion concerning for peritoneal carcinomatosis.    Incompletely decompressed bladder with Gill catheter, correlate with   catheter function.    Preliminary findings were discussed with Dr. Hairston at 3/21/2025 11:41 PM   by Dr. Shelton with read back confirmation.    --- End of Report ---          NAVIN SHELTON DO; Resident Radiologist  This document has been electronically signed.  CHARY HAIRSTON MD; Attending Radiologist  This document has been electronically signed. Mar 22 2025 12:20AM    < end of copied text >

## 2025-03-22 NOTE — ED ADULT NURSE REASSESSMENT NOTE - NS ED NURSE REASSESS COMMENT FT1
Break RN: Pt is A&Ox4, resting in stretcher with complaints nausea, h/a, and abdominal discomfort at this time. Respirations even and unlabored, chest rise equal b/l. NSR noted on cardiac monitor. VS as noted in flow sheets. Pt denies chest pain, SOB, numbness/tingling or any urinary symptoms at this time. Medications administered as ordered, see MAR. No acute distress noted. Safety maintained throughout. Report given to primary RN.

## 2025-03-22 NOTE — CONSULT NOTE ADULT - ASSESSMENT
89M hx BPH (chronic indwelling Gill), HLD, B/L inguinal hernia repair, and history of stage IIIB colon cancer (T4N1 w/ two two synchronous lesions) s/p  extended right hemicolectomy w/ ileocolic anastomosis on 12/23, admitted for high grade SBO. CT A/P showed a high-grade small bowel obstruction with a spiculated soft tissue lesion concerning for peritoneal carcinomatosis. Oncology is consulted in that context.     # History of stage IIIB colon cancer now with concern for recurrence   - Stage IIIB colon cancer (T4N1 w/ two two synchronous lesions, MMR proficient) s/p  extended right hemicolectomy w/ ileocolic anastomosis on 12/23   - Not a candidate for adjuvant chemo and is under surveillance     Recommendations:   - Malignant SBO protocol per surgery with NG tube decompression  - If no improvement with conservative measures, would recommend palliative care consult to explore overall GOC, as he would not be a candidate for chemotherapy and surgery does not appear to be in line with his wishes     Darnell Yang MD MS  Hematology/Oncology Fellow PGY-4  [employer] [facility] [employer] at [facility] [place] | [facility] | [facility]  Available on Teams  89 yo M hx BPH (chronic indwelling Gill), HLD, B/L inguinal hernia repair, and history of stage IIIB colon cancer (T4N1 w/ two synchronous lesions) s/p  extended right hemicolectomy w/ ileocolic anastomosis on 12/23, admitted for high grade SBO. CT A/P showed a high-grade small bowel obstruction with a spiculated soft tissue lesion concerning for peritoneal carcinomatosis. Oncology is consulted in that context.     # History of stage IIIB colon cancer now with concern for recurrence   - Stage IIIB colon cancer (T4N1 w/ two two synchronous lesions, MMR proficient) s/p  extended right hemicolectomy w/ ileocolic anastomosis on 12/23   - Was not deemed a candidate for adjuvant chemo and has been under surveillance     Recommendations:   - Malignant SBO protocol per surgery with NG tube decompression  - If no improvement with conservative measures, would recommend palliative care consult to explore overall GOC, as he has limited options. He lives alone and has no family. He is not a candidate for IV chemotherapy (although could possibly be a candidate for oral capecitabine) and he would like to avoid surgery if possible, but might be willing for intervention if needed to manage obstruction.    Darnell Yang MD MS  Hematology/Oncology Fellow PGY-4  [employer] at [facility] | [facility] | [facility]  Available on Teams

## 2025-03-22 NOTE — PATIENT PROFILE ADULT - FALL HARM RISK - CONCLUSION
Patient seen and examined. Ms. Ashford is a 89 year old female status post left hip IMN for left intertrochanteric hip fracture. No acute events overnight. Pain is currently well controlled with pain medication. Patient was able to walk well with PT yesterday.     Exam:  Patient Alert & Oriented x4  Dressing: Clean, Dry, Intact  Motor: Grossly intact EHL/FHL/Tibialis Anterior/Gastrocnemius  Sensory: Grossly intact superficial peroneal/deep peroneal/saphenous/sural/tibial nerves  Vascular: 2+ DP Pulse    Assessment/Plan:  Ms. Ashford is a 89 year old female status post left hip IMN for left intertrochanteric hip fracture    Plan:  - Left Lower Extremity: Weight Bearing as Tolerated  - PT/OT, Out of Bed  - Pain Control: Per Pain Protocol  - DVT Prophylaxis: Lovenox 40mg daily  - Medicine Co-Management  - Disposition: Subacute Rehab. Fall with Harm Risk

## 2025-03-22 NOTE — CONSULT NOTE ADULT - SUBJECTIVE AND OBJECTIVE BOX
HPI:  89M hx BPH (chronic indwelling Gill), HLD, B/L inguinal hernia repair with known left recurrence, colon cancer (T4N1 w/ two synchronous lesions) s/p extended right hemicolectomy w/ ileocolic anastomosis on 12/23 (not on chemo), recent presentation 2/24 for acute diastolic heart failure, presenting with abdominal pain. Patient recently presented to ED on 3/1 for AP and was found to have a new right midabdominal lesion cf peritoneal carcinomatosis. Pt was scheduled to follow up with heme/onc outpatient today to determine a plan for his new peritoneal carcinomatosis (Dr. Ramirez), but instead presented to ED due to his pain. He is reporting several weeks of intermittent lower midline AP rated up to 8/10 in intensity. He also states he feels a lot of intestinal movement and his abdomen makes "noises like aliens in my intestines." He has not been tolerating PO solids well, mostly vomiting his food, "for a few weeks," however, he has been able to hold down fluids. He reports passing gas on the morning of 3/21, but hasn't had a bowel movement in possibly 12d. He is unsure if he has had unintended weight loss of late, but supposes he has. He denies hematemesis, hematochezia, fevers, or chills. He also reports dizziness, headache, and back and shoulder pain.    In ED, vss. WBC 7.64. Cr. 1.69. Vlac 1.0. CTAP IV cf high grade SBO w/ RUQ TP at a spiculated soft tissue lesion, stable from prior, which is concerning for peritoneal carcinomatosis. No evidence of pneumatosis or portal venous gas.   (22 Mar 2025 01:11)      PAST MEDICAL & SURGICAL HISTORY:  BPH (benign prostatic hyperplasia)      Peripheral neuropathy      Heart failure, diastolic, chronic      Colon cancer      Bilateral inguinal hernia      Recurrent inguinal hernia      GERD (gastroesophageal reflux disease)      H/O hemicolectomy      H/O bilateral inguinal hernia repair          Allergies    aspirin (Unknown)  penicillin (Unknown)    Intolerances        MEDICATIONS  (STANDING):  dexAMETHasone  Injectable 4 milliGRAM(s) IV Push every 12 hours  heparin   Injectable 5000 Unit(s) SubCutaneous every 8 hours  influenza  Vaccine (HIGH DOSE) 0.5 milliLiter(s) IntraMuscular once  lactated ringers. 1000 milliLiter(s) (100 mL/Hr) IV Continuous <Continuous>  metoclopramide Injectable 10 milliGRAM(s) IV Push every 8 hours  octreotide  Injectable 100 MICROGram(s) SubCutaneous every 8 hours    MEDICATIONS  (PRN):      FAMILY HISTORY:      SOCIAL HISTORY: No EtOH, no tobacco    REVIEW OF SYSTEMS:    CONSTITUTIONAL: No weakness, fevers or chills  EYES/ENT: No visual changes;  No vertigo or throat pain   NECK: No pain or stiffness  RESPIRATORY: No cough, wheezing, hemoptysis; No shortness of breath  CARDIOVASCULAR: No chest pain or palpitations  GASTROINTESTINAL: No abdominal or epigastric pain. No nausea, vomiting, or hematemesis; No diarrhea or constipation. No melena or hematochezia.  GENITOURINARY: No dysuria, frequency or hematuria  NEUROLOGICAL: No numbness or weakness  SKIN: No itching, burning, rashes, or lesions   All other review of systems is negative unless indicated above.    Height (cm): 172.7 (03-22 @ 10:19)  Weight (kg): 59 (03-21 @ 17:07)  BMI (kg/m2): 19.8 (03-22 @ 10:19)  BSA (m2): 1.7 (03-22 @ 10:19)    T(F): 97.7 (03-22-25 @ 09:48), Max: 97.9 (03-21-25 @ 18:34)  HR: 72 (03-22-25 @ 09:48)  BP: 147/65 (03-22-25 @ 09:48)  RR: 18 (03-22-25 @ 09:48)  SpO2: 100% (03-22-25 @ 09:48)  Wt(kg): --    GENERAL: NAD, well-developed  HEAD:  Atraumatic, Normocephalic  EYES: EOMI, PERRLA, conjunctiva and sclera clear  NECK: Supple, No JVD  CHEST/LUNG: Clear to auscultation bilaterally; No wheeze  HEART: Regular rate and rhythm; No murmurs, rubs, or gallops  ABDOMEN: Soft, Nontender, Nondistended; Bowel sounds present  EXTREMITIES:  2+ Peripheral Pulses, No clubbing, cyanosis, or edema  NEUROLOGY: non-focal  SKIN: No rashes or lesions                          14.1   10.09 )-----------( 351      ( 22 Mar 2025 06:50 )             42.0         134[L]  |  95[L]  |  25[H]  ----------------------------<  83  4.3   |  20[L]  |  1.33[H]    Ca    9.1      22 Mar 2025 06:50  Phos  2.8     03-22  Mg     1.80     03-22    TPro  6.5  /  Alb  3.5  /  TBili  0.5  /  DBili  x   /  AST  15  /  ALT  6   /  AlkPhos  104  03-21      Phosphorus: 2.8 mg/dL (03-22 @ 06:50)  Magnesium: 1.80 mg/dL (03-22 @ 06:50)      PT/INR - ( 21 Mar 2025 19:52 )   PT: 11.2 sec;   INR: 0.94 ratio         PTT - ( 21 Mar 2025 19:52 )  PTT:28.3 sec    Catheterized  03-01 @ 17:50   >100,000 CFU/ml Enterococcus faecalis  10,000 - 49,000 CFU/mL Serratia marcescens  --  Enterococcus faecalis  Serratia marcescens       HPI (admission)  89M hx BPH (chronic indwelling Gill), HLD, B/L inguinal hernia repair with known left recurrence, colon cancer (T4N1 w/ two synchronous lesions) s/p extended right hemicolectomy w/ ileocolic anastomosis on 12/23 (not on chemo), recent presentation 2/24 for acute diastolic heart failure, presenting with abdominal pain. Patient recently presented to ED on 3/1 for AP and was found to have a new right midabdominal lesion cf peritoneal carcinomatosis. Pt was scheduled to follow up with heme/onc outpatient today to determine a plan for his new peritoneal carcinomatosis (Dr. Ramirez), but instead presented to ED due to his pain. He is reporting several weeks of intermittent lower midline AP rated up to 8/10 in intensity. He also states he feels a lot of intestinal movement and his abdomen makes "noises like aliens in my intestines." He has not been tolerating PO solids well, mostly vomiting his food, "for a few weeks," however, he has been able to hold down fluids. He reports passing gas on the morning of 3/21, but hasn't had a bowel movement in possibly 12d. He is unsure if he has had unintended weight loss of late, but supposes he has. He denies hematemesis, hematochezia, fevers, or chills. He also reports dizziness, headache, and back and shoulder pain.    In ED, vss. WBC 7.64. Cr. 1.69. Vlac 1.0. CTAP IV cf high grade SBO w/ RUQ TP at a spiculated soft tissue lesion, stable from prior, which is concerning for peritoneal carcinomatosis. No evidence of pneumatosis or portal venous gas.         PAST MEDICAL & SURGICAL HISTORY:    BPH (benign prostatic hyperplasia)      Peripheral neuropathy      Heart failure, diastolic, chronic      Colon cancer      Bilateral inguinal hernia      Recurrent inguinal hernia      GERD (gastroesophageal reflux disease)      H/O hemicolectomy      H/O bilateral inguinal hernia repair          Allergies    aspirin (Unknown)  penicillin (Unknown)    Intolerances        MEDICATIONS  (STANDING):  dexAMETHasone  Injectable 4 milliGRAM(s) IV Push every 12 hours  heparin   Injectable 5000 Unit(s) SubCutaneous every 8 hours  influenza  Vaccine (HIGH DOSE) 0.5 milliLiter(s) IntraMuscular once  lactated ringers. 1000 milliLiter(s) (100 mL/Hr) IV Continuous <Continuous>  metoclopramide Injectable 10 milliGRAM(s) IV Push every 8 hours  octreotide  Injectable 100 MICROGram(s) SubCutaneous every 8 hours    MEDICATIONS  (PRN):      FAMILY HISTORY:      SOCIAL HISTORY: No EtOH, no tobacco    REVIEW OF SYSTEMS:    CONSTITUTIONAL: + weakness, fevers or chills  EYES/ENT: No visual changes;  No vertigo or throat pain   NECK: No pain or stiffness  RESPIRATORY: No cough, wheezing, hemoptysis; No shortness of breath  CARDIOVASCULAR: No chest pain or palpitations  GASTROINTESTINAL: + abdominal or epigastric pain. + nausea, vomiting, or hematemesis   GENITOURINARY: No dysuria, frequency or hematuria  NEUROLOGICAL: No numbness or weakness  SKIN: No itching, burning, rashes, or lesions   All other review of systems is negative unless indicated above.    Height (cm): 172.7 (03-22 @ 10:19)  Weight (kg): 59 (03-21 @ 17:07)  BMI (kg/m2): 19.8 (03-22 @ 10:19)  BSA (m2): 1.7 (03-22 @ 10:19)    T(F): 97.7 (03-22-25 @ 09:48), Max: 97.9 (03-21-25 @ 18:34)  HR: 72 (03-22-25 @ 09:48)  BP: 147/65 (03-22-25 @ 09:48)  RR: 18 (03-22-25 @ 09:48)  SpO2: 100% (03-22-25 @ 09:48)  Wt(kg): --    GENERAL: NAD, frail   HEAD:  Atraumatic, Normocephalic  EYES: EOMI, PERRLA, conjunctiva and sclera clear  CHEST/LUNG: Clear to auscultation bilaterally  HEART: Regular rate and rhythm  ABDOMEN: NG tube in place, soft, mildly tender   EXTREMITIES:  2+ Peripheral Pulses   NEUROLOGY: non-focal  SKIN: No rashes or lesions                          14.1   10.09 )-----------( 351      ( 22 Mar 2025 06:50 )             42.0         134[L]  |  95[L]  |  25[H]  ----------------------------<  83  4.3   |  20[L]  |  1.33[H]    Ca    9.1      22 Mar 2025 06:50  Phos  2.8     03-22  Mg     1.80     03-22    TPro  6.5  /  Alb  3.5  /  TBili  0.5  /  DBili  x   /  AST  15  /  ALT  6   /  AlkPhos  104  03-21      Phosphorus: 2.8 mg/dL (03-22 @ 06:50)  Magnesium: 1.80 mg/dL (03-22 @ 06:50)      PT/INR - ( 21 Mar 2025 19:52 )   PT: 11.2 sec;   INR: 0.94 ratio         PTT - ( 21 Mar 2025 19:52 )  PTT:28.3 sec    Catheterized  03-01 @ 17:50   >100,000 CFU/ml Enterococcus faecalis  10,000 - 49,000 CFU/mL Serratia marcescens  --  Enterococcus faecalis  Serratia marcescens       HPI (admission)  89 yo M hx BPH (chronic indwelling Gill), HLD, B/L inguinal hernia repair with known left recurrence, colon cancer (T4N1 w/ two synchronous lesions) s/p extended right hemicolectomy w/ ileocolic anastomosis on 12/23 (not on chemo), recent presentation 2/24 for acute diastolic heart failure, presenting with abdominal pain. Patient recently presented to ED on 3/1 for AP and was found to have a new right midabdominal lesion cf peritoneal carcinomatosis. Pt was scheduled to follow up with heme/onc outpatient today to determine a plan for his new peritoneal carcinomatosis (Dr. Ramirez), but instead presented to ED due to his pain. He is reporting several weeks of intermittent lower midline AP rated up to 8/10 in intensity. He also states he feels a lot of intestinal movement and his abdomen makes "noises like aliens in my intestines." He has not been tolerating PO solids well, mostly vomiting his food, "for a few weeks," however, he has been able to hold down fluids. He reports passing gas on the morning of 3/21, but hasn't had a bowel movement in possibly 12d. He is unsure if he has had unintended weight loss of late, but supposes he has. He denies hematemesis, hematochezia, fevers, or chills. He also reports dizziness, headache, and back and shoulder pain.    In ED, vss. WBC 7.64. Cr. 1.69. Vlac 1.0. CTAP IV cf high grade SBO w/ RUQ TP at a spiculated soft tissue lesion, stable from prior, which is concerning for peritoneal carcinomatosis. No evidence of pneumatosis or portal venous gas.         PAST MEDICAL & SURGICAL HISTORY:    BPH (benign prostatic hyperplasia)      Peripheral neuropathy      Heart failure, diastolic, chronic      Colon cancer      Bilateral inguinal hernia      Recurrent inguinal hernia      GERD (gastroesophageal reflux disease)      H/O hemicolectomy      H/O bilateral inguinal hernia repair          Allergies    aspirin (Unknown)  penicillin (Unknown)    Intolerances        MEDICATIONS  (STANDING):  dexAMETHasone  Injectable 4 milliGRAM(s) IV Push every 12 hours  heparin   Injectable 5000 Unit(s) SubCutaneous every 8 hours  influenza  Vaccine (HIGH DOSE) 0.5 milliLiter(s) IntraMuscular once  lactated ringers. 1000 milliLiter(s) (100 mL/Hr) IV Continuous <Continuous>  metoclopramide Injectable 10 milliGRAM(s) IV Push every 8 hours  octreotide  Injectable 100 MICROGram(s) SubCutaneous every 8 hours    MEDICATIONS  (PRN):      FAMILY HISTORY:      SOCIAL HISTORY: No EtOH, no tobacco    REVIEW OF SYSTEMS:    CONSTITUTIONAL: + weakness, fevers or chills  EYES/ENT: No visual changes;  No vertigo or throat pain   NECK: No pain or stiffness  RESPIRATORY: No cough, wheezing, hemoptysis; No shortness of breath  CARDIOVASCULAR: No chest pain or palpitations  GASTROINTESTINAL: + abdominal or epigastric pain. + nausea, vomiting, or hematemesis   GENITOURINARY: No dysuria, frequency or hematuria  NEUROLOGICAL: No numbness or weakness  SKIN: No itching, burning, rashes, or lesions   All other review of systems is negative unless indicated above.    Height (cm): 172.7 (03-22 @ 10:19)  Weight (kg): 59 (03-21 @ 17:07)  BMI (kg/m2): 19.8 (03-22 @ 10:19)  BSA (m2): 1.7 (03-22 @ 10:19)    T(F): 97.7 (03-22-25 @ 09:48), Max: 97.9 (03-21-25 @ 18:34)  HR: 72 (03-22-25 @ 09:48)  BP: 147/65 (03-22-25 @ 09:48)  RR: 18 (03-22-25 @ 09:48)  SpO2: 100% (03-22-25 @ 09:48)  Wt(kg): --    GENERAL: NAD, frail   HEAD:  Atraumatic, Normocephalic  EYES: EOMI, PERRLA, conjunctiva and sclera clear  CHEST/LUNG: Clear to auscultation bilaterally  HEART: Regular rate and rhythm  ABDOMEN: NG tube in place, soft, mildly tender   EXTREMITIES:  2+ Peripheral Pulses   NEUROLOGY: non-focal  SKIN: No rashes or lesions                          14.1   10.09 )-----------( 351      ( 22 Mar 2025 06:50 )             42.0         134[L]  |  95[L]  |  25[H]  ----------------------------<  83  4.3   |  20[L]  |  1.33[H]    Ca    9.1      22 Mar 2025 06:50  Phos  2.8     03-22  Mg     1.80     03-22    TPro  6.5  /  Alb  3.5  /  TBili  0.5  /  DBili  x   /  AST  15  /  ALT  6   /  AlkPhos  104  03-21      Phosphorus: 2.8 mg/dL (03-22 @ 06:50)  Magnesium: 1.80 mg/dL (03-22 @ 06:50)      PT/INR - ( 21 Mar 2025 19:52 )   PT: 11.2 sec;   INR: 0.94 ratio         PTT - ( 21 Mar 2025 19:52 )  PTT:28.3 sec    Catheterized  03-01 @ 17:50   >100,000 CFU/ml Enterococcus faecalis  10,000 - 49,000 CFU/mL Serratia marcescens  --  Enterococcus faecalis  Serratia marcescens

## 2025-03-22 NOTE — ED ADULT NURSE REASSESSMENT NOTE - NS ED NURSE REASSESS COMMENT FT1
Pt found to have an SBO via CT. Seen at bedside by surgery team. NGT placed and confirmation XR taken. NGT attached to low suction as per order. Pt offers no medical complaints at this time. VSS. Respirations even and unlabored. Admitted for abdominal pain. To be transferred to [facility] s/p hand-off report.

## 2025-03-22 NOTE — H&P ADULT - ATTENDING COMMENTS
DATE OF SERVICE: 03-22-25 @ 14:33    89M with hx of colon adeno ca s/p R alexei colectomy in 12/23 here with abdominal pain, decreased bowel function, bloating. has been going on for a couple weeks, now worsening. Last BM a few days ago, small flatus after admission.  VSS  WBC NL  CT with malignant SBO 2/2 peritoneal implant  Abd soft, mildly distended nontender    NGT in place, continue to LIS  malignant SBO protocol  onc consult  IVF hydration  Monitor I/O  Palliative Eval

## 2025-03-23 NOTE — PROGRESS NOTE ADULT - ASSESSMENT
89M hx BPH (chronic indwelling Gill), HLD, B/L inguinal hernia repair with known left recurrence, colon cancer (T4N1 w/ two synchronous lesions) s/p extended right hemicolectomy w/ ileocolic anastomosis on 12/23 (not on chemo), recent presentation 2/24 for acute diastolic heart failure, presenting with abdominal pain, found to have high grade SBO w/ RUQ TP at a spiculated soft tissue lesion concerning for peritoneal carcinomatosis.     Plan:  -NPO/IVF/NGT to LCS.  -Malignant SBO protocol.  -Med-onc consult --> poor chemotherapy candidate, recommend palliative care involvement if patient fails nonoperative management  -VTE ppx: SQH    A team   [id number]    A Team Surgery  Please page [phone] for all questions.   89M hx BPH (chronic indwelling Gill), HLD, B/L inguinal hernia repair with known left recurrence, colon cancer (T4N1 w/ two synchronous lesions) s/p extended right hemicolectomy w/ ileocolic anastomosis on 12/23 (not on chemo), recent presentation 2/24 for acute diastolic heart failure, presenting with abdominal pain, found to have high grade SBO w/ RUQ TP at a spiculated soft tissue lesion concerning for peritoneal carcinomatosis.     Plan:  -NPO/IVF/NGT to LCS.  -Plan for GG challenge today  -Malignant SBO protocol.  -Med-onc consult --> poor chemotherapy candidate, recommend palliative care involvement if patient fails nonoperative management  -VTE ppx: SQ    A team   [id number]

## 2025-03-23 NOTE — PROGRESS NOTE ADULT - SUBJECTIVE AND OBJECTIVE BOX
TEAM [ A ] Surgery Daily Progress Note  =====================================================  INTERVAL:  - NG low output (220) in 24hrs  - Med onc consult --> poor chemotherapy candidate, recommend palliative care consult if no improvement with nonoperative management of malignant SBO    SUBJECTIVE: Patient seen and examined at bedside on AM rounds.     ALLERGIES:  aspirin (Unknown)  penicillin (Unknown)      --------------------------------------------------------------------------------------    MEDICATIONS:  dexAMETHasone  Injectable 4 milliGRAM(s) IV Push every 12 hours  heparin   Injectable 5000 Unit(s) SubCutaneous every 8 hours  influenza  Vaccine (HIGH DOSE) 0.5 milliLiter(s) IntraMuscular once  lactated ringers. 1000 milliLiter(s) IV Continuous <Continuous>  metoclopramide Injectable 10 milliGRAM(s) IV Push every 8 hours  octreotide  Injectable 100 MICROGram(s) SubCutaneous every 8 hours    --------------------------------------------------------------------------------------    VITAL SIGNS:  T(C): 36.5 (03-23-25 @ 02:07), Max: 36.5 (03-22-25 @ 09:48)  HR: 66 (03-23-25 @ 02:07) (66 - 72)  BP: 143/65 (03-23-25 @ 02:07) (137/74 - 147/65)  RR: 18 (03-23-25 @ 02:07) (17 - 19)  SpO2: 100% (03-23-25 @ 02:07) (100% - 100%)  --------------------------------------------------------------------------------------    INS AND OUTS:    03-21-25 @ 07:01  -  03-22-25 @ 07:00  --------------------------------------------------------  IN: 0 mL / OUT: 300 mL / NET: -300 mL    03-22-25 @ 07:01  -  03-23-25 @ 06:30  --------------------------------------------------------  IN: 2000 mL / OUT: 1370 mL / NET: 630 mL      --------------------------------------------------------------------------------------      EXAM    General: not acutely distressed, cachectic w/ temporal wasting  Neurological: AO X4  Respiratory: nonlabored respirations on room air  Abdominal: soft, nontender, nondistended, no rebound, no guarding, no palpable mass  Extremities: warm, no significant peripheral edema    --------------------------------------------------------------------------------------    LABS       TEAM [ A ] Surgery Daily Progress Note  =====================================================  INTERVAL:  - NG low output (220) in 24hrs  - Med onc consult --> poor chemotherapy candidate, recommend palliative care consult if no improvement with nonoperative management of malignant SBO    SUBJECTIVE: Patient seen and examined at bedside on AM rounds. Patient reports passing flatus, no bowel movements.     ALLERGIES:  aspirin (Unknown)  penicillin (Unknown)      --------------------------------------------------------------------------------------    MEDICATIONS:  dexAMETHasone  Injectable 4 milliGRAM(s) IV Push every 12 hours  heparin   Injectable 5000 Unit(s) SubCutaneous every 8 hours  influenza  Vaccine (HIGH DOSE) 0.5 milliLiter(s) IntraMuscular once  lactated ringers. 1000 milliLiter(s) IV Continuous <Continuous>  metoclopramide Injectable 10 milliGRAM(s) IV Push every 8 hours  octreotide  Injectable 100 MICROGram(s) SubCutaneous every 8 hours    --------------------------------------------------------------------------------------    VITAL SIGNS:  T(C): 36.5 (03-23-25 @ 02:07), Max: 36.5 (03-22-25 @ 09:48)  HR: 66 (03-23-25 @ 02:07) (66 - 72)  BP: 143/65 (03-23-25 @ 02:07) (137/74 - 147/65)  RR: 18 (03-23-25 @ 02:07) (17 - 19)  SpO2: 100% (03-23-25 @ 02:07) (100% - 100%)  --------------------------------------------------------------------------------------    INS AND OUTS:    03-21-25 @ 07:01  -  03-22-25 @ 07:00  --------------------------------------------------------  IN: 0 mL / OUT: 300 mL / NET: -300 mL    03-22-25 @ 07:01  -  03-23-25 @ 06:30  --------------------------------------------------------  IN: 2000 mL / OUT: 1370 mL / NET: 630 mL      --------------------------------------------------------------------------------------      EXAM    General: not acutely distressed, cachectic w/ temporal wasting  Neurological: AO X4  Respiratory: nonlabored respirations on room air  Abdominal: soft, nontender, nondistended, no rebound. NG tube in place with minimal bilious contents.   Extremities: warm, no significant peripheral edema    --------------------------------------------------------------------------------------    LABS

## 2025-03-23 NOTE — CHART NOTE - NSCHARTNOTEFT_GEN_A_CORE
89M w/ PMH BPH (chronic Gill), HLD, B/L inguinal hernia repair (L recurrence), colon cancer (T4N1) s/p extended right hemicolectomy w/ ileocolic anastomosis 12/23/24, diastolic HF admitted for malignant SBO 2/2 peritoneal carcinomatosis, mSBO protocol with GG challenge showing contrast in rectum today.     Alerted that the patient was agitated and becoming aggressive towards staff, even with enhanced supervision. The patient would not swallow oral medications and would not tolerate an EKG.    Given his physical aggression, the patient was ordered for zyprexa 2.5 IM.

## 2025-03-23 NOTE — CHART NOTE - NSCHARTNOTEFT_GEN_A_CORE
Patient's home pharmacy ([facility]) contacted regarding patients active medication list due to inability to confirm medications with the patient. Patient's home meds are below:    - Famotidine 20mg once daily  - Lasix 40mg once daily (held for now)  - Atorvastatin 40mg once daily  - Tamsulosin 0.4mg tablet x2 once daily  - Pantoprazole 40mg once daily  - Finasteride 5mg once daily  - Gabapentin 400mg once daily    * Of note, patient was recently treated for a UTI with a 14 day course antibiotics and completed his course *    A team surgery

## 2025-03-24 NOTE — CONSULT NOTE ADULT - ASSESSMENT
89M hx BPH (chronic indwelling Gill), HLD, B/L inguinal hernia repair with known left recurrence, colon cancer (T4N1 w/ two synchronous lesions) s/p extended right hemicolectomy w/ ileocolic anastomosis on 12/23 (not on chemo), recent presentation 2/24 for acute diastolic heart failure, presenting with abdominal pain, found to have high grade SBO w/ RUQ TP at a spiculated soft tissue lesion concerning for peritoneal carcinomatosis. Palliative team consulted given poor prognosis, patient would benefit from GOC and palliative treatment.

## 2025-03-24 NOTE — CONSULT NOTE ADULT - PROBLEM SELECTOR RECOMMENDATION 5
Palliative team consulted - given poor prognosis, patient would benefit from GOC and palliative treatment. Team will continue to follow patient. Case reviewed with primary team  Thank you for allowing us to participate in your patient's care. Please page [phone] for any questions/concerns.

## 2025-03-24 NOTE — CONSULT NOTE ADULT - PROBLEM SELECTOR RECOMMENDATION 4
Patient mental status limiting the ability to have a clear GOC conversation, difficult to assess his goals and wishes for his care. Please see above GOC. Palliative team will re-assess tomorrow for improvement in mental status. Patient AAOx2-3 but unable to verbalize why he is in the hospital or his medical conditions.   Counseled/educated patient about HCP appointment. Patient has 1 daughter whom likely has intellectual developmental disability? and seems to reside in a facility. He states having no other family/ friends.   Attempted to introduce advanced directives to patient regarding preferences for code status. Patient states he has never considered this before.   Please see above GOC  Further GOC pending improvement of mental status

## 2025-03-24 NOTE — CONSULT NOTE ADULT - PROBLEM SELECTOR RECOMMENDATION 2
Patient AAOx2-3, mental status waxes and wanes. AMS likely 2/2 delirium.   - Supportive care  - Avoid use of restraints Patient AAOx2-3, mental status waxes and wanes. AMS likely 2/2 delirium.   - Frequent reassurance and verbal orientation.  - Delusions and hallucinations should be neither endorsed nor challenged.   - Physical restraints should be avoided. Alternatives to restraint use, such as constant observation.  - PT eval and early ambulation if possible.  - Update the calendar date in the room.    - Monitor for constipation, urinary retention, pain, hunger, thirst, etc.    - Promote sleep wake cycle and reorientation as indicated. - CT findings - High-grade small bowel obstruction to the level of a single transition  point in the right upper quadrant at which there is a similar appearing spiculated soft tissue lesion concerning for peritoneal carcinomatosis.   - s/p NG  - on malignant SBO protocol   - Rest of care as per primary team

## 2025-03-24 NOTE — PROGRESS NOTE ADULT - SUBJECTIVE AND OBJECTIVE BOX
24 Hour Events:  - altered mental status overnight from baseline, IM zyprexa 2.5 given    SUBJECTIVE: Pt seen at bedside during AM rounds. Patient adamant about not being examined, mental status different from day of admission, has decompensated but overall vital stable.    Vital Signs Last 24 Hrs  T(C): 36.7 (24 Mar 2025 05:24), Max: 36.8 (23 Mar 2025 22:00)  T(F): 98 (24 Mar 2025 05:24), Max: 98.2 (23 Mar 2025 22:00)  HR: 93 (24 Mar 2025 05:24) (70 - 100)  BP: 130/56 (24 Mar 2025 05:24) (110/81 - 173/80)  BP(mean): --  RR: 18 (24 Mar 2025 05:24) (16 - 18)  SpO2: 98% (24 Mar 2025 05:24) (96% - 100%)    Parameters below as of 24 Mar 2025 05:24  Patient On (Oxygen Delivery Method): room air        I&O's Summary    23 Mar 2025 07:01  -  24 Mar 2025 07:00  --------------------------------------------------------  IN: 1050 mL / OUT: 850 mL / NET: 200 mL        LABS:                        13.3   13.50 )-----------( 352      ( 24 Mar 2025 04:50 )             39.4     03-24    138  |  98  |  22  ----------------------------<  102[H]  4.4   |  19[L]  |  1.01    Ca    9.2      24 Mar 2025 04:50  Phos  2.1     03-24  Mg     1.90     03-24        Urinalysis Basic - ( 24 Mar 2025 04:50 )    Color: x / Appearance: x / SG: x / pH: x  Gluc: 102 mg/dL / Ketone: x  / Bili: x / Urobili: x   Blood: x / Protein: x / Nitrite: x   Leuk Esterase: x / RBC: x / WBC x   Sq Epi: x / Non Sq Epi: x / Bacteria: x        Physical Exam:  unable to be performed

## 2025-03-24 NOTE — CONSULT NOTE ADULT - CONVERSATION DETAILS
Palliative team met with patient at bedside. Patient is AAOx2-3, mental status waxes and wanes throughout the conversation. Patient does not have insight to his current situation and diagnosis. When questioned about his level of knowledge of his condition, he states "I don't want to think/ talk about it". Throughout the conversation, patient mentioned his daughter that she resides in a resident home, goes on outings, and that she has a "brain of a child". When asked if patient has other family members or friends, patient states he has a brother however he has not spoken to him in 50 years. He has not completed a HCP. On further questioning of his wishes and what he would want if he were to lose a pulse or need a breathing tube, he states "I never thought about it". Patient's mental status is not clear through the conversation. Palliative team met with patient at bedside. Patient is AAOx2-3, mental status waxes and wanes throughout the conversation. Patient does not have insight to his current situation and diagnosis. When questioned about his level of knowledge of his condition, he states "I don't want to think/ talk about it". Throughout the conversation, patient mentioned his daughter that she resides in a resident home, goes on outings, and that she has a "brain of a child". When asked if patient has other family members or friends, patient states he has a brother however he has not spoken to him in 50 years. He has not completed a HCP. On further questioning of his wishes and what he would want if he were to lose a pulse or need a breathing tube, he states "I never thought about it". Patient's mental status is not clear through the conversation. Team will need to re-assess tomorrow for improvement of mental status. Palliative team met with patient at bedside. Patient is AAOx2-3, mental status waxes and wanes throughout the conversation. Patient does not have insight to his current situation and diagnosis. When questioned about his level of knowledge of his condition, he states "I don't want to think/ talk about it".     Counseled/educated about HCP appointment. Throughout the conversation, patient mentioned his daughter that she resides in a resident home, goes on outings, and that she has a "brain of a child". When asked if patient has other family members or friends, patient states he has a brother however he has not spoken to him in 50 years. He has not completed a HCP.     On further questioning of his wishes and what he would want if he were to lose a pulse or need a breathing tube, he states "I never thought about it".     Patient's mental status is not clear through the conversation. Team will need to re-assess tomorrow for improvement of mental status.

## 2025-03-24 NOTE — CONSULT NOTE ADULT - SUBJECTIVE AND OBJECTIVE BOX
Date of Service 03-24-25 @ 09:46     Reason for Consultation:	[] Pain		[] Goals of Care		[] Non-pain symptoms    [] End of life discussion		[] Other:    HPI:  89M hx BPH (chronic indwelling Gill), HLD, B/L inguinal hernia repair with known left recurrence, colon cancer (T4N1 w/ two synchronous lesions) s/p extended right hemicolectomy w/ ileocolic anastomosis on 12/23 (not on chemo), recent presentation 2/24 for acute diastolic heart failure, presenting with abdominal pain. Patient recently presented to ED on 3/1 for AP and was found to have a new right midabdominal lesion cf peritoneal carcinomatosis. Pt was scheduled to follow up with heme/onc outpatient today to determine a plan for his new peritoneal carcinomatosis (Dr. Ramirez), but instead presented to ED due to his pain. He is reporting several weeks of intermittent lower midline AP rated up to 8/10 in intensity. He also states he feels a lot of intestinal movement and his abdomen makes "noises like aliens in my intestines." He has not been tolerating PO solids well, mostly vomiting his food, "for a few weeks," however, he has been able to hold down fluids. He reports passing gas on the morning of 3/21, but hasn't had a bowel movement in possibly 12d. He is unsure if he has had unintended weight loss of late, but supposes he has. He denies hematemesis, hematochezia, fevers, or chills. He also reports dizziness, headache, and back and shoulder pain.    In ED, vss. WBC 7.64. Cr. 1.69. Vlac 1.0. CTAP IV cf high grade SBO w/ RUQ TP at a spiculated soft tissue lesion, stable from prior, which is concerning for peritoneal carcinomatosis. No evidence of pneumatosis or portal venous gas.   (22 Mar 2025 01:11)      Interval History:       PERTINENT PM/SXH:   No pertinent past medical history    BPH (benign prostatic hyperplasia)    Peripheral neuropathy    HLD (hyperlipidemia)    Leg edema    Acute on chronic diastolic congestive heart failure    Heart failure, diastolic, chronic    Colon cancer    Inguinal hernia    Bilateral inguinal hernia    Recurrent inguinal hernia    GERD (gastroesophageal reflux disease)      No significant past surgical history    H/O hemicolectomy    H/O bilateral inguinal hernia repair      FAMILY HISTORY:      Family Hx substance abuse [ ]yes [x ]no    ITEMS NOT CHECKED ARE NOT PRESENT    SOCIAL HISTORY:   Significant other/partner[ ]  Children[ ]  Buddhist/Spirituality:  Substance hx:  [ ]   Tobacco hx:  [ ]   Alcohol hx: [ ]   Home Opioid hx:  [ ] I-Stop Reference No:  Living Situation: [ ]Home  [ ]Long term care  [ ]Rehab [ ]Other  Home Services: [ ] HHA [ ] Visting RN [ ] Hospice      ADVANCE DIRECTIVES:    MOLST  [ ]  Living Will  [ ]   DECISION MAKER(s):  [ ] Health Care Proxy(s)  [ ] Surrogate(s)  [ ] Guardian           Name(s): Phone Number(s):    BASELINE (I)ADL(s) (prior to admission):  [place]: [ ]Total  [ ] Moderate [ ]Dependent    Allergies    aspirin (Unknown)  penicillin (Unknown)    Intolerances    MEDICATIONS  (STANDING):  atorvastatin 40 milliGRAM(s) Oral at bedtime  dexAMETHasone  Injectable 4 milliGRAM(s) IV Push every 12 hours  finasteride 5 milliGRAM(s) Oral daily  gabapentin 400 milliGRAM(s) Oral at bedtime  heparin   Injectable 5000 Unit(s) SubCutaneous every 8 hours  influenza  Vaccine (HIGH DOSE) 0.5 milliLiter(s) IntraMuscular once  lactated ringers. 1000 milliLiter(s) (75 mL/Hr) IV Continuous <Continuous>  magnesium sulfate  IVPB 1 Gram(s) IV Intermittent once  octreotide  Injectable 100 MICROGram(s) SubCutaneous every 8 hours  pantoprazole  Injectable 40 milliGRAM(s) IV Push daily  sodium phosphate 15 milliMole(s)/250 mL IVPB 15 milliMole(s) IV Intermittent once  tamsulosin 0.8 milliGRAM(s) Oral at bedtime    MEDICATIONS  (PRN):  HYDROmorphone  Injectable 0.2 milliGRAM(s) IV Push every 12 hours PRN headache        ITEMS UNCHECKED ARE NOT PRESENT     PRESENT SYMPTOMS: [ ]Unable to self-report due to altered mental status  [ ] CPOT [ ] PAINADs [ ] RDOS  Source if other than patient:  [ ]Family   [ ]Team     Pain: [ ]yes [ ]no  QOL impact -   Location -                    Aggravating factors -  Quality -  Radiation -  Timing-  Severity (0-10 scale):  Minimal acceptable level / Pain goal (0-10 scale):     CPOT:    https://www.[facility].org/getattachment/[email or web address]/Critical-Care-Pain-Observation-Tool-(CPOT)    Dyspnea:                           [ ]Mild [ ]Moderate [ ]Severe  Anxiety:                             [ ]Mild [ ]Moderate [ ]Severe  Agitation:                          [ ]Mild [ ]Moderate [ ]Severe  Fatigue:                             [ ]Mild [ ]Moderate [ ]Severe  Nausea:                             [ ]Mild [ ]Moderate [ ]Severe  Loss of appetite:              [ ]Mild [ ]Moderate [ ]Severe  Constipation:                   [ ]Mild [ ]Moderate [ ]Severe  Diarrhea:                          [ ]Mild [ ]Moderate [ ]Severe  Pruritus:                            [ ]Mild [ ]Moderate [ ]Severe  Depression:                      [ ]Mild [ ]Moderate [ ]Severe    PCSSQ[Palliative Care Spiritual Screening Question]   Severity (0-10):  Score of 4 or > indicate consideration of Chaplaincy referral.  Chaplaincy Referral: [ ] yes [ ] refused [ ] following [ x] deferred    Caregiver Success? : [ ] yes [ ] no [ ] Declined   [x ] Deferred            Social work referral [ ] Patient & Family Centered Care Referral [ ]     Anticipatory Grief present?:  [ ] yes [ ] no  [x ] Deferred                  Social work referral [ ] Chaplaincy Referral[ ] Caregiver Support Referral [ ]     Other Symptoms:  [ ]All other review of systems negative   [ ] Unable to obtain due to poor mentation     PHYSICAL EXAM:  Vital Signs Last 24 Hrs  T(C): 36.7 (24 Mar 2025 05:24), Max: 36.8 (23 Mar 2025 22:00)  T(F): 98 (24 Mar 2025 05:24), Max: 98.2 (23 Mar 2025 22:00)  HR: 93 (24 Mar 2025 05:24) (70 - 100)  BP: 130/56 (24 Mar 2025 05:24) (110/81 - 173/80)  BP(mean): --  RR: 18 (24 Mar 2025 05:24) (16 - 18)  SpO2: 98% (24 Mar 2025 05:24) (96% - 100%)    Parameters below as of 24 Mar 2025 05:24  Patient On (Oxygen Delivery Method): room air         I&O's Summary    23 Mar 2025 07:01  -  24 Mar 2025 07:00  --------------------------------------------------------  IN: 1050 mL / OUT: 850 mL / NET: 200 mL        GENERAL:  [ ]Alert  [ ]Oriented x   [ ]Lethargic  [ ]Cachexia  [ ]Unarousable  [ ]Verbal  [ ]Non-Verbal  [ ] No Distress  Behavioral:   [ ] Anxiety  [ ] Delirium [ ] Agitation [ ] Calm  [ ] Other  HEENT:  [ ]Normal  [ ] Temporal Wasting  [ ]Dry mouth   [ ]ET Tube/Trach  [ ]Oral lesions  [ ] Mucositis  PULMONARY:   [ ]Clear [ ]Tachypnea  [ ]Audible excessive secretions   [ ]Rhonchi        [ ]Right [ ]Left [ ]Bilateral  [ ]Crackles        [ ]Right [ ]Left [ ]Bilateral  [ ]Wheezing     [ ]Right [ ]Left [ ]Bilateral  [ ]Diminished breath sounds [ ]right [ ]left [ ]bilateral  CARDIOVASCULAR:    [ ]Regular [ ]Irregular [ ]Tachy  [ ]Bart [ ]Murmur [ ]Other  GASTROINTESTINAL:  [ ]Soft  [ ]Distended   [ ]+BS  [ ]Non tender [ ]Tender  [ ]PEG [ ]OGT/ NGT  Last BM:   GENITOURINARY:  [ ]Normal [ ] Incontinent   [ ]Oliguria/Anuria   [ ]Gill  MUSCULOSKELETAL:   [ ]Normal   [ ]Weakness  [ ]Bed/Wheelchair bound [ ]Edema  [  ] amputation  [  ] contraction  NEUROLOGIC:   [ ]No focal deficits  [ ]Cognitive impairment  [ ]Dysphagia [ ]Dysarthria [ ]Paresis [ ]Other   SKIN: See Nursing Skin Assessment for further details  [ ]Normal    [ ]Rash  [ ]Pressure ulcer(s)       Present on admission [ ]y [ ]n   [  ]  Wound    [  ] hyperpigmentation    CRITICAL CARE:  [ ] Shock Present  [ ]Septic [ ]Cardiogenic [ ]Neurologic [ ]Hypovolemic  [ ]  Vasopressors [ ]  Inotropes   [ ]Respiratory failure present [ ]Mechanical ventilation [ ]Non-invasive ventilatory support [ ]High flow    [ ]Acute  [ ]Chronic [ ]Hypoxic  [ ]Hypercarbic [ ]Other  [ ]Other organ failure     LABS:  reviewed                         13.3   13.50 )-----------( 352      ( 24 Mar 2025 04:50 )             39.4   03-24    138  |  98  |  22  ----------------------------<  102[H]  4.4   |  19[L]  |  1.01    Ca    9.2      24 Mar 2025 04:50  Phos  2.1     03-24  Mg     1.90     03-24      Urinalysis Basic - ( 24 Mar 2025 04:50 )    Color: x / Appearance: x / SG: x / pH: x  Gluc: 102 mg/dL / Ketone: x  / Bili: x / Urobili: x   Blood: x / Protein: x / Nitrite: x   Leuk Esterase: x / RBC: x / WBC x   Sq Epi: x / Non Sq Epi: x / Bacteria: x      CAPILLARY BLOOD GLUCOSE      POCT Blood Glucose.: 101 mg/dL (24 Mar 2025 00:53)  POCT Blood Glucose.: 121 mg/dL (23 Mar 2025 17:57)      RADIOLOGY & ADDITIONAL STUDIES: reviewed     PROTEIN CALORIE MALNUTRITION PRESENT: [ ]mild [ ]moderate [ ]severe [ ]underweight [ ]morbid obesity  https://www.andeal.org/vault/2440/web/files/ONC/Table_Clinical%20Characteristics%20to%20Document%20Malnutrition-White%20JV%20et%20al%202012.pdf    Height (cm): 172.7 (03-22-25 @ 10:19)  Weight (kg): 59 (03-21-25 @ 17:07), 61.19 (03-01-25 @ 15:28), 61.2 (11-27-24 @ 04:28)  BMI (kg/m2): 19.8 (03-22-25 @ 10:19)    [ ]PPSV2 < or = to 30% [ ]significant weight loss  [ ]poor nutritional intake  [ ]anasarca [ ]Artificial Nutrition      REFERRALS:   [ ]Chaplaincy  [ ]Hospice  [ ]Child Life  [ ]Social Work  [ ]Case management [ ]Holistic Therapy     Goals of Care Document:  Date of Service 03-24-25 @ 09:46     Reason for Consultation:	[] Pain		[x] Goals of Care		[] Non-pain symptoms    [] End of life discussion		[] Other:    HPI:  89M hx BPH (chronic indwelling Gill), HLD, B/L inguinal hernia repair with known left recurrence, colon cancer (T4N1 w/ two synchronous lesions) s/p extended right hemicolectomy w/ ileocolic anastomosis on 12/23 (not on chemo), recent presentation 2/24 for acute diastolic heart failure, presenting with abdominal pain. Patient recently presented to ED on 3/1 for AP and was found to have a new right midabdominal lesion cf peritoneal carcinomatosis. Pt was scheduled to follow up with heme/onc outpatient today to determine a plan for his new peritoneal carcinomatosis (Dr. Ramirez), but instead presented to ED due to his pain. He is reporting several weeks of intermittent lower midline AP rated up to 8/10 in intensity. He also states he feels a lot of intestinal movement and his abdomen makes "noises like aliens in my intestines." He has not been tolerating PO solids well, mostly vomiting his food, "for a few weeks," however, he has been able to hold down fluids. He reports passing gas on the morning of 3/21, but hasn't had a bowel movement in possibly 12d. He is unsure if he has had unintended weight loss of late, but supposes he has. He denies hematemesis, hematochezia, fevers, or chills. He also reports dizziness, headache, and back and shoulder pain.    In ED, vss. WBC 7.64. Cr. 1.69. Vlac 1.0. CTAP IV cf high grade SBO w/ RUQ TP at a spiculated soft tissue lesion, stable from prior, which is concerning for peritoneal carcinomatosis. No evidence of pneumatosis or portal venous gas.   (22 Mar 2025 01:11)      Interval History: No overnight events. Patient seen at bedside, AAOx2-3 however waxes and wanes. Patient able to answer some questions, however confused at times.       PERTINENT PM/SXH:   No pertinent past medical history    BPH (benign prostatic hyperplasia)    Peripheral neuropathy    HLD (hyperlipidemia)    Leg edema    Acute on chronic diastolic congestive heart failure    Heart failure, diastolic, chronic    Colon cancer    Inguinal hernia    Bilateral inguinal hernia    Recurrent inguinal hernia    GERD (gastroesophageal reflux disease)      No significant past surgical history    H/O hemicolectomy    H/O bilateral inguinal hernia repair      FAMILY HISTORY:      Family Hx substance abuse [ ]yes [x ]no    ITEMS NOT CHECKED ARE NOT PRESENT    SOCIAL HISTORY:   Significant other/partner[ ]  Children[x]  Jewish/Spirituality:  Substance hx:  [ ]   Tobacco hx:  [ ]   Alcohol hx: [ ]   Home Opioid hx:  [ ] I-Stop Reference No:  Living Situation: [x]Home  [ ]Long term care  [ ]Rehab [ ]Other  Home Services: [ ] HHA [ ] Visting RN [ ] Hospice      ADVANCE DIRECTIVES:    MOLST  [ ]  Living Will  [ ]   DECISION MAKER(s):  [ ] Health Care Proxy(s)  [x] Surrogate(s)  [ ] Guardian           Name(s): Phone Number(s): daughter Oc Hunter (has intellectual disability, lives in a group home)     BASELINE (I)ADL(s) (prior to admission):  [place]: [ ]Total  [x] Moderate [ ]Dependent    Allergies    aspirin (Unknown)  penicillin (Unknown)    Intolerances    MEDICATIONS  (STANDING):  atorvastatin 40 milliGRAM(s) Oral at bedtime  dexAMETHasone  Injectable 4 milliGRAM(s) IV Push every 12 hours  finasteride 5 milliGRAM(s) Oral daily  gabapentin 400 milliGRAM(s) Oral at bedtime  heparin   Injectable 5000 Unit(s) SubCutaneous every 8 hours  influenza  Vaccine (HIGH DOSE) 0.5 milliLiter(s) IntraMuscular once  lactated ringers. 1000 milliLiter(s) (75 mL/Hr) IV Continuous <Continuous>  magnesium sulfate  IVPB 1 Gram(s) IV Intermittent once  octreotide  Injectable 100 MICROGram(s) SubCutaneous every 8 hours  pantoprazole  Injectable 40 milliGRAM(s) IV Push daily  sodium phosphate 15 milliMole(s)/250 mL IVPB 15 milliMole(s) IV Intermittent once  tamsulosin 0.8 milliGRAM(s) Oral at bedtime    MEDICATIONS  (PRN):  HYDROmorphone  Injectable 0.2 milliGRAM(s) IV Push every 12 hours PRN headache      ITEMS UNCHECKED ARE NOT PRESENT     PRESENT SYMPTOMS: [ ]Unable to self-report due to altered mental status  [ ] CPOT [ ] PAINADs [ ] RDOS  Source if other than patient:  [ ]Family   [ ]Team     Pain: [ ]yes [x]no  QOL impact -   Location -                    Aggravating factors -  Quality -  Radiation -  Timing-  Severity (0-10 scale):  Minimal acceptable level / Pain goal (0-10 scale):     CPOT:    https://www.[facility].org/getattachment/[email or web address]/Critical-Care-Pain-Observation-Tool-(CPOT)    Dyspnea:                           [ ]Mild [ ]Moderate [ ]Severe  Anxiety:                             [ ]Mild [ ]Moderate [ ]Severe  Agitation:                          [ ]Mild [ ]Moderate [ ]Severe  Fatigue:                             [ ]Mild [ ]Moderate [ ]Severe  Nausea:                             [ ]Mild [ ]Moderate [ ]Severe  Loss of appetite:              [ ]Mild [ ]Moderate [ ]Severe  Constipation:                   [ ]Mild [ ]Moderate [ ]Severe  Diarrhea:                          [ ]Mild [ ]Moderate [ ]Severe  Pruritus:                            [ ]Mild [ ]Moderate [ ]Severe  Depression:                      [ ]Mild [ ]Moderate [ ]Severe    PCSSQ[Palliative Care Spiritual Screening Question]   Severity (0-10):  Score of 4 or > indicate consideration of Chaplaincy referral.  Chaplaincy Referral: [ ] yes [ ] refused [ ] following [ x] deferred    Caregiver [place]? : [ ] yes [ ] no [ ] Declined   [x ] Deferred            Social work referral [ ] Patient & Family Centered Care Referral [ ]     Anticipatory Grief present?:  [ ] yes [ ] no  [x ] Deferred                  Social work referral [ ] Chaplaincy Referral[ ] Caregiver Support Referral [ ]     Other Symptoms:  [ ]All other review of systems negative   [x] Unable to obtain due to poor mentation     PHYSICAL EXAM:  Vital Signs Last 24 Hrs  T(C): 36.7 (24 Mar 2025 05:24), Max: 36.8 (23 Mar 2025 22:00)  T(F): 98 (24 Mar 2025 05:24), Max: 98.2 (23 Mar 2025 22:00)  HR: 93 (24 Mar 2025 05:24) (70 - 100)  BP: 130/56 (24 Mar 2025 05:24) (110/81 - 173/80)  BP(mean): --  RR: 18 (24 Mar 2025 05:24) (16 - 18)  SpO2: 98% (24 Mar 2025 05:24) (96% - 100%)    Parameters below as of 24 Mar 2025 05:24  Patient On (Oxygen Delivery Method): room air    I&O's Summary    23 Mar 2025 07:01  -  24 Mar 2025 07:00  --------------------------------------------------------  IN: 1050 mL / OUT: 850 mL / NET: 200 mL    GENERAL:  [x]Alert  [ ]Oriented x   [ ]Lethargic  [x]Cachexia  [ ]Unarousable  [x]Verbal  [ ]Non-Verbal  [x] No Distress  Behavioral:   [ ] Anxiety  [ ] Delirium [ ] Agitation [x] Calm  [x] Other - encephalopathy   HEENT:  [x]Normal  [x] Temporal Wasting  [ ]Dry mouth   [ ]ET Tube/Trach  [ ]Oral lesions  [ ] Mucositis  PULMONARY:   [x]Clear [ ]Tachypnea  [ ]Audible excessive secretions   [ ]Rhonchi        [ ]Right [ ]Left [ ]Bilateral  [ ]Crackles        [ ]Right [ ]Left [ ]Bilateral  [ ]Wheezing     [ ]Right [ ]Left [ ]Bilateral  [ ]Diminished breath sounds [ ]right [ ]left [ ]bilateral  CARDIOVASCULAR:    [x]Regular [ ]Irregular [ ]Tachy  [ ]Bart [ ]Murmur [ ]Other  GASTROINTESTINAL:  [x]Soft  [ ]Distended   [x]+BS  [x]Non tender [ ]Tender  [ ]PEG [ ]OGT/ NGT  Last BM:   GENITOURINARY:  [ ]Normal [ ] Incontinent   [ ]Oliguria/Anuria   [x]Gill  MUSCULOSKELETAL:   [ ]Normal   [ ]Weakness  [x]Bed/Wheelchair bound [ ]Edema  [  ] amputation  [  ] contraction  NEUROLOGIC:   [x]No focal deficits  [ ]Cognitive impairment  [ ]Dysphagia [ ]Dysarthria [ ]Paresis [ ]Other   SKIN: See Nursing Skin Assessment for further details  [ ]Normal    [ ]Rash  [ ]Pressure ulcer(s)       Present on admission [ ]y [ ]n   [  ]  Wound    [  ] hyperpigmentation    CRITICAL CARE:  [ ]Shock Present  [ ]Septic [ ]Cardiogenic [ ]Neurologic [ ]Hypovolemic  [ ]  Vasopressors [ ]  Inotropes   [ ]Respiratory failure present [ ]Mechanical ventilation [ ]Non-invasive ventilatory support [ ]High flow    [ ]Acute  [ ]Chronic [ ]Hypoxic  [ ]Hypercarbic [ ]Other  [ ]Other organ failure     LABS:  reviewed                         13.3   13.50 )-----------( 352      ( 24 Mar 2025 04:50 )             39.4   03-24    138  |  98  |  22  ----------------------------<  102[H]  4.4   |  19[L]  |  1.01    Ca    9.2      24 Mar 2025 04:50  Phos  2.1     03-24  Mg     1.90     03-24      Urinalysis Basic - ( 24 Mar 2025 04:50 )    Color: x / Appearance: x / SG: x / pH: x  Gluc: 102 mg/dL / Ketone: x  / Bili: x / Urobili: x   Blood: x / Protein: x / Nitrite: x   Leuk Esterase: x / RBC: x / WBC x   Sq Epi: x / Non Sq Epi: x / Bacteria: x      CAPILLARY BLOOD GLUCOSE      POCT Blood Glucose.: 101 mg/dL (24 Mar 2025 00:53)  POCT Blood Glucose.: 121 mg/dL (23 Mar 2025 17:57)      RADIOLOGY & ADDITIONAL STUDIES: reviewed     PROTEIN CALORIE MALNUTRITION PRESENT: [ ]mild [ ]moderate [ ]severe [ ]underweight [ ]morbid obesity  https://www.andeal.org/vault/2440/web/files/ONC/Table_Clinical%20Characteristics%20to%20Document%20Malnutrition-White%20JV%20et%20al%202012.pdf    Height (cm): 172.7 (03-22-25 @ 10:19)  Weight (kg): 59 (03-21-25 @ 17:07), 61.19 (03-01-25 @ 15:28), 61.2 (11-27-24 @ 04:28)  BMI (kg/m2): 19.8 (03-22-25 @ 10:19)    [x]PPSV2 < or = to 30% [ ]significant weight loss  [x]poor nutritional intake  [ ]anasarca [ ]Artificial Nutrition      REFERRALS:   [ ]Chaplaincy  [ ]Hospice  [ ]Child Life  [ ]Social Work  [ ]Case management [ ]Holistic Therapy     Goals of Care Document:  Date of Service 03-24-25 @ 09:46     Reason for Consultation:	[] Pain		[x] Goals of Care		[] Non-pain symptoms    [] End of life discussion		[] Other:    HPI:  89M hx BPH (chronic indwelling Gill), HLD, B/L inguinal hernia repair with known left recurrence, colon cancer (T4N1 w/ two synchronous lesions) s/p extended right hemicolectomy w/ ileocolic anastomosis on 12/23 (not on chemo), recent presentation 2/24 for acute diastolic heart failure, presenting with abdominal pain. Patient recently presented to ED on 3/1 for AP and was found to have a new right midabdominal lesion cf peritoneal carcinomatosis. Pt was scheduled to follow up with heme/onc outpatient today to determine a plan for his new peritoneal carcinomatosis (Dr. Ramirez), but instead presented to ED due to his pain. He is reporting several weeks of intermittent lower midline AP rated up to 8/10 in intensity. He also states he feels a lot of intestinal movement and his abdomen makes "noises like aliens in my intestines." He has not been tolerating PO solids well, mostly vomiting his food, "for a few weeks," however, he has been able to hold down fluids. He reports passing gas on the morning of 3/21, but hasn't had a bowel movement in possibly 12d. He is unsure if he has had unintended weight loss of late, but supposes he has. He denies hematemesis, hematochezia, fevers, or chills. He also reports dizziness, headache, and back and shoulder pain.    In ED, vss. WBC 7.64. Cr. 1.69. Vlac 1.0. CTAP IV cf high grade SBO w/ RUQ TP at a spiculated soft tissue lesion, stable from prior, which is concerning for peritoneal carcinomatosis. No evidence of pneumatosis or portal venous gas.   (22 Mar 2025 01:11)      Interval History: No overnight events. Patient seen at bedside, AAOx2-3 however waxes and wanes. Patient able to answer some questions, however confused at times.       PERTINENT PM/SXH:   No pertinent past medical history    BPH (benign prostatic hyperplasia)    Peripheral neuropathy    HLD (hyperlipidemia)    Leg edema    Acute on chronic diastolic congestive heart failure    Heart failure, diastolic, chronic    Colon cancer    Inguinal hernia    Bilateral inguinal hernia    Recurrent inguinal hernia    GERD (gastroesophageal reflux disease)      No significant past surgical history    H/O hemicolectomy    H/O bilateral inguinal hernia repair      FAMILY HISTORY:      Family Hx substance abuse [ ]yes [x ]no    ITEMS NOT CHECKED ARE NOT PRESENT    SOCIAL HISTORY:   Significant other/partner[ ]  Children[x]  Confucianism/Spirituality:  Substance hx:  [ ]   Tobacco hx:  [ ]   Alcohol hx: [ ]   Home Opioid hx:  [ ] I-Stop Reference No:  [id number]. No medications found on ISTOP  Living Situation: [x]Home  [ ]Long term care  [ ]Rehab [ ]Other  Home Services: [ ] HHA [ ] Visting RN [ ] Hospice      ADVANCE DIRECTIVES:    MOLST  [ ]  Living Will  [ ]   DECISION MAKER(s):  [ ] Health Care Proxy(s)  [x] Surrogate(s)  [ ] Guardian           Name(s): Phone Number(s): estrella Hunter (has intellectual disability, lives in a group home)     BASELINE (I)ADL(s) (prior to admission):  [place]: [ ]Total  [x] Moderate [ ]Dependent    Allergies    aspirin (Unknown)  penicillin (Unknown)    Intolerances    MEDICATIONS  (STANDING):  atorvastatin 40 milliGRAM(s) Oral at bedtime  dexAMETHasone  Injectable 4 milliGRAM(s) IV Push every 12 hours  finasteride 5 milliGRAM(s) Oral daily  gabapentin 400 milliGRAM(s) Oral at bedtime  heparin   Injectable 5000 Unit(s) SubCutaneous every 8 hours  influenza  Vaccine (HIGH DOSE) 0.5 milliLiter(s) IntraMuscular once  lactated ringers. 1000 milliLiter(s) (75 mL/Hr) IV Continuous <Continuous>  magnesium sulfate  IVPB 1 Gram(s) IV Intermittent once  octreotide  Injectable 100 MICROGram(s) SubCutaneous every 8 hours  pantoprazole  Injectable 40 milliGRAM(s) IV Push daily  sodium phosphate 15 milliMole(s)/250 mL IVPB 15 milliMole(s) IV Intermittent once  tamsulosin 0.8 milliGRAM(s) Oral at bedtime    MEDICATIONS  (PRN):  HYDROmorphone  Injectable 0.2 milliGRAM(s) IV Push every 12 hours PRN headache      ITEMS UNCHECKED ARE NOT PRESENT     PRESENT SYMPTOMS: [ ]Unable to self-report due to altered mental status  [ ] CPOT [ ] PAINADs [ ] RDOS  Source if other than patient:  [ ]Family   [ ]Team     Pain: [ ]yes [x]no  QOL impact -   Location -                    Aggravating factors -  Quality -  Radiation -  Timing-  Severity (0-10 scale):  Minimal acceptable level / Pain goal (0-10 scale):     CPOT:    https://www.[facility].org/getattachment/[email or web address]/Critical-Care-Pain-Observation-Tool-(CPOT)    Dyspnea:                           [ ]Mild [ ]Moderate [ ]Severe  Anxiety:                             [ ]Mild [ ]Moderate [ ]Severe  Agitation:                          [ ]Mild [ ]Moderate [ ]Severe  Fatigue:                             [ ]Mild [ ]Moderate [ ]Severe  Nausea:                             [ ]Mild [ ]Moderate [ ]Severe  Loss of appetite:              [ ]Mild [ ]Moderate [ ]Severe  Constipation:                   [ ]Mild [ ]Moderate [ ]Severe  Diarrhea:                          [ ]Mild [ ]Moderate [ ]Severe  Pruritus:                            [ ]Mild [ ]Moderate [ ]Severe  Depression:                      [ ]Mild [ ]Moderate [ ]Severe    PCSSQ[Palliative Care Spiritual Screening Question]   Severity (0-10):  Score of 4 or > indicate consideration of Chaplaincy referral.  Chaplaincy Referral: [ ] yes [ ] refused [ ] following [ x] deferred    Caregiver [place]? : [ ] yes [ ] no [ ] Declined   [x ] Deferred            Social work referral [ ] Patient & Family Centered Care Referral [ ]     Anticipatory Grief present?:  [ ] yes [ ] no  [x ] Deferred                  Social work referral [ ] Chaplaincy Referral[ ] Caregiver Support Referral [ ]     Other Symptoms:  [ ]All other review of systems negative   [x] Unable to obtain due to poor mentation     PHYSICAL EXAM:  Vital Signs Last 24 Hrs  T(C): 36.7 (24 Mar 2025 05:24), Max: 36.8 (23 Mar 2025 22:00)  T(F): 98 (24 Mar 2025 05:24), Max: 98.2 (23 Mar 2025 22:00)  HR: 93 (24 Mar 2025 05:24) (70 - 100)  BP: 130/56 (24 Mar 2025 05:24) (110/81 - 173/80)  BP(mean): --  RR: 18 (24 Mar 2025 05:24) (16 - 18)  SpO2: 98% (24 Mar 2025 05:24) (96% - 100%)    Parameters below as of 24 Mar 2025 05:24  Patient On (Oxygen Delivery Method): room air    I&O's Summary    23 Mar 2025 07:01  -  24 Mar 2025 07:00  --------------------------------------------------------  IN: 1050 mL / OUT: 850 mL / NET: 200 mL    GENERAL:  [x]Alert  [ ]Oriented x   [ ]Lethargic  [x]Cachexia  [ ]Unarousable  [x]Verbal  [ ]Non-Verbal  [x] No Distress  Behavioral:   [ ] Anxiety  [ ] Delirium [ ] Agitation [x] Calm  [x] Other - encephalopathy   HEENT:  [x]Normal  [x] Temporal Wasting  [ ]Dry mouth   [ ]ET Tube/Trach  [ ]Oral lesions  [ ] Mucositis  PULMONARY:   [x]Clear [ ]Tachypnea  [ ]Audible excessive secretions   [ ]Rhonchi        [ ]Right [ ]Left [ ]Bilateral  [ ]Crackles        [ ]Right [ ]Left [ ]Bilateral  [ ]Wheezing     [ ]Right [ ]Left [ ]Bilateral  [ ]Diminished breath sounds [ ]right [ ]left [ ]bilateral  CARDIOVASCULAR:    [x]Regular [ ]Irregular [ ]Tachy  [ ]Bart [ ]Murmur [ ]Other  GASTROINTESTINAL:  [x]Soft  [ ]Distended   [x]+BS  [x]Non tender [ ]Tender  [ ]PEG [ ]OGT/ NGT  Last BM:   GENITOURINARY:  [ ]Normal [ ] Incontinent   [ ]Oliguria/Anuria   [x]Gill  MUSCULOSKELETAL:   [ ]Normal   [ ]Weakness  [x]Bed/Wheelchair bound [ ]Edema  [  ] amputation  [  ] contraction  NEUROLOGIC:   [x]No focal deficits  [ ]Cognitive impairment  [ ]Dysphagia [ ]Dysarthria [ ]Paresis [ ]Other   SKIN: See Nursing Skin Assessment for further details  [ ]Normal    [ ]Rash  [ ]Pressure ulcer(s)       Present on admission [ ]y [ ]n   [  ]  Wound    [  ] hyperpigmentation    CRITICAL CARE:  [ ]Shock Present  [ ]Septic [ ]Cardiogenic [ ]Neurologic [ ]Hypovolemic  [ ]  Vasopressors [ ]  Inotropes   [ ]Respiratory failure present [ ]Mechanical ventilation [ ]Non-invasive ventilatory support [ ]High flow    [ ]Acute  [ ]Chronic [ ]Hypoxic  [ ]Hypercarbic [ ]Other  [ ]Other organ failure     LABS:  reviewed                         13.3   13.50 )-----------( 352      ( 24 Mar 2025 04:50 )             39.4   03-24    138  |  98  |  22  ----------------------------<  102[H]  4.4   |  19[L]  |  1.01    Ca    9.2      24 Mar 2025 04:50  Phos  2.1     03-24  Mg     1.90     03-24      Urinalysis Basic - ( 24 Mar 2025 04:50 )    Color: x / Appearance: x / SG: x / pH: x  Gluc: 102 mg/dL / Ketone: x  / Bili: x / Urobili: x   Blood: x / Protein: x / Nitrite: x   Leuk Esterase: x / RBC: x / WBC x   Sq Epi: x / Non Sq Epi: x / Bacteria: x      CAPILLARY BLOOD GLUCOSE      POCT Blood Glucose.: 101 mg/dL (24 Mar 2025 00:53)  POCT Blood Glucose.: 121 mg/dL (23 Mar 2025 17:57)      RADIOLOGY & ADDITIONAL STUDIES: reviewed     PROTEIN CALORIE MALNUTRITION PRESENT: [ ]mild [ ]moderate [ ]severe [ ]underweight [ ]morbid obesity  https://www.andeal.org/vault/2440/web/files/ONC/Table_Clinical%20Characteristics%20to%20Document%20Malnutrition-White%20JV%20et%20al%202012.pdf    Height (cm): 172.7 (03-22-25 @ 10:19)  Weight (kg): 59 (03-21-25 @ 17:07), 61.19 (03-01-25 @ 15:28), 61.2 (11-27-24 @ 04:28)  BMI (kg/m2): 19.8 (03-22-25 @ 10:19)    [x]PPSV2 < or = to 30% [ ]significant weight loss  [x]poor nutritional intake  [ ]anasarca [ ]Artificial Nutrition      REFERRALS:   [ ]Chaplaincy  [ ]Hospice  [ ]Child Life  [ ]Social Work  [ ]Case management [ ]Holistic Therapy     Goals of Care Document:  Date of Service 03-24-25 @ 09:46     Reason for Consultation:	[] Pain		[x] Goals of Care		[] Non-pain symptoms    [] End of life discussion		[] Other:    HPI:  89M hx BPH (chronic indwelling Gill), HLD, B/L inguinal hernia repair with known left recurrence, colon cancer (T4N1 w/ two synchronous lesions) s/p extended right hemicolectomy w/ ileocolic anastomosis on 12/23 (not on chemo), recent presentation 2/24 for acute diastolic heart failure, presenting with abdominal pain. Patient recently presented to ED on 3/1 for AP and was found to have a new right midabdominal lesion cf peritoneal carcinomatosis. Pt was scheduled to follow up with heme/onc outpatient today to determine a plan for his new peritoneal carcinomatosis (Dr. Ramirez), but instead presented to ED due to his pain. He is reporting several weeks of intermittent lower midline AP rated up to 8/10 in intensity. He also states he feels a lot of intestinal movement and his abdomen makes "noises like aliens in my intestines." He has not been tolerating PO solids well, mostly vomiting his food, "for a few weeks," however, he has been able to hold down fluids. He reports passing gas on the morning of 3/21, but hasn't had a bowel movement in possibly 12d. He is unsure if he has had unintended weight loss of late, but supposes he has. He denies hematemesis, hematochezia, fevers, or chills. He also reports dizziness, headache, and back and shoulder pain.    In ED, vss. WBC 7.64. Cr. 1.69. Vlac 1.0. CTAP IV cf high grade SBO w/ RUQ TP at a spiculated soft tissue lesion, stable from prior, which is concerning for peritoneal carcinomatosis. No evidence of pneumatosis or portal venous gas.   (22 Mar 2025 01:11)      Interval History: No overnight events. Patient seen at bedside, AAOx2-3 however waxes and wanes. Patient able to answer some questions, however confused at times.       PERTINENT PM/SXH:   No pertinent past medical history  BPH (benign prostatic hyperplasia)  Peripheral neuropathy  HLD (hyperlipidemia)  Leg edema  Acute on chronic diastolic congestive heart failure  Heart failure, diastolic, chronic  Colon cancer  Inguinal hernia  Bilateral inguinal hernia  Recurrent inguinal hernia  GERD (gastroesophageal reflux disease)  No significant past surgical history  H/O hemicolectomy  H/O bilateral inguinal hernia repair    FAMILY HISTORY:    ITEMS NOT CHECKED ARE NOT PRESENT    SOCIAL HISTORY:   Significant other/partner[ ]  Children[x]  Congregational/Spirituality:  Substance hx:  [ ]   Tobacco hx:  [ ]   Alcohol hx: [ ]   Home Opioid hx:  [ ] I-Stop Reference No:  [id number]. No medications found on ISTOP  Living Situation: [x]Home  [ ]Long term care  [ ]Rehab [ ]Other      ADVANCE DIRECTIVES:    MOLST  [ ]  Living Will  [ ]   DECISION MAKER(s):  [ ] Health Care Proxy(s)  [x] Surrogate(s)  [ ] Guardian           Name(s): Phone Number(s): daughter Oc Hunter (has intellectual disability? lives in a "resident home")     BASELINE (I)ADL(s) (prior to admission):  [place]: [ ]Total  [x] Moderate [ ]Dependent    Allergies    aspirin (Unknown)  penicillin (Unknown)    Intolerances    MEDICATIONS  (STANDING):  atorvastatin 40 milliGRAM(s) Oral at bedtime  dexAMETHasone  Injectable 4 milliGRAM(s) IV Push every 12 hours  finasteride 5 milliGRAM(s) Oral daily  gabapentin 400 milliGRAM(s) Oral at bedtime  heparin   Injectable 5000 Unit(s) SubCutaneous every 8 hours  influenza  Vaccine (HIGH DOSE) 0.5 milliLiter(s) IntraMuscular once  lactated ringers. 1000 milliLiter(s) (75 mL/Hr) IV Continuous <Continuous>  magnesium sulfate  IVPB 1 Gram(s) IV Intermittent once  octreotide  Injectable 100 MICROGram(s) SubCutaneous every 8 hours  pantoprazole  Injectable 40 milliGRAM(s) IV Push daily  sodium phosphate 15 milliMole(s)/250 mL IVPB 15 milliMole(s) IV Intermittent once  tamsulosin 0.8 milliGRAM(s) Oral at bedtime    MEDICATIONS  (PRN):  HYDROmorphone  Injectable 0.2 milliGRAM(s) IV Push every 12 hours PRN headache      ITEMS UNCHECKED ARE NOT PRESENT     PRESENT SYMPTOMS: [ ]Unable to self-report due to altered mental status  [ ] CPOT [ ] PAINADs [ ] RDOS  Source if other than patient:  [ ]Family   [ ]Team     Pain: [ ]yes [x]no  QOL impact -   Location -                    Aggravating factors -  Quality -  Radiation -  Timing-  Severity (0-10 scale):  Minimal acceptable level / Pain goal (0-10 scale):     CPOT:    https://www.[facility].org/getattachment/ljy02t64-9c1j-2p9g-6j5p-1007b2005o4h/Critical-Care-Pain-Observation-Tool-(CPOT)    Dyspnea:                           [ ]Mild [ ]Moderate [ ]Severe  Anxiety:                             [ ]Mild [ ]Moderate [ ]Severe  Agitation:                          [ ]Mild [ ]Moderate [ ]Severe  Fatigue:                             [ ]Mild [ ]Moderate [ ]Severe  Nausea:                             [ ]Mild [ ]Moderate [ ]Severe  Loss of appetite:              [ ]Mild [ ]Moderate [ ]Severe  Constipation:                   [ ]Mild [ ]Moderate [ ]Severe  Diarrhea:                          [ ]Mild [ ]Moderate [ ]Severe  Pruritus:                            [ ]Mild [ ]Moderate [ ]Severe  Depression:                      [ ]Mild [ ]Moderate [ ]Severe    PCSSQ[Palliative Care Spiritual Screening Question]   Severity (0-10):  Score of 4 or > indicate consideration of Chaplaincy referral.  Chaplaincy Referral: [ ] yes [ ] refused [ ] following [ x] deferred    Caregiver [place]? : [ ] yes [ ] no [ ] Declined   [x ] Deferred            Social work referral [ ] Patient & Family Centered Care Referral [ ]     Anticipatory Grief present?:  [ ] yes [ ] no  [x ] Deferred                  Social work referral [ ] Chaplaincy Referral[ ] Caregiver Support Referral [ ]     Other Symptoms:  [x ]All other review of systems negative   [] Unable to obtain due to poor mentation     PHYSICAL EXAM:  Vital Signs Last 24 Hrs  T(C): 36.7 (24 Mar 2025 05:24), Max: 36.8 (23 Mar 2025 22:00)  T(F): 98 (24 Mar 2025 05:24), Max: 98.2 (23 Mar 2025 22:00)  HR: 93 (24 Mar 2025 05:24) (70 - 100)  BP: 130/56 (24 Mar 2025 05:24) (110/81 - 173/80)  BP(mean): --  RR: 18 (24 Mar 2025 05:24) (16 - 18)  SpO2: 98% (24 Mar 2025 05:24) (96% - 100%)    Parameters below as of 24 Mar 2025 05:24  Patient On (Oxygen Delivery Method): room air    I&O's Summary    23 Mar 2025 07:01  -  24 Mar 2025 07:00  --------------------------------------------------------  IN: 1050 mL / OUT: 850 mL / NET: 200 mL    GENERAL:  [x]Alert  [ x]Oriented x2-3   [ ]Lethargic  [x]Cachexia  [ ]Unarousable  [x]Verbal  [ ]Non-Verbal  [x] No Distress  Behavioral:   [ ] Anxiety  [ ] Delirium [ ] Agitation [x] Calm  [x] Other - encephalopathy   HEENT:  [x]Normal  [x] Temporal Wasting  [ ]Dry mouth   [ ]ET Tube/Trach  [ ]Oral lesions  [ ] Mucositis  PULMONARY:   [x]Clear [ ]Tachypnea  [ ]Audible excessive secretions   [ ]Rhonchi        [ ]Right [ ]Left [ ]Bilateral  [ ]Crackles        [ ]Right [ ]Left [ ]Bilateral  [ ]Wheezing     [ ]Right [ ]Left [ ]Bilateral  [ ]Diminished breath sounds [ ]right [ ]left [ ]bilateral  CARDIOVASCULAR:    [x]Regular [ ]Irregular [ ]Tachy  [ ]Bart [ ]Murmur [ ]Other  GASTROINTESTINAL:  [x]Soft  [ ]Distended   [x]+BS  [x]Non tender [ ]Tender  [ ]PEG [ ]OGT/ NGT  Last BM:   GENITOURINARY:  [ ]Normal [ ] Incontinent   [ ]Oliguria/Anuria   [x]Gill  MUSCULOSKELETAL:   [ ]Normal   [x ]Weakness  []Bed/Wheelchair bound [ ]Edema  [  ] amputation  [  ] contraction  NEUROLOGIC:   [x]No focal deficits  [ ]Cognitive impairment  [ ]Dysphagia [ ]Dysarthria [ ]Paresis [ ]Other   SKIN: See Nursing Skin Assessment for further details  [ ]Normal    [ ]Rash  [ ]Pressure ulcer(s)       Present on admission [ ]y [ ]n   [  ]  Wound    [  ] hyperpigmentation    CRITICAL CARE:  [ ]Shock Present  [ ]Septic [ ]Cardiogenic [ ]Neurologic [ ]Hypovolemic  [ ]  Vasopressors [ ]  Inotropes   [ ]Respiratory failure present [ ]Mechanical ventilation [ ]Non-invasive ventilatory support [ ]High flow    [ ]Acute  [ ]Chronic [ ]Hypoxic  [ ]Hypercarbic [ ]Other  [ ]Other organ failure     LABS:  reviewed                         13.3   13.50 )-----------( 352      ( 24 Mar 2025 04:50 )             39.4   03-24    138  |  98  |  22  ----------------------------<  102[H]  4.4   |  19[L]  |  1.01    Ca    9.2      24 Mar 2025 04:50  Phos  2.1     03-24  Mg     1.90     03-24      Urinalysis Basic - ( 24 Mar 2025 04:50 )    Color: x / Appearance: x / SG: x / pH: x  Gluc: 102 mg/dL / Ketone: x  / Bili: x / Urobili: x   Blood: x / Protein: x / Nitrite: x   Leuk Esterase: x / RBC: x / WBC x   Sq Epi: x / Non Sq Epi: x / Bacteria: x      CAPILLARY BLOOD GLUCOSE      POCT Blood Glucose.: 101 mg/dL (24 Mar 2025 00:53)  POCT Blood Glucose.: 121 mg/dL (23 Mar 2025 17:57)      RADIOLOGY & ADDITIONAL STUDIES: reviewed     PROTEIN CALORIE MALNUTRITION PRESENT: [ ]mild [ ]moderate [ ]severe [ ]underweight [ ]morbid obesity  https://www.andeal.org/vault/2440/web/files/ONC/Table_Clinical%20Characteristics%20to%20Document%20Malnutrition-White%20JV%20et%20al%202012.pdf    Height (cm): 172.7 (03-22-25 @ 10:19)  Weight (kg): 59 (03-21-25 @ 17:07), 61.19 (03-01-25 @ 15:28), 61.2 (11-27-24 @ 04:28)  BMI (kg/m2): 19.8 (03-22-25 @ 10:19)    [x]PPSV2 < or = to 30% [ ]significant weight loss  [x]poor nutritional intake  [ ]anasarca [ ]Artificial Nutrition      REFERRALS:   [ ]Chaplaincy  [ ]Hospice  [ ]Child Life  [ ]Social Work  [ x]Case management [ ]Holistic Therapy

## 2025-03-24 NOTE — PROGRESS NOTE ADULT - ASSESSMENT
1M hx BPH (chronic indwelling Gill), HLD, B/L inguinal hernia repair with known left recurrence, colon cancer (T4N1 w/ two synchronous lesions) s/p extended right hemicolectomy w/ ileocolic anastomosis on 12/23 (not on chemo), recent presentation 2/24 for acute diastolic heart failure, presenting with abdominal pain, found to have high grade SBO w/ RUQ TP at a spiculated soft tissue lesion concerning for peritoneal carcinomatosis.     Plan:  - CLD, LR75  -Will consider pscych consult if still altered by PM rounds  - PT eval  -Med-onc consult --> poor chemotherapy candidate, recommend palliative care involvement if patient fails nonoperative management  -VTE ppx: [facility]    A team   [id number]

## 2025-03-24 NOTE — CONSULT NOTE ADULT - PROBLEM SELECTOR RECOMMENDATION 9
CT findings - High-grade small bowel obstruction to the level of a single transition  point in the right upper quadrant at which there is a similar appearing spiculated soft tissue lesion concerning for peritoneal carcinomatosis. Patient with hx of colon cancer (T4N1 w/ two synchronous lesions) s/p extended right hemicolectomy w/ ileocolic anastomosis. Was not deemed a candidate for adjuvant chemo and has been under surveillance.   - Heme/onc recs appreciated  - Pain management PRN   - Rest of care as per primary team - Patient with hx of colon cancer (T4N1 w/ two synchronous lesions) s/p extended right hemicolectomy w/ ileocolic anastomosis., no adjuvant chemotherapy.  Now with concerns for peritoneal carcinomatosis  - Follows with Dr. Delvalle   - Oncology recommendations appreciated - "He is not a candidate for IV chemotherapy (although could possibly be a candidate for oral capecitabine) and he would like to avoid surgery if possible"

## 2025-03-24 NOTE — CONSULT NOTE ADULT - PROBLEM SELECTOR RECOMMENDATION 3
- Continue Tamsulosin 0.4mg qd   - Continue indwelling bruner - Patient AAOx2-3, mental status waxes and wanes. AMS likely 2/2 delirium but unclear baseline cognitive function  - Frequent reassurance and verbal orientation.  - Delusions and hallucinations should be neither endorsed nor challenged.   - Physical restraints should be avoided. Alternatives to restraint use, such as constant observation.  - PT eval and early ambulation if possible.  - Update the calendar date in the room.    - Monitor for constipation, urinary retention, pain, hunger, thirst, etc.    - Promote sleep wake cycle and reorientation as indicated.

## 2025-03-25 NOTE — PROVIDER CONTACT NOTE (OTHER) - ASSESSMENT
Pt AOx1-3 at baseline. VSS at this time, respirations even and unlabored, no nonverbal s/s of pain. Pupils constricting, PERRLA intact. Pt AOx1-3 at baseline. VSS at this time, respirations even and unlabored, no nonverbal s/s of pain. PERRLA intact. pt refusing/ not responding to neuro check instructions.

## 2025-03-25 NOTE — BH CONSULTATION LIAISON ASSESSMENT NOTE - NSBHCHARTREVIEWVS_PSY_A_CORE FT
Vital Signs Last 24 Hrs  T(C): 36.6 (25 Mar 2025 08:12), Max: 36.6 (25 Mar 2025 08:12)  T(F): 97.8 (25 Mar 2025 08:12), Max: 97.8 (25 Mar 2025 08:12)  HR: 67 (25 Mar 2025 08:12) (61 - 74)  BP: 138/51 (25 Mar 2025 08:12) (114/76 - 138/51)  BP(mean): --  RR: 16 (25 Mar 2025 08:12) (16 - 17)  SpO2: 96% (25 Mar 2025 08:12) (96% - 100%)    Parameters below as of 25 Mar 2025 08:12  Patient On (Oxygen Delivery Method): room air

## 2025-03-25 NOTE — PROGRESS NOTE ADULT - ASSESSMENT
89M hx BPH (chronic indwelling Bruner), HLD, B/L inguinal hernia repair with known left recurrence, colon cancer (T4N1 w/ two synchronous lesions) s/p extended right hemicolectomy w/ ileocolic anastomosis on 12/23 (not on chemo), recent presentation 2/24 for acute diastolic heart failure, presenting with abdominal pain, found to have high grade SBO w/ RUQ TP at a spiculated soft tissue lesion concerning for peritoneal carcinomatosis, now endorsing flatus and tolerating clear liquids but hospital course complicated by altered mental status and aggressive behavior and s/p zyprexa x2 on 3/24.     Plan:  - Diet: Continue CLD, IVFs at 75  - Monitor bowel fxn, difficult to ascertain given patient's previous agitation  - ABx: Vancomycin for E Faecium UTI  - Palliative consulted, appreciate recs   - ID consult for UTI  - Chronic bruner in place, continue to monitor UOP  - Psych consult  - PT eval  - Med-onc consult --> poor chemotherapy candidate, recommend palliative care involvement if patient fails nonoperative management  - VTE ppx: SQH    A team   [id number]

## 2025-03-25 NOTE — DIETITIAN INITIAL EVALUATION ADULT - DIET TYPE
Continue clear liquid diet as ordered. When medically feasible, advance diet as follows: clear liquid diet --> full liquid diet --> low fiber diet 1. Recommend SLP evaluation to assess for appropriate/safe diet texture for patient  2. Continue clear liquid diet as ordered. When medically feasible, advance diet as follows: clear liquid diet --> full liquid diet --> low fiber diet 1. Recommend SLP evaluation to assess for appropriate/safe diet texture for patient  2. Continue clear liquid diet as ordered. When medically feasible, advance diet as follows: clear liquid diet --> full liquid diet --> low fiber diet  3. If diet unable to be advanced/unable to tolerate PO intake, recommend alternate means of nutrition

## 2025-03-25 NOTE — DIETITIAN INITIAL EVALUATION ADULT - NS FNS REASON FOR WEIGHT CHANG
SBO, intolerance to solid foods/decreased po intake/altered GI function (specify) SBO, intolerance to solid foods, colon cancer/decreased po intake/altered GI function (specify)

## 2025-03-25 NOTE — BH CONSULTATION LIAISON ASSESSMENT NOTE - NSBHATTESTCOMMENTATTENDFT_PSY_A_CORE
Chart reviewed, seen/evaluated with student, agree with above assessment/recs. Patient grossly oriented, well engaged, calm/polite, denies avh, denies si and hi.  Plan as above. Will follow

## 2025-03-25 NOTE — PROGRESS NOTE ADULT - SUBJECTIVE AND OBJECTIVE BOX
Date of Service  : 3/25/2025   Indication for Geriatrics and Palliative Care Services: goals of care    SUBJECTIVE AND OBJECTIVE:  Patient seen and examined at bedside. Patient AAOx3 but a poor historian. Denies any acute symptoms , including pain.   He states he wants to go home as he has bills to pay and other things to care of and wants to follow up with Dr. Delvalle as he had a scheduled appointment.     INTERVAL HPI/OVERNIGHT EVENTS:  No acute overnight events    Allergies    aspirin (Unknown)  penicillin (Unknown)    Intolerances    MEDICATIONS  (STANDING):  atorvastatin 40 milliGRAM(s) Oral at bedtime  dexAMETHasone  Injectable 4 milliGRAM(s) IV Push every 12 hours  finasteride 5 milliGRAM(s) Oral daily  gabapentin 400 milliGRAM(s) Oral at bedtime  heparin   Injectable 5000 Unit(s) SubCutaneous every 8 hours  influenza  Vaccine (HIGH DOSE) 0.5 milliLiter(s) IntraMuscular once  lactated ringers. 1000 milliLiter(s) (75 mL/Hr) IV Continuous <Continuous>  octreotide  Injectable 100 MICROGram(s) SubCutaneous every 8 hours  pantoprazole  Injectable 40 milliGRAM(s) IV Push daily  tamsulosin 0.8 milliGRAM(s) Oral at bedtime    MEDICATIONS  (PRN):  HYDROmorphone  Injectable 0.2 milliGRAM(s) IV Push every 12 hours PRN headache      ITEMS UNCHECKED ARE NOT PRESENT    PRESENT SYMPTOMS: [ ]Unable to self-report due to altered mental status- see [ ] CPOT [ ] PAINADS [ ] RDOS  Source if other than patient:  [ ]Family   [ ]Team     Pain:  [ ]yes [x ]no  QOL impact -   Location -                    Aggravating factors -  Quality -  Radiation -  Timing-  Severity (0-10 scale):  Minimal acceptable level / Pain Goal (0-10 scale):     Dyspnea:                           [ ]Mild [ ]Moderate [ ]Severe  Anxiety:                             [ ]Mild [ ]Moderate [ ]Severe  Agitation:                          [ ]Mild [ ]Moderate [ ]Severe  Fatigue:                             [ ]Mild [ ]Moderate [ ]Severe  Nausea:                             [ ]Mild [ ]Moderate [ ]Severe  Loss of appetite:              [ ]Mild [ ]Moderate [ ]Severe  Constipation:                   [ ]Mild [ ]Moderate [ ]Severe  Diarrhea:                          [ ]Mild [ ]Moderate [ ]Severe    CPOT:    https://www.[email or web address]/getattachment/[email or web address]/Critical-Care-Pain-Observation-Tool-(CPOT)    PCSSQ[Palliative Care Spiritual Screening Question]   Severity (0-10):  Score of 4 or > indicate consideration of Chaplaincy referral.  Chaplaincy Referral: [ ] yes [ ] refused [ ] following [x ] deferred    Caregiver [place]? : [ ] yes [ ] no [ ] Declined [x ] Deferred              Social work referral [ ] Patient & Family Centered Care Referral [ ]     Anticipatory Grief present?:  [ ] yes [ ] no  [ x] Deferred                  Social work referral [ ] Chaplaincy Referral[ ] Caregiver Support Referral [ ]    Other Symptoms:  [ x]All other review of systems negative   [ ] Unable to obtain due to poor mentation     PHYSICAL EXAM:  Vital Signs Last 24 Hrs  T(C): 36.7 (25 Mar 2025 14:01), Max: 36.7 (25 Mar 2025 14:01)  T(F): 98.1 (25 Mar 2025 14:01), Max: 98.1 (25 Mar 2025 14:01)  HR: 76 (25 Mar 2025 14:01) (61 - 76)  BP: 109/54 (25 Mar 2025 14:01) (109/54 - 138/51)  BP(mean): --  RR: 16 (25 Mar 2025 14:01) (16 - 17)  SpO2: 97% (25 Mar 2025 14:01) (96% - 100%)    Parameters below as of 25 Mar 2025 14:01  Patient On (Oxygen Delivery Method): room air         I&O's Summary    24 Mar 2025 07:01  -  25 Mar 2025 07:00  --------------------------------------------------------  IN: 2845 mL / OUT: 1000 mL / NET: 1845 mL    25 Mar 2025 07:01  -  25 Mar 2025 14:49  --------------------------------------------------------  IN: 720 mL / OUT: 110 mL / NET: 610 mL       GENERAL: Poor historian   [x]Alert  [ x]Oriented x3   [ ]Lethargic  [x]Cachexia  [ ]Unarousable  [x]Verbal  [ ]Non-Verbal  [x] No Distress  Behavioral:   [ ] Anxiety  [ ] Delirium [ ] Agitation [x] Calm  [] Other   HEENT:  [x]Normal  [] Temporal Wasting  [ ]Dry mouth   [ ]ET Tube/Trach  [ ]Oral lesions  [ ] Mucositis  PULMONARY: normal respiratory effort, no accessory muscle use   []Clear [ ]Tachypnea  [ ]Audible excessive secretions   [ ]Rhonchi        [ ]Right [ ]Left [ ]Bilateral  [ ]Crackles        [ ]Right [ ]Left [ ]Bilateral  [ ]Wheezing     [ ]Right [ ]Left [ ]Bilateral  [ ]Diminished breath sounds [ ]right [ ]left [ ]bilateral  CARDIOVASCULAR:    [x]Regular [ ]Irregular [ ]Tachy  [ ]Bart [ ]Murmur [ ]Other  GASTROINTESTINAL:  [x]Soft  [ ]Distended   []+BS  [x]Non tender [ ]Tender  [ ]PEG [ ]OGT/ NGT  Last BM:   GENITOURINARY:  [ ]Normal [ ] Incontinent   [ ]Oliguria/Anuria   [x]Gill  MUSCULOSKELETAL:   [ ]Normal   [x ]Weakness  []Bed/Wheelchair bound [ ]Edema  [  ] amputation  [  ] contraction  NEUROLOGIC:   [x]No focal deficits  [ ]Cognitive impairment  [ ]Dysphagia [ ]Dysarthria [ ]Paresis [ ]Other   SKIN: See Nursing Skin Assessment for further details  [ ]Normal    [ ]Rash  [ ]Pressure ulcer(s)       Present on admission [ ]y [ ]n   [  ]  Wound    [  ] hyperpigmentation    CRITICAL CARE:  [ ]Shock Present  [ ]Septic [ ]Cardiogenic [ ]Neurologic [ ]Hypovolemic  [ ]Vasopressors [ ]Inotropes  [ ]Respiratory failure present [ ]Mechanical Ventilation [ ]Non-invasive ventilatory support [ ]High-Flow   [ ]Acute  [ ]Chronic [ ]Hypoxic  [ ]Hypercarbic [ ]Other  [ ]Other organ failure     LABS:  reviewed                         13.0   7.28  )-----------( 258      ( 25 Mar 2025 05:45 )             38.3   03-25    137  |  98  |  15  ----------------------------<  131[H]  4.0   |  27  |  0.77    Ca    8.7      25 Mar 2025 05:45  Phos  3.2     03-25  Mg     2.00     03-25      Urinalysis Basic - ( 25 Mar 2025 05:45 )    Color: x / Appearance: x / SG: x / pH: x  Gluc: 131 mg/dL / Ketone: x  / Bili: x / Urobili: x   Blood: x / Protein: x / Nitrite: x   Leuk Esterase: x / RBC: x / WBC x   Sq Epi: x / Non Sq Epi: x / Bacteria: x      CAPILLARY BLOOD GLUCOSE              RADIOLOGY & ADDITIONAL STUDIES: reviewed     Protein Calorie Malnutrition Present: [ ]mild [ ]moderate [x ]severe [ ]underweight [ ]morbid obesity  https://www.andeal.org/vault/2440/web/files/ONC/Table_Clinical%20Characteristics%20to%20Document%20Malnutrition-White%20JV%20et%20al%202012.pdf    Height (cm): 172.7 (03-22-25 @ 10:19)  Weight (kg): 59 (03-21-25 @ 17:07), 61.19 (03-01-25 @ 15:28), 61.2 (11-27-24 @ 04:28)  BMI (kg/m2): 19.8 (03-22-25 @ 10:19)    [ ]PPSV2 < or = 30%  [ ]significant weight loss [ ]poor nutritional intake [ ]anasarca   [ ]Artificial Nutrition    REFERRALS:   [ ]Chaplaincy  [ ]Hospice  [ ]Child Life  [ ]Social Work  [x ]Case management [ ]Holistic Therapy

## 2025-03-25 NOTE — DIETITIAN INITIAL EVALUATION ADULT - LITERATURE/VIDEOS GIVEN
Patient educated on use of oral nutrition supplements to optimize intake, patient declined at this time.

## 2025-03-25 NOTE — PROGRESS NOTE ADULT - PROBLEM SELECTOR PLAN 5
- 3/25 - Counseled/educated patient regarding role of HCP. He states his daughter has "special needs" and would not be able to help with decisions. Encouraged him on importance of HCP appointment. Patient states he makes his own decisions and declines to appoint a HCP.   Attempted to introduce advanced directives for code status preferences in event of cardiopulmonary arrest. However, patient did not wish to discuss as he states most important to him at this time is for him to be discharged as soon as possible and to follow up with Dr. Delvalle outpatient.

## 2025-03-25 NOTE — DIETITIAN INITIAL EVALUATION ADULT - SIGNS/SYMPTOMS
insignificant objective weight loss of 3.7% in one month  severe muscle & fat depletion, <50% of estimated energy requirements for>5 days

## 2025-03-25 NOTE — DIETITIAN INITIAL EVALUATION ADULT - REASON FOR ADMISSION
Abdominal pain  Per chart, patient is a 89 year old male with hx BPH (chronic indwelling Gill), hyperlipidemia, B/L inguinal hernia repair with known left recurrence, colon cancer (T4N1 w/ two synchronous lesions) s/p extended right hemicolectomy w/ ileocolic anastomosis on 12/23 (not on chemo), recent presentation 2/24 for acute diastolic heart failure, presenting with abdominal pain, found to have high grade SBO w/ RUQ TP at a spiculated soft tissue lesion concerning for peritoneal carcinomatosis.

## 2025-03-25 NOTE — BH CONSULTATION LIAISON ASSESSMENT NOTE - NSBHCONSULTFOLLOWAFTERCARE_PSY_A_CORE FT
Follow up with PCP, can present to [facility] as needed.  [facility] Outpatient services  For acute crisis: [facility]  [phone] [place] [address], [address] Floor, [place] [address]  [phone]  [email or web address]    [facility] [phone]

## 2025-03-25 NOTE — BH CONSULTATION LIAISON ASSESSMENT NOTE - SUMMARY
89 y/o M no PPhx, PMHx BPH, HLD, metastatic colon cancer, presented to ED w/ abdominal pain. 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 Received PRN Zyprexa  2.5 mg IM x2 on 3/24 5am, 3pm.   Patient seen at bedside A&Ox3, with fair insight, poor recent memory, as he does not recall being agitated and requiring PRNs. Calm, pleasant, cooperative, attentive, endorsing a good mood. No overt mood symptoms, psychosis, SI/HI, VH/AH, no rigidity/cogwheeling on exam.     PLAN:  - Obs: Defer to primary team  - Avoid anticholinergic agents (including zyprexa) I/s/o SBO  - PRNs: Haldol 0.5 PO/IM/IV Q6H PRN for agitation, if refractory can give subsequent 0.5mg dose if qtc <500  - Attempt verbal redirection first if agitated    - Hold antipsychotics if QTc > 500   - Dispo: TBD pending clinical course  89 y/o M no PPhx, PMHx BPH, HLD, metastatic colon cancer, presented to ED w/ abdominal pain. 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 Received PRN Zyprexa  2.5 mg IM x2 on 3/24 5am, 3pm.   Patient seen at bedside A&Ox3, with fair insight, poor recent memory, as he does not recall being agitated and requiring PRNs. Calm, pleasant, cooperative, attentive, perseverating, endorsing a good mood. No overt mood symptoms, psychosis, SI/HI, VH/AH, no rigidity/cogwheeling on exam.     PLAN:  - Obs: Defer to primary team  - Avoid anticholinergic agents (including zyprexa) I/s/o SBO  - PRNs: Haldol 0.5 PO/IM/IV Q6H PRN for agitation, if refractory can give subsequent 0.5mg dose if qtc <500  - Attempt verbal redirection first if agitated    - Hold antipsychotics if QTc > 500   - Dispo: TBD pending clinical course  89 y/o M no PPhx, PMHx BPH, HLD, metastatic colon cancer, presented to ED w/ abdominal pain. 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 Received PRN Zyprexa  2.5 mg IM x2 on 3/24 5am, 3pm.   Patient seen at bedside A&Ox3, with fair insight, poor recent memory, as he does not recall being agitated and requiring PRNs. Calm, pleasant, cooperative, attentive, perseverating, endorsing a good mood. No overt mood symptoms, psychosis, SI/HI, VH/AH, no rigidity/cogwheeling on exam.     PLAN:  - Obs: Defer to primary team  - Avoid anticholinergic agents (including zyprexa) I/s/o SBO  - PRNs: Haldol 0.5 PO/IM/IV Q6H PRN for agitation, if refractory can give subsequent 0.5mg dose if qtc <500  - Attempt verbal redirection first if agitated    - Hold antipsychotics if QTc > 500   - Dispo: TBD pending clinical course, no psychiatric contraindications to discharge.  Patient is a 89M hx BPH,  HLD, B/L inguinal hernia repair with known left recurrence, colon cancer (T4N1 w/ two synchronous lesions) s/p extended right hemicolectomy w/ ileocolic anastomosis on 12/23 (not on chemo), presenting with abdominal pain, found to have high grade SBO w/ RUQ TP at a spiculated soft tissue lesion concerning for peritoneal carcinomatosis,  Received PRN Zyprexa 2.5 mg x2. Psychiatry consulted for agitation.     Patient seen at bedside A&Ox3, with some insight into his medical condition, he does not recall being agitated and requiring PRNs. Calm, pleasant, cooperative,  perseverating, endorsing a good mood. No overt mood symptoms, psychosis, denies SI/HI, denies VH/AH, no rigidity/cogwheeling on exam.     PLAN:  - Obs: Defer to primary team, though given recent agitation, have low threshold to start CO 1:1  - Avoid medications with anticholinergic side effects I/s/o SBO  - PRNs: Haldol 0.5 PO/IM/IV Q6H PRN for agitation, if refractory can give additional 0.5mg dose   - Attempt verbal redirection first if agitated    - Hold antipsychotics if QTc > 500   - Dispo: TBD pending clinical course, no psychiatric contraindications to discharge.  Patient is a 89M hx BPH,  HLD, B/L inguinal hernia repair with known left recurrence, colon cancer (T4N1 w/ two synchronous lesions) s/p extended right hemicolectomy w/ ileocolic anastomosis on 12/23 (not on chemo), presenting with abdominal pain, found to have high grade SBO w/ RUQ TP at a spiculated soft tissue lesion concerning for peritoneal carcinomatosis,  Received PRN Zyprexa 2.5 mg x2. Psychiatry consulted for agitation.     Patient seen at bedside A&Ox3, with some insight into his medical condition, he does not recall being agitated and requiring PRNs. Calm, pleasant, cooperative,  perseverating, endorsing a good mood. No overt mood symptoms, psychosis, denies SI/HI, denies VH/AH, no rigidity/cogwheeling on exam.     PLAN:  - Obs: Defer to primary team, though given recent agitation, have low threshold to start CO 1:1  - Avoid medications with anticholinergic side effects I/s/o SBO  - PRNs: Haldol 0.5 mg PO/IM/IV Q6H PRN for agitation, if refractory can give additional 0.5mg dose   - Attempt verbal redirection first if agitated    - Hold antipsychotics if QTc > 500   - Dispo: TBD pending clinical course, no psychiatric contraindications to discharge.

## 2025-03-25 NOTE — DIETITIAN INITIAL EVALUATION ADULT - ORAL INTAKE PTA/DIET HISTORY
Patient is known to [facility] Clinical Nutrition services. Seen by nutrition during admission on 2/10/2024. Course notable for severe protein-calorie malnutrition. Visited patient at bedside today, interview limited due to patient with altered mentation, ~AAOx2 at time of visit, poor historian. Reported no known food allergies, no difficulties chewing and/or swallowing. Reported good appetite prior to admission, would eat 3 meals per day often consisting of toast and eggs for breakfast, sandwich for lunch, and meatloaf, steak or pork chops for dinner. Reported he does grocery shopping and cooking at home. Denied use of vitamins and/or supplements at home. Per H&P, patient reported he has not been able to tolerate solid foods for a few weeks, mostly vomiting food but has been able to tolerate fluids. Patient also reported he possibly had not had a BM for 12 days PTA.

## 2025-03-25 NOTE — BH CONSULTATION LIAISON ASSESSMENT NOTE - NSBHMSEBEHAV_PSY_A_CORE
Discharge instructions reviewed; Follow-up care reviewed; Patient verbalized understanding; Caregiver/Responsible party verbalized understanding. Patient taken to lobby in wheelchair by this RN. Patient being driven home by his wife.    Cooperative

## 2025-03-25 NOTE — PROGRESS NOTE ADULT - CONVERSATION DETAILS
Patient AAOx3. Patient denies being confused but is a poor historian.     Counseled/educated patient regarding role of HCP. He states his daughter has "special needs" and would not be able to help with decisions. Encouraged him on importance of HCP appointment. Patient states he makes his own decisions and declines to appoint a HCP.     Patient states that he does not why he is currently hospitalized and why he needs to stay in the hospital. He stated that he wished to be discharged as soon as possible as he does not believe he needs to be hospitalized.   Attempted to assess his understanding of his medical conditions; he denied having any cardiac, pulmonary history. He became upset when asked if he had history of cancer as he states he does not. He then proceeded again to state that he needed to be discharged as he stated he needed to follow up with Dr. Delvalle. Attempted to assess if he understood Dr. Delvalle's specialty and indication for seeing Dr. Delvalle to which patient stated he had something on his prostate? possibly cancer? but states he was told there was no surgery being offered? and as a result he states he was scheduled to see Dr. Delvalle to speak further about it.  Attempted to share with him that oncology had seen him inpatient a few days ago and had stated he is not a candidate for IV chemotherapy. Patient does not recall this encounter and again stated he needed to be discharged soon to speak further with Dr. Delvalle.     Attempted to introduce advanced directives for code status preferences in event of cardiopulmonary arrest. However, patient did not wish to discuss as he states most important to him at this time is for him to be discharged as soon as possible and to follow up with Dr. Delvalle outpatient. Patient AAOx3. Patient denies being confused but is a poor historian.     Counseled/educated patient regarding role of HCP. He states his daughter has "special needs" and would not be able to help with decisions. Encouraged him on importance of HCP appointment. Patient states he makes his own decisions and declines to appoint a HCP.     Patient states that he does not know why he is currently hospitalized and why he needs to stay in the hospital. He stated that he wished to be discharged as soon as possible as he does not believe he needs to be hospitalized.   Attempted to assess his understanding of his medical conditions; he denied having any cardiac, pulmonary history. He became upset when asked if he had history of cancer as he states he does not. He then proceeded again to state that he needed to be discharged as he stated he needed to follow up with Dr. Delvalle. Attempted to assess if he understood Dr. Delvalle's specialty and indication for seeing Dr. Delvalle to which patient stated he had something on his prostate? possibly cancer? but states he was told there was no surgery being offered? and as a result he states he was scheduled to see Dr. Delvalle to speak further about it.  Attempted to share with him that oncology had seen him inpatient a few days ago and had stated he is not a candidate for IV chemotherapy. Patient does not recall this encounter and again stated he needed to be discharged soon to speak further with Dr. Delvalle.     Attempted to introduce advanced directives for code status preferences in event of cardiopulmonary arrest. However, patient did not wish to discuss as he states most important to him at this time is for him to be discharged as soon as possible and to follow up with Dr. Delvalle outpatient.

## 2025-03-25 NOTE — DIETITIAN INITIAL EVALUATION ADULT - ETIOLOGY
SBO, solid food intolerance acute illness acute illness (SBO, solid food intolerance) acute illness (SBO) increased demand due to colon cancer

## 2025-03-25 NOTE — DIETITIAN INITIAL EVALUATION ADULT - NSFNSNUTRCHEWSWALLOWFT_GEN_A_CORE
Unclear if patient with chewing/swallowing difficulty as conflicting reports noted.  Unclear if patient with chewing/swallowing difficulty due to altered mentation.

## 2025-03-25 NOTE — DIETITIAN INITIAL EVALUATION ADULT - ORAL NUTRITION SUPPLEMENTS
Recommend Ensure Clear 3x/daily while on clear liquid diet (provides 240 kcal, 8 gm protein per 8oz serving), when advanced to full liquid diet, low fiber, or regular diet, recommend Ensure Plus High Protein 2-3x/daily based on patient's solid food intake (RD to follow), (provides 350 kcal, 20 gm protein per 8 oz serving)  Recommend Ensure Clear 3x/daily while on clear liquid diet (provides 240 kcal, 8 gm protein per 8oz serving), when advanced to full liquid diet, low fiber, or regular diet, recommend Ensure Plus High Protein 2-3x/daily based on patient's solid food intake (RD to follow), (provides 350 kcal, 20 gm protein per 8 oz serving) if within SLP recommendations

## 2025-03-25 NOTE — BH CONSULTATION LIAISON ASSESSMENT NOTE - HPI (INCLUDE ILLNESS QUALITY, SEVERITY, DURATION, TIMING, CONTEXT, MODIFYING FACTORS, ASSOCIATED SIGNS AND SYMPTOMS)
89 y/o M retired, , domiciled alone, no PPhx, PMHx BPH, HLD, metastatic colon cancer, presented to ED w/ abdominal pain. 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 Received PRN Zyprexa  2.5 mg IM x2 on 3/24 5am, 3pm. Patient requested to not contact daughter at this time.   Patient seen at bedside A&Ox3, with some insight into situation, however did not want to discuss current diagnosis. He does not recall being agitated and requiring PRNs on this hospitalization. Able to complete ADLs on his own at home, goes for walks, visits his daughter, perseverating on desire to go home and get back to his routine outside of the hospital. Patient calm, pleasant, cooperative, able to say days of the week backwards. Endorses a good mood however bored, and feeling safe in the hospital. Denies suicidal/homicidal ideation, visual/auditory hallucinations, overt mood symptoms.  Patient is a 89M hx BPH,  HLD, B/L inguinal hernia repair with known left recurrence, colon cancer (T4N1 w/ two synchronous lesions) s/p extended right hemicolectomy w/ ileocolic anastomosis on 12/23 (not on chemo), presenting with abdominal pain, found to have high grade SBO w/ RUQ TP at a spiculated soft tissue lesion concerning for peritoneal carcinomatosis,  Received PRN Zyprexa 2.5 mg x2. Psychiatry consulted for agitation.    Patient seen at bedside A&Ox3, with some insight into situation, however did not want to discuss current acute medical issues. He does not recall being agitated and requiring PRNs on this hospitalization. States that he is able to function on his own at home, goes for walks, visits his daughter, perseverating on desire to go home and get back to his routine outside of the hospital. Patient calm, pleasant, cooperative, able to say days of the week backwards. Endorses a good mood however bored, and feeling safe in the hospital. Denies suicidal/homicidal ideation, visual/auditory hallucinations, overt mood symptoms.     Patient requested to not contact daughter at this time.

## 2025-03-25 NOTE — DIETITIAN INITIAL EVALUATION ADULT - PERTINENT LABORATORY DATA
03-25 @ 05:45: Na 137, BUN 15, Cr 0.77, [H], K+ 4.0, Phos 3.2, Mg 2.00, Alk Phos --, ALT/SGPT --, AST/SGOT --, HbA1c --

## 2025-03-25 NOTE — PROGRESS NOTE ADULT - PROBLEM SELECTOR PLAN 2
- CT findings - High-grade small bowel obstruction to the level of a single transition  point in the right upper quadrant at which there is a similar appearing spiculated soft tissue lesion concerning for peritoneal carcinomatosis.   - s/p NG  - on malignant SBO protocol   - management as per primary team

## 2025-03-25 NOTE — PROVIDER CONTACT NOTE (OTHER) - ASSESSMENT
Pt is A&Ox3, confused at times but easily redirectable. Pt is currently resting in chair comfortably with no signs of acute distress.

## 2025-03-25 NOTE — PROGRESS NOTE ADULT - SUBJECTIVE AND OBJECTIVE BOX
TEAM [ A ] Surgery Daily Progress Note  =====================================================  INTERVAL:  - Zyprexa 2.5mg x2 yesterday for agitation, aggressive behavior  - Overnight was sedated, seen at bedside and improved   - Palliative following  - Started vancomycin yesterday for UCx growing >100k E faecium i/s/o AMS    SUBJECTIVE: Patient seen and examined at bedside on AM rounds. Patient with improved mental status and affect this morning. Reports flatus, no bowel movements. Tolerating clear liquids without n/v.     ALLERGIES:  aspirin (Unknown)  penicillin (Unknown)      --------------------------------------------------------------------------------------    MEDICATIONS:  atorvastatin 40 milliGRAM(s) Oral at bedtime  dexAMETHasone  Injectable 4 milliGRAM(s) IV Push every 12 hours  finasteride 5 milliGRAM(s) Oral daily  gabapentin 400 milliGRAM(s) Oral at bedtime  heparin   Injectable 5000 Unit(s) SubCutaneous every 8 hours  HYDROmorphone  Injectable 0.2 milliGRAM(s) IV Push every 12 hours PRN  influenza  Vaccine (HIGH DOSE) 0.5 milliLiter(s) IntraMuscular once  lactated ringers. 1000 milliLiter(s) IV Continuous <Continuous>  octreotide  Injectable 100 MICROGram(s) SubCutaneous every 8 hours  pantoprazole  Injectable 40 milliGRAM(s) IV Push daily  tamsulosin 0.8 milliGRAM(s) Oral at bedtime  vancomycin  IVPB 1000 milliGRAM(s) IV Intermittent every 24 hours  vancomycin  IVPB        --------------------------------------------------------------------------------------    VITAL SIGNS:  T(C): 36.6 (03-25-25 @ 08:12), Max: 36.6 (03-24-25 @ 13:40)  HR: 67 (03-25-25 @ 08:12) (61 - 89)  BP: 138/51 (03-25-25 @ 08:12) (114/76 - 138/51)  RR: 16 (03-25-25 @ 08:12) (16 - 17)  SpO2: 96% (03-25-25 @ 08:12) (96% - 100%)  --------------------------------------------------------------------------------------    INS AND OUTS:    03-24-25 @ 07:01  -  03-25-25 @ 07:00  --------------------------------------------------------  IN: 2845 mL / OUT: 1000 mL / NET: 1845 mL    03-25-25 @ 07:01  -  03-25-25 @ 11:29  --------------------------------------------------------  IN: 240 mL / OUT: 80 mL / NET: 160 mL      --------------------------------------------------------------------------------------      EXAM    General: not acutely distressed, improved mentation from prior. Conversing appropriately with mild confusion.   Neurological: AO X4  Respiratory: nonlabored respirations on room air  Abdominal: soft, nontender, nondistended, no rebound  Extremities: warm, no significant peripheral edema    --------------------------------------------------------------------------------------    LABS

## 2025-03-25 NOTE — PROGRESS NOTE ADULT - ASSESSMENT
89M hx BPH (chronic indwelling Gill), HLD, B/L inguinal hernia repair with known left recurrence, colon cancer (T4N1 w/ two synchronous lesions) s/p extended right hemicolectomy w/ ileocolic anastomosis on 12/23 (not on chemo), recent presentation 2/24 for acute diastolic heart failure, presenting with abdominal pain, found to have high grade SBO w/ RUQ TP at a spiculated soft tissue lesion concerning for peritoneal carcinomatosis  Palliative Care consulted for complex decision making  related to goals of care discussions

## 2025-03-25 NOTE — DIETITIAN INITIAL EVALUATION ADULT - PHYSCIAL ASSESSMENT
patient declined nutrition focused physical examp, however, patient noted with overt severe depletions per visual exam

## 2025-03-25 NOTE — CONSULT NOTE ADULT - SUBJECTIVE AND OBJECTIVE BOX
"HPI:  89M hx BPH (chronic indwelling Bruner), HLD, B/L inguinal hernia repair with known left recurrence, colon cancer (T4N1 w/ two synchronous lesions) s/p extended right hemicolectomy w/ ileocolic anastomosis on 12/23 (not on chemo), recent presentation 2/24 for acute diastolic heart failure, presenting with abdominal pain. Patient recently presented to ED on 3/1 for AP and was found to have a new right midabdominal lesion cf peritoneal carcinomatosis. Pt was scheduled to follow up with heme/onc outpatient today to determine a plan for his new peritoneal carcinomatosis (Dr. Ramirez), but instead presented to ED due to his pain. He is reporting several weeks of intermittent lower midline AP rated up to 8/10 in intensity. He also states he feels a lot of intestinal movement and his abdomen makes "noises like aliens in my intestines." He has not been tolerating PO solids well, mostly vomiting his food, "for a few weeks," however, he has been able to hold down fluids. He reports passing gas on the morning of 3/21, but hasn't had a bowel movement in possibly 12d. He is unsure if he has had unintended weight loss of late, but supposes he has. He denies hematemesis, hematochezia, fevers, or chills. He also reports dizziness, headache, and back and shoulder pain.    In ED, vss. WBC 7.64. Cr. 1.69. Vlac 1.0. CTAP IV cf high grade SBO w/ RUQ TP at a spiculated soft tissue lesion, stable from prior, which is concerning for peritoneal carcinomatosis. No evidence of pneumatosis or portal venous gas.   (22 Mar 2025 01:11)"    Above reviewed. 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, bilateral hernia repair, colon CA, prior hemicolectomy, heart failure presenting with abd pain  No fevers, no chills  Patient with abd pain, limited BM  Longstanding bruner without any symptoms UTI  No bladder pain  No other new complaints  ID called for further eval    PAST MEDICAL & SURGICAL HISTORY:  BPH (benign prostatic hyperplasia)      Peripheral neuropathy      Heart failure, diastolic, chronic      Colon cancer      Bilateral inguinal hernia      Recurrent inguinal hernia      GERD (gastroesophageal reflux disease)      H/O hemicolectomy      H/O bilateral inguinal hernia repair    Allergies    aspirin (Unknown)  penicillin (Unknown)    Intolerances    ANTIMICROBIALS:  vancomycin  IVPB    vancomycin  IVPB 1000 every 24 hours    OTHER MEDS:  atorvastatin 40 milliGRAM(s) Oral at bedtime  dexAMETHasone  Injectable 4 milliGRAM(s) IV Push every 12 hours  finasteride 5 milliGRAM(s) Oral daily  gabapentin 400 milliGRAM(s) Oral at bedtime  heparin   Injectable 5000 Unit(s) SubCutaneous every 8 hours  HYDROmorphone  Injectable 0.2 milliGRAM(s) IV Push every 12 hours PRN  influenza  Vaccine (HIGH DOSE) 0.5 milliLiter(s) IntraMuscular once  lactated ringers. 1000 milliLiter(s) IV Continuous <Continuous>  octreotide  Injectable 100 MICROGram(s) SubCutaneous every 8 hours  pantoprazole  Injectable 40 milliGRAM(s) IV Push daily  tamsulosin 0.8 milliGRAM(s) Oral at bedtime    SOCIAL HISTORY: No tobacco, no alcohol, no illicit drugs    FAMILY HISTORY: No pertinent family history    Drug Dosing Weight  Height (cm): 172.7 (22 Mar 2025 10:19)  Weight (kg): 59 (21 Mar 2025 17:07)  BMI (kg/m2): 19.8 (22 Mar 2025 10:19)  BSA (m2): 1.7 (22 Mar 2025 10:19)    PE:    Vital Signs Last 24 Hrs  T(C): 36.6 (25 Mar 2025 08:12), Max: 36.6 (24 Mar 2025 13:40)  T(F): 97.8 (25 Mar 2025 08:12), Max: 97.9 (24 Mar 2025 13:40)  HR: 67 (25 Mar 2025 08:12) (61 - 89)  BP: 138/51 (25 Mar 2025 08:12) (114/76 - 138/51)  RR: 16 (25 Mar 2025 08:12) (16 - 17)  SpO2: 96% (25 Mar 2025 08:12) (96% - 100%)    Gen: AOx3, NAD, non-toxic, pleasant  CV: S1+S2 normal, nontachycardic  Resp: Clear bilat, no resp distress, no crackles/wheezes  Abd: Soft, nontender, +BS  Ext: No LE edema, no wounds  : No Bruner  IV/Skin: No thrombophlebitis  Msk: No low back pain, no arthralgias, no joint swelling  Neuro: No sensory deficits, no motor deficits    LABS:                        13.0   7.28  )-----------( 258      ( 25 Mar 2025 05:45 )             38.3     03-25    137  |  98  |  15  ----------------------------<  131[H]  4.0   |  27  |  0.77    Ca    8.7      25 Mar 2025 05:45  Phos  3.2     03-25  Mg     2.00     03-25    Urinalysis Basic - ( 25 Mar 2025 05:45 )    Color: x / Appearance: x / SG: x / pH: x  Gluc: 131 mg/dL / Ketone: x  / Bili: x / Urobili: x   Blood: x / Protein: x / Nitrite: x   Leuk Esterase: x / RBC: x / WBC x   Sq Epi: x / Non Sq Epi: x / Bacteria: x    MICROBIOLOGY:    Catheterized  03-21-25   >100,000 CFU/ml Enterococcus faecalis  --  Enterococcus faecalis    Catheterized  03-01-25   >100,000 CFU/ml Enterococcus faecalis  10,000 - 49,000 CFU/mL Serratia marcescens  --  Enterococcus faecalis  Serratia marcescens    RADIOLOGY:    3/21    IMPRESSION:    High-grade small bowel obstruction to the level of a single transition   point in the right upper quadrant at which there is a similar appearing   spiculated soft tissue lesion concerning for peritoneal carcinomatosis.    Incompletely decompressed bladder with Bruner catheter, correlate with   catheter function.

## 2025-03-25 NOTE — BH CONSULTATION LIAISON ASSESSMENT NOTE - CURRENT MEDICATION
MEDICATIONS  (STANDING):  atorvastatin 40 milliGRAM(s) Oral at bedtime  dexAMETHasone  Injectable 4 milliGRAM(s) IV Push every 12 hours  finasteride 5 milliGRAM(s) Oral daily  gabapentin 400 milliGRAM(s) Oral at bedtime  heparin   Injectable 5000 Unit(s) SubCutaneous every 8 hours  influenza  Vaccine (HIGH DOSE) 0.5 milliLiter(s) IntraMuscular once  lactated ringers. 1000 milliLiter(s) (75 mL/Hr) IV Continuous <Continuous>  octreotide  Injectable 100 MICROGram(s) SubCutaneous every 8 hours  pantoprazole  Injectable 40 milliGRAM(s) IV Push daily  tamsulosin 0.8 milliGRAM(s) Oral at bedtime    MEDICATIONS  (PRN):  HYDROmorphone  Injectable 0.2 milliGRAM(s) IV Push every 12 hours PRN headache

## 2025-03-25 NOTE — CONSULT NOTE ADULT - ASSESSMENT
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, bilateral hernia repair, colon CA, prior hemicolectomy, heart failure presenting with abd pain  No fevers, no leukocytosis  Abd pain, found to have SBO  Chronic bruner  Extensive malignancy history  UA-/contam equivocal, UCX Enterococcus  No symptoms UTI  Low suspicion UTI  Overall, Asymptomatic bacteriuria, positive UCX  - DC Vanco, monitor off antibiotics as long as no new signs infection  - Monitor for any signs/symptoms infection/UTI  - Care for SBO per surgery team  - F/U pending cultures    Abner Almgauer MD  Contact on TEAMS messaging from 9am - 5pm  From 5pm-9am, on weekends, or if no response call [phone]    Antibiotic decision making based on local antibiogram and available recent culture results

## 2025-03-25 NOTE — DIETITIAN INITIAL EVALUATION ADULT - OTHER INFO
In house, patient was admitted with NPO status and advanced to clear liquid diet on 3/24. Consuming 0-50% of clear liquid diet. No difficulties swallowing fluids reported. Patient denied nausea, vomiting, constipation, diarrhea. Reported his las BM was 3 days ago, inconsistent with documentation in flowsheets which does not include any BM in house. NGT was placed on 3/22 with minimal output, removed on 3/23. Per PCA at bedside patient without a BM today, reported he has been able to walk around but unsure if patient is passing flatus.  In house, patient was admitted with NPO status and advanced to clear liquid diet on 3/24. Consuming 0-50% of clear liquid diet. No difficulties swallowing fluids reported. Patient denied nausea, vomiting, chewing or swallowing difficulties. Tolerating clear liquid diet well. Confirmed with PCA. Reported his las BM was 3 days ago, inconsistent with documentation in flowsheets which does not include any BM in house. NGT was placed on 3/22 with minimal output, removed on 3/23. Per PCA at bedside patient without a BM today, reported he has been able to walk around but unsure if patient is passing flatus.  In house, patient was admitted with NPO status and advanced to clear liquid diet on 3/24. Consuming 0-50% of clear liquid diet. Tolerating clear liquid diet well.No difficulties swallowing fluids reported. Confirmed with PCA. Patient denied nausea, vomiting, chewing or swallowing difficulties. NGT was placed on 3/22 with minimal output, removed on 3/23. Per PCA at bedside patient without a BM today, reported he has been able to walk around but unsure if patient is passing flatus.

## 2025-03-25 NOTE — DIETITIAN INITIAL EVALUATION ADULT - NS FNS WEIGHT CHANGE REASON
unintentional patient unable to reported usual body weight, weights listed are from [facility]/unintentional

## 2025-03-25 NOTE — DIETITIAN INITIAL EVALUATION ADULT - ADD RECOMMEND
1. Please document % PO intake in nursing flowsheet.   2. Monitor weights, labs, BM's, skin integrity, p.o. intake, signs and symptoms of dysphagia.   3. Voiced concerns of nutrition optimization to social work, appreciate recs.  1. Please document % PO intake in nursing flowsheet.   2. Monitor weights, labs, BM's, skin integrity, p.o. intake, signs and symptoms of dysphagia.   3. Voiced concerns to social work and  regarding discharge placement

## 2025-03-25 NOTE — PHYSICAL THERAPY INITIAL EVALUATION ADULT - PERTINENT HX OF CURRENT PROBLEM, REHAB EVAL
89M w/ PMH BPH (indwelling Gill), HLD, B/L inguinal hernia repair (L recurrence), colon cancer (T4N1) s/p extended right hemicolectomy (12/23/24) admitted for high-grade SBO 2/2 suspected peritoneal carcinomatosis. 89 y/o Male w/ PMHx BPH (indwelling Gill), HLD, B/L inguinal hernia repair , colon cancer (T4N1) s/p extended right hemicolectomy (12/23/24) admitted for high-grade SBO 2/2 suspected peritoneal carcinomatosis.

## 2025-03-25 NOTE — BH CONSULTATION LIAISON ASSESSMENT NOTE - RISK ASSESSMENT
risk: male, acute/chronic medical conditions  protective: Relationship with daughter, hobbies he looks forward to risk: male, acute/chronic medical conditions  protective: Relationship with daughter, hobbies he looks forward to, denies si and hi, future oriented

## 2025-03-25 NOTE — PROGRESS NOTE ADULT - PROBLEM SELECTOR PLAN 3
- Patient AAOx3 but a poor historian. Mental status better today compared to yesterday   - Behavioral Health recommendations appreciated   - Frequent reassurance and verbal orientation.  - Delusions and hallucinations should be neither endorsed nor challenged.   - Physical restraints should be avoided. Alternatives to restraint use, such as constant observation.  - PT eval and early ambulation if possible.  - Update the calendar date in the room.    - Monitor for constipation, urinary retention, pain, hunger, thirst, etc.    - Promote sleep wake cycle and reorientation as indicated. (0) indicator not present

## 2025-03-25 NOTE — DIETITIAN INITIAL EVALUATION ADULT - PERTINENT MEDS FT
MEDICATIONS  (STANDING):  atorvastatin 40 milliGRAM(s) Oral at bedtime  dexAMETHasone  Injectable 4 milliGRAM(s) IV Push every 12 hours  finasteride 5 milliGRAM(s) Oral daily  lactated ringers. 1000 milliLiter(s) (75 mL/Hr) IV Continuous <Continuous>  octreotide  Injectable 100 MICROGram(s) SubCutaneous every 8 hours  pantoprazole  Injectable 40 milliGRAM(s) IV Push daily  vancomycin  IVPB 1000 milliGRAM(s) IV Intermittent every 24 hours  vancomycin  IVPB

## 2025-03-26 NOTE — PROGRESS NOTE ADULT - ASSESSMENT
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 No other symptoms to suggest infection  Overall, Asymptomatic bacteriuria, positive UCX  - Would continue monitor off vanco despite WBC (patient appears clinically stable with no new symptoms--conversely, if has clinical worsening would anticipate a bowel process > UTI source; low threshold to restart antibiotic if clinical worsening)  - Monitor for any signs/symptoms infection/UTI  - Care for SBO per surgery team  - F/U pending cultures  - Trend WBC to normal    Abner Almaguer MD  Contact on TEAMS messaging from 9am - 5pm  From 5pm-9am, on weekends, or if no response call [phone]    Antibiotic decision making based on local antibiogram and available recent culture results

## 2025-03-26 NOTE — PROGRESS NOTE ADULT - SUBJECTIVE AND OBJECTIVE BOX
SURGERY      INTERVAL EVENTS/SUBJECTIVE:   Episode of spitting up yesterday. Unclear if bilious. Made NPO overnight  States he hasn't passed flatus recently. Denies nausea  ______________________________________________  OBJECTIVE:   T(C): 36.4 (03-26-25 @ 09:29), Max: 36.8 (03-26-25 @ 04:42)  HR: 73 (03-26-25 @ 09:29) (50 - 95)  BP: 147/71 (03-26-25 @ 09:29) (97/71 - 149/61)  RR: 16 (03-26-25 @ 09:29) (16 - 17)  SpO2: 99% (03-26-25 @ 09:29) (97% - 100%)  Wt(kg): --  CAPILLARY BLOOD GLUCOSE        I&O's Detail    25 Mar 2025 07:01  -  26 Mar 2025 07:00  --------------------------------------------------------  IN:    dextrose 5% + sodium chloride 0.45% w/ Additives: 100 mL    Lactated Ringers: 1425 mL    Oral Fluid: 1060 mL  Total IN: 2585 mL    OUT:    Indwelling Catheter - Urethral (mL): 1035 mL    IV PiggyBack: 0 mL  Total OUT: 1035 mL    Total NET: 1550 mL          Physical exam:  Gen: NAD  Abdomen: Soft, mildly distended, nontender  ________________________________  LABS:  CBC Full  -  ( 26 Mar 2025 04:36 )  WBC Count : 15.15 K/uL  RBC Count : 4.98 M/uL  Hemoglobin : 15.1 g/dL  Hematocrit : 44.9 %  Platelet Count - Automated : 225 K/uL  Mean Cell Volume : 90.2 fL  Mean Cell Hemoglobin : 30.3 pg  Mean Cell Hemoglobin Concentration : 33.6 g/dL  Auto Neutrophil # : x  Auto Lymphocyte # : x  Auto Monocyte # : x  Auto Eosinophil # : x  Auto Basophil # : x  Auto Neutrophil % : x  Auto Lymphocyte % : x  Auto Monocyte % : x  Auto Eosinophil % : x  Auto Basophil % : x    03-26    135  |  99  |  15  ----------------------------<  155[H]  4.5   |  23  |  0.76    Ca    9.0      26 Mar 2025 04:36  Phos  2.2     03-26  Mg     1.90     03-26      _____________________________________________  RADIOLOGY:

## 2025-03-26 NOTE — PROGRESS NOTE ADULT - SUBJECTIVE AND OBJECTIVE BOX
CC: F/U for Leukocytosis    Saw/spoke to patient. Unchanged. No abd pain. Still complains of constipation. No pain at bruner.    Allergies  aspirin (Unknown)  penicillin (Unknown)    ANTIMICROBIALS:      PE:    Vital Signs Last 24 Hrs  T(C): 36.3 (26 Mar 2025 13:36), Max: 36.8 (26 Mar 2025 04:42)  T(F): 97.3 (26 Mar 2025 13:36), Max: 98.2 (26 Mar 2025 04:42)  HR: 56 (26 Mar 2025 13:36) (50 - 95)  BP: 141/50 (26 Mar 2025 13:36) (97/71 - 149/61)  RR: 16 (26 Mar 2025 13:36) (16 - 17)  SpO2: 98% (26 Mar 2025 13:36) (97% - 100%)    Gen: AOx3, NAD, non-toxic  CV: Nontachycardic  Resp: Breathing comfortably, RA  Abd: Soft, nontender  IV/Skin: No thrombophlebitis    LABS:                        15.1   15.15 )-----------( 225      ( 26 Mar 2025 04:36 )             44.9     03-26    135  |  99  |  15  ----------------------------<  155[H]  4.5   |  23  |  0.76    Ca    9.0      26 Mar 2025 04:36  Phos  2.2     03-26  Mg     1.90     03-26    Urinalysis Basic - ( 26 Mar 2025 04:36 )    Color: x / Appearance: x / SG: x / pH: x  Gluc: 155 mg/dL / Ketone: x  / Bili: x / Urobili: x   Blood: x / Protein: x / Nitrite: x   Leuk Esterase: x / RBC: x / WBC x   Sq Epi: x / Non Sq Epi: x / Bacteria: x    MICROBIOLOGY:    Catheterized  03-21-25   >100,000 CFU/ml Enterococcus faecalis  --  Enterococcus faecalis    Catheterized  03-01-25   >100,000 CFU/ml Enterococcus faecalis  10,000 - 49,000 CFU/mL Serratia marcescens  --  Enterococcus faecalis  Serratia marcescens    RADIOLOGY:    3/22    IMPRESSION:    Enteric tube with tip likely terminating near the GE junction on most   recent film. Recommend repeat imaging and/or repositioning.  Clear lungs.

## 2025-03-26 NOTE — PROGRESS NOTE ADULT - ASSESSMENT
89M hx BPH (chronic indwelling Bruner), HLD, B/L inguinal hernia repair with known left recurrence, colon cancer (T4N1 w/ two synchronous lesions) s/p extended right hemicolectomy w/ ileocolic anastomosis on 12/23 (not on chemo), recent presentation 2/24 for acute diastolic heart failure, presenting with abdominal pain, found to have high grade SBO w/ RUQ TP at a spiculated soft tissue lesion concerning for peritoneal carcinomatosis, now endorsing flatus and tolerating clear liquids but hospital course complicated by altered mental status and aggressive behavior and s/p zyprexa x2 on 3/24.     Plan:  - Diet: NPO, possibly advance to CLD later today  - Monitor bowel fxn, difficult to ascertain given patient's previous agitation  - ABx: off abx per ID  - Palliative consulted, appreciate recs   - Appreciate ID recs  - Chronic bruner in place, continue to monitor UOP  - Appreciate Psych recs  - PT eval  - Med-onc consult --> poor chemotherapy candidate, recommend palliative care involvement if patient fails nonoperative management  - VTE ppx: [facility]    A team   [id number]

## 2025-03-26 NOTE — PROGRESS NOTE ADULT - ASSESSMENT
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

## 2025-03-26 NOTE — PROGRESS NOTE ADULT - SUBJECTIVE AND OBJECTIVE BOX
TEAM [ A ] Surgery Daily Progress Note  =====================================================    SUBJECTIVE: Patient seen and examined at bedside on AM rounds. Patient without complaints. Pt had 1 episode of emesis overnight. Denying nausea or vomiting since. Diet downgraded to NPO. -Flatus/-BM. OOB/Amublating as tolerated. Denies fever, chills.    ALLERGIES:  aspirin (Unknown)  penicillin (Unknown)      --------------------------------------------------------------------------------------    MEDICATIONS:    Neurologic Medications  gabapentin 400 milliGRAM(s) Oral at bedtime  HYDROmorphone  Injectable 0.2 milliGRAM(s) IV Push every 12 hours PRN headache  metoclopramide Injectable 10 milliGRAM(s) IV Push every 8 hours    Respiratory Medications    Cardiovascular Medications    Gastrointestinal Medications  dextrose 5% + sodium chloride 0.45% with potassium chloride 20 mEq/L 1000 milliLiter(s) IV Continuous <Continuous>  magnesium sulfate  IVPB 1 Gram(s) IV Intermittent once  pantoprazole  Injectable 40 milliGRAM(s) IV Push daily    Genitourinary Medications  tamsulosin 0.8 milliGRAM(s) Oral at bedtime    Hematologic/Oncologic Medications  heparin   Injectable 5000 Unit(s) SubCutaneous every 8 hours  influenza  Vaccine (HIGH DOSE) 0.5 milliLiter(s) IntraMuscular once    Antimicrobial/Immunologic Medications    Endocrine/Metabolic Medications  atorvastatin 40 milliGRAM(s) Oral at bedtime  dexAMETHasone  Injectable 4 milliGRAM(s) IV Push every 12 hours  finasteride 5 milliGRAM(s) Oral daily  octreotide  Injectable 100 MICROGram(s) SubCutaneous every 8 hours    Topical/Other Medications    --------------------------------------------------------------------------------------    VITAL SIGNS:  T(C): 36.4 (03-26-25 @ 09:29), Max: 36.8 (03-26-25 @ 04:42)  HR: 73 (03-26-25 @ 09:29) (50 - 95)  BP: 147/71 (03-26-25 @ 09:29) (97/71 - 149/61)  RR: 16 (03-26-25 @ 09:29) (16 - 17)  SpO2: 99% (03-26-25 @ 09:29) (97% - 100%)  --------------------------------------------------------------------------------------    EXAM    General: NAD, resting in bed comfortably.  Cardiac: regular rate, warm and well perfused  Respiratory: Nonlabored respirations, normal cw expansion.  Abdomen: soft, nontender, more distended than yesterday  Extremities: normal strength, FROM, no deformities    --------------------------------------------------------------------------------------    LABS                        15.1   15.15 )-----------( 225      ( 26 Mar 2025 04:36 )             44.9   03-26    135  |  99  |  15  ----------------------------<  155[H]  4.5   |  23  |  0.76    Ca    9.0      26 Mar 2025 04:36  Phos  2.2     03-26  Mg     1.90     03-26      --------------------------------------------------------------------------------------    INS AND OUTS:    03-25-25 @ 07:01  -  03-26-25 @ 07:00  --------------------------------------------------------  IN: 2585 mL / OUT: 1035 mL / NET: 1550 mL      --------------------------------------------------------------------------------------

## 2025-03-27 NOTE — PROGRESS NOTE ADULT - SUBJECTIVE AND OBJECTIVE BOX
TEAM [ A ] Surgery Daily Progress Note  =====================================================    INTERVAL:   - Patient with emesis yesterday, no longer passing flatus --> NG tube place with total of 800cc output (feculent) in 24hrs  - Afebrile, VSS    SUBJECTIVE: Patient seen and examined at bedside on AM rounds. Denies bowel function since yesterday. No other acue complaints.     ALLERGIES:  aspirin (Unknown)  penicillin (Unknown)      --------------------------------------------------------------------------------------    MEDICATIONS:  dexAMETHasone  Injectable 4 milliGRAM(s) IV Push every 12 hours  dextrose 5% + sodium chloride 0.45% with potassium chloride 20 mEq/L 1000 milliLiter(s) IV Continuous <Continuous>  heparin   Injectable 5000 Unit(s) SubCutaneous every 8 hours  HYDROmorphone  Injectable 0.2 milliGRAM(s) IV Push every 12 hours PRN  influenza  Vaccine (HIGH DOSE) 0.5 milliLiter(s) IntraMuscular once  magnesium sulfate  IVPB 2 Gram(s) IV Intermittent once  metoclopramide Injectable 10 milliGRAM(s) IV Push every 8 hours  octreotide  Injectable 100 MICROGram(s) SubCutaneous every 8 hours  pantoprazole  Injectable 40 milliGRAM(s) IV Push daily  sodium phosphate 30 milliMole(s)/500 mL IVPB 30 milliMole(s) IV Intermittent once    --------------------------------------------------------------------------------------    VITAL SIGNS:  T(C): 36.7 (03-27-25 @ 04:38), Max: 36.9 (03-26-25 @ 20:42)  HR: 67 (03-27-25 @ 04:38) (56 - 73)  BP: 117/67 (03-27-25 @ 04:38) (91/42 - 147/71)  RR: 18 (03-27-25 @ 04:38) (16 - 18)  SpO2: 96% (03-27-25 @ 04:38) (95% - 99%)  --------------------------------------------------------------------------------------    INS AND OUTS:    03-26-25 @ 07:01  -  03-27-25 @ 07:00  --------------------------------------------------------  IN: 900 mL / OUT: 1600 mL / NET: -700 mL      --------------------------------------------------------------------------------------      EXAM    General: NAD, resting in bed comfortably.  Cardiac: regular rate, warm and well perfused  Respiratory: Nonlabored respirations, normal cw expansion.  Abdomen: soft, nontender, mildly distended. Nasogastric tube with feculent contents  Extremities: normal strength, FROM, no deformities    --------------------------------------------------------------------------------------    LABS

## 2025-03-27 NOTE — PROGRESS NOTE ADULT - ASSESSMENT
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  Overall, Asymptomatic bacteriuria, positive UCX  - Would continue monitor off vanco despite WBC (patient appears clinically stable with no new symptoms--conversely, if has clinical worsening would anticipate a bowel process > UTI source; low threshold to restart antibiotic if clinical worsening)  - Monitor for any signs/symptoms infection/UTI  - Care for SBO per surgery team  - F/U pending cultures  - Trend WBC to normal    Abner Almaguer MD  Contact on TEAMS messaging from 9am - 5pm  From 5pm-9am, on weekends, or if no response call [phone]    Antibiotic decision making based on local antibiogram and available recent culture results

## 2025-03-27 NOTE — PROGRESS NOTE ADULT - SUBJECTIVE AND OBJECTIVE BOX
CC: F/U for Leukocytosis    Saw/spoke to patient. No fevers, no chills. No new complaints.    Allergies  aspirin (Unknown)  penicillin (Unknown)    ANTIMICROBIALS:      PE:    Vital Signs Last 24 Hrs  T(C): 36.7 (27 Mar 2025 13:20), Max: 36.9 (26 Mar 2025 20:42)  T(F): 98 (27 Mar 2025 13:20), Max: 98.4 (26 Mar 2025 20:42)  HR: 58 (27 Mar 2025 13:20) (57 - 67)  BP: 121/49 (27 Mar 2025 13:20) (91/42 - 121/49)  RR: 17 (27 Mar 2025 13:20) (16 - 18)  SpO2: 94% (27 Mar 2025 13:20) (94% - 98%)    Gen: AOx3, NAD, non-toxic  CV: Nontachycardic  Resp: Breathing comfortably, RA  Abd: Soft, nontender  IV/Skin: No thrombophlebitis    LABS:                        13.7   13.40 )-----------( 207      ( 27 Mar 2025 04:34 )             41.3     03-27    132[L]  |  97[L]  |  16  ----------------------------<  186[H]  4.8   |  25  |  0.80    Ca    8.2[L]      27 Mar 2025 04:34  Phos  1.9     03-27  Mg     1.80     03-27    Urinalysis Basic - ( 27 Mar 2025 04:34 )    Color: x / Appearance: x / SG: x / pH: x  Gluc: 186 mg/dL / Ketone: x  / Bili: x / Urobili: x   Blood: x / Protein: x / Nitrite: x   Leuk Esterase: x / RBC: x / WBC x   Sq Epi: x / Non Sq Epi: x / Bacteria: x    MICROBIOLOGY:    Catheterized  03-21-25   >100,000 CFU/ml Enterococcus faecalis  --  Enterococcus faecalis    Catheterized  03-01-25   >100,000 CFU/ml Enterococcus faecalis  10,000 - 49,000 CFU/mL Serratia marcescens  --  Enterococcus faecalis  Serratia marcescens    RADIOLOGY:    3/26 XR    IMPRESSION: Follow-up studies pre and post NG tube adjustment.

## 2025-03-27 NOTE — PROGRESS NOTE ADULT - ASSESSMENT
89M hx BPH (chronic indwelling Bruner), HLD, B/L inguinal hernia repair with known left recurrence, colon cancer (T4N1 w/ two synchronous lesions) s/p extended right hemicolectomy w/ ileocolic anastomosis on 12/23 (not on chemo), recent presentation 2/24 for acute diastolic heart failure, presenting with abdominal pain, found to have high grade SBO w/ RUQ TP at a spiculated soft tissue lesion concerning for peritoneal carcinomatosis, now endorsing flatus and tolerating clear liquids but hospital course complicated by altered mental status and aggressive behavior and s/p zyprexa x2 on 3/24. Now with improved mental status and no longer requiring IV antipsychotics however now without bowel function and s/p NG tube replacement on 3/26 due to increased distention and 1x emesis    Plan:  - Continue NPO/NGT/IVFs  - Frequent NG tube flushes due to thick, feculent contents  - Monitor bowel function   - ID following, trend WBCs to normal, no clinical signs/sources of infection at this time   - Palliative consulted  - Chronic bruner in place, continue to monitor UOP  - Appreciate Psych recs  - PT eval --> rec ADALID  - Med-onc consult --> poor chemotherapy candidate, recommend palliative care involvement if patient fails nonoperative management  - IV protonix while NG tube is in place  - VTE ppx: SQH    A team   [id number]

## 2025-03-28 NOTE — PROGRESS NOTE ADULT - SUBJECTIVE AND OBJECTIVE BOX
CC: F/U for Positive culture finding    Saw/spoke to patient. No fevers, no chills. No new complaints.    Allergies  aspirin (Unknown)  penicillin (Unknown)    ANTIMICROBIALS:      PE:    Vital Signs Last 24 Hrs  T(C): 36.4 (28 Mar 2025 13:20), Max: 36.6 (27 Mar 2025 17:15)  T(F): 97.5 (28 Mar 2025 13:20), Max: 97.9 (28 Mar 2025 00:52)  HR: 66 (28 Mar 2025 13:20) (55 - 66)  BP: 131/59 (28 Mar 2025 13:20) (115/50 - 136/53)  RR: 20 (28 Mar 2025 13:20) (16 - 20)  SpO2: 93% (28 Mar 2025 13:20) (93% - 96%)    Gen: AOx3, NAD, non-toxic  CV: Nontachycardic  Resp: Breathing comfortably, RA  Abd: Soft, nontender  IV/Skin: No thrombophlebitis    LABS:                        13.3   14.79 )-----------( 185      ( 28 Mar 2025 05:15 )             39.2     03-28    132[L]  |  98  |  12  ----------------------------<  170[H]  4.7   |  26  |  0.79    Ca    7.9[L]      28 Mar 2025 05:15  Phos  2.6     03-28  Mg     2.20     03-28    Urinalysis Basic - ( 28 Mar 2025 05:15 )    Color: x / Appearance: x / SG: x / pH: x  Gluc: 170 mg/dL / Ketone: x  / Bili: x / Urobili: x   Blood: x / Protein: x / Nitrite: x   Leuk Esterase: x / RBC: x / WBC x   Sq Epi: x / Non Sq Epi: x / Bacteria: x    MICROBIOLOGY:    Catheterized  03-21-25   >100,000 CFU/ml Enterococcus faecalis  --  Enterococcus faecalis    Catheterized  03-01-25   >100,000 CFU/ml Enterococcus faecalis  10,000 - 49,000 CFU/mL Serratia marcescens  --  Enterococcus faecalis  Serratia marcescens    RADIOLOGY:    3/26 XR    IMPRESSION: Follow-up studies pre and post NG tube adjustment. The procedure was performed independently

## 2025-03-28 NOTE — PROGRESS NOTE ADULT - SUBJECTIVE AND OBJECTIVE BOX
A Team Colorectal Surgery Progress Note    S: Pt seen and examined with team on morning rounds. Patient very concerned about filing his taxes. Feeling better. NGT to LWS decreased drastically overnight. No nausea/emesis. No flatus. No BM. No CP/Palpitations/SOB/HA/Dizziness.      Vital Signs Last 24 Hrs  T(C): 36.4 (28 Mar 2025 09:12), Max: 36.7 (27 Mar 2025 13:20)  T(F): 97.5 (28 Mar 2025 09:12), Max: 98 (27 Mar 2025 13:20)  HR: 62 (28 Mar 2025 09:12) (55 - 66)  BP: 115/50 (28 Mar 2025 09:12) (115/50 - 136/53)  BP(mean): --  RR: 16 (28 Mar 2025 09:12) (16 - 17)  SpO2: 95% (28 Mar 2025 09:12) (94% - 96%)    Parameters below as of 28 Mar 2025 09:12  Patient On (Oxygen Delivery Method): room air        I&O's Summary    27 Mar 2025 07:01  -  28 Mar 2025 07:00  --------------------------------------------------------  IN: 1700 mL / OUT: 1995 mL / NET: -295 mL    28 Mar 2025 07:01  -  28 Mar 2025 11:56  --------------------------------------------------------  IN: 0 mL / OUT: 375 mL / NET: -375 mL      I&O's Detail    27 Mar 2025 07:01  -  28 Mar 2025 07:00  --------------------------------------------------------  IN:    dextrose 5% + sodium chloride 0.45% w/ Additives: 1700 mL  Total IN: 1700 mL    OUT:    Indwelling Catheter - Urethral (mL): 1795 mL    Nasogastric/Oral tube (mL): 200 mL    Oral Fluid: 0 mL  Total OUT: 1995 mL    Total NET: -295 mL      28 Mar 2025 07:01  -  28 Mar 2025 11:56  --------------------------------------------------------  IN:  Total IN: 0 mL    OUT:    Indwelling Catheter - Urethral (mL): 225 mL    Nasogastric/Oral tube (mL): 150 mL  Total OUT: 375 mL    Total NET: -375 mL          General Appearance: Appears well, NAD  Chest: Breathing comfortably on RA.    CV: S1, S2 @62  Abdomen: Soft, nondistended, nontender.  Extremities: Moves all extremities.    LABS:                        13.3   14.79 )-----------( 185      ( 28 Mar 2025 05:15 )             39.2     03-28    132[L]  |  98  |  12  ----------------------------<  170[H]  4.7   |  26  |  0.79    Ca    7.9[L]      28 Mar 2025 05:15  Phos  2.6     03-28  Mg     2.20     03-28        Urinalysis Basic - ( 28 Mar 2025 05:15 )    Color: x / Appearance: x / SG: x / pH: x  Gluc: 170 mg/dL / Ketone: x  / Bili: x / Urobili: x   Blood: x / Protein: x / Nitrite: x   Leuk Esterase: x / RBC: x / WBC x   Sq Epi: x / Non Sq Epi: x / Bacteria: x

## 2025-03-28 NOTE — PROGRESS NOTE ADULT - ASSESSMENT
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, bilateral hernia repair, colon CA, prior hemicolectomy, heart failure presenting with abd pain  No fevers, no leukocytosis  Abd pain, found to have SBO  Chronic bruner  Extensive malignancy history  UA-/contam equivocal, UCX Enterococcus  No symptoms UTI  Low suspicion UTI  WBC rise likely related to SBO, waxing/waning  Overall, Asymptomatic bacteriuria, positive UCX  - Continue off antibiotics as long as no new signs infection  - Monitor for any signs/symptoms infection/UTI  - Care for SBO per surgery team  - F/U pending cultures  - Trend WBC to normal    Abner Almaguer MD  Contact on TEAMS messaging from 9am - 5pm  From 5pm-9am, on weekends, or if no response call [phone]    Antibiotic decision making based on local antibiogram and available recent culture results

## 2025-03-28 NOTE — PROGRESS NOTE ADULT - ASSESSMENT
89M hx BPH (chronic indwelling Bruner), HLD, B/L inguinal hernia repair with known left recurrence, colon cancer (T4N1 w/ two synchronous lesions) s/p extended right hemicolectomy w/ ileocolic anastomosis on 12/23 (not on chemo), recent presentation 2/24 for acute diastolic heart failure, presenting with abdominal pain, found to have high grade SBO w/ RUQ TP at a spiculated soft tissue lesion concerning for peritoneal carcinomatosis, now endorsing flatus and tolerating clear liquids but hospital course complicated by altered mental status and aggressive behavior and s/p zyprexa x2 on 3/24. Now with improved mental status and no longer requiring IV antipsychotics however now without bowel function and s/p NG tube replacement on 3/26 due to increased distention and 1x emesis    Plan:  - Continue NPO/NGT/IVFs  - Frequent NG tube flushes due to thick, feculent contents  - Monitor bowel function   - ID following, trend WBCs to normal, no clinical signs/sources of infection at this time   - Palliative consulted  - Chronic bruner in place, continue to monitor UOP  - Appreciate Psych recs  - PT eval --> rec ADALID  - Med-onc consult --> poor chemotherapy candidate, recommend palliative care involvement if patient fails nonoperative management  - IV protonix while NG tube is in place  - VTE ppx: SQH    A team   [id number]

## 2025-03-29 NOTE — PROGRESS NOTE ADULT - SUBJECTIVE AND OBJECTIVE BOX
TEAM [ A ] Surgery Daily Progress Note  =====================================================    SUBJECTIVE: Patient seen and examined at bedside on AM rounds. Remains -/-, denies abdominal pain. No acute complaints.     ALLERGIES:  aspirin (Unknown)  penicillin (Unknown)      --------------------------------------------------------------------------------------    MEDICATIONS:  dexAMETHasone  Injectable 4 milliGRAM(s) IV Push every 12 hours  dextrose 5% + sodium chloride 0.45% with potassium chloride 20 mEq/L 1000 milliLiter(s) IV Continuous <Continuous>  heparin   Injectable 5000 Unit(s) SubCutaneous every 8 hours  HYDROmorphone  Injectable 0.2 milliGRAM(s) IV Push every 12 hours PRN  influenza  Vaccine (HIGH DOSE) 0.5 milliLiter(s) IntraMuscular once  metoclopramide Injectable 10 milliGRAM(s) IV Push every 8 hours  octreotide  Injectable 100 MICROGram(s) SubCutaneous every 8 hours  pantoprazole  Injectable 40 milliGRAM(s) IV Push daily  sodium phosphate 15 milliMole(s)/250 mL IVPB 15 milliMole(s) IV Intermittent once    --------------------------------------------------------------------------------------    VITAL SIGNS:  T(C): 36.8 (03-29-25 @ 05:18), Max: 37.2 (03-29-25 @ 01:28)  HR: 84 (03-29-25 @ 05:18) (62 - 84)  BP: 148/56 (03-29-25 @ 05:18) (114/50 - 148/56)  RR: 18 (03-29-25 @ 05:18) (16 - 20)  SpO2: 95% (03-29-25 @ 05:18) (93% - 96%)  --------------------------------------------------------------------------------------    INS AND OUTS:    03-28-25 @ 07:01  -  03-29-25 @ 07:00  --------------------------------------------------------  IN: 1875 mL / OUT: 2125 mL / NET: -250 mL      --------------------------------------------------------------------------------------      EXAM      General Appearance: Appears well, NAD  Chest: Breathing comfortably on RA.    CV: WWP  Abdomen: Soft, nontender, less distended from prior. NG will low output brownish contents  Extremities: Moves all extremities.    --------------------------------------------------------------------------------------    LABS

## 2025-03-29 NOTE — PROGRESS NOTE ADULT - ASSESSMENT
89M hx BPH (chronic indwelling Bruner), HLD, B/L inguinal hernia repair with known left recurrence, colon cancer (T4N1 w/ two synchronous lesions) s/p extended right hemicolectomy w/ ileocolic anastomosis on 12/23 (not on chemo), recent presentation 2/24 for acute diastolic heart failure, presenting with abdominal pain, found to have high grade SBO w/ RUQ TP at a spiculated soft tissue lesion concerning for peritoneal carcinomatosis, now endorsing flatus and tolerating clear liquids but hospital course complicated by altered mental status and aggressive behavior and s/p zyprexa x2 on 3/24. Now with improved mental status and no longer requiring IV antipsychotics however now without bowel function and s/p NG tube replacement on 3/26 due to increased distention and 1x emesis    Plan:  - Plan for GG challenge today  - Monitor bowel function   - ID following, continue to trend WBCs, no clinical signs of active/ongoing infection  - Palliative consulted  - Chronic bruner in place, continue to monitor UOP  - PT eval --> rec ADALID  - Med-onc consult --> poor chemotherapy candidate, recommend palliative care involvement if patient fails nonoperative management  - IV protonix while NG tube is in place  - VTE ppx: SQH    A team   [id number]     89M hx BPH (chronic indwelling Bruner), HLD, B/L inguinal hernia repair with known left recurrence, colon cancer (T4N1 w/ two synchronous lesions) s/p extended right hemicolectomy w/ ileocolic anastomosis on 12/23 (not on chemo), recent presentation 2/24 for acute diastolic heart failure, presenting with abdominal pain, found to have high grade SBO w/ RUQ TP at a spiculated soft tissue lesion concerning for peritoneal carcinomatosis, now endorsing flatus and tolerating clear liquids but hospital course complicated by altered mental status and aggressive behavior and s/p zyprexa x2 on 3/24. Now with improved mental status and no longer requiring IV antipsychotics however now without bowel function and s/p NG tube replacement on 3/26 due to increased distention and 1x emesis    Plan:  - Plan for GG challenge today  - Transition to NS due to downtrending sodium, 131 in AM  - Monitor bowel function   - ID following, continue to trend WBCs, no clinical signs of active/ongoing infection  - Palliative consulted  - Chronic bruner in place, continue to monitor UOP  - PT eval --> rec ADALID  - Med-onc consult --> poor chemotherapy candidate, recommend palliative care involvement if patient fails nonoperative management  - IV protonix while NG tube is in place  - VTE ppx: SQH    A team   [id number]     89M hx BPH (chronic indwelling Bruner), HLD, B/L inguinal hernia repair with known left recurrence, colon cancer (T4N1 w/ two synchronous lesions) s/p extended right hemicolectomy w/ ileocolic anastomosis on 12/23 (not on chemo), recent presentation 2/24 for acute diastolic heart failure, presenting with abdominal pain, found to have high grade SBO w/ RUQ TP at a spiculated soft tissue lesion concerning for peritoneal carcinomatosis, now endorsing flatus and tolerating clear liquids but hospital course complicated by altered mental status and aggressive behavior and s/p zyprexa x2 on 3/24. Now with improved mental status and no longer requiring IV antipsychotics however now without bowel function and s/p NG tube replacement on 3/26 due to increased distention and 1x emesis    Plan:  - Plan for GG challenge today  - Transition to NS due to downtrending sodium, 131 in AM  - Monitor bowel function   - ID following, continue to trend WBCs, no clinical signs of active/ongoing infection  - Palliative consulted  - Chronic bruner in place, continue to monitor UOP  - PT eval --> rec ADALID  - Med-onc consult --> poor chemotherapy candidate, recommend palliative care involvement if patient fails nonoperative management  - IV protonix while NG tube is in place  - VTE ppx: SQH    A team   [id number]    Seen and examined with Dr. Hu

## 2025-03-30 NOTE — PROGRESS NOTE ADULT - ASSESSMENT
89M hx BPH (chronic indwelling Bruner), HLD, B/L inguinal hernia repair with known left recurrence, colon cancer (T4N1 w/ two synchronous lesions) s/p extended right hemicolectomy w/ ileocolic anastomosis on 12/23 (not on chemo), recent presentation 2/24 for acute diastolic heart failure, presenting with abdominal pain, found to have high grade SBO w/ RUQ TP at a spiculated soft tissue lesion concerning for peritoneal carcinomatosis, now endorsing flatus and tolerating clear liquids but hospital course complicated by altered mental status and aggressive behavior and s/p zyprexa x2 on 3/24. Now with improved mental status and no longer requiring IV antipsychotics however now without bowel function and s/p NG tube replacement on 3/26 due to increased distention and 1x emesis    Plan:  - c/w NS75  - Palliative to re-engage with patient on Monday 3/31 for GOC per discussion with them today  - Monitor bowel function   - ID following, continue to trend WBCs, no clinical signs of active/ongoing infection  - Chronic bruner in place, continue to monitor UOP  - PT eval --> rec ADALID  - Med-onc consult --> poor chemotherapy candidate, recommend palliative care involvement if patient fails nonoperative management  - IV protonix while NG tube is in place  - VTE ppx: SQH    A team   [id number]

## 2025-03-30 NOTE — PROGRESS NOTE ADULT - SUBJECTIVE AND OBJECTIVE BOX
24 Hour Events:  - failed GG 3/29, NGT placed back to suction    SUBJECTIVE: Pt seen at bedside during AM rounds. No o/n events, patient resting comfortably. -/-    Vital Signs Last 24 Hrs  T(C): 36.8 (30 Mar 2025 09:51), Max: 36.8 (30 Mar 2025 09:51)  T(F): 98.3 (30 Mar 2025 09:51), Max: 98.3 (30 Mar 2025 09:51)  HR: 69 (30 Mar 2025 09:51) (57 - 78)  BP: 134/59 (30 Mar 2025 09:51) (134/59 - 159/63)  BP(mean): --  RR: 17 (30 Mar 2025 09:51) (16 - 18)  SpO2: 100% (30 Mar 2025 09:51) (95% - 100%)    Parameters below as of 30 Mar 2025 09:51  Patient On (Oxygen Delivery Method): room air        I&O's Summary    29 Mar 2025 07:01  -  30 Mar 2025 07:00  --------------------------------------------------------  IN: 1050 mL / OUT: 2300 mL / NET: -1250 mL    30 Mar 2025 07:01  -  30 Mar 2025 10:01  --------------------------------------------------------  IN: 0 mL / OUT: 0 mL / NET: 0 mL        LABS:                        14.5   12.67 )-----------( 197      ( 30 Mar 2025 04:32 )             43.4     03-30    133[L]  |  97[L]  |  12  ----------------------------<  87  4.5   |  23  |  0.81    Ca    8.4      30 Mar 2025 04:32  Phos  2.9     03-30  Mg     2.00     03-30        Urinalysis Basic - ( 30 Mar 2025 04:32 )    Color: x / Appearance: x / SG: x / pH: x  Gluc: 87 mg/dL / Ketone: x  / Bili: x / Urobili: x   Blood: x / Protein: x / Nitrite: x   Leuk Esterase: x / RBC: x / WBC x   Sq Epi: x / Non Sq Epi: x / Bacteria: x        Physical Exam:  General Appearance: Appears well, NAD, NGT in place  Neck: Supple  Chest: Equal expansion bilaterally, equal breath sounds  CV: Pulse present  Abdomen: Soft, nontender, slightly distended today  Extremities: Grossly symmetric, SCD's in place  : bruner in place

## 2025-03-31 NOTE — CONSULT NOTE ADULT - NS ATTEND AMEND GEN_ALL_CORE FT
I agree with the above history, physical examination, chief complaint/diagnosis, and plan, which I have reviewed and edited where appropriate.  I agree with notes/assessment and detailed interval history of health care providers on my service.  I have seen and examined the patient.  I reviewed the laboratory and available data and agree with the history, physical assessment and plan.  I reviewed and discussed with all consultants, house staff and PA's.  The Nutrition Support Team (NST) discusses on an ongoing basis with the primary team and all consultants, House staff and PA's to have a permanent risk benefit analyses on all decisions and coordinating care.  I was physically present for the key portions of the evaluation and management (E/M) service provided.  89M hx BPH s/p extended right hemicolectomy w/ ileocolic anastomosis with high grade SBO w/ RUQ TP at a spiculated soft tissue lesion concerning for peritoneal carcinomatosis with severe calorie protein malnutrition and prolonged NPO.  PHYSICAL EXAM  General NGT in place  Chest: clear  CV: RR  Abdomen: Soft, nontender, slightly distended today  Extremities: warm  : Gill  The patient will require:  25 kilocalories / kg / day  Diagnosis:  Severe protein calorie malnutrition in chronic illness  Plan: Place PICC   Initiate TPN

## 2025-03-31 NOTE — CONSULT NOTE ADULT - SUBJECTIVE AND OBJECTIVE BOX
Nutrition Support Consult Note    HPI: 89M hx BPH (chronic indwelling Bruner), HLD, B/L inguinal hernia repair with known left recurrence, colon cancer (T4N1 w/ two synchronous lesions) s/p extended right hemicolectomy w/ ileocolic anastomosis on 12/23 (not on chemo), recent presentation 2/24 for acute diastolic heart failure, presenting with abdominal pain, found to have high grade SBO w/ RUQ TP at a spiculated soft tissue lesion concerning for peritoneal carcinomatosis, now endorsing flatus and tolerating clear liquids but hospital course complicated by altered mental status and aggressive behavior and s/p zyprexa x2 on 3/24. Now with improved mental status and no longer requiring IV antipsychotics however now without bowel function and s/p NG tube replacement on 3/26 due to increased distention and 1x emesis; Concern for severe protein malnutrition as pt has prolonged NPO.      Allergies    aspirin (Unknown)  penicillin (Unknown)    Intolerances        MEDICATIONS  (STANDING):  dexAMETHasone  Injectable 4 milliGRAM(s) IV Push every 12 hours  dextrose 5% + lactated ringers. 1000 milliLiter(s) (50 mL/Hr) IV Continuous <Continuous>  heparin   Injectable 5000 Unit(s) SubCutaneous every 8 hours  influenza  Vaccine (HIGH DOSE) 0.5 milliLiter(s) IntraMuscular once  metoclopramide Injectable 10 milliGRAM(s) IV Push every 8 hours  octreotide  Injectable 100 MICROGram(s) SubCutaneous every 8 hours  pantoprazole  Injectable 40 milliGRAM(s) IV Push daily  Parenteral Nutrition - Adult 1 Each (42 mL/Hr) TPN Continuous <Continuous>    MEDICATIONS  (PRN):      PAST MEDICAL & SURGICAL HISTORY:  BPH (benign prostatic hyperplasia)      Peripheral neuropathy      Heart failure, diastolic, chronic      Colon cancer      Bilateral inguinal hernia      Recurrent inguinal hernia      GERD (gastroesophageal reflux disease)      H/O hemicolectomy      H/O bilateral inguinal hernia repair    ICU Vital Signs Last 24 Hrs  T(C): 37.1 (31 Mar 2025 08:22), Max: 37.1 (31 Mar 2025 08:22)  T(F): 98.8 (31 Mar 2025 08:22), Max: 98.8 (31 Mar 2025 08:22)  HR: 68 (31 Mar 2025 08:33) (56 - 69)  BP: 135/50 (31 Mar 2025 08:33) (104/44 - 178/58)  BP(mean): --  ABP: --  ABP(mean): --  RR: 16 (31 Mar 2025 08:22) (16 - 18)  SpO2: 94% (31 Mar 2025 08:22) (94% - 98%)    O2 Parameters below as of 31 Mar 2025 08:22  Patient On (Oxygen Delivery Method): room air          I&O's Detail    30 Mar 2025 07:01  -  31 Mar 2025 07:00  --------------------------------------------------------  IN:    sodium chloride 0.9%: 1575 mL  Total IN: 1575 mL    OUT:    Indwelling Catheter - Urethral (mL): 2700 mL    Nasogastric/Oral tube (mL): 200 mL    Oral Fluid: 0 mL  Total OUT: 2900 mL    Total NET: -1325 mL      31 Mar 2025 07:01  -  31 Mar 2025 13:51  --------------------------------------------------------  IN:    dextrose 5% + lactated ringers: 200 mL    IV PiggyBack: 100 mL  Total IN: 300 mL    OUT:    Indwelling Catheter - Urethral (mL): 200 mL    Nasogastric/Oral tube (mL): 100 mL    Oral Fluid: 0 mL  Total OUT: 300 mL    Total NET: 0 mL    PHYSICAL EXAM  General Appearance: Appears well, NAD, NGT in place  Neck: Supple  Chest: Equal expansion bilaterally, equal breath sounds  CV: Pulse present  Abdomen: Soft, nontender, slightly distended today  Extremities: Grossly symmetric, SCD's in place  : bruner in place    LABS:  CBC Full  -  ( 31 Mar 2025 04:31 )  WBC Count : 11.35 K/uL  RBC Count : 4.94 M/uL  Hemoglobin : 14.8 g/dL  Hematocrit : 45.4 %  Platelet Count - Automated : 193 K/uL  Mean Cell Volume : 91.9 fL  Mean Cell Hemoglobin : 30.0 pg  Mean Cell Hemoglobin Concentration : 32.6 g/dL  Auto Neutrophil # : x  Auto Lymphocyte # : x  Auto Monocyte # : x  Auto Eosinophil # : x  Auto Basophil # : x  Auto Neutrophil % : x  Auto Lymphocyte % : x  Auto Monocyte % : x  Auto Eosinophil % : x  Auto Basophil % : x    03-31    131[L]  |  93[L]  |  16  ----------------------------<  92  4.7   |  21[L]  |  0.82    Ca    8.3[L]      31 Mar 2025 08:02  Phos  3.1     03-31  Mg     1.90     03-31      CAPILLARY BLOOD GLUCOSE       POCT Blood Glucose.: 142 mg/dL (31 Mar 2025 12:15)      Pre-Illness Weight: ___68__ kG  Weight [ x ]loss /[  ]gain: kG weeks / months  Current Weight: 68kG  Height: 172 cm  Ideal Body Weight: 68kG  BMI: 19  Current Diet: [x  ]NPO  Appetite: [x  ]Poor [  ]Adequate [  ]Good      CLINICAL INDICATORS  MALNUTRITION IN CONTEXT OF ACUTE ILLNESS OR INJURY  SEVERE MALNUTRITION  Weight Loss  [  ] >2% in 1 week  [  ] >5% in 1 month  [  ] >7.5% in 3 months    Caloric Intake  [  ] <50% of nutirition needs >= 5 days    Generlized Edema  Severe Edema    MODERATE MALNUTRITION  Weight Loss  [  ] >1-2% in 1 week  [  ] >5% in 1 month  [  ] >7.5% in 3 months    Caloric Intake  [  ] <75% of nutirition needs >= 5 days    Generlized Edema  Mild Edema      MALNUTRITION IN CONTEXT OF CHRONIC ILLNESS  SEVERE MALNUTRITION  Weight Loss  [  ] >5% in 1 month  [  ] >7.5% in 3 month  [  ] >10% in 6 months  [  ] >20% in 1 years    Caloric Intake  [  ] <50% of nutirition needs >= 1 month    Generlized Edema  Severe Edema    MODERATE MALNUTRITION  Weight Loss  [  ] >5% in 1 month  [  ] >7.5% in 3 month  [  ] >10% in 6 months  [  ] >20% in 1 years    Caloric Intake  [  ] <75% of nutirition needs >= 1 month    Generlized Edema  Mild Edema    Metabolic Requirements:  The patient will require:  ______25_______ kilocalories / kg / day  Diagnosis:  [  ] Mild protein malnutrition  [  ] Moderate protein calorie malnutrition in acute illness/ injury  [  ] Severe protein calorie malnutrition in acute illness/ injury  [  ] Moderate protein calorie malnutrition in chronic illness  [ x ] Severe protein calorie malnutrition in chronic illness      Nutritional Requirements  Carbohydrates: Total grams / kG / day: _3.4__ Total Calories: _999__  Lipids: Total grams / kG / day: _9__ Total Calories: __428_  Proteins: Total grams / kG / day: 1.5___ Total Calories: _272__  Non-Protein Calorie to Nitrogen Ratio:      Plan: once PICC placed by IR   [x  ] Initiate TPN formula:  Carbohydrates:__290____grams, Amino Acid:____100__grams  Lipids:____45___ grams, Total volume:_2000______mL.  1. Dedicated Central line must be placed for TPN.  2. Strict Intake and Output.  3. Weights three times a week  4. Monitor BMP, Mg+, Ionized Ca++, Phosphorus daily  5. Monitor Triglycerides monthly  6. Pre-albumin weekly.  7. Fingersticks to monitor glucose every 6 hours until stable then may be decreased to twice a day.      [  ] Initiate Enteral Nutrition:  Total calories to be delivered over:_____ hours / day at a goal rate  of:_____mL / hr  1. Place nasogastric or nasojejunal feeding tube if not in place  2. Begin: ____________________ at 25 mL / hr  3. Increase rate by 25mL / hr every four hours until goal rate is achieved  4. Monitor residual volume every 4 hours. If residual volume is greater  than 75% of the infused 4 hour volume, hold feeds for 2 hours and  consider promotility agent if volume is still greater than 75%  5. Monitor BMP, Mg+, Ionized Ca++, Phosphorus daily  6. Monitor Pre-albumin weekly  7. Weights two times a week    [  ] I have seen and examined the patient, reviewed the laboratory and radiographic data and agree with practitioner’s history, physical assessment, plan and  reviewed and edited where appropriate.

## 2025-03-31 NOTE — CHART NOTE - NSCHARTNOTEFT_GEN_A_CORE
PRE-INTERVENTIONAL RADIOLOGY PROCEDURE NOTE      Patient Age: 89    Patient Gender: M    Procedure: PICC placement    Diagnosis/Indication: TPN    Interventional Radiology Attending Physician: CASH PICC team    Ordering Attending Physician: Frida                       14.8   11.35 )-----------( 193      ( 31 Mar 2025 04:31 )             45.4       03-31    131[L]  |  93[L]  |  16  ----------------------------<  92  4.7   |  21[L]  |  0.82    Ca    8.3[L]      31 Mar 2025 08:02  Phos  3.1     03-31  Mg     1.90     03-31        Patient and Family Aware ? Yes PRE-INTERVENTIONAL RADIOLOGY PROCEDURE NOTE      Patient Age: 89    Patient Gender: M    Procedure: PICC placement    Diagnosis/Indication: TPN    Interventional Radiology Attending Physician: CASH PICC team    Ordering Attending Physician: Frida    PICC approved by Milton Elder                       14.8   11.35 )-----------( 193      ( 31 Mar 2025 04:31 )             45.4       03-31    131[L]  |  93[L]  |  16  ----------------------------<  92  4.7   |  21[L]  |  0.82    Ca    8.3[L]      31 Mar 2025 08:02  Phos  3.1     03-31  Mg     1.90     03-31        Patient and Family Aware ? Yes

## 2025-03-31 NOTE — PROGRESS NOTE ADULT - ASSESSMENT
89M hx BPH (chronic indwelling Bruner), HLD, B/L inguinal hernia repair with known left recurrence, colon cancer (T4N1 w/ two synchronous lesions) s/p extended right hemicolectomy w/ ileocolic anastomosis on 12/23 (not on chemo), recent presentation 2/24 for acute diastolic heart failure, presenting with abdominal pain, found to have high grade SBO w/ RUQ TP at a spiculated soft tissue lesion concerning for peritoneal carcinomatosis, now endorsing flatus and tolerating clear liquids but hospital course complicated by altered mental status and aggressive behavior and s/p zyprexa x2 on 3/24. Now with improved mental status and no longer requiring IV antipsychotics however now without bowel function and s/p NG tube replacement on 3/26 due to increased distention and 1x emesis    Plan:  - c/w NS75  - Palliative to re-engage with patient on Monday 3/31 for GOC per discussion with them today  - Monitor bowel function   - ID following, continue to trend WBCs, no clinical signs of active/ongoing infection  - Chronic bruner in place, continue to monitor UOP  - PT eval --> rec ADALID  - Med-onc consult --> poor chemotherapy candidate, recommend palliative care involvement if patient fails nonoperative management  - IV protonix while NG tube is in place  - VTE ppx: SQH    A team   [id number]   89M hx BPH (chronic indwelling Bruner), HLD, B/L inguinal hernia repair with known left recurrence, colon cancer (T4N1 w/ two synchronous lesions) s/p extended right hemicolectomy w/ ileocolic anastomosis on 12/23 (not on chemo), recent presentation 2/24 for acute diastolic heart failure, presenting with abdominal pain, found to have high grade SBO w/ RUQ TP at a spiculated soft tissue lesion concerning for peritoneal carcinomatosis, now endorsing flatus and tolerating clear liquids but hospital course complicated by altered mental status and aggressive behavior and s/p zyprexa x2 on 3/24. Now with improved mental status and no longer requiring IV antipsychotics however now without bowel function and s/p NG tube replacement on 3/26 due to increased distention and 1x emesis    Plan:  - c/w NS75  - Palliative to re-engage with patient on Monday 3/31 for GOC per discussion with them today  - If patient does not request hospice/comfort care will initiate TPN consult today  - Monitor bowel function   - ID following, continue to trend WBCs, no clinical signs of active/ongoing infection  - Chronic bruner in place, continue to monitor UOP  - PT eval --> rec ADALID  - Med-onc consult --> poor chemotherapy candidate, recommend palliative care involvement if patient fails nonoperative management  - IV protonix while NG tube is in place  - VTE ppx: SQH    A team   [id number]

## 2025-03-31 NOTE — PROGRESS NOTE ADULT - SUBJECTIVE AND OBJECTIVE BOX
CC: F/U for Positive culture finding    Saw/spoke to patient. No fevers, no chills. No new complaints.    Allergies  aspirin (Unknown)  penicillin (Unknown)    ANTIMICROBIALS:      PE:    Vital Signs Last 24 Hrs  T(C): 37 (31 Mar 2025 14:01), Max: 37.1 (31 Mar 2025 08:22)  T(F): 98.6 (31 Mar 2025 14:01), Max: 98.8 (31 Mar 2025 08:22)  HR: 74 (31 Mar 2025 14:01) (56 - 74)  BP: 122/60 (31 Mar 2025 14:01) (104/44 - 178/58)  RR: 16 (31 Mar 2025 14:01) (16 - 18)  SpO2: 95% (31 Mar 2025 14:01) (94% - 98%)    Gen: AOx3, NAD, non-toxic  CV: Nontachycardic  Resp: Breathing comfortably, RA  Abd: Soft, nontender  IV/Skin: No thrombophlebitis    LABS:                        14.8   11.35 )-----------( 193      ( 31 Mar 2025 04:31 )             45.4     03-31    131[L]  |  93[L]  |  16  ----------------------------<  92  4.7   |  21[L]  |  0.82    Ca    8.3[L]      31 Mar 2025 08:02  Phos  3.1     03-31  Mg     1.90     03-31    Urinalysis Basic - ( 31 Mar 2025 08:02 )    Color: x / Appearance: x / SG: x / pH: x  Gluc: 92 mg/dL / Ketone: x  / Bili: x / Urobili: x   Blood: x / Protein: x / Nitrite: x   Leuk Esterase: x / RBC: x / WBC x   Sq Epi: x / Non Sq Epi: x / Bacteria: x    MICROBIOLOGY:    Catheterized  03-21-25   >100,000 CFU/ml Enterococcus faecalis  --  Enterococcus faecalis    Catheterized  03-01-25   >100,000 CFU/ml Enterococcus faecalis  10,000 - 49,000 CFU/mL Serratia marcescens  --  Enterococcus faecalis  Serratia marcescens    RADIOLOGY:    3/29 XR    IMPRESSION: Delayed radiograph showing contrast-filled small bowel but no   colonic contrast consistent with obstruction.

## 2025-03-31 NOTE — CHART NOTE - NSCHARTNOTEFT_GEN_A_CORE
Primary team came to bedside after patient removed his nasogastric tube this morning. A discussion was started regarding the patient's understanding of his clinical condition as it relates to his cancer and malignant small bowel obstruction. The patient demonstrated and understanding that his cancer was the reason for his hospitalization as well as his prolonged NPO status. At this point, a conversation was started regarding his goals of care.     The patient stated that his primary goal is to return home and to be able to care for himself independently. The surgical options including a venting PEG vs. palliative intestinal bypass were discussed, including but not limited to the estimated length of recovery and barriers to him returning home. Additionally, the patient was educated about plans to place a PICC for IV nutrition given his ongoing NPO status and malnutrition. He is agreeable to PICC placement, which is planned for this afternoon with IR. Regarding his surgical goals of care, the patient stated an understanding of the two options. He is unsure which of the two he would like to pursue at this time, but demonstrated an ability to make decisions independently.     Plan:  - PICC to be placed today with IR, TPN consulted and aware  - Will revisit the patient's goals of care regarding possible venting PEG vs. intestinal bypass  - Palliative consulted, appreciate recommendations and further goals of care conversations given that the patient has demonstrated capacity to make decisions on his own behalf, but has limited social support to assist in caring for him  - Patient was offered a nasogastric tube to be replaced for decompression, which he denied. He was informed of the risk of aspiration and worsening abdominal pain, and would still like to proceed without an NG tube    A team surgery  [id number] Primary team came to bedside after patient removed his nasogastric tube this morning. A discussion was started regarding the patient's understanding of his clinical condition as it relates to his cancer and malignant small bowel obstruction. The patient demonstrated an understanding that his cancer is the reason for his hospitalization as well as his prolonged NPO status. At this point, a conversation regarding his goals of care took place.     The patient stated that his primary goal is to return home and to be able to care for himself independently. The surgical options including a venting PEG vs. palliative intestinal bypass were discussed, including but not limited to the estimated length of recovery and the possible barriers to his discharge home. This was not limited to postoperative complications, and also included the after-care regarding a venting G tube if he were to pursue that surgical option. Additionally, the patient was educated about PICC placement for IV nutrition given his ongoing NPO status and malnutrition. He is agreeable to PICC placement, which is planned for this afternoon with IR. Regarding his surgical goals of care, the patient stated an understanding of the two options. He is unsure which of the two he would like to pursue at this time, but demonstrated capacity to make a decision.    Plan:  - PICC to be placed today with IR, TPN consulted and aware  - Will revisit the patient's goals of care regarding possible venting PEG vs. intestinal bypass  - Palliative consulted, appreciate recommendations and further goals of care conversations given that the patient has demonstrated capacity to make decisions on his own behalf, but has limited social support to assist in caring for him  - Patient was offered a nasogastric tube to be replaced for decompression, which he denied. He was informed of the risk of aspiration and worsening abdominal pain, and would still like to proceed without an NG tube    A team surgery  [id number]

## 2025-03-31 NOTE — PROGRESS NOTE ADULT - SUBJECTIVE AND OBJECTIVE BOX
TEAM [ A ] Surgery Daily Progress Note  =====================================================    INTERVAL:  - Remains NPO/NGT/IVFs after failing second GG challenge this weekend  - Palliative re-consulted to determine goals of care and capacity given patient's lack of family and borderline mental status    SUBJECTIVE: Patient seen and examined at bedside on AM rounds    ALLERGIES:  aspirin (Unknown)  penicillin (Unknown)      --------------------------------------------------------------------------------------    MEDICATIONS:  dexAMETHasone  Injectable 4 milliGRAM(s) IV Push every 12 hours  heparin   Injectable 5000 Unit(s) SubCutaneous every 8 hours  influenza  Vaccine (HIGH DOSE) 0.5 milliLiter(s) IntraMuscular once  metoclopramide Injectable 10 milliGRAM(s) IV Push every 8 hours  octreotide  Injectable 100 MICROGram(s) SubCutaneous every 8 hours  pantoprazole  Injectable 40 milliGRAM(s) IV Push daily  sodium chloride 0.9%. 1000 milliLiter(s) IV Continuous <Continuous>    --------------------------------------------------------------------------------------    VITAL SIGNS:  T(C): 36.3 (03-31-25 @ 04:59), Max: 36.9 (03-30-25 @ 13:08)  HR: 64 (03-31-25 @ 04:59) (56 - 69)  BP: 164/89 (03-31-25 @ 04:59) (134/59 - 178/58)  RR: 18 (03-31-25 @ 04:59) (17 - 19)  SpO2: 96% (03-31-25 @ 04:59) (94% - 100%)  --------------------------------------------------------------------------------------    INS AND OUTS:    03-29-25 @ 07:01  -  03-30-25 @ 07:00  --------------------------------------------------------  IN: 1050 mL / OUT: 2300 mL / NET: -1250 mL    03-30-25 @ 07:01  -  03-31-25 @ 06:05  --------------------------------------------------------  IN: 1575 mL / OUT: 2900 mL / NET: -1325 mL      --------------------------------------------------------------------------------------      EXAM    General Appearance: Appears well, NAD, NGT in place  Neck: Supple  Chest: Equal expansion bilaterally, equal breath sounds  CV: Pulse present  Abdomen: Soft, nontender, slightly distended today  Extremities: Grossly symmetric, SCD's in place  HOMERO: franc in place    --------------------------------------------------------------------------------------    LABS       TEAM [ A ] Surgery Daily Progress Note  =====================================================    INTERVAL:  - Remains NPO/NGT/IVFs after failing second GG challenge this weekend  - Palliative re-consulted to determine goals of care and capacity given patient's lack of family and borderline mental status    SUBJECTIVE: Patient seen and examined at bedside on AM rounds. Agitated this morning, says he wants to go home and resume his treatment. No flatus, no BMs    ALLERGIES:  aspirin (Unknown)  penicillin (Unknown)      --------------------------------------------------------------------------------------    MEDICATIONS:  dexAMETHasone  Injectable 4 milliGRAM(s) IV Push every 12 hours  heparin   Injectable 5000 Unit(s) SubCutaneous every 8 hours  influenza  Vaccine (HIGH DOSE) 0.5 milliLiter(s) IntraMuscular once  metoclopramide Injectable 10 milliGRAM(s) IV Push every 8 hours  octreotide  Injectable 100 MICROGram(s) SubCutaneous every 8 hours  pantoprazole  Injectable 40 milliGRAM(s) IV Push daily  sodium chloride 0.9%. 1000 milliLiter(s) IV Continuous <Continuous>    --------------------------------------------------------------------------------------    VITAL SIGNS:  T(C): 36.3 (03-31-25 @ 04:59), Max: 36.9 (03-30-25 @ 13:08)  HR: 64 (03-31-25 @ 04:59) (56 - 69)  BP: 164/89 (03-31-25 @ 04:59) (134/59 - 178/58)  RR: 18 (03-31-25 @ 04:59) (17 - 19)  SpO2: 96% (03-31-25 @ 04:59) (94% - 100%)  --------------------------------------------------------------------------------------    INS AND OUTS:    03-29-25 @ 07:01  -  03-30-25 @ 07:00  --------------------------------------------------------  IN: 1050 mL / OUT: 2300 mL / NET: -1250 mL    03-30-25 @ 07:01  -  03-31-25 @ 06:05  --------------------------------------------------------  IN: 1575 mL / OUT: 2900 mL / NET: -1325 mL      --------------------------------------------------------------------------------------      EXAM    General Appearance: Appears well, NAD, NGT in place  Neck: Supple  Chest: Equal expansion bilaterally, equal breath sounds  CV: Pulse present  Abdomen: Soft, nontender, slightly distended today  Extremities: Grossly symmetric, SCD's in place  : franc in place    --------------------------------------------------------------------------------------    LABS

## 2025-03-31 NOTE — PROGRESS NOTE ADULT - ASSESSMENT
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, bilateral hernia repair, colon CA, prior hemicolectomy, heart failure presenting with abd pain  No fevers, no leukocytosis  Abd pain, found to have SBO  Chronic bruner  Extensive malignancy history  UA-/contam equivocal, UCX Enterococcus  No symptoms UTI  Low suspicion UTI  WBC rise likely related to SBO, waxing/waning  Overall, Asymptomatic bacteriuria, positive UCX  - Continue off antibiotics as long as no new signs infection  - Monitor for any signs/symptoms infection/UTI  - Care for SBO per surgery team  - F/U pending cultures  - Trend WBC to normal    Abner Almaguer MD  Contact on TEAMS messaging from 9am - 5pm  From 5pm-9am, on weekends, or if no response call [phone]    Antibiotic decision making based on local antibiogram and available recent culture results

## 2025-04-01 NOTE — PROGRESS NOTE ADULT - PROBLEM SELECTOR PLAN 5
- 4/1- Patient AAOx3 and able to recall discussions with surgery team yesterday. Patient still deciding on which procedure he wishes to pursue. He had questions regarding the risks of them; discussed will have primary team follow up to address his questions/ concerns regarding risks/benefits.   Patient wishes to speak with Dr. Delvalle and his team as he states he was planned to see him outpatient; discussed will have oncology team follow up with him.   Attempted to introduce advanced directives for code status preferences in event of cardiopulmonary arrest. However, patient states he has not considered this.

## 2025-04-01 NOTE — PROGRESS NOTE ADULT - SUBJECTIVE AND OBJECTIVE BOX
CC: F/U for Positive culture finding    Saw/spoke to patient. Unchanged. No new events.    Allergies  aspirin (Unknown)  penicillin (Unknown)    ANTIMICROBIALS:      PE:    Vital Signs Last 24 Hrs  T(C): 36.8 (01 Apr 2025 14:20), Max: 36.9 (31 Mar 2025 16:43)  T(F): 98.2 (01 Apr 2025 14:20), Max: 98.4 (31 Mar 2025 16:43)  HR: 66 (01 Apr 2025 14:20) (56 - 70)  BP: 154/47 (01 Apr 2025 14:20) (123/67 - 169/44)  BP(mean): 76 (01 Apr 2025 14:20) (76 - 77)  RR: 16 (01 Apr 2025 14:20) (16 - 18)  SpO2: 98% (01 Apr 2025 14:20) (94% - 100%)    Gen: AOx3, NAD, non-toxic  CV: Nontachycardic  Resp: Breathing comfortably, RA  Abd: Soft, nontender  IV/Skin: No thrombophlebitis    LABS:                        13.9   12.79 )-----------( 205      ( 01 Apr 2025 05:30 )             42.1     04-01    131[L]  |  98  |  16  ----------------------------<  156[H]  4.3   |  23  |  0.76    Ca    8.3[L]      01 Apr 2025 05:30  Phos  2.2     04-01  Mg     2.00     04-01    Urinalysis Basic - ( 01 Apr 2025 05:30 )    Color: x / Appearance: x / SG: x / pH: x  Gluc: 156 mg/dL / Ketone: x  / Bili: x / Urobili: x   Blood: x / Protein: x / Nitrite: x   Leuk Esterase: x / RBC: x / WBC x   Sq Epi: x / Non Sq Epi: x / Bacteria: x    MICROBIOLOGY:    Catheterized  03-21-25   >100,000 CFU/ml Enterococcus faecalis  --  Enterococcus faecalis    RADIOLOGY:    3/31 XR    IMPRESSION:  Small left pleural effusion/atelectasis again noted.  No enteric tube.

## 2025-04-01 NOTE — PROGRESS NOTE ADULT - ASSESSMENT
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, bilateral hernia repair, colon CA, prior hemicolectomy, heart failure presenting with abd pain  No fevers, no leukocytosis  Abd pain, found to have SBO  Chronic bruner  Extensive malignancy history  UA-/contam equivocal, UCX Enterococcus  No symptoms UTI  Low suspicion UTI  WBC rise likely related to SBO, waxing/waning  Planned for PICC/TPN  Overall, Asymptomatic bacteriuria, positive UCX  - Continue off antibiotics as long as no new signs infection  - Monitor for any signs/symptoms infection/UTI  - Care for SBO per surgery team  - F/U pending cultures  - Trend WBC to normal    Abner Almaguer MD  Contact on TEAMS messaging from 9am - 5pm  From 5pm-9am, on weekends, or if no response call [phone]    Antibiotic decision making based on local antibiogram and available recent culture results

## 2025-04-01 NOTE — PROGRESS NOTE ADULT - CONVERSATION DETAILS
Patient AAOx3 . He states he is aware that he is hospitalized due to his cancer. He states that the team yesterday discussed with him about options about "tube" vs "bypass surgery". When asked about reason for these procedures being discussed, he stated that it was due to a "blockage". He states he is still deciding on which procedure he wishes to pursue. He had questions regarding the risks of them; discussed will have primary team follow up to address his questions/ concerns regarding risks/benefits.   Patient wishes to speak with Dr. Delvalle and his team as he states he was planned to see him outpatient. Discussed that inpatient oncology team had seen him during this admission and stated he was not a good candidate for IV chemotherapy, but he does not recall this discussion and wished to speak to Dr. Delvalle's team; discussed will have oncology team follow up with him.     Attempted to introduce advanced directives for code status preferences in event of cardiopulmonary arrest. However, patient states he has not considered this.    Patient also states that he wants to go home but states that he is aware that it is to be determined if he will be going home vs rehab. Patient AAOx3 . He states he is aware that he is hospitalized due to his cancer. He states that the team yesterday discussed with him about options about "tube" vs "bypass surgery". When asked about reason for these procedures being discussed, he stated that it was due to a "blockage". He states he is still deciding on which procedure he wishes to pursue. He had questions regarding the risks of them to facilitate his decision making; discussed will have primary team follow up to address his questions/ concerns regarding risks/benefits.   Patient wishes to speak with Dr. Delvalle and his team as he states he was planned to see him outpatient. Discussed that inpatient oncology team had seen him during this admission and stated he was not a good candidate for IV chemotherapy, but he does not recall this discussion and wished to speak to Dr. Delvalle's team; discussed will have oncology team follow up with him.     Attempted to introduce advanced directives for code status preferences in event of cardiopulmonary arrest. However, patient states he has not considered this.    Patient also states that he wants to go home but states that he is aware that it is to be determined if he will be going home vs rehab.

## 2025-04-01 NOTE — PROGRESS NOTE ADULT - SUBJECTIVE AND OBJECTIVE BOX
Date of Service  : 4/1/2025   Indication for Geriatrics and Palliative Care Services: goals of care    INTERVAL HPI/OVERNIGHT EVENTS:  Palliative care reconsulted for goals of care  Per chart review, patient self-removed NG tube and conversation yesterday with primary team regarding option of venting peg vs surgical bypass     SUBJECTIVE AND OBJECTIVE:  Patient seen and examined at bedside. Patient AAOx3 . Denies any acute symptoms , including pain and nausea. He states he wants to eat.     Allergies    aspirin (Unknown)  penicillin (Unknown)    Intolerances    MEDICATIONS  (STANDING):  chlorhexidine 4% Liquid 1 Application(s) Topical <User Schedule>  dexAMETHasone  Injectable 4 milliGRAM(s) IV Push every 12 hours  dextrose 5% + lactated ringers. 1000 milliLiter(s) (75 mL/Hr) IV Continuous <Continuous>  heparin   Injectable 5000 Unit(s) SubCutaneous every 8 hours  influenza  Vaccine (HIGH DOSE) 0.5 milliLiter(s) IntraMuscular once  metoclopramide Injectable 10 milliGRAM(s) IV Push every 8 hours  octreotide  Injectable 100 MICROGram(s) SubCutaneous every 8 hours  pantoprazole  Injectable 40 milliGRAM(s) IV Push daily  Parenteral Nutrition - Adult 1 Each (42 mL/Hr) TPN Continuous <Continuous>    MEDICATIONS  (PRN):  sodium chloride 0.9% lock flush 10 milliLiter(s) IV Push every 1 hour PRN Pre/post blood products, medications, blood draw, and to maintain line patency        ITEMS UNCHECKED ARE NOT PRESENT    PRESENT SYMPTOMS: [ ]Unable to self-report due to altered mental status- see [ ] CPOT [ ] PAINADS [ ] RDOS  Source if other than patient:  [ ]Family   [ ]Team     Pain:  [ ]yes [x ]no  QOL impact -   Location -                    Aggravating factors -  Quality -  Radiation -  Timing-  Severity (0-10 scale):  Minimal acceptable level / Pain Goal (0-10 scale):     Dyspnea:                           [ ]Mild [ ]Moderate [ ]Severe  Anxiety:                             [ ]Mild [ ]Moderate [ ]Severe  Agitation:                          [ ]Mild [ ]Moderate [ ]Severe  Fatigue:                             [ ]Mild [ ]Moderate [ ]Severe  Nausea:                             [ ]Mild [ ]Moderate [ ]Severe  Loss of appetite:              [ ]Mild [ ]Moderate [ ]Severe  Constipation:                   [ ]Mild [ ]Moderate [ ]Severe  Diarrhea:                          [ ]Mild [ ]Moderate [ ]Severe    CPOT:    https://www.[facility].org/getattachment/[email or web address]/Critical-Care-Pain-Observation-Tool-(CPOT)    PCSSQ[Palliative Care Spiritual Screening Question]   Severity (0-10):  Score of 4 or > indicate consideration of Chaplaincy referral.  Chaplaincy Referral: [ ] yes [ ] refused [ ] following [x ] deferred    Caregiver [place]? : [ ] yes [ ] no [ ] Declined [x ] Deferred              Social work referral [ ] Patient & Family Centered Care Referral [ ]     Anticipatory Grief present?:  [ ] yes [ ] no  [ x] Deferred                  Social work referral [ ] Chaplaincy Referral[ ] Caregiver Support Referral [ ]    Other Symptoms:  [ x]All other review of systems negative   [ ] Unable to obtain due to poor mentation     PHYSICAL EXAM:  Vital Signs Last 24 Hrs  T(C): 36.8 (01 Apr 2025 14:20), Max: 36.8 (01 Apr 2025 09:31)  T(F): 98.2 (01 Apr 2025 14:20), Max: 98.2 (01 Apr 2025 09:31)  HR: 66 (01 Apr 2025 14:20) (56 - 70)  BP: 154/47 (01 Apr 2025 14:20) (147/62 - 169/44)  BP(mean): 76 (01 Apr 2025 14:20) (76 - 77)  RR: 16 (01 Apr 2025 14:20) (16 - 18)  SpO2: 98% (01 Apr 2025 14:20) (94% - 100%)    Parameters below as of 01 Apr 2025 14:20  Patient On (Oxygen Delivery Method): room air      GENERAL: Poor historian   [x]Alert  [ x]Oriented x3   [ ]Lethargic   [ ]Unarousable  [x]Verbal  [ ]Non-Verbal  [x] No Distress  Behavioral:   [ ] Anxiety  [ ] Delirium [ ] Agitation [x] Calm  [] Other   HEENT:  [x]Normal  [] Temporal Wasting  [ ]Dry mouth   [ ]ET Tube/Trach  [ ]Oral lesions  [ ] Mucositis  PULMONARY: normal respiratory effort, no accessory muscle use   []Clear [ ]Tachypnea  [ ]Audible excessive secretions   [ ]Rhonchi        [ ]Right [ ]Left [ ]Bilateral  [ ]Crackles        [ ]Right [ ]Left [ ]Bilateral  [ ]Wheezing     [ ]Right [ ]Left [ ]Bilateral  [ ]Diminished breath sounds [ ]right [ ]left [ ]bilateral  CARDIOVASCULAR:    [x]Regular [ ]Irregular [ ]Tachy  [ ]Bart [ ]Murmur [ ]Other  GASTROINTESTINAL:  [x]Soft  [ ]Distended   []+BS  [x]Non tender [ ]Tender  [ ]PEG [ ]OGT/ NGT  Last BM:   GENITOURINARY:  [ ]Normal [ ] Incontinent   [ ]Oliguria/Anuria   [x]Gill  MUSCULOSKELETAL:   [ ]Normal   [x ]Weakness  []Bed/Wheelchair bound [ ]Edema  [  ] amputation  [  ] contraction  NEUROLOGIC:   [x]No focal deficits  [ ]Cognitive impairment  [ ]Dysphagia [ ]Dysarthria [ ]Paresis [ ]Other   SKIN: See Nursing Skin Assessment for further details  [ ]Normal    [ ]Rash  [ ]Pressure ulcer(s)       Present on admission [ ]y [ ]n   [  ]  Wound    [  ] hyperpigmentation    CRITICAL CARE:  [ ]Shock Present  [ ]Septic [ ]Cardiogenic [ ]Neurologic [ ]Hypovolemic  [ ]Vasopressors [ ]Inotropes  [ ]Respiratory failure present [ ]Mechanical Ventilation [ ]Non-invasive ventilatory support [ ]High-Flow   [ ]Acute  [ ]Chronic [ ]Hypoxic  [ ]Hypercarbic [ ]Other  [ ]Other organ failure     LABS:  reviewed                                          13.9   12.79 )-----------( 205      ( 01 Apr 2025 05:30 )             42.1       04-01    131[L]  |  98  |  16  ----------------------------<  156[H]  4.3   |  23  |  0.76    Ca    8.3[L]      01 Apr 2025 05:30  Phos  2.2     04-01  Mg     2.00     04-01            RADIOLOGY & ADDITIONAL STUDIES: reviewed     Protein Calorie Malnutrition Present: [ ]mild [ ]moderate [x ]severe [ ]underweight [ ]morbid obesity  https://www.andeal.org/vault/2440/web/files/ONC/Table_Clinical%20Characteristics%20to%20Document%20Malnutrition-White%20JV%20et%20al%202012.pdf    Height (cm): 172.7 (03-22-25 @ 10:19)  Weight (kg): 59 (03-21-25 @ 17:07), 61.19 (03-01-25 @ 15:28), 61.2 (11-27-24 @ 04:28)  BMI (kg/m2): 19.8 (03-22-25 @ 10:19)    [ ]PPSV2 < or = 30%  [ ]significant weight loss [ ]poor nutritional intake [ ]anasarca   [ ]Artificial Nutrition    REFERRALS:   [ ]Chaplaincy  [ ]Hospice  [ ]Child Life  [ ]Social Work  [x ]Case management [ ]Holistic Therapy

## 2025-04-01 NOTE — PROCEDURE NOTE - PROCEDURE FINDINGS AND DETAILS
Successful insertion of 5Fr double lumen PICC via the left basilic vein.   Tip of PICC in SVC. PICC length: 40cm

## 2025-04-01 NOTE — PROGRESS NOTE ADULT - SUBJECTIVE AND OBJECTIVE BOX
NUTRITION NOTE  [id number] STOMBER  ===============================    Interval events - Patient was seen and examined at bedside, no acute events overnight. Patient denies chest pain, shortness of breath, nausea or vomiting at this time. Plan for IR PICC placement today; TPN ordered for tonight.     ROS: Except as noted above, all other systems reviewed and are negative     Allergies  aspirin (Unknown)  penicillin (Unknown)    PAST MEDICAL & SURGICAL HISTORY:  BPH (benign prostatic hyperplasia)  Peripheral neuropathy  Heart failure, diastolic, chronic  Colon cancer  Bilateral inguinal hernia  Recurrent inguinal hernia  GERD (gastroesophageal reflux disease)  H/O hemicolectomy  H/O bilateral inguinal hernia repair    Vital Signs Last 24 Hrs  T(C): 36.8 (01 Apr 2025 09:31), Max: 37 (31 Mar 2025 14:01)  T(F): 98.2 (01 Apr 2025 09:31), Max: 98.6 (31 Mar 2025 14:01)  HR: 66 (01 Apr 2025 09:31) (56 - 74)  BP: 158/63 (01 Apr 2025 09:31) (122/60 - 169/44)  RR: 18 (01 Apr 2025 09:31) (16 - 18)  SpO2: 95% (01 Apr 2025 09:31) (94% - 100%)    MEDICATIONS  (STANDING):  dexAMETHasone  Injectable 4 milliGRAM(s) IV Push every 12 hours  dextrose 5% + lactated ringers. 1000 milliLiter(s) (75 mL/Hr) IV Continuous <Continuous>  heparin   Injectable 5000 Unit(s) SubCutaneous every 8 hours  influenza  Vaccine (HIGH DOSE) 0.5 milliLiter(s) IntraMuscular once  metoclopramide Injectable 10 milliGRAM(s) IV Push every 8 hours  octreotide  Injectable 100 MICROGram(s) SubCutaneous every 8 hours  pantoprazole  Injectable 40 milliGRAM(s) IV Push daily  Parenteral Nutrition - Adult 1 Each (42 mL/Hr) TPN Continuous <Continuous>  potassium phosphate IVPB 15 milliMole(s) IV Intermittent once    I&O's Detail    31 Mar 2025 07:01  -  01 Apr 2025 07:00  --------------------------------------------------------  IN:    dextrose 5% + lactated ringers: 1050 mL    IV PiggyBack: 100 mL  Total IN: 1150 mL    OUT:    Indwelling Catheter - Urethral (mL): 1050 mL    Nasogastric/Oral tube (mL): 100 mL    Oral Fluid: 0 mL  Total OUT: 1150 mL    Total NET: 0 mL      01 Apr 2025 07:01  -  01 Apr 2025 11:08  --------------------------------------------------------  IN:  Total IN: 0 mL    OUT:    Indwelling Catheter - Urethral (mL): 300 mL    Oral Fluid: 0 mL  Total OUT: 300 mL    Total NET: -300 mL    Drug Dosing Weight  Height (cm): 172.7 (22 Mar 2025 10:19)  Weight (kg): 59 (21 Mar 2025 17:07)  BMI (kg/m2): 19.8 (22 Mar 2025 10:19)  BSA (m2): 1.7 (22 Mar 2025 10:19)    PHYSICAL EXAM   General: NAD, resting in bed comfortably.  Cardiac: regular rate, warm and well perfused  Respiratory: Nonlabored respirations, normal cw expansion.  Abdomen: soft, nontender, mildly distended.   Extremities: normal strength, FROM, no deformities    Diet: NPO and TPN (plan to start today 4/1/25)    LABORATORY                          13.9   12.79 )-----------( 205      ( 01 Apr 2025 05:30 )             42.1     04-01    131[L]  |  98  |  16  ----------------------------<  156[H]  4.3   |  23  |  0.76    Ca    8.3[L]      01 Apr 2025 05:30  Phos  2.2     04-01  Mg     2.00     04-01    ASSESSMENT/PLAN:  89M with hx BPH (chronic indwelling Gill), HLD, B/L inguinal hernia repair with known left recurrence, colon cancer (T4N1 w/ two synchronous lesions) s/p extended right hemicolectomy w/ ileocolic anastomosis on 12/23 (not on chemo), recent presentation 2/24 for acute diastolic heart failure, now admitted with abdominal pain, found to have high grade SBO w/ RUQ TP at a spiculated soft tissue lesion concerning for peritoneal carcinomatosis. Nutrition support consult called for evaluation for TPN in view of prolonged NPO and severe malnutrition. Med-onc consult --> poor chemotherapy candidate, recommend palliative care involvement if patient fails nonoperative management.    TPN ordered for tonight with 1L infusion volume, TPN will provide 625 kcal/day; lipids will be started tomorrow pending triglyceride level     labs reviewed - electrolytes adjusted in TPN bag    monitor fingersticks, obtain daily weights    1.  Severe protein calorie malnutrition being optimized with TPN: CHO [125] gm.  AA [50] gm. SMOF Lipids [0] gm.  2.  Hyperglycemia managed with: [0] units of regular insulin    3.  Check fluid balance daily.  Strict I/O  [ ] [ ]   4.  Daily BMP, Ionized Calcium, Magnesium and Phosphorous   5.  Triglycerides at initiation of TPN and monthly     Nutrition Support [id number]

## 2025-04-01 NOTE — PROGRESS NOTE ADULT - SUBJECTIVE AND OBJECTIVE BOX
TEAM [ A ] Surgery Daily Progress Note  =====================================================    SUBJECTIVE: Patient seen and examined at bedside on AM rounds. Patient reporting some "rumbling" in stomach but no nausea. Pt self dc'd NGT yesterday afternoon. NPO, denies nausea, vomiting. -Flatus/-BM. OOB/Amublating as tolerated. Denies fever, chills.      ALLERGIES:  aspirin (Unknown)  penicillin (Unknown)      --------------------------------------------------------------------------------------    MEDICATIONS:    Neurologic Medications  metoclopramide Injectable 10 milliGRAM(s) IV Push every 8 hours    Respiratory Medications    Cardiovascular Medications    Gastrointestinal Medications  dextrose 5% + lactated ringers. 1000 milliLiter(s) IV Continuous <Continuous>  pantoprazole  Injectable 40 milliGRAM(s) IV Push daily  potassium phosphate IVPB 15 milliMole(s) IV Intermittent once    Genitourinary Medications    Hematologic/Oncologic Medications  heparin   Injectable 5000 Unit(s) SubCutaneous every 8 hours  influenza  Vaccine (HIGH DOSE) 0.5 milliLiter(s) IntraMuscular once    Antimicrobial/Immunologic Medications    Endocrine/Metabolic Medications  dexAMETHasone  Injectable 4 milliGRAM(s) IV Push every 12 hours  octreotide  Injectable 100 MICROGram(s) SubCutaneous every 8 hours    Topical/Other Medications    --------------------------------------------------------------------------------------    VITAL SIGNS:  T(C): 36.3 (04-01-25 @ 05:50), Max: 37 (03-31-25 @ 14:01)  HR: 56 (04-01-25 @ 05:50) (56 - 74)  BP: 169/44 (04-01-25 @ 05:50) (122/60 - 169/44)  RR: 17 (04-01-25 @ 05:50) (16 - 17)  SpO2: 100% (04-01-25 @ 05:50) (94% - 100%)  --------------------------------------------------------------------------------------    EXAM    General: NAD, resting in bed comfortably.  Cardiac: regular rate, warm and well perfused  Respiratory: Nonlabored respirations, normal cw expansion.  Abdomen: soft, nontender, mildly distended.   Extremities: normal strength, FROM, no deformities    --------------------------------------------------------------------------------------    LABS                        13.9   12.79 )-----------( 205 ( 01 Apr 2025 05:30 )             42.1   04-01    131[L]  |  98  |  16  ----------------------------<  156[H]  4.3   |  23  |  0.76    Ca    8.3[L]      01 Apr 2025 05:30  Phos  2.2     04-01  Mg     2.00     04-01      --------------------------------------------------------------------------------------    INS AND OUTS:    03-31-25 @ 07:01  -  04-01-25 @ 07:00  --------------------------------------------------------  IN: 1150 mL / OUT: 1150 mL / NET: 0 mL      --------------------------------------------------------------------------------------

## 2025-04-01 NOTE — PROGRESS NOTE ADULT - ASSESSMENT
89M hx BPH (chronic indwelling Bruner), HLD, B/L inguinal hernia repair with known left recurrence, colon cancer (T4N1 w/ two synchronous lesions) s/p extended right hemicolectomy w/ ileocolic anastomosis on 12/23 (not on chemo), recent presentation 2/24 for acute diastolic heart failure, presenting with abdominal pain, found to have high grade SBO w/ RUQ TP at a spiculated soft tissue lesion concerning for peritoneal carcinomatosis, now endorsing flatus and tolerating clear liquids but hospital course complicated by altered mental status and aggressive behavior and s/p zyprexa x2 on 3/24. Now with improved mental status and no longer requiring IV antipsychotics however now without bowel function and s/p NG tube replacement on 3/26 due to increased distention and 1x emesis    Plan:  - c/w [id number]  - Palliative did not see pt yesterday as relayed over the weekend, to be seen today  - TPN team consulted, to start tonight  - IR PICC today for TPN  - Monitor bowel function   - ID following, continue to trend WBCs, no clinical signs of active/ongoing infection  - Chronic bruner in place, continue to monitor UOP  - PT eval --> rec ADALID  - Med-onc consult --> poor chemotherapy candidate, recommend palliative care involvement if patient fails nonoperative management  - VTE ppx: SQH  - protonix    A team   [id number]

## 2025-04-01 NOTE — CHART NOTE - NSCHARTNOTEFT_GEN_A_CORE
NUTRITION FOLLOW UP NOTE    Pt seen for new TPN.     SOURCE: [] Patient [] Medical record [] RN/PCA [] Family/Caregiver [] Patient unavailable [] Patient inappropriate (disoriented, nonverbal, intubated/sedated) [] Other:    Medical Course:    Diet Prescription:   - NPO    Nutrition Course:  - Per chart, pt is 89 year old male PMH BPH, HLD, bilateral inguinal hernia repair with known left recurrence, colon cancer s/p extended right hemicolectomy with ileocolic anastomosis (12/23) presenting with abdominal pain found to have high grade SBO with RUQ TP at a spiculated soft tissue lesion concerning for peritoneal carcinomatosis. Course complicated by AMS, aggressive behavior. Now without bowel function, s/p PICC placement with initiation of TPN (3/31). Nutrition Support, ID, Palliative and Colorectal Surgery following.       Parenteral Nutrition: TPN 1.5L infusing over 12 hours @ 75 mL/hr (88 gm amino acids, 150 gm dextrose, 40 gm SMOF lipids) to provide 1262 kcal/day (meets 30 kcal/kg, 1.7 gm protein/kg @ ideal body weight 54.5 kg).     Food Allergy/Intolerance:  - NKFA    Pertinent Medications:   - dexAMETHasone Injectable, dextrose 5% + lactated ringers IV Continuous, metoclopramide Injectable, octreotide Injectable, pantoprazole Injectable, potassium phosphate IV    Pertinent Labs:   - (4/1) Na 131 mmol/L[L] Glu 156 mg/dL[H] K+ 4.3 mmol/L Cr 0.76 mg/dL BUN 16 mg/dL Phos 2.2 mg/dL[L]  - POCT: (4/1) 176, (3/31)      Weight: (3/21 dosing) 130 lbs / 59 kg  Height: 68 in / 172.7 cm  IBW: 154 lbs / 70 kg +/-10%  BMI: 19.8 kg/m^2    Physical Assessment, per flowsheets:  Edema/Pressure Injury: none noted     Estimated Needs:   [X] Recalculated, based on dosing weight 130 lbs / 59 kg   kcal daily @ kcal/kg,  gm protein daily @ gm/kg     Previous Nutrition Diagnosis: [X] Severe malnutrition in the context of acute illness or injury, ongoing [X] Unintended weight loss, remains appropriate   New Nutrition Diagnosis: [ ] not applicable     Education:  [ ] Provided pt with verbal / written education on   [ ] Provided on previous assessment by RD  [ ] Not applicable secondary to   [ ] Pt refused     Interventions:   1)   2)  3)   [] Current medical condition precludes nutrition intervention at this time.     Monitor & Evaluate:  PO intake, tolerance to diet/supplement, nutrition related lab values, weight trends, BMs/GI distress, hydration status, skin integrity.    Shasha Spencer RDN, CDN #[id number]  Also available on [employer] Teams NUTRITION FOLLOW UP NOTE    Pt seen for new TPN.     SOURCE: [X] Medical record    Medical Course:  - Per chart, pt is 89 year old male PMH BPH, HLD, bilateral inguinal hernia repair with known left recurrence, colon cancer s/p extended right hemicolectomy with ileocolic anastomosis (12/23) presenting with abdominal pain found to have high grade SBO with RUQ TP at a spiculated soft tissue lesion concerning for peritoneal carcinomatosis. Course complicated by AMS, aggressive behavior. Nutrition Support, ID, Palliative and Colorectal Surgery following.     Diet Prescription:   - NPO    Nutrition Course:  - Pt with limited meaningful contribution to discussion, confused during time of visit.   - Pt has remained on an inadequate diet for nine out of ten days. S/p NGT decompression, now removed. Plan for PICC placement today to initiate TPN. Current order: 1L (50 gm amino acids, 125 gm dextrose) to provide 625 kcal/day. Plan to start lipids tomorrow pending triglyceride level.   - Labs notable for hyponatremia, hypophosphatemia. Fingersticks WDL. Continues on IVF with D5.  - Unknown last BM. Continues on SBO protocol.     Food Allergy/Intolerance:  - NKFA    Pertinent Medications:   - dexAMETHasone Injectable, dextrose 5% + lactated ringers IV Continuous, metoclopramide Injectable, octreotide Injectable, pantoprazole Injectable, potassium phosphate IV    Pertinent Labs:   - (4/1) Na 131 mmol/L[L] Glu 156 mg/dL[H] K+ 4.3 mmol/L Cr 0.76 mg/dL BUN 16 mg/dL Phos 2.2 mg/dL[L]  - POCT: (4/1) 176, (3/31)      Weight: (3/21 dosing) 130 lbs / 59 kg  Height: 68 in / 172.7 cm  IBW: 154 lbs / 70 kg +/-10%  BMI: 19.8 kg/m^2    Physical Assessment, per flowsheets:  Edema/Pressure Injury: none noted     Estimated Needs:   [X] Recalculated, based on dosing weight 130 lbs / 59 kg  5823-3067 kcal daily @28-32 kcal/kg, 82.6-100.3 gm protein daily @1.4-1.7 gm/kg     Previous Nutrition Diagnosis: [X] Severe malnutrition in the context of acute illness or injury, ongoing [X] Unintended weight loss, remains appropriate   New Nutrition Diagnosis: [X] not applicable     Education:  [X] not applicable     Interventions:   1) TPN per Nutrition Support team.  2) Monitor electrolytes (K+, Mg, P) and replete to within desired limits as clinically indicated.   3) Obtain weekly weights.     Monitor & Evaluate:  Diet progression, nutrition related lab values, weight trends, BMs/GI distress, hydration status, skin integrity.    Shasha Spencer, PELON, CDN #[id number]  Also available on [employer] Teams

## 2025-04-01 NOTE — PROGRESS NOTE ADULT - PROBLEM SELECTOR PLAN 3
- 4/1- Mental status improved today - Patient AAOx3 and able to recall discussions with surgery team yesterday.   - As patient is at risk for delirium, patient's capacity should be continued to be reassessed by primary team routinely as appropriate.   Would recommend ongoing delirium precautions as below:   Frequent reassurance and verbal orientation.  Delusions and hallucinations should be neither endorsed nor challenged.   Physical restraints should be avoided. Alternatives to restraint use, such as constant observation.  PT eval and early ambulation if possible.  Update the calendar date in the room.    Monitor for constipation, urinary retention, pain, hunger, thirst, etc.    Promote sleep wake cycle and reorientation as indicated.  - In the event that patient has worsening of mental status again, can consider reconsulting psych for capacity assessment at that time. And in the event, patient has no capacity, would recommend Ethics consult to determine decision maker. - 4/1- Mental status improved today - Patient AAOx3 and able to recall discussions with surgery team yesterday.   - As patient is at risk for delirium, patient's capacity should be continued to be reassessed by primary team routinely as appropriate.   Would recommend ongoing delirium precautions as below:   Frequent reassurance and verbal orientation.  Delusions and hallucinations should be neither endorsed nor challenged.   Physical restraints should be avoided. Alternatives to restraint use, such as constant observation.  PT eval and early ambulation if possible.  Update the calendar date in the room.    Monitor for constipation, urinary retention, pain, hunger, thirst, etc.    Promote sleep wake cycle and reorientation as indicated.  - In the event that patient has worsening of mental status again with concerns of his capacity for medical decision making, can consider reconsulting psych for capacity assessment at that time. And in the event, patient has no capacity, would recommend Ethics consult.

## 2025-04-01 NOTE — PROGRESS NOTE ADULT - PROBLEM SELECTOR PLAN 2
- CT findings - High-grade small bowel obstruction to the level of a single transition  point in the right upper quadrant at which there is a similar appearing spiculated soft tissue lesion concerning for peritoneal carcinomatosis.   - on malignant SBO protocol   - s/p self removal of NG tube. Primary team discussed option of venting peg vs surgical bypass with patient.   - 4/1- Patient still deciding on which procedure he wishes to pursue. He had questions regarding the risks of them; discussed will have primary team follow up to address his questions/ concerns regarding risks/benefits.   - management as per primary team

## 2025-04-02 NOTE — PROGRESS NOTE ADULT - ASSESSMENT
89M hx BPH (chronic indwelling Bruner), HLD, B/L inguinal hernia repair with known left recurrence, colon cancer (T4N1 w/ two synchronous lesions) s/p extended right hemicolectomy w/ ileocolic anastomosis on 12/23 (not on chemo), recent presentation 2/24 for acute diastolic heart failure, presenting with abdominal pain, found to have high grade SBO w/ RUQ TP at a spiculated soft tissue lesion concerning for peritoneal carcinomatosis, now endorsing flatus and tolerating clear liquids but hospital course complicated by altered mental status and aggressive behavior and s/p zyprexa x2 on 3/24. Now with improved mental status and no longer requiring IV antipsychotics however now without bowel function and s/p NG tube replacement on 3/26 due to increased distention and 1x emesis    Plan:  - c/w D5LR  - Palliative did not see pt yesterday as relayed over the weekend, to be seen today  - s/p PICC (4/1), NPO/TPN  - Monitor bowel function   - ID following, continue to trend WBCs, no clinical signs of active/ongoing infection  - Chronic bruner in place, continue to monitor UOP (last exchange was on 3/4 with Kim Mercer)  - Will f/u with Dr. Delvalle (ONC)  - PT eval --> rec ADALID  - Med-onc consult --> poor chemotherapy candidate, recommend palliative care involvement if patient fails nonoperative management  - VTE ppx: SQH  - protonix    A team   [id number]

## 2025-04-02 NOTE — PROGRESS NOTE ADULT - PROBLEM SELECTOR PLAN 5
- 4/1- Patient AAOx3 and able to recall discussions with surgery team yesterday. Patient still deciding on which procedure he wishes to pursue. He had questions regarding the risks of them; discussed will have primary team follow up to address his questions/ concerns regarding risks/benefits.   Patient wishes to speak with Dr. Delvalle and his team as he states he was planned to see him outpatient; he is interested in following up with outpatient if alternative treatment options are offered.   Attempted to introduce advanced directives for code status preferences in event of cardiopulmonary arrest, however, patient continues to say he wants to go home and is still considering treatment options. - 4/2- Patient AAOx3 and states he is still contemplating as to which procedure to pursue (venting peg vs bypass) as he is still weighing the risks of both and potential outcomes of both.   Patient again states he wishes to speak with Dr. Delvalle and his team. Reviewed with him that per inpatient oncology team who had seen him on 3/22 - he is not a candidate for IV chemotherapy but could possibly be a candidate for oral regimen. Patient states he would be open to this if recommended by Dr. Delvalle, whom he wishes to speak to further.   Attempted to introduce advanced directives for code status preferences in event of cardiopulmonary arrest as patient stated a few times during encounter that he needs to go home to speak to a  regarding his burial plans. Patient initially stated he never considered advanced directives but when asked again stated that he would want all treatments necessary

## 2025-04-02 NOTE — PROGRESS NOTE ADULT - PROBLEM SELECTOR PLAN 3
- 4/1- Mental status improved - Patient AAOx3 and able to recall discussions with surgery team yesterday.   - As patient is at risk for delirium, patient's capacity should be continued to be reassessed by primary team routinely as appropriate.   Would recommend ongoing delirium precautions as below:   Frequent reassurance and verbal orientation.  Delusions and hallucinations should be neither endorsed nor challenged.   Physical restraints should be avoided. Alternatives to restraint use, such as constant observation.  PT eval and early ambulation if possible.  Update the calendar date in the room.    Monitor for constipation, urinary retention, pain, hunger, thirst, etc.    Promote sleep wake cycle and reorientation as indicated.  - In the event that patient has worsening of mental status again with concerns of his capacity for medical decision making, can consider reconsulting psych for capacity assessment at that time. And in the event, patient has no capacity, would recommend Ethics consult.

## 2025-04-02 NOTE — PROGRESS NOTE ADULT - PROBLEM SELECTOR PLAN 2
- CT findings - High-grade small bowel obstruction to the level of a single transition  point in the right upper quadrant at which there is a similar appearing spiculated soft tissue lesion concerning for peritoneal carcinomatosis.   - on malignant SBO protocol   - s/p self removal of NG tube. Primary team discussed option of venting peg vs surgical bypass with patient.   - 4/1- Patient still deciding on which procedure he wishes to pursue. He had questions regarding the risks of them; discussed will have primary team follow up to address his questions/ concerns regarding risks/benefits.   - 4/2- Patient still undecided, unsure as to which procedure to pursue. He continues to have concern about which option would be best and provide him the best outcome.   - management as per primary team - CT findings - High-grade small bowel obstruction to the level of a single transition  point in the right upper quadrant at which there is a similar appearing spiculated soft tissue lesion concerning for peritoneal carcinomatosis.   - on malignant SBO protocol   - s/p self removal of NG tube. Primary team discussed option of venting peg vs surgical bypass with patient.   - 4/2- Patient still undecided, unsure as to which procedure to pursue. He continues to have concern about which option would be best and provide him the best outcome.   - management as per primary team

## 2025-04-02 NOTE — CONSULT NOTE ADULT - ATTENDING COMMENTS
89M, colon cancer s/p resection, admitted with abdominal pain and found to have high grade SBO and imaging findings concerning for peritoneal carcinomatosis. No bowel movements, not passing gas. GI consulted for endoscopic placement of a venting peg tube.     Long discussion w patient at bedside today.   Patient requesting time to think about surgery vs venting peg tube placement   Please consult heme/onc per patient request   NPO   rest of care per primary team   GI to follow, please call w questions
89 yo M hx BPH (chronic indwelling Gill), HLD, B/L inguinal hernia repair, and history of stage IIIB colon cancer (T4N1 w/ two synchronous lesions) s/p extended right hemicolectomy w/ ileocolic anastomosis on 12/23, no adjuvant chemotherapy. He is now admitted for high grade SBO. CT A/P showed a high-grade small bowel obstruction with a spiculated soft tissue lesion concerning for peritoneal carcinomatosis.  Continue malignant SBO protocol per surgery with NG tube decompression.  If no improvement with conservative measures, would recommend palliative care consult to help with discussions of overall GOC. He has limited options given his age and as he lives alone and has no family. He is not a candidate for IV chemotherapy (although could possibly be a candidate for oral capecitabine) and he would like to avoid surgery if possible, but might be willing for intervention if needed to manage obstruction. Oncology will continue to follow and he can follow up with his primary oncologist Dr. Delvalle at [facility] following discharge.
Patient was seen and evaluated with Dr. Martinez , Geriatric Medicine Fellow, during bedside rounds.   Agree with above findings and recommendations, edited documentation as appropriate to reflect the plan of care discussed with patient and myself with the following additions:    89M hx BPH (chronic indwelling Gill), HLD, B/L inguinal hernia repair with known left recurrence, colon cancer (T4N1 w/ two synchronous lesions) s/p extended right hemicolectomy w/ ileocolic anastomosis on 12/23 (not on chemo), recent presentation 2/24 for acute diastolic heart failure, presenting with abdominal pain, found to have high grade SBO w/ RUQ TP at a spiculated soft tissue lesion concerning for peritoneal carcinomatosis  Palliative Care consulted for complex decision making  related to goals of care discussions     Patient seen and examined at bedside. Patient AAOx2-3 during encounter with delirium and confusion intermittently during encounter.   Patient states he lives alone at home and ambulates with a cane. He is .   Patient without any acute complaints, including pain, nausea/dyspnea.   Counseled patient regarding role of HCP.Shared with patient that a health care proxy is a legal document that appoints someone else to serve as your health care agent regarding healthcare decisions when you cannot do so for yourself. A HCP speaks on your behalf, expresses your wishes, and has the authority to make medical decisions. A HCP becomes your voice for all of your healthcare decisions.  Provided reassurance that patient will continue to make their own medical decisions and that a proxy would substitute only in the event that the patient was incapable of doing so.   Patient states he has a daughter Elsa who possibly has an underlying intellectual developmental disability as he states she has a "brain of a child" although she is an adult and of note states it once was classified as "retarded" and resides in a "residential" facility where there are doctors to help her and takes her out on "outings"; he visits he states he visits his daughter whenever his health allows him. He states he does not want Elsa knowing that he is currently at hospital.   Patient states he has a brother, whom he has not spoken to in over 50 years and unsure if his brother is still alive. He states he has no other family/ friends.   Attempted to assess patient's understanding of his medical condition, but he denies having any past medical history including ever having had cancer or any procedures for it. He is unable to state why he is hospitalized or his current hospital course.  Attempted to introduce advanced directives to patient regarding preferences for code status in event of cardiopulmonary arrest including options of resuscitative interventions of CPR/mechanical intubation vs allowing for natural death. Initially, he stated that everyone will pass away eventually. However on repeat attempt to discuss, patient states he has never considered advanced directives before.   See [facility] note  16 minutes spent on advanced care planning/ goals of care    Patient seems to be an elder orphan with delirium as he is AAOx 2-3 with confusion with unclear baseline mental status.   Would recommend delirium precautions as above.   Can reattempt goals of care discussion with patient if/when his mental status improves     Rest of management as above  Case reviewed with primary team  Thank you for allowing us to participate in your patient's care. Please page [phone] for any questions/concerns.

## 2025-04-02 NOTE — PROGRESS NOTE ADULT - SUBJECTIVE AND OBJECTIVE BOX
CC: F/U for positive UCX    Saw/spoke to patient. Unchanged. No new events.    Allergies  aspirin (Unknown)  penicillin (Unknown)    ANTIMICROBIALS:      PE:    Vital Signs Last 24 Hrs  T(C): 36.6 (02 Apr 2025 12:56), Max: 36.8 (02 Apr 2025 09:22)  T(F): 97.9 (02 Apr 2025 12:56), Max: 98.2 (02 Apr 2025 09:22)  HR: 68 (02 Apr 2025 12:56) (53 - 68)  BP: 150/55 (02 Apr 2025 12:56) (137/51 - 163/43)  RR: 18 (02 Apr 2025 12:56) (16 - 18)  SpO2: 99% (02 Apr 2025 12:56) (91% - 99%)    Gen: AOx3, NAD, non-toxic  CV: Nontachycardic  Resp: Breathing comfortably, RA  Abd: Soft, nontender  IV/Skin: No thrombophlebitis    LABS:                        13.0   12.64 )-----------( 221      ( 02 Apr 2025 04:40 )             38.5     04-02    131[L]  |  98  |  15  ----------------------------<  149[H]  4.2   |  22  |  0.76    Ca    8.3[L]      02 Apr 2025 04:40  Phos  3.2     04-02  Mg     2.00     04-02    Urinalysis Basic - ( 02 Apr 2025 04:40 )    Color: x / Appearance: x / SG: x / pH: x  Gluc: 149 mg/dL / Ketone: x  / Bili: x / Urobili: x   Blood: x / Protein: x / Nitrite: x   Leuk Esterase: x / RBC: x / WBC x   Sq Epi: x / Non Sq Epi: x / Bacteria: x    MICROBIOLOGY:    Catheterized  03-21-25   >100,000 CFU/ml Enterococcus faecalis  --  Enterococcus faecalis    RADIOLOGY:    3/31 XR    IMPRESSION:  Small left pleural effusion/atelectasis again noted.  No enteric tube.

## 2025-04-02 NOTE — CONSULT NOTE ADULT - ASSESSMENT
89 yo M with PMH of BPH w/ chronic bruner, HLD, inguinal hernia repair x2, colon cancer T4N1 s/p extended right hemicolectomy with ileocolic anastomosis on 12/2023, and HFpEF presenting for abdominal pain. Patient found to have high grade SBO with ruq transition point at a spiculated soft tissue lesion c/f peritoneal carcinomatosis.     Impression:  #High Grade SBO due to peritoneal carcinomatosis  #Colon cancer s/p hemicolectomy  #Inguinal hernia repair    P/w abdominal pain found to have high grade SBO with ruq transition point at a spiculated soft tissue lesion c/f peritoneal carcinomatosis. GI consulted for venting peg placement. Pt not moving his bowels or passing gas. Pt considering if he would like to pursue surgery vs peg placement    - Clarify whether pt would like to pursue surgery vs venting peg placement  - Discussed option of venting peg placement (advised that would not allow pt to eat po diet on discharge)  - Please notify GI team if pt chooses to pursue peg placement  - Management of sbo per surgery team  - Keep patient NPO  - Palliative and oncology recommendations    All recommendations are tentative until note is attested by attending.     Jenny Del Toro, PGY4  Gastroenterology/Hepatology Fellow  Available on [employer] Teams  [phone] (Long Range Pager)  [phone] (Short Range Pager [facility])    After 5 pm, please contact the on-call GI fellow for any urgent issues via the Hospital Call

## 2025-04-02 NOTE — PROGRESS NOTE ADULT - ASSESSMENT
89 yo M chronic bruner, bilateral hernia repair, colon CA, prior hemicolectomy, heart failure presenting with abd pain  No fevers, no leukocytosis  Abd pain, found to have SBO  Chronic bruner  Extensive malignancy history  UA-/contam equivocal, UCX Enterococcus  No symptoms UTI  Low suspicion UTI  WBC rise likely related to SBO, waxing/waning  Planned for PICC/TPN  Overall, Asymptomatic bacteriuria, positive UCX  - Continue off antibiotics as long as no new signs infection  - Monitor for any signs/symptoms infection/UTI  - Care for SBO per surgery team  - F/U pending cultures  - Trend WBC to normal    Signing off. Please call [phone] or on call fellow with further questions or change in status.     Abner Almaguer MD  Contact on TEAMS messaging from 9am - 5pm  From 5pm-9am, on weekends, or if no response call [phone]    Antibiotic decision making based on local antibiogram and available recent culture results

## 2025-04-02 NOTE — PROGRESS NOTE ADULT - SUBJECTIVE AND OBJECTIVE BOX
TEAM [ A ] Surgery Daily Progress Note  =====================================================    INTERVAL:  - s/p PICC (4/1), TPN started overnight  - Palliative following: unable to ascertain goals of care, patient would like to speak to his outpatient ONC provider (Dr. Delvalle)    SUBJECTIVE: Patient seen and examined at bedside on AM rounds.    ALLERGIES:  aspirin (Unknown)  penicillin (Unknown)      --------------------------------------------------------------------------------------    MEDICATIONS:  chlorhexidine 4% Liquid 1 Application(s) Topical <User Schedule>  dexAMETHasone  Injectable 4 milliGRAM(s) IV Push every 12 hours  heparin   Injectable 5000 Unit(s) SubCutaneous every 8 hours  influenza  Vaccine (HIGH DOSE) 0.5 milliLiter(s) IntraMuscular once  metoclopramide Injectable 10 milliGRAM(s) IV Push every 8 hours  octreotide  Injectable 100 MICROGram(s) SubCutaneous every 8 hours  pantoprazole  Injectable 40 milliGRAM(s) IV Push daily  Parenteral Nutrition - Adult 1 Each TPN Continuous <Continuous>  sodium chloride 0.9% lock flush 10 milliLiter(s) IV Push every 1 hour PRN    --------------------------------------------------------------------------------------    VITAL SIGNS:  T(C): 36.5 (04-02-25 @ 04:30), Max: 36.8 (04-01-25 @ 09:31)  HR: 59 (04-02-25 @ 04:30) (53 - 66)  BP: 137/51 (04-02-25 @ 04:30) (137/51 - 163/43)  RR: 16 (04-02-25 @ 04:30) (16 - 18)  SpO2: 98% (04-02-25 @ 04:30) (91% - 99%)  --------------------------------------------------------------------------------------    INS AND OUTS:    03-31-25 @ 07:01  -  04-01-25 @ 07:00  --------------------------------------------------------  IN: 1150 mL / OUT: 1150 mL / NET: 0 mL    04-01-25 @ 07:01  -  04-02-25 @ 06:10  --------------------------------------------------------  IN: 804 mL / OUT: 1550 mL / NET: -746 mL      --------------------------------------------------------------------------------------      EXAM    General: NAD, resting in bed comfortably.  Cardiac: regular rate, warm and well perfused  Respiratory: Nonlabored respirations, normal cw expansion.  Abdomen: soft, nontender, mildly distended.   Extremities: normal strength, FROM, no deformities    --------------------------------------------------------------------------------------    LABS

## 2025-04-02 NOTE — PROGRESS NOTE ADULT - SUBJECTIVE AND OBJECTIVE BOX
Date of Service  : 4/1/2025   Indication for Geriatrics and Palliative Care Services: goals of care    INTERVAL HPI/OVERNIGHT EVENTS:  Palliative care reconsulted for goals of care.     SUBJECTIVE AND OBJECTIVE:  Patient seen and examined at bedside. Patient AAOx3. Patient has no complaints. He states he spoke with the primary team and considering his options. He wants treatment if offered. Repeatedly states he "wants to go home".      Allergies    aspirin (Unknown)  penicillin (Unknown)    Intolerances    MEDICATIONS  (STANDING):  chlorhexidine 4% Liquid 1 Application(s) Topical <User Schedule>  dexAMETHasone  Injectable 4 milliGRAM(s) IV Push every 12 hours  dextrose 5% + lactated ringers. 1000 milliLiter(s) (75 mL/Hr) IV Continuous <Continuous>  heparin   Injectable 5000 Unit(s) SubCutaneous every 8 hours  influenza  Vaccine (HIGH DOSE) 0.5 milliLiter(s) IntraMuscular once  metoclopramide Injectable 10 milliGRAM(s) IV Push every 8 hours  octreotide  Injectable 100 MICROGram(s) SubCutaneous every 8 hours  pantoprazole  Injectable 40 milliGRAM(s) IV Push daily  Parenteral Nutrition - Adult 1 Each (42 mL/Hr) TPN Continuous <Continuous>    MEDICATIONS  (PRN):  sodium chloride 0.9% lock flush 10 milliLiter(s) IV Push every 1 hour PRN Pre/post blood products, medications, blood draw, and to maintain line patency        ITEMS UNCHECKED ARE NOT PRESENT    PRESENT SYMPTOMS: [ ]Unable to self-report due to altered mental status- see [ ] CPOT [ ] PAINADS [ ] RDOS  Source if other than patient:  [ ]Family   [ ]Team     Pain:  [ ]yes [x ]no  QOL impact -   Location -                    Aggravating factors -  Quality -  Radiation -  Timing-  Severity (0-10 scale):  Minimal acceptable level / Pain Goal (0-10 scale):     Dyspnea:                           [ ]Mild [ ]Moderate [ ]Severe  Anxiety:                             [ ]Mild [ ]Moderate [ ]Severe  Agitation:                          [ ]Mild [ ]Moderate [ ]Severe  Fatigue:                             [ ]Mild [ ]Moderate [ ]Severe  Nausea:                             [ ]Mild [ ]Moderate [ ]Severe  Loss of appetite:              [ ]Mild [ ]Moderate [ ]Severe  Constipation:                   [ ]Mild [ ]Moderate [ ]Severe  Diarrhea:                          [ ]Mild [ ]Moderate [ ]Severe    CPOT:    https://www.[email or web address]/getattachment/[email or web address]/Critical-Care-Pain-Observation-Tool-(CPOT)    PCSSQ[Palliative Care Spiritual Screening Question]   Severity (0-10):  Score of 4 or > indicate consideration of Chaplaincy referral.  Chaplaincy Referral: [ ] yes [ ] refused [ ] following [x ] deferred    Caregiver [place]? : [ ] yes [ ] no [ ] Declined [x ] Deferred              Social work referral [ ] Patient & Family Centered Care Referral [ ]     Anticipatory Grief present?:  [ ] yes [ ] no  [ x] Deferred                  Social work referral [ ] Chaplaincy Referral[ ] Caregiver Support Referral [ ]    Other Symptoms:  [ x]All other review of systems negative   [ ] Unable to obtain due to poor mentation     PHYSICAL EXAM:  Vital Signs Last 24 Hrs  T(C): 36.8 (01 Apr 2025 14:20), Max: 36.8 (01 Apr 2025 09:31)  T(F): 98.2 (01 Apr 2025 14:20), Max: 98.2 (01 Apr 2025 09:31)  HR: 66 (01 Apr 2025 14:20) (56 - 70)  BP: 154/47 (01 Apr 2025 14:20) (147/62 - 169/44)  BP(mean): 76 (01 Apr 2025 14:20) (76 - 77)  RR: 16 (01 Apr 2025 14:20) (16 - 18)  SpO2: 98% (01 Apr 2025 14:20) (94% - 100%)    Parameters below as of 01 Apr 2025 14:20  Patient On (Oxygen Delivery Method): room air      GENERAL: Poor historian   [x]Alert  [ x]Oriented x3   [ ]Lethargic   [ ]Unarousable  [x]Verbal  [ ]Non-Verbal  [x] No Distress  Behavioral:   [ ] Anxiety  [ ] Delirium [ ] Agitation [x] Calm  [] Other   HEENT:  [x]Normal  [] Temporal Wasting  [ ]Dry mouth   [ ]ET Tube/Trach  [ ]Oral lesions  [ ] Mucositis  PULMONARY: normal respiratory effort, no accessory muscle use   []Clear [ ]Tachypnea  [ ]Audible excessive secretions   [ ]Rhonchi        [ ]Right [ ]Left [ ]Bilateral  [ ]Crackles        [ ]Right [ ]Left [ ]Bilateral  [ ]Wheezing     [ ]Right [ ]Left [ ]Bilateral  [ ]Diminished breath sounds [ ]right [ ]left [ ]bilateral  CARDIOVASCULAR:    [x]Regular [ ]Irregular [ ]Tachy  [ ]Bart [ ]Murmur [ ]Other  GASTROINTESTINAL:  [x]Soft  [ ]Distended   []+BS  [x]Non tender [ ]Tender  [ ]PEG [ ]OGT/ NGT  Last BM:   GENITOURINARY:  [ ]Normal [ ] Incontinent   [ ]Oliguria/Anuria   [x]Gill  MUSCULOSKELETAL:   [ ]Normal   [x ]Weakness  []Bed/Wheelchair bound [ ]Edema  [  ] amputation  [  ] contraction  NEUROLOGIC:   [x]No focal deficits  [ ]Cognitive impairment  [ ]Dysphagia [ ]Dysarthria [ ]Paresis [ ]Other   SKIN: See Nursing Skin Assessment for further details  [ ]Normal    [ ]Rash  [ ]Pressure ulcer(s)       Present on admission [ ]y [ ]n   [  ]  Wound    [  ] hyperpigmentation    CRITICAL CARE:  [ ]Shock Present  [ ]Septic [ ]Cardiogenic [ ]Neurologic [ ]Hypovolemic  [ ]Vasopressors [ ]Inotropes  [ ]Respiratory failure present [ ]Mechanical Ventilation [ ]Non-invasive ventilatory support [ ]High-Flow   [ ]Acute  [ ]Chronic [ ]Hypoxic  [ ]Hypercarbic [ ]Other  [ ]Other organ failure     LABS:  reviewed                                          13.9   12.79 )-----------( 205      ( 01 Apr 2025 05:30 )             42.1       04-01    131[L]  |  98  |  16  ----------------------------<  156[H]  4.3   |  23  |  0.76    Ca    8.3[L]      01 Apr 2025 05:30  Phos  2.2     04-01  Mg     2.00     04-01            RADIOLOGY & ADDITIONAL STUDIES: reviewed     Protein Calorie Malnutrition Present: [ ]mild [ ]moderate [x ]severe [ ]underweight [ ]morbid obesity  https://www.andeal.org/vault/2440/web/files/ONC/Table_Clinical%20Characteristics%20to%20Document%20Malnutrition-White%20JV%20et%20al%202012.pdf    Height (cm): 172.7 (03-22-25 @ 10:19)  Weight (kg): 59 (03-21-25 @ 17:07), 61.19 (03-01-25 @ 15:28), 61.2 (11-27-24 @ 04:28)  BMI (kg/m2): 19.8 (03-22-25 @ 10:19)    [ ]PPSV2 < or = 30%  [ ]significant weight loss [ ]poor nutritional intake [ ]anasarca   [ ]Artificial Nutrition    REFERRALS:   [ ]Chaplaincy  [ ]Hospice  [ ]Child Life  [ ]Social Work  [x ]Case management [ ]Holistic Therapy        Date of Service  : 4/1/2025   Indication for Geriatrics and Palliative Care Services: goals of care    INTERVAL HPI/OVERNIGHT EVENTS:  Palliative care reconsulted for goals of care.     SUBJECTIVE AND OBJECTIVE:  Patient seen and examined at bedside. Patient AAOx3. Patient has no complaints. He states he spoke with the primary team and considering his options. He wants treatment if offered. Repeatedly states he "wants to go home".      Allergies    aspirin (Unknown)  penicillin (Unknown)    Intolerances    MEDICATIONS  (STANDING):  chlorhexidine 4% Liquid 1 Application(s) Topical <User Schedule>  dexAMETHasone  Injectable 4 milliGRAM(s) IV Push every 12 hours  dextrose 5% + lactated ringers. 1000 milliLiter(s) (75 mL/Hr) IV Continuous <Continuous>  heparin   Injectable 5000 Unit(s) SubCutaneous every 8 hours  influenza  Vaccine (HIGH DOSE) 0.5 milliLiter(s) IntraMuscular once  metoclopramide Injectable 10 milliGRAM(s) IV Push every 8 hours  octreotide  Injectable 100 MICROGram(s) SubCutaneous every 8 hours  pantoprazole  Injectable 40 milliGRAM(s) IV Push daily  Parenteral Nutrition - Adult 1 Each (42 mL/Hr) TPN Continuous <Continuous>    MEDICATIONS  (PRN):  sodium chloride 0.9% lock flush 10 milliLiter(s) IV Push every 1 hour PRN Pre/post blood products, medications, blood draw, and to maintain line patency        ITEMS UNCHECKED ARE NOT PRESENT    PRESENT SYMPTOMS: [ ]Unable to self-report due to altered mental status- see [ ] CPOT [ ] PAINADS [ ] RDOS  Source if other than patient:  [ ]Family   [ ]Team     Pain:  [ ]yes [x ]no  QOL impact -   Location -                    Aggravating factors -  Quality -  Radiation -  Timing-  Severity (0-10 scale):  Minimal acceptable level / Pain Goal (0-10 scale):     Dyspnea:                           [ ]Mild [ ]Moderate [ ]Severe  Anxiety:                             [ ]Mild [ ]Moderate [ ]Severe  Agitation:                          [ ]Mild [ ]Moderate [ ]Severe  Fatigue:                             [ ]Mild [ ]Moderate [ ]Severe  Nausea:                             [ ]Mild [ ]Moderate [ ]Severe  Loss of appetite:              [ ]Mild [ ]Moderate [ ]Severe  Constipation:                   [ ]Mild [ ]Moderate [ ]Severe  Diarrhea:                          [ ]Mild [ ]Moderate [ ]Severe    CPOT:    https://www.[facility].org/getattachment/[email or web address]/Critical-Care-Pain-Observation-Tool-(CPOT)    PCSSQ[Palliative Care Spiritual Screening Question]   Severity (0-10):  Score of 4 or > indicate consideration of Chaplaincy referral.  Chaplaincy Referral: [ ] yes [ ] refused [ ] following [x ] deferred    Caregiver [place]? : [ ] yes [ ] no [ ] Declined [x ] Deferred              Social work referral [ ] Patient & Family Centered Care Referral [ ]     Anticipatory Grief present?:  [ ] yes [ ] no  [ x] Deferred                  Social work referral [ ] Chaplaincy Referral[ ] Caregiver Support Referral [ ]    Other Symptoms:  [ x]All other review of systems negative   [ ] Unable to obtain due to poor mentation     PHYSICAL EXAM:  Vital Signs Last 24 Hrs  T(C): 36.6 (02 Apr 2025 12:56), Max: 36.8 (02 Apr 2025 09:22)  T(F): 97.9 (02 Apr 2025 12:56), Max: 98.2 (02 Apr 2025 09:22)  HR: 68 (02 Apr 2025 12:56) (53 - 68)  BP: 150/55 (02 Apr 2025 12:56) (137/51 - 163/43)  BP(mean): --  RR: 18 (02 Apr 2025 12:56) (16 - 18)  SpO2: 99% (02 Apr 2025 12:56) (91% - 99%)    Parameters below as of 02 Apr 2025 12:56  Patient On (Oxygen Delivery Method): room air      GENERAL: Poor historian   [x]Alert  [ x]Oriented x3   [ ]Lethargic   [ ]Unarousable  [x]Verbal  [ ]Non-Verbal  [x] No Distress  Behavioral:   [ ] Anxiety  [ ] Delirium [ ] Agitation [x] Calm  [] Other   HEENT:  [x]Normal  [] Temporal Wasting  [ ]Dry mouth   [ ]ET Tube/Trach  [ ]Oral lesions  [ ] Mucositis  PULMONARY: normal respiratory effort, no accessory muscle use   []Clear [ ]Tachypnea  [ ]Audible excessive secretions   [ ]Rhonchi        [ ]Right [ ]Left [ ]Bilateral  [ ]Crackles        [ ]Right [ ]Left [ ]Bilateral  [ ]Wheezing     [ ]Right [ ]Left [ ]Bilateral  [ ]Diminished breath sounds [ ]right [ ]left [ ]bilateral  CARDIOVASCULAR:    [x]Regular [ ]Irregular [ ]Tachy  [ ]Bart [ ]Murmur [ ]Other  GASTROINTESTINAL:  [x]Soft  [ ]Distended   []+BS  [x]Non tender [ ]Tender  [ ]PEG [ ]OGT/ NGT  Last BM:   GENITOURINARY:  [ ]Normal [ ] Incontinent   [ ]Oliguria/Anuria   [x]Gill  MUSCULOSKELETAL:   [ ]Normal   [x ]Weakness  []Bed/Wheelchair bound [ ]Edema  [  ] amputation  [  ] contraction  NEUROLOGIC:   [x]No focal deficits  [ ]Cognitive impairment  [ ]Dysphagia [ ]Dysarthria [ ]Paresis [ ]Other   SKIN: See Nursing Skin Assessment for further details  [ ]Normal    [ ]Rash  [ ]Pressure ulcer(s)       Present on admission [ ]y [ ]n   [  ]  Wound    [  ] hyperpigmentation    CRITICAL CARE:  [ ]Shock Present  [ ]Septic [ ]Cardiogenic [ ]Neurologic [ ]Hypovolemic  [ ]Vasopressors [ ]Inotropes  [ ]Respiratory failure present [ ]Mechanical Ventilation [ ]Non-invasive ventilatory support [ ]High-Flow   [ ]Acute  [ ]Chronic [ ]Hypoxic  [ ]Hypercarbic [ ]Other  [ ]Other organ failure     LABS:  reviewed                                                        13.0   12.64 )-----------( 221      ( 02 Apr 2025 04:40 )             38.5       04-02    131[L]  |  98  |  15  ----------------------------<  149[H]  4.2   |  22  |  0.76    Ca    8.3[L]      02 Apr 2025 04:40  Phos  3.2     04-02  Mg     2.00     04-02                  RADIOLOGY & ADDITIONAL STUDIES: reviewed     Protein Calorie Malnutrition Present: [ ]mild [ ]moderate [x ]severe [ ]underweight [ ]morbid obesity  https://www.andeal.org/vault/1390/web/files/ONC/Table_Clinical%20Characteristics%20to%20Document%20Malnutrition-White%20JV%20et%20al%202012.pdf    Height (cm): 172.7 (03-22-25 @ 10:19)  Weight (kg): 59 (03-21-25 @ 17:07), 61.19 (03-01-25 @ 15:28), 61.2 (11-27-24 @ 04:28)  BMI (kg/m2): 19.8 (03-22-25 @ 10:19)    [ ]PPSV2 < or = 30%  [ ]significant weight loss [ ]poor nutritional intake [ ]anasarca   [ ]Artificial Nutrition    REFERRALS:   [ ]Chaplaincy  [ ]Hospice  [ ]Child Life  [ ]Social Work  [x ]Case management [ ]Holistic Therapy

## 2025-04-02 NOTE — CHART NOTE - NSCHARTNOTEFT_GEN_A_CORE
Spoke with patient's outpatient urology provider, Kim Mercer. Per CATRINA Mercer, pt has had the bruner for a long time, >1 year, due to chronic urinary retention s/p multiple failed TOV. Pt has BPH and bladder pathology resulting in the retention. Pt is to come into the office once a month for a catheter exchange. Last exchange in office was in [facility] and pt most recently had a catheter exchange in the ED on 3/4/25. Bruner catheter does not require any routine flushing and pt has been taking care of his bruner catheter independently at home. Pt is due to follow up in office soon and may had additional imaging performed tentative upon follow up evaluation. Pt is to maintain the bruner catheter and will follow up with CATRINA Mercer upon leaving the hospital.

## 2025-04-02 NOTE — PROGRESS NOTE ADULT - TIME BILLING
Time spent for extensive review of the physical chart, electronic medical record, and documentation to obtain collateral information including but not limited to:  [x] Inpatient records (ED, H&P, primary team, and consultants if applicable, care coordination)  [x] Inpatient values/results (biomarkers, immunoassays, imaging, and microbiology results)  [x] Current or proposed treatment plans  [x] Pharmacotherapy review  [x] Discussion and coordinating care with primary team and interdisciplinary staff and floor staff  [x] Discussion including counseling/ education with the patient

## 2025-04-02 NOTE — CONSULT NOTE ADULT - SUBJECTIVE AND OBJECTIVE BOX
Chief Complaint:  Patient is a 89y old  Male who presents with a chief complaint of High grade SBO (2025 13:09)      HPI:    Allergies:  aspirin (Unknown)  penicillin (Unknown)      Home Medications:    Hospital Medications:  chlorhexidine 4% Liquid 1 Application(s) Topical <User Schedule>  dexAMETHasone  Injectable 4 milliGRAM(s) IV Push every 12 hours  heparin   Injectable 5000 Unit(s) SubCutaneous every 8 hours  influenza  Vaccine (HIGH DOSE) 0.5 milliLiter(s) IntraMuscular once  lipid, fat emulsion (Fish Oil and Plant Based) 20% Infusion 10.4 mL/Hr IV Continuous <Continuous>  metoclopramide Injectable 10 milliGRAM(s) IV Push every 8 hours  octreotide  Injectable 100 MICROGram(s) SubCutaneous every 8 hours  pantoprazole  Injectable 40 milliGRAM(s) IV Push daily  Parenteral Nutrition - Adult 1 Each TPN Continuous <Continuous>  Parenteral Nutrition - Adult 1 Each TPN Continuous <Continuous>  sodium chloride 0.9% lock flush 10 milliLiter(s) IV Push every 1 hour PRN      PMHX/PSHX:  No pertinent past medical history    BPH (benign prostatic hyperplasia)    Peripheral neuropathy    HLD (hyperlipidemia)    Leg edema    Acute on chronic diastolic congestive heart failure    Heart failure, diastolic, chronic    Colon cancer    Inguinal hernia    Bilateral inguinal hernia    Recurrent inguinal hernia    GERD (gastroesophageal reflux disease)    No significant past surgical history    H/O hemicolectomy    H/O bilateral inguinal hernia repair        Family history:  No pertinent family history in first degree relatives    No pertinent family history in first degree relatives    No pertinent family history in first degree relatives        Denies family history of colon cancer/polyps, stomach cancer/polyps, pancreatic cancer/masses, liver cancer/disease, ovarian cancer and endometrial cancer.    Social History:     Tob: Denies  EtOH: As above  Illicit Drugs: Denies    ROS:   General:  No wt loss, fevers, chills, night sweats, fatigue  Eyes:  Good vision, no reported pain  ENT:  No sore throat, pain, runny nose, dysphagia  CV:  No pain, palpitations, hypo/hypertension  Pulm:  No dyspnea, cough, tachypnea, wheezing  GI:  As per HPI  :  No pain, bleeding, incontinence, nocturia  Muscle:  No pain, weakness  Neuro:  No weakness, tingling, memory problems  Psych:  No fatigue, insomnia, mood problems, depression  Endocrine:  No polyuria, polydipsia, cold/heat intolerance  Heme:  No petechiae, ecchymosis, easy bruisability  Skin:  No rash, tattoos, scars, edema    PHYSICAL EXAM:   GENERAL:  No acute distress  HEENT:  Normocephalic/atraumatic, no scleral icterus  CHEST:  No accessory muscle use  HEART:  Regular rate and rhythm  ABDOMEN:  Soft, non-tender, non-distended, normoactive bowel sounds,  no masses, no hepato-splenomegaly, no signs of chronic liver disease  EXTREMITIES: No cyanosis, clubbing, or edema  SKIN:  No rash  NEURO:  Alert and oriented x 3, no asterixis    Vital Signs:  Vital Signs Last 24 Hrs  T(C): 36.6 (2025 12:56), Max: 36.8 (2025 09:22)  T(F): 97.9 (2025 12:56), Max: 98.2 (2025 09:22)  HR: 68 (2025 12:56) (53 - 68)  BP: 150/55 (2025 12:56) (137/51 - 163/43)  BP(mean): --  RR: 18 (2025 12:56) (16 - 18)  SpO2: 99% (2025 12:56) (91% - 99%)    Parameters below as of 2025 12:56  Patient On (Oxygen Delivery Method): room air      Daily     Daily Weight in k.7 (2025 04:30)    LABS:                        13.0   12.64 )-----------( 221      ( 2025 04:40 )             38.5     Mean Cell Volume: 88.1 fL (25 @ 04:40)    -    131[L]  |  98  |  15  ----------------------------<  149[H]  4.2   |  22  |  0.76    Ca    8.3[L]      2025 04:40  Phos  3.2     04-02  Mg     2.00     04-02          Urinalysis Basic - ( 2025 04:40 )    Color: x / Appearance: x / SG: x / pH: x  Gluc: 149 mg/dL / Ketone: x  / Bili: x / Urobili: x   Blood: x / Protein: x / Nitrite: x   Leuk Esterase: x / RBC: x / WBC x   Sq Epi: x / Non Sq Epi: x / Bacteria: x                              13.0   12.64 )-----------( 221      ( 2025 04:40 )             38.5                         13.9   12.79 )-----------( 205      ( 2025 05:30 )             42.1                         14.8   11.35 )-----------( 193      ( 31 Mar 2025 04:31 )             45.4       Imaging:           Chief Complaint: SBO      HPI:  Ari Ngo is a 89 yo M with PMH of BPH w/ chronic bruner, HLD, inguinal hernia repair x2, colon cancer T4N1 s/p extended right hemicolectomy with ileocolic anastomosis on 2023, and HFpEF presenting for abdominal pain. Patient found to have high grade SBO with ruq transition point at a spiculated soft tissue lesion c/f peritoneal carcinomatosis. He has not been passing gas or had a BM. He denies abdominal pain or n/v. GI consulted for venting peg placement.     Allergies:  aspirin (Unknown)  penicillin (Unknown)      Home Medications:    Hospital Medications:  chlorhexidine 4% Liquid 1 Application(s) Topical <User Schedule>  dexAMETHasone  Injectable 4 milliGRAM(s) IV Push every 12 hours  heparin   Injectable 5000 Unit(s) SubCutaneous every 8 hours  influenza  Vaccine (HIGH DOSE) 0.5 milliLiter(s) IntraMuscular once  lipid, fat emulsion (Fish Oil and Plant Based) 20% Infusion 10.4 mL/Hr IV Continuous <Continuous>  metoclopramide Injectable 10 milliGRAM(s) IV Push every 8 hours  octreotide  Injectable 100 MICROGram(s) SubCutaneous every 8 hours  pantoprazole  Injectable 40 milliGRAM(s) IV Push daily  Parenteral Nutrition - Adult 1 Each TPN Continuous <Continuous>  Parenteral Nutrition - Adult 1 Each TPN Continuous <Continuous>  sodium chloride 0.9% lock flush 10 milliLiter(s) IV Push every 1 hour PRN      PMHX/PSHX:  No pertinent past medical history    BPH (benign prostatic hyperplasia)    Peripheral neuropathy    HLD (hyperlipidemia)    Leg edema    Acute on chronic diastolic congestive heart failure    Heart failure, diastolic, chronic    Colon cancer    Inguinal hernia    Bilateral inguinal hernia    Recurrent inguinal hernia    GERD (gastroesophageal reflux disease)    No significant past surgical history    H/O hemicolectomy    H/O bilateral inguinal hernia repair        Family history:  No pertinent family history in first degree relatives    No pertinent family history in first degree relatives    No pertinent family history in first degree relatives        Denies family history of colon cancer/polyps, stomach cancer/polyps, pancreatic cancer/masses, liver cancer/disease, ovarian cancer and endometrial cancer.    Social History:     Tob: Denies  EtOH: As above  Illicit Drugs: Denies    ROS:   General:  No wt loss, fevers, chills, night sweats, fatigue  Eyes:  Good vision, no reported pain  ENT:  No sore throat, pain, runny nose, dysphagia  CV:  No pain, palpitations, hypo/hypertension  Pulm:  No dyspnea, cough, tachypnea, wheezing  GI:  As per HPI  :  No pain, bleeding, incontinence, nocturia  Muscle:  No pain, weakness  Neuro:  No weakness, tingling, memory problems  Psych:  No fatigue, insomnia, mood problems, depression  Endocrine:  No polyuria, polydipsia, cold/heat intolerance  Heme:  No petechiae, ecchymosis, easy bruisability  Skin:  No rash, tattoos, scars, edema    PHYSICAL EXAM:   GENERAL:  No acute distress  HEENT:  Normocephalic/atraumatic, no scleral icterus  CHEST:  No accessory muscle use  HEART:  Regular rate and rhythm  ABDOMEN:  Soft, non-tender, mildly distended  EXTREMITIES: No cyanosis, clubbing, or edema  SKIN:  No rash  NEURO:  Alert and oriented x 3, no asterixis    Vital Signs:  Vital Signs Last 24 Hrs  T(C): 36.6 (2025 12:56), Max: 36.8 (2025 09:22)  T(F): 97.9 (2025 12:56), Max: 98.2 (2025 09:22)  HR: 68 (2025 12:56) (53 - 68)  BP: 150/55 (2025 12:56) (137/51 - 163/43)  BP(mean): --  RR: 18 (2025 12:56) (16 - 18)  SpO2: 99% (2025 12:56) (91% - 99%)    Parameters below as of 2025 12:56  Patient On (Oxygen Delivery Method): room air      Daily     Daily Weight in k.7 (2025 04:30)    LABS:                        13.0   12.64 )-----------( 221      ( 2025 04:40 )             38.5     Mean Cell Volume: 88.1 fL (25 @ 04:40)        131[L]  |  98  |  15  ----------------------------<  149[H]  4.2   |  22  |  0.76    Ca    8.3[L]      2025 04:40  Phos  3.2     04-02  Mg     2.00     04-02          Urinalysis Basic - ( 2025 04:40 )    Color: x / Appearance: x / SG: x / pH: x  Gluc: 149 mg/dL / Ketone: x  / Bili: x / Urobili: x   Blood: x / Protein: x / Nitrite: x   Leuk Esterase: x / RBC: x / WBC x   Sq Epi: x / Non Sq Epi: x / Bacteria: x                              13.0   12.64 )-----------( 221      ( 2025 04:40 )             38.5                         13.9   12.79 )-----------( 205      ( 2025 05:30 )             42.1                         14.8   11.35 )-----------( 193      ( 31 Mar 2025 04:31 )             45.4       Imaging:  CTAP:  BOWEL: Status post right hemicolectomy with ileocolic anastomosis. New   high-grade small bowel obstruction to the level of a single transition   point in the right upper quadrant (301-45, 602-33) at a spiculated soft   tissue lesion which does not appear substantially changed since prior   study, howeverconcerning for peritoneal carcinomatosis. Small bowel   loops distal to the transition point are collapsed. No evidence of   pneumatosis or portal venous gas. Moderate colonic stool burden.  PERITONEUM/RETROPERITONEUM: No pneumoperitoneum.  VESSELS: Chronic moderate to severe narrowing of the celiac axis.   Atherosclerotic changes.  LYMPH NODES: No lymphadenopathy.  ABDOMINAL WALL: Stable small fluid-containing right internal hernia.   Small left inguinal hernia which appears to contain a knuckleof colon,   unchanged.  BONES: Degenerative changes.    IMPRESSION:    High-grade small bowel obstruction to the level of a single transition   point in the right upper quadrant at which there is a similar appearing   spiculated soft tissue lesion concerning for peritoneal carcinomatosis.    Incompletely decompressed bladder with Bruner catheter, correlate with   catheter function.

## 2025-04-02 NOTE — PROGRESS NOTE ADULT - ASSESSMENT
89M hx BPH (chronic indwelling Gill), HLD, B/L inguinal hernia repair with known left recurrence, colon cancer (T4N1 w/ two synchronous lesions) s/p extended right hemicolectomy w/ ileocolic anastomosis on 12/23 (not on chemo), recent presentation 2/24 for acute diastolic heart failure, presenting with abdominal pain, found to have high grade SBO w/ RUQ TP at a spiculated soft tissue lesion concerning for peritoneal carcinomatosis.   Palliative Care consulted for complex decision making  related to goals of care discussions

## 2025-04-02 NOTE — PROGRESS NOTE ADULT - RESPIRATORY DISTRESS OBSERVATION: RESTLESSNESS
Patient: Jefry Kenney    Procedure Summary     Date: 10/16/20 Room / Location: [facility] ENDOSCOPY 5 / [facility] ENDOSCOPY    Anesthesia Start: 0926 Anesthesia Stop: 0940    Procedure: COLONOSCOPY WITH ANESTHESIA (N/A ) Diagnosis:       Hyperplastic colonic polyp, unspecified part of colon      (Hyperplastic colonic polyp, unspecified part of colon [K63.5])    Surgeon: Barry Jaimes MD Provider: ИВАН Menendez CRNA    Anesthesia Type: MAC ASA Status: 2          Anesthesia Type: MAC    Vitals  Vitals Value Taken Time   BP     Temp     Pulse 76 10/16/20 0943   Resp     SpO2 98 % 10/16/20 0943   Vitals shown include unvalidated device data.        Post Anesthesia Care and Evaluation    Patient location during evaluation: PACU  Patient participation: complete - patient participated  Level of consciousness: awake and alert  Pain score: 0  Pain management: adequate  Airway patency: patent  Anesthetic complications: No anesthetic complications    Cardiovascular status: acceptable and stable  Respiratory status: acceptable and unassisted  Hydration status: acceptable       None

## 2025-04-02 NOTE — PROGRESS NOTE ADULT - SUBJECTIVE AND OBJECTIVE BOX
NUTRITION NOTE  [id number] STOMBER  ===============================    Interval events - Patient was seen and examined at bedside, no acute events overnight. Patient denies chest pain, shortness of breath, nausea or vomiting at this time. IR PICC placed and TPN was started on 25 for nutritional support. Pt is tolerating TPN without any issues. TPN infusion volume and calories increased tonight.     ROS: Except as noted above, all other systems reviewed and are negative     Allergies  aspirin (Unknown)  penicillin (Unknown)    PAST MEDICAL & SURGICAL HISTORY:  BPH (benign prostatic hyperplasia)  Peripheral neuropathy  Heart failure, diastolic, chronic  Colon cancer  Bilateral inguinal hernia  Recurrent inguinal hernia  GERD (gastroesophageal reflux disease)  H/O hemicolectomy  H/O bilateral inguinal hernia repair    Vital Signs Last 24 Hrs  T(C): 36.8 (2025 09:22), Max: 36.8 (2025 14:00)  T(F): 98.2 (2025 09:22), Max: 98.2 (2025 14:00)  HR: 53 (2025 09:22) (53 - 66)  BP: 154/52 (2025 09:22) (137/51 - 163/43)  BP(mean): 76 (2025 14:20) (76 - 77)  RR: 17 (2025 09:22) (16 - 17)  SpO2: 98% (2025 09:22) (91% - 99%)    MEDICATIONS  (STANDING):  chlorhexidine 4% Liquid 1 Application(s) Topical <User Schedule>  dexAMETHasone  Injectable 4 milliGRAM(s) IV Push every 12 hours  heparin   Injectable 5000 Unit(s) SubCutaneous every 8 hours  influenza  Vaccine (HIGH DOSE) 0.5 milliLiter(s) IntraMuscular once  lipid, fat emulsion (Fish Oil and Plant Based) 20% Infusion 10.4 mL/Hr (10.4 mL/Hr) IV Continuous <Continuous>  metoclopramide Injectable 10 milliGRAM(s) IV Push every 8 hours  octreotide  Injectable 100 MICROGram(s) SubCutaneous every 8 hours  pantoprazole  Injectable 40 milliGRAM(s) IV Push daily  Parenteral Nutrition - Adult 1 Each (42 mL/Hr) TPN Continuous <Continuous>  Parenteral Nutrition - Adult 1 Each (63 mL/Hr) TPN Continuous <Continuous>    MEDICATIONS  (PRN):  sodium chloride 0.9% lock flush 10 milliLiter(s) IV Push every 1 hour PRN Pre/post blood products, medications, blood draw, and to maintain line patency    I&O's Detail    2025 07:01  -  2025 07:00  --------------------------------------------------------  IN:    dextrose 5% + lactated ringers: 300 mL    TPN (Total Parenteral Nutrition): 504 mL  Total IN: 804 mL    OUT:    Indwelling Catheter - Urethral (mL): 1550 mL    IV PiggyBack: 0 mL    Oral Fluid: 0 mL  Total OUT: 1550 mL    Total NET: -746 mL      2025 07:01  -  2025 11:13  --------------------------------------------------------  IN:  Total IN: 0 mL    OUT:    Indwelling Catheter - Urethral (mL): 175 mL    Oral Fluid: 0 mL  Total OUT: 175 mL    Total NET: -175 mL    POCT Blood Glucose.: 161 mg/dL (2025 06:08)  POCT Blood Glucose.: 175 mg/dL (2025 23:56)  POCT Blood Glucose.: 150 mg/dL (2025 15:53)    Daily Weight in k.7 (2025 04:30)    Drug Dosing Weight  Height (cm): 172.7 (22 Mar 2025 10:19)  Weight (kg): 59 (21 Mar 2025 17:07)  BMI (kg/m2): 19.8 (22 Mar 2025 10:19)  BSA (m2): 1.7 (22 Mar 2025 10:19)    PHYSICAL EXAM   General: NAD, resting in bed comfortably.  Cardiac: regular rate, warm and well perfused  Respiratory: Nonlabored respirations, normal cw expansion.  Abdomen: soft, nontender, mildly distended.   Extremities: normal strength, FROM, no deformities  PICC Site: double lumen PICC in E, site clean and dry, placed 25    Diet: NPO and TPN/lipids (started on 25)    LABORATORY                        13.0   12.64 )-----------( 221      ( 2025 04:40 )             38.5   04-02    131[L]  |  98  |  15  ----------------------------<  149[H]  4.2   |  22  |  0.76    Ca    8.3[L]      2025 04:40  Phos  3.2     04-02  Mg     2.00     -    ASSESSMENT/PLAN:  89M with hx BPH (chronic indwelling Gill), HLD, B/L inguinal hernia repair with known left recurrence, colon cancer (T4N1 w/ two synchronous lesions) s/p extended right hemicolectomy w/ ileocolic anastomosis on  (not on chemo), recent presentation  for acute diastolic heart failure, now admitted with abdominal pain, found to have high grade SBO w/ RUQ TP at a spiculated soft tissue lesion concerning for peritoneal carcinomatosis. Nutrition support consult called for evaluation for TPN in view of prolonged NPO and severe malnutrition. Med-onc consult --> poor chemotherapy candidate, recommend palliative care involvement if patient fails nonoperative management. IR PICC placed and parenteral nutrition was started on 25.     TPN infusion volume increased to 1.5L, TPN will provide 1290 kcal/day    labs reviewed - electrolytes adjusted in TPN bag    monitor fingersticks, obtain daily weights    1.  Severe protein calorie malnutrition being optimized with TPN: CHO [200] gm.  AA [90] gm. SMOF Lipids [25] gm.  2.  Hyperglycemia managed with: [0] units of regular insulin    3.  Check fluid balance daily.  Strict I/O  [ ] [ ]   4.  Daily BMP, Ionized Calcium, Magnesium and Phosphorous   5.  Triglycerides at initiation of TPN and monthly - Chol -- LDL -- HDL -- Trig 104    Nutrition Support [id number]

## 2025-04-03 NOTE — PROGRESS NOTE ADULT - SUBJECTIVE AND OBJECTIVE BOX
24 Hour Events:  - NAOE    SUBJECTIVE: Pt seen at bedside during AM rounds. No o/n events, patient resting comfortably. -/-. Patient stating he would like to pursue surgery and proceed with undergoing the intestinal bypass instead of pursuing the venting g-tube or hospice route. States he just wants to be able to go home and eat food. Patient understands this goal will not be achieved immediately after surgery, and that he will have to spend time in the hospital before being medically cleared for discharge and likely go to rehab before going home.    Vital Signs Last 24 Hrs  T(C): 36.4 (03 Apr 2025 05:08), Max: 36.8 (02 Apr 2025 09:22)  T(F): 97.6 (03 Apr 2025 05:08), Max: 98.2 (02 Apr 2025 09:22)  HR: 87 (03 Apr 2025 05:08) (50 - 87)  BP: 126/79 (03 Apr 2025 05:08) (126/79 - 172/67)  BP(mean): --  RR: 17 (03 Apr 2025 05:08) (17 - 18)  SpO2: 100% (03 Apr 2025 05:08) (95% - 100%)    Parameters below as of 03 Apr 2025 05:08  Patient On (Oxygen Delivery Method): room air        I&O's Summary    02 Apr 2025 07:01  -  03 Apr 2025 07:00  --------------------------------------------------------  IN: 1269.4 mL / OUT: 2395 mL / NET: -1125.6 mL        LABS:                        13.0   14.50 )-----------( 219      ( 03 Apr 2025 03:52 )             38.6     04-03    131[L]  |  98  |  20  ----------------------------<  134[H]  4.1   |  22  |  0.66    Ca    8.2[L]      03 Apr 2025 03:52  Phos  2.9     04-03  Mg     1.90     04-03        Urinalysis Basic - ( 03 Apr 2025 03:52 )    Color: x / Appearance: x / SG: x / pH: x  Gluc: 134 mg/dL / Ketone: x  / Bili: x / Urobili: x   Blood: x / Protein: x / Nitrite: x   Leuk Esterase: x / RBC: x / WBC x   Sq Epi: x / Non Sq Epi: x / Bacteria: x        Physical Exam:  General Appearance: Appears well, NAD  Neck: Supple  Chest: Equal expansion bilaterally, equal breath sounds  CV: Pulse present  Abdomen: Soft, nontender  Extremities: Grossly symmetric, SCD's in place

## 2025-04-03 NOTE — PROGRESS NOTE ADULT - ASSESSMENT
89M hx BPH (chronic indwelling Bruner), HLD, B/L inguinal hernia repair with known left recurrence, colon cancer (T4N1 w/ two synchronous lesions) s/p extended right hemicolectomy w/ ileocolic anastomosis on 12/23 (not on chemo), recent presentation 2/24 for acute diastolic heart failure, presenting with abdominal pain, found to have high grade SBO w/ RUQ TP at a spiculated soft tissue lesion concerning for peritoneal carcinomatosis, now endorsing flatus and tolerating clear liquids but hospital course complicated by altered mental status and aggressive behavior and s/p zyprexa x2 on 3/24. Now with improved mental status and no longer requiring IV antipsychotics however now without bowel function and s/p NG tube replacement on 3/26 due to increased distention and 1x emesis    Plan:  - potential OR planning  - s/p PICC (4/1), NPO/TPN  - ID signed off  - GI signed off  - Palliative signing off - per note, patient would like all treatments necessary   - Monitor bowel function   - Chronic bruner in place, continue to monitor UOP (last exchange was on 3/4 with Kim Mercer)  - f/u Onc recs  - PT eval --> rec ADALID  - Med-onc consult --> poor chemotherapy candidate, recommend palliative care involvement if patient fails nonoperative management  - VTE ppx: SQH  - protonix    A team   [id number]

## 2025-04-03 NOTE — PROGRESS NOTE ADULT - SUBJECTIVE AND OBJECTIVE BOX
Uses topical abx and the hibiclens  Has not seen a specialist about it   ? About seeing a general surgeon or possibly a dermatologist  See if losing weight  helps also    NUTRITION NOTE  [id number] STOMBER  ===============================    Interval events - Patient was seen and examined at bedside, no acute events overnight. Patient denies chest pain, shortness of breath, nausea or vomiting at this time. IR PICC placed and TPN was started on 25 for nutritional support. Pt is tolerating TPN without any issues. TPN/lipids ordered for tonight.     ROS: Except as noted above, all other systems reviewed and are negative     Allergies  aspirin (Unknown)  penicillin (Unknown)    PAST MEDICAL & SURGICAL HISTORY:  BPH (benign prostatic hyperplasia)  Peripheral neuropathy  Heart failure, diastolic, chronic  Colon cancer  Bilateral inguinal hernia  Recurrent inguinal hernia  GERD (gastroesophageal reflux disease)  H/O hemicolectomy  H/O bilateral inguinal hernia repair    Vital Signs Last 24 Hrs  T(C): 36.6 (2025 09:35), Max: 36.6 (2025 12:56)  T(F): 97.9 (2025 09:35), Max: 97.9 (2025 12:56)  HR: 57 (2025 09:35) (50 - 87)  BP: 108/39 (2025 09:35) (108/39 - 172/67)  RR: 18 (2025 09:35) (17 - 18)  SpO2: 100% (2025 09:35) (95% - 100%)    MEDICATIONS  (STANDING):  chlorhexidine 2% Cloths 1 Application(s) Topical daily  dexAMETHasone  Injectable 4 milliGRAM(s) IV Push every 12 hours  heparin   Injectable 5000 Unit(s) SubCutaneous every 8 hours  influenza  Vaccine (HIGH DOSE) 0.5 milliLiter(s) IntraMuscular once  lipid, fat emulsion (Fish Oil and Plant Based) 20% Infusion 16.6 mL/Hr (16.6 mL/Hr) IV Continuous <Continuous>  metoclopramide Injectable 10 milliGRAM(s) IV Push every 8 hours  octreotide  Injectable 100 MICROGram(s) SubCutaneous every 8 hours  pantoprazole  Injectable 40 milliGRAM(s) IV Push daily  Parenteral Nutrition - Adult 1 Each (63 mL/Hr) TPN Continuous <Continuous>  Parenteral Nutrition - Adult 1 Each (63 mL/Hr) TPN Continuous <Continuous>    MEDICATIONS  (PRN):  sodium chloride 0.9% lock flush 10 milliLiter(s) IV Push every 1 hour PRN Pre/post blood products, medications, blood draw, and to maintain line patency    I&O's Detail    2025 07:01  -  2025 07:00  --------------------------------------------------------  IN:    Fat Emulsion (Fish Oil &amp; Plant Based) 20% Infusion: 114.4 mL    TPN (Total Parenteral Nutrition): 1155 mL  Total IN: 1269.4 mL    OUT:    Indwelling Catheter - Urethral (mL): 2395 mL    IV PiggyBack: 0 mL    Oral Fluid: 0 mL  Total OUT: 2395 mL    Total NET: -1125.6 mL      2025 07:01  -  2025 11:31  --------------------------------------------------------  IN:  Total IN: 0 mL    OUT:    Indwelling Catheter - Urethral (mL): 100 mL  Total OUT: 100 mL    Total NET: -100 mL    POCT Blood Glucose.: 150 mg/dL (2025 06:17)  POCT Blood Glucose.: 183 mg/dL (2025 00:11)  POCT Blood Glucose.: 144 mg/dL (2025 17:51)  POCT Blood Glucose.: 155 mg/dL (2025 12:48)    Daily Weight in k.7 (2025 04:30)    Drug Dosing Weight  Height (cm): 172.7 (22 Mar 2025 10:19)  Weight (kg): 59 (21 Mar 2025 17:07)  BMI (kg/m2): 19.8 (22 Mar 2025 10:19)  BSA (m2): 1.7 (22 Mar 2025 10:19)    PHYSICAL EXAM   General: NAD, resting in bed comfortably  Cardiac: regular rate, warm and well perfused  Respiratory: Nonlabored respirations, normal cw expansion  Abdomen: soft, nontender, mildly distended.   Extremities: normal strength, FROM, no deformities  PICC Site: double lumen PICC in E, site clean and dry, placed 25    Diet: NPO and TPN/lipids (started on 25)    LABORATORY                                 13.0   14.50 )-----------( 219      ( 2025 03:52 )             38.6   04-03    131[L]  |  98  |  20  ----------------------------<  134[H]  4.1   |  22  |  0.66    Ca    8.2[L]      2025 03:52  Phos  2.9     04-03  Mg     1.90     -    ASSESSMENT/PLAN:  89M with hx BPH (chronic indwelling Gill), HLD, B/L inguinal hernia repair with known left recurrence, colon cancer (T4N1 w/ two synchronous lesions) s/p extended right hemicolectomy w/ ileocolic anastomosis on  (not on chemo), recent presentation  for acute diastolic heart failure, now admitted with abdominal pain, found to have high grade SBO w/ RUQ TP at a spiculated soft tissue lesion concerning for peritoneal carcinomatosis. Nutrition support consult called for evaluation for TPN in view of prolonged NPO and severe malnutrition. Med-onc consult --> poor chemotherapy candidate, recommend palliative care involvement if patient fails nonoperative management. IR PICC placed and parenteral nutrition was started on 25.     continue TPN with infusion volume of 1.5L, TPN will provide 1480 kcal/day    labs reviewed - electrolytes adjusted in TPN bag    monitor fingersticks, obtain daily weights    continue parenteral nutrition at this time, will follow up with primary team on plan, ongoing discussions regarding venting PEG vs surgical bypass      1.  Severe protein calorie malnutrition being optimized with TPN: CHO [200] gm.  AA [100] gm. SMOF Lipids [40] gm.  2.  Hyperglycemia managed with: [0] units of regular insulin    3.  Check fluid balance daily.  Strict I/O  [ ] [ ]   4.  Daily BMP, Ionized Calcium, Magnesium and Phosphorous   5.  Triglycerides at initiation of TPN and monthly  Chol -- LDL -- HDL -- Trig 104    Nutrition Support [id number]

## 2025-04-03 NOTE — CHART NOTE - NSCHARTNOTEFT_GEN_A_CORE
GI consulted for venting peg placement.   Pt now wishes to pursue surgery     GI to sign off. Please call back with further questions.     Jenny Del Toro, PGY4  Gastroenterology/Hepatology Fellow  Available on [employer] Teams  [phone] (Long Range Pager)  [phone] (Short Range Pager [facility])    After 5 pm, please contact the on-call GI fellow for any urgent issues via the Hospital Call  Per team patient now wants to pursue venting peg placement    - Keep patient NPO at MN  - Send early am preop labs (cbc, bmp, coags, T&S)  - If schedule permits and pt would still like to pursue venting peg placement, will plan for peg placement tomorrow    Jenny Del Toro, PGY4  Gastroenterology/Hepatology Fellow  Available on [employer] Teams  [phone] (Long Range Pager)  [phone] (Short Range Pager [facility])    After 5 pm, please contact the on-call GI fellow for any urgent issues via the Hospital Call

## 2025-04-04 NOTE — CONSULT NOTE ADULT - SUBJECTIVE AND OBJECTIVE BOX
Clinical summary:  89-year-old male with a history of BPH (chronic Gill), HLD, s/p bilateral inguinal hernia repair with known left recurrence, colon cancer s/p extended right hemicolectomy with ileocolic anastomosis in December 2023, CHF presenting to the hospital with abdominal pain and found to have high-grade SBO with right upper quadrant transition point concerning for peritoneal carcinomatosis in the setting of known colon cancer diagnosis (not on chemo).  Patient's course has been complicated by severe malnutrition.  He has been evaluated by medical oncology and he is a poor chemotherapy candidate, evaluated by surgery and patient is a candidate for a small bowel diversion vs a venting PEG tube.  He is currently on TPN in order to assist with his nutritional needs.  Palliative care was consulted for goals of care planning in the setting of complex medical prognosis.  Per palliative documentation, patient is to remain full code with no limits on interventions,  Patient has been vacillating in his decision making in regards to the surgical informed consent for the small bowel diversion vs. a venting PEG tube.     Ethics was consulted to determine barriers to patient's decision making,     Prognosis Estimate (survival in hrs, days, wks, mos, yrs): guarded  Patient Decision-Making Capacity: Has Capacity  Patient Aware of:  Diagnosis:  Yes    Prognosis:  Yes    Name of medical decision-maker should patient lack capacity (relationship): none  Evidence of Patient’s Preference of Life-Sustaining Treatment (Written or Oral):  Resuscitation status:   DNR:  No DNI:  No              Discussions:  Discussion with care team (Alex Briseno MD, Elsi Sanchez PA-C), Blank Chapa (Ethicist), Nallely Conklin (Ethics Fellow),  04/04/2025 at 1155AM-1215PM:  Discussion with care team at bedside that patient has been indecisive about surgical options and planning.  Patient has primary concern of returning to home to be able to visit his daughter who is developmentally delayed and lives in a group home.  Team concerned about patient alternating decisions frequently and determining a safe disposition plan for the patient.  The patient is currently unable to ambulate without assistance secondary to deconditioning and malnutrition in the setting of known colon cancer diagnosis. Patient has alternately stated to the team that all he wants is to go home and put his affairs in order, he needs to do his taxes and has been trying to contact an  to help him set up whatever legal documentation he might need.  Social Work and Case management are also concerned about discharge as he lives alone and will need assistance.        Discussion with patient and Nallely Conklin (Ethics Fellow) at patient bedside 04/04/2025 1215PM-1245PM:  Discussed with patient the goals of his care. He acknowledged that he will move forward with the planned surgery as he expressed that he wants to live as long as possible in order to be able to take care of and visit his daughter.  He desires to be able to eat solid foods again with his daughter as he has been unable to tolerate solid foods for the last several weeks.  He is most concerned about planning financially for his daughter's wellbeing.  Prior to this admission he was working with an  to put his financial assets into trust for his daughter and he is concerned about other family members having access to his assets.  He has a brother from whom he has been estranged for over 20 years.  He has no other family members or people who he has developed a close relationship with.     [id number]- Ethics met with SW and Case management to discuss the now known barriers for the patient. SW will escalate within to ascertain the process for the patient’s  to visit and bring the needed paperwork for the patient. The patient provided the contact information for his .

## 2025-04-04 NOTE — PROGRESS NOTE ADULT - ASSESSMENT
89M hx BPH (chronic indwelling Bruner), HLD, B/L inguinal hernia repair with known left recurrence, colon cancer (T4N1 w/ two synchronous lesions) s/p extended right hemicolectomy w/ ileocolic anastomosis on 12/23 (not on chemo), recent presentation 2/24 for acute diastolic heart failure, presented with a malignant SBO that has failed conservative management, now pending venting PEG placement with GI and on TPN.      Plan:  - s/p PICC (4/1),   - Diet: NPO/TPN  - Preoped for PEG today with GI  - Med onc (Dr. Delvalle) following, unsure of treatment plans, will f/u after discharge and determine further treatment  - Monitor bowel function   - Ethics to see in AM  - Chronic bruner in place, continue to monitor UOP (last exchange was on 3/4 with Kim Mercer)  - PT eval --> rec ADALID  - VTE ppx: [facility]    A team   [id number]

## 2025-04-04 NOTE — CONSULT NOTE ADULT - ASSESSMENT
Bioethics analysis:  The central ethical issues that exist in this case are (A) respecting and honoring this patient’s autonomy, and (B) the providers’ desire for beneficence and non-maleficence for a patient who is struggling and vacillating in decisions for moving forward with a surgery that has its own risks and benefits.     Mr. Ngo has an innate MORAL STATUS – that is, his distinct personhood, personal experience and interpretation of suffering, life-story, etc.– which must be respected. Decisions concerning treatment are complex and must be taken into consideration given the higher rates of mortality and morbidity associated in this specific case.    Often times patients are faced with complex medical decisions that they are not prepared for as they feel that they have unfinished business to attain to first.  It is important to journey with patients and offer them the tincture of time in order to come to the decisions they feel are in their best interest. Ethics commends the team for recognizing that the patient is struggling as he makes decisions that could also impact the future of his disabled daughter.      In this case which is directly interconnected to a patient’s autonomy and the medical team’s beneficence and nonmaleficence’s duties is the trust that must permeate the clinician-patient relationship. In practical terms, the clinicians have a duty to place themselves in the lived experience of the patient’s illness, in order to appreciate the situation as the patient understands, perceives, and feels it (it is both to understand and to be understanding). It is also to be mindful of the power imbalance that can determine negatively the practitioner-patient relationship and thus erode trust in a much-needed moment. We should approach every task that involves the patient with humility to ameliorate that power imbalance. Communication is central in the practitioner-patient relationship.  In this case it is important to determine the patient’s goals of their care, for Mr. Ngo, besides getting his affairs in order it is also being able to eat solid foods and be able to visit with his daughter is the outcome he envisions, which he hopes will be attained after the surgery.    RECOMMENDATION-  What is considered a contributing factor to his vacillation of decision making is that he feels he hasn’t put his affairs in order—his finances (which he wants to direct to the future care of his daughter.) Social Work will facilitate the process to bring the patient’s  to the patient.  By doing so, the team is honoring the patient’s autonomy and beneficently easing his distress and provide peace of mind prior to his planned surgery.      Thank you for considering Ethics in this patient’s care.     Blank Chapa D.Min, RN-BSN, [facility]  Medical Ethics  [phone]        More than 50% of the time of this consultation was spent in coordination of Care of Patient     References     Sabina SPIVEY & David DUNCAN. Principles of Biomedical Ethics. [place]: [employer]; 2019     JOHN Vizcarra C. (2020). Beneficence, interests, and wellbeing in medicine: what it means to provide benefit to patients. The American Journal of Bioethics, 20(3), 53-62.

## 2025-04-04 NOTE — CHART NOTE - NSCHARTNOTEFT_GEN_A_CORE
Pt declining peg tube at this time. He states he would still like to think if he wants surgery or peg tube placement  No plan for peg tube placement today given pt wishes    GI to sign off. Please call back if pt wishes to pursue venting peg placement    Jenny Del Toro, PGY4  Gastroenterology/Hepatology Fellow  Available on [employer] Teams  [phone] (Long Range Pager)  [phone] (Short Range Pager [facility])    After 5 pm, please contact the on-call GI fellow for any urgent issues via the Hospital Call

## 2025-04-04 NOTE — CONSULT NOTE ADULT - CONSULT REQUESTED DATE/TIME
04-Apr-2025 08:00
23-Mar-2025 08:00
22-Mar-2025 13:28
25-Mar-2025 12:16
02-Apr-2025 14:00
31-Mar-2025
04-Apr-2025 22:45

## 2025-04-04 NOTE — PROGRESS NOTE ADULT - SUBJECTIVE AND OBJECTIVE BOX
TEAM [ A ] Surgery Daily Progress Note  =====================================================    INTERVAL:  - Patient ultimately decided that venting PEG is in line with his GOC  - Added on for PEG with GI, preoped    SUBJECTIVE: Patient seen and examined at bedside on AM rounds.    ALLERGIES:  aspirin (Unknown)  penicillin (Unknown)      --------------------------------------------------------------------------------------    MEDICATIONS:  chlorhexidine 2% Cloths 1 Application(s) Topical daily  dexAMETHasone  Injectable 4 milliGRAM(s) IV Push every 12 hours  influenza  Vaccine (HIGH DOSE) 0.5 milliLiter(s) IntraMuscular once  lipid, fat emulsion (Fish Oil and Plant Based) 20% Infusion 16.6 mL/Hr IV Continuous <Continuous>  metoclopramide Injectable 10 milliGRAM(s) IV Push every 8 hours  octreotide  Injectable 100 MICROGram(s) SubCutaneous every 8 hours  pantoprazole  Injectable 40 milliGRAM(s) IV Push daily  Parenteral Nutrition - Adult 1 Each TPN Continuous <Continuous>  sodium chloride 0.9% lock flush 10 milliLiter(s) IV Push every 1 hour PRN    --------------------------------------------------------------------------------------    VITAL SIGNS:  T(C): 36.3 (04-04-25 @ 05:23), Max: 36.6 (04-03-25 @ 09:35)  HR: 56 (04-04-25 @ 05:23) (53 - 59)  BP: 141/52 (04-04-25 @ 05:23) (108/39 - 164/56)  RR: 17 (04-04-25 @ 05:23) (16 - 18)  SpO2: 97% (04-04-25 @ 05:23) (97% - 100%)  --------------------------------------------------------------------------------------    INS AND OUTS:    04-02-25 @ 07:01  -  04-03-25 @ 07:00  --------------------------------------------------------  IN: 1269.4 mL / OUT: 2395 mL / NET: -1125.6 mL    04-03-25 @ 07:01  -  04-04-25 @ 06:00  --------------------------------------------------------  IN: 1661.4 mL / OUT: 1400 mL / NET: 261.4 mL      --------------------------------------------------------------------------------------      EXAM    General Appearance: Appears well, NAD  Neck: Supple  Chest: Equal expansion bilaterally, equal breath sounds  CV: Pulse present  Abdomen: Soft, nontender  Extremities: Grossly symmetric, SCD's in place       --------------------------------------------------------------------------------------    LABS

## 2025-04-04 NOTE — CONSULT NOTE ADULT - PROBLEM SELECTOR RECOMMENDATION 4
resolved  patient appears fairly well oriented though does require redirection at times  ambivalent about surgery though seems overall inclined towards surgery and getting home  Ethics consultation

## 2025-04-04 NOTE — CONSULT NOTE ADULT - PROVIDER SPECIALTY LIST ADULT
Gastroenterology
Nutrition Support
Heme/Onc
Palliative Care
Ethics
Internal Medicine
Infectious Disease

## 2025-04-04 NOTE — PROGRESS NOTE ADULT - SUBJECTIVE AND OBJECTIVE BOX
NUTRITION NOTE  [id number] STOMBER  ===============================    Interval events - Patient was seen and examined at bedside, no acute events overnight. Patient denies chest pain, shortness of breath, nausea or vomiting at this time. IR PICC placed and TPN was started on 25 for nutritional support. Pt is tolerating TPN without any issues. TPN/lipids ordered for tonight. Plan for venting PEG placement today with GI.     ROS: Except as noted above, all other systems reviewed and are negative     Allergies  aspirin (Unknown)  penicillin (Unknown)    PAST MEDICAL & SURGICAL HISTORY:  BPH (benign prostatic hyperplasia)  Peripheral neuropathy  Heart failure, diastolic, chronic  Colon cancer  Bilateral inguinal hernia  Recurrent inguinal hernia  GERD (gastroesophageal reflux disease)  H/O hemicolectomy  H/O bilateral inguinal hernia repair    Vital Signs Last 24 Hrs  T(C): 36.4 (2025 09:11), Max: 36.6 (2025 18:04)  T(F): 97.5 (2025 09:11), Max: 97.8 (2025 18:04)  HR: 65 (2025 09:11) (53 - 65)  BP: 142/52 (2025 09:11) (112/48 - 164/56)  RR: 18 (2025 09:11) (16 - 18)  SpO2: 99% (2025 09:11) (97% - 100%)    MEDICATIONS  (STANDING):  chlorhexidine 2% Cloths 1 Application(s) Topical daily  dexAMETHasone  Injectable 4 milliGRAM(s) IV Push every 12 hours  influenza  Vaccine (HIGH DOSE) 0.5 milliLiter(s) IntraMuscular once  lipid, fat emulsion (Fish Oil and Plant Based) 20% Infusion 16.6 mL/Hr (16.6 mL/Hr) IV Continuous <Continuous>  metoclopramide Injectable 10 milliGRAM(s) IV Push every 8 hours  octreotide  Injectable 100 MICROGram(s) SubCutaneous every 8 hours  pantoprazole  Injectable 40 milliGRAM(s) IV Push daily  Parenteral Nutrition - Adult 1 Each (63 mL/Hr) TPN Continuous <Continuous>  Parenteral Nutrition - Adult 1 Each (63 mL/Hr) TPN Continuous <Continuous>    MEDICATIONS  (PRN):  sodium chloride 0.9% lock flush 10 milliLiter(s) IV Push every 1 hour PRN Pre/post blood products, medications, blood draw, and to maintain line patency    I&O's Detail    2025 07:01  -  2025 07:00  --------------------------------------------------------  IN:    Fat Emulsion (Fish Oil &amp; Plant Based) 20% Infusion: 149.4 mL    TPN (Total Parenteral Nutrition): 1512 mL  Total IN: 1661.4 mL    OUT:    Indwelling Catheter - Urethral (mL): 1400 mL    IV PiggyBack: 0 mL    Oral Fluid: 0 mL  Total OUT: 1400 mL    Total NET: 261.4 mL      2025 07:01  -  2025 10:44  --------------------------------------------------------  IN:  Total IN: 0 mL    OUT:    Oral Fluid: 0 mL    Voided (mL): 400 mL  Total OUT: 400 mL    Total NET: -400 mL    POCT Blood Glucose.: 138 mg/dL (2025 06:00)  POCT Blood Glucose.: 174 mg/dL (2025 00:33)  POCT Blood Glucose.: 125 mg/dL (2025 18:03)  POCT Blood Glucose.: 136 mg/dL (2025 14:36)    Daily Weight in k.2 (2025 18:04)    Drug Dosing Weight  Height (cm): 172.7 (22 Mar 2025 10:19)  Weight (kg): 59 (21 Mar 2025 17:07)  BMI (kg/m2): 19.8 (22 Mar 2025 10:19)  BSA (m2): 1.7 (22 Mar 2025 10:19)    PHYSICAL EXAM   General: NAD, sitting in chair   Cardiac: regular rate, warm and well perfused  Respiratory: Nonlabored respirations, normal cw expansion  Abdomen: soft, nontender, mildly distended.   Extremities: normal strength, FROM, no deformities  [place] [facility] [place], placed 25    Diet: NPO and TPN/lipids (started on 25)    LABORATORY                                          12.6   13.80 )-----------( 198      ( 2025 04:45 )             37.4   04-04    134[L]  |  98  |  27[H]  ----------------------------<  141[H]  4.1   |  20[L]  |  0.63    Ca    8.2[L]      2025 04:45  Phos  2.9     04-04  Mg     2.00     -    ASSESSMENT/PLAN:  89M with hx BPH (chronic indwelling Gill), HLD, B/L inguinal hernia repair with known left recurrence, colon cancer (T4N1 w/ two synchronous lesions) s/p extended right hemicolectomy w/ ileocolic anastomosis on  (not on chemo), recent presentation  for acute diastolic heart failure, now admitted with abdominal pain, found to have high grade SBO w/ RUQ TP at a spiculated soft tissue lesion concerning for peritoneal carcinomatosis. Nutrition support consult called for evaluation for TPN in view of prolonged NPO and severe malnutrition. Med-onc consult --> poor chemotherapy candidate, recommend palliative care involvement if patient fails nonoperative management. IR PICC placed and parenteral nutrition was started on 25.     continue TPN with infusion volume of 1.5L, TPN will provide 1480 kcal/day    labs reviewed - electrolytes adjusted in TPN bag    monitor fingersticks, obtain daily weights    continue parenteral nutrition at this time, will follow up with primary team on plan, plan for venting PEG placement with GI today    1.  Severe protein calorie malnutrition being optimized with TPN: CHO [200] gm.  AA [100] gm. SMOF Lipids [40] gm.  2.  Hyperglycemia managed with: [0] units of regular insulin    3.  Check fluid balance daily.  Strict I/O  [ ] [ ]   4.  Daily BMP, Ionized Calcium, Magnesium and Phosphorous   5.  Triglycerides at initiation of TPN and monthly  Chol -- LDL -- HDL -- Trig 104    Nutrition Support [id number]  NUTRITION NOTE  [id number] STOMBER  ===============================    Interval events - Patient was seen and examined at bedside, no acute events overnight. Patient denies chest pain, shortness of breath, nausea or vomiting at this time. IR PICC placed and TPN was started on 25 for nutritional support. Pt is tolerating TPN without any issues. TPN/lipids ordered for tonight.     ROS: Except as noted above, all other systems reviewed and are negative     Allergies  aspirin (Unknown)  penicillin (Unknown)    PAST MEDICAL & SURGICAL HISTORY:  BPH (benign prostatic hyperplasia)  Peripheral neuropathy  Heart failure, diastolic, chronic  Colon cancer  Bilateral inguinal hernia  Recurrent inguinal hernia  GERD (gastroesophageal reflux disease)  H/O hemicolectomy  H/O bilateral inguinal hernia repair    Vital Signs Last 24 Hrs  T(C): 36.4 (2025 09:11), Max: 36.6 (2025 18:04)  T(F): 97.5 (2025 09:11), Max: 97.8 (2025 18:04)  HR: 65 (2025 09:11) (53 - 65)  BP: 142/52 (2025 09:11) (112/48 - 164/56)  RR: 18 (2025 09:11) (16 - 18)  SpO2: 99% (2025 09:11) (97% - 100%)    MEDICATIONS  (STANDING):  chlorhexidine 2% Cloths 1 Application(s) Topical daily  dexAMETHasone  Injectable 4 milliGRAM(s) IV Push every 12 hours  influenza  Vaccine (HIGH DOSE) 0.5 milliLiter(s) IntraMuscular once  lipid, fat emulsion (Fish Oil and Plant Based) 20% Infusion 16.6 mL/Hr (16.6 mL/Hr) IV Continuous <Continuous>  metoclopramide Injectable 10 milliGRAM(s) IV Push every 8 hours  octreotide  Injectable 100 MICROGram(s) SubCutaneous every 8 hours  pantoprazole  Injectable 40 milliGRAM(s) IV Push daily  Parenteral Nutrition - Adult 1 Each (63 mL/Hr) TPN Continuous <Continuous>  Parenteral Nutrition - Adult 1 Each (63 mL/Hr) TPN Continuous <Continuous>    MEDICATIONS  (PRN):  sodium chloride 0.9% lock flush 10 milliLiter(s) IV Push every 1 hour PRN Pre/post blood products, medications, blood draw, and to maintain line patency    I&O's Detail    2025 07:01  -  2025 07:00  --------------------------------------------------------  IN:    Fat Emulsion (Fish Oil &amp; Plant Based) 20% Infusion: 149.4 mL    TPN (Total Parenteral Nutrition): 1512 mL  Total IN: 1661.4 mL    OUT:    Indwelling Catheter - Urethral (mL): 1400 mL    IV PiggyBack: 0 mL    Oral Fluid: 0 mL  Total OUT: 1400 mL    Total NET: 261.4 mL      2025 07:01  -  2025 10:44  --------------------------------------------------------  IN:  Total IN: 0 mL    OUT:    Oral Fluid: 0 mL    Voided (mL): 400 mL  Total OUT: 400 mL    Total NET: -400 mL    POCT Blood Glucose.: 138 mg/dL (2025 06:00)  POCT Blood Glucose.: 174 mg/dL (2025 00:33)  POCT Blood Glucose.: 125 mg/dL (2025 18:03)  POCT Blood Glucose.: 136 mg/dL (2025 14:36)    Daily Weight in k.2 (2025 18:04)    Drug Dosing Weight  Height (cm): 172.7 (22 Mar 2025 10:19)  Weight (kg): 59 (21 Mar 2025 17:07)  BMI (kg/m2): 19.8 (22 Mar 2025 10:19)  BSA (m2): 1.7 (22 Mar 2025 10:19)    PHYSICAL EXAM   General: NAD, sitting in chair   Cardiac: regular rate, warm and well perfused  Respiratory: Nonlabored respirations, normal cw expansion  Abdomen: soft, nontender, mildly distended.   Extremities: normal strength, FROM, no deformities  PICC Site: double lumen PICC in [facility] [place], site clean and dry, placed 25    Diet: NPO and TPN/lipids (started on 25)    LABORATORY                                          12.6   13.80 )-----------( 198      ( 2025 04:45 )             37.4   04-04    134[L]  |  98  |  27[H]  ----------------------------<  141[H]  4.1   |  20[L]  |  0.63    Ca    8.2[L]      2025 04:45  Phos  2.9     04-04  Mg     2.00     -04    ASSESSMENT/PLAN:  89M with hx BPH (chronic indwelling Gill), HLD, B/L inguinal hernia repair with known left recurrence, colon cancer (T4N1 w/ two synchronous lesions) s/p extended right hemicolectomy w/ ileocolic anastomosis on  (not on chemo), recent presentation  for acute diastolic heart failure, now admitted with abdominal pain, found to have high grade SBO w/ RUQ TP at a spiculated soft tissue lesion concerning for peritoneal carcinomatosis. Nutrition support consult called for evaluation for TPN in view of prolonged NPO and severe malnutrition. Med-onc consult --> poor chemotherapy candidate, recommend palliative care involvement if patient fails nonoperative management. IR PICC placed and parenteral nutrition was started on 25.     continue TPN with infusion volume of 1.5L, TPN will provide 1480 kcal/day    labs reviewed - electrolytes adjusted in TPN bag    monitor fingersticks, obtain daily weights    continue parenteral nutrition at this time, will follow up with primary team on plan    1.  Severe protein calorie malnutrition being optimized with TPN: CHO [200] gm.  AA [100] gm. SMOF Lipids [40] gm.  2.  Hyperglycemia managed with: [0] units of regular insulin    3.  Check fluid balance daily.  Strict I/O  [ ] [ ]   4.  Daily BMP, Ionized Calcium, Magnesium and Phosphorous   5.  Triglycerides at initiation of TPN and monthly  Chol -- LDL -- HDL -- Trig 104    Nutrition Support [id number]

## 2025-04-04 NOTE — CONSULT NOTE ADULT - PROBLEM SELECTOR RECOMMENDATION 3
awaiting surgery  Recommend optimize nutrition with TPN prior to proposed surgery  echocardiogram ~ 1 year ago normal LV function  no contraindication to proposed surgery

## 2025-04-04 NOTE — CONSULT NOTE ADULT - SUBJECTIVE AND OBJECTIVE BOX
DATE OF SERVICE: 04-04-25 @ 22:45    Patient is a 89y old  Male who presents with a chief complaint of High grade mSBO (04 Apr 2025 18:11)      HPI:  89M hx BPH (chronic indwelling Gill), HLD, B/L inguinal hernia repair with known left recurrence, colon cancer (T4N1 w/ two synchronous lesions) s/p extended right hemicolectomy w/ ileocolic anastomosis on 12/23 (not on chemo), recent presentation 2/24 for acute diastolic heart failure, presenting with abdominal pain. Patient recently presented to ED on 3/1 for AP and was found to have a new right midabdominal lesion cf peritoneal carcinomatosis. Pt was scheduled to follow up with heme/onc outpatient today to determine a plan for his new peritoneal carcinomatosis (Dr. Ramirez), but instead presented to ED due to his pain. He is reporting several weeks of intermittent lower midline AP rated up to 8/10 in intensity. He also states he feels a lot of intestinal movement and his abdomen makes "noises like aliens in my intestines." He has not been tolerating PO solids well, mostly vomiting his food. The patient was admitted and has been tentatively scheduled for surgery early next week    PAST MEDICAL & SURGICAL HISTORY:    BPH (benign prostatic hyperplasia)      Peripheral neuropathy      Heart failure, diastolic, chronic      Colon cancer      Bilateral inguinal hernia      Recurrent inguinal hernia      GERD (gastroesophageal reflux disease)      H/O hemicolectomy      H/O bilateral inguinal hernia repair          Review of Systems:   CONSTITUTIONAL: No fever, + weight loss, or fatigue  ENMT:  No difficulty hearing, tinnitus, vertigo; No sinus or throat pain  NECK: No pain or stiffness  RESPIRATORY: No cough, wheezing, chills or hemoptysis; No shortness of breath  CARDIOVASCULAR: No chest pain, palpitations, dizziness, leg swelling or sob  GASTROINTESTINAL: see above HPI   GENITOURINARY: No dysuria, frequency, hematuria, or incontinence  NEUROLOGICAL: No headaches, memory loss, loss of strength, numbness, or tremors      Allergies    aspirin (Unknown)  penicillin (Unknown)    Social History: non smoker  no IVDA  no ETOH abuse   lives alone     FAMILY HISTORY: unk       MEDICATIONS  (STANDING):  chlorhexidine 2% Cloths 1 Application(s) Topical daily  dexAMETHasone  Injectable 4 milliGRAM(s) IV Push every 12 hours  influenza  Vaccine (HIGH DOSE) 0.5 milliLiter(s) IntraMuscular once  lipid, fat emulsion (Fish Oil and Plant Based) 20% Infusion 16.6 mL/Hr (16.6 mL/Hr) IV Continuous <Continuous>  metoclopramide Injectable 10 milliGRAM(s) IV Push every 8 hours  octreotide  Injectable 100 MICROGram(s) SubCutaneous every 8 hours  pantoprazole  Injectable 40 milliGRAM(s) IV Push daily  Parenteral Nutrition - Adult 1 Each (63 mL/Hr) TPN Continuous <Continuous>  Parenteral Nutrition - Adult 1 Each (63 mL/Hr) TPN Continuous <Continuous>    MEDICATIONS  (PRN):  sodium chloride 0.9% lock flush 10 milliLiter(s) IV Push every 1 hour PRN Pre/post blood products, medications, blood draw, and to maintain line patency      CAPILLARY BLOOD GLUCOSE      POCT Blood Glucose.: 122 mg/dL (04 Apr 2025 19:15)  POCT Blood Glucose.: 130 mg/dL (04 Apr 2025 13:20)  POCT Blood Glucose.: 138 mg/dL (04 Apr 2025 06:00)  POCT Blood Glucose.: 174 mg/dL (04 Apr 2025 00:33)    I&O's Summary    03 Apr 2025 07:01  -  04 Apr 2025 07:00  --------------------------------------------------------  IN: 1661.4 mL / OUT: 1400 mL / NET: 261.4 mL    04 Apr 2025 07:01  -  04 Apr 2025 22:45  --------------------------------------------------------  IN: 0 mL / OUT: 750 mL / NET: -750 mL        24hrs Vital:  T(C): 36.4 (04-04-25 @ 21:18), Max: 36.6 (04-04-25 @ 17:44)  HR: 53 (04-04-25 @ 21:18) (53 - 69)  BP: 145/62 (04-04-25 @ 21:18) (117/48 - 164/56)  RR: 18 (04-04-25 @ 21:18) (17 - 18)  SpO2: 100% (04-04-25 @ 21:18) (97% - 100%)    PHYSICAL EXAM:  GENERAL: NAD cachexia +  EYES: EOMI, PERRLA  NECK: Supple, No JVD  CHEST/LUNG: Clear   HEART: S1S2; no murmurs  ABDOMEN: Soft, Nontender;  EXTREMITIES: no edema  NEUROLOGY: Alert, no focal motor or sensory deficits  SKIN: No rashes or lesions    LABS:                        12.6   13.80 )-----------( 198      ( 04 Apr 2025 04:45 )             37.4     04-04    134[L]  |  98  |  27[H]  ----------------------------<  141[H]  4.1   |  20[L]  |  0.63    Ca    8.2[L]      04 Apr 2025 04:45  Phos  2.9     04-04  Mg     2.00     04-04      PT/INR - ( 04 Apr 2025 04:45 )   PT: 10.6 sec;   INR: <0.90 ratio         PTT - ( 04 Apr 2025 04:45 )  PTT:35.5 sec      Urinalysis Basic - ( 04 Apr 2025 04:45 )    Color: x / Appearance: x / SG: x / pH: x  Gluc: 141 mg/dL / Ketone: x  / Bili: x / Urobili: x   Blood: x / Protein: x / Nitrite: x   Leuk Esterase: x / RBC: x / WBC x   Sq Epi: x / Non Sq Epi: x / Bacteria: x        RADIOLOGY & ADDITIONAL TESTS:    Consultant(s) Notes Reviewed:      Care Discussed with Consultants/Other Providers:

## 2025-04-04 NOTE — CONSULT NOTE ADULT - ASSESSMENT
89M hx BPH (chronic indwelling Gill), HLD, B/L inguinal hernia repair with known left recurrence, colon cancer (T4N1 w/ two synchronous lesions) s/p extended right hemicolectomy w/ ileocolic anastomosis on 12/23 (not on chemo), recent presentation 2/24 for acute diastolic heart failure, presenting with abdominal pain, found to have high grade SBO w/ RUQ TP at a spiculated soft tissue lesion concerning for peritoneal carcinomatosis

## 2025-04-04 NOTE — CONSULT NOTE ADULT - CONSULT REASON
sbo for venting peg
hx colon ca s/p resection, presenting with malignant SBO and found to have peritoneal mets. Per med/onc, no options for chemotherapy, recommended palliative care involvement
SBO, recurrent colon cancer
prolonged NPO; concern for severe malnutrition
Ethics assistance requested for a 89 year old male who is vacillating in decision making for surgery for small bowel diversion vs. venting PEG.
medical management
Positive culture finding

## 2025-04-04 NOTE — CONSULT NOTE ADULT - REASON FOR ADMISSION
High grade SBO
High grade mSBO
High grade SBO

## 2025-04-05 NOTE — PROGRESS NOTE ADULT - ASSESSMENT
ASSESSMENT/PLAN: 89M hx BPH (chronic indwelling Bruner), HLD, B/L inguinal hernia repair with known left recurrence, colon cancer (T4N1 w/ two synchronous lesions) s/p extended right hemicolectomy w/ ileocolic anastomosis on 12/23 (not on chemo), recent presentation 2/24 for acute diastolic heart failure, presented with a malignant SBO that has failed conservative management, now pending venting PEG placement with GI and on TPN.      Plan:  - s/p PICC (4/1),   - Diet: NPO/TPN  - OR Tuesday   - Med onc (Dr. Delvalle) following, unsure of treatment plans, will f/u after discharge and determine further treatment  - Monitor bowel function   - Appreciate Ethics Eval  - Appreciate Medicine consult  - Chronic bruner in place, continue to monitor UOP (last exchange was on 3/4 with Kim Mercer)  - PT eval --> rec ADALID  - VTE ppx: SQ    A team   [id number]   ASSESSMENT/PLAN: 89M hx BPH (chronic indwelling Bruner), HLD, B/L inguinal hernia repair with known left recurrence, colon cancer (T4N1 w/ two synchronous lesions) s/p extended right hemicolectomy w/ ileocolic anastomosis on 12/23 (not on chemo), recent presentation 2/24 for acute diastolic heart failure, presented with a malignant SBO that has failed conservative management, on TPN. Pending surgical bypass vs. gastrostomy      Plan:  - s/p PICC (4/1),   - Diet: NPO/TPN  - OR Tuesday   - Med onc (Dr. Delavlle) following, unsure of treatment plans, will f/u after discharge and determine further treatment  - Monitor bowel function   - Appreciate Ethics Eval  - Appreciate Medicine consult  - Chronic bruner in place, continue to monitor UOP (last exchange was on 3/4 with Kim Mercer)  - PT eval --> rec ADALID  - VTE ppx: SQ    A team   [id number]

## 2025-04-05 NOTE — PROGRESS NOTE ADULT - PROBLEM SELECTOR PLAN 2
continue TPN  discussed with surgery  patient may be a candidate for intestinal bypass vs venting PEG

## 2025-04-05 NOTE — PROGRESS NOTE ADULT - SUBJECTIVE AND OBJECTIVE BOX
SURGERY PROGRESS NOTE    SUBJECTIVE / 24H EVENTS:  Patient seen and examined on morning rounds. No acute events overnight.      OBJECTIVE:  VITAL SIGNS:  T(C): 36.4 (04-05-25 @ 09:33), Max: 36.6 (04-04-25 @ 17:44)  HR: 59 (04-05-25 @ 09:33) (53 - 69)  BP: 128/40 (04-05-25 @ 09:33) (117/48 - 148/43)  RR: 17 (04-05-25 @ 09:33) (16 - 18)  SpO2: 100% (04-05-25 @ 09:33) (97% - 100%)  Daily     Daily   POCT Blood Glucose.: 144 mg/dL (04-05-25 @ 07:21)  POCT Blood Glucose.: 122 mg/dL (04-04-25 @ 19:15)  POCT Blood Glucose.: 130 mg/dL (04-04-25 @ 13:20)      PHYSICAL EXAM:  Gen: NAD  LS: Respirations unlabored   GI: Soft. Nontender. Nondistended. BS+.  Ext: Warm, well perfused      04-04-25 @ 07:01  -  04-05-25 @ 07:00  --------------------------------------------------------  IN:    Fat Emulsion (Fish Oil &amp; Plant Based) 20% Infusion: 166 mL    TPN (Total Parenteral Nutrition): 630 mL  Total IN: 796 mL    OUT:    Indwelling Catheter - Urethral (mL): 1125 mL    IV PiggyBack: 0 mL    Oral Fluid: 0 mL  Total OUT: 1125 mL    Total NET: -329 mL      04-05-25 @ 07:01  -  04-05-25 @ 10:58  --------------------------------------------------------  IN:  Total IN: 0 mL    OUT:    Indwelling Catheter - Urethral (mL): 200 mL    Oral Fluid: 0 mL  Total OUT: 200 mL    Total NET: -200 mL          LAB VALUES:  04-05    137  |  103  |  35[H]  ----------------------------<  158[H]  3.6   |  22  |  0.59    Ca    8.4      05 Apr 2025 04:20  Phos  3.0     04-05  Mg     2.20     04-05                                 11.8   14.60 )-----------( 177      ( 05 Apr 2025 04:20 )             35.0       PT/INR - ( 04 Apr 2025 04:45 )   PT: 10.6 sec;   INR: <0.90 ratio         PTT - ( 04 Apr 2025 04:45 )  PTT:35.5 sec        Urinalysis Basic - ( 05 Apr 2025 04:20 )    Color: x / Appearance: x / SG: x / pH: x  Gluc: 158 mg/dL / Ketone: x  / Bili: x / Urobili: x   Blood: x / Protein: x / Nitrite: x   Leuk Esterase: x / RBC: x / WBC x   Sq Epi: x / Non Sq Epi: x / Bacteria: x        MEDICATIONS  (STANDING):  chlorhexidine 2% Cloths 1 Application(s) Topical daily  dexAMETHasone  Injectable 4 milliGRAM(s) IV Push every 12 hours  influenza  Vaccine (HIGH DOSE) 0.5 milliLiter(s) IntraMuscular once  lipid, fat emulsion (Fish Oil and Plant Based) 20% Infusion 16.6 mL/Hr (16.6 mL/Hr) IV Continuous <Continuous>  lipid, fat emulsion (Fish Oil and Plant Based) 20% Infusion 16.6 mL/Hr (16.6 mL/Hr) IV Continuous <Continuous>  metoclopramide Injectable 10 milliGRAM(s) IV Push every 8 hours  octreotide  Injectable 100 MICROGram(s) SubCutaneous every 8 hours  pantoprazole  Injectable 40 milliGRAM(s) IV Push daily  Parenteral Nutrition - Adult 1 Each (63 mL/Hr) TPN Continuous <Continuous>  Parenteral Nutrition - Adult 1 Each (63 mL/Hr) TPN Continuous <Continuous>    MEDICATIONS  (PRN):  sodium chloride 0.9% lock flush 10 milliLiter(s) IV Push every 1 hour PRN Pre/post blood products, medications, blood draw, and to maintain line patency        SURGERY PROGRESS NOTE    SUBJECTIVE / 24H EVENTS:  Patient seen and examined on morning rounds. No acute events overnight. Discussed details of surgery and potential recovery further.       OBJECTIVE:  VITAL SIGNS:  T(C): 36.4 (04-05-25 @ 09:33), Max: 36.6 (04-04-25 @ 17:44)  HR: 59 (04-05-25 @ 09:33) (53 - 69)  BP: 128/40 (04-05-25 @ 09:33) (117/48 - 148/43)  RR: 17 (04-05-25 @ 09:33) (16 - 18)  SpO2: 100% (04-05-25 @ 09:33) (97% - 100%)  Daily     Daily   POCT Blood Glucose.: 144 mg/dL (04-05-25 @ 07:21)  POCT Blood Glucose.: 122 mg/dL (04-04-25 @ 19:15)  POCT Blood Glucose.: 130 mg/dL (04-04-25 @ 13:20)      PHYSICAL EXAM:  Gen: NAD  LS: Respirations unlabored on RA  GI: Softly distended. Nontender.  Ext: Warm, well perfused      04-04-25 @ 07:01  -  04-05-25 @ 07:00  --------------------------------------------------------  IN:    Fat Emulsion (Fish Oil &amp; Plant Based) 20% Infusion: 166 mL    TPN (Total Parenteral Nutrition): 630 mL  Total IN: 796 mL    OUT:    Indwelling Catheter - Urethral (mL): 1125 mL    IV PiggyBack: 0 mL    Oral Fluid: 0 mL  Total OUT: 1125 mL    Total NET: -329 mL      04-05-25 @ 07:01  -  04-05-25 @ 10:58  --------------------------------------------------------  IN:  Total IN: 0 mL    OUT:    Indwelling Catheter - Urethral (mL): 200 mL    Oral Fluid: 0 mL  Total OUT: 200 mL    Total NET: -200 mL          LAB VALUES:  04-05    137  |  103  |  35[H]  ----------------------------<  158[H]  3.6   |  22  |  0.59    Ca    8.4      05 Apr 2025 04:20  Phos  3.0     04-05  Mg     2.20     04-05                                 11.8   14.60 )-----------( 177      ( 05 Apr 2025 04:20 )             35.0       PT/INR - ( 04 Apr 2025 04:45 )   PT: 10.6 sec;   INR: <0.90 ratio         PTT - ( 04 Apr 2025 04:45 )  PTT:35.5 sec        Urinalysis Basic - ( 05 Apr 2025 04:20 )    Color: x / Appearance: x / SG: x / pH: x  Gluc: 158 mg/dL / Ketone: x  / Bili: x / Urobili: x   Blood: x / Protein: x / Nitrite: x   Leuk Esterase: x / RBC: x / WBC x   Sq Epi: x / Non Sq Epi: x / Bacteria: x        MEDICATIONS  (STANDING):  chlorhexidine 2% Cloths 1 Application(s) Topical daily  dexAMETHasone  Injectable 4 milliGRAM(s) IV Push every 12 hours  influenza  Vaccine (HIGH DOSE) 0.5 milliLiter(s) IntraMuscular once  lipid, fat emulsion (Fish Oil and Plant Based) 20% Infusion 16.6 mL/Hr (16.6 mL/Hr) IV Continuous <Continuous>  lipid, fat emulsion (Fish Oil and Plant Based) 20% Infusion 16.6 mL/Hr (16.6 mL/Hr) IV Continuous <Continuous>  metoclopramide Injectable 10 milliGRAM(s) IV Push every 8 hours  octreotide  Injectable 100 MICROGram(s) SubCutaneous every 8 hours  pantoprazole  Injectable 40 milliGRAM(s) IV Push daily  Parenteral Nutrition - Adult 1 Each (63 mL/Hr) TPN Continuous <Continuous>  Parenteral Nutrition - Adult 1 Each (63 mL/Hr) TPN Continuous <Continuous>    MEDICATIONS  (PRN):  sodium chloride 0.9% lock flush 10 milliLiter(s) IV Push every 1 hour PRN Pre/post blood products, medications, blood draw, and to maintain line patency

## 2025-04-05 NOTE — PROGRESS NOTE ADULT - SUBJECTIVE AND OBJECTIVE BOX
NUTRITION NOTE  [id number] STOMBER  ===============================    Interval events; no acute events overnight; Pt tolerating TPN at this time     VITAL SIGNS:  T(C): 36.4 (25 @ 09:33), Max: 36.6 (25 @ 17:44)  HR: 59 (25 @ 09:33) (53 - 69)  BP: 128/40 (25 @ 09:33) (117/48 - 148/43)  ABP: --  ABP(mean): --  RR: 17 (25 @ 09:33) (16 - 18)  SpO2: 100% (25 @ 09:33) (97% - 100%)  CVP(mm Hg): --   @ 07:01  -   @ 07:00  --------------------------------------------------------  IN: 796 mL / OUT: 1125 mL / NET: -329 mL     @ 07:01  -   @ 11:03  --------------------------------------------------------  IN: 0 mL / OUT: 200 mL / NET: -200 mL      Glucose 122-144    PHYSICAL EXAM   General: NAD, sitting in chair   Cardiac: regular rate, warm and well perfused  Respiratory: Nonlabored respirations, normal cw expansion  Abdomen: soft, nontender, mildly distended.   Extremities: normal strength, FROM, no deformities  [place]ICC [place] [facility] [place], placed 25    Diet: NPO and TPN/lipids (started on 25)    Metabolic/FLUIDS/ELECTROLYTES/NUTRITION:  Gastrointestinal Medications:  lipid, fat emulsion (Fish Oil and Plant Based) 20% Infusion 16.6 mL/Hr IV Continuous <Continuous>  lipid, fat emulsion (Fish Oil and Plant Based) 20% Infusion 16.6 mL/Hr IV Continuous <Continuous>  pantoprazole  Injectable 40 milliGRAM(s) IV Push daily  Parenteral Nutrition - Adult 1 Each TPN Continuous <Continuous>  Parenteral Nutrition - Adult 1 Each TPN Continuous <Continuous>  sodium chloride 0.9% lock flush 10 milliLiter(s) IV Push every 1 hour PRN    I&O's Detail  Daily Weight in k.2 (2025 18:04)  04-05    137  |  103  |  35[H]  ----------------------------<  158[H]  3.6   |  22  |  0.59    Ca    8.4      2025 04:20  Phos  3.0     04-05  Mg     2.20     04-05    INFECTIOUS DISEASE:  Antimicrobials/Immunologic Medications:  influenza  Vaccine (HIGH DOSE) 0.5 milliLiter(s) IntraMuscular once    OTHER MEDICATIONS:  Endocrine/Metabolic Medications:  dexAMETHasone  Injectable 4 milliGRAM(s) IV Push every 12 hours  octreotide  Injectable 100 MICROGram(s) SubCutaneous every 8 hours    Genitourinary Medications:    Topical/Other Medications:  chlorhexidine 2% Cloths 1 Application(s) Topical daily    ASSESSMENT/PLAN:  89M with hx BPH (chronic indwelling Gill), HLD, B/L inguinal hernia repair with known left recurrence, colon cancer (T4N1 w/ two synchronous lesions) s/p extended right hemicolectomy w/ ileocolic anastomosis on  (not on chemo), recent presentation  for acute diastolic heart failure, now admitted with abdominal pain, found to have high grade SBO w/ RUQ TP at a spiculated soft tissue lesion concerning for peritoneal carcinomatosis. Nutrition support consult called for evaluation for TPN in view of prolonged NPO and severe malnutrition. Med-onc consult --> poor chemotherapy candidate, recommend palliative care involvement if patient fails nonoperative management. IR PICC placed and parenteral nutrition was started on 25.     continue TPN with infusion volume of 1.5L, TPN will provide 1480 kcal/day    labs reviewed - electrolytes adjusted in TPN bag    monitor fingersticks, obtain daily weights    continue parenteral nutrition at this time, plan for OR on .  Severe protein calorie malnutrition being optimized with TPN: CHO [200] gm.  AA [100] gm. SMOF Lipids [40] gm.  2.  Hyperglycemia managed with: [0] units of regular insulin    3.  Check fluid balance daily.  Strict I/O  [ ] [ ]   4.  Daily BMP, Ionized Calcium, Magnesium and Phosphorous   5.  Triglycerides at initiation of TPN and monthly - Chol -- LDL -- HDL -- Trig 104    Nutrition Support [id number]         1. Protein calorie malnutrition being optimized with TPN: CHO [ ] gm.  AA [ ] gm. Lipids [ ] gm.  2.  Hyperglycemia managed with: [ ] units of regular insulin    3.  Check fluid balance daily.  Strict I/O  [ ] [ ]   4.  Daily BMP, Ionized Calcium, Magnesium and Phosphorous   5.  Triglycerides at initiation of TPN and monthly [ ] [ ]   6.  Pepcid in TPN for Gi prophylaxis  [ ]

## 2025-04-05 NOTE — PROGRESS NOTE ADULT - SUBJECTIVE AND OBJECTIVE BOX
Patient is a 89y old  Male who presents with a chief complaint of High grade mSBO (04 Apr 2025 18:11)      DATE OF SERVICE: 04-05-25 @ 10:00    SUBJECTIVE / OVERNIGHT EVENTS: overnight events noted    ROS:  Resp: No cough no sputum production  CVS: No chest pain no palpitations no orthopnea  GI: no N/V/D  "I am expecting a phone call"        MEDICATIONS  (STANDING):  chlorhexidine 2% Cloths 1 Application(s) Topical daily  dexAMETHasone  Injectable 4 milliGRAM(s) IV Push every 12 hours  influenza  Vaccine (HIGH DOSE) 0.5 milliLiter(s) IntraMuscular once  lipid, fat emulsion (Fish Oil and Plant Based) 20% Infusion 16.6 mL/Hr (16.6 mL/Hr) IV Continuous <Continuous>  metoclopramide Injectable 10 milliGRAM(s) IV Push every 8 hours  octreotide  Injectable 100 MICROGram(s) SubCutaneous every 8 hours  pantoprazole  Injectable 40 milliGRAM(s) IV Push daily  Parenteral Nutrition - Adult 1 Each (63 mL/Hr) TPN Continuous <Continuous>    MEDICATIONS  (PRN):  sodium chloride 0.9% lock flush 10 milliLiter(s) IV Push every 1 hour PRN Pre/post blood products, medications, blood draw, and to maintain line patency        CAPILLARY BLOOD GLUCOSE      POCT Blood Glucose.: 144 mg/dL (05 Apr 2025 07:21)  POCT Blood Glucose.: 122 mg/dL (04 Apr 2025 19:15)  POCT Blood Glucose.: 130 mg/dL (04 Apr 2025 13:20)    I&O's Summary    04 Apr 2025 07:01  -  05 Apr 2025 07:00  --------------------------------------------------------  IN: 796 mL / OUT: 1125 mL / NET: -329 mL    05 Apr 2025 07:01  -  05 Apr 2025 10:00  --------------------------------------------------------  IN: 0 mL / OUT: 200 mL / NET: -200 mL        Vital Signs Last 24 Hrs  T(C): 36.4 (05 Apr 2025 09:33), Max: 36.6 (04 Apr 2025 17:44)  T(F): 97.5 (05 Apr 2025 09:33), Max: 97.8 (04 Apr 2025 17:44)  HR: 59 (05 Apr 2025 09:33) (53 - 69)  BP: 128/40 (05 Apr 2025 09:33) (117/48 - 148/43)  BP(mean): --  RR: 17 (05 Apr 2025 09:33) (16 - 18)  SpO2: 100% (05 Apr 2025 09:33) (97% - 100%)    PHYSICAL EXAM:  CHEST/LUNG: Clear   HEART: S1S2; no murmurs  ABDOMEN: Soft, Nontender;  EXTREMITIES: no edema  NEUROLOGY: Alert, nonfocal       LABS:                        11.8   14.60 )-----------( 177      ( 05 Apr 2025 04:20 )             35.0     04-05    137  |  103  |  35[H]  ----------------------------<  158[H]  3.6   |  22  |  0.59    Ca    8.4      05 Apr 2025 04:20  Phos  3.0     04-05  Mg     2.20     04-05      PT/INR - ( 04 Apr 2025 04:45 )   PT: 10.6 sec;   INR: <0.90 ratio         PTT - ( 04 Apr 2025 04:45 )  PTT:35.5 sec      Urinalysis Basic - ( 05 Apr 2025 04:20 )    Color: x / Appearance: x / SG: x / pH: x  Gluc: 158 mg/dL / Ketone: x  / Bili: x / Urobili: x   Blood: x / Protein: x / Nitrite: x   Leuk Esterase: x / RBC: x / WBC x   Sq Epi: x / Non Sq Epi: x / Bacteria: x          All consultant(s) notes reviewed and care discussed with other providers        Contact Number, Dr Redd [phone]

## 2025-04-06 NOTE — PROGRESS NOTE ADULT - SUBJECTIVE AND OBJECTIVE BOX
NUTRITION NOTE  [id number] STOMBER  ===============================    Interval events; no acute events overnight; Pt tolerating TPN at this time    VITAL SIGNS:  T(C): 36.6 (25 @ 09:03), Max: 36.6 (25 @ 05:47)  HR: 53 (25 @ 09:03) (53 - 58)  BP: 146/42 (25 @ 09:03) (140/43 - 170/44)  ABP: --  ABP(mean): --  RR: 18 (25 @ 09:03) (17 - 18)  SpO2: 99% (25 @ 09:03) (94% - 100%)  CVP(mm Hg): --   @ 07:01  -   @ 07:00  --------------------------------------------------------  IN: 1711.2 mL / OUT: 1150 mL / NET: 561.2 mL     @ 07:01  -   @ 11:55  --------------------------------------------------------  IN: 252 mL / OUT: 300 mL / NET: -48 mL      Glucose 122-176     PHYSICAL EXAM   General: NAD, sitting in chair   Cardiac: regular rate, warm and well perfused  Respiratory: Nonlabored respirations, normal cw expansion  Abdomen: soft, nontender, mildly distended.   Extremities: normal strength, FROM, no deformities  [place] [facility] [place], placed 25    Diet: NPO and TPN/lipids (started on 25)    Metabolic/FLUIDS/ELECTROLYTES/NUTRITION:  Gastrointestinal Medications:  lipid, fat emulsion (Fish Oil and Plant Based) 20% Infusion 16.6 mL/Hr IV Continuous <Continuous>  lipid, fat emulsion (Fish Oil and Plant Based) 20% Infusion 16.6 mL/Hr IV Continuous <Continuous>  lipid, fat emulsion (Fish Oil and Plant Based) 20% Infusion 16.6 mL/Hr IV Continuous <Continuous>  pantoprazole  Injectable 40 milliGRAM(s) IV Push daily  Parenteral Nutrition - Adult 1 Each TPN Continuous <Continuous>  Parenteral Nutrition - Adult 1 Each TPN Continuous <Continuous>  sodium chloride 0.9% lock flush 10 milliLiter(s) IV Push every 1 hour PRN    I&O's Detail  89y  Daily Weight in k.2 (2025 18:04)  04-06    138  |  106  |  36[H]  ----------------------------<  160[H]  3.6   |  21[L]  |  0.57    Ca    8.5      2025 05:18  Phos  2.7     04-06  Mg     2.20     04-06    INFECTIOUS DISEASE:  Antimicrobials/Immunologic Medications:  influenza  Vaccine (HIGH DOSE) 0.5 milliLiter(s) IntraMuscular once    OTHER MEDICATIONS:  Endocrine/Metabolic Medications:  dexAMETHasone  Injectable 4 milliGRAM(s) IV Push every 12 hours  octreotide  Injectable 100 MICROGram(s) SubCutaneous every 8 hours    Genitourinary Medications:    Topical/Other Medications:  chlorhexidine 2% Cloths 1 Application(s) Topical daily    ASSESSMENT/PLAN:   89M with hx BPH (chronic indwelling Gill), HLD, B/L inguinal hernia repair with known left recurrence, colon cancer (T4N1 w/ two synchronous lesions) s/p extended right hemicolectomy w/ ileocolic anastomosis on  (not on chemo), recent presentation  for acute diastolic heart failure, now admitted with abdominal pain, found to have high grade SBO w/ RUQ TP at a spiculated soft tissue lesion concerning for peritoneal carcinomatosis. Nutrition support consult called for evaluation for TPN in view of prolonged NPO and severe malnutrition. Med-onc consult --> poor chemotherapy candidate, recommend palliative care involvement if patient fails nonoperative management. IR PICC placed and parenteral nutrition was started on 25.     continue TPN with infusion volume of 1.5L, TPN will provide 1480 kcal/day    labs reviewed - electrolytes adjusted in TPN bag    monitor fingersticks, obtain daily weights    continue parenteral nutrition at this time, plan for OR on     1.  Severe protein calorie malnutrition being optimized with TPN: CHO [200] gm.  AA [100] gm. SMOF Lipids [40] gm.  2.  Hyperglycemia managed with: [0] units of regular insulin    3.  Check fluid balance daily.  Strict I/O  [ ] [ ]   4.  Daily BMP, Ionized Calcium, Magnesium and Phosphorous   5.  Triglycerides at initiation of TPN and monthly  Chol -- LDL -- HDL -- Trig 104    Nutrition Support [id number]             1. Protein calorie malnutrition being optimized with TPN: CHO [ ] gm.  AA [ ] gm. Lipids [ ] gm.  2.  Hyperglycemia managed with: [ ] units of regular insulin    3.  Check fluid balance daily.  Strict I/O  [ ] [ ]   4.  Daily BMP, Ionized Calcium, Magnesium and Phosphorous   5.  Triglycerides at initiation of TPN and monthly [ ] [ ]   6.  Pepcid in TPN for Gi prophylaxis  [ ]

## 2025-04-06 NOTE — PROGRESS NOTE ADULT - SUBJECTIVE AND OBJECTIVE BOX
Patient is a 89y old  Male who presents with a chief complaint of High grade mSBO (06 Apr 2025 11:52)      DATE OF SERVICE: 04-06-25 @ 12:31    SUBJECTIVE / OVERNIGHT EVENTS: overnight events noted    ROS:  Resp: No cough no sputum production  CVS: No chest pain no palpitations no orthopnea  GI: no N/V/D      MEDICATIONS  (STANDING):  chlorhexidine 2% Cloths 1 Application(s) Topical daily  dexAMETHasone  Injectable 4 milliGRAM(s) IV Push every 12 hours  influenza  Vaccine (HIGH DOSE) 0.5 milliLiter(s) IntraMuscular once  lipid, fat emulsion (Fish Oil and Plant Based) 20% Infusion 16.6 mL/Hr (16.6 mL/Hr) IV Continuous <Continuous>  lipid, fat emulsion (Fish Oil and Plant Based) 20% Infusion 16.6 mL/Hr (16.6 mL/Hr) IV Continuous <Continuous>  lipid, fat emulsion (Fish Oil and Plant Based) 20% Infusion 16.6 mL/Hr (16.6 mL/Hr) IV Continuous <Continuous>  metoclopramide Injectable 10 milliGRAM(s) IV Push every 8 hours  octreotide  Injectable 100 MICROGram(s) SubCutaneous every 8 hours  pantoprazole  Injectable 40 milliGRAM(s) IV Push daily  Parenteral Nutrition - Adult 1 Each (63 mL/Hr) TPN Continuous <Continuous>  Parenteral Nutrition - Adult 1 Each (63 mL/Hr) TPN Continuous <Continuous>    MEDICATIONS  (PRN):  sodium chloride 0.9% lock flush 10 milliLiter(s) IV Push every 1 hour PRN Pre/post blood products, medications, blood draw, and to maintain line patency        CAPILLARY BLOOD GLUCOSE      POCT Blood Glucose.: 176 mg/dL (06 Apr 2025 00:36)  POCT Blood Glucose.: 116 mg/dL (05 Apr 2025 18:59)  POCT Blood Glucose.: 130 mg/dL (05 Apr 2025 13:54)    I&O's Summary    05 Apr 2025 07:01  -  06 Apr 2025 07:00  --------------------------------------------------------  IN: 1711.2 mL / OUT: 1150 mL / NET: 561.2 mL    06 Apr 2025 07:01  -  06 Apr 2025 12:31  --------------------------------------------------------  IN: 252 mL / OUT: 300 mL / NET: -48 mL        Vital Signs Last 24 Hrs  T(C): 36.6 (06 Apr 2025 09:03), Max: 36.6 (06 Apr 2025 05:47)  T(F): 97.8 (06 Apr 2025 09:03), Max: 97.8 (06 Apr 2025 05:47)  HR: 53 (06 Apr 2025 09:03) (53 - 58)  BP: 146/42 (06 Apr 2025 09:03) (140/43 - 170/44)  BP(mean): --  RR: 18 (06 Apr 2025 09:03) (17 - 18)  SpO2: 99% (06 Apr 2025 09:03) (94% - 100%)    PHYSICAL EXAM:  CHEST/LUNG: Clear   HEART: S1S2; no murmurs  ABDOMEN: Soft, Nontender;  EXTREMITIES: no edema  NEUROLOGY: Alert, nonfocal     LABS:                        11.5   15.22 )-----------( 166      ( 06 Apr 2025 05:18 )             34.1     04-06    138  |  106  |  36[H]  ----------------------------<  160[H]  3.6   |  21[L]  |  0.57    Ca    8.5      06 Apr 2025 05:18  Phos  2.7     04-06  Mg     2.20     04-06            Urinalysis Basic - ( 06 Apr 2025 05:18 )    Color: x / Appearance: x / SG: x / pH: x  Gluc: 160 mg/dL / Ketone: x  / Bili: x / Urobili: x   Blood: x / Protein: x / Nitrite: x   Leuk Esterase: x / RBC: x / WBC x   Sq Epi: x / Non Sq Epi: x / Bacteria: x          All consultant(s) notes reviewed and care discussed with other providers        Contact Number, Dr Redd [phone]

## 2025-04-06 NOTE — PROGRESS NOTE ADULT - ASSESSMENT
ASSESSMENT/PLAN: 89M hx BPH (chronic indwelling Bruner), HLD, B/L inguinal hernia repair with known left recurrence, colon cancer (T4N1 w/ two synchronous lesions) s/p extended right hemicolectomy w/ ileocolic anastomosis on 12/23 (not on chemo), recent presentation 2/24 for acute diastolic heart failure, presented with a malignant SBO that has failed conservative management, on TPN. Pending surgical bypass vs. gastrostomy      Plan:  - s/p PICC (4/1),   - Diet: NPO/TPN  - OR Tuesday   - Med onc (Dr. Delvalle) following, unsure of treatment plans, will f/u after discharge and determine further treatment  - Monitor bowel function   - Appreciate Ethics Eval  - Appreciate Medicine consult  - Chronic bruner in place, continue to monitor UOP (last exchange was on 3/4 with Kim Mercer)  - PT eval --> rec ADALID  - VTE ppx: SQ    A team   [id number]

## 2025-04-06 NOTE — PROGRESS NOTE ADULT - SUBJECTIVE AND OBJECTIVE BOX
Surgery Daily Progress Note  =====================================================    INTERVAL EVENTS: NAEO    SUBJECTIVE: Patient seen at bedside during AM rounds. Resting comfortably, pain appropriately controlled.      --------------------------------------------------------------------------------------  VITAL SIGNS:  T(C): 36.6 (04-06-25 @ 05:47), Max: 36.6 (04-06-25 @ 05:47)  HR: 57 (04-06-25 @ 05:47) (53 - 59)  BP: 170/44 (04-06-25 @ 05:47) (128/40 - 170/44)  RR: 18 (04-06-25 @ 05:47) (17 - 18)  SpO2: 99% (04-06-25 @ 05:47) (94% - 100%)  --------------------------------------------------------------------------------------  MEDICATIONS:   chlorhexidine 2% Cloths 1 Application(s) Topical daily  dexAMETHasone  Injectable 4 milliGRAM(s) IV Push every 12 hours  influenza  Vaccine (HIGH DOSE) 0.5 milliLiter(s) IntraMuscular once  lipid, fat emulsion (Fish Oil and Plant Based) 20% Infusion 16.6 mL/Hr IV Continuous <Continuous>  lipid, fat emulsion (Fish Oil and Plant Based) 20% Infusion 16.6 mL/Hr IV Continuous <Continuous>  metoclopramide Injectable 10 milliGRAM(s) IV Push every 8 hours  octreotide  Injectable 100 MICROGram(s) SubCutaneous every 8 hours  pantoprazole  Injectable 40 milliGRAM(s) IV Push daily  Parenteral Nutrition - Adult 1 Each TPN Continuous <Continuous>  sodium chloride 0.9% lock flush 10 milliLiter(s) IV Push every 1 hour PRN    --------------------------------------------------------------------------------------  INTAKE/OUTPUT:     04-05-25 @ 07:01  -  04-06-25 @ 07:00  --------------------------------------------------------  IN: 1711.2 mL / OUT: 1150 mL / NET: 561.2 mL      --------------------------------------------------------------------------------------  PHYSICAL EXAM:  Gen: NAD  LS: Respirations unlabored on RA  GI: Softly distended. Nontender.  Ext: Warm, well perfused    --------------------------------------------------------------------------------------

## 2025-04-07 NOTE — DISCHARGE NOTE PROVIDER - NSDCCPTREATMENT_GEN_ALL_CORE_FT
PRINCIPAL PROCEDURE  Procedure: Intestinal bypass  Findings and Treatment: WOUND CARE:  Please keep incisions clean and dry. Please do not Scrub or rub incisions. Do not use lotion or powder on incisions.   BATHING: You may shower and/or sponge bathe. You may use warm soapy water in the shower and rinse, pat dry.  ACTIVITY: No heavy lifting or straining. Otherwise, you may return to your usual level of physical activity. If you are taking narcotic pain medication DO NOT drive a car, operate machinery or make important decisions.  DIET: Return to your usual diet.  NOTIFY YOUR SURGEON IF YOU HAVE: any bleeding that does not stop, any pus draining from your wound(s), any fever (over 100.4 F) persistent nausea/vomiting, or if your pain is not controlled on your discharge pain medications, unable to urinate.  Please follow up with your primary care physician in one week regarding your hospitalization, bring copies of your discharge paperwork.  Please follow up with your surgeon, Dr. Marie

## 2025-04-07 NOTE — DISCHARGE NOTE PROVIDER - NSDCMRMEDTOKEN_GEN_ALL_CORE_FT
atorvastatin 40 mg oral tablet: 1 tab(s) orally once a day (at bedtime)  famotidine 20 mg oral tablet: 1 tab(s) orally once a day  finasteride 5 mg oral tablet: 1 tab(s) orally once a day  furosemide 40 mg oral tablet: 1 tab(s) orally once a day  gabapentin 400 mg oral capsule: 1 cap(s) orally once a day (at bedtime)  pantoprazole 40 mg oral delayed release tablet: 1 tab(s) orally 2 times a day  tamsulosin 0.4 mg oral capsule: 1 cap(s) orally once a day (at bedtime)

## 2025-04-07 NOTE — PROGRESS NOTE ADULT - SUBJECTIVE AND OBJECTIVE BOX
NUTRITION NOTE  [id number] STOMBER  ===============================    Interval events - Patient was seen and examined at bedside, no acute events overnight. Patient denies chest pain, shortness of breath, nausea or vomiting at this time. IR PICC placed and TPN was started on 25 for nutritional support. Pt is tolerating TPN without any issues. TPN/lipids ordered for tonight.     ROS: Except as noted above, all other systems reviewed and are negative     Allergies  aspirin (Unknown)  penicillin (Unknown)    PAST MEDICAL & SURGICAL HISTORY:  BPH (benign prostatic hyperplasia)  Peripheral neuropathy  Heart failure, diastolic, chronic  Colon cancer  Bilateral inguinal hernia  Recurrent inguinal hernia  GERD (gastroesophageal reflux disease)  H/O hemicolectomy  H/O bilateral inguinal hernia repair    Vital Signs Last 24 Hrs  T(C): 36.5 (2025 09:10), Max: 36.9 (2025 00:54)  T(F): 97.7 (2025 09:10), Max: 98.4 (2025 00:54)  HR: 55 (2025 10:45) (54 - 58)  BP: 144/46 (2025 10:45) (132/46 - 161/59)  RR: 17 (2025 09:10) (16 - 18)  SpO2: 96% (2025 09:10) (96% - 100%)    MEDICATIONS  (STANDING):  chlorhexidine 2% Cloths 1 Application(s) Topical daily  dexAMETHasone  Injectable 4 milliGRAM(s) IV Push every 12 hours  influenza  Vaccine (HIGH DOSE) 0.5 milliLiter(s) IntraMuscular once  lipid, fat emulsion (Fish Oil and Plant Based) 20% Infusion 20.8 mL/Hr (20.8 mL/Hr) IV Continuous <Continuous>  metoclopramide Injectable 10 milliGRAM(s) IV Push every 8 hours  octreotide  Injectable 100 MICROGram(s) SubCutaneous every 8 hours  pantoprazole  Injectable 40 milliGRAM(s) IV Push daily  Parenteral Nutrition - Adult 1 Each (75 mL/Hr) TPN Continuous <Continuous>  Parenteral Nutrition - Adult 1 Each (63 mL/Hr) TPN Continuous <Continuous>    MEDICATIONS  (PRN):  sodium chloride 0.9% lock flush 10 milliLiter(s) IV Push every 1 hour PRN Pre/post blood products, medications, blood draw, and to maintain line patency    I&O's Detail    2025 07:01  -  2025 07:00  --------------------------------------------------------  IN:    Fat Emulsion (Fish Oil &amp; Plant Based) 20% Infusion: 49.8 mL    TPN (Total Parenteral Nutrition): 1512 mL  Total IN: 1561.8 mL    OUT:    Indwelling Catheter - Urethral (mL): 1800 mL    IV PiggyBack: 0 mL    Oral Fluid: 0 mL  Total OUT: 1800 mL    Total NET: -238.2 mL      2025 07:01  -  2025 12:27  --------------------------------------------------------  IN:    Oral Fluid: 30 mL    TPN (Total Parenteral Nutrition): 126 mL  Total IN: 156 mL    OUT:    Indwelling Catheter - Urethral (mL): 300 mL    IV PiggyBack: 0 mL  Total OUT: 300 mL    Total NET: -144 mL    POCT Blood Glucose.: 145 mg/dL (2025 10:04)  POCT Blood Glucose.: 159 mg/dL (2025 01:47)  POCT Blood Glucose.: 136 mg/dL (2025 18:28)    Daily Weight in k.2 (2025 18:04)    Drug Dosing Weight  Height (cm): 172.7 (22 Mar 2025 10:19)  Weight (kg): 59 (21 Mar 2025 17:07)  BMI (kg/m2): 19.8 (22 Mar 2025 10:19)  BSA (m2): 1.7 (22 Mar 2025 10:19)    PHYSICAL EXAM   General: NAD, resting comfortably in bed   Cardiac: regular rate, warm and well perfused  Respiratory: Nonlabored respirations, normal cw expansion  Abdomen: soft, nontender, mildly distended.   Extremities: normal strength, FROM, no deformities  PICC Site: double lumen PICC in E, site clean and dry, placed 25    Diet: NPO and TPN/lipids (started on 25)    LABORATORY                                                   11.6   16.91 )-----------( 154      ( 2025 05:44 )             35.3   04-07    140  |  106  |  34[H]  ----------------------------<  135[H]  4.1   |  22  |  0.59    Ca    8.5      2025 05:44  Phos  2.9     04-07  Mg     2.30     04-07    ASSESSMENT/PLAN:  89M with hx BPH (chronic indwelling Gill), HLD, B/L inguinal hernia repair with known left recurrence, colon cancer (T4N1 w/ two synchronous lesions) s/p extended right hemicolectomy w/ ileocolic anastomosis on  (not on chemo), recent presentation  for acute diastolic heart failure, now admitted with abdominal pain, found to have high grade SBO w/ RUQ TP at a spiculated soft tissue lesion concerning for peritoneal carcinomatosis. Nutrition support consult called for evaluation for TPN in view of prolonged NPO and severe malnutrition. Med-onc consult --> poor chemotherapy candidate, recommend palliative care involvement if patient fails nonoperative management. IR PICC placed and parenteral nutrition was started on 25.     TPN volume increased to 1800mL, TPN will provide 1620 kcal/day, TPN calories increased today     labs reviewed - electrolytes adjusted in TPN bag    monitor fingersticks, obtain daily weights    continue parenteral nutrition at this time, will follow up with primary team on plan, plan for OR tomorrow     1.  Severe protein calorie malnutrition being optimized with TPN: CHO [200] gm.  AA [110] gm. SMOF Lipids [50] gm.  2.  Hyperglycemia managed with: [0] units of regular insulin    3.  Check fluid balance daily.  Strict I/O  [ ] [ ]   4.  Daily BMP, Ionized Calcium, Magnesium and Phosphorous   5.  Triglycerides at initiation of TPN and monthly - Chol -- LDL -- HDL -- Trig 104    Nutrition Support [id number]

## 2025-04-07 NOTE — PROGRESS NOTE ADULT - SUBJECTIVE AND OBJECTIVE BOX
DATE OF SERVICE: 04-07-25 @ 13:59    INTERVAL HPI/OVERNIGHT EVENTS: Patient seen and examined, resting comfortably at bedside awake and alert. No acute events overnight. Patient denies nausea and vomiting. Denies having bowel movements. Pain well controlled.       MEDICATIONS  (STANDING):  chlorhexidine 2% Cloths 1 Application(s) Topical daily  dexAMETHasone  Injectable 4 milliGRAM(s) IV Push every 12 hours  influenza  Vaccine (HIGH DOSE) 0.5 milliLiter(s) IntraMuscular once  lipid, fat emulsion (Fish Oil and Plant Based) 20% Infusion 20.8 mL/Hr (20.8 mL/Hr) IV Continuous <Continuous>  metoclopramide Injectable 10 milliGRAM(s) IV Push every 8 hours  octreotide  Injectable 100 MICROGram(s) SubCutaneous every 8 hours  pantoprazole  Injectable 40 milliGRAM(s) IV Push daily  Parenteral Nutrition - Adult 1 Each (75 mL/Hr) TPN Continuous <Continuous>  Parenteral Nutrition - Adult 1 Each (63 mL/Hr) TPN Continuous <Continuous>    MEDICATIONS  (PRN):  sodium chloride 0.9% lock flush 10 milliLiter(s) IV Push every 1 hour PRN Pre/post blood products, medications, blood draw, and to maintain line patency      Vital Signs Last 24 Hrs  T(C): 36.4 (07 Apr 2025 13:28), Max: 36.9 (07 Apr 2025 00:54)  T(F): 97.6 (07 Apr 2025 13:28), Max: 98.4 (07 Apr 2025 00:54)  HR: 60 (07 Apr 2025 13:28) (54 - 60)  BP: 156/49 (07 Apr 2025 13:28) (132/46 - 161/59)  BP(mean): --  RR: 18 (07 Apr 2025 13:28) (17 - 18)  SpO2: 100% (07 Apr 2025 13:28) (96% - 100%)    Parameters below as of 07 Apr 2025 09:10  Patient On (Oxygen Delivery Method): room air      I&O's Detail    06 Apr 2025 07:01  -  07 Apr 2025 07:00  --------------------------------------------------------  IN:    Fat Emulsion (Fish Oil &amp; Plant Based) 20% Infusion: 49.8 mL    TPN (Total Parenteral Nutrition): 1512 mL  Total IN: 1561.8 mL    OUT:    Indwelling Catheter - Urethral (mL): 1800 mL    IV PiggyBack: 0 mL    Oral Fluid: 0 mL  Total OUT: 1800 mL    Total NET: -238.2 mL      07 Apr 2025 07:01  -  07 Apr 2025 13:59  --------------------------------------------------------  IN:    Oral Fluid: 30 mL    TPN (Total Parenteral Nutrition): 126 mL  Total IN: 156 mL    OUT:    Indwelling Catheter - Urethral (mL): 300 mL    IV PiggyBack: 0 mL  Total OUT: 300 mL    Total NET: -144 mL            Physical Exam:  General: WN/WD NAD  Respiratory: airway patent, respirations unlabored, no increased WoB  Neurology: A&Ox3, nonfocal, BARBOSA x 4  Abdominal: Soft, NT, ND, no rebounding or guarding  Gill cath to bedside gravity with clear, straw colored urine output      LABS:                        11.6   16.91 )-----------( 154      ( 07 Apr 2025 05:44 )             35.3     04-07    140  |  106  |  34[H]  ----------------------------<  135[H]  4.1   |  22  |  0.59    Ca    8.5      07 Apr 2025 05:44  Phos  2.9     04-07  Mg     2.30     04-07        Urinalysis Basic - ( 07 Apr 2025 05:44 )    Color: x / Appearance: x / SG: x / pH: x  Gluc: 135 mg/dL / Ketone: x  / Bili: x / Urobili: x   Blood: x / Protein: x / Nitrite: x   Leuk Esterase: x / RBC: x / WBC x   Sq Epi: x / Non Sq Epi: x / Bacteria: x         DATE OF SERVICE: 04-07-25 @ 13:59    INTERVAL HPI/OVERNIGHT EVENTS: Patient seen and examined, resting comfortably at bedside awake and alert. No acute events overnight. Patient denies nausea and vomiting. Denies having bowel movements. Pain well controlled.       MEDICATIONS  (STANDING):  chlorhexidine 2% Cloths 1 Application(s) Topical daily  dexAMETHasone  Injectable 4 milliGRAM(s) IV Push every 12 hours  influenza  Vaccine (HIGH DOSE) 0.5 milliLiter(s) IntraMuscular once  lipid, fat emulsion (Fish Oil and Plant Based) 20% Infusion 20.8 mL/Hr (20.8 mL/Hr) IV Continuous <Continuous>  metoclopramide Injectable 10 milliGRAM(s) IV Push every 8 hours  octreotide  Injectable 100 MICROGram(s) SubCutaneous every 8 hours  pantoprazole  Injectable 40 milliGRAM(s) IV Push daily  Parenteral Nutrition - Adult 1 Each (75 mL/Hr) TPN Continuous <Continuous>  Parenteral Nutrition - Adult 1 Each (63 mL/Hr) TPN Continuous <Continuous>    MEDICATIONS  (PRN):  sodium chloride 0.9% lock flush 10 milliLiter(s) IV Push every 1 hour PRN Pre/post blood products, medications, blood draw, and to maintain line patency      Vital Signs Last 24 Hrs  T(C): 36.4 (07 Apr 2025 13:28), Max: 36.9 (07 Apr 2025 00:54)  T(F): 97.6 (07 Apr 2025 13:28), Max: 98.4 (07 Apr 2025 00:54)  HR: 60 (07 Apr 2025 13:28) (54 - 60)  BP: 156/49 (07 Apr 2025 13:28) (132/46 - 161/59)  BP(mean): --  RR: 18 (07 Apr 2025 13:28) (17 - 18)  SpO2: 100% (07 Apr 2025 13:28) (96% - 100%)    Parameters below as of 07 Apr 2025 09:10  Patient On (Oxygen Delivery Method): room air      I&O's Detail    06 Apr 2025 07:01  -  07 Apr 2025 07:00  --------------------------------------------------------  IN:    Fat Emulsion (Fish Oil &amp; Plant Based) 20% Infusion: 49.8 mL    TPN (Total Parenteral Nutrition): 1512 mL  Total IN: 1561.8 mL    OUT:    Indwelling Catheter - Urethral (mL): 1800 mL    IV PiggyBack: 0 mL    Oral Fluid: 0 mL  Total OUT: 1800 mL    Total NET: -238.2 mL      07 Apr 2025 07:01  -  07 Apr 2025 13:59  --------------------------------------------------------  IN:    Oral Fluid: 30 mL    TPN (Total Parenteral Nutrition): 126 mL  Total IN: 156 mL    OUT:    Indwelling Catheter - Urethral (mL): 300 mL    IV PiggyBack: 0 mL  Total OUT: 300 mL    Total NET: -144 mL            Physical Exam:  General: WN/WD NAD  Respiratory: airway patent, respirations unlabored, no increased WoB  Neurology: A&Ox3, nonfocal, BARBOSA x 4  Abdominal: Soft, NT, no rebounding or guarding  Gill cath to bedside gravity with clear, straw colored urine output      LABS:                        11.6   16.91 )-----------( 154      ( 07 Apr 2025 05:44 )             35.3     04-07    140  |  106  |  34[H]  ----------------------------<  135[H]  4.1   |  22  |  0.59    Ca    8.5      07 Apr 2025 05:44  Phos  2.9     04-07  Mg     2.30     04-07        Urinalysis Basic - ( 07 Apr 2025 05:44 )    Color: x / Appearance: x / SG: x / pH: x  Gluc: 135 mg/dL / Ketone: x  / Bili: x / Urobili: x   Blood: x / Protein: x / Nitrite: x   Leuk Esterase: x / RBC: x / WBC x   Sq Epi: x / Non Sq Epi: x / Bacteria: x

## 2025-04-07 NOTE — DISCHARGE NOTE PROVIDER - HOSPITAL COURSE
89M hx BPH (chronic indwelling Gill), HLD, B/L inguinal hernia repair with known left recurrence, colon cancer (T4N1 w/ two synchronous lesions) s/p extended right hemicolectomy w/ ileocolic anastomosis on 12/23 (not on chemo), recent presentation 2/24 for acute diastolic heart failure, presenting with abdominal pain. Patient recently presented to ED on 3/1 for AP and was found to have a new right midabdominal lesion cf peritoneal carcinomatosis. Pt was scheduled to follow up with heme/onc outpatient today to determine a plan for his new peritoneal carcinomatosis (Dr. Ramirez), but instead presented to ED due to his pain. He is reporting several weeks of intermittent lower midline AP rated up to 8/10 in intensity. He also states he feels a lot of intestinal movement and his abdomen makes "noises like aliens in my intestines." He has not been tolerating PO solids well, mostly vomiting his food, "for a few weeks," however, he has been able to hold down fluids. He reports passing gas on the morning of 3/21, but hasn't had a bowel movement in possibly 12d. He is unsure if he has had unintended weight loss of late, but supposes he has. He denies hematemesis, hematochezia, fevers, or chills. He also reports dizziness, headache, and back and shoulder pain. In ED, vss. WBC 7.64. Cr. 1.69. Vlac 1.0. CTAP IV cf high grade SBO w/ RUQ TP at a spiculated soft tissue lesion, stable from prior, which is concerning for peritoneal carcinomatosis. No evidence of pneumatosis or portal venous gas. Patient was admitted to colorectal surgery, NGT placed, and started on malignant SBO protocol.   3/23 - GG challenge, contrast made it to rectum. Pt became acutely agitated and aggressive in the evening requiring zyprexa x1. Not tolerating EKG to be performed.  3/24 - NGT out and advanced to [facility]D. Palliative consulted. Pt remained altered during day so Psych consult placed for evaluation. Started vancomycin yesterday for UCx growing >100k E faecium i/s/o AMS  3/25 - reporting flatus without BM. ID consulted - vanc dc'd, rec to monitor off abx. Mental status improved. Psych eval c/f delirium.   3/26 - made NPO overnight after episode of small emesis. No further flatus or BM. Pt with continued nausea into the afternoon and increasing abd distention --> NGT replaced with 800cc bilious output  3/29 - no flatus or BM. Plan for GG challenge today --> failed. Transition to NS due to downtrending sodium, 131 in AM  3/31 - continued no bowel function. Palliative re-consulted to determine goals of care and capacity given patient's lack of family and borderline mental status. Pt self dc'd NGT. TPN consulted and started next day. Primary team came to bedside after patient removed his nasogastric tube this morning. A discussion was started regarding the patient's understanding of his clinical condition as it relates to his cancer and malignant small bowel obstruction. The patient demonstrated an understanding that his cancer is the reason for his hospitalization as well as his prolonged NPO status. At this point, a conversation regarding his goals of care took place. Patient does not desire hospice/comfort care. Regarding his surgical goals of care, the patient stated an understanding of the two options. He is unsure which of the two he would like to pursue at this time, but demonstrated capacity to make a decision. Will revisit the patient's goals of care regarding possible venting PEG vs. intestinal bypass.  Patient was offered a nasogastric tube to be replaced for decompression, which he denied. He was informed of the risk of aspiration and worsening abdominal pain, and would still like to proceed without an NG tube.  4/2 - GI consulted for possible venting PEG placement. Further conversation about PEG vs bypass surgery. Pt still deciding.   4/4 - Pt decided to proceed with PEG. Added onto OR. However, later in afternoon when GI team came to speak with the pt prior to the procedure pt decided to decline getting a venting PEG and wanted to still think about which intervention he wants to proceed with. Ethics consulted for assistance in decision making --> ethics agrees that pt has capacity for decision making and what is considered a contributing factor to his vacillation of decision making is that he feels he hasn’t put his affairs in order—his finances (which he wants to direct to the future care of his daughter.) Social Work will facilitate the process to bring the patient’s  to the patient.                 89M hx BPH (chronic indwelling Bruner), HLD, B/L inguinal hernia repair with known left recurrence, colon cancer (T4N1 w/ two synchronous lesions) s/p extended right hemicolectomy w/ ileocolic anastomosis on 12/23 (not on chemo), recent presentation 2/24 for acute diastolic heart failure, presenting with abdominal pain. Patient recently presented to ED on 3/1 for AP and was found to have a new right midabdominal lesion cf peritoneal carcinomatosis. Pt was scheduled to follow up with heme/onc outpatient today to determine a plan for his new peritoneal carcinomatosis (Dr. Ramirez), but instead presented to ED due to his pain. He is reporting several weeks of intermittent lower midline AP rated up to 8/10 in intensity. He also states he feels a lot of intestinal movement and his abdomen makes "noises like aliens in my intestines." He has not been tolerating PO solids well, mostly vomiting his food, "for a few weeks," however, he has been able to hold down fluids. He reports passing gas on the morning of 3/21, but hasn't had a bowel movement in possibly 12d. He is unsure if he has had unintended weight loss of late, but supposes he has. He denies hematemesis, hematochezia, fevers, or chills. He also reports dizziness, headache, and back and shoulder pain. In ED, vss. WBC 7.64. Cr. 1.69. Vlac 1.0. CTAP IV cf high grade SBO w/ RUQ TP at a spiculated soft tissue lesion, stable from prior, which is concerning for peritoneal carcinomatosis. No evidence of pneumatosis or portal venous gas. Patient was admitted to colorectal surgery, NGT placed, and started on malignant SBO protocol.   3/23 - GG challenge, contrast made it to rectum. Pt became acutely agitated and aggressive in the evening requiring zyprexa x1. Not tolerating EKG to be performed.  3/24 - NGT out and advanced to [facility]. Palliative consulted. Pt remained altered during day so Psych consult placed for evaluation. Started vancomycin yesterday for UCx growing >100k E faecium i/s/o AMS  3/25 - reporting flatus without BM. ID consulted - vanc dc'd, rec to monitor off abx. Mental status improved. Psych eval c/f delirium.   3/26 - made NPO overnight after episode of small emesis. No further flatus or BM. Pt with continued nausea into the afternoon and increasing abd distention --> NGT replaced with 800cc bilious output  3/29 - no flatus or BM. Plan for GG challenge today --> failed. Transition to NS due to downtrending sodium, 131 in AM  3/31 - continued no bowel function. Palliative re-consulted to determine goals of care and capacity given patient's lack of family and borderline mental status. Pt self dc'd NGT. TPN consulted and started next day. Primary team came to bedside after patient removed his nasogastric tube this morning. A discussion was started regarding the patient's understanding of his clinical condition as it relates to his cancer and malignant small bowel obstruction. The patient demonstrated an understanding that his cancer is the reason for his hospitalization as well as his prolonged NPO status. At this point, a conversation regarding his goals of care took place. Patient does not desire hospice/comfort care. Regarding his surgical goals of care, the patient stated an understanding of the two options. He is unsure which of the two he would like to pursue at this time, but demonstrated capacity to make a decision. Will revisit the patient's goals of care regarding possible venting PEG vs. intestinal bypass.  Patient was offered a nasogastric tube to be replaced for decompression, which he denied. He was informed of the risk of aspiration and worsening abdominal pain, and would still like to proceed without an NG tube.  4/2 - GI consulted for possible venting PEG placement. Further conversation about PEG vs bypass surgery. Pt still deciding.   4/4 - Pt decided to proceed with PEG. Added onto OR. However, later in afternoon when GI team came to speak with the pt prior to the procedure pt decided to decline getting a venting PEG and wanted to still think about which intervention he wants to proceed with. Ethics consulted for assistance in decision making --> ethics agrees that pt has capacity for decision making and what is considered a contributing factor to his vacillation of decision making is that he feels he hasn’t put his affairs in order—his finances (which he wants to direct to the future care of his daughter.) Social Work will facilitate the process to bring the patient’s  to the patient.    4/8 - after pt's affairs were in order with his , pt decided to proceed with the intestinal bypass surgery. Patient went to the OR on 4/8. Patient tolerated the procedure well without complications  4/11 - Post-op, pt with return of bowel function --> NGT clamp trial --> passed and NGT dc'd, pt advanced to [facility]  4/13 - pt had been advanced to [facility] and TPN weaning off  4/16 - pt's bowel function had ceased during the day 4/15, and then overnight pt with severe nausea and abd distension requiring NGT to be replaced and downgraded to NPO. Later, primary team called to bedside several times during the day due to patient's agitation and frustration. Additionally, the patient expressed a desire to make himself DNR/DNI but given his agitation, the decision was made to defer this conversation until he is more calm and consistent in his beliefs. When at the bedside, the patient expresses most of his frustration to be about the nasogastric tube, which he wants to be removed. An extensive discussion took place regarding the risks of nasogastric tube removal, which include but are not limited to aspiration and death. The patient demonstrated an understanding of these risks and still wishes to proceed with NG tube removal.   4/17 - thorough GOC discussion had with pt with primary team and palliative team outlining his desires going forward and code status. Patient is A&Ox4 and has capacity to make his own decisions. Patient wishes to be made comfort care only and completed a MOLST bedside with surgical/palliative team designating his DNR/DNI and comfort measure status. Patient was able to discuss with all team members his current diagnosis, obstruction due to his cancer, reason for his bypass surgery, and complications since. Patient understands that his bowels aren't moving appropriately and understands the risks of not proceeding with a NGT and NPO status. Patient was able to relay the risks of vomiting, aspiration, and malnutrition to team members. Understanding these risks, pt wants to have comfort food and eat as he wishes. Patient also does not want any additional escalating medical management such as NGT, feeding tube / PEG, surgical intervention, or cardiac arrest measures such as chest compressions, intubation, or BiPAP mask. Patient wishes to be made comfort care. Patient agreeable to maintaining his bruner catheter and inserting a new IV for IV access in place of his PICC line being removed. Patient agreeable to continuing current IV medications such as reglan, and pain regimen. Can plan to transition to PO options.

## 2025-04-07 NOTE — CHART NOTE - NSCHARTNOTEFT_GEN_A_CORE
Pt seen for new TPN.     SOURCE: [X] Medical record    Medical Course:  - Per chart, pt is 89 year old male PMH BPH, HLD, bilateral inguinal hernia repair with known left recurrence, colon cancer s/p extended right hemicolectomy with ileocolic anastomosis (12/23) presenting with abdominal pain found to have high grade SBO with RUQ TP at a spiculated soft tissue lesion concerning for peritoneal carcinomatosis. Course complicated by AMS, aggressive behavior. Nutrition Support, ID, Palliative and Colorectal Surgery following.     Diet Prescription:   - NPO, With Ice Chips/Sips of Water    Nutrition Course:  - TPN: 1.5L infusing @ 63 mL/hr (100 gm amino acids, 200 gm dextrose, 40 gm SMOF lipids) to provide 1480 kcal/day.   - No flatus/BM. Continues on SBO protocol.     Food Allergy/Intolerance:  - NKFA    Pertinent Medications:   - dexAMETHasone Injectable, lipid fat emulsion (Fish Oil and Plant Based) 20% IV Continuous, metoclopramide Injectable, octreotide Injectable, pantoprazole Injectable, Parenteral Nutrition TPN Continuous    Pertinent Labs:   - (4/7) Na 140 mmol/L Glu 135 mg/dL[H] K+ 4.1 mmol/L Cr 0.59 mg/dL BUN 34 mg/dL[H] Phos 2.9 mg/dL   - (4/2) Trig 104 mg/dL  - POCT: (4/7) 159, (4/6) 136-176     Weight: (4/2) 118.3 lbs / 53.7 kg, (3/21 dosing) 130 lbs / 59 kg  Weight Assessment: Apparent ~12 lb (9%) weight loss x<2 weeks.   Height: 68 in / 172.7 cm  IBW: 154 lbs / 70 kg +/-10%  BMI: 18.0 kg/m^2 (at lowest weight)    Physical Assessment, per flowsheets:  Edema/Pressure Injury: none noted     Estimated Needs:   [X] No changes since previous assessment, based on dosing weight 130 lbs / 59 kg  3814-2311 kcal daily @28-32 kcal/kg, 82.6-100.3 gm protein daily @1.4-1.7 gm/kg     Previous Nutrition Diagnosis: [X] Severe malnutrition in the context of acute illness or injury, ongoing [X] Unintended weight loss, ongoing   New Nutrition Diagnosis: [X] not applicable     Education:  [X] not applicable     Interventions:   1) TPN per Nutrition Support team.  2) Monitor electrolytes (K+, Mg, P) and replete to within desired limits as clinically indicated.   3) Obtain weekly weights.     Monitor & Evaluate:  Diet progression, nutrition related lab values, weight trends, BMs/GI distress, hydration status, skin integrity.    Shasha Spencer RDN, CDN #[id number]  Also available on [employer] Teams. Pt seen for malnutrition follow up.     SOURCE: [X] Medical record    Medical Course:  - Per chart, pt is 89 year old male PMH BPH, HLD, bilateral inguinal hernia repair with known left recurrence, colon cancer s/p extended right hemicolectomy with ileocolic anastomosis (12/23) presenting with abdominal pain found to have high grade SBO with RUQ TP at a spiculated soft tissue lesion concerning for peritoneal carcinomatosis. Course complicated by AMS, aggressive behavior. Nutrition Support, ID, Palliative and Colorectal Surgery following.     Diet Prescription:   - NPO, With Ice Chips/Sips of Water    Nutrition Course:  - TPN: 1.5L infusing @ 63 mL/hr (100 gm amino acids, 200 gm dextrose, 40 gm SMOF lipids) to provide 1480 kcal/day.   - No flatus/BM. Continues on SBO protocol.     Food Allergy/Intolerance:  - NKFA    Pertinent Medications:   - dexAMETHasone Injectable, lipid fat emulsion (Fish Oil and Plant Based) 20% IV Continuous, metoclopramide Injectable, octreotide Injectable, pantoprazole Injectable, Parenteral Nutrition TPN Continuous    Pertinent Labs:   - (4/7) Na 140 mmol/L Glu 135 mg/dL[H] K+ 4.1 mmol/L Cr 0.59 mg/dL BUN 34 mg/dL[H] Phos 2.9 mg/dL   - (4/2) Trig 104 mg/dL  - POCT: (4/7) 159, (4/6) 136-176     Weight: (4/2) 118.3 lbs / 53.7 kg, (3/21 dosing) 130 lbs / 59 kg  Weight Assessment: Apparent ~12 lb (9%) weight loss x<2 weeks.   Height: 68 in / 172.7 cm  IBW: 154 lbs / 70 kg +/-10%  BMI: 18.0 kg/m^2 (at lowest weight)    Physical Assessment, per flowsheets:  Edema/Pressure Injury: none noted     Estimated Needs:   [X] No changes since previous assessment, based on dosing weight 130 lbs / 59 kg  0118-9049 kcal daily @28-32 kcal/kg, 82.6-100.3 gm protein daily @1.4-1.7 gm/kg     Previous Nutrition Diagnosis: [X] Severe malnutrition in the context of acute illness or injury, ongoing [X] Unintended weight loss, ongoing   New Nutrition Diagnosis: [X] not applicable     Education:  [X] not applicable     Interventions:   1) TPN per Nutrition Support team.  2) Monitor electrolytes (K+, Mg, P) and replete to within desired limits as clinically indicated.   3) Obtain weekly weights.     Monitor & Evaluate:  Diet progression, nutrition related lab values, weight trends, BMs/GI distress, hydration status, skin integrity.    Shasha Spencer RDN, CDN #[id number]  Also available on [employer] Teams. Pt seen for malnutrition follow up.     SOURCE: [X] Medical record    Medical Course:  - Per chart, pt is 89 year old male PMH BPH, HLD, bilateral inguinal hernia repair with known left recurrence, colon cancer s/p extended right hemicolectomy with ileocolic anastomosis (12/23) presenting with abdominal pain found to have high grade SBO with RUQ TP at a spiculated soft tissue lesion concerning for peritoneal carcinomatosis. Course complicated by AMS, aggressive behavior. Nutrition Support, ID, Palliative and Colorectal Surgery following.     Diet Prescription:   - NPO, With Ice Chips/Sips of Water    Nutrition Course:  - Pt remains NPO with TPN: 1.8L infusing @ 75 mL/hr (110 gm amino acids, 200 gm dextrose, 50 gm SMOF lipids) to provide 1620 kcal/day. This meets 27 kcal/kg, 1.9 gm protein/kg @ dosing weight 59 kg.   - No flatus/BM. Continues on SBO protocol.   - Fingersticks WDL.     Food Allergy/Intolerance:  - NKFA    Pertinent Medications:   - dexAMETHasone Injectable, lipid fat emulsion (Fish Oil and Plant Based) 20% IV Continuous, metoclopramide Injectable, octreotide Injectable, pantoprazole Injectable, Parenteral Nutrition TPN Continuous    Pertinent Labs:   - (4/7) Na 140 mmol/L Glu 135 mg/dL[H] K+ 4.1 mmol/L Cr 0.59 mg/dL BUN 34 mg/dL[H] Phos 2.9 mg/dL   - (4/2) Trig 104 mg/dL  - POCT: (4/7) 159, (4/6) 136-176     Weight: (4/2) 118.3 lbs / 53.7 kg, (3/21 dosing) 130 lbs / 59 kg  Weight Assessment: Apparent ~12 lb (9%) weight loss x<2 weeks.   Height: 68 in / 172.7 cm  IBW: 154 lbs / 70 kg +/-10%  BMI: 18.0 kg/m^2 (at lowest weight)    Physical Assessment, per flowsheets:  Edema/Pressure Injury: none noted     Estimated Needs:   [X] No changes since previous assessment, based on dosing weight 130 lbs / 59 kg  4430-0289 kcal daily @28-32 kcal/kg, 82.6-100.3 gm protein daily @1.4-1.7 gm/kg     Previous Nutrition Diagnosis: [X] Severe malnutrition in the context of acute illness or injury, ongoing [X] Unintended weight loss, ongoing   New Nutrition Diagnosis: [X] not applicable     Education:  [X] not applicable     Interventions:   1) TPN per Nutrition Support team.  2) Obtain weekly weights.     Monitor & Evaluate:  Diet progression, nutrition related lab values, weight trends, BMs/GI distress, hydration status, skin integrity.    Shasha Spencer RDN, CDN #[id number]  Also available on [employer] Teams.

## 2025-04-07 NOTE — DISCHARGE NOTE PROVIDER - NSDCCPCAREPLAN_GEN_ALL_CORE_FT
PRINCIPAL DISCHARGE DIAGNOSIS  Diagnosis: SBO (small bowel obstruction)  Assessment and Plan of Treatment:       SECONDARY DISCHARGE DIAGNOSES  Diagnosis: Colon cancer  Assessment and Plan of Treatment:

## 2025-04-07 NOTE — DISCHARGE NOTE PROVIDER - NSDCFUADDINST_GEN_ALL_CORE_FT
Comfort Care - Comfort feeds. Pt understand risks associated with eating/drinking food/liquids as he wishes.

## 2025-04-07 NOTE — PROGRESS NOTE ADULT - ASSESSMENT
Patient is a 89y old Man with a PMHx of colon cancer s/p extended right hemicolectomy in 12/23, recently presented with malignant SBO that has failed conservative management.     PLAN:  -Diet: NPO/TPN  -Monitor bowel function  -Monitor bruner output  -Plan for OR tomorrow

## 2025-04-07 NOTE — PROGRESS NOTE ADULT - SUBJECTIVE AND OBJECTIVE BOX
Patient is a 89y old  Male who presents with a chief complaint of High grade mSBO (07 Apr 2025 12:22)      DATE OF SERVICE: 04-07-25 @ 13:41    SUBJECTIVE / OVERNIGHT EVENTS: overnight events noted    ROS:  Resp: No cough no sputum production  CVS: No chest pain no palpitations no orthopnea  GI: no N/V/D      MEDICATIONS  (STANDING):  chlorhexidine 2% Cloths 1 Application(s) Topical daily  dexAMETHasone  Injectable 4 milliGRAM(s) IV Push every 12 hours  influenza  Vaccine (HIGH DOSE) 0.5 milliLiter(s) IntraMuscular once  lipid, fat emulsion (Fish Oil and Plant Based) 20% Infusion 20.8 mL/Hr (20.8 mL/Hr) IV Continuous <Continuous>  metoclopramide Injectable 10 milliGRAM(s) IV Push every 8 hours  octreotide  Injectable 100 MICROGram(s) SubCutaneous every 8 hours  pantoprazole  Injectable 40 milliGRAM(s) IV Push daily  Parenteral Nutrition - Adult 1 Each (75 mL/Hr) TPN Continuous <Continuous>  Parenteral Nutrition - Adult 1 Each (63 mL/Hr) TPN Continuous <Continuous>    MEDICATIONS  (PRN):  sodium chloride 0.9% lock flush 10 milliLiter(s) IV Push every 1 hour PRN Pre/post blood products, medications, blood draw, and to maintain line patency        CAPILLARY BLOOD GLUCOSE      POCT Blood Glucose.: 145 mg/dL (07 Apr 2025 10:04)  POCT Blood Glucose.: 140 mg/dL (07 Apr 2025 07:05)  POCT Blood Glucose.: 159 mg/dL (07 Apr 2025 01:47)  POCT Blood Glucose.: 136 mg/dL (06 Apr 2025 18:28)    I&O's Summary    06 Apr 2025 07:01  -  07 Apr 2025 07:00  --------------------------------------------------------  IN: 1561.8 mL / OUT: 1800 mL / NET: -238.2 mL    07 Apr 2025 07:01  -  07 Apr 2025 13:41  --------------------------------------------------------  IN: 156 mL / OUT: 300 mL / NET: -144 mL        Vital Signs Last 24 Hrs  T(C): 36.4 (07 Apr 2025 13:28), Max: 36.9 (07 Apr 2025 00:54)  T(F): 97.6 (07 Apr 2025 13:28), Max: 98.4 (07 Apr 2025 00:54)  HR: 60 (07 Apr 2025 13:28) (54 - 60)  BP: 156/49 (07 Apr 2025 13:28) (132/46 - 161/59)  BP(mean): --  RR: 18 (07 Apr 2025 13:28) (17 - 18)  SpO2: 100% (07 Apr 2025 13:28) (96% - 100%)    PHYSICAL EXAM:  CHEST/LUNG: Clear   HEART: S1S2; no murmurs  ABDOMEN: Soft, Nontender;  EXTREMITIES: no edema  NEUROLOGY: Alert, nonfocal     LABS:                        11.6   16.91 )-----------( 154      ( 07 Apr 2025 05:44 )             35.3     04-07    140  |  106  |  34[H]  ----------------------------<  135[H]  4.1   |  22  |  0.59    Ca    8.5      07 Apr 2025 05:44  Phos  2.9     04-07  Mg     2.30     04-07            Urinalysis Basic - ( 07 Apr 2025 05:44 )    Color: x / Appearance: x / SG: x / pH: x  Gluc: 135 mg/dL / Ketone: x  / Bili: x / Urobili: x   Blood: x / Protein: x / Nitrite: x   Leuk Esterase: x / RBC: x / WBC x   Sq Epi: x / Non Sq Epi: x / Bacteria: x          All consultant(s) notes reviewed and care discussed with other providers        Contact Number, Dr Redd [phone]

## 2025-04-07 NOTE — PROGRESS NOTE ADULT - SUBJECTIVE AND OBJECTIVE BOX
Surgery Daily Progress Note  =====================================================    INTERVAL EVENTS: NAEO    SUBJECTIVE: Patient seen at bedside during AM rounds. Resting comfortably, pain appropriately controlled.     --------------------------------------------------------------------------------------  VITAL SIGNS:  T(C): 36.4 (04-07-25 @ 05:47), Max: 36.9 (04-07-25 @ 00:54)  HR: 57 (04-07-25 @ 05:47) (53 - 58)  BP: 161/59 (04-07-25 @ 05:47) (132/46 - 161/59)  RR: 18 (04-07-25 @ 05:47) (16 - 18)  SpO2: 100% (04-07-25 @ 05:47) (96% - 100%)  --------------------------------------------------------------------------------------  MEDICATIONS:   chlorhexidine 2% Cloths 1 Application(s) Topical daily  dexAMETHasone  Injectable 4 milliGRAM(s) IV Push every 12 hours  influenza  Vaccine (HIGH DOSE) 0.5 milliLiter(s) IntraMuscular once  lipid, fat emulsion (Fish Oil and Plant Based) 20% Infusion 16.6 mL/Hr IV Continuous <Continuous>  lipid, fat emulsion (Fish Oil and Plant Based) 20% Infusion 16.6 mL/Hr IV Continuous <Continuous>  lipid, fat emulsion (Fish Oil and Plant Based) 20% Infusion 16.6 mL/Hr IV Continuous <Continuous>  metoclopramide Injectable 10 milliGRAM(s) IV Push every 8 hours  octreotide  Injectable 100 MICROGram(s) SubCutaneous every 8 hours  pantoprazole  Injectable 40 milliGRAM(s) IV Push daily  Parenteral Nutrition - Adult 1 Each TPN Continuous <Continuous>  sodium chloride 0.9% lock flush 10 milliLiter(s) IV Push every 1 hour PRN    --------------------------------------------------------------------------------------  INTAKE/OUTPUT:     04-05-25 @ 07:01  -  04-06-25 @ 07:00  --------------------------------------------------------  IN: 1711.2 mL / OUT: 1150 mL / NET: 561.2 mL    04-06-25 @ 07:01  -  04-07-25 @ 06:09  --------------------------------------------------------  IN: 1561.8 mL / OUT: 1800 mL / NET: -238.2 mL      --------------------------------------------------------------------------------------  PHYSICAL EXAM:  Gen: NAD  LS: Respirations unlabored on RA  GI: Softly distended. Nontender.  Ext: Warm, well perfused    --------------------------------------------------------------------------------------     Surgery Daily Progress Note  =====================================================    INTERVAL EVENTS: NAEO    SUBJECTIVE: Patient seen at bedside during AM rounds. Resting comfortably, pain appropriately controlled. Pt states he is -/-, wants to have surgery done.     --------------------------------------------------------------------------------------  VITAL SIGNS:  T(C): 36.4 (04-07-25 @ 05:47), Max: 36.9 (04-07-25 @ 00:54)  HR: 57 (04-07-25 @ 05:47) (53 - 58)  BP: 161/59 (04-07-25 @ 05:47) (132/46 - 161/59)  RR: 18 (04-07-25 @ 05:47) (16 - 18)  SpO2: 100% (04-07-25 @ 05:47) (96% - 100%)  --------------------------------------------------------------------------------------  MEDICATIONS:   chlorhexidine 2% Cloths 1 Application(s) Topical daily  dexAMETHasone  Injectable 4 milliGRAM(s) IV Push every 12 hours  influenza  Vaccine (HIGH DOSE) 0.5 milliLiter(s) IntraMuscular once  lipid, fat emulsion (Fish Oil and Plant Based) 20% Infusion 16.6 mL/Hr IV Continuous <Continuous>  lipid, fat emulsion (Fish Oil and Plant Based) 20% Infusion 16.6 mL/Hr IV Continuous <Continuous>  lipid, fat emulsion (Fish Oil and Plant Based) 20% Infusion 16.6 mL/Hr IV Continuous <Continuous>  metoclopramide Injectable 10 milliGRAM(s) IV Push every 8 hours  octreotide  Injectable 100 MICROGram(s) SubCutaneous every 8 hours  pantoprazole  Injectable 40 milliGRAM(s) IV Push daily  Parenteral Nutrition - Adult 1 Each TPN Continuous <Continuous>  sodium chloride 0.9% lock flush 10 milliLiter(s) IV Push every 1 hour PRN    --------------------------------------------------------------------------------------  INTAKE/OUTPUT:     04-05-25 @ 07:01  -  04-06-25 @ 07:00  --------------------------------------------------------  IN: 1711.2 mL / OUT: 1150 mL / NET: 561.2 mL    04-06-25 @ 07:01  -  04-07-25 @ 06:09  --------------------------------------------------------  IN: 1561.8 mL / OUT: 1800 mL / NET: -238.2 mL      --------------------------------------------------------------------------------------  PHYSICAL EXAM:  Gen: NAD  LS: Respirations unlabored on RA  GI: Softly distended. Nontender.  Ext: Warm, well perfused    --------------------------------------------------------------------------------------

## 2025-04-07 NOTE — DISCHARGE NOTE PROVIDER - CARE PROVIDER_API CALL
Asia Marie  Surgery  [address] [place] [address]  Phone: [phone]  Fax: [phone]  Follow Up Time: 2 weeks

## 2025-04-07 NOTE — PROGRESS NOTE ADULT - ASSESSMENT
ASSESSMENT/PLAN: 89M hx BPH (chronic indwelling Bruner), HLD, B/L inguinal hernia repair with known left recurrence, colon cancer (T4N1 w/ two synchronous lesions) s/p extended right hemicolectomy w/ ileocolic anastomosis on 12/23 (not on chemo), recent presentation 2/24 for acute diastolic heart failure, presented with a malignant SBO that has failed conservative management, on TPN. Pending surgical bypass vs. gastrostomy      Plan:  - s/p PICC (4/1),   - Diet: NPO/TPN  - OR Tuesday  - Med onc (Dr. Delvalle) following, unsure of treatment plans, will f/u after discharge and determine further treatment  - Monitor bowel function   - Appreciate Ethics Eval  - Appreciate Medicine consult  - Chronic bruner in place, continue to monitor UOP (last exchange was on 3/4 with Kim Mercer)  - PT eval --> rec ADALID  - VTE ppx: SQ    A team   [id number] ASSESSMENT/PLAN: 89M hx BPH (chronic indwelling Bruner), HLD, B/L inguinal hernia repair with known left recurrence, colon cancer (T4N1 w/ two synchronous lesions) s/p extended right hemicolectomy w/ ileocolic anastomosis on 12/23 (not on chemo), recent presentation 2/24 for acute diastolic heart failure, presented with a malignant SBO that has failed conservative management, on TPN. Pending surgical bypass vs. gastrostomy      Plan:  - s/p PICC (4/1),   - Diet: NPO/TPN  - OR Tuesday, to be consented   - Med onc (Dr. Delvalle) following, unsure of treatment plans, will f/u after discharge and determine further treatment  - Monitor bowel function   - Appreciate Ethics Eval  - Appreciate Medicine consult  - Chronic bruner in place, continue to monitor UOP (last exchange was on 3/4 with Kim Mercer)  - PT eval --> rec ADALID  - VTE ppx: SQ    A team   [id number]

## 2025-04-07 NOTE — DISCHARGE NOTE PROVIDER - DETAILS OF MALNUTRITION DIAGNOSIS/DIAGNOSES
This patient has been assessed with a concern for Malnutrition and was treated during this hospitalization for the following Nutrition diagnosis/diagnoses:     -  03/25/2025: Severe protein-calorie malnutrition

## 2025-04-08 ENCOUNTER — TRANSCRIPTION ENCOUNTER (OUTPATIENT)
Age: 89
End: 2025-04-08

## 2025-04-08 NOTE — CHART NOTE - NSCHARTNOTEFT_GEN_A_CORE
POST-OP NOTE    MERARI GALEAS | [id number] | AALIYAH LAMAR [id number]    Procedure: s/p diagnostic lap, intestinal bypass     Subjective: Patient seen and examined. Reports his pain as controlled. Upset that he has a nasogastric tube in place but says he understands that it is temporary. Denies nausea or vomiting. No flatus or BMs. No acute complaints.     Vital Signs Last 24 Hrs  T(C): 36.2 (08 Apr 2025 17:30), Max: 36.8 (08 Apr 2025 15:30)  T(F): 97.2 (08 Apr 2025 17:30), Max: 98.2 (08 Apr 2025 15:30)  HR: 94 (08 Apr 2025 18:30) (54 - 100)  BP: 107/56 (08 Apr 2025 18:30) (103/51 - 150/48)  BP(mean): 68 (08 Apr 2025 18:30) (58 - 82)  RR: 18 (08 Apr 2025 18:30) (11 - 23)  SpO2: 97% (08 Apr 2025 18:30) (95% - 100%)    Parameters below as of 08 Apr 2025 16:00  Patient On (Oxygen Delivery Method): room air      I&O's Summary    07 Apr 2025 07:01  -  08 Apr 2025 07:00  --------------------------------------------------------  IN: 1664.6 mL / OUT: 1825 mL / NET: -160.4 mL    08 Apr 2025 07:01  -  08 Apr 2025 19:54  --------------------------------------------------------  IN: 485 mL / OUT: 425 mL / NET: 60 mL                            12.3   21.58 )-----------( 173      ( 08 Apr 2025 16:15 )             37.1     04-08    136  |  104  |  36[H]  ----------------------------<  156[H]  3.9   |  21[L]  |  0.59    Ca    8.2[L]      08 Apr 2025 16:15  Phos  3.2     04-08  Mg     1.90     04-08     PT/INR - ( 08 Apr 2025 08:20 )   PT: 10.5 sec;   INR: <0.90 ratio         PTT - ( 08 Apr 2025 08:20 )  PTT:25.9 sec    PHYSICAL EXAM:  Gen: NAD  Resp: breathing easily, no stridor  CV: RRR  Abdomen: soft, nontender, nondistended. Midline steri strips c/d/i. Nasogastric tube in place with minimal output. Flushed at bedside.   : Chronic bruner in place with clear/yellow urine    A:89M hx BPH (chronic indwelling Bruner), HLD, B/L inguinal hernia repair with known left recurrence, colon cancer (T4N1 w/ two synchronous lesions) s/p extended right hemicolectomy w/ ileocolic anastomosis on 12/23 (not on chemo), recent presentation 2/24 for acute diastolic heart failure, presented with a malignant SBO that has failed conservative management, on TPN. He is now s/p diagnostic lap with intestinal bypass (4/8/25), recovering well postoperatively with no acute concerns.     Plan:  - NPO/NGT   - ARBF  - Continue with TPN via PICC  - Off reglan, octreotide, weaning decadron  - Pain: IV apap  - Monitor UOP, chronic bruner in place  - PT re-eval in AM  - PACU labs WNL    A team  [id number]      A team   [id number]

## 2025-04-08 NOTE — PROGRESS NOTE ADULT - SUBJECTIVE AND OBJECTIVE BOX
NUTRITION NOTE  [id number] STOMBER  ===============================    Interval events - Patient was seen and examined at bedside, no acute events overnight. Patient denies chest pain, shortness of breath, nausea or vomiting at this time. IR PICC placed and TPN was started on 25 for nutritional support. Pt is tolerating TPN without any issues. TPN/lipids ordered for tonight. Plan for OR today.     ROS: Except as noted above, all other systems reviewed and are negative     Allergies  aspirin (Unknown)  penicillin (Unknown)    PAST MEDICAL & SURGICAL HISTORY:  BPH (benign prostatic hyperplasia)  Peripheral neuropathy  Heart failure, diastolic, chronic  Colon cancer  Bilateral inguinal hernia  Recurrent inguinal hernia  GERD (gastroesophageal reflux disease)  H/O hemicolectomy  H/O bilateral inguinal hernia repair    Vital Signs Last 24 Hrs  T(C): 36.4 (2025 05:04), Max: 36.4 (2025 13:28)  T(F): 97.5 (2025 05:04), Max: 97.6 (2025 13:28)  HR: 56 (2025 05:04) (53 - 60)  BP: 126/42 (2025 05:04) (126/42 - 156/49)  RR: 16 (2025 05:04) (16 - 18)  SpO2: 99% (2025 05:04) (95% - 100%)    MEDICATIONS  (STANDING):  chlorhexidine 2% Cloths 1 Application(s) Topical daily  dexAMETHasone  Injectable 4 milliGRAM(s) IV Push every 12 hours  influenza  Vaccine (HIGH DOSE) 0.5 milliLiter(s) IntraMuscular once  lipid, fat emulsion (Fish Oil and Plant Based) 20% Infusion 20.8 mL/Hr (20.8 mL/Hr) IV Continuous <Continuous>  metoclopramide Injectable 10 milliGRAM(s) IV Push every 8 hours  octreotide  Injectable 100 MICROGram(s) SubCutaneous every 8 hours  pantoprazole  Injectable 40 milliGRAM(s) IV Push daily  Parenteral Nutrition - Adult 1 Each (75 mL/Hr) TPN Continuous <Continuous>  Parenteral Nutrition - Adult 1 Each (75 mL/Hr) TPN Continuous <Continuous>    MEDICATIONS  (PRN):  sodium chloride 0.9% lock flush 10 milliLiter(s) IV Push every 1 hour PRN Pre/post blood products, medications, blood draw, and to maintain line patency    I&O's Detail    2025 07:01  -  2025 07:00  --------------------------------------------------------  IN:    Fat Emulsion (Fish Oil &amp; Plant Based) 20% Infusion: 249.6 mL    Oral Fluid: 110 mL    TPN (Total Parenteral Nutrition): 1305 mL  Total IN: 1664.6 mL    OUT:    Indwelling Catheter - Urethral (mL): 1825 mL    IV PiggyBack: 0 mL  Total OUT: 1825 mL    Total NET: -160.4 mL    POCT Blood Glucose.: 137 mg/dL (2025 10:03)  POCT Blood Glucose.: 167 mg/dL (2025 21:42)    Daily Weight in k.2 (2025 18:04)    Drug Dosing Weight  Height (cm): 172.7 (22 Mar 2025 10:19)  Weight (kg): 59 (21 Mar 2025 17:07)  BMI (kg/m2): 19.8 (22 Mar 2025 10:19)  BSA (m2): 1.7 (22 Mar 2025 10:19)    PHYSICAL EXAM   General: NAD, resting comfortably in bed   Cardiac: regular rate, warm and well perfused  Respiratory: Nonlabored respirations, normal cw expansion  Abdomen: soft, nontender, mildly distended.   Extremities: normal strength, FROM, no deformities  PICC Site: double lumen PICC in [facility], site clean and dry, placed 25    Diet: NPO and TPN/lipids (started on 25)    LABORATORY                                                         12.3   16.11 )-----------( 124      ( 2025 05:30 )             37.0   -    136  |  103  |  34[H]  ----------------------------<  113[H]  4.7   |  23  |  0.60    Ca    8.7      2025 05:30  Phos  3.1     04-08  Mg     2.20     04-08    ASSESSMENT/PLAN:  89M with hx BPH (chronic indwelling Gill), HLD, B/L inguinal hernia repair with known left recurrence, colon cancer (T4N1 w/ two synchronous lesions) s/p extended right hemicolectomy w/ ileocolic anastomosis on  (not on chemo), recent presentation  for acute diastolic heart failure, now admitted with abdominal pain, found to have high grade SBO w/ RUQ TP at a spiculated soft tissue lesion concerning for peritoneal carcinomatosis. Nutrition support consult called for evaluation for TPN in view of prolonged NPO and severe malnutrition. Med-onc consult --> poor chemotherapy candidate, recommend palliative care involvement if patient fails nonoperative management. IR PICC placed and parenteral nutrition was started on 25.     TPN volume increased to 1800mL, TPN will provide 1620 kcal/day    labs reviewed - electrolytes adjusted in TPN bag    monitor fingersticks, obtain daily weights    continue parenteral nutrition at this time, will follow up with primary team on plan, plan for OR today     1.  Severe protein calorie malnutrition being optimized with TPN: CHO [200] gm.  AA [110] gm. SMOF Lipids [50] gm.  2.  Hyperglycemia managed with: [0] units of regular insulin    3.  Check fluid balance daily.  Strict I/O  [ ] [ ]   4.  Daily BMP, Ionized Calcium, Magnesium and Phosphorous   5.  Triglycerides at initiation of TPN and monthly  Chol -- LDL -- HDL -- Trig 104    Nutrition Support [id number]

## 2025-04-08 NOTE — PROGRESS NOTE ADULT - SUBJECTIVE AND OBJECTIVE BOX
SURGERY PROGRESS NOTE    SUBJECTIVE / 24H EVENTS:  Patient seen and examined on morning rounds. Patient resting comfortably in bed. Patient denies any complaints at this time, is ready for the OR today      OBJECTIVE:  VITAL SIGNS:  T(C): 36.4 (04-08-25 @ 05:04), Max: 36.4 (04-07-25 @ 13:28)  HR: 56 (04-08-25 @ 05:04) (53 - 60)  BP: 126/42 (04-08-25 @ 05:04) (126/42 - 156/49)  BP(mean): --  ABP: --  ABP(mean): --  RR: 16 (04-08-25 @ 05:04) (16 - 18)  SpO2: 99% (04-08-25 @ 05:04) (95% - 100%)  Wt(kg): --  CVP(mm Hg): --  CI: --  CAPILLARY BLOOD GLUCOSE      POCT Blood Glucose.: 167 mg/dL (07 Apr 2025 21:42)  POCT Blood Glucose.: 145 mg/dL (07 Apr 2025 10:04)   N/A      04-07 @ 07:01  -  04-08 @ 07:00  --------------------------------------------------------  IN:    Fat Emulsion (Fish Oil &amp; Plant Based) 20% Infusion: 249.6 mL    Oral Fluid: 110 mL    TPN (Total Parenteral Nutrition): 1305 mL  Total IN: 1664.6 mL    OUT:    Indwelling Catheter - Urethral (mL): 1825 mL    IV PiggyBack: 0 mL  Total OUT: 1825 mL    Total NET: -160.4 mL      Medications  MEDICATIONS  (STANDING):  chlorhexidine 2% Cloths 1 Application(s) Topical daily  dexAMETHasone  Injectable 4 milliGRAM(s) IV Push every 12 hours  influenza  Vaccine (HIGH DOSE) 0.5 milliLiter(s) IntraMuscular once  lipid, fat emulsion (Fish Oil and Plant Based) 20% Infusion 20.8 mL/Hr (20.8 mL/Hr) IV Continuous <Continuous>  metoclopramide Injectable 10 milliGRAM(s) IV Push every 8 hours  octreotide  Injectable 100 MICROGram(s) SubCutaneous every 8 hours  pantoprazole  Injectable 40 milliGRAM(s) IV Push daily  Parenteral Nutrition - Adult 1 Each (75 mL/Hr) TPN Continuous <Continuous>    MEDICATIONS  (PRN):  sodium chloride 0.9% lock flush 10 milliLiter(s) IV Push every 1 hour PRN Pre/post blood products, medications, blood draw, and to maintain line patency      PHYSICAL EXAM:  Gen: NAD  LS: Respirations unlabored on RA  GI: Softly distended. Nontender  Ext: Warm, well perfused    LABS  CBC (04-08 @ 05:30)                              12.3[L]                         16.11[H]  )----------------(  124[L]     --    % Neutrophils, --    % Lymphocytes, ANC: --                                  37.0[L]  CBC (04-07 @ 05:44)                              11.6[L]                         16.91[H]  )----------------(  154        --    % Neutrophils, --    % Lymphocytes, ANC: --                                  35.3[L]    BMP (04-08 @ 05:30)             136     |  103     |  34[H] 		Ca++ 1.11[L]  Ca 8.7                ---------------------------------( 113[H]		Mg 2.20               4.7     |  23      |  0.60  			Ph 3.1     BMP (04-07 @ 05:44)             140     |  106     |  34[H] 		Ca++ 1.14[L]  Ca 8.5                ---------------------------------( 135[H]		Mg 2.30               4.1     |  22      |  0.59  			Ph 2.9         Coags (04-08 @ 08:20)  aPTT 25.9 / INR <0.90 / PT 10.5    Urinalysis (04-08 @ 05:30):     Color:  / Appearance:  / SG:  / pH:  / Gluc: 113[H] / Ketones:  / Bili:  / Urobili:  / Protein : / Nitrites:  / Leuk.Est:  / RBC:  / WBC:  / Sq Epi:  / Non Sq Epi:  / Bacteria            Assessment and Plan:   · Assessment	  ASSESSMENT/PLAN: 89M hx BPH (chronic indwelling Bruner), HLD, B/L inguinal hernia repair with known left recurrence, colon cancer (T4N1 w/ two synchronous lesions) s/p extended right hemicolectomy w/ ileocolic anastomosis on 12/23 (not on chemo), recent presentation 2/24 for acute diastolic heart failure, presented with a malignant SBO that has failed conservative management, on TPN. Pending surgical bypass vs. gastrostomy      Plan:  - OR today  - Appreciate Medicine consult: optimized for OR  - Appreciate Ethics Eval  - Diet: NPO/TPN, s/p PICC (4/1)  - Med onc (Dr. Delvalle) following, unsure of treatment plans, will f/u after discharge and determine further treatment  - Monitor bowel function   - Chronic bruner in place, continue to monitor UOP (last exchange was on 3/4 with Kim Mercer)  - PT eval --> rec ADALID  - VTE ppx: SQ    A team   [id number]

## 2025-04-08 NOTE — PROGRESS NOTE ADULT - SUBJECTIVE AND OBJECTIVE BOX
SURGERY PROGRESS NOTE    SUBJECTIVE / 24H EVENTS:  Patient seen and examined on morning rounds. No acute events overnight. Still no flatus or BM. Ready for surgery this AM      OBJECTIVE:  VITAL SIGNS:  T(C): 36.4 (04-08-25 @ 05:04), Max: 36.5 (04-07-25 @ 09:10)  HR: 56 (04-08-25 @ 05:04) (53 - 60)  BP: 126/42 (04-08-25 @ 05:04) (126/42 - 156/49)  RR: 16 (04-08-25 @ 05:04) (16 - 18)  SpO2: 99% (04-08-25 @ 05:04) (95% - 100%)  Daily     Daily   POCT Blood Glucose.: 167 mg/dL (04-07-25 @ 21:42)  POCT Blood Glucose.: 145 mg/dL (04-07-25 @ 10:04)      PHYSICAL EXAM:  Gen: NAD  LS: Respirations unlabored on RA  GI: Softly distended. Nontender.   Ext: Warm, well perfused      04-07-25 @ 07:01  -  04-08-25 @ 07:00  --------------------------------------------------------  IN:    Fat Emulsion (Fish Oil &amp; Plant Based) 20% Infusion: 249.6 mL    Oral Fluid: 110 mL    TPN (Total Parenteral Nutrition): 1305 mL  Total IN: 1664.6 mL    OUT:    Indwelling Catheter - Urethral (mL): 1825 mL    IV PiggyBack: 0 mL  Total OUT: 1825 mL    Total NET: -160.4 mL          LAB VALUES:  04-07    140  |  106  |  34[H]  ----------------------------<  135[H]  4.1   |  22  |  0.59    Ca    8.5      07 Apr 2025 05:44  Phos  2.9     04-07  Mg     2.30     04-07                                 12.3   16.11 )-----------( 124      ( 08 Apr 2025 05:30 )             37.0               Urinalysis Basic - ( 07 Apr 2025 05:44 )    Color: x / Appearance: x / SG: x / pH: x  Gluc: 135 mg/dL / Ketone: x  / Bili: x / Urobili: x   Blood: x / Protein: x / Nitrite: x   Leuk Esterase: x / RBC: x / WBC x   Sq Epi: x / Non Sq Epi: x / Bacteria: x        MICROBIOLOGY:      RADIOLOGY:        MEDICATIONS  (STANDING):  chlorhexidine 2% Cloths 1 Application(s) Topical daily  dexAMETHasone  Injectable 4 milliGRAM(s) IV Push every 12 hours  influenza  Vaccine (HIGH DOSE) 0.5 milliLiter(s) IntraMuscular once  lipid, fat emulsion (Fish Oil and Plant Based) 20% Infusion 20.8 mL/Hr (20.8 mL/Hr) IV Continuous <Continuous>  metoclopramide Injectable 10 milliGRAM(s) IV Push every 8 hours  octreotide  Injectable 100 MICROGram(s) SubCutaneous every 8 hours  pantoprazole  Injectable 40 milliGRAM(s) IV Push daily  Parenteral Nutrition - Adult 1 Each (75 mL/Hr) TPN Continuous <Continuous>    MEDICATIONS  (PRN):  sodium chloride 0.9% lock flush 10 milliLiter(s) IV Push every 1 hour PRN Pre/post blood products, medications, blood draw, and to maintain line patency        SURGERY PROGRESS NOTE    SUBJECTIVE / 24H EVENTS:  Patient seen and examined on morning rounds. No acute events overnight. Still no flatus or BM. Ready for surgery this AM      OBJECTIVE:  VITAL SIGNS:  T(C): 36.4 (04-08-25 @ 05:04), Max: 36.5 (04-07-25 @ 09:10)  HR: 56 (04-08-25 @ 05:04) (53 - 60)  BP: 126/42 (04-08-25 @ 05:04) (126/42 - 156/49)  RR: 16 (04-08-25 @ 05:04) (16 - 18)  SpO2: 99% (04-08-25 @ 05:04) (95% - 100%)  Daily     Daily   POCT Blood Glucose.: 167 mg/dL (04-07-25 @ 21:42)  POCT Blood Glucose.: 145 mg/dL (04-07-25 @ 10:04)      PHYSICAL EXAM:  Gen: NAD  LS: Respirations unlabored on RA  GI: Softly distended. Nontender  Ext: Warm, well perfused      04-07-25 @ 07:01  -  04-08-25 @ 07:00  --------------------------------------------------------  IN:    Fat Emulsion (Fish Oil &amp; Plant Based) 20% Infusion: 249.6 mL    Oral Fluid: 110 mL    TPN (Total Parenteral Nutrition): 1305 mL  Total IN: 1664.6 mL    OUT:    Indwelling Catheter - Urethral (mL): 1825 mL    IV PiggyBack: 0 mL  Total OUT: 1825 mL    Total NET: -160.4 mL        LAB VALUES:  04-07    140  |  106  |  34[H]  ----------------------------<  135[H]  4.1   |  22  |  0.59    Ca    8.5      07 Apr 2025 05:44  Phos  2.9     04-07  Mg     2.30     04-07                                 12.3   16.11 )-----------( 124      ( 08 Apr 2025 05:30 )             37.0         Urinalysis Basic - ( 07 Apr 2025 05:44 )    Color: x / Appearance: x / SG: x / pH: x  Gluc: 135 mg/dL / Ketone: x  / Bili: x / Urobili: x   Blood: x / Protein: x / Nitrite: x   Leuk Esterase: x / RBC: x / WBC x   Sq Epi: x / Non Sq Epi: x / Bacteria: x        MEDICATIONS  (STANDING):  chlorhexidine 2% Cloths 1 Application(s) Topical daily  dexAMETHasone  Injectable 4 milliGRAM(s) IV Push every 12 hours  influenza  Vaccine (HIGH DOSE) 0.5 milliLiter(s) IntraMuscular once  lipid, fat emulsion (Fish Oil and Plant Based) 20% Infusion 20.8 mL/Hr (20.8 mL/Hr) IV Continuous <Continuous>  metoclopramide Injectable 10 milliGRAM(s) IV Push every 8 hours  octreotide  Injectable 100 MICROGram(s) SubCutaneous every 8 hours  pantoprazole  Injectable 40 milliGRAM(s) IV Push daily  Parenteral Nutrition - Adult 1 Each (75 mL/Hr) TPN Continuous <Continuous>    MEDICATIONS  (PRN):  sodium chloride 0.9% lock flush 10 milliLiter(s) IV Push every 1 hour PRN Pre/post blood products, medications, blood draw, and to maintain line patency

## 2025-04-08 NOTE — BRIEF OPERATIVE NOTE - NSICDXBRIEFPROCEDURE_GEN_ALL_CORE_FT
PROCEDURES:  Diagnostic laparoscopy 08-Apr-2025 18:05:50  Raina Nair  Intestinal bypass 08-Apr-2025 18:04:49  Raina Nair

## 2025-04-08 NOTE — BRIEF OPERATIVE NOTE - OPERATION/FINDINGS
s/p diagnostic laparoscopy. small bowel noted to have transition point at mass in posterior RUQ extending into RP, no other evidence of peritoneal disease. small bowel run proximally and distally, no other transition points noted. proximal and distal small bowel loops brought to midline, stapled intestinal bypass performed, common enterotomy handsewn. fascia closed with running #1 maxon.

## 2025-04-08 NOTE — PROGRESS NOTE ADULT - SUBJECTIVE AND OBJECTIVE BOX
Patient is a 89y old  Male who presents with a chief complaint of High grade mSBO (08 Apr 2025 09:41)      DATE OF SERVICE: 04-08-25 @ 10:25    SUBJECTIVE / OVERNIGHT EVENTS: overnight events noted    ROS:  Resp: No cough no sputum production  CVS: No chest pain no palpitations no orthopnea  GI: no N/V/D  'I feel fine'           MEDICATIONS  (STANDING):  chlorhexidine 2% Cloths 1 Application(s) Topical daily  dexAMETHasone  Injectable 4 milliGRAM(s) IV Push every 12 hours  influenza  Vaccine (HIGH DOSE) 0.5 milliLiter(s) IntraMuscular once  lipid, fat emulsion (Fish Oil and Plant Based) 20% Infusion 20.8 mL/Hr (20.8 mL/Hr) IV Continuous <Continuous>  metoclopramide Injectable 10 milliGRAM(s) IV Push every 8 hours  octreotide  Injectable 100 MICROGram(s) SubCutaneous every 8 hours  pantoprazole  Injectable 40 milliGRAM(s) IV Push daily  Parenteral Nutrition - Adult 1 Each (75 mL/Hr) TPN Continuous <Continuous>    MEDICATIONS  (PRN):  sodium chloride 0.9% lock flush 10 milliLiter(s) IV Push every 1 hour PRN Pre/post blood products, medications, blood draw, and to maintain line patency        CAPILLARY BLOOD GLUCOSE      POCT Blood Glucose.: 137 mg/dL (08 Apr 2025 10:03)  POCT Blood Glucose.: 167 mg/dL (07 Apr 2025 21:42)    I&O's Summary    07 Apr 2025 07:01  -  08 Apr 2025 07:00  --------------------------------------------------------  IN: 1664.6 mL / OUT: 1825 mL / NET: -160.4 mL        Vital Signs Last 24 Hrs  T(C): 36.4 (08 Apr 2025 05:04), Max: 36.4 (07 Apr 2025 13:28)  T(F): 97.5 (08 Apr 2025 05:04), Max: 97.6 (07 Apr 2025 13:28)  HR: 56 (08 Apr 2025 05:04) (53 - 60)  BP: 126/42 (08 Apr 2025 05:04) (126/42 - 156/49)  BP(mean): --  RR: 16 (08 Apr 2025 05:04) (16 - 18)  SpO2: 99% (08 Apr 2025 05:04) (95% - 100%)    PHYSICAL EXAM:  CHEST/LUNG: Clear   HEART: S1S2; no murmurs  ABDOMEN: Soft, Nontender;  EXTREMITIES: no edema  NEUROLOGY: Alert, nonfocal     LABS:                        12.3   16.11 )-----------( 124      ( 08 Apr 2025 05:30 )             37.0     04-08    136  |  103  |  34[H]  ----------------------------<  113[H]  4.7   |  23  |  0.60    Ca    8.7      08 Apr 2025 05:30  Phos  3.1     04-08  Mg     2.20     04-08      PT/INR - ( 08 Apr 2025 08:20 )   PT: 10.5 sec;   INR: <0.90 ratio         PTT - ( 08 Apr 2025 08:20 )  PTT:25.9 sec      Urinalysis Basic - ( 08 Apr 2025 05:30 )    Color: x / Appearance: x / SG: x / pH: x  Gluc: 113 mg/dL / Ketone: x  / Bili: x / Urobili: x   Blood: x / Protein: x / Nitrite: x   Leuk Esterase: x / RBC: x / WBC x   Sq Epi: x / Non Sq Epi: x / Bacteria: x          All consultant(s) notes reviewed and care discussed with other providers        Contact Number, Dr Redd [phone]

## 2025-04-08 NOTE — ASU PREOP CHECKLIST - ISOLATION PRECAUTIONS
Vaccine Information Statement(s) or the Emergency Use Authorization was given today. This has been reviewed, questions answered, and verbal consent given by Patient for injection(s) and administration of COVID-19 Immunization Pfizer COVID-19.      Patient tolerated without incident. See immunization grid for documentation.      Patient waited the recommended 15min post-vaccination and tolerated well.   
Ronnie started taking Losartan 25 mg tablets daily for a week and then was titrated to 50 mg tablets daily. Ronnie's blood pressure was 128/76.  
none

## 2025-04-08 NOTE — PROGRESS NOTE ADULT - PROBLEM SELECTOR PLAN 3
awaiting surgery likely tomorrow   TPN  no active vomiting awaiting surgery likely tomorrow   TPN  no active vomiting  patient is medically optimized for surgery

## 2025-04-09 NOTE — PROGRESS NOTE ADULT - SUBJECTIVE AND OBJECTIVE BOX
Patient is a 89y old  Male who presents with a chief complaint of High grade mSBO (09 Apr 2025 11:25)      DATE OF SERVICE: 04-09-25 @ 14:06    SUBJECTIVE / OVERNIGHT EVENTS: overnight events noted    ROS:  Resp: No cough no sputum production  CVS: No chest pain no palpitations no orthopnea  GI: no N/V/D        MEDICATIONS  (STANDING):  acetaminophen   IVPB .. 1000 milliGRAM(s) IV Intermittent every 6 hours  chlorhexidine 2% Cloths 1 Application(s) Topical daily  dexAMETHasone  Injectable 4 milliGRAM(s) IV Push daily  enoxaparin Injectable 40 milliGRAM(s) SubCutaneous every 24 hours  influenza  Vaccine (HIGH DOSE) 0.5 milliLiter(s) IntraMuscular once  lipid, fat emulsion (Fish Oil and Plant Based) 20% Infusion 20.8 mL/Hr (20.8 mL/Hr) IV Continuous <Continuous>  pantoprazole  Injectable 40 milliGRAM(s) IV Push daily  Parenteral Nutrition - Adult 1 Each (75 mL/Hr) TPN Continuous <Continuous>  Parenteral Nutrition - Adult 1 Each (75 mL/Hr) TPN Continuous <Continuous>    MEDICATIONS  (PRN):  benzocaine 20% Spray 1 Spray(s) Topical three times a day PRN sore throat  HYDROmorphone  Injectable 0.2 milliGRAM(s) IV Push every 4 hours PRN Moderate to severe pain (4-10)  sodium chloride 0.9% lock flush 10 milliLiter(s) IV Push every 1 hour PRN Pre/post blood products, medications, blood draw, and to maintain line patency        CAPILLARY BLOOD GLUCOSE      POCT Blood Glucose.: 100 mg/dL (09 Apr 2025 07:15)    I&O's Summary    08 Apr 2025 07:01  -  09 Apr 2025 07:00  --------------------------------------------------------  IN: 685 mL / OUT: 950 mL / NET: -265 mL    09 Apr 2025 07:01  -  09 Apr 2025 14:06  --------------------------------------------------------  IN: 0 mL / OUT: 600 mL / NET: -600 mL        Vital Signs Last 24 Hrs  T(C): 36.7 (09 Apr 2025 13:39), Max: 36.8 (08 Apr 2025 15:30)  T(F): 98.1 (09 Apr 2025 13:39), Max: 98.2 (08 Apr 2025 15:30)  HR: 82 (09 Apr 2025 13:39) (54 - 100)  BP: 119/49 (09 Apr 2025 13:39) (96/72 - 136/44)  BP(mean): 68 (08 Apr 2025 18:30) (58 - 82)  RR: 17 (09 Apr 2025 13:39) (11 - 23)  SpO2: 97% (09 Apr 2025 13:39) (95% - 100%)        LABS:                        12.8   19.49 )-----------( 144      ( 09 Apr 2025 03:38 )             38.5     04-09    136  |  102  |  51[H]  ----------------------------<  106[H]  4.5   |  22  |  0.89    Ca    8.3[L]      09 Apr 2025 03:38  Phos  4.7     04-09  Mg     2.40     04-09    TPro  5.0[L]  /  Alb  2.8[L]  /  TBili  1.2  /  DBili  0.5[H]  /  AST  36  /  ALT  44[H]  /  AlkPhos  75  04-09    PT/INR - ( 08 Apr 2025 08:20 )   PT: 10.5 sec;   INR: <0.90 ratio         PTT - ( 08 Apr 2025 08:20 )  PTT:25.9 sec      Urinalysis Basic - ( 09 Apr 2025 03:38 )    Color: x / Appearance: x / SG: x / pH: x  Gluc: 106 mg/dL / Ketone: x  / Bili: x / Urobili: x   Blood: x / Protein: x / Nitrite: x   Leuk Esterase: x / RBC: x / WBC x   Sq Epi: x / Non Sq Epi: x / Bacteria: x          All consultant(s) notes reviewed and care discussed with other providers        Contact Number, Dr Redd [phone]

## 2025-04-09 NOTE — PROGRESS NOTE ADULT - SUBJECTIVE AND OBJECTIVE BOX
NUTRITION NOTE  [id number] STOMBER  ===============================    Interval events - Patient was seen and examined at bedside, no acute events overnight. Patient denies chest pain, shortness of breath, nausea or vomiting at this time. IR PICC placed and TPN was started on 25 for nutritional support. Pt is tolerating TPN without any issues. TPN/lipids ordered for tonight. Pt is s/p mall bowel bypass on .     ROS: Except as noted above, all other systems reviewed and are negative     Allergies  aspirin (Unknown)  penicillin (Unknown)    PAST MEDICAL & SURGICAL HISTORY:  BPH (benign prostatic hyperplasia)  Peripheral neuropathy  Heart failure, diastolic, chronic  Colon cancer  Bilateral inguinal hernia  Recurrent inguinal hernia  GERD (gastroesophageal reflux disease)  H/O hemicolectomy  H/O bilateral inguinal hernia repair    Vital Signs Last 24 Hrs  T(C): 36.7 (2025 09:22), Max: 36.8 (2025 15:30)  T(F): 98.1 (2025 09:22), Max: 98.2 (2025 15:30)  HR: 77 (2025 09:22) (54 - 100)  BP: 109/49 (2025 09:22) (96/72 - 150/48)  BP(mean): 68 (2025 18:30) (58 - 82)  RR: 17 (2025 09:22) (11 - 23)  SpO2: 97% (2025 09:22) (95% - 100%)    MEDICATIONS  (STANDING):  acetaminophen   IVPB .. 1000 milliGRAM(s) IV Intermittent every 6 hours  chlorhexidine 2% Cloths 1 Application(s) Topical daily  dexAMETHasone  Injectable 4 milliGRAM(s) IV Push daily  influenza  Vaccine (HIGH DOSE) 0.5 milliLiter(s) IntraMuscular once  lipid, fat emulsion (Fish Oil and Plant Based) 20% Infusion 20.8 mL/Hr (20.8 mL/Hr) IV Continuous <Continuous>  pantoprazole  Injectable 40 milliGRAM(s) IV Push daily  Parenteral Nutrition - Adult 1 Each (75 mL/Hr) TPN Continuous <Continuous>  Parenteral Nutrition - Adult 1 Each (75 mL/Hr) TPN Continuous <Continuous>    MEDICATIONS  (PRN):  benzocaine 20% Spray 1 Spray(s) Topical three times a day PRN sore throat  HYDROmorphone  Injectable 0.2 milliGRAM(s) IV Push every 4 hours PRN Moderate to severe pain (4-10)  sodium chloride 0.9% lock flush 10 milliLiter(s) IV Push every 1 hour PRN Pre/post blood products, medications, blood draw, and to maintain line patency    I&O's Detail    2025 07:01  -  2025 07:00  --------------------------------------------------------  IN:    IV PiggyBack: 200 mL    TPN (Total Parenteral Nutrition): 485 mL  Total IN: 685 mL    OUT:    Indwelling Catheter - Urethral (mL): 845 mL    Nasogastric/Oral tube (mL): 105 mL    Oral Fluid: 0 mL  Total OUT: 950 mL    Total NET: -265 mL    Daily Height in cm: 172.7 (2025 11:35)    Daily Weight in k.8 (2025 05:22)    Drug Dosing Weight  Height (cm): 172.7 (2025 11:35)  Weight (kg): 59 (2025 11:35)  BMI (kg/m2): 19.8 (2025 11:35)  BSA (m2): 1.7 (2025 11:35)    PHYSICAL EXAM   General: NAD, resting comfortably in bed   Cardiac: regular rate, warm and well perfused  Respiratory: Nonlabored respirations, normal cw expansion  Abdomen: soft, nontender, incision c/d/i, NG tube in place  Extremities: normal strength, FROM, no deformities  PICC Site: double lumen [place] [facility] clean and dry, placed 25    Diet: NPO and TPN/lipids (started on 25)    LABORATORY                                             12.8   19.49 )-----------( 144      ( 2025 03:38 )             38.5   04-09    136  |  102  |  51[H]  ----------------------------<  106[H]  4.5   |  22  |  0.89    Ca    8.3[L]      2025 03:38  Phos  4.7       Mg     2.40         TPro  5.0[L]  /  Alb  2.8[L]  /  TBili  1.2  /  DBili  0.5[H]  /  AST  36  /  ALT  44[H]  /  AlkPhos  75      LIVER FUNCTIONS - ( 2025 03:38 )  Alb: 2.8 g/dL / Pro: 5.0 g/dL / ALK PHOS: 75 U/L / ALT: 44 U/L / AST: 36 U/L / GGT: x            Chol -- LDL -- HDL -- Trig 104    ASSESSMENT/PLAN:  89M with hx BPH (chronic indwelling Gill), HLD, B/L inguinal hernia repair with known left recurrence, colon cancer (T4N1 w/ two synchronous lesions) s/p extended right hemicolectomy w/ ileocolic anastomosis on  (not on chemo), recent presentation  for acute diastolic heart failure, now admitted with abdominal pain, found to have high grade SBO w/ RUQ TP at a spiculated soft tissue lesion concerning for peritoneal carcinomatosis. Nutrition support consult called for evaluation for TPN in view of prolonged NPO and severe malnutrition. Med-onc consult --> poor chemotherapy candidate, recommend palliative care involvement if patient fails nonoperative management. IR PICC placed and parenteral nutrition was started on 25. Pt is s/p intestinal bypass on 25.     continue TPN with infusion volume of 1800mL, TPN will provide 1620 kcal/day    labs reviewed - electrolytes adjusted in TPN bag    monitor fingersticks, obtain daily weights    continue parenteral nutrition at this time, will follow up with primary team on plan    1.  Severe protein calorie malnutrition being optimized with TPN: CHO [200] gm.  AA [110] gm. SMOF Lipids [50] gm.  2.  Hyperglycemia managed with: [0] units of regular insulin    3.  Check fluid balance daily.  Strict I/O  [ ] [ ]   4.  Daily BMP, Ionized Calcium, Magnesium and Phosphorous   5.  Triglycerides at initiation of TPN and monthly  Chol -- LDL -- HDL -- Trig 104    Nutrition Support [id number]

## 2025-04-09 NOTE — PROGRESS NOTE ADULT - SUBJECTIVE AND OBJECTIVE BOX
__________________________________________________________________   ===================>> SURGERY PROGRESS NOTE <<===================  -----------------------------------------------------------------------------------------------------------  Interval/Subjective:  Patient seen and examined. No acute events overnight. Vitals stable. Afebrile. Pain controlled with medications.   - OR yesterday for bypass  __________________________________________________________________   ===================>> VITAL SIGNS / EXAM <<=========================  -----------------------------------------------------------------------------------------------------------  T(C): 36.4 (05:22), Max: 36.8 (15:30)  HR: 89 (05:22) (54 - 100)  BP: 118/46 (05:22) (96/72 - 150/48)  RR: 17 (05:22) (11 - 23)  SpO2: 100% (05:22) (95% - 100%)    I & O's:  IN:    IV PiggyBack: 200 mL    TPN (Total Parenteral Nutrition): 485 mL  Total IN: 685 mL    OUT:    Indwelling Catheter - Urethral (mL): 845 mL    Nasogastric/Oral tube (mL): 105 mL    Oral Fluid: 0 mL  Total OUT: 950 mL    Physical Exam:  General: NAD, resting comfortably in bed  HEENT: Normocephalic atraumatic  Respiratory: Nonlabored respirations  Cardio: regular rate, normotensive  Abdomen: soft, nontender, nondistended. Midline steri strips c/d/i. Nasogastric tube in place with minimal output. Flushed at bedside.   __________________________________________________________________   ===================>> LAB AND IMAGING <<==========================  -----------------------------------------------------------------------------------------------------------  CBC: 25 @ 03:38          12.8   19.49 >----< 144          38.5    Chemistry: 25 @ 03:38  136|102|51    ------------< 106  4.5|22|0.89    Ca: 8.3 25 @ 03:38  M.40 25 @ 03:38  Phos: 4.7 25 @ 03:38    LFT's: 25 @ 03:38  AST: 36  ALT: 44  ALP: 75  T.Bili: 1.2  D.Bili: 0.5  __________________________________________________________________   ===================>> ASSESSMENT AND PLAN <<======================  -----------------------------------------------------------------------------------------------------------  A:  89M w/ PMH BPH (indwelling Bruner), HLD, B/L inguinal hernia repair (L recurrence), colon cancer (T4N1) s/p extended right hemicolectomy (24) admitted for high-grade SBO 2/2 suspected peritoneal carcinomatosis. Now s/p small bowel bypass on .   P:  - Diet: NPO/TPN, s/p PICC ()  - Med onc (Dr. Delvalle) following, unsure of treatment plans, will f/u after discharge and determine further treatment  - Monitor bowel function   - Chronic bruner in place, continue to monitor UOP (last exchange was on 3/4 with Kim Mercer)  - PT eval --> rec ADALID  - VTE ppx: SQH    A Team  [id number]

## 2025-04-09 NOTE — PROGRESS NOTE ADULT - ASSESSMENT
Patient is a 89y old Man s/p extended right hemicolectomy in 12/23, recently admitted with malignant SBO that failed conservative management, now s/p diagnostic lap and small bowel bypass on 4/8.     PLAN:  -Diet: NPO/TPN  -Activity: Encourage ambulation  -Pain Control  -Monitor urine output  -Monitor bowel function

## 2025-04-09 NOTE — PROGRESS NOTE ADULT - PROBLEM SELECTOR PLAN 4
Vitals capture stopped. DVT ppx: SCDs -> enox after colonoscopy  Diet: clear liquid -> NPO at midnight except bowel prep  Ambulation: ad nikia  GOC conversation pending completion as noted in chart; he is considering DNI, not sure about DNR  Patient also has no social contact that he would like to be a decision maker for him  Continue to engage on this topic; appears patient may just have to make decisions for himself as he has no one to make decisions for him

## 2025-04-09 NOTE — PROGRESS NOTE ADULT - SUBJECTIVE AND OBJECTIVE BOX
DATE OF SERVICE: 04-09-25 @ 11:25    INTERVAL HPI/OVERNIGHT EVENTS: Patient seen and examined, resting comfortably at bedside awake and alert. No acute events overnight. Patient denies nausea and vomiting. Admits to passing flatus, denies having bowel movements. Denies fever, chills, SOB, or chest pain. Pain well controlled.       MEDICATIONS  (STANDING):  acetaminophen   IVPB .. 1000 milliGRAM(s) IV Intermittent every 6 hours  chlorhexidine 2% Cloths 1 Application(s) Topical daily  dexAMETHasone  Injectable 4 milliGRAM(s) IV Push daily  influenza  Vaccine (HIGH DOSE) 0.5 milliLiter(s) IntraMuscular once  lipid, fat emulsion (Fish Oil and Plant Based) 20% Infusion 20.8 mL/Hr (20.8 mL/Hr) IV Continuous <Continuous>  pantoprazole  Injectable 40 milliGRAM(s) IV Push daily  Parenteral Nutrition - Adult 1 Each (75 mL/Hr) TPN Continuous <Continuous>  Parenteral Nutrition - Adult 1 Each (75 mL/Hr) TPN Continuous <Continuous>    MEDICATIONS  (PRN):  benzocaine 20% Spray 1 Spray(s) Topical three times a day PRN sore throat  HYDROmorphone  Injectable 0.2 milliGRAM(s) IV Push every 4 hours PRN Moderate to severe pain (4-10)  sodium chloride 0.9% lock flush 10 milliLiter(s) IV Push every 1 hour PRN Pre/post blood products, medications, blood draw, and to maintain line patency      Vital Signs Last 24 Hrs  T(C): 36.7 (09 Apr 2025 09:22), Max: 36.8 (08 Apr 2025 15:30)  T(F): 98.1 (09 Apr 2025 09:22), Max: 98.2 (08 Apr 2025 15:30)  HR: 77 (09 Apr 2025 09:22) (54 - 100)  BP: 109/49 (09 Apr 2025 09:22) (96/72 - 150/48)  BP(mean): 68 (08 Apr 2025 18:30) (58 - 82)  RR: 17 (09 Apr 2025 09:22) (11 - 23)  SpO2: 97% (09 Apr 2025 09:22) (95% - 100%)    Parameters below as of 09 Apr 2025 09:22  Patient On (Oxygen Delivery Method): room air      I&O's Detail    08 Apr 2025 07:01  -  09 Apr 2025 07:00  --------------------------------------------------------  IN:    IV PiggyBack: 200 mL    TPN (Total Parenteral Nutrition): 485 mL  Total IN: 685 mL    OUT:    Indwelling Catheter - Urethral (mL): 845 mL    Nasogastric/Oral tube (mL): 105 mL    Oral Fluid: 0 mL  Total OUT: 950 mL    Total NET: -265 mL            Physical Exam:  General: WN/WD NAD  Respiratory: airway patent, respirations unlabored, no increased WoB  Neurology: A&Ox3, nonfocal, BARBOSA x 4  Abdominal: Soft, appropriately TTP, ND, no rebounding or guarding  Incisions covered with steri strips, minimal strikethrough appreciated, no surrounding erythema  Gill cath to bedside gravity with clear, straw colored urine output      LABS:                        12.8   19.49 )-----------( 144      ( 09 Apr 2025 03:38 )             38.5     04-09    136  |  102  |  51[H]  ----------------------------<  106[H]  4.5   |  22  |  0.89    Ca    8.3[L]      09 Apr 2025 03:38  Phos  4.7     04-09  Mg     2.40     04-09    TPro  5.0[L]  /  Alb  2.8[L]  /  TBili  1.2  /  DBili  0.5[H]  /  AST  36  /  ALT  44[H]  /  AlkPhos  75  04-09    PT/INR - ( 08 Apr 2025 08:20 )   PT: 10.5 sec;   INR: <0.90 ratio         PTT - ( 08 Apr 2025 08:20 )  PTT:25.9 sec  Urinalysis Basic - ( 09 Apr 2025 03:38 )    Color: x / Appearance: x / SG: x / pH: x  Gluc: 106 mg/dL / Ketone: x  / Bili: x / Urobili: x   Blood: x / Protein: x / Nitrite: x   Leuk Esterase: x / RBC: x / WBC x   Sq Epi: x / Non Sq Epi: x / Bacteria: x       DATE OF SERVICE: 04-09-25 @ 11:25    INTERVAL HPI/OVERNIGHT EVENTS: Patient seen and examined, resting comfortably at bedside awake and alert. No acute events overnight. Patient denies nausea and vomiting. Denies flatus, denies having bowel movements. Denies fever, chills, SOB, or chest pain. Pain well controlled.       MEDICATIONS  (STANDING):  acetaminophen   IVPB .. 1000 milliGRAM(s) IV Intermittent every 6 hours  chlorhexidine 2% Cloths 1 Application(s) Topical daily  dexAMETHasone  Injectable 4 milliGRAM(s) IV Push daily  influenza  Vaccine (HIGH DOSE) 0.5 milliLiter(s) IntraMuscular once  lipid, fat emulsion (Fish Oil and Plant Based) 20% Infusion 20.8 mL/Hr (20.8 mL/Hr) IV Continuous <Continuous>  pantoprazole  Injectable 40 milliGRAM(s) IV Push daily  Parenteral Nutrition - Adult 1 Each (75 mL/Hr) TPN Continuous <Continuous>  Parenteral Nutrition - Adult 1 Each (75 mL/Hr) TPN Continuous <Continuous>    MEDICATIONS  (PRN):  benzocaine 20% Spray 1 Spray(s) Topical three times a day PRN sore throat  HYDROmorphone  Injectable 0.2 milliGRAM(s) IV Push every 4 hours PRN Moderate to severe pain (4-10)  sodium chloride 0.9% lock flush 10 milliLiter(s) IV Push every 1 hour PRN Pre/post blood products, medications, blood draw, and to maintain line patency      Vital Signs Last 24 Hrs  T(C): 36.7 (09 Apr 2025 09:22), Max: 36.8 (08 Apr 2025 15:30)  T(F): 98.1 (09 Apr 2025 09:22), Max: 98.2 (08 Apr 2025 15:30)  HR: 77 (09 Apr 2025 09:22) (54 - 100)  BP: 109/49 (09 Apr 2025 09:22) (96/72 - 150/48)  BP(mean): 68 (08 Apr 2025 18:30) (58 - 82)  RR: 17 (09 Apr 2025 09:22) (11 - 23)  SpO2: 97% (09 Apr 2025 09:22) (95% - 100%)    Parameters below as of 09 Apr 2025 09:22  Patient On (Oxygen Delivery Method): room air      I&O's Detail    08 Apr 2025 07:01  -  09 Apr 2025 07:00  --------------------------------------------------------  IN:    IV PiggyBack: 200 mL    TPN (Total Parenteral Nutrition): 485 mL  Total IN: 685 mL    OUT:    Indwelling Catheter - Urethral (mL): 845 mL    Nasogastric/Oral tube (mL): 105 mL    Oral Fluid: 0 mL  Total OUT: 950 mL    Total NET: -265 mL            Physical Exam:  General: WN/WD NAD  Respiratory: airway patent, respirations unlabored, no increased WoB  Neurology: A&Ox3, nonfocal, BARBOSA x 4  Abdominal: Soft, appropriately TTP, ND, no rebounding or guarding  Incisions covered with steri strips, minimal strikethrough appreciated, no surrounding erythema  Gill cath to bedside gravity with clear, straw colored urine output      LABS:                        12.8   19.49 )-----------( 144      ( 09 Apr 2025 03:38 )             38.5     04-09    136  |  102  |  51[H]  ----------------------------<  106[H]  4.5   |  22  |  0.89    Ca    8.3[L]      09 Apr 2025 03:38  Phos  4.7     04-09  Mg     2.40     04-09    TPro  5.0[L]  /  Alb  2.8[L]  /  TBili  1.2  /  DBili  0.5[H]  /  AST  36  /  ALT  44[H]  /  AlkPhos  75  04-09    PT/INR - ( 08 Apr 2025 08:20 )   PT: 10.5 sec;   INR: <0.90 ratio         PTT - ( 08 Apr 2025 08:20 )  PTT:25.9 sec  Urinalysis Basic - ( 09 Apr 2025 03:38 )    Color: x / Appearance: x / SG: x / pH: x  Gluc: 106 mg/dL / Ketone: x  / Bili: x / Urobili: x   Blood: x / Protein: x / Nitrite: x   Leuk Esterase: x / RBC: x / WBC x   Sq Epi: x / Non Sq Epi: x / Bacteria: x

## 2025-04-10 NOTE — PROGRESS NOTE ADULT - SUBJECTIVE AND OBJECTIVE BOX
NUTRITION NOTE  [id number] STOMBER  ===============================    Interval events - Patient was seen and examined at bedside, no acute events overnight. Patient denies chest pain, shortness of breath, nausea or vomiting at this time. IR PICC placed and TPN was started on 25 for nutritional support. Pt is tolerating TPN without any issues. TPN/lipids ordered for tonight. Pt is s/p mall bowel bypass on . Pt remains NPO with NGT in place.     ROS: Except as noted above, all other systems reviewed and are negative     Allergies  aspirin (Unknown)  penicillin (Unknown)    PAST MEDICAL & SURGICAL HISTORY:  BPH (benign prostatic hyperplasia)  Peripheral neuropathy  Heart failure, diastolic, chronic  Colon cancer  Bilateral inguinal hernia  Recurrent inguinal hernia  GERD (gastroesophageal reflux disease)  H/O hemicolectomy  H/O bilateral inguinal hernia repair    Vital Signs Last 24 Hrs  T(C): 36.8 (10 Apr 2025 09:08), Max: 36.9 (10 Apr 2025 01:00)  T(F): 98.3 (10 Apr 2025 09:08), Max: 98.5 (10 Apr 2025 01:00)  HR: 81 (10 Apr 2025 09:08) (72 - 82)  BP: 118/40 (10 Apr 2025 09:08) (111/40 - 139/44)  RR: 18 (10 Apr 2025 09:08) (17 - 19)  SpO2: 97% (10 Apr 2025 09:08) (96% - 99%)    MEDICATIONS  (STANDING):  acetaminophen   IVPB .. 1000 milliGRAM(s) IV Intermittent every 6 hours  acetaminophen   IVPB .. 1000 milliGRAM(s) IV Intermittent every 6 hours  chlorhexidine 2% Cloths 1 Application(s) Topical daily  enoxaparin Injectable 40 milliGRAM(s) SubCutaneous every 24 hours  influenza  Vaccine (HIGH DOSE) 0.5 milliLiter(s) IntraMuscular once  lipid, fat emulsion (Fish Oil and Plant Based) 20% Infusion 20.8 mL/Hr (20.8 mL/Hr) IV Continuous <Continuous>  pantoprazole  Injectable 40 milliGRAM(s) IV Push daily  Parenteral Nutrition - Adult 1 Each (75 mL/Hr) TPN Continuous <Continuous>  Parenteral Nutrition - Adult 1 Each (75 mL/Hr) TPN Continuous <Continuous>    MEDICATIONS  (PRN):  benzocaine 20% Spray 1 Spray(s) Topical three times a day PRN sore throat  HYDROmorphone  Injectable 0.2 milliGRAM(s) IV Push every 4 hours PRN Moderate to severe pain (4-10)  sodium chloride 0.9% lock flush 10 milliLiter(s) IV Push every 1 hour PRN Pre/post blood products, medications, blood draw, and to maintain line patency    I&O's Detail    2025 07:01  -  10 Apr 2025 07:00  --------------------------------------------------------  IN:    TPN (Total Parenteral Nutrition): 150 mL  Total IN: 150 mL    OUT:    Indwelling Catheter - Urethral (mL): 2280 mL    Nasogastric/Oral tube (mL): 300 mL    Oral Fluid: 0 mL  Total OUT: 2580 mL    Total NET: -2430 mL      10 Apr 2025 07:01  -  10 Apr 2025 11:15  --------------------------------------------------------  IN:    Fat Emulsion (Fish Oil &amp; Plant Based) 20% Infusion: 80.4 mL    IV PiggyBack: 250 mL    TPN (Total Parenteral Nutrition): 300 mL  Total IN: 630.4 mL    OUT:    Indwelling Catheter - Urethral (mL): 500 mL    Nasogastric/Oral tube (mL): 50 mL    Oral Fluid: 0 mL  Total OUT: 550 mL    Total NET: 80.4 mL    POCT Blood Glucose.: 105 mg/dL (10 Apr 2025 05:05)  POCT Blood Glucose.: 127 mg/dL (2025 17:06)    Daily Weight in k.7 (10 Apr 2025 05:29)    Drug Dosing Weight  Height (cm): 172.7 (2025 11:35)  Weight (kg): 59 (2025 11:35)  BMI (kg/m2): 19.8 (2025 11:35)  BSA (m2): 1.7 (2025 11:35)    PHYSICAL EXAM   General: NAD, resting comfortably in bed   Cardiac: regular rate, warm and well perfused  Respiratory: Nonlabored respirations, normal cw expansion  Abdomen: soft, nontender, incision c/d/i, NG tube in place  Extremities: normal strength, FROM, no deformities  PICC Site: double lumen PICC in [facility], site clean and dry, placed 25    Diet: NPO and TPN/lipids (started on 25)    LABORATORY                                                      10.5   11.39 )-----------( 86       ( 10 Apr 2025 05:21 )             32.2   04-10    137  |  106  |  48[H]  ----------------------------<  88  4.4   |  21[L]  |  0.63    Ca    8.0[L]      10 Apr 2025 05:21  Phos  2.5     04-10  Mg     2.20     04-10    TPro  4.5[L]  /  Alb  2.4[L]  /  TBili  1.1  /  DBili  0.4[H]  /  AST  32  /  ALT  42[H]  /  AlkPhos  62  04-10    LIVER FUNCTIONS - ( 10 Apr 2025 05:21 )  Alb: 2.4 g/dL / Pro: 4.5 g/dL / ALK PHOS: 62 U/L / ALT: 42 U/L / AST: 32 U/L / GGT: x           04-02 Chol -- LDL -- HDL -- Trig 104    ASSESSMENT/PLAN:  89M with hx BPH (chronic indwelling Gill), HLD, B/L inguinal hernia repair with known left recurrence, colon cancer (T4N1 w/ two synchronous lesions) s/p extended right hemicolectomy w/ ileocolic anastomosis on  (not on chemo), recent presentation  for acute diastolic heart failure, now admitted with abdominal pain, found to have high grade SBO w/ RUQ TP at a spiculated soft tissue lesion concerning for peritoneal carcinomatosis. Nutrition support consult called for evaluation for TPN in view of prolonged NPO and severe malnutrition. Med-onc consult --> poor chemotherapy candidate, recommend palliative care involvement if patient fails nonoperative management. IR PICC placed and parenteral nutrition was started on 25. Pt is s/p intestinal bypass on 25.     continue TPN with infusion volume of 1800mL, TPN will provide 1620 kcal/day    labs reviewed - electrolytes adjusted in TPN bag    monitor fingersticks, obtain daily weights    continue parenteral nutrition at this time, will follow up with primary team on plan    1.  Severe protein calorie malnutrition being optimized with TPN: CHO [200] gm.  AA [110] gm. SMOF Lipids [50] gm.  2.  Hyperglycemia managed with: [0] units of regular insulin    3.  Check fluid balance daily.  Strict I/O  [ ] [ ]   4.  Daily BMP, Ionized Calcium, Magnesium and Phosphorous   5.  Triglycerides at initiation of TPN and monthly - Chol -- LDL -- HDL -- Trig 104    Nutrition Support [id number]

## 2025-04-10 NOTE — PROGRESS NOTE ADULT - SUBJECTIVE AND OBJECTIVE BOX
Surgery Daily Progress Note  =====================================================    INTERVAL EVENTS: LIZBETH. Restarted on DVT ppx.     SUBJECTIVE: Patient seen at bedside during AM rounds. Resting comfortably, pain appropriately controlled. States he has not passed gas or BM.  output/24h. Was OOB to chair yesterday.    --------------------------------------------------------------------------------------  VITAL SIGNS:  T(C): 36.6 (04-10-25 @ 05:29), Max: 36.9 (04-10-25 @ 01:00)  HR: 80 (04-10-25 @ 05:29) (72 - 82)  BP: 111/40 (04-10-25 @ 05:29) (109/49 - 139/44)  RR: 18 (04-10-25 @ 05:29) (17 - 19)  SpO2: 96% (04-10-25 @ 05:29) (96% - 99%)  --------------------------------------------------------------------------------------  MEDICATIONS:   acetaminophen   IVPB .. 1000 milliGRAM(s) IV Intermittent every 6 hours  benzocaine 20% Spray 1 Spray(s) Topical three times a day PRN  chlorhexidine 2% Cloths 1 Application(s) Topical daily  dexAMETHasone  Injectable 4 milliGRAM(s) IV Push daily  enoxaparin Injectable 40 milliGRAM(s) SubCutaneous every 24 hours  HYDROmorphone  Injectable 0.2 milliGRAM(s) IV Push every 4 hours PRN  influenza  Vaccine (HIGH DOSE) 0.5 milliLiter(s) IntraMuscular once  lipid, fat emulsion (Fish Oil and Plant Based) 20% Infusion 20.8 mL/Hr IV Continuous <Continuous>  pantoprazole  Injectable 40 milliGRAM(s) IV Push daily  Parenteral Nutrition - Adult 1 Each TPN Continuous <Continuous>  sodium chloride 0.9% lock flush 10 milliLiter(s) IV Push every 1 hour PRN    --------------------------------------------------------------------------------------  INTAKE/OUTPUT:     04-09-25 @ 07:01  -  04-10-25 @ 07:00  --------------------------------------------------------  IN: 150 mL / OUT: 2580 mL / NET: -2430 mL      --------------------------------------------------------------------------------------    Physical Exam:  General: NAD, resting comfortably in bed  HEENT: Normocephalic atraumatic  Respiratory: Nonlabored respirations  Cardio: regular rate, normotensive  Abdomen: soft, nontender, nondistended. Midline steri strips c/d/i. Nasogastric tube in place with minimal output. Flushed at bedside.   --------------------------------------------------------------------------------------

## 2025-04-10 NOTE — PROGRESS NOTE ADULT - ASSESSMENT
A:  89M w/ PMH BPH (indwelling Bruner), HLD, B/L inguinal hernia repair (L recurrence), colon cancer (T4N1) s/p extended right hemicolectomy (12/23/24) admitted for high-grade SBO 2/2 suspected peritoneal carcinomatosis. Now s/p small bowel bypass on 4/8.   P:  - Diet: NPO/NGT/TPN, s/p PICC (4/1)  - Med onc (Dr. Delvalle) following, unsure of treatment plans, will f/u after discharge and determine further treatment  - Monitor bowel function   - Chronic bruner in place, continue to monitor UOP (last exchange was on 3/4 with Kim Mercer)  - PT eval --> rec ADALID  - VTE ppx: [facility]    A Team  [id number]

## 2025-04-10 NOTE — PROGRESS NOTE ADULT - SUBJECTIVE AND OBJECTIVE BOX
Patient is a 89y old  Male who presents with a chief complaint of High grade mSBO (10 Apr 2025 08:37)      DATE OF SERVICE: 04-10-25 @ 10:29    SUBJECTIVE / OVERNIGHT EVENTS: overnight events noted    ROS:  Resp: No cough no sputum production  CVS: No chest pain no palpitations no orthopnea  GI: no N/V/D        MEDICATIONS  (STANDING):  acetaminophen   IVPB .. 1000 milliGRAM(s) IV Intermittent every 6 hours  acetaminophen   IVPB .. 1000 milliGRAM(s) IV Intermittent every 6 hours  chlorhexidine 2% Cloths 1 Application(s) Topical daily  enoxaparin Injectable 40 milliGRAM(s) SubCutaneous every 24 hours  influenza  Vaccine (HIGH DOSE) 0.5 milliLiter(s) IntraMuscular once  lipid, fat emulsion (Fish Oil and Plant Based) 20% Infusion 20.8 mL/Hr (20.8 mL/Hr) IV Continuous <Continuous>  pantoprazole  Injectable 40 milliGRAM(s) IV Push daily  Parenteral Nutrition - Adult 1 Each (75 mL/Hr) TPN Continuous <Continuous>  Parenteral Nutrition - Adult 1 Each (75 mL/Hr) TPN Continuous <Continuous>    MEDICATIONS  (PRN):  benzocaine 20% Spray 1 Spray(s) Topical three times a day PRN sore throat  HYDROmorphone  Injectable 0.2 milliGRAM(s) IV Push every 4 hours PRN Moderate to severe pain (4-10)  sodium chloride 0.9% lock flush 10 milliLiter(s) IV Push every 1 hour PRN Pre/post blood products, medications, blood draw, and to maintain line patency        CAPILLARY BLOOD GLUCOSE      POCT Blood Glucose.: 105 mg/dL (10 Apr 2025 05:05)  POCT Blood Glucose.: 127 mg/dL (09 Apr 2025 17:06)    I&O's Summary    09 Apr 2025 07:01  -  10 Apr 2025 07:00  --------------------------------------------------------  IN: 150 mL / OUT: 2580 mL / NET: -2430 mL    10 Apr 2025 07:01  -  10 Apr 2025 10:29  --------------------------------------------------------  IN: 0 mL / OUT: 500 mL / NET: -500 mL        Vital Signs Last 24 Hrs  T(C): 36.8 (10 Apr 2025 09:08), Max: 36.9 (10 Apr 2025 01:00)  T(F): 98.3 (10 Apr 2025 09:08), Max: 98.5 (10 Apr 2025 01:00)  HR: 81 (10 Apr 2025 09:08) (72 - 82)  BP: 118/40 (10 Apr 2025 09:08) (111/40 - 139/44)  BP(mean): --  RR: 18 (10 Apr 2025 09:08) (17 - 19)  SpO2: 97% (10 Apr 2025 09:08) (96% - 99%)    PHYSICAL EXAM:  CHEST/LUNG: Clear   HEART: S1S2; no murmurs  ABDOMEN: Soft, Nontender;  EXTREMITIES: no edema  NEUROLOGY: Alert, nonfocal     LABS:                        10.5   11.39 )-----------( 86       ( 10 Apr 2025 05:21 )             32.2     04-10    137  |  106  |  48[H]  ----------------------------<  88  4.4   |  21[L]  |  0.63    Ca    8.0[L]      10 Apr 2025 05:21  Phos  2.5     04-10  Mg     2.20     04-10    TPro  4.5[L]  /  Alb  2.4[L]  /  TBili  1.1  /  DBili  0.4[H]  /  AST  32  /  ALT  42[H]  /  AlkPhos  62  04-10          Urinalysis Basic - ( 10 Apr 2025 05:21 )    Color: x / Appearance: x / SG: x / pH: x  Gluc: 88 mg/dL / Ketone: x  / Bili: x / Urobili: x   Blood: x / Protein: x / Nitrite: x   Leuk Esterase: x / RBC: x / WBC x   Sq Epi: x / Non Sq Epi: x / Bacteria: x          All consultant(s) notes reviewed and care discussed with other providers        Contact Number, Dr Redd [phone]

## 2025-04-11 NOTE — PROGRESS NOTE ADULT - SUBJECTIVE AND OBJECTIVE BOX
Patient is a 89y old  Male who presents with a chief complaint of High grade mSBO (11 Apr 2025 09:33)      DATE OF SERVICE: 04-11-25 @ 09:59    SUBJECTIVE / OVERNIGHT EVENTS: overnight events noted    ROS:  Resp: No cough no sputum production  CVS: No chest pain no palpitations no orthopnea  GI: no N/V/D      MEDICATIONS  (STANDING):  acetaminophen   IVPB .. 1000 milliGRAM(s) IV Intermittent every 6 hours  chlorhexidine 2% Cloths 1 Application(s) Topical daily  enoxaparin Injectable 40 milliGRAM(s) SubCutaneous every 24 hours  influenza  Vaccine (HIGH DOSE) 0.5 milliLiter(s) IntraMuscular once  lipid, fat emulsion (Fish Oil and Plant Based) 20% Infusion 20.8 mL/Hr (20.8 mL/Hr) IV Continuous <Continuous>  pantoprazole  Injectable 40 milliGRAM(s) IV Push daily  Parenteral Nutrition - Adult 1 Each (75 mL/Hr) TPN Continuous <Continuous>    MEDICATIONS  (PRN):  benzocaine 20% Spray 1 Spray(s) Topical three times a day PRN sore throat  HYDROmorphone  Injectable 0.2 milliGRAM(s) IV Push every 4 hours PRN Moderate to severe pain (4-10)  sodium chloride 0.9% lock flush 10 milliLiter(s) IV Push every 1 hour PRN Pre/post blood products, medications, blood draw, and to maintain line patency        CAPILLARY BLOOD GLUCOSE      POCT Blood Glucose.: 131 mg/dL (10 Apr 2025 17:03)    I&O's Summary    10 Apr 2025 07:01  -  11 Apr 2025 07:00  --------------------------------------------------------  IN: 1945.6 mL / OUT: 2400 mL / NET: -454.4 mL        Vital Signs Last 24 Hrs  T(C): 36.6 (11 Apr 2025 09:55), Max: 37 (11 Apr 2025 00:49)  T(F): 97.9 (11 Apr 2025 09:55), Max: 98.6 (11 Apr 2025 00:49)  HR: 90 (11 Apr 2025 09:55) (75 - 90)  BP: 116/42 (11 Apr 2025 09:55) (116/42 - 130/48)  BP(mean): --  RR: 16 (11 Apr 2025 09:55) (16 - 16)  SpO2: 100% (11 Apr 2025 09:55) (97% - 100%)    PHYSICAL EXAM: NGT in place  CHEST/LUNG: Clear   HEART: S1S2; no murmurs  ABDOMEN: Soft, Nontender;  EXTREMITIES: no edema  NEUROLOGY: Alert, nonfocal     LABS:                        9.4    9.03  )-----------( 92       ( 11 Apr 2025 05:39 )             28.4     04-11    140  |  108[H]  |  39[H]  ----------------------------<  94  4.0   |  24  |  0.56    Ca    7.8[L]      11 Apr 2025 05:39  Phos  2.1     04-11  Mg     2.00     04-11    TPro  4.5[L]  /  Alb  2.3[L]  /  TBili  1.1  /  DBili  0.5[H]  /  AST  30  /  ALT  47[H]  /  AlkPhos  58  04-11          Urinalysis Basic - ( 11 Apr 2025 05:39 )    Color: x / Appearance: x / SG: x / pH: x  Gluc: 94 mg/dL / Ketone: x  / Bili: x / Urobili: x   Blood: x / Protein: x / Nitrite: x   Leuk Esterase: x / RBC: x / WBC x   Sq Epi: x / Non Sq Epi: x / Bacteria: x          All consultant(s) notes reviewed and care discussed with other providers        Contact Number, Dr Redd [phone]

## 2025-04-11 NOTE — PROGRESS NOTE ADULT - SUBJECTIVE AND OBJECTIVE BOX
A Team Colorectal Surgery Progress Note    S: Pt seen and examined with team on morning rounds. Patient denies nausea/emesis. No Flatus/No BM. Denies bloating/hiccups/belching. +OOB to chair yesterday and will repeat today. No CP/Palpitations/SOB/HA/Dizziness.      Vital Signs Last 24 Hrs  T(C): 36.8 (11 Apr 2025 05:22), Max: 37 (11 Apr 2025 00:49)  T(F): 98.2 (11 Apr 2025 05:22), Max: 98.6 (11 Apr 2025 00:49)  HR: 84 (11 Apr 2025 05:22) (75 - 85)  BP: 130/48 (11 Apr 2025 05:22) (116/51 - 130/48)  BP(mean): --  RR: 16 (11 Apr 2025 05:22) (16 - 16)  SpO2: 98% (11 Apr 2025 05:22) (97% - 100%)    Parameters below as of 11 Apr 2025 05:22  Patient On (Oxygen Delivery Method): room air        I&O's Summary    10 Apr 2025 07:01  -  11 Apr 2025 07:00  --------------------------------------------------------  IN: 1945.6 mL / OUT: 2400 mL / NET: -454.4 mL      I&O's Detail    10 Apr 2025 07:01  -  11 Apr 2025 07:00  --------------------------------------------------------  IN:    Fat Emulsion (Fish Oil &amp; Plant Based) 20% Infusion: 120.6 mL    IV PiggyBack: 250 mL    TPN (Total Parenteral Nutrition): 1575 mL  Total IN: 1945.6 mL    OUT:    Indwelling Catheter - Urethral (mL): 2100 mL    Nasogastric/Oral tube (mL): 300 mL    Oral Fluid: 0 mL  Total OUT: 2400 mL    Total NET: -454.4 mL          General Appearance: Appears well, NAD  Chest: Breathing comfortably on RA.    CV: S1, S2 @ 84  Abdomen: Soft, nondistended, nontender, dressings clean/dry/intact.  Extremities: Moves all extremities.    LABS:                        9.4    9.03  )-----------( 92       ( 11 Apr 2025 05:39 )             28.4     04-11    140  |  108[H]  |  39[H]  ----------------------------<  94  4.0   |  24  |  0.56    Ca    7.8[L]      11 Apr 2025 05:39  Phos  2.1     04-11  Mg     2.00     04-11    TPro  4.5[L]  /  Alb  2.3[L]  /  TBili  1.1  /  DBili  0.5[H]  /  AST  30  /  ALT  47[H]  /  AlkPhos  58  04-11      Urinalysis Basic - ( 11 Apr 2025 05:39 )    Color: x / Appearance: x / SG: x / pH: x  Gluc: 94 mg/dL / Ketone: x  / Bili: x / Urobili: x   Blood: x / Protein: x / Nitrite: x   Leuk Esterase: x / RBC: x / WBC x   Sq Epi: x / Non Sq Epi: x / Bacteria: x

## 2025-04-11 NOTE — PROGRESS NOTE ADULT - ASSESSMENT
89M w/ PMH BPH (indwelling Bruner), HLD, B/L inguinal hernia repair (L recurrence), colon cancer (T4N1) s/p extended right hemicolectomy (12/23/24) admitted for high-grade SBO 2/2 suspected peritoneal carcinomatosis. Now s/p small bowel bypass on 4/8.     P:  - NGT clamp trial this AM  - Diet: NPO/NGT/TPN, s/p PICC (4/1)  - Med onc (Dr. Delvalle) following, unsure of treatment plans, will f/u after discharge and determine further treatment  - Monitor bowel function   - Chronic bruner in place, continue to monitor UOP (last exchange was on 3/4 with Kim Mercer)  - OOB ambulate with assistance  - OOB to chair daily  - PT eval --> rec ADALID  - VTE ppx: [facility]    A Team  [id number]

## 2025-04-11 NOTE — PROGRESS NOTE ADULT - SUBJECTIVE AND OBJECTIVE BOX
NUTRITION NOTE  [id number] STOMBER  ===============================    Interval events - Patient was seen and examined at bedside, no acute events overnight. Patient denies chest pain, shortness of breath, nausea or vomiting at this time. IR PICC placed and TPN was started on 25 for nutritional support. Pt is tolerating TPN without any issues. TPN/lipids ordered for tonight. Pt is s/p mall bowel bypass on . Pt remains NPO, NGT clamped this morning.     ROS: Except as noted above, all other systems reviewed and are negative     Allergies  aspirin (Unknown)  penicillin (Unknown)    PAST MEDICAL & SURGICAL HISTORY:  BPH (benign prostatic hyperplasia)  Peripheral neuropathy  Heart failure, diastolic, chronic  Colon cancer  Bilateral inguinal hernia  Recurrent inguinal hernia  GERD (gastroesophageal reflux disease)  H/O hemicolectomy  H/O bilateral inguinal hernia repair    Vital Signs Last 24 Hrs  T(C): 36.6 (2025 09:55), Max: 37 (2025 00:49)  T(F): 97.9 (2025 09:55), Max: 98.6 (2025 00:49)  HR: 90 (2025 09:55) (75 - 90)  BP: 116/42 (2025 09:55) (116/42 - 130/48)  RR: 16 (2025 09:55) (16 - 16)  SpO2: 100% (2025 09:55) (97% - 100%)    MEDICATIONS  (STANDING):  acetaminophen   IVPB .. 1000 milliGRAM(s) IV Intermittent every 6 hours  chlorhexidine 2% Cloths 1 Application(s) Topical daily  enoxaparin Injectable 40 milliGRAM(s) SubCutaneous every 24 hours  influenza  Vaccine (HIGH DOSE) 0.5 milliLiter(s) IntraMuscular once  lipid, fat emulsion (Fish Oil and Plant Based) 20% Infusion 20.8 mL/Hr (20.8 mL/Hr) IV Continuous <Continuous>  lipid, fat emulsion (Fish Oil and Plant Based) 20% Infusion 20.8 mL/Hr (20.8 mL/Hr) IV Continuous <Continuous>  pantoprazole  Injectable 40 milliGRAM(s) IV Push daily  Parenteral Nutrition - Adult 1 Each (75 mL/Hr) TPN Continuous <Continuous>  Parenteral Nutrition - Adult 1 Each (75 mL/Hr) TPN Continuous <Continuous>    MEDICATIONS  (PRN):  benzocaine 20% Spray 1 Spray(s) Topical three times a day PRN sore throat  HYDROmorphone  Injectable 0.2 milliGRAM(s) IV Push every 4 hours PRN Moderate to severe pain (4-10)  sodium chloride 0.9% lock flush 10 milliLiter(s) IV Push every 1 hour PRN Pre/post blood products, medications, blood draw, and to maintain line patency    I&O's Detail    10 Apr 2025 07:01  -  2025 07:00  --------------------------------------------------------  IN:    Fat Emulsion (Fish Oil &amp; Plant Based) 20% Infusion: 120.6 mL    IV PiggyBack: 250 mL    TPN (Total Parenteral Nutrition): 1575 mL  Total IN: 1945.6 mL    OUT:    Indwelling Catheter - Urethral (mL): 2100 mL    Nasogastric/Oral tube (mL): 300 mL    Oral Fluid: 0 mL  Total OUT: 2400 mL    Total NET: -454.4 mL      2025 07:01  -  2025 10:59  --------------------------------------------------------  IN:  Total IN: 0 mL    OUT:    Indwelling Catheter - Urethral (mL): 450 mL    Oral Fluid: 0 mL  Total OUT: 450 mL    Total NET: -450 mL    POCT Blood Glucose.: 131 mg/dL (10 Apr 2025 17:03)    Daily Weight in k.7 (10 Apr 2025 05:29)    Drug Dosing Weight  Height (cm): 172.7 (2025 11:35)  Weight (kg): 59 (2025 11:35)  BMI (kg/m2): 19.8 (2025 11:35)  BSA (m2): 1.7 (2025 11:35)    PHYSICAL EXAM   General: NAD, resting comfortably in bed   Cardiac: regular rate, warm and well perfused  Respiratory: Nonlabored respirations, normal cw expansion  Abdomen: soft, nontender, incision c/d/i, NG tube in place  Extremities: normal strength, FROM, no deformities  PICC Site: double lumen PICC in E, site clean and dry, placed 25    Diet: NPO and TPN/lipids (started on 25)    LABORATORY                      9.4    9.03  )-----------( 92       ( 2025 05:39 )             28.4   04-11    140  |  108[H]  |  39[H]  ----------------------------<  94  4.0   |  24  |  0.56    Ca    7.8[L]      2025 05:39  Phos  2.1     04-11  Mg     2.00     04-11    TPro  4.5[L]  /  Alb  2.3[L]  /  TBili  1.1  /  DBili  0.5[H]  /  AST  30  /  ALT  47[H]  /  AlkPhos  58  04-11    LIVER FUNCTIONS - ( 2025 05:39 )  Alb: 2.3 g/dL / Pro: 4.5 g/dL / ALK PHOS: 58 U/L / ALT: 47 U/L / AST: 30 U/L / GGT: x           04-02 Chol -- LDL -- HDL -- Trig 104    ASSESSMENT/PLAN:  89M with hx BPH (chronic indwelling Gill), HLD, B/L inguinal hernia repair with known left recurrence, colon cancer (T4N1 w/ two synchronous lesions) s/p extended right hemicolectomy w/ ileocolic anastomosis on  (not on chemo), recent presentation  for acute diastolic heart failure, now admitted with abdominal pain, found to have high grade SBO w/ RUQ TP at a spiculated soft tissue lesion concerning for peritoneal carcinomatosis. Nutrition support consult called for evaluation for TPN in view of prolonged NPO and severe malnutrition. Med-onc consult --> poor chemotherapy candidate, recommend palliative care involvement if patient fails nonoperative management. IR PICC placed and parenteral nutrition was started on 25. Pt is s/p intestinal bypass on 25.     continue TPN with infusion volume of 1800mL, TPN will provide 1620 kcal/day    labs reviewed - electrolytes adjusted in TPN bag    monitor fingersticks, obtain daily weights    continue parenteral nutrition at this time, will follow up with primary team on plan    1.  Severe protein calorie malnutrition being optimized with TPN: CHO [200] gm.  AA [110] gm. SMOF Lipids [50] gm.  2.  Hyperglycemia managed with: [0] units of regular insulin    3.  Check fluid balance daily.  Strict I/O  [ ] [ ]   4.  Daily BMP, Ionized Calcium, Magnesium and Phosphorous   5.  Triglycerides at initiation of TPN and monthly - Chol -- LDL -- HDL -- Trig 104    Nutrition Support [id number]

## 2025-04-12 NOTE — PROGRESS NOTE ADULT - SUBJECTIVE AND OBJECTIVE BOX
__________________________________________________________________   ===================>> SURGERY PROGRESS NOTE <<===================  -----------------------------------------------------------------------------------------------------------  Interval/Subjective:  Patient seen and examined. reports having bowel movements and passing gas. States he wants to go home so he can pay his bills.   __________________________________________________________________   ===================>> VITAL SIGNS / EXAM <<=========================  -----------------------------------------------------------------------------------------------------------  T(C): 36.8 (06:24), Max: 37.2 (17:57)  HR: 89 (06:24) (85 - 90)  BP: 129/48 (06:24) (102/34 - 129/48)  RR: 19 (06:24) (16 - 19)  SpO2: 99% (06:24) (98% - 100%)    I & O's:  IN:    Fat Emulsion (Fish Oil &amp; Plant Based) 20% Infusion: 41.6 mL    IV PiggyBack: 1000 mL    TPN (Total Parenteral Nutrition): 750 mL  Total IN: 1791.6 mL    OUT:    Indwelling Catheter - Urethral (mL): 1880 mL    Nasogastric/Oral tube (mL): 0 mL    Oral Fluid: 0 mL  Total OUT: 1880 mL    Physical Exam:  General: NAD, resting comfortably in bed  HEENT: Normocephalic atraumatic  Respiratory: Nonlabored respirations  Cardio: regular rate, normotensive  Abdomen: soft, nontender, nondistended, incisions c/d/i  __________________________________________________________________   ===================>> LAB AND IMAGING <<==========================  -----------------------------------------------------------------------------------------------------------  CBC: 25 @ 05:21          8.8   10.89 >----< 110          27.0    Chemistry: 25 @ 05:21  141|110|40    ------------< 126  3.7|23|0.58    Ca: 8.0 25 @ 05:21  M.90 25 @ 05:21  Phos: 2.8 25 @ 05:21    LFT's: 25 @ 05:21  AST: 23  ALT: 44  ALP: 78  T.Bili: 0.8  D.Bili: 0.4  CBC: 25 @ 05:39          9.4   9.03 >----< 92          28.4    Chemistry: 25 05:39  140|108|39    ------------< 94  4.0|24|0.56    Ca: 7.8 25 05:39  M.00 25 @ 05:39  Phos: 2.1 25 @ 05:39    LFT's: 25 @ 05:39  AST: 30  ALT: 47  ALP: 58  T.Bili: 1.1  D.Bili: 0.5    __________________________________________________________________   ===================>> ASSESSMENT AND PLAN <<======================  -----------------------------------------------------------------------------------------------------------  A:  89M w/ PMH BPH (indwelling Bruner), HLD, B/L inguinal hernia repair (L recurrence), colon cancer (T4N1) s/p extended right hemicolectomy (24) admitted for high-grade SBO 2/2 suspected peritoneal carcinomatosis. Now s/p small bowel bypass on . Post op course c/b ileus, now with NGT removed and return of bowel function.     P:  - Diet: CLD  - Med onc (Dr. Delvalle) following, unsure of treatment plans, will f/u after discharge and determine further treatment  - Chronic bruner in place, continue to monitor UOP (last exchange was on 3/4 with Kim Mercer)  - OOB ambulate with assistance  - OOB to chair daily  - PT eval --> rec ADALID  - V[place] ppx: [facility] [place]    A Team  [id number]  __________________________________________________________________   ===================>> SURGERY PROGRESS NOTE <<===================  -----------------------------------------------------------------------------------------------------------  Interval/Subjective:  Patient seen and examined. reports having bowel movements and passing gas. States he wants to go home so he can pay his bills.   __________________________________________________________________   ===================>> VITAL SIGNS / EXAM <<=========================  -----------------------------------------------------------------------------------------------------------  T(C): 36.8 (06:24), Max: 37.2 (17:57)  HR: 89 (06:24) (85 - 90)  BP: 129/48 (06:24) (102/34 - 129/48)  RR: 19 (06:24) (16 - 19)  SpO2: 99% (06:24) (98% - 100%)    I & O's:  IN:    Fat Emulsion (Fish Oil &amp; Plant Based) 20% Infusion: 41.6 mL    IV PiggyBack: 1000 mL    TPN (Total Parenteral Nutrition): 750 mL  Total IN: 1791.6 mL    OUT:    Indwelling Catheter - Urethral (mL): 1880 mL    Nasogastric/Oral tube (mL): 0 mL    Oral Fluid: 0 mL  Total OUT: 1880 mL    Physical Exam:  General: NAD, resting comfortably in bed  HEENT: Normocephalic atraumatic  Respiratory: Nonlabored respirations  Cardio: regular rate, normotensive  Abdomen: soft, nontender, nondistended, incisions c/d/i  __________________________________________________________________   ===================>> LAB AND IMAGING <<==========================  -----------------------------------------------------------------------------------------------------------  CBC: 25 @ 05:21          8.8   10.89 >----< 110          27.0    Chemistry: 25 @ 05:21  141|110|40    ------------< 126  3.7|23|0.58    Ca: 8.0 25 @ 05:21  M.90 25 @ 05:21  Phos: 2.8 25 @ 05:21    LFT's: 25 @ 05:21  AST: 23  ALT: 44  ALP: 78  T.Bili: 0.8  D.Bili: 0.4  CBC: 25 @ 05:39          9.4   9.03 >----< 92          28.4    Chemistry: 25 05:39  140|108|39    ------------< 94  4.0|24|0.56    Ca: 7.8 25 05:39  M.00 25 @ 05:39  Phos: 2.1 25 @ 05:39    LFT's: 25 @ 05:39  AST: 30  ALT: 47  ALP: 58  T.Bili: 1.1  D.Bili: 0.5    __________________________________________________________________   ===================>> ASSESSMENT AND PLAN <<======================  -----------------------------------------------------------------------------------------------------------  A:  89M w/ PMH BPH (indwelling Bruner), HLD, B/L inguinal hernia repair (L recurrence), colon cancer (T4N1) s/p extended right hemicolectomy (24) admitted for high-grade SBO 2/2 suspected peritoneal carcinomatosis. Now s/p small bowel bypass on . Post op course c/b ileus, now with NGT removed and return of bowel function.     P:  - Diet: CLD  - Med onc (Dr. Delvalle) following, unsure of treatment plans, will f/u after discharge and determine further treatment  - Chronic bruner in place, continue to monitor UOP (last exchange was on 3/4 with Kim Mercer)  - OOB ambulate with assistance  - OOB to chair daily  - PT eval --> rec ADALID  - VTE ppx: [facility]  - PRN enemas for difficulty passing firm stool    A Team  [id number]

## 2025-04-12 NOTE — PROGRESS NOTE ADULT - SUBJECTIVE AND OBJECTIVE BOX
Patient is a 89y old  Male who presents with a chief complaint of High grade mSBO (12 Apr 2025 08:55)      DATE OF SERVICE: 04-12-25 @ 10:38    SUBJECTIVE / OVERNIGHT EVENTS: overnight events noted    ROS:  Resp: No cough no sputum production  CVS: No chest pain no palpitations no orthopnea  GI: no N/V/D      MEDICATIONS  (STANDING):  acetaminophen   IVPB .. 1000 milliGRAM(s) IV Intermittent every 6 hours  chlorhexidine 2% Cloths 1 Application(s) Topical daily  enoxaparin Injectable 40 milliGRAM(s) SubCutaneous every 24 hours  influenza  Vaccine (HIGH DOSE) 0.5 milliLiter(s) IntraMuscular once  lipid, fat emulsion (Fish Oil and Plant Based) 20% Infusion 20.8 mL/Hr (20.8 mL/Hr) IV Continuous <Continuous>  lipid, fat emulsion (Fish Oil and Plant Based) 20% Infusion 20.8 mL/Hr (20.8 mL/Hr) IV Continuous <Continuous>  pantoprazole  Injectable 40 milliGRAM(s) IV Push daily  Parenteral Nutrition - Adult 1 Each (75 mL/Hr) TPN Continuous <Continuous>  Parenteral Nutrition - Adult 1 Each (75 mL/Hr) TPN Continuous <Continuous>    MEDICATIONS  (PRN):  benzocaine 20% Spray 1 Spray(s) Topical three times a day PRN sore throat  HYDROmorphone  Injectable 0.2 milliGRAM(s) IV Push every 4 hours PRN Moderate to severe pain (4-10)  sodium chloride 0.9% lock flush 10 milliLiter(s) IV Push every 1 hour PRN Pre/post blood products, medications, blood draw, and to maintain line patency        CAPILLARY BLOOD GLUCOSE      POCT Blood Glucose.: 135 mg/dL (12 Apr 2025 08:03)  POCT Blood Glucose.: 129 mg/dL (11 Apr 2025 19:38)    I&O's Summary    11 Apr 2025 07:01  -  12 Apr 2025 07:00  --------------------------------------------------------  IN: 1791.6 mL / OUT: 1880 mL / NET: -88.4 mL    12 Apr 2025 07:01  -  12 Apr 2025 10:38  --------------------------------------------------------  IN: 383.2 mL / OUT: 350 mL / NET: 33.2 mL        Vital Signs Last 24 Hrs  T(C): 36.8 (12 Apr 2025 09:08), Max: 37.2 (11 Apr 2025 17:57)  T(F): 98.3 (12 Apr 2025 09:08), Max: 98.9 (11 Apr 2025 17:57)  HR: 89 (12 Apr 2025 09:08) (85 - 89)  BP: 115/48 (12 Apr 2025 09:08) (102/34 - 129/48)  BP(mean): --  RR: 19 (12 Apr 2025 09:08) (17 - 19)  SpO2: 98% (12 Apr 2025 09:08) (98% - 99%)    PHYSICAL EXAM:  CHEST/LUNG: Clear   HEART: S1S2; no murmurs  ABDOMEN: Soft, Nontender;  EXTREMITIES: no edema  NEUROLOGY: Alert, nonfocal     LABS:                        8.8    10.89 )-----------( 110      ( 12 Apr 2025 05:21 )             27.0     04-12    141  |  110[H]  |  40[H]  ----------------------------<  126[H]  3.7   |  23  |  0.58    Ca    8.0[L]      12 Apr 2025 05:21  Phos  2.8     04-12  Mg     1.90     04-12    TPro  4.4[L]  /  Alb  2.1[L]  /  TBili  0.8  /  DBili  0.4[H]  /  AST  23  /  ALT  44[H]  /  AlkPhos  78  04-12          Urinalysis Basic - ( 12 Apr 2025 05:21 )    Color: x / Appearance: x / SG: x / pH: x  Gluc: 126 mg/dL / Ketone: x  / Bili: x / Urobili: x   Blood: x / Protein: x / Nitrite: x   Leuk Esterase: x / RBC: x / WBC x   Sq Epi: x / Non Sq Epi: x / Bacteria: x          All consultant(s) notes reviewed and care discussed with other providers        Contact Number, Dr Redd [phone]

## 2025-04-12 NOTE — PROGRESS NOTE ADULT - SUBJECTIVE AND OBJECTIVE BOX
NUTRITION NOTE  [id number] STOMBER  ===============================    Interval events - Patient was seen and examined at bedside, no acute events overnight. Patient denies chest pain, shortness of breath, nausea or vomiting at this time. IR PICC placed and TPN was started on 25 for nutritional support. Pt is tolerating TPN without any issues. TPN/lipids ordered for tonight. Pt was started on clear liquids this morning.     ROS: Except as noted above, all other systems reviewed and are negative     Allergies  aspirin (Unknown)  penicillin (Unknown)    PAST MEDICAL & SURGICAL HISTORY:  BPH (benign prostatic hyperplasia)  Peripheral neuropathy  Heart failure, diastolic, chronic  Colon cancer  Bilateral inguinal hernia  Recurrent inguinal hernia  GERD (gastroesophageal reflux disease)  H/O hemicolectomy  H/O bilateral inguinal hernia repair    Vital Signs Last 24 Hrs  T(C): 36.8 (2025 06:24), Max: 37.2 (2025 17:57)  T(F): 98.3 (2025 06:24), Max: 98.9 (2025 17:57)  HR: 89 (2025 06:24) (85 - 90)  BP: 129/48 (2025 06:24) (102/34 - 129/48)  RR: 19 (2025 06:24) (16 - 19)  SpO2: 99% (2025 06:24) (98% - 100%)    MEDICATIONS  (STANDING):  acetaminophen   IVPB .. 1000 milliGRAM(s) IV Intermittent every 6 hours  chlorhexidine 2% Cloths 1 Application(s) Topical daily  enoxaparin Injectable 40 milliGRAM(s) SubCutaneous every 24 hours  influenza  Vaccine (HIGH DOSE) 0.5 milliLiter(s) IntraMuscular once  lipid, fat emulsion (Fish Oil and Plant Based) 20% Infusion 20.8 mL/Hr (20.8 mL/Hr) IV Continuous <Continuous>  lipid, fat emulsion (Fish Oil and Plant Based) 20% Infusion 20.8 mL/Hr (20.8 mL/Hr) IV Continuous <Continuous>  pantoprazole  Injectable 40 milliGRAM(s) IV Push daily  Parenteral Nutrition - Adult 1 Each (75 mL/Hr) TPN Continuous <Continuous>  Parenteral Nutrition - Adult 1 Each (75 mL/Hr) TPN Continuous <Continuous>    MEDICATIONS  (PRN):  benzocaine 20% Spray 1 Spray(s) Topical three times a day PRN sore throat  HYDROmorphone  Injectable 0.2 milliGRAM(s) IV Push every 4 hours PRN Moderate to severe pain (4-10)  sodium chloride 0.9% lock flush 10 milliLiter(s) IV Push every 1 hour PRN Pre/post blood products, medications, blood draw, and to maintain line patency    I&O's Detail    2025 07:01  -  2025 07:00  --------------------------------------------------------  IN:    Fat Emulsion (Fish Oil &amp; Plant Based) 20% Infusion: 41.6 mL    IV PiggyBack: 1000 mL    TPN (Total Parenteral Nutrition): 750 mL  Total IN: 1791.6 mL    OUT:    Indwelling Catheter - Urethral (mL): 1880 mL    Nasogastric/Oral tube (mL): 0 mL    Oral Fluid: 0 mL  Total OUT: 1880 mL    Total NET: -88.4 mL    POCT Blood Glucose.: 135 mg/dL (2025 08:03)  POCT Blood Glucose.: 129 mg/dL (2025 19:38)    Daily Weight in k.7 (10 Apr 2025 05:29)    Drug Dosing Weight  Height (cm): 172.7 (2025 11:35)  Weight (kg): 59 (2025 11:35)  BMI (kg/m2): 19.8 (2025 11:35)  BSA (m2): 1.7 (2025 11:35)    PHYSICAL EXAM   General: NAD, resting comfortably in bed   Cardiac: regular rate, warm and well perfused  Respiratory: Nonlabored respirations, normal cw expansion  Abdomen: soft, nontender, incision c/d/i, NG tube in place  Extremities: normal strength, FROM, no deformities  PICC Site: double lumen PICC in LUE, site clean and dry, placed 25    Diet: clear liquids and TPN/lipids (started on 25)    LABORATORY                             8.8    10.89 )-----------( 110      ( 2025 05:21 )             27.0   04-12    141  |  110[H]  |  40[H]  ----------------------------<  126[H]  3.7   |  23  |  0.58    Ca    8.0[L]      2025 05:21  Phos  2.8     04-12  Mg     1.90     04-12    TPro  4.4[L]  /  Alb  2.1[L]  /  TBili  0.8  /  DBili  0.4[H]  /  AST  23  /  ALT  44[H]  /  AlkPhos  78  -12    LIVER FUNCTIONS - ( 2025 05:21 )  Alb: 2.1 g/dL / Pro: 4.4 g/dL / ALK PHOS: 78 U/L / ALT: 44 U/L / AST: 23 U/L / GGT: x            Chol -- LDL -- HDL -- Trig 104    ASSESSMENT/PLAN:  89M with hx BPH (chronic indwelling Gill), HLD, B/L inguinal hernia repair with known left recurrence, colon cancer (T4N1 w/ two synchronous lesions) s/p extended right hemicolectomy w/ ileocolic anastomosis on  (not on chemo), recent presentation  for acute diastolic heart failure, now admitted with abdominal pain, found to have high grade SBO w/ RUQ TP at a spiculated soft tissue lesion concerning for peritoneal carcinomatosis. Nutrition support consult called for evaluation for TPN in view of prolonged NPO and severe malnutrition. Med-onc consult --> poor chemotherapy candidate, recommend palliative care involvement if patient fails nonoperative management. IR PICC placed and parenteral nutrition was started on 25. Pt is s/p intestinal bypass on 25.     continue TPN with infusion volume of 1800mL, TPN will provide 1620 kcal/day    labs reviewed - continue same TPN formula today     monitor fingersticks, obtain daily weights    continue parenteral nutrition at this time, will follow up with primary team on plan, monitor tolerance to liquids, advance diet as tolerated     1.  Severe protein calorie malnutrition being optimized with TPN: CHO [200] gm.  AA [110] gm. SMOF Lipids [50] gm.  2.  Hyperglycemia managed with: [0] units of regular insulin    3.  Check fluid balance daily.  Strict I/O  [ ] [ ]   4.  Daily BMP, Ionized Calcium, Magnesium and Phosphorous   5.  Triglycerides at initiation of TPN and monthly - Chol -- LDL -- HDL -- Trig 104    Nutrition Support [id number]

## 2025-04-13 NOTE — PROGRESS NOTE ADULT - ASSESSMENT
A:  89M w/ PMH BPH (indwelling Bruner), HLD, B/L inguinal hernia repair (L recurrence), colon cancer (T4N1) s/p extended right hemicolectomy (12/23/24) admitted for high-grade SBO 2/2 suspected peritoneal carcinomatosis. Now s/p small bowel bypass on 4/8. Post op course c/b ileus, now with NGT removed and return of bowel function.     P:  - Diet: adv to LRD, plan to 1/2 TPN today, f/u with TPN team   - Med onc (Dr. Delvalle) following, unsure of treatment plans, will f/u after discharge and determine further treatment  - Chronic bruner in place, continue to monitor UOP (last exchange was on 3/4 with Kim Mercer)  - OOB ambulate with assistance  - OOB to chair daily  - PT eval --> rec ADALID  - VTE ppx: [facility]  - PRN enemas for difficulty passing firm stool    A Team  [id number]

## 2025-04-13 NOTE — PROGRESS NOTE ADULT - SUBJECTIVE AND OBJECTIVE BOX
NUTRITION NOTE  [id number] STOMBER  ===============================    Interval events - Patient was seen and examined at bedside, no acute events overnight. Patient denies chest pain, shortness of breath, nausea or vomiting at this time. IR PICC placed and TPN was started on 25 for nutritional support. Pt is tolerating TPN without any issues. Pt tolerated liquids and is now advanced to a low fiber diet. TPN volume and calories decreased today.     ROS: Except as noted above, all other systems reviewed and are negative     Allergies  aspirin (Unknown)  penicillin (Unknown)    PAST MEDICAL & SURGICAL HISTORY:  BPH (benign prostatic hyperplasia)  Peripheral neuropathy  Heart failure, diastolic, chronic  Colon cancer  Bilateral inguinal hernia  Recurrent inguinal hernia  GERD (gastroesophageal reflux disease)  H/O hemicolectomy  H/O bilateral inguinal hernia repair    Vital Signs Last 24 Hrs  T(C): 36.4 (2025 09:10), Max: 37 (2025 07:50)  T(F): 97.6 (2025 09:10), Max: 98.6 (2025 07:50)  HR: 87 (2025 09:10) (75 - 90)  BP: 124/47 (2025 09:10) (116/40 - 133/49)  RR: 18 (2025 09:10) (16 - 19)  SpO2: 100% (2025 09:10) (98% - 100%)    MEDICATIONS  (STANDING):  acetaminophen   IVPB .. 1000 milliGRAM(s) IV Intermittent every 6 hours  chlorhexidine 2% Cloths 1 Application(s) Topical daily  enoxaparin Injectable 40 milliGRAM(s) SubCutaneous every 24 hours  influenza  Vaccine (HIGH DOSE) 0.5 milliLiter(s) IntraMuscular once  lipid, fat emulsion (Fish Oil and Plant Based) 20% Infusion 20.8 mL/Hr (20.8 mL/Hr) IV Continuous <Continuous>  lipid, fat emulsion (Fish Oil and Plant Based) 20% Infusion 10.4 mL/Hr (10.4 mL/Hr) IV Continuous <Continuous>  pantoprazole  Injectable 40 milliGRAM(s) IV Push daily  Parenteral Nutrition - Adult 1 Each (75 mL/Hr) TPN Continuous <Continuous>  Parenteral Nutrition - Adult 1 Each (42 mL/Hr) TPN Continuous <Continuous>    MEDICATIONS  (PRN):  benzocaine 20% Spray 1 Spray(s) Topical three times a day PRN sore throat  HYDROmorphone  Injectable 0.2 milliGRAM(s) IV Push every 4 hours PRN Moderate to severe pain (4-10)  sodium chloride 0.9% lock flush 10 milliLiter(s) IV Push every 1 hour PRN Pre/post blood products, medications, blood draw, and to maintain line patency    I&O's Detail    2025 07:01  -  2025 07:00  --------------------------------------------------------  IN:    Fat Emulsion (Fish Oil &amp; Plant Based) 20% Infusion: 145.6 mL    Fat Emulsion (Fish Oil &amp; Plant Based) 20% Infusion: 41.6 mL    IV PiggyBack: 200 mL    Oral Fluid: 750 mL    TPN (Total Parenteral Nutrition): 1050 mL  Total IN: 2187.2 mL    OUT:    Indwelling Catheter - Urethral (mL): 1900 mL  Total OUT: 1900 mL    Total NET: 287.2 mL      2025 07:01  -  2025 09:45  --------------------------------------------------------  IN:    Oral Fluid: 240 mL  Total IN: 240 mL    OUT:    Indwelling Catheter - Urethral (mL): 800 mL  Total OUT: 800 mL    Total NET: -560 mL    POCT Blood Glucose.: 97 mg/dL (2025 07:45)  POCT Blood Glucose.: 126 mg/dL (2025 20:24)    Daily Weight in k.7 (10 Apr 2025 05:29)    Drug Dosing Weight  Height (cm): 172.7 (2025 11:35)  Weight (kg): 59 (2025 11:35)  BMI (kg/m2): 19.8 (2025 11:35)  BSA (m2): 1.7 (2025 11:35)    PHYSICAL EXAM   General: NAD, resting comfortably in bed   Cardiac: regular rate, warm and well perfused  Respiratory: Nonlabored respirations, normal cw expansion  Abdomen: soft, nontender, incision c/d/i, NG tube in place  Extremities: normal strength, FROM, no deformities  PICC Site: double lumen PICC in E, site clean and dry, placed 25    Diet: low fiber diet and TPN/lipids (started on 25)    LABORATORY                                      9.4    13.96 )-----------( 131      ( 2025 04:10 )             29.9   04-13    142  |  108[H]  |  33[H]  ----------------------------<  73  3.7   |  19[L]  |  0.49[L]    Ca    8.3[L]      2025 04:10  Phos  2.6     04-13  Mg     2.10     -13    TPro  4.4[L]  /  Alb  2.1[L]  /  TBili  0.8  /  DBili  0.4[H]  /  AST  23  /  ALT  44[H]  /  AlkPhos  78  04-12    LIVER FUNCTIONS - ( 2025 05:21 )  Alb: 2.1 g/dL / Pro: 4.4 g/dL / ALK PHOS: 78 U/L / ALT: 44 U/L / AST: 23 U/L / GGT: x           04-02 Chol -- LDL -- HDL -- Trig 104    ASSESSMENT/PLAN:  89M with hx BPH (chronic indwelling Gill), HLD, B/L inguinal hernia repair with known left recurrence, colon cancer (T4N1 w/ two synchronous lesions) s/p extended right hemicolectomy w/ ileocolic anastomosis on  (not on chemo), recent presentation  for acute diastolic heart failure, now admitted with abdominal pain, found to have high grade SBO w/ RUQ TP at a spiculated soft tissue lesion concerning for peritoneal carcinomatosis. Nutrition support consult called for evaluation for TPN in view of prolonged NPO and severe malnutrition. Med-onc consult --> poor chemotherapy candidate, recommend palliative care involvement if patient fails nonoperative management. IR PICC placed and parenteral nutrition was started on 25. Pt is s/p intestinal bypass on 25.     TPN infusion volume decreased to 1L, TPN will provide 790 kcal/day; TPN volume and caloris decreased to half today     labs reviewed - electrolytes adjusted in TPN bag     monitor fingersticks, obtain daily weights    continue parenteral nutrition at this time, will follow up with primary team on plan, continue to monitor PO intake and tolerance to diet, plan to d/c TPN when pt is tolerating enough PO intake     1.  Severe protein calorie malnutrition being optimized with TPN: CHO [200] gm.  AA [110] gm. SMOF Lipids [50] gm.  2.  Hyperglycemia managed with: [0] units of regular insulin    3.  Check fluid balance daily.  Strict I/O  [ ] [ ]   4.  Daily BMP, Ionized Calcium, Magnesium and Phosphorous   5.  Triglycerides at initiation of TPN and monthly - Chol -- LDL -- HDL -- Trig 104    Nutrition Support [id number]  NUTRITION NOTE  [id number] STOMBER  ===============================    Interval events - Patient was seen and examined at bedside, no acute events overnight. Patient denies chest pain, shortness of breath, nausea or vomiting at this time. IR PICC placed and TPN was started on 25 for nutritional support. Pt is tolerating TPN without any issues. Pt tolerated liquids and is now advanced to a low fiber diet. TPN volume and calories decreased today.     ROS: Except as noted above, all other systems reviewed and are negative     Allergies  aspirin (Unknown)  penicillin (Unknown)    PAST MEDICAL & SURGICAL HISTORY:  BPH (benign prostatic hyperplasia)  Peripheral neuropathy  Heart failure, diastolic, chronic  Colon cancer  Bilateral inguinal hernia  Recurrent inguinal hernia  GERD (gastroesophageal reflux disease)  H/O hemicolectomy  H/O bilateral inguinal hernia repair    Vital Signs Last 24 Hrs  T(C): 36.4 (2025 09:10), Max: 37 (2025 07:50)  T(F): 97.6 (2025 09:10), Max: 98.6 (2025 07:50)  HR: 87 (2025 09:10) (75 - 90)  BP: 124/47 (2025 09:10) (116/40 - 133/49)  RR: 18 (2025 09:10) (16 - 19)  SpO2: 100% (2025 09:10) (98% - 100%)    MEDICATIONS  (STANDING):  acetaminophen   IVPB .. 1000 milliGRAM(s) IV Intermittent every 6 hours  chlorhexidine 2% Cloths 1 Application(s) Topical daily  enoxaparin Injectable 40 milliGRAM(s) SubCutaneous every 24 hours  influenza  Vaccine (HIGH DOSE) 0.5 milliLiter(s) IntraMuscular once  lipid, fat emulsion (Fish Oil and Plant Based) 20% Infusion 20.8 mL/Hr (20.8 mL/Hr) IV Continuous <Continuous>  lipid, fat emulsion (Fish Oil and Plant Based) 20% Infusion 10.4 mL/Hr (10.4 mL/Hr) IV Continuous <Continuous>  pantoprazole  Injectable 40 milliGRAM(s) IV Push daily  Parenteral Nutrition - Adult 1 Each (75 mL/Hr) TPN Continuous <Continuous>  Parenteral Nutrition - Adult 1 Each (42 mL/Hr) TPN Continuous <Continuous>    MEDICATIONS  (PRN):  benzocaine 20% Spray 1 Spray(s) Topical three times a day PRN sore throat  HYDROmorphone  Injectable 0.2 milliGRAM(s) IV Push every 4 hours PRN Moderate to severe pain (4-10)  sodium chloride 0.9% lock flush 10 milliLiter(s) IV Push every 1 hour PRN Pre/post blood products, medications, blood draw, and to maintain line patency    I&O's Detail    2025 07:01  -  2025 07:00  --------------------------------------------------------  IN:    Fat Emulsion (Fish Oil &amp; Plant Based) 20% Infusion: 145.6 mL    Fat Emulsion (Fish Oil &amp; Plant Based) 20% Infusion: 41.6 mL    IV PiggyBack: 200 mL    Oral Fluid: 750 mL    TPN (Total Parenteral Nutrition): 1050 mL  Total IN: 2187.2 mL    OUT:    Indwelling Catheter - Urethral (mL): 1900 mL  Total OUT: 1900 mL    Total NET: 287.2 mL      2025 07:01  -  2025 09:45  --------------------------------------------------------  IN:    Oral Fluid: 240 mL  Total IN: 240 mL    OUT:    Indwelling Catheter - Urethral (mL): 800 mL  Total OUT: 800 mL    Total NET: -560 mL    POCT Blood Glucose.: 97 mg/dL (2025 07:45)  POCT Blood Glucose.: 126 mg/dL (2025 20:24)    Daily Weight in k.7 (10 Apr 2025 05:29)    Drug Dosing Weight  Height (cm): 172.7 (2025 11:35)  Weight (kg): 59 (2025 11:35)  BMI (kg/m2): 19.8 (2025 11:35)  BSA (m2): 1.7 (2025 11:35)    PHYSICAL EXAM   General: NAD, resting comfortably in bed   Cardiac: regular rate, warm and well perfused  Respiratory: Nonlabored respirations, normal cw expansion  Abdomen: soft, nontender, incision c/d/i, NG tube in place  Extremities: normal strength, FROM, no deformities  PICC Site: double lumen PICC in E, site clean and dry, placed 25    Diet: low fiber diet and TPN/lipids (started on 25)    LABORATORY                                      9.4    13.96 )-----------( 131      ( 2025 04:10 )             29.9   04-13    142  |  108[H]  |  33[H]  ----------------------------<  73  3.7   |  19[L]  |  0.49[L]    Ca    8.3[L]      2025 04:10  Phos  2.6     04-13  Mg     2.10     -13    TPro  4.4[L]  /  Alb  2.1[L]  /  TBili  0.8  /  DBili  0.4[H]  /  AST  23  /  ALT  44[H]  /  AlkPhos  78  04-12    LIVER FUNCTIONS - ( 2025 05:21 )  Alb: 2.1 g/dL / Pro: 4.4 g/dL / ALK PHOS: 78 U/L / ALT: 44 U/L / AST: 23 U/L / GGT: x           04-02 Chol -- LDL -- HDL -- Trig 104    ASSESSMENT/PLAN:  89M with hx BPH (chronic indwelling Gill), HLD, B/L inguinal hernia repair with known left recurrence, colon cancer (T4N1 w/ two synchronous lesions) s/p extended right hemicolectomy w/ ileocolic anastomosis on  (not on chemo), recent presentation  for acute diastolic heart failure, now admitted with abdominal pain, found to have high grade SBO w/ RUQ TP at a spiculated soft tissue lesion concerning for peritoneal carcinomatosis. Nutrition support consult called for evaluation for TPN in view of prolonged NPO and severe malnutrition. Med-onc consult --> poor chemotherapy candidate, recommend palliative care involvement if patient fails nonoperative management. IR PICC placed and parenteral nutrition was started on 25. Pt is s/p intestinal bypass on 25.     TPN infusion volume decreased to 1L, TPN will provide 790 kcal/day; TPN volume and calories decreased to half today     labs reviewed - electrolytes adjusted in TPN bag     monitor fingersticks, obtain daily weights    continue parenteral nutrition at this time, will follow up with primary team on plan, continue to monitor PO intake and tolerance to diet, plan to d/c TPN when pt is tolerating enough PO intake     1.  Severe protein calorie malnutrition being optimized with TPN: CHO [100] gm.  AA [55] gm. SMOF Lipids [25] gm.  2.  Hyperglycemia managed with: [0] units of regular insulin    3.  Check fluid balance daily.  Strict I/O  [ ] [ ]   4.  Daily BMP, Ionized Calcium, Magnesium and Phosphorous   5.  Triglycerides at initiation of TPN and monthly - Chol -- LDL -- HDL -- Trig 104    Nutrition Support [id number]

## 2025-04-13 NOTE — PROGRESS NOTE ADULT - SUBJECTIVE AND OBJECTIVE BOX
Surgery Daily Progress Note  =====================================================    INTERVAL EVENTS: NAEO    SUBJECTIVE: Patient seen at bedside during AM rounds. Resting comfortably, pain appropriately controlled. Tolerating CLD, +/+.   --------------------------------------------------------------------------------------  VITAL SIGNS:  T(C): 37 (04-13-25 @ 07:50), Max: 37 (04-13-25 @ 07:50)  HR: 75 (04-13-25 @ 07:50) (75 - 90)  BP: 121/42 (04-13-25 @ 07:50) (115/48 - 133/49)  RR: 19 (04-13-25 @ 07:50) (16 - 19)  SpO2: 100% (04-13-25 @ 07:50) (98% - 100%)  --------------------------------------------------------------------------------------  MEDICATIONS:   acetaminophen   IVPB .. 1000 milliGRAM(s) IV Intermittent every 6 hours  benzocaine 20% Spray 1 Spray(s) Topical three times a day PRN  chlorhexidine 2% Cloths 1 Application(s) Topical daily  enoxaparin Injectable 40 milliGRAM(s) SubCutaneous every 24 hours  HYDROmorphone  Injectable 0.2 milliGRAM(s) IV Push every 4 hours PRN  influenza  Vaccine (HIGH DOSE) 0.5 milliLiter(s) IntraMuscular once  lipid, fat emulsion (Fish Oil and Plant Based) 20% Infusion 20.8 mL/Hr IV Continuous <Continuous>  lipid, fat emulsion (Fish Oil and Plant Based) 20% Infusion 20.8 mL/Hr IV Continuous <Continuous>  pantoprazole  Injectable 40 milliGRAM(s) IV Push daily  Parenteral Nutrition - Adult 1 Each TPN Continuous <Continuous>  sodium chloride 0.9% lock flush 10 milliLiter(s) IV Push every 1 hour PRN    --------------------------------------------------------------------------------------  INTAKE/OUTPUT:     04-12-25 @ 07:01  -  04-13-25 @ 07:00  --------------------------------------------------------  IN: 2187.2 mL / OUT: 1900 mL / NET: 287.2 mL      --------------------------------------------------------------------------------------  EXAM    General: NAD, resting comfortably in bed  HEENT: Normocephalic atraumatic  Respiratory: Nonlabored respirations  Cardio: regular rate, normotensive  Abdomen: soft, nontender, nondistended, incisions c/d/i    --------------------------------------------------------------------------------------

## 2025-04-13 NOTE — PROGRESS NOTE ADULT - SUBJECTIVE AND OBJECTIVE BOX
Patient is a 89y old  Male who presents with a chief complaint of High grade mSBO (13 Apr 2025 09:44)      DATE OF SERVICE: 04-13-25 @ 12:36    SUBJECTIVE / OVERNIGHT EVENTS: overnight events noted    ROS:  Resp: No cough no sputum production  CVS: No chest pain no palpitations no orthopnea  GI: no N/V/D      MEDICATIONS  (STANDING):  acetaminophen   IVPB .. 1000 milliGRAM(s) IV Intermittent every 6 hours  chlorhexidine 2% Cloths 1 Application(s) Topical daily  enoxaparin Injectable 40 milliGRAM(s) SubCutaneous every 24 hours  influenza  Vaccine (HIGH DOSE) 0.5 milliLiter(s) IntraMuscular once  lipid, fat emulsion (Fish Oil and Plant Based) 20% Infusion 20.8 mL/Hr (20.8 mL/Hr) IV Continuous <Continuous>  lipid, fat emulsion (Fish Oil and Plant Based) 20% Infusion 10.4 mL/Hr (10.4 mL/Hr) IV Continuous <Continuous>  pantoprazole  Injectable 40 milliGRAM(s) IV Push daily  Parenteral Nutrition - Adult 1 Each (42 mL/Hr) TPN Continuous <Continuous>  Parenteral Nutrition - Adult 1 Each (75 mL/Hr) TPN Continuous <Continuous>    MEDICATIONS  (PRN):  benzocaine 20% Spray 1 Spray(s) Topical three times a day PRN sore throat  HYDROmorphone  Injectable 0.2 milliGRAM(s) IV Push every 4 hours PRN Moderate to severe pain (4-10)  sodium chloride 0.9% lock flush 10 milliLiter(s) IV Push every 1 hour PRN Pre/post blood products, medications, blood draw, and to maintain line patency        CAPILLARY BLOOD GLUCOSE      POCT Blood Glucose.: 97 mg/dL (13 Apr 2025 07:45)  POCT Blood Glucose.: 126 mg/dL (12 Apr 2025 20:24)    I&O's Summary    12 Apr 2025 07:01  -  13 Apr 2025 07:00  --------------------------------------------------------  IN: 2187.2 mL / OUT: 1900 mL / NET: 287.2 mL    13 Apr 2025 07:01  -  13 Apr 2025 12:36  --------------------------------------------------------  IN: 527.4 mL / OUT: 800 mL / NET: -272.6 mL        Vital Signs Last 24 Hrs  T(C): 36.4 (13 Apr 2025 09:10), Max: 37 (13 Apr 2025 07:50)  T(F): 97.6 (13 Apr 2025 09:10), Max: 98.6 (13 Apr 2025 07:50)  HR: 87 (13 Apr 2025 09:10) (75 - 90)  BP: 124/47 (13 Apr 2025 09:10) (116/40 - 133/49)  BP(mean): --  RR: 18 (13 Apr 2025 09:10) (16 - 19)  SpO2: 100% (13 Apr 2025 09:10) (98% - 100%)      PHYSICAL EXAM:  CHEST/LUNG: Clear   HEART: S1S2; no murmurs  ABDOMEN: Soft, Nontender;  EXTREMITIES: no edema  NEUROLOGY: Alert, nonfocal     LABS:                        9.4    13.96 )-----------( 131      ( 13 Apr 2025 04:10 )             29.9     04-13    142  |  108[H]  |  33[H]  ----------------------------<  73  3.7   |  19[L]  |  0.49[L]    Ca    8.3[L]      13 Apr 2025 04:10  Phos  2.6     04-13  Mg     2.10     04-13    TPro  4.4[L]  /  Alb  2.1[L]  /  TBili  0.8  /  DBili  0.4[H]  /  AST  23  /  ALT  44[H]  /  AlkPhos  78  04-12          Urinalysis Basic - ( 13 Apr 2025 04:10 )    Color: x / Appearance: x / SG: x / pH: x  Gluc: 73 mg/dL / Ketone: x  / Bili: x / Urobili: x   Blood: x / Protein: x / Nitrite: x   Leuk Esterase: x / RBC: x / WBC x   Sq Epi: x / Non Sq Epi: x / Bacteria: x          All consultant(s) notes reviewed and care discussed with other providers        Contact Number, Dr Redd [phone]

## 2025-04-14 NOTE — PROGRESS NOTE ADULT - SUBJECTIVE AND OBJECTIVE BOX
Patient is a 89y old  Male who presents with a chief complaint of High grade mSBO (14 Apr 2025 10:30)      DATE OF SERVICE: 04-14-25 @ 15:53    SUBJECTIVE / OVERNIGHT EVENTS: overnight events noted    ROS:  Resp: No cough no sputum production  CVS: No chest pain no palpitations no orthopnea  GI: no N/V/D    MEDICATIONS  (STANDING):  acetaminophen   IVPB .. 1000 milliGRAM(s) IV Intermittent every 6 hours  chlorhexidine 2% Cloths 1 Application(s) Topical daily  enoxaparin Injectable 40 milliGRAM(s) SubCutaneous every 24 hours  influenza  Vaccine (HIGH DOSE) 0.5 milliLiter(s) IntraMuscular once  lipid, fat emulsion (Fish Oil and Plant Based) 20% Infusion 10.4 mL/Hr (10.4 mL/Hr) IV Continuous <Continuous>  pantoprazole  Injectable 40 milliGRAM(s) IV Push daily  Parenteral Nutrition - Adult 1 Each (42 mL/Hr) TPN Continuous <Continuous>  Parenteral Nutrition - Adult 1 Each (42 mL/Hr) TPN Continuous <Continuous>  simethicone 80 milliGRAM(s) Chew three times a day    MEDICATIONS  (PRN):  benzocaine 20% Spray 1 Spray(s) Topical three times a day PRN sore throat  oxyCODONE    IR 2.5 milliGRAM(s) Oral every 6 hours PRN Severe Pain (7 - 10)  sodium chloride 0.9% lock flush 10 milliLiter(s) IV Push every 1 hour PRN Pre/post blood products, medications, blood draw, and to maintain line patency        CAPILLARY BLOOD GLUCOSE      POCT Blood Glucose.: 103 mg/dL (14 Apr 2025 05:34)  POCT Blood Glucose.: 122 mg/dL (13 Apr 2025 20:39)    I&O's Summary    13 Apr 2025 07:01  -  14 Apr 2025 07:00  --------------------------------------------------------  IN: 1719.8 mL / OUT: 1940 mL / NET: -220.2 mL    14 Apr 2025 07:01  -  14 Apr 2025 15:53  --------------------------------------------------------  IN: 300 mL / OUT: 200 mL / NET: 100 mL        Vital Signs Last 24 Hrs  T(C): 36.3 (14 Apr 2025 15:04), Max: 37 (13 Apr 2025 17:33)  T(F): 97.3 (14 Apr 2025 15:04), Max: 98.6 (13 Apr 2025 17:33)  HR: 79 (14 Apr 2025 15:04) (75 - 90)  BP: 107/52 (14 Apr 2025 15:04) (106/32 - 146/62)  BP(mean): --  RR: 15 (14 Apr 2025 15:04) (15 - 18)  SpO2: 97% (14 Apr 2025 15:04) (96% - 100%)      PHYSICAL EXAM:  CHEST/LUNG: Clear   HEART: S1S2; no murmurs  ABDOMEN: Soft, Nontender;  EXTREMITIES: no edema  NEUROLOGY: Alert, nonfocal     LABS:                        9.5    12.25 )-----------( 157      ( 14 Apr 2025 05:41 )             28.6     04-14    142  |  109[H]  |  32[H]  ----------------------------<  98  3.6   |  24  |  0.48[L]    Ca    8.0[L]      14 Apr 2025 05:41  Phos  2.6     04-14  Mg     2.00     04-14            Urinalysis Basic - ( 14 Apr 2025 05:41 )    Color: x / Appearance: x / SG: x / pH: x  Gluc: 98 mg/dL / Ketone: x  / Bili: x / Urobili: x   Blood: x / Protein: x / Nitrite: x   Leuk Esterase: x / RBC: x / WBC x   Sq Epi: x / Non Sq Epi: x / Bacteria: x          All consultant(s) notes reviewed and care discussed with other providers        Contact Number, Dr Redd [phone]

## 2025-04-14 NOTE — PROGRESS NOTE ADULT - SUBJECTIVE AND OBJECTIVE BOX
NUTRITION NOTE  [id number] STOMBER  ===============================    Interval events - Patient was seen and examined at bedside, no acute events overnight. Patient denies chest pain, shortness of breath, or vomiting at this time. IR PICC placed and TPN was started on 25 for nutritional support. Pt is tolerating TPN without any issues. Pt tolerated liquids and is now advanced to a low fiber diet. Pt c/o persistent nausea overnight, remains on halved TPN tonight.     ROS: Except as noted above, all other systems reviewed and are negative     Allergies  aspirin (Unknown)  penicillin (Unknown)    PAST MEDICAL & SURGICAL HISTORY:  BPH (benign prostatic hyperplasia)  Peripheral neuropathy  Heart failure, diastolic, chronic  Colon cancer  Bilateral inguinal hernia  Recurrent inguinal hernia  GERD (gastroesophageal reflux disease)  H/O hemicolectomy  H/O bilateral inguinal hernia repair    Vital Signs Last 24 Hrs  T(C): 36.6 (2025 09:46), Max: 37.1 (2025 13:17)  T(F): 97.8 (2025 09:46), Max: 98.8 (2025 13:17)  HR: 83 (2025 09:46) (75 - 90)  BP: 143/50 (2025 09:46) (113/48 - 146/62)  RR: 18 (2025 09:46) (17 - 18)  SpO2: 96% (2025 09:46) (96% - 100%)    MEDICATIONS  (STANDING):  acetaminophen   IVPB .. 1000 milliGRAM(s) IV Intermittent every 6 hours  chlorhexidine 2% Cloths 1 Application(s) Topical daily  enoxaparin Injectable 40 milliGRAM(s) SubCutaneous every 24 hours  influenza  Vaccine (HIGH DOSE) 0.5 milliLiter(s) IntraMuscular once  lipid, fat emulsion (Fish Oil and Plant Based) 20% Infusion 10.4 mL/Hr (10.4 mL/Hr) IV Continuous <Continuous>  pantoprazole  Injectable 40 milliGRAM(s) IV Push daily  Parenteral Nutrition - Adult 1 Each (42 mL/Hr) TPN Continuous <Continuous>  Parenteral Nutrition - Adult 1 Each (42 mL/Hr) TPN Continuous <Continuous>    MEDICATIONS  (PRN):  benzocaine 20% Spray 1 Spray(s) Topical three times a day PRN sore throat  oxyCODONE    IR 2.5 milliGRAM(s) Oral every 6 hours PRN Severe Pain (7 - 10)  sodium chloride 0.9% lock flush 10 milliLiter(s) IV Push every 1 hour PRN Pre/post blood products, medications, blood draw, and to maintain line patency    I&O's Detail    2025 07:01  -  2025 07:00  --------------------------------------------------------  IN:    Fat Emulsion (Fish Oil &amp; Plant Based) 20% Infusion: 62.4 mL    Fat Emulsion (Fish Oil &amp; Plant Based) 20% Infusion: 10.4 mL    Oral Fluid: 780 mL    TPN (Total Parenteral Nutrition): 867 mL  Total IN: 1719.8 mL    OUT:    Indwelling Catheter - Urethral (mL): 1940 mL  Total OUT: 1940 mL    Total NET: -220.2 mL      2025 07:01  -  2025 10:32  --------------------------------------------------------  IN:    Oral Fluid: 300 mL  Total IN: 300 mL    OUT:    Indwelling Catheter - Urethral (mL): 200 mL  Total OUT: 200 mL    Total NET: 100 mL    POCT Blood Glucose.: 103 mg/dL (2025 05:34)  POCT Blood Glucose.: 122 mg/dL (2025 20:39)    Daily Weight in k.7 (10 Apr 2025 05:29)    Drug Dosing Weight  Height (cm): 172.7 (2025 11:35)  Weight (kg): 59 (2025 11:35)  BMI (kg/m2): 19.8 (2025 11:35)  BSA (m2): 1.7 (2025 11:35)    PHYSICAL EXAM   General: NAD, resting comfortably in bed   Cardiac: regular rate, warm and well perfused  Respiratory: Nonlabored respirations, normal cw expansion  Abdomen: soft, nontender, nondistended   Extremities: normal strength, FROM, no deformities  PICC Site: [place] [facility] clean and [place], placed 25    Diet: low fiber diet and TPN/lipids (started on 25)    LABORATORY                                               9.5    . )-----------( 157      ( 2025 05:41 )             28.6   04-14    142  |  109[H]  |  32[H]  ----------------------------<  98  3.6   |  24  |  0.48[L]    Ca    8.0[L]      2025 05:41  Phos  2.6     04-14  Mg     2.00     04-14    TPro  4.4[L]  /  Alb  2.1[L]  /  TBili  0.8  /  DBili  0.4[H]  /  AST  23  /  ALT  44[H]  /  AlkPhos  78  04-12    LIVER FUNCTIONS - ( 2025 05:21 )  Alb: 2.1 g/dL / Pro: 4.4 g/dL / ALK PHOS: 78 U/L / ALT: 44 U/L / AST: 23 U/L / GGT: x           04-02 Chol -- LDL -- HDL -- Trig 104    ASSESSMENT/PLAN:  89M with hx BPH (chronic indwelling Gill), HLD, B/L inguinal hernia repair with known left recurrence, colon cancer (T4N1 w/ two synchronous lesions) s/p extended right hemicolectomy w/ ileocolic anastomosis on  (not on chemo), recent presentation  for acute diastolic heart failure, now admitted with abdominal pain, found to have high grade SBO w/ RUQ TP at a spiculated soft tissue lesion concerning for peritoneal carcinomatosis. Nutrition support consult called for evaluation for TPN in view of prolonged NPO and severe malnutrition. Med-onc consult --> poor chemotherapy candidate, recommend palliative care involvement if patient fails nonoperative management. IR PICC placed and parenteral nutrition was started on 25. Pt is s/p intestinal bypass on 25.     continue TPN with infusion volume of 1L, TPN will provide 790 kcal/day    labs reviewed - electrolytes adjusted in TPN bag     monitor fingersticks, obtain daily weights    continue parenteral nutrition at this time, will follow up with primary team on plan, continue to monitor PO intake and tolerance to diet, plan to d/c TPN when pt is tolerating enough PO intake     1.  Severe protein calorie malnutrition being optimized with TPN: CHO [100] gm.  AA [55] gm. SMOF Lipids [25] gm.  2.  Hyperglycemia managed with: [0] units of regular insulin    3.  Check fluid balance daily.  Strict I/O  [ ] [ ]   4.  Daily BMP, Ionized Calcium, Magnesium and Phosphorous   5.  Triglycerides at initiation of TPN and monthly - Chol -- LDL -- HDL -- Trig 104    Nutrition Support [id number]

## 2025-04-14 NOTE — PROGRESS NOTE ADULT - SUBJECTIVE AND OBJECTIVE BOX
TEAM [ A ] Surgery Daily Progress Note  =====================================================    INTERVAL:  - NAEO    SUBJECTIVE: Patient seen and examined at bedside on AM rounds.     ALLERGIES:  aspirin (Unknown)  penicillin (Unknown)      --------------------------------------------------------------------------------------    MEDICATIONS:  acetaminophen   IVPB .. 1000 milliGRAM(s) IV Intermittent every 6 hours  benzocaine 20% Spray 1 Spray(s) Topical three times a day PRN  chlorhexidine 2% Cloths 1 Application(s) Topical daily  enoxaparin Injectable 40 milliGRAM(s) SubCutaneous every 24 hours  influenza  Vaccine (HIGH DOSE) 0.5 milliLiter(s) IntraMuscular once  lipid, fat emulsion (Fish Oil and Plant Based) 20% Infusion 10.4 mL/Hr IV Continuous <Continuous>  lipid, fat emulsion (Fish Oil and Plant Based) 20% Infusion 20.8 mL/Hr IV Continuous <Continuous>  oxyCODONE    IR 2.5 milliGRAM(s) Oral every 6 hours PRN  pantoprazole  Injectable 40 milliGRAM(s) IV Push daily  Parenteral Nutrition - Adult 1 Each TPN Continuous <Continuous>  sodium chloride 0.9% lock flush 10 milliLiter(s) IV Push every 1 hour PRN    --------------------------------------------------------------------------------------    VITAL SIGNS:  T(C): 36.6 (04-14-25 @ 01:30), Max: 37.1 (04-13-25 @ 13:17)  HR: 77 (04-14-25 @ 01:30) (75 - 90)  BP: 146/62 (04-14-25 @ 01:30) (113/48 - 146/62)  RR: 18 (04-14-25 @ 01:30) (17 - 19)  SpO2: 97% (04-14-25 @ 01:30) (97% - 100%)  --------------------------------------------------------------------------------------    INS AND OUTS:    04-12-25 @ 07:01  -  04-13-25 @ 07:00  --------------------------------------------------------  IN: 2187.2 mL / OUT: 1900 mL / NET: 287.2 mL    04-13-25 @ 07:01  -  04-14-25 @ 02:22  --------------------------------------------------------  IN: 1719.8 mL / OUT: 1740 mL / NET: -20.2 mL      --------------------------------------------------------------------------------------      EXAM    General: NAD, resting comfortably in bed  HEENT: Normocephalic atraumatic  Respiratory: Nonlabored respirations  Cardio: regular rate, normotensive  Abdomen: soft, nontender, nondistended, incisions c/d/i    --------------------------------------------------------------------------------------    LABS       TEAM [ A ] Surgery Daily Progress Note  =====================================================    INTERVAL:  - NAEO    SUBJECTIVE: Patient seen and examined at bedside on AM rounds. Pt endorses having an episode of nausea this AM which has since resolved. Pt still having bowel movements and passing gas, states that he has had diarrhea.     ALLERGIES:  aspirin (Unknown)  penicillin (Unknown)      --------------------------------------------------------------------------------------    MEDICATIONS:  acetaminophen   IVPB .. 1000 milliGRAM(s) IV Intermittent every 6 hours  benzocaine 20% Spray 1 Spray(s) Topical three times a day PRN  chlorhexidine 2% Cloths 1 Application(s) Topical daily  enoxaparin Injectable 40 milliGRAM(s) SubCutaneous every 24 hours  influenza  Vaccine (HIGH DOSE) 0.5 milliLiter(s) IntraMuscular once  lipid, fat emulsion (Fish Oil and Plant Based) 20% Infusion 10.4 mL/Hr IV Continuous <Continuous>  lipid, fat emulsion (Fish Oil and Plant Based) 20% Infusion 20.8 mL/Hr IV Continuous <Continuous>  oxyCODONE    IR 2.5 milliGRAM(s) Oral every 6 hours PRN  pantoprazole  Injectable 40 milliGRAM(s) IV Push daily  Parenteral Nutrition - Adult 1 Each TPN Continuous <Continuous>  sodium chloride 0.9% lock flush 10 milliLiter(s) IV Push every 1 hour PRN    --------------------------------------------------------------------------------------    VITAL SIGNS:  T(C): 36.6 (04-14-25 @ 01:30), Max: 37.1 (04-13-25 @ 13:17)  HR: 77 (04-14-25 @ 01:30) (75 - 90)  BP: 146/62 (04-14-25 @ 01:30) (113/48 - 146/62)  RR: 18 (04-14-25 @ 01:30) (17 - 19)  SpO2: 97% (04-14-25 @ 01:30) (97% - 100%)  --------------------------------------------------------------------------------------    INS AND OUTS:    04-12-25 @ 07:01  -  04-13-25 @ 07:00  --------------------------------------------------------  IN: 2187.2 mL / OUT: 1900 mL / NET: 287.2 mL    04-13-25 @ 07:01  -  04-14-25 @ 02:22  --------------------------------------------------------  IN: 1719.8 mL / OUT: 1740 mL / NET: -20.2 mL      --------------------------------------------------------------------------------------      EXAM    General: NAD, resting comfortably in bed  HEENT: Normocephalic atraumatic  Respiratory: Nonlabored respirations  Cardio: regular rate, normotensive  Abdomen: soft, nontender, nondistended, incisions c/d/i    --------------------------------------------------------------------------------------    LABS

## 2025-04-14 NOTE — PROGRESS NOTE ADULT - ASSESSMENT
A:  89M w/ PMH BPH (indwelling Bruner), HLD, B/L inguinal hernia repair (L recurrence), colon cancer (T4N1) s/p extended right hemicolectomy (12/23/24) admitted for high-grade SBO 2/2 suspected peritoneal carcinomatosis. Now s/p small bowel bypass on 4/8. Post op course c/b ileus, now with NGT removed and return of bowel function.     P:  - Diet: LRD  - Plan to wean TPN  - Med onc (Dr. Delvalle) following, unsure of treatment plans, will f/u after discharge and determine further treatment  - Chronic bruner in place, continue to monitor UOP (last exchange was on 3/4 with Kim Mercer)  - OOB ambulate with assistance  - OOB to chair daily  - PT eval --> rec ADALID  - VTE ppx: SQH  - PRN enemas for difficulty passing firm stool    A Team  [id number]      A:  89M w/ PMH BPH (indwelling Bruner), HLD, B/L inguinal hernia repair (L recurrence), colon cancer (T4N1) s/p extended right hemicolectomy (12/23/24) admitted for high-grade SBO 2/2 suspected peritoneal carcinomatosis. Now s/p small bowel bypass on 4/8. Post op course c/b ileus, now with NGT removed and return of bowel function.     P:  - Diet: LRD  - 1/2 TPN yesterday, fu TPN   - Med onc (Dr. Delvalle) following, unsure of treatment plans, will f/u after discharge and determine further treatment  - Chronic bruner in place, continue to monitor UOP (last exchange was on 3/4 with Kim Mercer)  - OOB ambulate with assistance  - OOB to chair daily  - PT eval --> rec ADALID  - VTE ppx: SQH  - PRN enemas for difficulty passing firm stool    A Team  [id number]      A:  89M w/ PMH BPH (indwelling Bruner), HLD, B/L inguinal hernia repair (L recurrence), colon cancer (T4N1) s/p extended right hemicolectomy (12/23/24) admitted for high-grade SBO 2/2 suspected peritoneal carcinomatosis. Now s/p small bowel bypass on 4/8. Post op course c/b ileus, now with NGT removed and return of bowel function.     P:  - Diet: LRD  - 1/2 TPN yesterday, likely remaining 1/2 due to pt nausea overnight   - Med onc (Dr. Delvalle) following, unsure of treatment plans, will f/u after discharge and determine further treatment  - Chronic bruner in place, continue to monitor UOP (last exchange was on 3/4 with Kim Mercer)  - OOB ambulate with assistance  - OOB to chair daily  - PT eval --> rec ADALID  - VTE ppx: SQH  - PRN enemas for difficulty passing firm stool    A Team  [id number]

## 2025-04-15 NOTE — PROGRESS NOTE ADULT - SUBJECTIVE AND OBJECTIVE BOX
TEAM [ A ] Surgery Daily Progress Note  =====================================================    SUBJECTIVE: Patient seen and examined at bedside on AM rounds. Patient resting comfortably in bed, states he is passing flatus and having BMs, denies N/V or any other complaints at this time.    ALLERGIES:  aspirin (Unknown)  penicillin (Unknown)  --------------------------------------------------------------------------------------    MEDICATIONS:  MEDICATIONS  (STANDING):  chlorhexidine 2% Cloths 1 Application(s) Topical daily  enoxaparin Injectable 40 milliGRAM(s) SubCutaneous every 24 hours  influenza  Vaccine (HIGH DOSE) 0.5 milliLiter(s) IntraMuscular once  pantoprazole  Injectable 40 milliGRAM(s) IV Push daily  Parenteral Nutrition - Adult 1 Each (42 mL/Hr) TPN Continuous <Continuous>  simethicone 80 milliGRAM(s) Chew three times a day  --------------------------------------------------------------------------------------    VITAL SIGNS:  T(C): 36.3 (04-15-25 @ 05:30), Max: 36.9 (04-14-25 @ 17:49)  HR: 87 (04-15-25 @ 05:30) (79 - 94)  BP: 131/56 (04-15-25 @ 05:30) (106/32 - 131/56)  BP(mean): --  ABP: --  ABP(mean): --  RR: 16 (04-15-25 @ 05:30) (15 - 18)  SpO2: 94% (04-15-25 @ 05:30) (94% - 100%)  Wt(kg): --  CVP(mm Hg): --  CI: --  CAPILLARY BLOOD GLUCOSE      POCT Blood Glucose.: 114 mg/dL (15 Apr 2025 07:00)   N/A      04-14 @ 07:01  -  04-15 @ 07:00  --------------------------------------------------------  IN:    Fat Emulsion (Fish Oil &amp; Plant Based) 20% Infusion: 52 mL    IV PiggyBack: 200 mL    Oral Fluid: 1060 mL    TPN (Total Parenteral Nutrition): 840 mL  Total IN: 2152 mL    OUT:    Indwelling Catheter - Urethral (mL): 1345 mL  Total OUT: 1345 mL    Total NET: 807 mL  --------------------------------------------------------------------------------------      EXAM    General: NAD, resting comfortably in bed  HEENT: Normocephalic atraumatic  Respiratory: Nonlabored respirations  Cardio: regular rate, normotensive  Abdomen: soft, nontender, nondistended, incisions c/d/i    --------------------------------------------------------------------------------------    LABS    CBC (04-15 @ 05:14)                              8.2[L]                         20.05[H]  )----------------(  195        --    % Neutrophils, --    % Lymphocytes, ANC: --                                  25.0[L]  CBC (04-14 @ 05:41)                              9.5[L]                         12.25[H]  )----------------(  157        --    % Neutrophils, --    % Lymphocytes, ANC: --                                  28.6[L]    BMP (04-15 @ 05:14)             144     |  108[H]  |  24[H] 		Ca++ 1.15    Ca 8.1[L]             ---------------------------------( 110[H]		Mg 2.00               3.8     |  25      |  0.47[L]			Ph 2.5     BMP (04-14 @ 05:41)             142     |  109[H]  |  32[H] 		Ca++ 1.12[L]  Ca 8.0[L]             ---------------------------------( 98    		Mg 2.00               3.6     |  24      |  0.48[L]			Ph 2.6       Urinalysis (04-15 @ 05:14):     Color:  / Appearance:  / SG:  / pH:  / Gluc: 110[H] / Ketones:  / Bili:  / Urobili:  / Protein : / Nitrites:  / Leuk.Est:  / RBC:  / WBC:  / Sq Epi:  / Non Sq Epi:  / Bacteria        Assessment and Plan:   · Assessment	  A:  89M w/ PMH BPH (indwelling Bruner), HLD, B/L inguinal hernia repair (L recurrence), colon cancer (T4N1) s/p extended right hemicolectomy (12/23/24) admitted for high-grade SBO 2/2 suspected peritoneal carcinomatosis. Now s/p small bowel bypass on 4/8. Post op course c/b ileus, now with NGT removed and return of bowel function.     P:  - Diet: LRD  - stop TPN  - rising WBC- will obtain CT scan today chest/abd/pelvis  - Med onc (Dr. Delvalle) following, unsure of treatment plans, will f/u after discharge and determine further treatment  - Chronic bruner in place, continue to monitor UOP (last exchange was on 3/4 with Kim Mercer)  - OOB ambulate with assistance  - OOB to chair daily  - PT eval --> rec ADALID  - VTE ppx: SQH  - PRN enemas for difficulty passing firm stool    A Team  [id number]

## 2025-04-15 NOTE — CHART NOTE - NSCHARTNOTEFT_GEN_A_CORE
Pt seen for nutrition consult- assessment, education, TPN.     SOURCE: [X] Medical record    Medical Course:  - Per chart, pt is 89 year old male PMH BPH, HLD, bilateral inguinal hernia repair with known left recurrence, colon cancer s/p extended right hemicolectomy with ileocolic anastomosis (12/23) presenting with abdominal pain found to have high grade SBO with RUQ TP at a spiculated soft tissue lesion concerning for peritoneal carcinomatosis. Course complicated by AMS, aggressive behavior. S/p small bowel bypass (4/8) .Nutrition Support and Colorectal Surgery following.     Diet Prescription:   - Low Fiber    Nutrition Course:  - Pt remains NPO with TPN: 1.8L infusing @ 75 mL/hr (110 gm amino acids, 200 gm dextrose, 50 gm SMOF lipids) to provide 1620 kcal/day. This meets 27 kcal/kg, 1.9 gm protein/kg @ dosing weight 59 kg.   - No flatus/BM. Continues on SBO protocol.   - Fingersticks WDL.     Food Allergy/Intolerance:  - NKFA    Pertinent Medications:   - pantoprazole Injectable, Parenteral Nutrition TPN Continuous, simethicone    Pertinent Labs:   - (4/15) Na 144 mmol/L Glu 110 mg/dL[H] K+ 3.8 mmol/L Cr 0.47 mg/dL[L] BUN 24 mg/dL[H] Phos 2.5 mg/dL   - (4/2) Trig 104 mg/dL  - POCT: (4/15) 114, (4/14) 103     Weight: (4/15) 128 lbs / 58.1 kg, (4/9) 134 lbs / 60.8 kg, (4/8 dosing) 130 lbs / 59 kg, (4/2) 118.3 lbs / 53.7 kg, (3/21 dosing) 130 lbs / 59 kg  Height: 68 in / 172.7 cm  IBW: 154 lbs / 70 kg +/-10%  BMI: 18.0 kg/m^2 (at lowest weight)    Physical Assessment, per flowsheets:  Edema/Pressure Injury: none noted     Estimated Needs:   [X] No changes since previous assessment, based on dosing weight 130 lbs / 59 kg  6627-7661 kcal daily @28-32 kcal/kg, 82.6-100.3 gm protein daily @1.4-1.7 gm/kg     Previous Nutrition Diagnosis: [X] Severe malnutrition in the context of acute illness or injury, ongoing [X] Unintended weight loss, ongoing   New Nutrition Diagnosis: [X] not applicable     Education:  [X] not applicable     Interventions:   1) TPN per Nutrition Support team.  2) Obtain weekly weights.     Monitor & Evaluate:  Diet progression, nutrition related lab values, weight trends, BMs/GI distress, hydration status, skin integrity.    Shasha Spencer RDN, CDN #[id number]  Also available on [employer] Teams. Pt seen for nutrition consult- assessment, education, TPN.     SOURCE: [X] Medical record [X] Patient (limited historian)    Medical Course:  - Per chart, pt is 89 year old male PMH BPH, HLD, bilateral inguinal hernia repair with known left recurrence, colon cancer s/p extended right hemicolectomy with ileocolic anastomosis (12/23) presenting with abdominal pain found to have high grade SBO with RUQ TP at a spiculated soft tissue lesion concerning for peritoneal carcinomatosis. Course complicated by AMS, aggressive behavior. S/p small bowel bypass (4/8). Nutrition Support and Colorectal Surgery following.     Diet Prescription:   - Low Fiber    Nutrition Course:  - Pt was advanced to clear liquids (4/12-4/13) and now continues on a low fiber diet. Pt reports good appetite/PO intake, flowsheets note fair PO intake. Plan to discontinue TPN today.   - Chart notes +flatus/+BM.     Food Allergy/Intolerance:  - NKFA    Pertinent Medications:   - pantoprazole Injectable, Parenteral Nutrition TPN Continuous, simethicone    Pertinent Labs:   - (4/15) Na 144 mmol/L Glu 110 mg/dL[H] K+ 3.8 mmol/L Cr 0.47 mg/dL[L] BUN 24 mg/dL[H] Phos 2.5 mg/dL   - (4/2) Trig 104 mg/dL  - POCT: (4/15) 114, (4/14) 103     Weight: (4/15) 128 lbs / 58.1 kg, (4/9) 134 lbs / 60.8 kg, (4/8 dosing) 130 lbs / 59 kg, (4/2) 118.3 lbs / 53.7 kg, (3/21 dosing) 130 lbs / 59 kg  Height: 68 in / 172.7 cm  IBW: 154 lbs / 70 kg +/-10%  BMI: 18.0 kg/m^2 (at lowest weight)    Physical Assessment, per flowsheets:  Edema/Pressure Injury: none noted     Estimated Needs:   [X] No changes since previous assessment, based on dosing weight 130 lbs / 59 kg  9367-4427 kcal daily @28-32 kcal/kg, 82.6-100.3 gm protein daily @1.4-1.7 gm/kg     Previous Nutrition Diagnosis: [X] Severe malnutrition in the context of acute illness or injury, ongoing [X] Unintended weight loss, ongoing   New Nutrition Diagnosis: [X] not applicable     Education:  [X] Pt with poor comprehension, provided written low fiber nutrition therapy handout however unable to review in depth. Pt made aware of current diet order. Encouraged PO intake as tolerated.     Interventions:   1) Recommend continue low fiber diet.  2) May consider addition of Ensure Plus High Protein 1 PO daily (provides 350 kcal, 20 gm protein per 8 oz serving).   3) Obtain weekly weights.     Monitor & Evaluate:  PO intake, tolerance to diet, nutrition related lab values, weight trends, BMs/GI distress, hydration status, skin integrity.    Shasha Spencer RDN, CDN #[id number]  Also available on [employer] Teams.

## 2025-04-15 NOTE — PROGRESS NOTE ADULT - SUBJECTIVE AND OBJECTIVE BOX
Patient is a 89y old  Male who presents with a chief complaint of High grade mSBO (15 Apr 2025 09:16)      DATE OF SERVICE: 04-15-25 @ 10:04    SUBJECTIVE / OVERNIGHT EVENTS: overnight events noted    ROS:  not available     MEDICATIONS  (STANDING):  chlorhexidine 2% Cloths 1 Application(s) Topical daily  enoxaparin Injectable 40 milliGRAM(s) SubCutaneous every 24 hours  influenza  Vaccine (HIGH DOSE) 0.5 milliLiter(s) IntraMuscular once  pantoprazole  Injectable 40 milliGRAM(s) IV Push daily  Parenteral Nutrition - Adult 1 Each (42 mL/Hr) TPN Continuous <Continuous>  simethicone 80 milliGRAM(s) Chew three times a day    MEDICATIONS  (PRN):  benzocaine 20% Spray 1 Spray(s) Topical three times a day PRN sore throat  oxyCODONE    IR 2.5 milliGRAM(s) Oral every 6 hours PRN Severe Pain (7 - 10)  sodium chloride 0.9% lock flush 10 milliLiter(s) IV Push every 1 hour PRN Pre/post blood products, medications, blood draw, and to maintain line patency        CAPILLARY BLOOD GLUCOSE      POCT Blood Glucose.: 114 mg/dL (15 Apr 2025 07:00)    I&O's Summary    14 Apr 2025 07:01  -  15 Apr 2025 07:00  --------------------------------------------------------  IN: 2152 mL / OUT: 1345 mL / NET: 807 mL        Vital Signs Last 24 Hrs  T(C): 36.3 (15 Apr 2025 05:30), Max: 36.9 (14 Apr 2025 17:49)  T(F): 97.3 (15 Apr 2025 05:30), Max: 98.5 (14 Apr 2025 17:49)  HR: 87 (15 Apr 2025 05:30) (79 - 94)  BP: 131/56 (15 Apr 2025 05:30) (106/32 - 131/56)  BP(mean): --  RR: 16 (15 Apr 2025 05:30) (15 - 18)  SpO2: 94% (15 Apr 2025 05:30) (94% - 100%)    PHYSICAL EXAM:  CHEST/LUNG: Clear   HEART: S1S2; no murmurs  ABDOMEN: Soft, Nontender;  EXTREMITIES: no edema  NEUROLOGY: Alert, nonfocal     LABS:                        8.2    20.05 )-----------( 195      ( 15 Apr 2025 05:14 )             25.0     04-15    144  |  108[H]  |  24[H]  ----------------------------<  110[H]  3.8   |  25  |  0.47[L]    Ca    8.1[L]      15 Apr 2025 05:14  Phos  2.5     04-15  Mg     2.00     04-15            Urinalysis Basic - ( 15 Apr 2025 05:14 )    Color: x / Appearance: x / SG: x / pH: x  Gluc: 110 mg/dL / Ketone: x  / Bili: x / Urobili: x   Blood: x / Protein: x / Nitrite: x   Leuk Esterase: x / RBC: x / WBC x   Sq Epi: x / Non Sq Epi: x / Bacteria: x          All consultant(s) notes reviewed and care discussed with other providers        Contact Number, Dr Redd [phone]

## 2025-04-15 NOTE — PROGRESS NOTE ADULT - SUBJECTIVE AND OBJECTIVE BOX
NUTRITION NOTE  [id number] STOMBER  ===============================    Interval events - Patient was seen and examined at bedside, no acute events overnight. Patient denies chest pain, shortness of breath, or vomiting at this time. IR PICC placed and TPN was started on 25 for nutritional support. Pt is tolerating TPN without any issues. Pt tolerated liquids and is now advanced to a low fiber diet. Pt is tolerating PO intake without any nausea or vomiting with PO intake. Plan to d/c TPN today. Rehab planning in process.     ROS: Except as noted above, all other systems reviewed and are negative     Allergies  aspirin (Unknown)  penicillin (Unknown)    PAST MEDICAL & SURGICAL HISTORY:  BPH (benign prostatic hyperplasia)  Peripheral neuropathy  Heart failure, diastolic, chronic  Colon cancer  Bilateral inguinal hernia  Recurrent inguinal hernia  GERD (gastroesophageal reflux disease)  H/O hemicolectomy  H/O bilateral inguinal hernia repair    Vital Signs Last 24 Hrs  T(C): 36.3 (15 Apr 2025 05:30), Max: 36.9 (2025 17:49)  T(F): 97.3 (15 Apr 2025 05:30), Max: 98.5 (2025 17:49)  HR: 87 (15 Apr 2025 05:30) (79 - 94)  BP: 131/56 (15 Apr 2025 05:30) (106/32 - 143/50)  RR: 16 (15 Apr 2025 05:30) (15 - 18)  SpO2: 94% (15 Apr 2025 05:30) (94% - 100%)    MEDICATIONS  (STANDING):  chlorhexidine 2% Cloths 1 Application(s) Topical daily  enoxaparin Injectable 40 milliGRAM(s) SubCutaneous every 24 hours  influenza  Vaccine (HIGH DOSE) 0.5 milliLiter(s) IntraMuscular once  pantoprazole  Injectable 40 milliGRAM(s) IV Push daily  Parenteral Nutrition - Adult 1 Each (42 mL/Hr) TPN Continuous <Continuous>  simethicone 80 milliGRAM(s) Chew three times a day    MEDICATIONS  (PRN):  benzocaine 20% Spray 1 Spray(s) Topical three times a day PRN sore throat  oxyCODONE    IR 2.5 milliGRAM(s) Oral every 6 hours PRN Severe Pain (7 - 10)  sodium chloride 0.9% lock flush 10 milliLiter(s) IV Push every 1 hour PRN Pre/post blood products, medications, blood draw, and to maintain line patency    I&O's Detail    2025 07:01  -  15 Apr 2025 07:00  --------------------------------------------------------  IN:    Fat Emulsion (Fish Oil &amp; Plant Based) 20% Infusion: 52 mL    IV PiggyBack: 200 mL    Oral Fluid: 1060 mL    TPN (Total Parenteral Nutrition): 840 mL  Total IN: 2152 mL    OUT:    Indwelling Catheter - Urethral (mL): 1345 mL  Total OUT: 1345 mL    Total NET: 807 mL    POCT Blood Glucose.: 114 mg/dL (15 Apr 2025 07:00)    Daily Weight in k.1 (15 Apr 2025 05:30)    Drug Dosing Weight  Height (cm): 172.7 (2025 11:35)  Weight (kg): 59 (2025 11:35)  BMI (kg/m2): 19.8 (2025 11:35)  BSA (m2): 1.7 (2025 11:35)    PHYSICAL EXAM   General: NAD, resting comfortably in bed   Cardiac: regular rate, warm and well perfused  Respiratory: Nonlabored respirations, normal cw expansion  Abdomen: soft, nontender, nondistended   Extremities: normal strength, FROM, no deformities  PICC Site: double lumen PICC in [facility] [place], site clean and dry, placed 25    Diet: low fiber diet and TPN/lipids (started on 25, d/c on 4/15/25)    LABORATORY                           8.2    20.05 )-----------( 195      ( 15 Apr 2025 05:14 )             25.0   04-15    144  |  108[H]  |  24[H]  ----------------------------<  110[H]  3.8   |  25  |  0.47[L]    Ca    8.1[L]      15 Apr 2025 05:14  Phos  2.5     04-15  Mg     2.00     04-15    TPro  4.4[L]  /  Alb  2.1[L]  /  TBili  0.8  /  DBili  0.4[H]  /  AST  23  /  ALT  44[H]  /  AlkPhos  78  04-12    LIVER FUNCTIONS - ( 2025 05:21 )  Alb: 2.1 g/dL / Pro: 4.4 g/dL / ALK PHOS: 78 U/L / ALT: 44 U/L / AST: 23 U/L / GGT: x           04-02 Chol -- LDL -- HDL -- Trig 104    ASSESSMENT/PLAN:  89M with hx BPH (chronic indwelling Gill), HLD, B/L inguinal hernia repair with known left recurrence, colon cancer (T4N1 w/ two synchronous lesions) s/p extended right hemicolectomy w/ ileocolic anastomosis on  (not on chemo), recent presentation  for acute diastolic heart failure, now admitted with abdominal pain, found to have high grade SBO w/ RUQ TP at a spiculated soft tissue lesion concerning for peritoneal carcinomatosis. Nutrition support consult called for evaluation for TPN in view of prolonged NPO and severe malnutrition. Med-onc consult --> poor chemotherapy candidate, recommend palliative care involvement if patient fails nonoperative management. IR PICC placed and parenteral nutrition was started on 25. Pt is s/p intestinal bypass on 25.     plan to d/c parenteral nutrition today; remove PICC    continue to monitor PO intake and tolerance to diet    rehab planning in process    Nutrition Support [id number]

## 2025-04-15 NOTE — PROGRESS NOTE ADULT - NS ATTEND AMEND GEN_ALL_CORE FT
I agree with the above history, physical examination, chief complaint/diagnosis, and plan, which I have reviewed and edited where appropriate.  I agree with notes/assessment and detailed interval history of health care providers on my service.  I have seen and examined the patient.  I reviewed the laboratory and available data and agree with the history, physical assessment and plan.  I reviewed and discussed with all consultants, house staff and PA's.  The Nutrition Support Team (NST) discusses on an ongoing basis with the primary team and all consultants, House staff and PA's to have a permanent risk benefit analyses on all decisions and coordinating care.  I was physically present for the key portions of the evaluation and management (E/M) service provided.  89M hx BPH s/p extended right hemicolectomy w/ ileocolic anastomosis with high grade SBO w/ RUQ TP at a spiculated soft tissue lesion concerning for peritoneal carcinomatosis with severe calorie protein malnutrition and prolonged NPO.  PHYSICAL EXAM  General NGT in place  Chest: clear  CV: RR  Abdomen: Soft, nontender, slightly distended today  Extremities: warm  : Gill  labs reviewed - electrolytes adjusted   TPN:  1800mL/1620 kcal/day,  monitor fingersticks, obtain daily weights  Severe protein calorie malnutrition being optimized with TPN
I agree with the above history, physical examination, chief complaint/diagnosis, and plan, which I have reviewed and edited where appropriate.  I agree with notes/assessment and detailed interval history of health care providers on my service.  I have seen and examined the patient.  I reviewed the laboratory and available data and agree with the history, physical assessment and plan.  I reviewed and discussed with all consultants, house staff and PA's.  The Nutrition Support Team (NST) discusses on an ongoing basis with the primary team and all consultants, House staff and PA's to have a permanent risk benefit analyses on all decisions and coordinating care.  I was physically present for the key portions of the evaluation and management (E/M) service provided.  89M hx BPH s/p extended right hemicolectomy w/ ileocolic anastomosis with high grade SBO w/ RUQ TP at a spiculated soft tissue lesion concerning for peritoneal carcinomatosis with severe calorie protein malnutrition and prolonged NPO.  PHYSICAL EXAM  General NGT in place  Chest: clear  CV: RR  Abdomen: Soft, nontender, slightly distended today  Extremities: warm  : Gill  labs reviewed - electrolytes adjusted   TPN:  1800mL/1620 kcal/day,  monitor fingersticks, obtain daily weights  Severe protein calorie malnutrition being optimized with TPN
I agree with the above history, physical examination, chief complaint/diagnosis, and plan, which I have reviewed and edited where appropriate.  I agree with notes/assessment and detailed interval history of health care providers on my service.  I have seen and examined the patient.  I reviewed the laboratory and available data and agree with the history, physical assessment and plan.  I reviewed and discussed with all consultants, house staff and PA's.  The Nutrition Support Team (NST) discusses on an ongoing basis with the primary team and all consultants, House staff and PA's to have a permanent risk benefit analyses on all decisions and coordinating care.  I was physically present for the key portions of the evaluation and management (E/M) service provided.  89M hx BPH s/p extended right hemicolectomy w/ ileocolic anastomosis with high grade SBO w/ RUQ TP at a spiculated soft tissue lesion concerning for peritoneal carcinomatosis with severe calorie protein malnutrition and prolonged NPO.  PHYSICAL EXAM  General NGT in place  Chest: clear  CV: RR  Abdomen: Soft, nontender, slightly distended today  Extremities: warm  : Gill  labs reviewed - electrolytes adjusted   TPN:  1.5L/1480 kcal/day  monitor fingersticks, obtain daily weights  Severe protein calorie malnutrition being optimized with TPN
I agree with the above history, physical examination, chief complaint/diagnosis, and plan, which I have reviewed and edited where appropriate.  I agree with notes/assessment and detailed interval history of health care providers on my service.  I have seen and examined the patient.  I reviewed the laboratory and available data and agree with the history, physical assessment and plan.  I reviewed and discussed with all consultants, house staff and PA's.  The Nutrition Support Team (NST) discusses on an ongoing basis with the primary team and all consultants, House staff and PA's to have a permanent risk benefit analyses on all decisions and coordinating care.  I was physically present for the key portions of the evaluation and management (E/M) service provided.  89M hx BPH s/p extended right hemicolectomy w/ ileocolic anastomosis with high grade SBO w/ RUQ TP at a spiculated soft tissue lesion concerning for peritoneal carcinomatosis with severe calorie protein malnutrition and prolonged NPO.  PHYSICAL EXAM  General NGT in place  Chest: clear  CV: RR  Abdomen: Soft, nontender, slightly distended today  Extremities: warm  : Gill  labs reviewed - electrolytes adjusted   TPN:  1800mL/1620 kcal/day,  monitor fingersticks, obtain daily weights  Severe protein calorie malnutrition being optimized with TPN
No acute events  Malignant SBO that failed conservative management  For OR today
I agree with the above history, physical examination, chief complaint/diagnosis, and plan, which I have reviewed and edited where appropriate.  I agree with notes/assessment and detailed interval history of health care providers on my service.  I have seen and examined the patient.  I reviewed the laboratory and available data and agree with the history, physical assessment and plan.  I reviewed and discussed with all consultants, house staff and PA's.  The Nutrition Support Team (NST) discusses on an ongoing basis with the primary team and all consultants, House staff and PA's to have a permanent risk benefit analyses on all decisions and coordinating care.  I was physically present for the key portions of the evaluation and management (E/M) service provided.  89M hx BPH s/p extended right hemicolectomy w/ ileocolic anastomosis with high grade SBO w/ RUQ TP at a spiculated soft tissue lesion concerning for peritoneal carcinomatosis with severe calorie protein malnutrition and prolonged NPO.  PHYSICAL EXAM  General NGT in place  Chest: clear  CV: RR  Abdomen: Soft, nontender, slightly distended today  Extremities: warm  : Gill  Labs reviewed  Monitor PO intake   DC parenteral nutrition-PICC
I agree with the above history, physical examination, chief complaint/diagnosis, and plan, which I have reviewed and edited where appropriate.  I agree with notes/assessment and detailed interval history of health care providers on my service.  I have seen and examined the patient.  I reviewed the laboratory and available data and agree with the history, physical assessment and plan.  I reviewed and discussed with all consultants, house staff and PA's.  The Nutrition Support Team (NST) discusses on an ongoing basis with the primary team and all consultants, House staff and PA's to have a permanent risk benefit analyses on all decisions and coordinating care.  I was physically present for the key portions of the evaluation and management (E/M) service provided.  89M hx BPH s/p extended right hemicolectomy w/ ileocolic anastomosis with high grade SBO w/ RUQ TP at a spiculated soft tissue lesion concerning for peritoneal carcinomatosis with severe calorie protein malnutrition and prolonged NPO.  PHYSICAL EXAM  General NGT in place  Chest: clear  CV: RR  Abdomen: Soft, nontender, slightly distended today  Extremities: warm  : Gill  TPN:  1L/625 kcal/day
I agree with the above history, physical examination, chief complaint/diagnosis, and plan, which I have reviewed and edited where appropriate.  I agree with notes/assessment and detailed interval history of health care providers on my service.  I have seen and examined the patient.  I reviewed the laboratory and available data and agree with the history, physical assessment and plan.  I reviewed and discussed with all consultants, house staff and PA's.  The Nutrition Support Team (NST) discusses on an ongoing basis with the primary team and all consultants, House staff and PA's to have a permanent risk benefit analyses on all decisions and coordinating care.  I was physically present for the key portions of the evaluation and management (E/M) service provided.  89M hx BPH s/p extended right hemicolectomy w/ ileocolic anastomosis with high grade SBO w/ RUQ TP at a spiculated soft tissue lesion concerning for peritoneal carcinomatosis with severe calorie protein malnutrition and prolonged NPO.  PHYSICAL EXAM  General NGT in place  Chest: clear  CV: RR  Abdomen: Soft, nontender, slightly distended today  Extremities: warm  : Gill  labs reviewed - electrolytes adjusted   TPN:  1.5L/1290 kcal/day  monitor fingersticks, obtain daily weights  Severe protein calorie malnutrition being optimized with TPN
I agree with the above history, physical examination, chief complaint/diagnosis, and plan, which I have reviewed and edited where appropriate.  I agree with notes/assessment and detailed interval history of health care providers on my service.  I have seen and examined the patient.  I reviewed the laboratory and available data and agree with the history, physical assessment and plan.  I reviewed and discussed with all consultants, house staff and PA's.  The Nutrition Support Team (NST) discusses on an ongoing basis with the primary team and all consultants, House staff and PA's to have a permanent risk benefit analyses on all decisions and coordinating care.  I was physically present for the key portions of the evaluation and management (E/M) service provided.  89M hx BPH s/p extended right hemicolectomy w/ ileocolic anastomosis with high grade SBO w/ RUQ TP at a spiculated soft tissue lesion concerning for peritoneal carcinomatosis with severe calorie protein malnutrition and prolonged NPO.  PHYSICAL EXAM  General NGT in place  Chest: clear  CV: RR  Abdomen: Soft, nontender, slightly distended today  Extremities: warm  : Gill  labs reviewed - electrolytes adjusted   TPN:  1800mL/1620 kcal/day,  monitor fingersticks, obtain daily weights  Severe protein calorie malnutrition being optimized with TPN
POD 1 small bowel bypass for obstruction due to unresectable local cancer recurrence  Recovering as expected  Abd soft, nondistended, NGT output minimal but feculent  Continue w NPO/TPN and NGT decompression until GI function  Chronic bruner catheter
I agree with the above history, physical examination, chief complaint/diagnosis, and plan, which I have reviewed and edited where appropriate.  I agree with notes/assessment and detailed interval history of health care providers on my service.  I have seen and examined the patient.  I reviewed the laboratory and available data and agree with the history, physical assessment and plan.  I reviewed and discussed with all consultants, house staff and PA's.  The Nutrition Support Team (NST) discusses on an ongoing basis with the primary team and all consultants, House staff and PA's to have a permanent risk benefit analyses on all decisions and coordinating care.  I was physically present for the key portions of the evaluation and management (E/M) service provided.  89M hx BPH s/p extended right hemicolectomy w/ ileocolic anastomosis with high grade SBO w/ RUQ TP at a spiculated soft tissue lesion concerning for peritoneal carcinomatosis with severe calorie protein malnutrition and prolonged NPO.  PHYSICAL EXAM  General NGT in place  Chest: clear  CV: RR  Abdomen: Soft, nontender, slightly distended today  Extremities: warm  : Gill  labs reviewed - electrolytes adjusted   TPN:  1800mL/1620 kcal/day,  monitor fingersticks, obtain daily weights  Severe protein calorie malnutrition being optimized with TPN
I agree with the above history, physical examination, chief complaint/diagnosis, and plan, which I have reviewed and edited where appropriate.  I agree with notes/assessment and detailed interval history of health care providers on my service.  I have seen and examined the patient.  I reviewed the laboratory and available data and agree with the history, physical assessment and plan.  I reviewed and discussed with all consultants, house staff and PA's.  The Nutrition Support Team (NST) discusses on an ongoing basis with the primary team and all consultants, House staff and PA's to have a permanent risk benefit analyses on all decisions and coordinating care.  I was physically present for the key portions of the evaluation and management (E/M) service provided.  89M hx BPH s/p extended right hemicolectomy w/ ileocolic anastomosis with high grade SBO w/ RUQ TP at a spiculated soft tissue lesion concerning for peritoneal carcinomatosis with severe calorie protein malnutrition and prolonged NPO.  PHYSICAL EXAM  General NGT in place  Chest: clear  CV: RR  Abdomen: Soft, nontender, slightly distended today  Extremities: warm  : Gill  labs reviewed - electrolytes adjusted   TPN:  1Lt/790 kcal/day,  monitor fingersticks, obtain daily weights  Severe protein calorie malnutrition being optimized with TPN

## 2025-04-15 NOTE — PROGRESS NOTE ADULT - NS ATTEND OPT1 GEN_ALL_CORE
I independently performed the documented:
I independently performed the documented:
I attest my time as attending is greater than 50% of the total combined time spent on qualifying patient care activities by the PA/NP and attending.
I independently performed the documented:
I attest my time as attending is greater than 50% of the total combined time spent on qualifying patient care activities by the PA/NP and attending.
I independently performed the documented:

## 2025-04-16 NOTE — PROGRESS NOTE ADULT - SUBJECTIVE AND OBJECTIVE BOX
Patient is a 89y old  Male who presents with a chief complaint of High grade mSBO (15 Apr 2025 10:44)      DATE OF SERVICE: 04-16-25 @ 10:49    SUBJECTIVE / OVERNIGHT EVENTS: overnight events noted    ROS:  not available       MEDICATIONS  (STANDING):  calcium gluconate IVPB 1 Gram(s) IV Intermittent once  chlorhexidine 2% Cloths 1 Application(s) Topical daily  ciprofloxacin   IVPB 400 milliGRAM(s) IV Intermittent every 12 hours  ciprofloxacin   IVPB      enoxaparin Injectable 40 milliGRAM(s) SubCutaneous every 24 hours  influenza  Vaccine (HIGH DOSE) 0.5 milliLiter(s) IntraMuscular once  metroNIDAZOLE  IVPB      metroNIDAZOLE  IVPB 500 milliGRAM(s) IV Intermittent every 8 hours  pantoprazole  Injectable 40 milliGRAM(s) IV Push daily  simethicone 80 milliGRAM(s) Chew three times a day  sodium chloride 0.45% with potassium chloride 20 mEq/L 1000 milliLiter(s) (100 mL/Hr) IV Continuous <Continuous>    MEDICATIONS  (PRN):  benzocaine 20% Spray 1 Spray(s) Topical three times a day PRN sore throat  oxyCODONE    IR 2.5 milliGRAM(s) Oral every 6 hours PRN Severe Pain (7 - 10)  sodium chloride 0.9% lock flush 10 milliLiter(s) IV Push every 1 hour PRN Pre/post blood products, medications, blood draw, and to maintain line patency        CAPILLARY BLOOD GLUCOSE      POCT Blood Glucose.: 93 mg/dL (16 Apr 2025 06:18)  POCT Blood Glucose.: 192 mg/dL (15 Apr 2025 16:52)    I&O's Summary    15 Apr 2025 07:01  -  16 Apr 2025 07:00  --------------------------------------------------------  IN: 1092 mL / OUT: 1970 mL / NET: -878 mL    16 Apr 2025 07:01  -  16 Apr 2025 10:49  --------------------------------------------------------  IN: 0 mL / OUT: 100 mL / NET: -100 mL        Vital Signs Last 24 Hrs  T(C): 36.9 (16 Apr 2025 08:25), Max: 36.9 (16 Apr 2025 08:25)  T(F): 98.4 (16 Apr 2025 08:25), Max: 98.4 (16 Apr 2025 08:25)  HR: 89 (16 Apr 2025 08:25) (70 - 101)  BP: 121/55 (16 Apr 2025 08:25) (118/50 - 134/55)  BP(mean): --  RR: 16 (16 Apr 2025 08:25) (16 - 17)  SpO2: 95% (16 Apr 2025 08:25) (95% - 98%)    PHYSICAL EXAM:  NECK: Supple  CHEST/LUNG: decreased breath sounds bases   HEART: S1 S2; no murmurs   ABDOMEN: Soft, tenderness epigastric area  EXTREMITIES:  no edema  NEUROLOGY: Alert non-focal  SKIN: No rashes or lesions    LABS:                        7.8    23.73 )-----------( 259      ( 16 Apr 2025 03:45 )             23.0     04-16    140  |  106  |  22  ----------------------------<  82  3.9   |  25  |  0.57    Ca    8.2[L]      16 Apr 2025 03:45  Phos  2.7     04-16  Mg     2.00     04-16    TPro  4.8[L]  /  Alb  2.2[L]  /  TBili  0.7  /  DBili  0.3  /  AST  23  /  ALT  26  /  AlkPhos  105  04-15          Urinalysis Basic - ( 16 Apr 2025 03:45 )    Color: x / Appearance: x / SG: x / pH: x  Gluc: 82 mg/dL / Ketone: x  / Bili: x / Urobili: x   Blood: x / Protein: x / Nitrite: x   Leuk Esterase: x / RBC: x / WBC x   Sq Epi: x / Non Sq Epi: x / Bacteria: x          All consultant(s) notes reviewed and care discussed with other providers        Contact Number, Dr Redd [phone]

## 2025-04-16 NOTE — PROGRESS NOTE ADULT - SUBJECTIVE AND OBJECTIVE BOX
CC: Leukocytosis    Saw/spoke to patient. Called back to see patient for concern cholecystitis. Patient awake, but appears fatigued.    Allergies  aspirin (Unknown)  penicillin (Unknown)    ANTIMICROBIALS:  ciprofloxacin   IVPB 400 every 12 hours  ciprofloxacin   IVPB    metroNIDAZOLE  IVPB 500 every 8 hours  metroNIDAZOLE  IVPB      PE:    Vital Signs Last 24 Hrs  T(C): 36.6 (16 Apr 2025 13:39), Max: 36.9 (16 Apr 2025 08:25)  T(F): 97.9 (16 Apr 2025 13:39), Max: 98.4 (16 Apr 2025 08:25)  HR: 82 (16 Apr 2025 13:39) (70 - 101)  BP: 115/46 (16 Apr 2025 13:39) (115/46 - 134/55)  RR: 17 (16 Apr 2025 13:39) (16 - 17)  SpO2: 96% (16 Apr 2025 13:39) (95% - 97%)    Gen: AOx1-2  CV: Nontachycardic  Resp: Breathing comfortably, NGT  Abd: Soft, nontender  IV/Skin: No thrombophlebitis    LABS:                        7.8    23.73 )-----------( 259      ( 16 Apr 2025 03:45 )             23.0     04-16    140  |  106  |  22  ----------------------------<  82  3.9   |  25  |  0.57    Ca    8.2[L]      16 Apr 2025 03:45  Phos  2.7     04-16  Mg     2.00     04-16    TPro  4.8[L]  /  Alb  2.2[L]  /  TBili  0.7  /  DBili  0.3  /  AST  23  /  ALT  26  /  AlkPhos  105  04-15    Urinalysis Basic - ( 16 Apr 2025 03:45 )    Color: x / Appearance: x / SG: x / pH: x  Gluc: 82 mg/dL / Ketone: x  / Bili: x / Urobili: x   Blood: x / Protein: x / Nitrite: x   Leuk Esterase: x / RBC: x / WBC x   Sq Epi: x / Non Sq Epi: x / Bacteria: x    MICROBIOLOGY:    Catheterized  03-21-25   >100,000 CFU/ml Enterococcus faecalis  --  Enterococcus faecalis    RADIOLOGY:    4/15 CT    IMPRESSION:  *  Distended gallbladder with pericholecystic fluid, suspicious for acute   cholecystitis. Portions of the gallbladder wall are not well-visualized;   perforation cannot be excluded. Enhancement of the common bile duct,   possibly cholangitis.  *  Hyperenhancement of the rectum with focal area of decreased   enhancement in the right anterior wall, possibly perforation, although no   extraluminal air is identified.  *  Small bowel bypass in the right hemipelvis due to spiculated mass   adjacent to the bypassed small bowel. Dilated loops of small proximal to   the bypass and distal to the bypass with gradual transitions to normal   caliber bowel, probably low-grade/partial small bowel obstruction.  *  Patchy opacities in the right lower lobe, predominantly seen in the   superior segment, possibly infection.  *  Bladder wall thickening. Correlate with urinalysis for acute cystitis.  *  Increased size of small amount of fluid in the right inguinal canal,   measuring 3.3 x 2.4 cm, previously 2.4 x 2.2 cm with new droplet of air,   of uncertain etiology.

## 2025-04-16 NOTE — CHART NOTE - NSCHARTNOTEFT_GEN_A_CORE
Primary team notified of patient's agitated behavior since having an NG tube placed. When seen at the bedside, the patient reports wanting to leave the hospital. The patient is agitated but redirectable was reassured that we share a goal to have the patient return home once it is medically appropriate.     After a second event of agitated behavior, the decision was made to place the patient on enhanced supervision. This has been successful in alleviating his agitation earlier in his hospital stay and will be used prior to anxiolytic/antipsychotic medications. If enhanced supervision is ineffective, will consider psych recommendations from earlier in his hospitalization (Haldol 0.5 PRN)    A team  [id number]

## 2025-04-16 NOTE — CHART NOTE - NSCHARTNOTEFT_GEN_A_CORE
Primary team called to bedside several times during the day due to patient's agitation and frustration. Additionally, the patient expressed a desire to make himself DNR/DNI but given his agitation, the decision was made to defer this conversation until he is more calm and consistent in his beliefs.     When at the bedside, the patient expresses most of his frustration to be about the nasogastric tube, which he wants to be removed. An extensive discussion took place regarding the risks of nasogastric tube removal, which include but are not limited to aspiration and death. The patient demonstrated an understanding of these risks and still wishes to proceed with NG tube removal.     Interventions   - NG tube removed   - Continue NPO/IVFs overnight   - Will discuss goals of care in the morning if the patient demonstrates consistent wishes to become DNR/DNI    A team surgery  [id number] Primary team called to bedside several times during the day due to patient's agitation and frustration. Additionally, the patient expressed a desire to make himself DNR/DNI but given his agitation, the decision was made to defer this conversation until he is more calm and consistent in his beliefs.     When at the bedside, the patient expresses most of his frustration to be about the nasogastric tube, which he wants to be removed. An extensive discussion took place regarding the risks of nasogastric tube removal, which include but are not limited to aspiration and death. The patient demonstrated an understanding of these risks and still wishes to proceed with NG tube removal.     Interventions   - NG tube removed   - Continue NPO/IVFs overnight   - Will discuss goals of care in the morning and revisit patient's code status. Patient agreeable to this plan   - Palliative reconsulted on 4/16, appreciate recommendations     A team surgery  [id number]

## 2025-04-16 NOTE — PROGRESS NOTE ADULT - SUBJECTIVE AND OBJECTIVE BOX
TEAM [ A ] Surgery Daily Progress Note  =====================================================    SUBJECTIVE: Patient seen and examined at bedside on AM rounds. Patient resting comfortably in bed, Patient uncomfortable with NG tube and not happy with it. Denies any other complaints at this time    ALLERGIES:  aspirin (Unknown)  penicillin (Unknown)  --------------------------------------------------------------------------------------  VITAL SIGNS:  T(C): 36.9 (04-16-25 @ 08:25), Max: 36.9 (04-16-25 @ 08:25)  HR: 89 (04-16-25 @ 08:25) (70 - 101)  BP: 121/55 (04-16-25 @ 08:25) (118/50 - 134/55)  BP(mean): --  ABP: --  ABP(mean): --  RR: 16 (04-16-25 @ 08:25) (16 - 17)  SpO2: 95% (04-16-25 @ 08:25) (95% - 98%)  Wt(kg): --  CVP(mm Hg): --  CI: --  CAPILLARY BLOOD GLUCOSE      POCT Blood Glucose.: 93 mg/dL (16 Apr 2025 06:18)  POCT Blood Glucose.: 192 mg/dL (15 Apr 2025 16:52)   N/A      04-15 @ 07:01  -  04-16 @ 07:00  --------------------------------------------------------  IN:    IV PiggyBack: 300 mL    Oral Fluid: 120 mL    TPN (Total Parenteral Nutrition): 672 mL  Total IN: 1092 mL    OUT:    Indwelling Catheter - Urethral (mL): 1500 mL    Nasogastric/Oral tube (mL): 470 mL  Total OUT: 1970 mL    Total NET: -878 mL      04-16 @ 07:01  -  04-16 @ 11:30  --------------------------------------------------------  IN:  Total IN: 0 mL    OUT:    Indwelling Catheter - Urethral (mL): 100 mL    Oral Fluid: 0 mL  Total OUT: 100 mL    Total NET: -100 mL  --------------------------------------------------------------------------------------      EXAM    General: NAD, resting comfortably in bed  HEENT: Normocephalic atraumatic  Respiratory: Nonlabored respirations  Cardio: regular rate, normotensive  Abdomen: nontender,  softly distended, incisions c/d/i    --------------------------------------------------------------------------------------    LABS    CBC (04-16 @ 03:45)                              7.8[L]                         23.73[H]  )----------------(  259        --    % Neutrophils, --    % Lymphocytes, ANC: --                                  23.0[L]  CBC (04-15 @ 05:14)                              8.2[L]                         20.05[H]  )----------------(  195        --    % Neutrophils, --    % Lymphocytes, ANC: --                                  25.0[L]    BMP (04-16 @ 03:45)             140     |  106     |  22    		Ca++ 1.14[L]  Ca 8.2[L]             ---------------------------------( 82    		Mg 2.00               3.9     |  25      |  0.57  			Ph 2.7     BMP (04-15 @ 05:14)             144     |  108[H]  |  24[H] 		Ca++ 1.15    Ca 8.1[L]             ---------------------------------( 110[H]		Mg 2.00               3.8     |  25      |  0.47[L]			Ph 2.5       LFTs (04-15 @ 05:14)      TPro 4.8[L] / Alb 2.2[L] / TBili 0.7 / DBili 0.3 / AST 23 / ALT 26 / AlkPhos 105    Urinalysis (04-16 @ 03:45):     Color:  / Appearance:  / SG:  / pH:  / Gluc: 82 / Ketones:  / Bili:  / Urobili:  / Protein : / Nitrites:  / Leuk.Est:  / RBC:  / WBC:  / Sq Epi:  / Non Sq Epi:  / Bacteria        Assessment and Plan:   · Assessment	  A:  89M w/ PMH BPH (indwelling Bruner), HLD, B/L inguinal hernia repair (L recurrence), colon cancer (T4N1) s/p extended right hemicolectomy (12/23/24) admitted for high-grade SBO 2/2 suspected peritoneal carcinomatosis. Now s/p small bowel bypass on 4/8. Post op course c/b ilues    P:  - Diet: NPO with NG tube  - rising WBC- will get CT scan today with rectal contrast  - Med onc (Dr. Delvalle) following, unsure of treatment plans, will f/u after discharge and determine further treatment  - Chronic bruner in place, continue to monitor UOP (last exchange was on 3/4 with Kim Mercer) -  plan to exchange today as patient has a rising WBC  - OOB ambulate with assistance  - check CDIFF  - dc PICC  - PT eval --> rec ADALID  - VTE ppx: [facility]    A Team  [id number]

## 2025-04-16 NOTE — PROGRESS NOTE ADULT - ASSESSMENT
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, bilateral hernia repair, colon CA, prior hemicolectomy, heart failure presenting with abd pain  No fevers, no leukocytosis  Abd pain, found to have SBO  Chronic bruner  Extensive malignancy history  UA-/contam equivocal, UCX Enterococcus  In the interim, had rising WBC  CT with distended gallbladder, pericholecystic fluid; acute cholecystitis, possible perforation; enhancement of rectum; bladder thickening; increased fluid R inguinal canal  Overall, Asymptomatic bacteriuria, Positive culture, Cholecystitis  - Ceftriaxone 1g q 24, monitor on first dose for any signs allergy  - Flagyl 500mg q 12  - DC Cipro  - F/U surgery if any intervention planned for cholecystitis  - Check BCXs x2  - Trend WBC to normal, monitor for alternate signs infection    Abner Almaguer MD  Contact on TEAMS messaging from 9am - 5pm  From 5pm-9am, on weekends, or if no response call [phone]    Antibiotic decision making based on local antibiogram and available recent culture results

## 2025-04-17 NOTE — PROGRESS NOTE ADULT - PROBLEM SELECTOR PLAN 2
difficult to address with abdominal process and now leukocytosis   continue to monitor  now comfort care

## 2025-04-17 NOTE — PROGRESS NOTE ADULT - PROBLEM SELECTOR PLAN 2
- CT findings - High-grade small bowel obstruction to the level of a single transition  point in the right upper quadrant at which there is a similar appearing spiculated soft tissue lesion concerning for peritoneal carcinomatosis.   - s/p small bowel bypass on 4/8  - Post op course complicated by ileus per primary team. Patient refusing NG to be replaced  XRAY Abdomen 4/14 - Imaging personally reviewed by me - Nonobstructive gas pattern.  - Per GOC discussion, patient now comfort measures only

## 2025-04-17 NOTE — PROGRESS NOTE ADULT - SUBJECTIVE AND OBJECTIVE BOX
Patient is a 89y old  Male who presents with a chief complaint of High grade mSBO (17 Apr 2025 08:17)      DATE OF SERVICE: 04-17-25 @ 12:32    SUBJECTIVE / OVERNIGHT EVENTS: overnight events noted    ROS:  not available       MEDICATIONS  (STANDING):  acetaminophen   IVPB .. 1000 milliGRAM(s) IV Intermittent every 6 hours  cefTRIAXone   IVPB 1000 milliGRAM(s) IV Intermittent every 24 hours  chlorhexidine 2% Cloths 1 Application(s) Topical daily  dextrose 5% + sodium chloride 0.45% with potassium chloride 20 mEq/L 1000 milliLiter(s) (100 mL/Hr) IV Continuous <Continuous>  influenza  Vaccine (HIGH DOSE) 0.5 milliLiter(s) IntraMuscular once  metoclopramide Injectable 10 milliGRAM(s) IV Push every 6 hours  metroNIDAZOLE  IVPB 500 milliGRAM(s) IV Intermittent every 8 hours  metroNIDAZOLE  IVPB      pantoprazole  Injectable 40 milliGRAM(s) IV Push daily  simethicone 80 milliGRAM(s) Chew three times a day    MEDICATIONS  (PRN):  benzocaine 20% Spray 1 Spray(s) Topical three times a day PRN sore throat  sodium chloride 0.9% lock flush 10 milliLiter(s) IV Push every 1 hour PRN Pre/post blood products, medications, blood draw, and to maintain line patency        CAPILLARY BLOOD GLUCOSE      POCT Blood Glucose.: 73 mg/dL (17 Apr 2025 08:41)    I&O's Summary    16 Apr 2025 07:01  -  17 Apr 2025 07:00  --------------------------------------------------------  IN: 0 mL / OUT: 765 mL / NET: -765 mL        Vital Signs Last 24 Hrs  T(C): 36.6 (17 Apr 2025 05:41), Max: 36.9 (16 Apr 2025 17:20)  T(F): 97.8 (17 Apr 2025 05:41), Max: 98.4 (16 Apr 2025 17:20)  HR: 88 (17 Apr 2025 05:41) (75 - 88)  BP: 128/55 (17 Apr 2025 05:41) (115/46 - 128/55)  BP(mean): --  RR: 17 (17 Apr 2025 05:41) (16 - 18)  SpO2: 94% (17 Apr 2025 05:41) (94% - 96%)      PHYSICAL EXAM:  CHEST/LUNG: decreased breath sounds bases   HEART: S1 S2; no murmurs   ABDOMEN: Soft, mild tenderness   distension +  EXTREMITIES:  no edema  NEUROLOGY: Alert non-focal    LABS:                        8.8    24.54 )-----------( 318      ( 17 Apr 2025 05:36 )             27.1     04-17    140  |  103  |  23  ----------------------------<  34[LL]  4.3   |  22  |  0.66    Ca    8.3[L]      17 Apr 2025 05:36  Phos  3.7     04-17  Mg     2.10     04-17    TPro  5.6[L]  /  Alb  2.1[L]  /  TBili  1.1  /  DBili  x   /  AST  25  /  ALT  23  /  AlkPhos  146[H]  04-17          Urinalysis Basic - ( 17 Apr 2025 05:36 )    Color: x / Appearance: x / SG: x / pH: x  Gluc: 34 mg/dL / Ketone: x  / Bili: x / Urobili: x   Blood: x / Protein: x / Nitrite: x   Leuk Esterase: x / RBC: x / WBC x   Sq Epi: x / Non Sq Epi: x / Bacteria: x          All consultant(s) notes reviewed and care discussed with other providers        Contact Number, Dr Redd [phone]

## 2025-04-17 NOTE — PROGRESS NOTE ADULT - PROBLEM SELECTOR PLAN 5
- 4/17- Counseled/educated patient about advanced care planning for code status preferences, including option of allowing for natural death. Patient states he is ready to pass away naturally when it is time and does not want any further interventions. Comfort centric approach of care of no further diagnostic workup reviewed. Patient agrees he would not want any further interventions at this time   MOLST completed with patient to reflect his wishes for DNR/ DNI. No non-invasive ventilation. NO feeding tube. Comfort Measures only.  See GOC note  16 minutes spent on advanced care planning/ goals of care

## 2025-04-17 NOTE — PROGRESS NOTE ADULT - SUBJECTIVE AND OBJECTIVE BOX
Date of Service  : 4/17/2025   Indication for Geriatrics and Palliative Care Services: goals of care    INTERVAL HPI/OVERNIGHT EVENTS:  Palliative care reconsulted for goals of care. Per primary team, patient with ileus and had self- removed NG tube and refusing for it be replaced .     SUBJECTIVE AND OBJECTIVE:  Patient seen and examined at bedside. Patient AAOx3. Patient has no complaints. Denies pain or nausea. States he wants to drink juice and eat.     Allergies    aspirin (Unknown)  penicillin (Unknown)    Intolerances    MEDICATIONS  (STANDING):  chlorhexidine 2% Cloths 1 Application(s) Topical daily  dextrose 5% + sodium chloride 0.45% with potassium chloride 20 mEq/L 1000 milliLiter(s) (100 mL/Hr) IV Continuous <Continuous>  influenza  Vaccine (HIGH DOSE) 0.5 milliLiter(s) IntraMuscular once  metoclopramide Injectable 10 milliGRAM(s) IV Push every 6 hours  pantoprazole  Injectable 40 milliGRAM(s) IV Push daily  simethicone 80 milliGRAM(s) Chew three times a day    MEDICATIONS  (PRN):  benzocaine 20% Spray 1 Spray(s) Topical three times a day PRN sore throat  sodium chloride 0.9% lock flush 10 milliLiter(s) IV Push every 1 hour PRN Pre/post blood products, medications, blood draw, and to maintain line patency    ITEMS UNCHECKED ARE NOT PRESENT    PRESENT SYMPTOMS: [ ]Unable to self-report due to altered mental status- see [ ] CPOT [ ] PAINADS [ ] RDOS  Source if other than patient:  [ ]Family   [ ]Team     Pain:  [ ]yes [x ]no  QOL impact -   Location -                    Aggravating factors -  Quality -  Radiation -  Timing-  Severity (0-10 scale):  Minimal acceptable level / Pain Goal (0-10 scale):     Dyspnea:                           [ ]Mild [ ]Moderate [ ]Severe  Anxiety:                             [ ]Mild [ ]Moderate [ ]Severe  Agitation:                          [ ]Mild [ ]Moderate [ ]Severe  Fatigue:                             [ ]Mild [ ]Moderate [ ]Severe  Nausea:                             [ ]Mild [ ]Moderate [ ]Severe  Loss of appetite:              [ ]Mild [ ]Moderate [ ]Severe  Constipation:                   [ ]Mild [ ]Moderate [ ]Severe  Diarrhea:                          [ ]Mild [ ]Moderate [ ]Severe    CPOT:    https://www.[facility].org/getattachment/[email or web address]/Critical-Care-Pain-Observation-Tool-(CPOT)    PCSSQ[Palliative Care Spiritual Screening Question]   Severity (0-10):  Score of 4 or > indicate consideration of Chaplaincy referral.  Chaplaincy Referral: [ ] yes [ ] refused [ ] following [x ] deferred    Caregiver Jones? : [ ] yes [ ] no [ ] Declined [x ] Deferred              Social work referral [ ] Patient & Family Centered Care Referral [ ]     Anticipatory Grief present?:  [ ] yes [ ] no  [ x] Deferred                  Social work referral [ ] Chaplaincy Referral[ ] Caregiver Support Referral [ ]    Other Symptoms:  [ x]All other review of systems negative   [ ] Unable to obtain due to poor mentation     PHYSICAL EXAM:  Vital Signs Last 24 Hrs  T(C): 36.3 (17 Apr 2025 20:58), Max: 36.6 (17 Apr 2025 02:00)  T(F): 97.3 (17 Apr 2025 20:58), Max: 97.9 (17 Apr 2025 02:00)  HR: 88 (17 Apr 2025 20:58) (75 - 88)  BP: 140/69 (17 Apr 2025 20:58) (118/50 - 140/69)  BP(mean): --  RR: 17 (17 Apr 2025 20:58) (17 - 18)  SpO2: 94% (17 Apr 2025 20:58) (94% - 95%)    Parameters below as of 17 Apr 2025 20:58  Patient On (Oxygen Delivery Method): room air      GENERAL:   [x]Alert  [ x]Oriented x3   [ ]Lethargic   [ ]Unarousable  [x]Verbal  [ ]Non-Verbal  [x] No Distress  Behavioral:   [x ] Anxiety  [ ] Delirium [ ] Agitation [] Calm  [] Other   HEENT:  [x]Normal  [] Temporal Wasting  [ ]Dry mouth   [ ]ET Tube/Trach  [ ]Oral lesions  [ ] Mucositis  PULMONARY: normal respiratory effort, no accessory muscle use   []Clear [ ]Tachypnea  [ ]Audible excessive secretions   [ ]Rhonchi        [ ]Right [ ]Left [ ]Bilateral  [ ]Crackles        [ ]Right [ ]Left [ ]Bilateral  [ ]Wheezing     [ ]Right [ ]Left [ ]Bilateral  [ ]Diminished breath sounds [ ]right [ ]left [ ]bilateral  CARDIOVASCULAR:    [x]Regular [ ]Irregular [ ]Tachy  [ ]Bart [ ]Murmur [ ]Other  GASTROINTESTINAL:  [x]Soft  [ ]Distended   []+BS  [x]Non tender [ ]Tender  [ ]PEG [ ]OGT/ NGT  Last BM: 4/15  GENITOURINARY:  [ ]Normal [ ] Incontinent   [ ]Oliguria/Anuria   [x]Gill  MUSCULOSKELETAL:   [ ]Normal   [x ]Weakness  []Bed/Wheelchair bound [ ]Edema  [  ] amputation  [  ] contraction  NEUROLOGIC:   [x]No focal deficits  [ ]Cognitive impairment  [ ]Dysphagia [ ]Dysarthria [ ]Paresis [ ]Other   SKIN: See Nursing Skin Assessment for further details  [ ]Normal    [ ]Rash  [ ]Pressure ulcer(s)       Present on admission [ ]y [ ]n   [  ]  Wound    [  ] hyperpigmentation    CRITICAL CARE:  [ ]Shock Present  [ ]Septic [ ]Cardiogenic [ ]Neurologic [ ]Hypovolemic  [ ]Vasopressors [ ]Inotropes  [ ]Respiratory failure present [ ]Mechanical Ventilation [ ]Non-invasive ventilatory support [ ]High-Flow   [ ]Acute  [ ]Chronic [ ]Hypoxic  [ ]Hypercarbic [ ]Other  [ ]Other organ failure     LABS:  reviewed                                             8.8    24.54 )-----------( 318      ( 17 Apr 2025 05:36 )             27.1       04-17    140  |  103  |  23  ----------------------------<  34[LL]  4.3   |  22  |  0.66    Ca    8.3[L]      17 Apr 2025 05:36  Phos  3.7     04-17  Mg     2.10     04-17    TPro  5.6[L]  /  Alb  2.1[L]  /  TBili  1.1  /  DBili  x   /  AST  25  /  ALT  23  /  AlkPhos  146[H]  04-17            RADIOLOGY & ADDITIONAL STUDIES: reviewed     Protein Calorie Malnutrition Present: [ ]mild [ ]moderate [x ]severe [ ]underweight [ ]morbid obesity  https://www.andeal.org/vault/6900/web/files/ONC/Table_Clinical%20Characteristics%20to%20Document%20Malnutrition-White%20JV%20et%20al%202012.pdf    Height (cm): 172.7 (03-22-25 @ 10:19)  Weight (kg): 59 (03-21-25 @ 17:07), 61.19 (03-01-25 @ 15:28), 61.2 (11-27-24 @ 04:28)  BMI (kg/m2): 19.8 (03-22-25 @ 10:19)    [ ]PPSV2 < or = 30%  [ ]significant weight loss [ ]poor nutritional intake [ ]anasarca   [ ]Artificial Nutrition    REFERRALS:   [ ]Chaplaincy  [ ]Hospice  [ ]Child Life  [ ]Social Work  [x ]Case management [ ]Holistic Therapy

## 2025-04-17 NOTE — GOALS OF CARE CONVERSATION - ADVANCED CARE PLANNING - CONVERSATION DETAILS
Patient AAOx3 during encounter. He stated that his current clinical status was not an acceptable quality of life for him. Patient was able to discuss that he had obstruction due to his cancer and reason for his bypass surgery. Surgery team reviewed recent complication of ileus which led to NG placement, which patient is adamantly refusing to have placed back during encounter; he stated he just wanted to eat and drink. Surgery team reviewed potential risks/ complications of not proceeding with NG tube and NPO status including risks of vomiting, aspiration, and malnutrition; he was able to demonstrate teach back about these risks and states that he accepts them. Understanding these risks, pt wants to have comfort food and eat as he wishes.     Counseled/educated about advanced care planning for code status preferences, including option of allowing for natural death. Patient states he is ready to pass away naturally when it is time and does not want any further interventions. Comfort centric approach of care of no further diagnostic workup reviewed. Patient agrees he would not want any further interventions.     MOLST completed with patient to reflect his wishes for DNR/ DNI. No non-invasive ventilation. NO feeding tube. Comfort Measures only.

## 2025-04-17 NOTE — PROGRESS NOTE ADULT - ASSESSMENT
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, bilateral hernia repair, colon CA, prior hemicolectomy, heart failure presenting with abd pain  No fevers, no leukocytosis  Abd pain, found to have SBO  Chronic bruner  Extensive malignancy history  UA-/contam equivocal, UCX Enterococcus  In the interim, had rising WBC  CT with distended gallbladder, pericholecystic fluid; acute cholecystitis, possible perforation; enhancement of rectum; bladder thickening; increased fluid R inguinal canal  Overall, Asymptomatic bacteriuria, Positive culture, Cholecystitis  - Ceftriaxone 1g q 24, monitor on first dose for any signs allergy  - Flagyl 500mg q 12  - F/U surgery if any intervention planned for cholecystitis  - Check BCXs x2  - Trend WBC to normal, monitor for alternate signs infection    Tailor above planning based on defined GOC (comfort care?)    Abner Almaguer MD  Contact on TEAMS messaging from 9am - 5pm  From 5pm-9am, on weekends, or if no response call [phone]    Antibiotic decision making based on local antibiogram and available recent culture results

## 2025-04-17 NOTE — PROGRESS NOTE ADULT - SUBJECTIVE AND OBJECTIVE BOX
TEAM [ A ] Surgery Daily Progress Note  =====================================================    SUBJECTIVE: Patient seen and examined at bedside on AM rounds. Patient reporting that they want to eat. NPO, denies nausea, vomiting. -Flatus/-BM. OOB/Amublating as tolerated.     ALLERGIES:  aspirin (Unknown)  penicillin (Unknown)      --------------------------------------------------------------------------------------    MEDICATIONS:    Neurologic Medications  acetaminophen   IVPB .. 1000 milliGRAM(s) IV Intermittent every 6 hours  metoclopramide Injectable 10 milliGRAM(s) IV Push every 6 hours    Respiratory Medications    Cardiovascular Medications    Gastrointestinal Medications  pantoprazole  Injectable 40 milliGRAM(s) IV Push daily  simethicone 80 milliGRAM(s) Chew three times a day  sodium chloride 0.45% with potassium chloride 20 mEq/L 1000 milliLiter(s) IV Continuous <Continuous>  sodium chloride 0.9% lock flush 10 milliLiter(s) IV Push every 1 hour PRN Pre/post blood products, medications, blood draw, and to maintain line patency    Genitourinary Medications    Hematologic/Oncologic Medications  enoxaparin Injectable 40 milliGRAM(s) SubCutaneous every 24 hours  influenza  Vaccine (HIGH DOSE) 0.5 milliLiter(s) IntraMuscular once    Antimicrobial/Immunologic Medications  cefTRIAXone   IVPB 1000 milliGRAM(s) IV Intermittent every 24 hours  metroNIDAZOLE  IVPB 500 milliGRAM(s) IV Intermittent every 8 hours  metroNIDAZOLE  IVPB        Endocrine/Metabolic Medications    Topical/Other Medications  benzocaine 20% Spray 1 Spray(s) Topical three times a day PRN sore throat  chlorhexidine 2% Cloths 1 Application(s) Topical daily    --------------------------------------------------------------------------------------    VITAL SIGNS:  T(C): 36.6 (04-17-25 @ 05:41), Max: 36.9 (04-16-25 @ 08:25)  HR: 88 (04-17-25 @ 05:41) (75 - 89)  BP: 128/55 (04-17-25 @ 05:41) (115/46 - 128/55)  RR: 17 (04-17-25 @ 05:41) (16 - 18)  SpO2: 94% (04-17-25 @ 05:41) (94% - 96%)  --------------------------------------------------------------------------------------    EXAM    General: NAD, resting in bed comfortably.  Cardiac: regular rate, warm and well perfused  Respiratory: Nonlabored respirations, normal cw expansion.  Abdomen: soft, nontender, mildly distended.  Extremities: normal strength, FROM, no deformities    --------------------------------------------------------------------------------------    LABS                        8.8    24.54 )-----------( 318      ( 17 Apr 2025 05:36 )             27.1   04-16    140  |  106  |  22  ----------------------------<  82  3.9   |  25  |  0.57    Ca    8.2[L]      16 Apr 2025 03:45  Phos  2.7     04-16  Mg     2.00     04-16      --------------------------------------------------------------------------------------    INS AND OUTS:    04-16-25 @ 07:01  -  04-17-25 @ 07:00  --------------------------------------------------------  IN: 0 mL / OUT: 765 mL / NET: -765 mL      --------------------------------------------------------------------------------------

## 2025-04-17 NOTE — PROGRESS NOTE ADULT - ASSESSMENT
89M w/ PMH BPH (indwelling Bruner), HLD, B/L inguinal hernia repair (L recurrence), colon cancer (T4N1) s/p extended right hemicolectomy (12/23/24) admitted for high-grade SBO 2/2 suspected peritoneal carcinomatosis. Now s/p small bowel bypass on 4/8. Post op course c/b ileus.    P:  - Diet: NPO   - Monitor wbc  - Med onc (Dr. Delvalle) following, unsure of treatment plans, will f/u after discharge and determine further treatment  - Chronic bruner in place, continue to monitor UOP (last exchange was on 3/4 with Kim Mercer)  - OOB ambulate with assistance  - check CDIFF  - dc PICC after new IV access established today  - PT eval --> rec ADALID  - VTE ppx: SQH  - f/u palliative today, reengage hospice and DNR conversation per patient's change in wishes    A Team  [id number]    89M w/ PMH BPH (indwelling Bruner), HLD, B/L inguinal hernia repair (L recurrence), colon cancer (T4N1) s/p extended right hemicolectomy (12/23/24) admitted for high-grade SBO 2/2 suspected peritoneal carcinomatosis. Now s/p small bowel bypass on 4/8. Post op course c/b ileus.    P:  - Diet: NPO   - Monitor wbc  - Med onc (Dr. Delvalle) following, unsure of treatment plans, will f/u after discharge and determine further treatment  - Chronic bruner in place, continue to monitor UOP (last exchange was on 3/4 with Kim Mercer) -- catheter exchanged on 4/16.  - OOB ambulate with assistance  - check CDIFF  - dc PICC after new IV access established today  - PT eval --> rec ADALID  - VTE ppx: SQH  - f/u palliative today, reengage hospice and DNR conversation per patient's change in wishes    A Team  [id number]

## 2025-04-17 NOTE — PROGRESS NOTE ADULT - SUBJECTIVE AND OBJECTIVE BOX
CC: F/U for Cholecystitis    Saw/spoke to patient. No fevers, no chills. No new complaints.    Allergies  aspirin (Unknown)  penicillin (Unknown)    ANTIMICROBIALS:      PE:    Vital Signs Last 24 Hrs  T(C): 36.6 (17 Apr 2025 05:41), Max: 36.9 (16 Apr 2025 17:20)  T(F): 97.8 (17 Apr 2025 05:41), Max: 98.4 (16 Apr 2025 17:20)  HR: 88 (17 Apr 2025 05:41) (75 - 88)  BP: 128/55 (17 Apr 2025 05:41) (118/50 - 128/55)  RR: 17 (17 Apr 2025 05:41) (16 - 18)  SpO2: 94% (17 Apr 2025 05:41) (94% - 96%)    Gen: AOx3, NAD, non-toxic  CV: Nontachycardic  Resp: Breathing comfortably, RA  Abd: Soft, nontender  IV/Skin: No thrombophlebitis    LABS:                        8.8    24.54 )-----------( 318      ( 17 Apr 2025 05:36 )             27.1     04-17    140  |  103  |  23  ----------------------------<  34[LL]  4.3   |  22  |  0.66    Ca    8.3[L]      17 Apr 2025 05:36  Phos  3.7     04-17  Mg     2.10     04-17    TPro  5.6[L]  /  Alb  2.1[L]  /  TBili  1.1  /  DBili  x   /  AST  25  /  ALT  23  /  AlkPhos  146[H]  04-17    Urinalysis Basic - ( 17 Apr 2025 05:36 )    Color: x / Appearance: x / SG: x / pH: x  Gluc: 34 mg/dL / Ketone: x  / Bili: x / Urobili: x   Blood: x / Protein: x / Nitrite: x   Leuk Esterase: x / RBC: x / WBC x   Sq Epi: x / Non Sq Epi: x / Bacteria: x    MICROBIOLOGY:    Catheterized  03-21-25   >100,000 CFU/ml Enterococcus faecalis  --  Enterococcus faecalis    RADIOLOGY:    4/16 CT:      IMPRESSION:  Findings concerning for gangrenous cholecystitis, similar to prior exam.    Small volume ascites.    Wall thickening of small bowel loops proximal to the bypass, enteritis or   reactive thickening.

## 2025-04-17 NOTE — CHART NOTE - NSCHARTNOTEFT_GEN_A_CORE
Surgery team member and Palliative team had extensive goals of care conversation with patient outlining his desires going forward and code status. Patient is A&Ox4 and has capacity to make his own decisions. Patient wishes to be made comfort care only and completed a MOLST bedside with surgical/palliative team designating his DNR/DNI and comfort measure status. Patient was able to discuss with all team members his current diagnosis, obstruction due to his cancer, reason for his bypass surgery, and complications since. Patient understands that his bowels aren't moving appropriately and understands the risks of not proceeding with a NGT and NPO status. Patient was able to relay the risks of vomiting, aspiration, and malnutrition to team members. Understanding these risks, pt wants to have comfort food and eat as he wishes. Surgery team member and Palliative team had extensive goals of care conversation with patient outlining his desires going forward and code status. Patient is A&Ox4 and has capacity to make his own decisions. Patient wishes to be made comfort care only and completed a MOLST bedside with surgical/palliative team designating his DNR/DNI and comfort measure status. Patient was able to discuss with all team members his current diagnosis, obstruction due to his cancer, reason for his bypass surgery, and complications since. Patient understands that his bowels aren't moving appropriately and understands the risks of not proceeding with a NGT and NPO status. Patient was able to relay the risks of vomiting, aspiration, and malnutrition to team members. Understanding these risks, pt wants to have comfort food and eat as he wishes. Patient also does not want any additional escalating medical management such as NGT, feeding tube / PEG, surgical intervention, or cardiac arrest measures such as chest compressions, intubation, or BiPAP mask. Patient wishes to be made comfort care. Patient agreeable to maintaining his bruner catheter and inserting a new IV for IV access in place of his PICC line being removed. Surgery team member and Palliative team had extensive goals of care conversation with patient outlining his desires going forward and code status. Patient is A&Ox4 and has capacity to make his own decisions. Patient wishes to be made comfort care only and completed a MOLST bedside with surgical/palliative team designating his DNR/DNI and comfort measure status. Patient was able to discuss with all team members his current diagnosis, obstruction due to his cancer, reason for his bypass surgery, and complications since. Patient understands that his bowels aren't moving appropriately and understands the risks of not proceeding with a NGT and NPO status. Patient was able to relay the risks of vomiting, aspiration, and malnutrition to team members. Understanding these risks, pt wants to have comfort food and eat as he wishes. Patient also does not want any additional escalating medical management such as NGT, feeding tube / PEG, surgical intervention, or cardiac arrest measures such as chest compressions, intubation, or BiPAP mask. Patient wishes to be made comfort care. Patient agreeable to maintaining his bruner catheter and inserting a new IV for IV access in place of his PICC line being removed. Patient agreeable to continuing current IV medications such as antibiotics, reglan, and pain regimen. Can plan to transition to PO options. Surgery team member and Palliative team had extensive goals of care conversation with patient outlining his desires going forward and code status. Patient is A&Ox4 and has capacity to make his own decisions. Patient wishes to be made comfort care only and completed a MOLST bedside with surgical/palliative team designating his DNR/DNI and comfort measure status. Patient was able to discuss with all team members his current diagnosis, obstruction due to his cancer, reason for his bypass surgery, and complications since. Patient understands that his bowels aren't moving appropriately and understands the risks of not proceeding with a NGT and NPO status. Patient was able to relay the risks of vomiting, aspiration, and malnutrition to team members. Understanding these risks, pt wants to have comfort food and eat as he wishes. Patient also does not want any additional escalating medical management such as NGT, feeding tube / PEG, surgical intervention, or cardiac arrest measures such as chest compressions, intubation, or BiPAP mask. Patient wishes to be made comfort care. Patient agreeable to maintaining his bruner catheter and inserting a new IV for IV access in place of his PICC line being removed. Patient agreeable to continuing current IV medications such as reglan, and pain regimen. Can plan to transition to PO options.

## 2025-04-17 NOTE — PROGRESS NOTE ADULT - PROBLEM SELECTOR PLAN 3
- CT 4/16- Findings concerning for gangrenous cholecystitis, similar to prior exam.  - Infectious Disease recommendations appreciated   - Per primary surgery team's discussion with patient, he has been refusing further workup of gallbladder to evaluate for cholecystitis (refer to surgery note on 4/17)   - per [facility] discussion, patient now comfort measures only

## 2025-04-18 NOTE — PROGRESS NOTE ADULT - ASSESSMENT
89M w/ PMH BPH (indwelling Bruner), HLD, B/L inguinal hernia repair (L recurrence), colon cancer (T4N1) s/p extended right hemicolectomy (12/23/24) admitted for high-grade SBO 2/2 suspected peritoneal carcinomatosis. Now s/p small bowel bypass on 4/8. Post op course c/b ileus. CT (4/16/2025) demonstrating gangrenous cholecystitis. After discussion with palliative, patient is now DNR/DNI and CMO.     P:  - DNR/DNI, CMO   - Diet: RD  - Lab holiday   - Med onc (Dr. Delvalle) following, unsure of treatment plans, will f/u after discharge and determine further treatment  - Chronic bruner in place, continue to monitor UOP (last exchange was on 3/4 with Kim Mercer) -- catheter exchanged on 4/16.  - OOB ambulate with assistance  - Pt refusing PICC dc, as does not want to be stuck for IV access   - PT eval --> rec ADALID, f/u CM re: dispo   - VTE ppx: [facility]    A Team  [phone]

## 2025-04-18 NOTE — PROGRESS NOTE ADULT - SUBJECTIVE AND OBJECTIVE BOX
Surgery Daily Progress Note  =====================================================    INTERVAL EVENTS: Patient is now CMO, DNR/DNI. Protected sleep instituted overnight. Off IV antibiotics per patient GOC. Dilaudid 0.2mg given overnight for pain.     SUBJECTIVE: Patient seen at bedside during AM rounds. Resting comfortably, pain appropriately controlled. Denies fever, n/v, still wants to eat. Pt passing gas and BM.     --------------------------------------------------------------------------------------  VITAL SIGNS:  T(C): 36.3 (04-17-25 @ 20:58), Max: 36.3 (04-17-25 @ 20:58)  HR: 92 (04-18-25 @ 01:00) (88 - 92)  BP: 128/56 (04-18-25 @ 01:00) (128/56 - 140/69)  RR: 18 (04-18-25 @ 01:00) (17 - 18)  SpO2: 99% (04-18-25 @ 01:00) (94% - 99%)  --------------------------------------------------------------------------------------  MEDICATIONS:   acetaminophen   IVPB .. 1000 milliGRAM(s) IV Intermittent every 6 hours  benzocaine 20% Spray 1 Spray(s) Topical three times a day PRN  chlorhexidine 2% Cloths 1 Application(s) Topical daily  influenza  Vaccine (HIGH DOSE) 0.5 milliLiter(s) IntraMuscular once  metoclopramide Injectable 10 milliGRAM(s) IV Push every 6 hours  pantoprazole  Injectable 40 milliGRAM(s) IV Push daily  simethicone 80 milliGRAM(s) Chew three times a day  sodium chloride 0.9% lock flush 10 milliLiter(s) IV Push every 1 hour PRN    --------------------------------------------------------------------------------------  INTAKE/OUTPUT:     04-17-25 @ 07:01  -  04-18-25 @ 07:00  --------------------------------------------------------  IN: 1040 mL / OUT: 1020 mL / NET: 20 mL      --------------------------------------------------------------------------------------    EXAM    General: NAD, resting in bed comfortably.  Cardiac: regular rate, warm and well perfused  Respiratory: Nonlabored respirations, normal cw expansion.  Abdomen: soft, mildly TTP, mildly distended.  Extremities: normal strength, FROM, no deformities      --------------------------------------------------------------------------------------

## 2025-04-18 NOTE — PROGRESS NOTE ADULT - ASSESSMENT
89 yo M chronic bruner, bilateral hernia repair, colon CA, prior hemicolectomy, heart failure presenting with abd pain  No fevers, no leukocytosis  Abd pain, found to have SBO  Chronic bruner  Extensive malignancy history  UA-/contam equivocal, UCX Enterococcus  In the interim, had rising WBC  CT with distended gallbladder, pericholecystic fluid; acute cholecystitis, possible perforation; enhancement of rectum; bladder thickening; increased fluid R inguinal canal  Overall, Asymptomatic bacteriuria, Positive culture, Cholecystitis  - Ceftriaxone 1g q 24, monitor on first dose for any signs allergy  - Flagyl 500mg q 12  - F/U surgery if any intervention planned for cholecystitis  - Check BCXs x2  - Trend WBC to normal, monitor for alternate signs infection    Tailor above planning based on defined GOC (comfort care?); in the interim antibiotics were stopped by team based on GOC    Signing off. Please call [phone] or on call fellow with further questions or change in status.     Abner Almaguer MD  Contact on TEAMS messaging from 9am - 5pm  From 5pm-9am, on weekends, or if no response call [phone]    Antibiotic decision making based on local antibiogram and available recent culture results

## 2025-04-18 NOTE — PROGRESS NOTE ADULT - SUBJECTIVE AND OBJECTIVE BOX
Patient is a 89y old  Male who presents with a chief complaint of High grade mSBO (18 Apr 2025 07:17)      DATE OF SERVICE: 04-18-25 @ 12:14    SUBJECTIVE / OVERNIGHT EVENTS: overnight events noted    ROS:  Resp: No cough no sputum production  CVS: No chest pain no palpitations no orthopnea  GI: no N/V/D  resting comfortably       MEDICATIONS  (STANDING):  acetaminophen   IVPB .. 1000 milliGRAM(s) IV Intermittent every 6 hours  chlorhexidine 2% Cloths 1 Application(s) Topical daily  influenza  Vaccine (HIGH DOSE) 0.5 milliLiter(s) IntraMuscular once  pantoprazole  Injectable 40 milliGRAM(s) IV Push daily    MEDICATIONS  (PRN):  sodium chloride 0.9% lock flush 10 milliLiter(s) IV Push every 1 hour PRN Pre/post blood products, medications, blood draw, and to maintain line patency        CAPILLARY BLOOD GLUCOSE        I&O's Summary    17 Apr 2025 07:01  -  18 Apr 2025 07:00  --------------------------------------------------------  IN: 1040 mL / OUT: 1020 mL / NET: 20 mL    18 Apr 2025 07:01  -  18 Apr 2025 12:14  --------------------------------------------------------  IN: 100 mL / OUT: 100 mL / NET: 0 mL        Vital Signs Last 24 Hrs  T(C): 36.3 (18 Apr 2025 09:29), Max: 36.3 (17 Apr 2025 20:58)  T(F): 97.4 (18 Apr 2025 09:29), Max: 97.4 (18 Apr 2025 09:29)  HR: 92 (18 Apr 2025 09:29) (88 - 92)  BP: 120/51 (18 Apr 2025 09:29) (120/51 - 140/69)  BP(mean): --  RR: 17 (18 Apr 2025 09:29) (17 - 18)  SpO2: 100% (18 Apr 2025 09:29) (94% - 100%)    PHYSICAL EXAM:  CHEST/LUNG: clear   HEART: S1 S2; no murmurs   ABDOMEN: Soft, Nontender  EXTREMITIES:  no edema  NEUROLOGY: Alert non-focal    LABS:                        8.8    24.54 )-----------( 318      ( 17 Apr 2025 05:36 )             27.1     04-17    140  |  103  |  23  ----------------------------<  34[LL]  4.3   |  22  |  0.66    Ca    8.3[L]      17 Apr 2025 05:36  Phos  3.7     04-17  Mg     2.10     04-17    TPro  5.6[L]  /  Alb  2.1[L]  /  TBili  1.1  /  DBili  x   /  AST  25  /  ALT  23  /  AlkPhos  146[H]  04-17          Urinalysis Basic - ( 17 Apr 2025 05:36 )    Color: x / Appearance: x / SG: x / pH: x  Gluc: 34 mg/dL / Ketone: x  / Bili: x / Urobili: x   Blood: x / Protein: x / Nitrite: x   Leuk Esterase: x / RBC: x / WBC x   Sq Epi: x / Non Sq Epi: x / Bacteria: x          All consultant(s) notes reviewed and care discussed with other providers        Contact Number, Dr Redd [phone]

## 2025-04-18 NOTE — PROGRESS NOTE ADULT - SUBJECTIVE AND OBJECTIVE BOX
CC: F/U for Cholecystitis    Saw/spoke to patient. No fevers, no chills. No new complaints.    Allergies  aspirin (Unknown)  penicillin (Unknown)    ANTIMICROBIALS:      PE:    Vital Signs Last 24 Hrs  T(C): 36.8 (18 Apr 2025 13:11), Max: 36.8 (18 Apr 2025 13:11)  T(F): 98.2 (18 Apr 2025 13:11), Max: 98.2 (18 Apr 2025 13:11)  HR: 96 (18 Apr 2025 13:11) (88 - 96)  BP: 121/60 (18 Apr 2025 13:11) (120/51 - 140/69)  RR: 18 (18 Apr 2025 13:11) (17 - 18)  SpO2: 96% (18 Apr 2025 13:11) (94% - 100%)    Gen: AOx3, NAD, non-toxic  CV: Nontachycardic  Resp: Breathing comfortably, RA  Abd: Soft, nontender  IV/Skin: No thrombophlebitis    LABS:                        8.8    24.54 )-----------( 318      ( 17 Apr 2025 05:36 )             27.1     04-17    140  |  103  |  23  ----------------------------<  34[LL]  4.3   |  22  |  0.66    Ca    8.3[L]      17 Apr 2025 05:36  Phos  3.7     04-17  Mg     2.10     04-17    TPro  5.6[L]  /  Alb  2.1[L]  /  TBili  1.1  /  DBili  x   /  AST  25  /  ALT  23  /  AlkPhos  146[H]  04-17    Urinalysis Basic - ( 17 Apr 2025 05:36 )    Color: x / Appearance: x / SG: x / pH: x  Gluc: 34 mg/dL / Ketone: x  / Bili: x / Urobili: x   Blood: x / Protein: x / Nitrite: x   Leuk Esterase: x / RBC: x / WBC x   Sq Epi: x / Non Sq Epi: x / Bacteria: x    MICROBIOLOGY:    Blood Blood-Venous  04-16-25   No growth at 24 hours  --  --    Blood Blood  04-16-25   No growth at 24 hours  --  --    Catheterized  03-21-25   >100,000 CFU/ml Enterococcus faecalis  --  Enterococcus faecalis    RADIOLOGY:    4/16    IMPRESSION:  Findings concerning for gangrenous cholecystitis, similar to prior exam.    Small volume ascites.    Wall thickening of small bowel loops proximal to the bypass, enteritis or   reactive thickening.

## 2025-04-19 NOTE — PROGRESS NOTE ADULT - ASSESSMENT
89M w/ PMH BPH (indwelling Bruner), HLD, B/L inguinal hernia repair (L recurrence), colon cancer (T4N1) s/p extended right hemicolectomy (12/23/24) admitted for high-grade SBO 2/2 suspected peritoneal carcinomatosis. Now s/p small bowel bypass on 4/8. Post op course c/b ileus. CT (4/16/2025) demonstrating gangrenous cholecystitis. After discussion with palliative, patient is now DNR/DNI and CMO.     P:  - DNR/DNI, CMO   - Diet: RD  - Lab holiday   - Med onc (Dr. Delvalle) following, unsure of treatment plans, will f/u after discharge and determine further treatment  - Chronic bruner in place, continue to monitor UOP (last exchange was on 3/4 with Kim Mercer) -- catheter exchanged on 4/16.  - OOB ambulate with assistance  - Patient does not have IV access   - PT eval --> rec ADALID, f/u CM re: dispo   - VTE ppx: [facility]    A Team  [id number]  89M w/ PMH BPH (indwelling Bruner), HLD, B/L inguinal hernia repair (L recurrence), colon cancer (T4N1) s/p extended right hemicolectomy (12/23/24) admitted for high-grade SBO 2/2 suspected peritoneal carcinomatosis. Now s/p small bowel bypass on 4/8. Post op course c/b ileus. CT (4/16/2025) demonstrating gangrenous cholecystitis. After discussion with palliative, patient is now DNR/DNI and CMO.     P:  - DNR/DNI, CMO   - Diet: RD  - Lab holiday   - Med onc (Dr. Delvalle) following, unsure of treatment plans, will f/u after discharge and determine further treatment  - Chronic bruner in place, continue to monitor UOP (last exchange was on 3/4 with Kim Mercer) -- catheter exchanged on 4/16.  - OOB ambulate with assistance  - Patient does not have IV access   - PT eval --> rec ADALID, f/u CM re: dispo   - VTE ppx: [facility]    A Team  [id number]     Surg ATt  comfort care   ?consider Viky tube for comfort  to D?W Pt    Eugene Nino MD

## 2025-04-19 NOTE — PROGRESS NOTE ADULT - SUBJECTIVE AND OBJECTIVE BOX
Surgery Daily Progress Note  =====================================================    INTERVAL EVENTS: NAEO. PICC removed yesterday in alignment with patient's CMO GOC.     SUBJECTIVE: Patient seen at bedside during AM rounds. Resting comfortably, pain appropriately controlled. Denies n/v, taking some PO. Abdomen more distended but denies passing gas or BM. Gill urine tea colored.     --------------------------------------------------------------------------------------  VITAL SIGNS:  T(C): 36.4 (04-19-25 @ 08:46), Max: 36.9 (04-19-25 @ 01:02)  HR: 94 (04-19-25 @ 08:46) (79 - 96)  BP: 141/67 (04-19-25 @ 08:46) (116/50 - 141/67)  RR: 18 (04-19-25 @ 08:46) (17 - 18)  SpO2: 95% (04-19-25 @ 08:46) (94% - 100%)  --------------------------------------------------------------------------------------  MEDICATIONS:   acetaminophen   Oral Liquid .. 1000 milliGRAM(s) Oral every 6 hours  chlorhexidine 2% Cloths 1 Application(s) Topical daily  influenza  Vaccine (HIGH DOSE) 0.5 milliLiter(s) IntraMuscular once  pantoprazole   Suspension 40 milliGRAM(s) Oral before breakfast  sodium chloride 0.9% lock flush 10 milliLiter(s) IV Push every 1 hour PRN    --------------------------------------------------------------------------------------  INTAKE/OUTPUT:     04-18-25 @ 07:01  -  04-19-25 @ 07:00  --------------------------------------------------------  IN: 260 mL / OUT: 620 mL / NET: -360 mL      --------------------------------------------------------------------------------------  EXAM    General: NAD, resting in bed comfortably.  Cardiac: regular rate, warm and well perfused  Respiratory: Nonlabored respirations, normal cw expansion.  Abdomen: soft, mildly TTP, mildly distended. Incision open to air.   Extremities: normal strength, FROM, no deformities      --------------------------------------------------------------------------------------

## 2025-04-20 NOTE — CHART NOTE - NSCHARTNOTEFT_GEN_A_CORE
Reconsult received, discussed with colorectal surgery [phone]. Patient initially made SI statement in context of pain (IV had stopped and needed replacement), however SI continued after IV was replaced and pain mgmt regimen was resumed. 1:1 initiated.     Reviewed prior CL psych eval from earlier in hospitalization. Patient is a 89 year old M, no formal PPH, PMH of BPH,  HLD, B/L inguinal hernia repair with known left recurrence, colon cancer (T4N1 w/ two synchronous lesions) s/p extended right hemicolectomy w/ ileocolic anastomosis on 12/23 (not on chemo, not candidate), presenting with abdominal pain, found to have high grade SBO w/ RUQ TP at a spiculated soft tissue lesion concerning for peritoneal carcinomatosis. Psych initially consulted for agitation.    Vital Signs Last 24 Hrs  T(C): 36.2 (20 Apr 2025 04:12), Max: 36.7 (19 Apr 2025 20:38)  T(F): 97.1 (20 Apr 2025 04:12), Max: 98.1 (19 Apr 2025 20:38)  HR: 93 (20 Apr 2025 08:30) (80 - 93)  BP: 94/45 (20 Apr 2025 08:30) (94/45 - 140/68)  BP(mean): --  RR: 16 (20 Apr 2025 08:30) (16 - 18)  SpO2: 94% (20 Apr 2025 08:30) (94% - 97%)    MEDICATIONS  (STANDING):  acetaminophen   Oral Liquid .. 1000 milliGRAM(s) Oral every 6 hours  chlorhexidine 2% Cloths 1 Application(s) Topical daily  influenza  Vaccine (HIGH DOSE) 0.5 milliLiter(s) IntraMuscular once  pantoprazole   Suspension 40 milliGRAM(s) Oral before breakfast    MEDICATIONS  (PRN):  oxyCODONE    IR 2.5 milliGRAM(s) Oral every 6 hours PRN Severe Pain (7 - 10)  sodium chloride 0.9% lock flush 10 milliLiter(s) IV Push every 1 hour PRN Pre/post blood products, medications, blood draw, and to maintain line patency      Patient to be formally evaluated tomorrow. See recs as below.   - c/w 1:1 for SI  - continue pain control regimen, increase if necessary  - will discuss with palliative care team in AM (Dr. Agosto)  - begin Melatonin 6mg qHS  - PRN for sleep try Ramelteon 8mg qHS if available, otherwise consider trazodone 25mg qHS PRN  - PRN for agitation: Haldol 0.5 to 1 mg PO/IM/IV Q6H PRN for agitation  - Psych will follow

## 2025-04-20 NOTE — PROGRESS NOTE ADULT - SUBJECTIVE AND OBJECTIVE BOX
__________________________________________________________________   ===================>> SURGERY PROGRESS NOTE <<===================  -----------------------------------------------------------------------------------------------------------  Interval/Subjective:  pain overnight, had to have IV replaced for pain control, reports SI this AM.   __________________________________________________________________   ===================>> VITAL SIGNS / EXAM <<=========================  -----------------------------------------------------------------------------------------------------------  T(C): 36.8 (13:05), Max: 36.8 (13:05)  HR: 105 (13:05) (80 - 105)  BP: 105/58 (13:05) (94/45 - 135/68)  RR: 15 (13:05) (15 - 17)  SpO2: 96% (13:05) (94% - 97%)    I & O's:  IN:    Oral Fluid: 120 mL  Total IN: 120 mL    OUT:    Indwelling Catheter - Urethral (mL): 590 mL    IV PiggyBack: 0 mL  Total OUT: 590 mL      Physical Exam:  General: NAD, resting comfortably in bed  HEENT: Normocephalic atraumatic  Respiratory: Nonlabored respirations  Cardio: regular rate, normotensive  Abdomen: soft, mildly TTP, mildly distended. Incision open to air.   __________________________________________________________________   ===================>> LAB AND IMAGING <<==========================  -----------------------------------------------------------------------------------------------------------    __________________________________________________________________   ===================>> ASSESSMENT AND PLAN <<======================  -----------------------------------------------------------------------------------------------------------  A:  89M with PMHx BPH (indwelling Gill), HLD, bilateral inguinal hernia repair (left recurrence), colon CA (T4N1) s/p extended right hemicolectomy (12/23/24) admitted for high-grade SBO with suspected peritoneal carcinomatosis; now s/p small bowel bypass (4/8) complicated by ileus and CT-documented gangrenous cholecystitis; transitioned to CMO/DNR/DNI with comfort measures and PRN analgesia.    • Care Coordination: Transitioned to comfort care; Medicaid pending; consider private hire for 24/7 support if feasible.    • Palliative Care: Comfort measures only; no further diagnostics/interventions.    • Psych: 1:1 monitoring for SI with PRN Haldol (0.5–1?mg) for agitation, melatonin 6?mg qHS, and sleep agents as needed.    • Infectious Disease: Antibiotics stopped per GOC; monitor for infection without escalation.    • Internal Medicine: Maintain supportive, minimal intervention approach per comfort care directives.    P:  - Pain control: APAP 1000mg PO q6h, Oxy IR 2.5mg PO q6h PRN severe pain (7-10)  - Diet: Regular   - Gill: chronic  - Activity: Ambulate with assistance  - DVT ppx: SCDs (no pharmacologic prophylaxis ordered)  - possible perc kim tube for abdominal pain, ?if within GOC    A Team  [id number]

## 2025-04-20 NOTE — CHART NOTE - NSCHARTNOTEFT_GEN_A_CORE
Overnight 4/20/2025, RN notified team that patient was shouting, in 9/10 pain. Examined by surgery team, patient abdomen remains soft, distended, and tender with guarding largely in the R abdomen, but not peritonitic. Patient at the time did not have IV access, PICC line was removed 4/18 by colorectal surgery team. Multiple attempts made by nursing staff to place IV were initially unsuccessful and thus oxycodone 2.5mg PRN for severe pain reinstated at 2AM after examination. Abdominal XR ordered to assess for intraabdominal changes, which demonstrated a dilated stomach, colonic distention with contrast, but negative for intraperitoneal free air after read-back by overnight radiology team.     Per nursing, patient remained agitated without resolution of pain at 3AM 4/20 after receiving oxycodone. Critical impact team called by nursing staff, and IV access was obtained in pt R hand. Pt re-examined by surgery team, abdomen remained soft, distended, and tender in the R abdomen with guarding, and not peritonitic. Patient was agitated, shouting that he wished to die and just wanted to not be in pain. He verbalized he wanted to go home and say goodbye to his family.     Patient was ordered for 0.5mg dilaudid IVP after repeat examination by surgery team. Per radiology, recommended to obtain CXR or CT if high concern for intraperitoneal free air, but given patient's GOC for comfort measures only and level of agitation and pain, not pursued overnight.     Surgery team to continue to monitor clinical status. Discussed with overnight [place] resident Dr. Quintanilla.     A team surgery  #[id number] Overnight 4/20/2025, RN notified team that patient was shouting, in 9/10 pain. Examined by surgery team, patient abdomen remains soft, distended, and tender with guarding largely in the R abdomen, but not peritonitic. Patient at the time did not have IV access, PICC line was removed 4/18 by colorectal surgery team. Multiple attempts made by nursing staff to place IV were initially unsuccessful and thus PO oxycodone 2.5mg PRN for severe pain reinstated at 2AM after examination. PO tylenol was also re-offered to patient given his initial refusal at 12AM dosing. Abdominal XR ordered to assess for intraabdominal changes, which demonstrated a dilated stomach, colonic distention with contrast, but negative for intraperitoneal free air after read-back by overnight radiology team.     Per nursing, patient remained agitated without resolution of pain at 3AM 4/20 after receiving oxycodone. Critical impact team called by nursing staff, and IV access was obtained in pt R hand. Pt re-examined by surgery team, abdomen remained soft, distended, and tender in the R abdomen with guarding, and not peritonitic. Patient was agitated, shouting that he wished to die and just wanted to not be in pain. He verbalized he wanted to go home and say goodbye to his family.     Patient was ordered for 0.5mg dilaudid IVP after repeat examination by surgery team. Per radiology, recommended to obtain CXR or CT if high concern for intraperitoneal free air, but given patient's GOC for comfort measures only and level of agitation and pain, not pursued overnight.     Surgery team to continue to monitor clinical status. Discussed with overnight [place] resident Dr. Quintanilla.     A team surgery  #[id number] Overnight 4/20/2025, RN notified team that patient was shouting, in 9/10 pain. Examined by surgery team, patient abdomen remains soft, distended, and tender with guarding largely in the R abdomen, but not peritonitic. Patient at the time did not have IV access, PICC line was removed 4/18 by colorectal surgery team. Multiple attempts made by nursing staff to place IV were initially unsuccessful and thus PO oxycodone 2.5mg PRN for severe pain reinstated at 2AM after examination. PO tylenol was also re-offered to patient given his initial refusal at 12AM dosing. Abdominal XR ordered to assess for intraabdominal changes, which demonstrated a dilated stomach, colonic distention with contrast, but negative for intraperitoneal free air after read-back by overnight radiology team.     Per nursing, patient remained agitated without resolution of pain at 3AM 4/20 after receiving oxycodone. Critical impact team called by nursing staff, and IV access was obtained in pt R hand. Pt re-examined by surgery team, abdomen remained soft, distended, and tender in the R abdomen with guarding, and not peritonitic. Patient was agitated, shouting that he wished to die and just wanted to not be in pain. He verbalized he wanted to go home and say goodbye to his family. Pt was AOx3.     Patient was ordered for 0.5mg dilaudid IVP after repeat examination by surgery team. Per radiology, recommended to obtain CXR or CT if high concern for intraperitoneal free air, but given patient's GOC for comfort measures only and level of agitation and pain, not pursued overnight.     Surgery team to continue to monitor clinical status. Discussed with overnight [place] resident Dr. Quintanilla.     A team surgery  #[id number]

## 2025-04-21 NOTE — PROGRESS NOTE ADULT - PROBLEM SELECTOR PLAN 1
- Patient with hx of colon cancer (T4N1 w/ two synchronous lesions) s/p extended right hemicolectomy w/ ileocolic anastomosis., no adjuvant chemotherapy.  Now with concerns for peritoneal carcinomatosis  - Follows with Dr. Delvalle   - Oncology recommendations appreciated - "He is not a candidate for IV chemotherapy (although could possibly be a candidate for oral capecitabine) and he would like to avoid surgery if possible".  - Patient wishes to speak with Dr. Delvalle and his team; discussed with primary team to have oncology team follow up with patient
- Patient with hx of colon cancer (T4N1 w/ two synchronous lesions) s/p extended right hemicolectomy w/ ileocolic anastomosis., no adjuvant chemotherapy.  Now with concerns for peritoneal carcinomatosis  - Follows with Dr. Delvalle   - Oncology recommendations appreciated - "He is not a candidate for IV chemotherapy (although could possibly be a candidate for oral capecitabine) and he would like to avoid surgery if possible".  - Patient now comfort measures only
- Patient with hx of colon cancer (T4N1 w/ two synchronous lesions) s/p extended right hemicolectomy w/ ileocolic anastomosis., no adjuvant chemotherapy.  Now with concerns for peritoneal carcinomatosis  - Follows with Dr. Delvalle   - Oncology recommendations appreciated - "He is not a candidate for IV chemotherapy (although could possibly be a candidate for oral capecitabine) and he would like to avoid surgery if possible".  - Patient wishes to speak with Dr. Delvalle and his team; he is open to other treatment options if offered
resolved  will continue to monitor
resolved  will continue to monitor
unclear etiology  agree with CT chest/abdomen/pelvis   discussed with surgery   consider blood cultures if persistent
resolved  will continue to monitor
resolved  will continue to monitor
likely secondary to acute cholecystitis   CT chest/abdomen/pelvis noted  discussed with surgery   awaiting ID consultation  likely will need to escalate antibiotics   Recommend blood cultures
- Patient with hx of colon cancer (T4N1 w/ two synchronous lesions) s/p extended right hemicolectomy w/ ileocolic anastomosis., no adjuvant chemotherapy.  Now with concerns for peritoneal carcinomatosis  - Follows with Dr. Delvalle   - Oncology recommendations appreciated - "He is not a candidate for IV chemotherapy (although could possibly be a candidate for oral capecitabine) and he would like to avoid surgery if possible".  - Patient now comfort measures only
resolved  will continue to monitor
ID consultation appreciated  continue antibiotics  agree with comfort care
ID consultation appreciated  off antibiotics  agree with comfort care
resolved  will continue to monitor
- Patient with hx of colon cancer (T4N1 w/ two synchronous lesions) s/p extended right hemicolectomy w/ ileocolic anastomosis., no adjuvant chemotherapy.  Now with concerns for peritoneal carcinomatosis  - Follows with Dr. Delvalle   - Oncology recommendations appreciated - "He is not a candidate for IV chemotherapy (although could possibly be a candidate for oral capecitabine) and he would like to avoid surgery if possible".  - Patient wishes to follow up with Dr. Delvalle outpatient to discuss his care

## 2025-04-21 NOTE — PROGRESS NOTE ADULT - SUBJECTIVE AND OBJECTIVE BOX
Date of Service  : 4/21/2025   Indication for Geriatrics and Palliative Care Services: goals of care    INTERVAL HPI/OVERNIGHT EVENTS:  Palliative care reconsulted for symptom management . Per chart review, patient on 1:1 for Suicidal ideation.     SUBJECTIVE AND OBJECTIVE:  Patient seen and examined at bedside. Patient AAOx3. Patient states he wishes to be peaceful and not be in pain. He states he has pain in his abdomen that is severe, declined to elaborate further on pain quality/quantity. Also states he is very nauseous.   Per bedside PCA who is sitting with patient for 1:1 for suicidal ideation, patient has been nauseous this AM and has emesis episodes this AM     Allergies    aspirin (Unknown)  penicillin (Unknown)    Intolerances    MEDICATIONS  (STANDING):  acetaminophen   IVPB .. 1000 milliGRAM(s) IV Intermittent every 6 hours  chlorhexidine 2% Cloths 1 Application(s) Topical daily  influenza  Vaccine (HIGH DOSE) 0.5 milliLiter(s) IntraMuscular once  melatonin 6 milliGRAM(s) Oral at bedtime  metoclopramide Injectable 10 milliGRAM(s) IV Push every 8 hours  pantoprazole  Injectable 40 milliGRAM(s) IV Push daily    MEDICATIONS  (PRN):  glycopyrrolate Injectable 0.2 milliGRAM(s) IV Push every 6 hours PRN increased oral secretions  haloperidol    Injectable 0.5 milliGRAM(s) IV Push every 6 hours PRN Agitation  HYDROmorphone  Injectable 0.2 milliGRAM(s) IV Push every 4 hours PRN Moderate Pain (4 - 6)  HYDROmorphone  Injectable 0.5 milliGRAM(s) IV Push every 4 hours PRN Severe Pain (7 - 10)  LORazepam   Injectable 0.25 milliGRAM(s) IV Push every 6 hours PRN refractory nausea/agitation  ondansetron Injectable 4 milliGRAM(s) IV Push every 6 hours PRN Nausea and/or Vomiting  sodium chloride 0.9% lock flush 10 milliLiter(s) IV Push every 1 hour PRN Pre/post blood products, medications, blood draw, and to maintain line patency      ITEMS UNCHECKED ARE NOT PRESENT    PRESENT SYMPTOMS: [ ]Unable to self-report due to altered mental status- see [ ] CPOT [ ] PAINADS [ ] RDOS  Source if other than patient:  [ ]Family   [ ]Team     Pain:  [ x]yes [ ]no  QOL impact - severe  Location -    abdomen                 Aggravating factors - declined to further elaborate   Quality -declined to further elaborate   Radiation - back   Timing- constant   Severity (0-10 scale): "severe"   Minimal acceptable level / Pain Goal (0-10 scale): declined to further elaborate     Dyspnea:                           [ ]Mild [ ]Moderate [ ]Severe  Anxiety:                             [ ]Mild [ ]Moderate [ ]Severe  Agitation:                          [ ]Mild [ ]Moderate [ ]Severe  Fatigue:                             [ ]Mild [ ]Moderate [ ]Severe  Nausea:                             [ ]Mild [ ]Moderate [x ]Severe  Loss of appetite:              [ ]Mild [ ]Moderate [ ]Severe  Constipation:                   [ ]Mild [ ]Moderate [ ]Severe  Diarrhea:                          [ ]Mild [ ]Moderate [ ]Severe    CPOT:    https://www.[email or web address]/getattachment/[email or web address]-[email or web address]/Critical-Care-Pain-Observation-Tool-(CPOT)    PCSSQ[Palliative Care Spiritual Screening Question]   Severity (0-10):  Score of 4 or > indicate consideration of Chaplaincy referral.  Chaplaincy Referral: [ ] yes [ ] refused [ ] following [x ] deferred    Caregiver [place]? : [ ] yes [ ] no [ ] Declined [x ] Deferred              Social work referral [ ] Patient & Family Centered Care Referral [ ]     Anticipatory Grief present?:  [ ] yes [ ] no  [ x] Deferred                  Social work referral [ ] Chaplaincy Referral[ ] Caregiver Support Referral [ ]    Other Symptoms:  [ x]All other review of systems negative   [ ] Unable to obtain due to poor mentation     PHYSICAL EXAM:  Vital Signs Last 24 Hrs  T(C): 36.8 (21 Apr 2025 13:02), Max: 36.8 (21 Apr 2025 09:04)  T(F): 98.2 (21 Apr 2025 13:02), Max: 98.3 (21 Apr 2025 09:04)  HR: 93 (21 Apr 2025 13:02) (79 - 93)  BP: 117/56 (21 Apr 2025 13:02) (117/56 - 141/65)  BP(mean): 75 (21 Apr 2025 13:02) (75 - 85)  RR: 17 (21 Apr 2025 13:02) (17 - 18)  SpO2: 94% (21 Apr 2025 13:02) (94% - 100%)    Parameters below as of 21 Apr 2025 13:02  Patient On (Oxygen Delivery Method): room air      GENERAL:   [x]Alert  [ x]Oriented x3   [ ]Lethargic   [ ]Unarousable  [x]Verbal  [ ]Non-Verbal  [x] No Distress  Behavioral:   [x ] Anxiety  [ ] Delirium [ ] Agitation [] Calm  [] Other   HEENT:  [x]Normal  [] Temporal Wasting  [ ]Dry mouth   [ ]ET Tube/Trach  [ ]Oral lesions  [ ] Mucositis  PULMONARY: normal respiratory effort, no accessory muscle use   []Clear [ ]Tachypnea  [ ]Audible excessive secretions   [ ]Rhonchi        [ ]Right [ ]Left [ ]Bilateral  [ ]Crackles        [ ]Right [ ]Left [ ]Bilateral  [ ]Wheezing     [ ]Right [ ]Left [ ]Bilateral  [ ]Diminished breath sounds [ ]right [ ]left [ ]bilateral  CARDIOVASCULAR:    [x]Regular [ ]Irregular [ ]Tachy  [ ]Bart [ ]Murmur [ ]Other  GASTROINTESTINAL:  [x]Soft  [ x]Distended mildly  []+BS  []Non tender [x ]Tender  [ ]PEG [ ]OGT/ NGT  Last BM: 4/20  GENITOURINARY:  [ ]Normal [ ] Incontinent   [ ]Oliguria/Anuria   [x]Gill  MUSCULOSKELETAL:   [ ]Normal   [x ]Weakness  [x]Bed/Wheelchair bound [ ]Edema  [  ] amputation  [  ] contraction  NEUROLOGIC:   [x]No focal deficits  [ ]Cognitive impairment  [ ]Dysphagia [ ]Dysarthria [ ]Paresis [ ]Other   SKIN: See Nursing Skin Assessment for further details  [ ]Normal    [ ]Rash  [ ]Pressure ulcer(s)       Present on admission [ ]y [ ]n   [  ]  Wound    [  ] hyperpigmentation    CRITICAL CARE:  [ ]Shock Present  [ ]Septic [ ]Cardiogenic [ ]Neurologic [ ]Hypovolemic  [ ]Vasopressors [ ]Inotropes  [ ]Respiratory failure present [ ]Mechanical Ventilation [ ]Non-invasive ventilatory support [ ]High-Flow   [ ]Acute  [ ]Chronic [ ]Hypoxic  [ ]Hypercarbic [ ]Other  [ ]Other organ failure     LABS:  reviewed                                  RADIOLOGY & ADDITIONAL STUDIES: reviewed     Protein Calorie Malnutrition Present: [ ]mild [ ]moderate [x ]severe [ ]underweight [ ]morbid obesity  https://www.andeal.org/vault/7314/web/files/ONC/Table_Clinical%20Characteristics%20to%20Document%20Malnutrition-White%20JV%20et%20al%975210.pdf    Height (cm): 172.7 (03-22-25 @ 10:19)  Weight (kg): 59 (03-21-25 @ 17:07), 61.19 (03-01-25 @ 15:28), 61.2 (11-27-24 @ 04:28)  BMI (kg/m2): 19.8 (03-22-25 @ 10:19)    [ ]PPSV2 < or = 30%  [ ]significant weight loss [ ]poor nutritional intake [ ]anasarca   [ ]Artificial Nutrition    REFERRALS:   [ ]Chaplaincy  [ ]Hospice  [ ]Child Life  [ ]Social Work  [x ]Case management [ ]Holistic Therapy

## 2025-04-21 NOTE — PROGRESS NOTE ADULT - PROBLEM SELECTOR PLAN 9
- 4/17- MOLST completed with patient to reflect his wishes for DNR/ DNI. No non-invasive ventilation. NO feeding tube. Comfort Measures only.

## 2025-04-21 NOTE — PROGRESS NOTE ADULT - SUBJECTIVE AND OBJECTIVE BOX
TEAM [ A ] Surgery Daily Progress Note  =====================================================    SUBJECTIVE: Patient seen and examined at bedside on AM rounds. Patient reporting nausea and abd pain. Comfort Red diet, later vomited in the AM after rounds. -Flatus/-BM. OOB/Amublating as tolerated. Pt still endorsing SI. On 1:1    ALLERGIES:  aspirin (Unknown)  penicillin (Unknown)      --------------------------------------------------------------------------------------    MEDICATIONS:    Neurologic Medications  acetaminophen   Oral Liquid .. 1000 milliGRAM(s) Oral every 6 hours  haloperidol    Injectable 0.5 milliGRAM(s) IV Push every 6 hours PRN Agitation  HYDROmorphone  Injectable 0.2 milliGRAM(s) IV Push every 4 hours PRN Moderate Pain (4 - 6)  HYDROmorphone  Injectable 0.5 milliGRAM(s) IV Push every 4 hours PRN Severe Pain (7 - 10)  melatonin 6 milliGRAM(s) Oral at bedtime PRN Sleep  ondansetron Injectable 4 milliGRAM(s) IV Push every 8 hours PRN Nausea and/or Vomiting    Respiratory Medications    Cardiovascular Medications    Gastrointestinal Medications  pantoprazole   Suspension 40 milliGRAM(s) Oral before breakfast  sodium chloride 0.9% lock flush 10 milliLiter(s) IV Push every 1 hour PRN Pre/post blood products, medications, blood draw, and to maintain line patency    Genitourinary Medications    Hematologic/Oncologic Medications  influenza  Vaccine (HIGH DOSE) 0.5 milliLiter(s) IntraMuscular once    Antimicrobial/Immunologic Medications    Endocrine/Metabolic Medications    Topical/Other Medications  chlorhexidine 2% Cloths 1 Application(s) Topical daily    --------------------------------------------------------------------------------------    VITAL SIGNS:  T(C): 36.8 (04-21-25 @ 09:04), Max: 36.8 (04-20-25 @ 13:05)  HR: 93 (04-21-25 @ 09:04) (79 - 107)  BP: 141/65 (04-21-25 @ 09:04) (101/54 - 141/65)  RR: 18 (04-21-25 @ 09:04) (15 - 18)  SpO2: 97% (04-21-25 @ 09:04) (94% - 100%)  --------------------------------------------------------------------------------------    EXAM    General: Appears nauseated, resting in bed  Cardiac: regular rate, warm and well perfused  Respiratory: Nonlabored respirations, normal cw expansion.  Abdomen: soft, some tenderness epigastric region, distended.    --------------------------------------------------------------------------------------    LABS    --------------------------------------------------------------------------------------    INS AND OUTS:    04-20-25 @ 07:01  -  04-21-25 @ 07:00  --------------------------------------------------------  IN: 240 mL / OUT: 380 mL / NET: -140 mL    04-21-25 @ 07:01  -  04-21-25 @ 10:03  --------------------------------------------------------  IN: 0 mL / OUT: 100 mL / NET: -100 mL      --------------------------------------------------------------------------------------

## 2025-04-21 NOTE — PROGRESS NOTE ADULT - PROBLEM SELECTOR PLAN 3
- CT 4/16- Findings concerning for gangrenous cholecystitis, similar to prior exam.  - Infectious Disease recommendations appreciated   - Per primary surgery team's discussion with patient, he has been refusing further workup of gallbladder to evaluate for cholecystitis (refer to surgery note on 4/17)   - per [facility] discussion, patient now comfort measures only

## 2025-04-21 NOTE — BH CONSULTATION LIAISON PROGRESS NOTE - NSBHCHARTREVIEWVS_PSY_A_CORE FT
Vital Signs Last 24 Hrs  T(C): 36.8 (21 Apr 2025 13:02), Max: 36.8 (21 Apr 2025 09:04)  T(F): 98.2 (21 Apr 2025 13:02), Max: 98.3 (21 Apr 2025 09:04)  HR: 93 (21 Apr 2025 13:02) (79 - 107)  BP: 117/56 (21 Apr 2025 13:02) (101/54 - 141/65)  BP(mean): 75 (21 Apr 2025 13:02) (75 - 85)  RR: 17 (21 Apr 2025 13:02) (17 - 18)  SpO2: 94% (21 Apr 2025 13:02) (94% - 100%)    Parameters below as of 21 Apr 2025 13:02  Patient On (Oxygen Delivery Method): room air

## 2025-04-21 NOTE — BH CONSULTATION LIAISON PROGRESS NOTE - NSBHFUPINTERVALHXFT_PSY_A_CORE
Pt previously seen by psychiatry for agitation/delirium, now reconsulted for SI in context of pain. Has not required prns for agitation since initial evaluation.  Pt seen this AM, 1:1 at bedside. Appears withdrawn, uncomfortable. Asks when he can 'go home to die'. When asked to elaborate pt does not verbalize active SI/I/P, rather he clarifies he wishes to be allowed to 'die in peace', naturally. Reports persistent abdominal pain. Per 1:1 pt w/ poor PO intake d/t nausea/vomiting; near end of interview noted to be gagging/getting ready to vomit. AAOx3.

## 2025-04-21 NOTE — PROGRESS NOTE ADULT - ATTENDING COMMENTS
Abd soft, mildly distended, nontender  Denies nausea  CLD today
CT C/A/P performed for leukocytosis w multiple findings including poss PNA and partial obstruction at level of bypass  NGT replaced w return of bilious content today  Clinical picture not consistent w acute kim/cholangitis or rectal perforation as described on CT  WBC 23K today    ID consult for antibiotics plan and further workup  Will check CT w rectal contrast to rule out a rectal perforation  Continue w NGT decompression for dysmotility in setting of long standing obstruction. Standing reglan ordered   R/o C diff
Dc planning for hospice care   Abd distended, nontender   Continue diet as tolerated, pain control PRN
Having multiple BMs   Abd soft, tolerated LRD for breakfast  WBC slowly uptrending, continue to monitor  Continue LRD, start to wean off TPN tomorrow if continues to do well w diet  Rehab planning
Pt seen/examined, agree with above
Abd soft, mildly distended  No GI function. On TPN  Patient once again states that he wants to go home as soon as possible. Unlikely to qualify for any form of chemotherapy in the setting of unresolved malignant SBO  Options for going home right away are limited given that he lives alone and lacks social support  A palliative bypass +/- venting G tube is an option to going home eventually barring any complications and recovery status   Re-engage oncology as patient wishes to speak to them
Abd soft, nondistended, NGT output less feculent  Continue w NGT decompression and TPN today  Await GI function, PT/OOB  Wean off steroids   Plan for NGT clamp trial tomorrow if exam remains ok
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
I agree with the above history, physical examination, chief complaint/diagnosis, and plan, which I have reviewed and edited where appropriate.  I agree with notes/assessment and detailed interval history of health care providers on my service.  I have seen and examined the patient.  I reviewed the laboratory and available data and agree with the history, physical assessment and plan.  I reviewed and discussed with all consultants, house staff and PA's.  The Nutrition Support Team (NST) discusses on an ongoing basis with the primary team and all consultants, House staff and PA's to have a permanent risk benefit analyses on all decisions and coordinating care.  I was physically present for the key portions of the evaluation and management (E/M) service provided.  89M hx BPH s/p extended right hemicolectomy w/ ileocolic anastomosis with high grade SBO w/ RUQ TP at a spiculated soft tissue lesion concerning for peritoneal carcinomatosis with severe calorie protein malnutrition and prolonged NPO.  PHYSICAL EXAM  General NGT in place  Chest: clear  CV: RR  Abdomen: Soft, nontender, slightly distended today  Extremities: warm  : Gill  labs reviewed - electrolytes adjusted   TPN:  1.5L/1480 kcal/day  monitor fingersticks, obtain daily weights  Severe protein calorie malnutrition being optimized with TPN
Remains inconsistent in his wishes  States he wants surgery now. Was able to verbalize the risks and benefits that were explained to him including bleeding, infection, anastomotic leak, multiorgan system failure, and possibly death. He understands that he will need rehab after surgery and he is hoping he will go home eventually. I explained that recovery is expected to be months long and that I cannot predict if he will ever be strong enough to live alone at home again.   He is booked for diagnostic lap, poss bowel bypass, poss gastrostomy tube, poss open on Tuesday  Continue TPN  Pending ethics consult  Dr. Redd contacted for med clearance
Reports of flatus and small BM per nursing  Abd soft, nondistended  NGT removed   Sips/chips tonight
Well appearing, NAD  Abd softly distended, nontender, still w GI function and tolerating LRD  CT C/A/P w PO and IV contrast for WBC of 20. Check Cdiff if stools are loose again  Wean TPN
DATE OF SERVICE: 03-23-25 @ 15:07    No events, thinks he had some gas, minimal ngt output  abd soft nt/nd    Gastrografin today, if passes will remove NGT  Palliative consult
Feels hungry but denies GI fx  Abd soft, nondistended  Once again we discussed options for a surgical bypass vs a venting PEG +/- hospice. His goals are still the same to go home as soon as possible. I expressed that a surgery would give him the best chance at being able to eat again but would not prevent a recurrence of another malignant SBO or prolong his life, and would most likely mean extending his hospital stay with plans for rehab afterwards. A venting PEG with home hospice may be more aligned with his goals of care if he qualifies, but would mean limited PO intake.
NGT output low, denies flatus   Abd soft, nondistended, nontender  Plan for gastrograffin challenge tomorrow
No flatus. NGT replaced overnight  Abd soft, mild/moderate distension  Continue malignant SBO protocol  If no improvement after the weekend, will start TPN and consider surgery vs venting PEG vs palliative care
Patient going back and forth on decision to pursue surgery vs PEG  Again I explained the risks/benefits of both. The primary benefit of a venting PEG is to get him home as soon as possible, which has been his primary goal. However, I once again explained that he cannot eat solid foods w a venting PEG   A surgical bypass will give him the best chance at eating again but he will expect a much longer recovery that will likely preclude him from going home for several weeks as well as accepting all the risks of an abdominal surgery  He now wants a venting PEG again
Pt seen/examined, agree with above
States that he "wants to die"  Reiterated that team is working on inpatient hospice placement  Reports abdominal pain that is controlled w Dilaudid  Continue hospice planning
Pt seen/examined, agree with above  SB protocol with contrast in dilated SB loops  If no progress of contrast with resolution of SBO, will require palliative care and PEG.
WBC remains high today. Patient refusing further workup for the gallbladder to evaluate for cholecystitis  Abd nontender, mildly distended. Denies nausea since NGT removed  Bowel dysmotility in setting of longstanding obstruction. Bypass appears open on scan. Continue standing [facility]  Palliative care to discuss hospice options per patient's wishes
Failed gastrograffin challenge this weekend, no GI fx  Abd soft, NGT in place  Discussed options for a surgical bypass vs venting PEG/hospice. I explained that neither options will prolong his life or cure his cancer. A surgery would give him a chance at eating again, but would come at risks of bleeding, infection, anastomotic leak, prolonged hospital stay especially in the setting of malnutrition. He expressed that his goal is to go home as soon as possible.  Re-engage palliative care team for GOC discussion and potentially home hospice as an option
Patient was seen and evaluated with Dr. Martinez, Geriatric Medicine Fellow, during bedside rounds.   Agree with above findings and recommendations, edited documentation as appropriate to reflect the plan of care discussed with patient and myself with the following additions:    89M hx BPH (chronic indwelling Gill), HLD, B/L inguinal hernia repair with known left recurrence, colon cancer (T4N1 w/ two synchronous lesions) s/p extended right hemicolectomy w/ ileocolic anastomosis on 12/23 (not on chemo), recent presentation 2/24 for acute diastolic heart failure, presenting with abdominal pain, found to have high grade SBO w/ RUQ TP at a spiculated soft tissue lesion concerning for peritoneal carcinomatosis.   Palliative Care consulted for complex decision making  related to goals of care discussions     Overnight, TPN was initiated.   Patient seen and examined at bedside. Denies any pain, nausea. States he wants to go home.    Patient AAOx3 and states he is aware that he needs to consider next steps for his obstruction due to his cancer. He states he is still contemplating as to which procedure to pursue (venting peg vs bypass) as he is still weighing the risks of both and potential outcomes of both. He also brought up that hospice option was discussed. When inquired, he did know what hospice was. Introduced hospice is a philosophy of care when no further DMT would be pursued for cancer to elevate his quality of life.   Patient again states he wishes to speak with Dr. Delvalle and his team. Reviewed with him that per inpatient oncology team who had seen him on 3/22 - he is not a candidate for IV chemotherapy but could possibly be a candidate for oral regimen. Patient states he would be open to this if recommended by Dr. Delvalle, whom he wishes to speak to further.   Attempted to introduce advanced directives for code status preferences in event of cardiopulmonary arrest as patient stated a few times during encounter that he needs to go home to speak to a  regarding his burial plans as he wants to ensure he is buried together with his wife and when his daughter dies for her to be buried with them as well. Patient initially stated he never considered advanced directives but when asked again stated that he would want all treatments necessary.     Case reviewed with primary team. Per discussion with primary team afterwards, patient opting for venting PEG.      Thank you for allowing us to participate in your patient's care.  Patient to be discharged from [facility] team consult service today.   Discussed with primary team.  Please re-consult if we can be of further assistance, page [phone].

## 2025-04-21 NOTE — PROVIDER CONTACT NOTE (OTHER) - ASSESSMENT
Pt VS were obtained and recorded. VSS. Pt shows no dificulty breathing and shows no s/s of distress.

## 2025-04-21 NOTE — PROGRESS NOTE ADULT - PROBLEM SELECTOR PROBLEM 4
AMS (altered mental status)
Severe protein-calorie malnutrition
AMS (altered mental status)
Severe protein-calorie malnutrition
AMS (altered mental status)
Pain
Severe protein-calorie malnutrition
Severe protein-calorie malnutrition

## 2025-04-21 NOTE — PROVIDER CONTACT NOTE (OTHER) - RECOMMENDATIONS
Notify provider
Pt assisted with linen change and am care. PCA at bedside. HOB raised to 50 degrees. Pillows used to position patient. Aspiration precautions.
MD to assess.
requesting to order constant observation if NGT need to place back in
MD to assess.
Notify provider
Please come see patient

## 2025-04-21 NOTE — PROVIDER CONTACT NOTE (OTHER) - NAME OF MD/NP/PA/DO NOTIFIED:
MD De Leon
MD Edward De Leon
PA Shayy Bynum
GINNY Rodriguez / A gely
Edward De Leon MD
Edward De Leon MD
Vance Starkey MD
Fletcher Lucas #[id number]
Edward De Leon MD

## 2025-04-21 NOTE — PROGRESS NOTE ADULT - REASON FOR ADMISSION
High grade SBO
High grade mSBO
High grade SBO
High grade mSBO
High grade SBO
High grade SBO
High grade mSBO
High grade SBO
High grade SBO
High grade mSBO
High grade SBO
High grade mSBO
High grade SBO

## 2025-04-21 NOTE — BH CONSULTATION LIAISON PROGRESS NOTE - NSBHASSESSMENTFT_PSY_ALL_CORE
Patient is a 89M hx BPH,  HLD, B/L inguinal hernia repair with known left recurrence, colon cancer (T4N1 w/ two synchronous lesions) s/p extended right hemicolectomy w/ ileocolic anastomosis on 12/23 (not on chemo), presenting with abdominal pain, found to have high grade SBO w/ RUQ TP at a spiculated soft tissue lesion concerning for peritoneal carcinomatosis,  Received PRN Zyprexa 2.5 mg x2. Psychiatry initially consulted for agitation in March, later reconsulted in April for SI.     Initial eval 3/25 - Patient seen at bedside, A&Ox3, some insight into his medical condition, he does not recall being agitated and requiring PRNs. Calm, pleasant, cooperative, perseverating, endorsing good mood. No overt mood symptoms, psychosis, denies SI/HI, denies VH/AH, no rigidity/cogwheeling on exam.    4/21 - seen this AM, 1:1 at bedside, appears withdrawn, uncomfortable. Asks when he can 'go home to die', does not verbalize active SI/I/P, rather he wishes to be allowed to 'die in peace', naturally. AAOx3. persistent abdominal pain, nausea/vomiting. Pt w/ persistent passive SI in context of pain/general medical condition. No active SI/I/P. Ok to D/C 1:1. Per primary team plan may be for ?inpatient hospice; regardless of dispo, pt is for comfort care thus would prioritize optimizing pt's pain control to minimize distress.      PLAN:  - Obs: pt w/ passive SI however may DC 1:1 CO  - Avoid medications with anticholinergic side effects I/s/o SBO  [x] melatonin standing (ordered)  - PRNs:  	- for sleep - Ramelteon 8mg qHS if available, otherwise consider trazodone 25mg qHS PRN  	- for agitation - Haldol 0.5 mg PO/IM/IV Q6H PRN for agitation, if refractory can give additional 0.5mg dose  - Attempt verbal redirection first if agitated    - Hold antipsychotics if QTc > 500   - Dispo: no psychiatric contraindications to discharge.

## 2025-04-21 NOTE — PROGRESS NOTE ADULT - PROBLEM SELECTOR PROBLEM 1
Hyponatremia
Leucocytosis
Leucocytosis
Hyponatremia
Leucocytosis
Leucocytosis
Colonic cancer
Hyponatremia
Colonic cancer

## 2025-04-21 NOTE — PROVIDER CONTACT NOTE (OTHER) - REASON
Patient with /39, HR 57
NGT has no output
HR 50 and /53
Pt vomited.
Pt only arousable to pain
low urine output from FC = 285cc since 9am
Episode of emesis/spit up
Hypotension

## 2025-04-21 NOTE — CHART NOTE - NSCHARTNOTEFT_GEN_A_CORE
Patient becoming more symptomatic with persistent nausea and vomiting and increasing abdominal pain. Per palliative evaluation, recommend current prn nausea and pain regimen and will observe pt's prn requirements over next 24-48hrs, in which case patient may need to transition onto a continuous drip for comfort needs within GOC. Per psychiatry eval, agree that patient is not suicidal and just wanting to die naturally with as little pain and nausea as possible. Psych recommend to remove the 1:1 observation and continue with prn haldol as necessary. Per primary team and CM conversation with patient, patient wishes to transition to inpatient hospice if possible. CM to send out referrals today.

## 2025-04-21 NOTE — BH CONSULTATION LIAISON PROGRESS NOTE - NSBHATTESTCOMMENTATTENDFT_PSY_A_CORE
agree with above, patient says he wishes to "die peacefully", does not want to actively kill himself but wishes to go to hospice or home and "go naturally" because "I can't take this pain". Denies active SI and patient is indeed hospice appropriate and no DMT available, high level of suffering present given N/V, abd pain. No psych c/i to discharge, no psych c/i to removal of 1:1, agree with transfer to hospice when able.

## 2025-04-21 NOTE — PROGRESS NOTE ADULT - ASSESSMENT
89M with PMHx BPH (indwelling Gill), HLD, bilateral inguinal hernia repair (left recurrence), colon CA (T4N1) s/p extended right hemicolectomy (12/23/24) admitted for high-grade SBO with suspected peritoneal carcinomatosis; now s/p small bowel bypass (4/8) complicated by ileus and CT-documented gangrenous cholecystitis; transitioned to CMO/DNR/DNI with comfort measures and PRN analgesia.    • Care Coordination: Transitioned to comfort care; Medicaid pending; cannot financially afford private hire for 24/7 support  • Palliative Care: Comfort measures only; no further diagnostics/interventions.    • Psych: 1:1 monitoring for SI with PRN Haldol (0.5–1?mg) for agitation, melatonin 6?mg qHS, and sleep agents as needed.    • Infectious Disease: Antibiotics stopped per GOC; monitor for infection without escalation.    • Internal Medicine: Maintain supportive, minimal intervention approach per comfort care directives.    P:  - Pain control: IV access restored, IV tylenol and dilaudid  - Diet: Regular   - Gill: chronic  - Activity: Ambulate with assistance  - DVT ppx: SCDs (no pharmacologic prophylaxis ordered per GOC)  - possible perc kim tube for abdominal pain, ?if within GOC  - f/u with psych and palliative today for multidisciplinary approach to comfort care medication management  - zofran for nausea  - Per pt, no escalation for nausea, no NGT placement  - f/u psych for persistent SI; prn haldol added    A Team  [phone]

## 2025-04-21 NOTE — PROGRESS NOTE ADULT - PROBLEM SELECTOR PLAN 4
likely septic encephalopathy at this time  continue to monitor
resolved  continue to monitor
- In past 24 hours, received PO Oxy IR 2.5mg x3 and IV Tylenol x2  Patient now with nausea/ emesis - unable to tolerate PO medications  - IV Dilaudid 0.2mg q2 PRN moderate pain  - IV Dilaudid 0.5mg q2 PRN severe pain
at baseline  appears well controlled loriented  agreeable fo rcomfort care
resolved  continue to monitor
Nutrition recommendations appreciated
resolved  continue to monitor
resolved  continue comfort care
resolved  continue to monitor
Nutrition recommendations appreciated
Nutrition recommendations appreciated
resolved  continue to monitor
Nutrition recommendations appreciated

## 2025-04-21 NOTE — PROVIDER CONTACT NOTE (OTHER) - ACTION/TREATMENT ORDERED:
Ok to wait for colorectal team to flush NGT when they do rounds.
MD aware. Ordering an abdominal x-ray. Taking pt off of a full liquid diet and putting pt on a clear liquid diet. Ordering Reglan. Plan of care ongoing.
Awaiting further instructions
GINNY Bynum of A team notified. IV emesis ordered.
will speak with team
MD made aware.
No new interventions ordered at this time.
MD aware. Plan of care ongoing.
Provider came to bedside to assess pt. Pt responsive to pain, but not following instruction. MD De Leon brought their senior, when they came to reassess pt, he verbalized name, AOx1, followed instructio

## 2025-04-21 NOTE — BH CONSULTATION LIAISON PROGRESS NOTE - CURRENT MEDICATION
MEDICATIONS  (STANDING):  acetaminophen   IVPB .. 1000 milliGRAM(s) IV Intermittent every 6 hours  chlorhexidine 2% Cloths 1 Application(s) Topical daily  influenza  Vaccine (HIGH DOSE) 0.5 milliLiter(s) IntraMuscular once  metoclopramide Injectable 10 milliGRAM(s) IV Push every 8 hours  pantoprazole  Injectable 40 milliGRAM(s) IV Push daily    MEDICATIONS  (PRN):  haloperidol    Injectable 0.5 milliGRAM(s) IV Push every 6 hours PRN Agitation  HYDROmorphone  Injectable 0.2 milliGRAM(s) IV Push every 4 hours PRN Moderate Pain (4 - 6)  HYDROmorphone  Injectable 0.5 milliGRAM(s) IV Push every 4 hours PRN Severe Pain (7 - 10)  LORazepam   Injectable 0.25 milliGRAM(s) IV Push every 6 hours PRN refractory nausea/agitation  melatonin 6 milliGRAM(s) Oral at bedtime PRN Sleep  ondansetron Injectable 4 milliGRAM(s) IV Push every 6 hours PRN Nausea and/or Vomiting  sodium chloride 0.9% lock flush 10 milliLiter(s) IV Push every 1 hour PRN Pre/post blood products, medications, blood draw, and to maintain line patency

## 2025-04-21 NOTE — PROGRESS NOTE ADULT - PROBLEM SELECTOR PLAN 10
Case reviewed with primary team and care coordination team   Discussed with primary team, patient likely needs to be off 1:1 observation prior to further disposition planning. Appreciate Care coordination team in following up with patient for disposition planning     Thank you for allowing us to participate in your patient's care. Please page [phone] for any questions/concerns.

## 2025-04-21 NOTE — PROGRESS NOTE ADULT - PROBLEM SELECTOR PROBLEM 5
Goals of care, counseling/discussion
Colonic cancer
Nausea & vomiting
Goals of care, counseling/discussion
Colonic cancer
Goals of care, counseling/discussion
Colonic cancer
Goals of care, counseling/discussion
Colonic cancer

## 2025-04-21 NOTE — CHART NOTE - NSCHARTNOTEFT_GEN_A_CORE
NUTRITION FOLLOW UP NOTE    Pt seen for follow-up (malnutrition)     SOURCE: [] Patient [X] Medical record [X] RN/PCA [] Family/Caregiver []Patient unavailable []Patient inappropriate (disoriented, nonverbal, intubated/sedated) [] Other:    Medical Course: 89M with PMHx BPH (indwelling Gill), HLD, bilateral inguinal hernia repair (left recurrence), colon CA (T4N1) s/p extended right hemicolectomy (12/23/24) admitted for high-grade SBO with suspected peritoneal carcinomatosis; now s/p small bowel bypass (4/8) complicated by ileus and CT-documented gangrenous cholecystitis; transitioned to CMO/DNR/DNI with comfort measures and PRN analgesia.    Diet Prescription: Diet, Regular (04-17-25 @ 12:05)      Nutrition Course: Since last nutrition eval, pt transitioned off from TPN (as of 4/14) to low fiber diet. consistently poor (0-25%) intake throughout duration of admit. Now noted DNR/DNI comfort measures per patient wishes as of 4/17. Writer visited pt who did not wish to converse, stated he does not want anything. PCA at bedside reports pt does not wish to participate at this time, not doing well. Did not eat anything. Aggressive nutrition support interventions not warranted with current goal of care. Pt noted wishes to transition to inpatient Hospice. Recommend continuing comfort measures and pleasure feeds       Pertinent Medications: MEDICATIONS  (STANDING):  acetaminophen   IVPB .. 1000 milliGRAM(s) IV Intermittent every 6 hours  chlorhexidine 2% Cloths 1 Application(s) Topical daily  influenza  Vaccine (HIGH DOSE) 0.5 milliLiter(s) IntraMuscular once  melatonin 6 milliGRAM(s) Oral at bedtime  metoclopramide Injectable 10 milliGRAM(s) IV Push every 8 hours  pantoprazole  Injectable 40 milliGRAM(s) IV Push daily    MEDICATIONS  (PRN):  glycopyrrolate Injectable 0.2 milliGRAM(s) IV Push every 6 hours PRN increased oral secretions  haloperidol    Injectable 0.5 milliGRAM(s) IV Push every 6 hours PRN Agitation  HYDROmorphone  Injectable 0.2 milliGRAM(s) IV Push every 4 hours PRN Moderate Pain (4 - 6)  HYDROmorphone  Injectable 0.5 milliGRAM(s) IV Push every 4 hours PRN Severe Pain (7 - 10)  LORazepam   Injectable 0.25 milliGRAM(s) IV Push every 6 hours PRN refractory nausea/agitation  ondansetron Injectable 4 milliGRAM(s) IV Push every 6 hours PRN Nausea and/or Vomiting  sodium chloride 0.9% lock flush 10 milliLiter(s) IV Push every 1 hour PRN Pre/post blood products, medications, blood draw, and to maintain line patency    [] Relevant notes on medications:     Pertinent Labs:  04-17 Phos 3.7 mg/dL 04-17 Alb 2.1 g/dL[L] 04-02 Chol --    LDL --    HDL --    Trig 104 mg/dL    Previous Nutrition Diagnosis:   [X] Severe malnutrition in the context of acute illness or injury, ongoing [X] Unintended weight loss, ongoing   New Nutrition Diagnosis: [X] not applicable     Education:  [X] Not applicable secondary to comfort care status      Interventions:   [X] Current medical condition precludes nutrition intervention at this time.  continue comfort measures/pleasure feeding per patient preference  Nutrition remains available, consult as needed     Monitor & Evaluate:  N/A- comfort care    Caitlin Pollard, MS, RD, CDN, CNSC pg [phone]  Also available on [employer] Teams

## 2025-04-21 NOTE — PROGRESS NOTE ADULT - PROBLEM/PLAN-1
DISPLAY PLAN FREE TEXT
Tranexamic Acid Counseling:  Patient advised of the small risk of bleeding problems with tranexamic acid. They were also instructed to call if they developed any nausea, vomiting or diarrhea. All of the patient's questions and concerns were addressed.

## 2025-04-21 NOTE — BH CONSULTATION LIAISON PROGRESS NOTE - NSBHCONSULTFOLLOWAFTERCARE_PSY_A_CORE FT
[facility] Outpatient services  For acute crisis: [facility]  [phone] [place] [address], [address] Floor, [place] [address]  [phone]  [email or web address]    [facility] [phone]

## 2025-04-21 NOTE — PROGRESS NOTE ADULT - PROBLEM SELECTOR PROBLEM 2
Severe protein-calorie malnutrition
SBO (small bowel obstruction)
SBO (small bowel obstruction)
Severe protein-calorie malnutrition
SBO (small bowel obstruction)
Severe protein-calorie malnutrition
Severe protein-calorie malnutrition
SBO (small bowel obstruction)
SBO (small bowel obstruction)

## 2025-04-21 NOTE — BH CONSULTATION LIAISON PROGRESS NOTE - NSBHCHARTREVIEWLAB_PSY_A_CORE FT
13.0   7.28  )-----------( 258      ( 25 Mar 2025 05:45 )             38.3   03-25    137  |  98  |  15  ----------------------------<  131[H]  4.0   |  27  |  0.77    Ca    8.7      25 Mar 2025 05:45  Phos  3.2     03-25  Mg     2.00     03-25

## 2025-04-21 NOTE — PROVIDER CONTACT NOTE (OTHER) - SITUATION
Pt vomiting black emesis and c/o abd pain. GINNY Bynum of A team notified. Pt's VS stable. Pt vomiting black emesis 4 times and c/o abd pain. GINNY Bynum of A team notified. Pt's VS stable.

## 2025-04-21 NOTE — PROGRESS NOTE ADULT - ATTENDING SUPERVISION STATEMENT
Resident
Fellow

## 2025-04-21 NOTE — PROVIDER CONTACT NOTE (OTHER) - DATE AND TIME:
12-Apr-2025 01:00
25-Mar-2025 18:00
25-Mar-2025 21:05
Ok - but then will need to come in for follow up after about 1 month on medication.    First opening is fine.    
Patient called stating last time she was in to see Becka Mcfarlane they had discussed starting a medication.  Patient states she has thought about it and would like to start the medication.  Patient did not give writer medication name.      [facility]    Patient can be reached at [phone]  
Patient stated understanding to plan of care. Patient stated that she will call after she receives the medication since it needs a prior auth through her insurance.  
03-Apr-2025 09:50
26-Mar-2025 02:13
25-Mar-2025 22:10
31-Mar-2025 10:15
21-Apr-2025 10:00
29-Mar-2025 02:00

## 2025-04-21 NOTE — PROGRESS NOTE ADULT - PROBLEM SELECTOR PROBLEM 6
Encounter for palliative care
Anxiety

## 2025-04-21 NOTE — PROGRESS NOTE ADULT - NS ATTEST RISK PROBLEM GEN_ALL_CORE FT
1. Number and complexity of problems addressed for this patient:    1.1 Moderate (At least 1)  [ ] 1 or more chronic illnesses with exacerbation, progression, or side effects of treatment  [ ] 2 or more stable chronic illnesses  [ ] 1 undiagnosed new problem with uncertain prognosis  [ ] 1 acute illness with systemic symptoms  [ ] 1 acute complicated injury  1.2 High (At least 1)   [ ] 1 or more chronic illnesses with severe exacerbation, progression, or side effects of treatment  [x ] 1 acute or chronic illnesses or injuries that may pose a threat to life or bodily function    2. Amount and/or Complexity of Data that was Reviewed and Analyzed for this case:       Moderate (1 out of 3)       High (2 out of 3)  2.1. (Any combination of 3 of the following)   [ x] Prior External notes were reviewed  [x ] Each test result was reviewed (see "LABS" and "RADIOLOGY & ADDITIONAL STUDIES" above)  [ ] The following tests were ordered and/or reviewed (Only count 1 point for ordering or reviewing a unique test):  	[ ]CBC  	[ ] Chemistry   	[ ] Imaging   	[ ] Other:   [ ] Assessment requiring an independent historian   		Name of historian and relationship:   2.2  [x ] Personally review and interpretation of  image or testing   2.3  [ x] Discussion of management or test interpretation with external physician/other qualified health care professional\appropriate source (not separately reported)    3. Risk of Complications and/or Morbidity or Mortality of for this Patient’s Management:  3.1 Moderate risk of morbidity from additional diagnostic testing or treatment (At least 1):   [ ] Prescription drug management   [ ] Decision regarding minor surgery, treatment, or procedure with identified patient or procedure risk factors  [ ] Decision regarding elective major surgery, treatment, or procedure without identified patient or procedure risk factors   [ ] Diagnosis or treatment significantly limited by social determinants of health   [ ] Other:   3.2 High risk of morbidity from additional diagnostic testing or treatment (At least 1):   [ ] Drug therapy requiring intensive monitoring for toxicity   [ ] Decision regarding elective major surgery, treatment, or procedure with identified patient or procedure risk factors   [ ] Decision regarding emergency major surgery, treatment, or procedure   [ ] Decision regarding hospitalization or escalation of hospital-level of care  [ x] Decision not to resuscitate, not to intubate, and comfort measures   [ ] Decision to proceed or not with artificial nutrition   [ ] Parenteral controlled substance  [ ] Other:
1. Number and complexity of problems addressed for this patient:    1.1 Moderate (At least 1)  [ ] 1 or more chronic illnesses with exacerbation, progression, or side effects of treatment  [ ] 2 or more stable chronic illnesses  [ ] 1 undiagnosed new problem with uncertain prognosis  [ ] 1 acute illness with systemic symptoms  [ ] 1 acute complicated injury  1.2 High (At least 1)   [ ] 1 or more chronic illnesses with severe exacerbation, progression, or side effects of treatment  [x ] 1 acute or chronic illnesses or injuries that may pose a threat to life or bodily function    2. Amount and/or Complexity of Data that was Reviewed and Analyzed for this case:       Moderate (1 out of 3)       High (2 out of 3)  2.1. (Any combination of 3 of the following)   [ x] Prior External notes were reviewed  [x ] Each test result was reviewed (see "LABS" and "RADIOLOGY & ADDITIONAL STUDIES" above)  [ ] The following tests were ordered and/or reviewed (Only count 1 point for ordering or reviewing a unique test):  	[ ]CBC  	[ ] Chemistry   	[ ] Imaging   	[ ] Other:   [ x] Assessment requiring an independent historian   		Name of historian and relationship: Bedside PCA Artie  2.2  [ ] Personally review and interpretation of  image or testing   2.3  [ x] Discussion of management or test interpretation with external physician/other qualified health care professional\appropriate source (not separately reported)    3. Risk of Complications and/or Morbidity or Mortality of for this Patient’s Management:  3.1 Moderate risk of morbidity from additional diagnostic testing or treatment (At least 1):   [ ] Prescription drug management   [ ] Decision regarding minor surgery, treatment, or procedure with identified patient or procedure risk factors  [ ] Decision regarding elective major surgery, treatment, or procedure without identified patient or procedure risk factors   [ ] Diagnosis or treatment significantly limited by social determinants of health   [ ] Other:   3.2 High risk of morbidity from additional diagnostic testing or treatment (At least 1):   [ ] Drug therapy requiring intensive monitoring for toxicity   [ ] Decision regarding elective major surgery, treatment, or procedure with identified patient or procedure risk factors   [ ] Decision regarding emergency major surgery, treatment, or procedure   [ ] Decision regarding hospitalization or escalation of hospital-level of care  [ ] Decision not to resuscitate, not to intubate, and comfort measures   [ ] Decision to proceed or not with artificial nutrition   [ x] Parenteral controlled substance  [ ] Other:

## 2025-04-21 NOTE — PROVIDER CONTACT NOTE (OTHER) - BACKGROUND
SBO
Patient admitted for abdominal pain
Pt admitted for abdominal pain, SBO. s/p NGT, removed.
Pt is a 89 year old male. Admitted for abdominal pain. PMH of colon cancer, bilateral inguinal hernia, GERD, heart failure, peripheral neuropathy, and BPH.
Patient admitted for bowel obstruction
Pt is admitted for SBO. PMH colon cancer, bilateral inguinal hernia, GERD, heart failure, and BPH.
Admitted for Abdl. pain, SBO
Patient admitted with SBO.
Pt on constant observation.

## 2025-04-21 NOTE — PROGRESS NOTE ADULT - PROBLEM SELECTOR PLAN 5
- Per bedside PCA and primary team, patient with nausea and emesis this AM   - XRAY Abdomen 4/20 - Gas-distended stomach.Otherwise nonobstructive bowel gas pattern.No free intraperitoneal air.  - Start IV [id number] q8 ATC  - IV Zofran 4mg q6 PRN nausea/emesis   - Start IV Ativan 0.25mg q2 PRN refractory nausea/ refractory anxiety or agitation

## 2025-04-21 NOTE — PROGRESS NOTE ADULT - PROVIDER SPECIALTY LIST ADULT
Colorectal Surgery
Infectious Disease
Nutrition Support
Surgery
Surgery
Colorectal Surgery
Infectious Disease
Infectious Disease
Internal Medicine
Internal Medicine
Nutrition Support
Colorectal Surgery
Infectious Disease
Nutrition Support
Surgery
Colorectal Surgery
Nutrition Support
Palliative Care
Surgery
Surgery
Colorectal Surgery
Internal Medicine
Internal Medicine
Palliative Care
Internal Medicine
Internal Medicine
Palliative Care
Internal Medicine
Palliative Care
Palliative Care

## 2025-04-21 NOTE — PROGRESS NOTE ADULT - PROBLEM SELECTOR PROBLEM 3
SBO (small bowel obstruction)
Cholecystitis
SBO (small bowel obstruction)
AMS (altered mental status)
Cholecystitis
AMS (altered mental status)
AMS (altered mental status)

## 2025-04-21 NOTE — PROGRESS NOTE ADULT - NUTRITIONAL ASSESSMENT
Severe protein calorie malnutrition in acute illness/ injury; secondary to poor appetite, weight loss >5% in 1 month and/or >7.5% in 3 months, caloric Intake <50% of nutrition needs >= 5 days, temporal wasting, severe loss of muscle mass/atrophy and loss of body fat stores.    Diet, Low Fiber (04-13-25 @ 08:17) [Active]    lipid, fat emulsion (Fish Oil and Plant Based) 20% Infusion 10.4 mL/Hr (10.4 mL/Hr) IV Continuous <Continuous>  Parenteral Nutrition - Adult 1 Each (42 mL/Hr) TPN Continuous <Continuous>, 04-14-25 @ 17:00, 17:00, Stop order after: 1 Days    **Greater than 50% of the encounter was spent counseling and/coordination of care on parenteral nutrition. 25 minutes were spent face to face with the patient.
Severe protein calorie malnutrition in acute illness/ injury; secondary to poor appetite, weight loss >5% in 1 month and/or >7.5% in 3 months, caloric Intake <50% of nutrition needs >= 5 days, temporal wasting, severe loss of muscle mass/atrophy and loss of body fat stores.    Diet, NPO (03-26-25 @ 05:26) [Active]    lipid, fat emulsion (Fish Oil and Plant Based) 20% Infusion 16.6 mL/Hr (16.6 mL/Hr) IV Continuous <Continuous>  Parenteral Nutrition - Adult 1 Each (63 mL/Hr) TPN Continuous <Continuous>, 04-03-25 @ 17:00, 17:00, Stop order after: 1 Days    **Greater than 50% of the encounter was spent counseling and/coordination of care on parenteral nutrition. 25 minutes were spent face to face with the patient.
Severe protein calorie malnutrition in acute illness/ injury; secondary to poor appetite, weight loss >5% in 1 month and/or >7.5% in 3 months, caloric Intake <50% of nutrition needs >= 5 days, temporal wasting, severe loss of muscle mass/atrophy and loss of body fat stores.    Diet, NPO (04-08-25 @ 19:31) [Active]    lipid, fat emulsion (Fish Oil and Plant Based) 20% Infusion 20.8 mL/Hr (20.8 mL/Hr) IV Continuous <Continuous>  Parenteral Nutrition - Adult 1 Each (75 mL/Hr) TPN Continuous <Continuous>, 04-09-25 @ 17:00, 17:00, Stop order after: 1 Days    **Greater than 50% of the encounter was spent counseling and/coordination of care on parenteral nutrition. 25 minutes were spent face to face with the patient.
This patient has been assessed with a concern for Malnutrition and has been determined to have a diagnosis/diagnoses of Severe protein-calorie malnutrition.    This patient is being managed with:   Parenteral Nutrition - Adult-  Entered: Apr  3 2025  5:00PM    lipid fat emulsion (Fish Oil and Plant Based) 20% Infusion-[Known as SMOFLIPID 20% Infusion]  40 Gram(s) in IV Solution 200 milliLiter(s) infuse at 16.6 mL/Hr  Dose Rate: 16.6 mL/Hr Infuse Over: 12 Hours  Administration Instructions: Use 1.2 micron in-line filter  Entered: Apr  3 2025  5:00PM    Diet NPO-  Entered: Mar 26 2025  5:25AM  
This patient has been assessed with a concern for Malnutrition and has been determined to have a diagnosis/diagnoses of Severe protein-calorie malnutrition.    This patient is being managed with:   lipid fat emulsion (Fish Oil and Plant Based) 20% Infusion-[Known as SMOFLIPID 20% Infusion]  50 Gram(s) in IV Solution 250 milliLiter(s) infuse at 20.8 mL/Hr  Dose Rate: 20.8 mL/Hr Infuse Over: 12 Hours  Administration Instructions: Use 1.2 micron in-line filter  Entered: Apr 10 2025  5:00PM    Parenteral Nutrition - Adult-  Entered: Apr 10 2025  5:00PM    Diet NPO-  Entered: Apr 8 2025  7:32PM  
Severe protein calorie malnutrition in acute illness/ injury; secondary to poor appetite, weight loss >5% in 1 month and/or >7.5% in 3 months, caloric Intake <50% of nutrition needs >= 5 days, temporal wasting, severe loss of muscle mass/atrophy and loss of body fat stores.    Diet, Low Fiber (04-13-25 @ 08:17) [Active]    lipid, fat emulsion (Fish Oil and Plant Based) 20% Infusion 10.4 mL/Hr (10.4 mL/Hr) IV Continuous <Continuous>  Parenteral Nutrition - Adult 1 Each (42 mL/Hr) TPN Continuous <Continuous>, 04-13-25 @ 17:00, 17:00, Stop order after: 1 Days    **Greater than 50% of the encounter was spent counseling and/coordination of care on parenteral nutrition. 25 minutes were spent face to face with the patient.
Severe protein calorie malnutrition in acute illness/ injury; secondary to poor appetite, weight loss >5% in 1 month and/or >7.5% in 3 months, caloric Intake <50% of nutrition needs >= 5 days, temporal wasting, severe loss of muscle mass/atrophy and loss of body fat stores.    Diet, NPO (03-26-25 @ 05:26) [Active]    lipid, fat emulsion (Fish Oil and Plant Based) 20% Infusion 16.6 mL/Hr (16.6 mL/Hr) IV Continuous <Continuous>  Parenteral Nutrition - Adult 1 Each (63 mL/Hr) TPN Continuous <Continuous>, 04-04-25 @ 17:00, 17:00, Stop order after: 1 Days    **Greater than 50% of the encounter was spent counseling and/coordination of care on parenteral nutrition. 25 minutes were spent face to face with the patient.
Severe protein calorie malnutrition in acute illness/ injury; secondary to poor appetite, weight loss >5% in 1 month and/or >7.5% in 3 months, caloric Intake <50% of nutrition needs >= 5 days, temporal wasting, severe loss of muscle mass/atrophy and loss of body fat stores.    Diet, NPO (04-08-25 @ 19:31) [Active]    lipid, fat emulsion (Fish Oil and Plant Based) 20% Infusion 20.8 mL/Hr (20.8 mL/Hr) IV Continuous <Continuous>  Parenteral Nutrition - Adult 1 Each (75 mL/Hr) TPN Continuous <Continuous>, 04-10-25 @ 17:00, 17:00, Stop order after: 1 Days    **Greater than 50% of the encounter was spent counseling and/coordination of care on parenteral nutrition. 25 minutes were spent face to face with the patient.
Severe protein calorie malnutrition in acute illness/ injury; secondary to poor appetite, weight loss >5% in 1 month and/or >7.5% in 3 months, caloric Intake <50% of nutrition needs >= 5 days, temporal wasting, severe loss of muscle mass/atrophy and loss of body fat stores.    Diet, NPO (04-08-25 @ 19:31) [Active]    lipid, fat emulsion (Fish Oil and Plant Based) 20% Infusion 20.8 mL/Hr (20.8 mL/Hr) IV Continuous <Continuous>  Parenteral Nutrition - Adult 1 Each (75 mL/Hr) TPN Continuous <Continuous>, 04-11-25 @ 17:00, 17:00, Stop order after: 1 Days    **Greater than 50% of the encounter was spent counseling and/coordination of care on parenteral nutrition. 25 minutes were spent face to face with the patient.
This patient has been assessed with a concern for Malnutrition and has been determined to have a diagnosis/diagnoses of Severe protein-calorie malnutrition.    This patient is being managed with:   Parenteral Nutrition - Adult-  Entered: Apr 7 2025  5:00PM    lipid fat emulsion (Fish Oil and Plant Based) 20% Infusion-[Known as SMOFLIPID 20% Infusion]  50 Gram(s) in IV Solution 250 milliLiter(s) infuse at 20.8 mL/Hr  Dose Rate: 20.8 mL/Hr Infuse Over: 12 Hours  Administration Instructions: Use 1.2 micron in-line filter  Entered: Apr 7 2025  5:00PM    Diet NPO after Midnight-     NPO Start Date: 07-Apr-2025   NPO Start Time: 23:59  Entered: Apr 7 2025  3:48PM    Diet NPO-  With Ice Chips/Sips of Water  Entered: Apr 5 2025  5:25PM  
Severe protein calorie malnutrition in acute illness/ injury; secondary to poor appetite, weight loss >5% in 1 month and/or >7.5% in 3 months, caloric Intake <50% of nutrition needs >= 5 days, temporal wasting, severe loss of muscle mass/atrophy and loss of body fat stores.    Diet, Clear Liquid (04-12-25 @ 08:55) [Active]    lipid, fat emulsion (Fish Oil and Plant Based) 20% Infusion 20.8 mL/Hr (20.8 mL/Hr) IV Continuous <Continuous>  Parenteral Nutrition - Adult 1 Each (75 mL/Hr) TPN Continuous <Continuous>, 04-12-25 @ 17:00, 17:00, Stop order after: 1 Days    **Greater than 50% of the encounter was spent counseling and/coordination of care on parenteral nutrition. 25 minutes were spent face to face with the patient.
Severe protein calorie malnutrition in acute illness/ injury; secondary to poor appetite, weight loss >5% in 1 month and/or >7.5% in 3 months, caloric Intake <50% of nutrition needs >= 5 days, temporal wasting, severe loss of muscle mass/atrophy and loss of body fat stores.    Diet, Low Fiber (04-13-25 @ 08:17) [Active]    **Greater than 50% of the encounter was spent counseling and/coordination of care on parenteral nutrition. 25 minutes were spent face to face with the patient.
Severe protein calorie malnutrition in acute illness/ injury; secondary to poor appetite, weight loss >5% in 1 month and/or >7.5% in 3 months, caloric Intake <50% of nutrition needs >= 5 days, temporal wasting, severe loss of muscle mass/atrophy and loss of body fat stores.    Diet, NPO (03-26-25 @ 05:26) [Active]    lipid, fat emulsion (Fish Oil and Plant Based) 20% Infusion 10.4 mL/Hr (10.4 mL/Hr) IV Continuous <Continuous>  Parenteral Nutrition - Adult 1 Each (63 mL/Hr) TPN Continuous <Continuous>, 04-02-25 @ 17:00, 17:00, Stop order after: 1 Days    **Greater than 50% of the encounter was spent counseling and/coordination of care on parenteral nutrition. 25 minutes were spent face to face with the patient.
Severe protein calorie malnutrition in acute illness/ injury; secondary to poor appetite, weight loss >5% in 1 month and/or >7.5% in 3 months, caloric Intake <50% of nutrition needs >= 5 days, temporal wasting, severe loss of muscle mass/atrophy and loss of body fat stores.    Diet, NPO after Midnight:      NPO Start Date: 07-Apr-2025,   NPO Start Time: 23:59 (04-07-25 @ 15:48) [Active]  Diet, NPO:   With Ice Chips/Sips of Water (04-05-25 @ 17:25) [Active]    lipid, fat emulsion (Fish Oil and Plant Based) 20% Infusion 20.8 mL/Hr (20.8 mL/Hr) IV Continuous <Continuous>  Parenteral Nutrition - Adult 1 Each (75 mL/Hr) TPN Continuous <Continuous>, 04-08-25 @ 17:00, 17:00, Stop order after: 1 Days    **Greater than 50% of the encounter was spent counseling and/coordination of care on parenteral nutrition. 25 minutes were spent face to face with the patient.
This patient has been assessed with a concern for Malnutrition and has been determined to have a diagnosis/diagnoses of Severe protein-calorie malnutrition.    This patient is being managed with:   Diet NPO-  Entered: Apr 15 2025  9:49PM  
This patient has been assessed with a concern for Malnutrition and has been determined to have a diagnosis/diagnoses of Severe protein-calorie malnutrition.    This patient is being managed with:   Diet NPO-  Entered: Apr 15 2025  9:49PM  
This patient has been assessed with a concern for Malnutrition and has been determined to have a diagnosis/diagnoses of Severe protein-calorie malnutrition.    This patient is being managed with:   Diet NPO-  Entered: Mar 26 2025  5:25AM  
This patient has been assessed with a concern for Malnutrition and has been determined to have a diagnosis/diagnoses of Severe protein-calorie malnutrition.    This patient is being managed with:   Diet NPO-  Entered: Mar 26 2025  5:25AM  
This patient has been assessed with a concern for Malnutrition and has been determined to have a diagnosis/diagnoses of Severe protein-calorie malnutrition.    This patient is being managed with:   Parenteral Nutrition - Adult-  Entered: Apr 1 2025  5:00PM    Diet NPO-  Entered: Mar 26 2025  5:25AM  
This patient has been assessed with a concern for Malnutrition and has been determined to have a diagnosis/diagnoses of Severe protein-calorie malnutrition.    This patient is being managed with:   Parenteral Nutrition - Adult-  Entered: Apr 14 2025  5:00PM    Diet Low Fiber-  Entered: Apr 13 2025  8:17AM  
This patient has been assessed with a concern for Malnutrition and has been determined to have a diagnosis/diagnoses of Severe protein-calorie malnutrition.    This patient is being managed with:   Parenteral Nutrition - Adult-  Entered: Apr 4 2025  5:00PM    lipid fat emulsion (Fish Oil and Plant Based) 20% Infusion-[Known as SMOFLIPID 20% Infusion]  40 Gram(s) in IV Solution 200 milliLiter(s) infuse at 16.6 mL/Hr  Dose Rate: 16.6 mL/Hr Infuse Over: 12 Hours  Administration Instructions: Use 1.2 micron in-line filter  Entered: Apr 4 2025  5:00PM    Diet NPO-  Entered: Mar 26 2025  5:25AM  
Severe protein calorie malnutrition in acute illness/ injury; secondary to poor appetite, weight loss >5% in 1 month and/or >7.5% in 3 months, caloric Intake <50% of nutrition needs >= 5 days, temporal wasting, severe loss of muscle mass/atrophy and loss of body fat stores.    Diet, NPO (03-26-25 @ 05:26) [Active]    Parenteral Nutrition - Adult 1 Each (42 mL/Hr) TPN Continuous <Continuous>, 04-01-25 @ 17:00, 17:00, Stop order after: 1 Days    **Greater than 50% of the encounter was spent counseling and/coordination of care on parenteral nutrition. 25 minutes were spent face to face with the patient.
Severe protein calorie malnutrition in acute illness/ injury; secondary to poor appetite, weight loss >5% in 1 month and/or >7.5% in 3 months, caloric Intake <50% of nutrition needs >= 5 days, temporal wasting, severe loss of muscle mass/atrophy and loss of body fat stores.    Diet, NPO:   With Ice Chips/Sips of Water (04-05-25 @ 17:25) [Active]    lipid, fat emulsion (Fish Oil and Plant Based) 20% Infusion 20.8 mL/Hr (20.8 mL/Hr) IV Continuous <Continuous>  Parenteral Nutrition - Adult 1 Each (75 mL/Hr) TPN Continuous <Continuous>, 04-07-25 @ 17:00, 17:00, Stop order after: 1 Days    **Greater than 50% of the encounter was spent counseling and/coordination of care on parenteral nutrition. 25 minutes were spent face to face with the patient.
This patient has been assessed with a concern for Malnutrition and has been determined to have a diagnosis/diagnoses of Severe protein-calorie malnutrition.    This patient is being managed with:   Diet NPO-  Entered: Mar 26 2025  5:25AM  
This patient has been assessed with a concern for Malnutrition and has been determined to have a diagnosis/diagnoses of Severe protein-calorie malnutrition.    This patient is being managed with:   Diet Regular-  Entered: Apr 17 2025 12:04PM  
This patient has been assessed with a concern for Malnutrition and has been determined to have a diagnosis/diagnoses of Severe protein-calorie malnutrition.    This patient is being managed with:   Parenteral Nutrition - Adult-  Entered: Apr 2 2025  5:00PM    lipid fat emulsion (Fish Oil and Plant Based) 20% Infusion-[Known as SMOFLIPID 20% Infusion]  25 Gram(s) in IV Solution 125 milliLiter(s) infuse at 10.4 mL/Hr  Dose Rate: 10.4 mL/Hr Infuse Over: 12 Hours  Administration Instructions: Use 1.2 micron in-line filter  Entered: Apr 2 2025  5:00PM    Diet NPO-  Entered: Mar 26 2025  5:25AM  
This patient has been assessed with a concern for Malnutrition and has been determined to have a diagnosis/diagnoses of Severe protein-calorie malnutrition.    This patient is being managed with:   Diet NPO-  Entered: Mar 26 2025  5:25AM  
This patient has been assessed with a concern for Malnutrition and has been determined to have a diagnosis/diagnoses of Severe protein-calorie malnutrition.    This patient is being managed with:   Diet NPO-  Entered: Mar 26 2025  5:25AM  
This patient has been assessed with a concern for Malnutrition and has been determined to have a diagnosis/diagnoses of Severe protein-calorie malnutrition.    This patient is being managed with:   Parenteral Nutrition - Adult-  Entered: Apr 13 2025  5:00PM    lipid fat emulsion (Fish Oil and Plant Based) 20% Infusion-[Known as SMOFLIPID 20% Infusion]  25 Gram(s) in IV Solution 125 milliLiter(s) infuse at 10.4 mL/Hr  Dose Rate: 10.4 mL/Hr Infuse Over: 12 Hours  Administration Instructions: Use 1.2 micron in-line filter  Entered: Apr 13 2025  5:00PM    Diet Low Fiber-  Entered: Apr 13 2025  8:17AM    lipid fat emulsion (Fish Oil and Plant Based) 20% Infusion-[Known as SMOFLIPID 20% Infusion]  50 Gram(s) in IV Solution 250 milliLiter(s) infuse at 20.8 mL/Hr  Dose Rate: 20.8 mL/Hr Infuse Over: 12 Hours  Administration Instructions: Use 1.2 micron in-line filter  Entered: Apr 12 2025  5:00PM  
This patient has been assessed with a concern for Malnutrition and has been determined to have a diagnosis/diagnoses of Severe protein-calorie malnutrition.    This patient is being managed with:   Parenteral Nutrition - Adult-  Entered: Apr 4 2025  5:00PM    lipid fat emulsion (Fish Oil and Plant Based) 20% Infusion-[Known as SMOFLIPID 20% Infusion]  40 Gram(s) in IV Solution 200 milliLiter(s) infuse at 16.6 mL/Hr  Dose Rate: 16.6 mL/Hr Infuse Over: 12 Hours  Administration Instructions: Use 1.2 micron in-line filter  Entered: Apr 4 2025  5:00PM    Diet NPO-  Entered: Mar 26 2025  5:25AM  

## 2025-04-21 NOTE — PROGRESS NOTE ADULT - PROBLEM SELECTOR PLAN 6
Case reviewed with primary team    The patient's symptoms are well controlled. No acute symptoms at this time.   Thank you for allowing us to participate in your patient's care.  Patient to be discharged from [facility] team consult service today.   Discussed with primary team.  Please re-consult if we can be of further assistance, page [phone].
Case reviewed with primary team
Case reviewed with primary team and care coordination team   Patient is not a eligible for inpatient hospice at this time as he does not have symptoms at this time.   Further disposition planning as per care coordination team.     Thank you for allowing us to participate in your patient's care.  Patient to be discharged from [facility] team consult service today.   Please re-consult if we can be of further assistance, page [phone].
Case reviewed with primary team
- Behavioral health recommendations appreciated - IV Haldol 0.5mg q6 PRN agitation   - Start IV Ativan 0.25mg q2 PRN refractory nausea/ refractory anxiety or agitation

## 2025-04-22 NOTE — CHART NOTE - NSCHARTNOTESELECT_GEN_ALL_CORE
Agitation/Event Note
Event Note
Follow Up/Nutrition Services
Follow-up/Nutrition Services
Med rec/Event Note
Nutrition Services
Pre-IR PICC note
Psych/Event Note
ARMIDA and MOLST
Chronic bruner
Consult/Nutrition Services
Event Note
Event Note
Follow Up/Nutrition Services
Inpatient Hospice
Pain and Agitation/Event Note
Postop check/Event Note
Rapid Response

## 2025-04-22 NOTE — CHART NOTE - NSCHARTNOTEFT_GEN_A_CORE
Rapid Response called for patient for hypoxia to low 80s on vital sign check. Repeat spO2% on eval was 88%. Other VSS.     Patient asymptomatic, agitated, and reporting wanting to go home.     Clarified with RR team that patient is DNR/DNI and CMO as per G[facility] discussion with documented MOLST in chart.     Plan:   - Vital signs once qShift as per policy. No vital signs or checks from 10pm-6am.   - Supplemental O2 via NC only if patient reports SOB for symptomatic relief, otherwise no supplemental O2 based on spO2%  - Continue PRN medications for symptom control     [facility] [id number]

## 2025-04-22 NOTE — DISCHARGE NOTE FOR THE EXPIRED PATIENT - HOSPITAL COURSE
89M hx BPH (chronic indwelling Bruner), HLD, B/L inguinal hernia repair with known left recurrence, colon cancer (T4N1 w/ two synchronous lesions) s/p extended right hemicolectomy w/ ileocolic anastomosis on 12/23 (not on chemo), recent presentation 2/24 for acute diastolic heart failure, presenting with abdominal pain. Patient recently presented to ED on 3/1 for AP and was found to have a new right midabdominal lesion cf peritoneal carcinomatosis. Pt was scheduled to follow up with heme/onc outpatient today to determine a plan for his new peritoneal carcinomatosis (Dr. Ramirez), but instead presented to ED due to his pain. He is reporting several weeks of intermittent lower midline AP rated up to 8/10 in intensity. He also states he feels a lot of intestinal movement and his abdomen makes "noises like aliens in my intestines." He has not been tolerating PO solids well, mostly vomiting his food, "for a few weeks," however, he has been able to hold down fluids. He reports passing gas on the morning of 3/21, but hasn't had a bowel movement in possibly 12d. He is unsure if he has had unintended weight loss of late, but supposes he has. He denies hematemesis, hematochezia, fevers, or chills. He also reports dizziness, headache, and back and shoulder pain. In ED, vss. WBC 7.64. Cr. 1.69. Vlac 1.0. CTAP IV cf high grade SBO w/ RUQ TP at a spiculated soft tissue lesion, stable from prior, which is concerning for peritoneal carcinomatosis. No evidence of pneumatosis or portal venous gas. Patient was admitted to colorectal surgery, NGT placed, and started on malignant SBO protocol.   3/23 - GG challenge, contrast made it to rectum. Pt became acutely agitated and aggressive in the evening requiring zyprexa x1. Not tolerating EKG to be performed.  3/24 - NGT out and advanced to [facility]. Palliative consulted. Pt remained altered during day so Psych consult placed for evaluation. Started vancomycin yesterday for UCx growing >100k E faecium i/s/o AMS  3/25 - reporting flatus without BM. ID consulted - vanc dc'd, rec to monitor off abx. Mental status improved. Psych eval c/f delirium.   3/26 - made NPO overnight after episode of small emesis. No further flatus or BM. Pt with continued nausea into the afternoon and increasing abd distention --> NGT replaced with 800cc bilious output  3/29 - no flatus or BM. Plan for GG challenge today --> failed. Transition to NS due to downtrending sodium, 131 in AM  3/31 - continued no bowel function. Palliative re-consulted to determine goals of care and capacity given patient's lack of family and borderline mental status. Pt self dc'd NGT. TPN consulted and started next day. Primary team came to bedside after patient removed his nasogastric tube this morning. A discussion was started regarding the patient's understanding of his clinical condition as it relates to his cancer and malignant small bowel obstruction. The patient demonstrated an understanding that his cancer is the reason for his hospitalization as well as his prolonged NPO status. At this point, a conversation regarding his goals of care took place. Patient does not desire hospice/comfort care. Regarding his surgical goals of care, the patient stated an understanding of the two options. He is unsure which of the two he would like to pursue at this time, but demonstrated capacity to make a decision. Will revisit the patient's goals of care regarding possible venting PEG vs. intestinal bypass.  Patient was offered a nasogastric tube to be replaced for decompression, which he denied. He was informed of the risk of aspiration and worsening abdominal pain, and would still like to proceed without an NG tube.  4/2 - GI consulted for possible venting PEG placement. Further conversation about PEG vs bypass surgery. Pt still deciding.   4/4 - Pt decided to proceed with PEG. Added onto OR. However, later in afternoon when GI team came to speak with the pt prior to the procedure pt decided to decline getting a venting PEG and wanted to still think about which intervention he wants to proceed with. Ethics consulted for assistance in decision making --> ethics agrees that pt has capacity for decision making and what is considered a contributing factor to his vacillation of decision making is that he feels he hasn’t put his affairs in order—his finances (which he wants to direct to the future care of his daughter.) Social Work will facilitate the process to bring the patient’s  to the patient.    4/8 - after pt's affairs were in order with his , pt decided to proceed with the intestinal bypass surgery. Patient went to the OR on 4/8. Patient tolerated the procedure well without complications  4/11 - Post-op, pt with return of bowel function --> NGT clamp trial --> passed and NGT dc'd, pt advanced to [facility]  4/13 - pt had been advanced to [facility] and TPN weaning off  4/16 - pt's bowel function had ceased during the day 4/15, and then overnight pt with severe nausea and abd distension requiring NGT to be replaced and downgraded to NPO. Later, primary team called to bedside several times during the day due to patient's agitation and frustration with his treatment course with NGT/NPO. Additionally, the patient expressed a desire to make himself DNR/DNI but given his agitation, the decision was made to defer this conversation until he is more calm and consistent in his beliefs. When at the bedside, the patient expresses most of his frustration to be about the nasogastric tube, which he wants to be removed. An extensive discussion took place regarding the risks of nasogastric tube removal, which include but are not limited to aspiration and death. The patient demonstrated an understanding of these risks and still wishes to proceed with NG tube removal.   4/17 - Thorough C discussion was had with the pt with the primary team and palliative team outlining his desires going forward and code status. Patient is A&Ox4 and has capacity to make his own decisions. Patient wishes to be made comfort care only and completed a MOLST bedside with surgical/palliative team designating his DNR/DNI and comfort measure status. Patient was able to discuss with all team members his current diagnosis, obstruction due to his cancer, reason for his bypass surgery, and complications since including a post-op ileus. Patient understands that his bowels aren't moving appropriately and understands the risks of not proceeding with a NGT and NPO status. Patient was able to relay the risks of vomiting and aspiration due to eating/drinking in this setting to team members. Understanding these risks, pt wants to have comfort food and eat as he wishes. Patient also does not want any additional escalating medical management such as NGT, feeding tube / PEG, surgical intervention, or cardiac arrest measures such as chest compressions, intubation, or BiPAP mask. Patient wishes to be made comfort care. Patient agreeable to maintaining his bruner catheter and inserting a new IV for IV access in place of his PICC line being removed. Patient agreeable to continuing IV medications such as nausea and pain regimen.   4/22 - RRT called overnight for pt hypoxia. Rapid incorrectly called by nursing staff since patient was DNR/DNI and comfort care. Primary team was called bedside in the morning by nursing staff due to suspicion of death. Primary surgical team assessed the patient and found no clinical signs of life. Time of death was called at 6:03.

## (undated) DEVICE — VENODYNE/SCD SLEEVE CALF MEDIUM

## (undated) DEVICE — SUT MONOCRYL 4-0 27" PS-2 UNDYED

## (undated) DEVICE — WARMING BLANKET UPPER ADULT

## (undated) DEVICE — ELCTR BOVIE PENCIL SMOKE EVACUATION

## (undated) DEVICE — BASIN EMESIS 10IN GRADUATED MAUVE

## (undated) DEVICE — SOL IRR POUR NS 0.9% 1500ML

## (undated) DEVICE — Device

## (undated) DEVICE — BLADE SURGICAL #15 CARBON

## (undated) DEVICE — GOWN LG

## (undated) DEVICE — DRAPE GENERAL ENDOSCOPY

## (undated) DEVICE — BLADE SURGICAL #10 CARBON

## (undated) DEVICE — SUT SILK 3-0 18" SH (POP-OFF)

## (undated) DEVICE — DRAPE 3/4 SHEET 52X76"

## (undated) DEVICE — PACK IV START WITH CHG

## (undated) DEVICE — DRSG STERISTRIPS 0.5 X 4"

## (undated) DEVICE — SUT VICRYL 0 27" UR-6

## (undated) DEVICE — POSITIONER STRAP ARMBOARD VELCRO TS-30

## (undated) DEVICE — WARMING BLANKET FULL UNDERBODY

## (undated) DEVICE — DRAPE TOWEL BLUE 17" X 24"

## (undated) DEVICE — TIP METZENBAUM SCISSOR MONOPOLAR ENDOCUT (ORANGE)

## (undated) DEVICE — DRAPE FLUID WARMER 44 X 44"

## (undated) DEVICE — LUBRICATING JELLY HR ONE SHOT 3G

## (undated) DEVICE — STAPLER SKIN MULTI DIRECTION W35

## (undated) DEVICE — MARKER ENDO SPOT EX

## (undated) DEVICE — SHEARS COVIDIEN ENDO SHEAR 5MM X 31CM W UNIPOLAR CAUTERY

## (undated) DEVICE — ELCTR GROUNDING PAD ADULT COVIDIEN

## (undated) DEVICE — TUBING INSUFFLATION LAP FILTER 10FT

## (undated) DEVICE — FOLEY TRAY 16FR 5CC LF UMETER CLOSED

## (undated) DEVICE — TROCAR COVIDIEN VERSAPORT BLADELESS OPTICAL 5MM STANDARD

## (undated) DEVICE — BASIN SET SINGLE

## (undated) DEVICE — CANISTER DISPOSABLE THIN WALL 3000CC

## (undated) DEVICE — POOLE SUCTION TIP

## (undated) DEVICE — ELCTR BOVIE BLADE 3/4" EXTENDED LENGTH 6"

## (undated) DEVICE — NDL COUNTER FOAM AND MAGNET 20-40

## (undated) DEVICE — DISSECTOR ENDOSCOPIC KITTNER SINGLE TIP

## (undated) DEVICE — TROCAR COVIDIEN BLUNT TIP HASSAN 12MM

## (undated) DEVICE — ELCTR REM POLYHESIVE PATIENT RETURN ELECTRODE ADULT

## (undated) DEVICE — GLV 7.5 PROTEXIS (WHITE)

## (undated) DEVICE — DRSG TEGADERM 2.5X3"

## (undated) DEVICE — TUBING HYDRO-SURG PLUS IRRIGATOR W SMOKEVAC & PROBE

## (undated) DEVICE — BIOPSY FORCEP COLD DISP

## (undated) DEVICE — ELCTR ECG CONDUCTIVE ADHESIVE

## (undated) DEVICE — PACK GENERAL LAPAROSCOPY

## (undated) DEVICE — CONTAINER FORMALIN 80ML YELLOW

## (undated) DEVICE — SALIVA EJECTOR (BLUE)

## (undated) DEVICE — SUT MAXON 1 36" GS-24

## (undated) DEVICE — LINE BREATHE SAMPLNG

## (undated) DEVICE — TUBING STRYKER PNEUMOCLEAR SMOKE EVACUATION HIGH FLOW

## (undated) DEVICE — SYR LUER LOK 20CC

## (undated) DEVICE — D HELP - CLEARVIEW CLEARIFY SYSTEM

## (undated) DEVICE — SOL IRR POUR H2O 1500ML

## (undated) DEVICE — SOL IRR POUR NS 0.9% 1000ML

## (undated) DEVICE — DRSG BANDAID 0.75X3"

## (undated) DEVICE — ENDOCATCH 10MM

## (undated) DEVICE — SOL IRR POUR H2O 500ML

## (undated) DEVICE — LABELS BLANK W PEN

## (undated) DEVICE — DRSG 2X2

## (undated) DEVICE — POSITIONER FOAM EGG CRATE ULNAR 2PCS (PINK)

## (undated) DEVICE — DRAPE WARMING SOLUTION 44 X 44"

## (undated) DEVICE — TUBING STRYKER PNEUMOCLEAR SMOKE HEAT HUMID

## (undated) DEVICE — TUBING MEDI-VAC W MAXIGRIP CONNECTORS 1/4"X6'

## (undated) DEVICE — DRSG TELFA 3 X 8

## (undated) DEVICE — PROTECTOR HEEL / ELBOW FLUFFY

## (undated) DEVICE — TUBING IV SET GRAVITY 3Y 100" MACRO

## (undated) DEVICE — TROCAR APPLIED MEDICAL KII BALLOON BLUNT TIP 12MM X 100MM

## (undated) DEVICE — STAPLER SKIN VISI-STAT 35 WIDE

## (undated) DEVICE — DRSG CURITY GAUZE SPONGE 4 X 4" 12-PLY NON-STERILE

## (undated) DEVICE — CATH IV SAFE BC 22G X 1" (BLUE)

## (undated) DEVICE — TUBING OLYMPUS INSUFFLATION

## (undated) DEVICE — TROCAR COVIDIEN BLUNT TIP HASSAN 10MM

## (undated) DEVICE — BIOPSY FORCEP RADIAL JAW 4 STANDARD WITH NEEDLE

## (undated) DEVICE — TUBING SUCTION NONCONDUCTIVE 6MM X 12FT